# Patient Record
Sex: FEMALE | Race: WHITE | Employment: UNEMPLOYED | URBAN - METROPOLITAN AREA
[De-identification: names, ages, dates, MRNs, and addresses within clinical notes are randomized per-mention and may not be internally consistent; named-entity substitution may affect disease eponyms.]

---

## 2017-12-27 PROBLEM — R04.0 ACUTE ANTERIOR EPISTAXIS: Status: ACTIVE | Noted: 2017-12-27

## 2017-12-29 PROBLEM — D62 ANEMIA DUE TO ACUTE BLOOD LOSS: Status: ACTIVE | Noted: 2017-12-29

## 2017-12-29 PROBLEM — N18.30 CKD (CHRONIC KIDNEY DISEASE) STAGE 3, GFR 30-59 ML/MIN (HCC): Status: ACTIVE | Noted: 2017-12-29

## 2017-12-29 PROBLEM — I48.20 CHRONIC ATRIAL FIBRILLATION (HCC): Status: ACTIVE | Noted: 2017-12-29

## 2017-12-30 PROBLEM — E87.70 VOLUME OVERLOAD: Status: ACTIVE | Noted: 2017-12-30

## 2018-02-13 PROBLEM — E78.2 MIXED HYPERLIPIDEMIA: Status: ACTIVE | Noted: 2018-02-13

## 2018-02-13 PROBLEM — Z92.89 HISTORY OF ECHOCARDIOGRAM: Status: ACTIVE | Noted: 2018-02-13

## 2018-04-25 ENCOUNTER — HOSPITAL ENCOUNTER (INPATIENT)
Age: 83
LOS: 13 days | Discharge: NURSING, REHAB OR LONG-TERM CARE | DRG: 981 | End: 2018-05-08
Attending: EMERGENCY MEDICINE | Admitting: INTERNAL MEDICINE
Payer: MEDICARE

## 2018-04-25 DIAGNOSIS — D64.9 ANEMIA, UNSPECIFIED TYPE: ICD-10-CM

## 2018-04-25 DIAGNOSIS — R04.0 EPISTAXIS: Primary | ICD-10-CM

## 2018-04-25 LAB
ANISOCYTOSIS: ABNORMAL
APTT: 27.9 SEC (ref 24.5–35.1)
BASOPHILS ABSOLUTE: 0.01 E9/L (ref 0–0.2)
BASOPHILS RELATIVE PERCENT: 0.1 % (ref 0–2)
EOSINOPHILS ABSOLUTE: 0.1 E9/L (ref 0.05–0.5)
EOSINOPHILS RELATIVE PERCENT: 1.5 % (ref 0–6)
HCT VFR BLD CALC: 29.1 % (ref 34–48)
HEMOGLOBIN: 8.4 G/DL (ref 11.5–15.5)
HYPOCHROMIA: ABNORMAL
IMMATURE GRANULOCYTES #: 0.02 E9/L
IMMATURE GRANULOCYTES %: 0.3 % (ref 0–5)
INR BLD: 1.1
LYMPHOCYTES ABSOLUTE: 0.81 E9/L (ref 1.5–4)
LYMPHOCYTES RELATIVE PERCENT: 12 % (ref 20–42)
MCH RBC QN AUTO: 23.3 PG (ref 26–35)
MCHC RBC AUTO-ENTMCNC: 28.9 % (ref 32–34.5)
MCV RBC AUTO: 80.8 FL (ref 80–99.9)
MONOCYTES ABSOLUTE: 0.59 E9/L (ref 0.1–0.95)
MONOCYTES RELATIVE PERCENT: 8.7 % (ref 2–12)
NEUTROPHILS ABSOLUTE: 5.22 E9/L (ref 1.8–7.3)
NEUTROPHILS RELATIVE PERCENT: 77.4 % (ref 43–80)
OVALOCYTES: ABNORMAL
PDW BLD-RTO: 19.6 FL (ref 11.5–15)
PLATELET # BLD: 248 E9/L (ref 130–450)
PMV BLD AUTO: 9.7 FL (ref 7–12)
POIKILOCYTES: ABNORMAL
POLYCHROMASIA: ABNORMAL
PROTHROMBIN TIME: 13 SEC (ref 9.3–12.4)
RBC # BLD: 3.6 E12/L (ref 3.5–5.5)
WBC # BLD: 6.8 E9/L (ref 4.5–11.5)

## 2018-04-25 PROCEDURE — 96374 THER/PROPH/DIAG INJ IV PUSH: CPT

## 2018-04-25 PROCEDURE — 2700000000 HC OXYGEN THERAPY PER DAY

## 2018-04-25 PROCEDURE — 6370000000 HC RX 637 (ALT 250 FOR IP): Performed by: INTERNAL MEDICINE

## 2018-04-25 PROCEDURE — 6370000000 HC RX 637 (ALT 250 FOR IP): Performed by: EMERGENCY MEDICINE

## 2018-04-25 PROCEDURE — 6360000002 HC RX W HCPCS: Performed by: EMERGENCY MEDICINE

## 2018-04-25 PROCEDURE — 2500000003 HC RX 250 WO HCPCS: Performed by: EMERGENCY MEDICINE

## 2018-04-25 PROCEDURE — 30901 CONTROL OF NOSEBLEED: CPT

## 2018-04-25 PROCEDURE — 85730 THROMBOPLASTIN TIME PARTIAL: CPT

## 2018-04-25 PROCEDURE — 99285 EMERGENCY DEPT VISIT HI MDM: CPT

## 2018-04-25 PROCEDURE — 2580000003 HC RX 258: Performed by: INTERNAL MEDICINE

## 2018-04-25 PROCEDURE — 85610 PROTHROMBIN TIME: CPT

## 2018-04-25 PROCEDURE — 2060000000 HC ICU INTERMEDIATE R&B

## 2018-04-25 PROCEDURE — 85025 COMPLETE CBC W/AUTO DIFF WBC: CPT

## 2018-04-25 PROCEDURE — 2Y41X5Z PACKING OF NASAL REGION USING PACKING MATERIAL: ICD-10-PCS | Performed by: EMERGENCY MEDICINE

## 2018-04-25 RX ORDER — HYDRALAZINE HYDROCHLORIDE 10 MG/1
10 TABLET, FILM COATED ORAL 3 TIMES DAILY
Status: CANCELLED | OUTPATIENT
Start: 2018-04-25

## 2018-04-25 RX ORDER — FUROSEMIDE 20 MG/1
20 TABLET ORAL DAILY
Status: DISCONTINUED | OUTPATIENT
Start: 2018-04-26 | End: 2018-04-30

## 2018-04-25 RX ORDER — SODIUM CHLORIDE 0.9 % (FLUSH) 0.9 %
10 SYRINGE (ML) INJECTION PRN
Status: DISCONTINUED | OUTPATIENT
Start: 2018-04-25 | End: 2018-04-28 | Stop reason: SDUPTHER

## 2018-04-25 RX ORDER — CLONIDINE HYDROCHLORIDE 0.1 MG/1
0.1 TABLET ORAL ONCE
Status: DISCONTINUED | OUTPATIENT
Start: 2018-04-25 | End: 2018-05-02

## 2018-04-25 RX ORDER — TRANEXAMIC ACID 100 MG/ML
100 INJECTION, SOLUTION INTRAVENOUS ONCE
Status: COMPLETED | OUTPATIENT
Start: 2018-04-25 | End: 2018-04-25

## 2018-04-25 RX ORDER — ONDANSETRON 2 MG/ML
4 INJECTION INTRAMUSCULAR; INTRAVENOUS EVERY 6 HOURS PRN
Status: DISCONTINUED | OUTPATIENT
Start: 2018-04-25 | End: 2018-05-08 | Stop reason: HOSPADM

## 2018-04-25 RX ORDER — LORAZEPAM 2 MG/ML
1 INJECTION INTRAMUSCULAR ONCE
Status: COMPLETED | OUTPATIENT
Start: 2018-04-25 | End: 2018-04-25

## 2018-04-25 RX ORDER — GABAPENTIN 300 MG/1
600 CAPSULE ORAL NIGHTLY
Status: DISCONTINUED | OUTPATIENT
Start: 2018-04-25 | End: 2018-05-08 | Stop reason: HOSPADM

## 2018-04-25 RX ORDER — SIMVASTATIN 40 MG
40 TABLET ORAL NIGHTLY
Status: DISCONTINUED | OUTPATIENT
Start: 2018-04-25 | End: 2018-05-08 | Stop reason: HOSPADM

## 2018-04-25 RX ORDER — OXYMETAZOLINE HYDROCHLORIDE 0.05 G/100ML
2 SPRAY NASAL ONCE
Status: COMPLETED | OUTPATIENT
Start: 2018-04-25 | End: 2018-04-25

## 2018-04-25 RX ORDER — HYDROXYZINE HYDROCHLORIDE 10 MG/1
10 TABLET, FILM COATED ORAL 3 TIMES DAILY PRN
Status: CANCELLED | OUTPATIENT
Start: 2018-04-25

## 2018-04-25 RX ORDER — SODIUM CHLORIDE 0.9 % (FLUSH) 0.9 %
10 SYRINGE (ML) INJECTION EVERY 12 HOURS SCHEDULED
Status: DISCONTINUED | OUTPATIENT
Start: 2018-04-25 | End: 2018-05-08 | Stop reason: HOSPADM

## 2018-04-25 RX ORDER — FUROSEMIDE 40 MG/1
20 TABLET ORAL DAILY
Status: CANCELLED | OUTPATIENT
Start: 2018-04-25

## 2018-04-25 RX ORDER — HYDROXYZINE HYDROCHLORIDE 10 MG/1
10 TABLET, FILM COATED ORAL 3 TIMES DAILY PRN
Status: DISCONTINUED | OUTPATIENT
Start: 2018-04-25 | End: 2018-05-08 | Stop reason: HOSPADM

## 2018-04-25 RX ORDER — LISINOPRIL 10 MG/1
10 TABLET ORAL DAILY
Status: DISCONTINUED | OUTPATIENT
Start: 2018-04-26 | End: 2018-04-27

## 2018-04-25 RX ORDER — PANTOPRAZOLE SODIUM 40 MG/1
40 TABLET, DELAYED RELEASE ORAL
Status: DISCONTINUED | OUTPATIENT
Start: 2018-04-26 | End: 2018-04-29

## 2018-04-25 RX ORDER — INSULIN GLARGINE 100 [IU]/ML
60 INJECTION, SOLUTION SUBCUTANEOUS NIGHTLY
Status: DISCONTINUED | OUTPATIENT
Start: 2018-04-25 | End: 2018-05-08

## 2018-04-25 RX ORDER — NORTRIPTYLINE HYDROCHLORIDE 10 MG/1
10 CAPSULE ORAL NIGHTLY
Status: CANCELLED | OUTPATIENT
Start: 2018-04-25

## 2018-04-25 RX ORDER — METOPROLOL SUCCINATE 100 MG/1
100 TABLET, EXTENDED RELEASE ORAL DAILY
Status: DISCONTINUED | OUTPATIENT
Start: 2018-04-26 | End: 2018-04-30

## 2018-04-25 RX ORDER — SIMVASTATIN 40 MG
40 TABLET ORAL NIGHTLY
Status: CANCELLED | OUTPATIENT
Start: 2018-04-25

## 2018-04-25 RX ORDER — LEVOTHYROXINE SODIUM 0.03 MG/1
25 TABLET ORAL DAILY
Status: DISCONTINUED | OUTPATIENT
Start: 2018-04-26 | End: 2018-05-08 | Stop reason: HOSPADM

## 2018-04-25 RX ORDER — OXYMETAZOLINE HYDROCHLORIDE 0.05 G/100ML
2 SPRAY NASAL ONCE
Status: DISCONTINUED | OUTPATIENT
Start: 2018-04-25 | End: 2018-04-25

## 2018-04-25 RX ORDER — GABAPENTIN 300 MG/1
600 CAPSULE ORAL NIGHTLY
Status: CANCELLED | OUTPATIENT
Start: 2018-04-25

## 2018-04-25 RX ORDER — PANTOPRAZOLE SODIUM 40 MG/1
40 TABLET, DELAYED RELEASE ORAL
Status: CANCELLED | OUTPATIENT
Start: 2018-04-26

## 2018-04-25 RX ORDER — LISINOPRIL 10 MG/1
10 TABLET ORAL DAILY
Status: CANCELLED | OUTPATIENT
Start: 2018-04-25

## 2018-04-25 RX ORDER — METOPROLOL SUCCINATE 100 MG/1
100 TABLET, EXTENDED RELEASE ORAL DAILY
Status: CANCELLED | OUTPATIENT
Start: 2018-04-25

## 2018-04-25 RX ORDER — LEVOTHYROXINE SODIUM 0.03 MG/1
25 TABLET ORAL DAILY
Status: CANCELLED | OUTPATIENT
Start: 2018-04-25

## 2018-04-25 RX ORDER — HYDRALAZINE HYDROCHLORIDE 10 MG/1
10 TABLET, FILM COATED ORAL 3 TIMES DAILY
Status: DISCONTINUED | OUTPATIENT
Start: 2018-04-25 | End: 2018-04-28

## 2018-04-25 RX ORDER — CALCIUM CARBONATE 500(1250)
500 TABLET ORAL DAILY
Status: DISCONTINUED | OUTPATIENT
Start: 2018-04-26 | End: 2018-05-08 | Stop reason: HOSPADM

## 2018-04-25 RX ORDER — NORTRIPTYLINE HYDROCHLORIDE 10 MG/1
10 CAPSULE ORAL NIGHTLY
Status: DISCONTINUED | OUTPATIENT
Start: 2018-04-25 | End: 2018-05-08 | Stop reason: HOSPADM

## 2018-04-25 RX ORDER — CALCIUM CARBONATE 500(1250)
500 TABLET ORAL DAILY
Status: CANCELLED | OUTPATIENT
Start: 2018-04-25

## 2018-04-25 RX ADMIN — OXYMETAZOLINE HYDROCHLORIDE 2 SPRAY: 5 SPRAY NASAL at 16:43

## 2018-04-25 RX ADMIN — HYDROXYZINE HYDROCHLORIDE 10 MG: 10 TABLET, FILM COATED ORAL at 22:08

## 2018-04-25 RX ADMIN — Medication 10 ML: at 22:08

## 2018-04-25 RX ADMIN — GABAPENTIN 600 MG: 300 CAPSULE ORAL at 22:07

## 2018-04-25 RX ADMIN — NORTRIPTYLINE HYDROCHLORIDE 10 MG: 10 CAPSULE ORAL at 22:07

## 2018-04-25 RX ADMIN — SIMVASTATIN 40 MG: 40 TABLET, FILM COATED ORAL at 22:07

## 2018-04-25 RX ADMIN — COCAINE HYDROCHLORIDE: 40 SOLUTION TOPICAL at 16:43

## 2018-04-25 RX ADMIN — INSULIN GLARGINE 60 UNITS: 100 INJECTION, SOLUTION SUBCUTANEOUS at 22:10

## 2018-04-25 RX ADMIN — TRANEXAMIC ACID 100 MG: 100 INJECTION, SOLUTION INTRAVENOUS at 19:42

## 2018-04-25 RX ADMIN — HYDRALAZINE HYDROCHLORIDE 10 MG: 10 TABLET, FILM COATED ORAL at 22:07

## 2018-04-25 RX ADMIN — LORAZEPAM 1 MG: 2 INJECTION, SOLUTION INTRAMUSCULAR; INTRAVENOUS at 16:43

## 2018-04-25 ASSESSMENT — ENCOUNTER SYMPTOMS
SORE THROAT: 0
COUGH: 0
SHORTNESS OF BREATH: 0
VOMITING: 0
BACK PAIN: 0
CHEST TIGHTNESS: 0
ABDOMINAL PAIN: 0
DIARRHEA: 0
RHINORRHEA: 0
NAUSEA: 0

## 2018-04-26 ENCOUNTER — APPOINTMENT (OUTPATIENT)
Dept: GENERAL RADIOLOGY | Age: 83
DRG: 981 | End: 2018-04-26
Payer: MEDICARE

## 2018-04-26 PROBLEM — D62 ACUTE BLOOD LOSS ANEMIA: Status: ACTIVE | Noted: 2018-04-26

## 2018-04-26 PROBLEM — E78.2 MIXED HYPERLIPIDEMIA: Status: RESOLVED | Noted: 2018-02-13 | Resolved: 2018-04-26

## 2018-04-26 LAB
ALBUMIN SERPL-MCNC: 3.4 G/DL (ref 3.5–5.2)
ALP BLD-CCNC: 81 U/L (ref 35–104)
ALT SERPL-CCNC: 13 U/L (ref 0–32)
ANION GAP SERPL CALCULATED.3IONS-SCNC: 12 MMOL/L (ref 7–16)
ANISOCYTOSIS: ABNORMAL
AST SERPL-CCNC: 20 U/L (ref 0–31)
BASOPHILS ABSOLUTE: 0 E9/L (ref 0–0.2)
BASOPHILS RELATIVE PERCENT: 0 % (ref 0–2)
BILIRUB SERPL-MCNC: 0.5 MG/DL (ref 0–1.2)
BUN BLDV-MCNC: 29 MG/DL (ref 8–23)
CALCIUM SERPL-MCNC: 9.1 MG/DL (ref 8.6–10.2)
CHLORIDE BLD-SCNC: 101 MMOL/L (ref 98–107)
CO2: 28 MMOL/L (ref 22–29)
CREAT SERPL-MCNC: 1.5 MG/DL (ref 0.5–1)
EOSINOPHILS ABSOLUTE: 0.2 E9/L (ref 0.05–0.5)
EOSINOPHILS RELATIVE PERCENT: 4.4 % (ref 0–6)
GFR AFRICAN AMERICAN: 40
GFR NON-AFRICAN AMERICAN: 33 ML/MIN/1.73
GLUCOSE BLD-MCNC: 127 MG/DL (ref 74–109)
HCT VFR BLD CALC: 26.2 % (ref 34–48)
HCT VFR BLD CALC: 26.3 % (ref 34–48)
HCT VFR BLD CALC: 26.6 % (ref 34–48)
HEMOGLOBIN: 7.6 G/DL (ref 11.5–15.5)
HEMOGLOBIN: 7.6 G/DL (ref 11.5–15.5)
HEMOGLOBIN: 7.7 G/DL (ref 11.5–15.5)
HYPOCHROMIA: ABNORMAL
LYMPHOCYTES ABSOLUTE: 0.78 E9/L (ref 1.5–4)
LYMPHOCYTES RELATIVE PERCENT: 16.5 % (ref 20–42)
MCH RBC QN AUTO: 23.6 PG (ref 26–35)
MCHC RBC AUTO-ENTMCNC: 28.9 % (ref 32–34.5)
MCV RBC AUTO: 81.7 FL (ref 80–99.9)
METER GLUCOSE: 295 MG/DL (ref 70–110)
MONOCYTES ABSOLUTE: 0.23 E9/L (ref 0.1–0.95)
MONOCYTES RELATIVE PERCENT: 5.2 % (ref 2–12)
NEUTROPHILS ABSOLUTE: 3.4 E9/L (ref 1.8–7.3)
NEUTROPHILS RELATIVE PERCENT: 73.9 % (ref 43–80)
NUCLEATED RED BLOOD CELLS: 0 /100 WBC
OVALOCYTES: ABNORMAL
PDW BLD-RTO: 19.5 FL (ref 11.5–15)
PLATELET # BLD: 217 E9/L (ref 130–450)
PMV BLD AUTO: 9.4 FL (ref 7–12)
POIKILOCYTES: ABNORMAL
POLYCHROMASIA: ABNORMAL
POTASSIUM REFLEX MAGNESIUM: 4.8 MMOL/L (ref 3.5–5)
RBC # BLD: 3.22 E12/L (ref 3.5–5.5)
SODIUM BLD-SCNC: 141 MMOL/L (ref 132–146)
TARGET CELLS: ABNORMAL
TOTAL PROTEIN: 6.3 G/DL (ref 6.4–8.3)
WBC # BLD: 4.6 E9/L (ref 4.5–11.5)

## 2018-04-26 PROCEDURE — 80053 COMPREHEN METABOLIC PANEL: CPT

## 2018-04-26 PROCEDURE — 97535 SELF CARE MNGMENT TRAINING: CPT

## 2018-04-26 PROCEDURE — G8988 SELF CARE GOAL STATUS: HCPCS

## 2018-04-26 PROCEDURE — 6370000000 HC RX 637 (ALT 250 FOR IP): Performed by: OTOLARYNGOLOGY

## 2018-04-26 PROCEDURE — 36415 COLL VENOUS BLD VENIPUNCTURE: CPT

## 2018-04-26 PROCEDURE — 85025 COMPLETE CBC W/AUTO DIFF WBC: CPT

## 2018-04-26 PROCEDURE — G8987 SELF CARE CURRENT STATUS: HCPCS

## 2018-04-26 PROCEDURE — 2580000003 HC RX 258: Performed by: INTERNAL MEDICINE

## 2018-04-26 PROCEDURE — 82962 GLUCOSE BLOOD TEST: CPT

## 2018-04-26 PROCEDURE — 94664 DEMO&/EVAL PT USE INHALER: CPT

## 2018-04-26 PROCEDURE — 2700000000 HC OXYGEN THERAPY PER DAY

## 2018-04-26 PROCEDURE — 85018 HEMOGLOBIN: CPT

## 2018-04-26 PROCEDURE — 97165 OT EVAL LOW COMPLEX 30 MIN: CPT

## 2018-04-26 PROCEDURE — 6370000000 HC RX 637 (ALT 250 FOR IP): Performed by: INTERNAL MEDICINE

## 2018-04-26 PROCEDURE — 85014 HEMATOCRIT: CPT

## 2018-04-26 PROCEDURE — 71046 X-RAY EXAM CHEST 2 VIEWS: CPT

## 2018-04-26 PROCEDURE — 97161 PT EVAL LOW COMPLEX 20 MIN: CPT

## 2018-04-26 PROCEDURE — 2060000000 HC ICU INTERMEDIATE R&B

## 2018-04-26 PROCEDURE — 94640 AIRWAY INHALATION TREATMENT: CPT

## 2018-04-26 RX ORDER — NICOTINE POLACRILEX 4 MG
15 LOZENGE BUCCAL PRN
Status: DISCONTINUED | OUTPATIENT
Start: 2018-04-26 | End: 2018-04-28 | Stop reason: SDUPTHER

## 2018-04-26 RX ORDER — IPRATROPIUM BROMIDE AND ALBUTEROL SULFATE 2.5; .5 MG/3ML; MG/3ML
1 SOLUTION RESPIRATORY (INHALATION)
Status: DISCONTINUED | OUTPATIENT
Start: 2018-04-26 | End: 2018-05-08 | Stop reason: HOSPADM

## 2018-04-26 RX ORDER — DEXTROSE MONOHYDRATE 50 MG/ML
100 INJECTION, SOLUTION INTRAVENOUS PRN
Status: DISCONTINUED | OUTPATIENT
Start: 2018-04-26 | End: 2018-04-28 | Stop reason: SDUPTHER

## 2018-04-26 RX ORDER — DEXTROSE MONOHYDRATE 25 G/50ML
12.5 INJECTION, SOLUTION INTRAVENOUS PRN
Status: DISCONTINUED | OUTPATIENT
Start: 2018-04-26 | End: 2018-04-28 | Stop reason: SDUPTHER

## 2018-04-26 RX ORDER — SULFAMETHOXAZOLE AND TRIMETHOPRIM 800; 160 MG/1; MG/1
1 TABLET ORAL DAILY
Status: DISCONTINUED | OUTPATIENT
Start: 2018-04-26 | End: 2018-05-02

## 2018-04-26 RX ORDER — SULFAMETHOXAZOLE AND TRIMETHOPRIM 800; 160 MG/1; MG/1
1 TABLET ORAL EVERY 12 HOURS SCHEDULED
Status: DISCONTINUED | OUTPATIENT
Start: 2018-04-26 | End: 2018-04-26 | Stop reason: DRUGHIGH

## 2018-04-26 RX ADMIN — METOPROLOL SUCCINATE 100 MG: 100 TABLET, FILM COATED, EXTENDED RELEASE ORAL at 09:11

## 2018-04-26 RX ADMIN — Medication 10 ML: at 19:58

## 2018-04-26 RX ADMIN — INSULIN GLARGINE 60 UNITS: 100 INJECTION, SOLUTION SUBCUTANEOUS at 20:56

## 2018-04-26 RX ADMIN — NORTRIPTYLINE HYDROCHLORIDE 10 MG: 10 CAPSULE ORAL at 19:57

## 2018-04-26 RX ADMIN — HYDRALAZINE HYDROCHLORIDE 10 MG: 10 TABLET, FILM COATED ORAL at 15:30

## 2018-04-26 RX ADMIN — LEVOTHYROXINE SODIUM 25 MCG: 25 TABLET ORAL at 05:18

## 2018-04-26 RX ADMIN — SIMVASTATIN 40 MG: 40 TABLET, FILM COATED ORAL at 19:57

## 2018-04-26 RX ADMIN — IPRATROPIUM BROMIDE AND ALBUTEROL SULFATE 1 AMPULE: .5; 3 SOLUTION RESPIRATORY (INHALATION) at 11:56

## 2018-04-26 RX ADMIN — Medication 10 ML: at 17:39

## 2018-04-26 RX ADMIN — HYDRALAZINE HYDROCHLORIDE 10 MG: 10 TABLET, FILM COATED ORAL at 09:11

## 2018-04-26 RX ADMIN — Medication 500 MG: at 09:11

## 2018-04-26 RX ADMIN — GABAPENTIN 600 MG: 300 CAPSULE ORAL at 19:57

## 2018-04-26 RX ADMIN — IPRATROPIUM BROMIDE AND ALBUTEROL SULFATE 1 AMPULE: .5; 3 SOLUTION RESPIRATORY (INHALATION) at 15:30

## 2018-04-26 RX ADMIN — LISINOPRIL 10 MG: 10 TABLET ORAL at 09:11

## 2018-04-26 RX ADMIN — PANTOPRAZOLE SODIUM 40 MG: 40 TABLET, DELAYED RELEASE ORAL at 05:18

## 2018-04-26 RX ADMIN — PANTOPRAZOLE SODIUM 40 MG: 40 TABLET, DELAYED RELEASE ORAL at 17:04

## 2018-04-26 RX ADMIN — HYDRALAZINE HYDROCHLORIDE 10 MG: 10 TABLET, FILM COATED ORAL at 19:57

## 2018-04-26 RX ADMIN — FUROSEMIDE 20 MG: 20 TABLET ORAL at 09:11

## 2018-04-26 RX ADMIN — SULFAMETHOXAZOLE AND TRIMETHOPRIM 1 TABLET: 800; 160 TABLET ORAL at 11:31

## 2018-04-26 RX ADMIN — IPRATROPIUM BROMIDE AND ALBUTEROL SULFATE 1 AMPULE: .5; 3 SOLUTION RESPIRATORY (INHALATION) at 20:16

## 2018-04-26 RX ADMIN — Medication 10 ML: at 09:12

## 2018-04-26 ASSESSMENT — PAIN SCALES - GENERAL
PAINLEVEL_OUTOF10: 0
PAINLEVEL_OUTOF10: 0

## 2018-04-27 ENCOUNTER — APPOINTMENT (OUTPATIENT)
Dept: CT IMAGING | Age: 83
DRG: 981 | End: 2018-04-27
Payer: MEDICARE

## 2018-04-27 ENCOUNTER — ANESTHESIA EVENT (OUTPATIENT)
Dept: OPERATING ROOM | Age: 83
DRG: 981 | End: 2018-04-27
Payer: MEDICARE

## 2018-04-27 LAB
ABO/RH: NORMAL
ALBUMIN SERPL-MCNC: 3.6 G/DL (ref 3.5–5.2)
ALP BLD-CCNC: 85 U/L (ref 35–104)
ALT SERPL-CCNC: 13 U/L (ref 0–32)
ANION GAP SERPL CALCULATED.3IONS-SCNC: 10 MMOL/L (ref 7–16)
ANION GAP SERPL CALCULATED.3IONS-SCNC: 9 MMOL/L (ref 7–16)
ANION GAP SERPL CALCULATED.3IONS-SCNC: 9 MMOL/L (ref 7–16)
ANISOCYTOSIS: ABNORMAL
ANTIBODY SCREEN: NORMAL
AST SERPL-CCNC: 19 U/L (ref 0–31)
BASOPHILS ABSOLUTE: 0.02 E9/L (ref 0–0.2)
BASOPHILS RELATIVE PERCENT: 0.3 % (ref 0–2)
BILIRUB SERPL-MCNC: 0.2 MG/DL (ref 0–1.2)
BUN BLDV-MCNC: 40 MG/DL (ref 8–23)
BUN BLDV-MCNC: 41 MG/DL (ref 8–23)
BUN BLDV-MCNC: 46 MG/DL (ref 8–23)
CALCIUM SERPL-MCNC: 8.9 MG/DL (ref 8.6–10.2)
CHLORIDE BLD-SCNC: 98 MMOL/L (ref 98–107)
CHLORIDE BLD-SCNC: 98 MMOL/L (ref 98–107)
CHLORIDE BLD-SCNC: 99 MMOL/L (ref 98–107)
CO2: 29 MMOL/L (ref 22–29)
CO2: 30 MMOL/L (ref 22–29)
CO2: 30 MMOL/L (ref 22–29)
CREAT SERPL-MCNC: 1.9 MG/DL (ref 0.5–1)
CREAT SERPL-MCNC: 2 MG/DL (ref 0.5–1)
CREAT SERPL-MCNC: 2.2 MG/DL (ref 0.5–1)
EOSINOPHILS ABSOLUTE: 0.17 E9/L (ref 0.05–0.5)
EOSINOPHILS RELATIVE PERCENT: 2.3 % (ref 0–6)
GFR AFRICAN AMERICAN: 26
GFR AFRICAN AMERICAN: 29
GFR AFRICAN AMERICAN: 31
GFR NON-AFRICAN AMERICAN: 21 ML/MIN/1.73
GFR NON-AFRICAN AMERICAN: 24 ML/MIN/1.73
GFR NON-AFRICAN AMERICAN: 25 ML/MIN/1.73
GLUCOSE BLD-MCNC: 256 MG/DL (ref 74–109)
GLUCOSE BLD-MCNC: 266 MG/DL (ref 74–109)
GLUCOSE BLD-MCNC: 277 MG/DL (ref 74–109)
HCT VFR BLD CALC: 28 % (ref 34–48)
HCT VFR BLD CALC: 29.3 % (ref 34–48)
HEMOGLOBIN: 7.7 G/DL (ref 11.5–15.5)
HEMOGLOBIN: 8.6 G/DL (ref 11.5–15.5)
HYPOCHROMIA: ABNORMAL
IMMATURE GRANULOCYTES #: 0.05 E9/L
IMMATURE GRANULOCYTES %: 0.7 % (ref 0–5)
LYMPHOCYTES ABSOLUTE: 0.81 E9/L (ref 1.5–4)
LYMPHOCYTES RELATIVE PERCENT: 11.2 % (ref 20–42)
MCH RBC QN AUTO: 23.1 PG (ref 26–35)
MCH RBC QN AUTO: 24.8 PG (ref 26–35)
MCHC RBC AUTO-ENTMCNC: 27.5 % (ref 32–34.5)
MCHC RBC AUTO-ENTMCNC: 29.4 % (ref 32–34.5)
MCV RBC AUTO: 83.8 FL (ref 80–99.9)
MCV RBC AUTO: 84.4 FL (ref 80–99.9)
METER GLUCOSE: 259 MG/DL (ref 70–110)
MONOCYTES ABSOLUTE: 0.97 E9/L (ref 0.1–0.95)
MONOCYTES RELATIVE PERCENT: 13.4 % (ref 2–12)
NEUTROPHILS ABSOLUTE: 5.22 E9/L (ref 1.8–7.3)
NEUTROPHILS RELATIVE PERCENT: 72.1 % (ref 43–80)
OVALOCYTES: ABNORMAL
PDW BLD-RTO: 19.1 FL (ref 11.5–15)
PDW BLD-RTO: 19.5 FL (ref 11.5–15)
PLATELET # BLD: 232 E9/L (ref 130–450)
PLATELET # BLD: 237 E9/L (ref 130–450)
PMV BLD AUTO: 10.2 FL (ref 7–12)
PMV BLD AUTO: 10.4 FL (ref 7–12)
POIKILOCYTES: ABNORMAL
POLYCHROMASIA: ABNORMAL
POTASSIUM REFLEX MAGNESIUM: 5.3 MMOL/L (ref 3.5–5)
POTASSIUM SERPL-SCNC: 5.2 MMOL/L (ref 3.5–5)
POTASSIUM SERPL-SCNC: 5.2 MMOL/L (ref 3.5–5)
PRO-BNP: 3112 PG/ML (ref 0–450)
RBC # BLD: 3.34 E12/L (ref 3.5–5.5)
RBC # BLD: 3.47 E12/L (ref 3.5–5.5)
SODIUM BLD-SCNC: 136 MMOL/L (ref 132–146)
SODIUM BLD-SCNC: 138 MMOL/L (ref 132–146)
SODIUM BLD-SCNC: 138 MMOL/L (ref 132–146)
TOTAL PROTEIN: 6.9 G/DL (ref 6.4–8.3)
WBC # BLD: 7.2 E9/L (ref 4.5–11.5)
WBC # BLD: 9.2 E9/L (ref 4.5–11.5)

## 2018-04-27 PROCEDURE — 36415 COLL VENOUS BLD VENIPUNCTURE: CPT

## 2018-04-27 PROCEDURE — 80048 BASIC METABOLIC PNL TOTAL CA: CPT

## 2018-04-27 PROCEDURE — 2580000003 HC RX 258: Performed by: INTERNAL MEDICINE

## 2018-04-27 PROCEDURE — 6370000000 HC RX 637 (ALT 250 FOR IP): Performed by: INTERNAL MEDICINE

## 2018-04-27 PROCEDURE — 85025 COMPLETE CBC W/AUTO DIFF WBC: CPT

## 2018-04-27 PROCEDURE — 84156 ASSAY OF PROTEIN URINE: CPT

## 2018-04-27 PROCEDURE — 36430 TRANSFUSION BLD/BLD COMPNT: CPT

## 2018-04-27 PROCEDURE — 82962 GLUCOSE BLOOD TEST: CPT

## 2018-04-27 PROCEDURE — 6370000000 HC RX 637 (ALT 250 FOR IP): Performed by: OTOLARYNGOLOGY

## 2018-04-27 PROCEDURE — 86900 BLOOD TYPING SEROLOGIC ABO: CPT

## 2018-04-27 PROCEDURE — 80053 COMPREHEN METABOLIC PANEL: CPT

## 2018-04-27 PROCEDURE — 82570 ASSAY OF URINE CREATININE: CPT

## 2018-04-27 PROCEDURE — 94640 AIRWAY INHALATION TREATMENT: CPT

## 2018-04-27 PROCEDURE — 84300 ASSAY OF URINE SODIUM: CPT

## 2018-04-27 PROCEDURE — 86923 COMPATIBILITY TEST ELECTRIC: CPT

## 2018-04-27 PROCEDURE — 85027 COMPLETE CBC AUTOMATED: CPT

## 2018-04-27 PROCEDURE — 82436 ASSAY OF URINE CHLORIDE: CPT

## 2018-04-27 PROCEDURE — 71250 CT THORAX DX C-: CPT

## 2018-04-27 PROCEDURE — 83880 ASSAY OF NATRIURETIC PEPTIDE: CPT

## 2018-04-27 PROCEDURE — 2060000000 HC ICU INTERMEDIATE R&B

## 2018-04-27 PROCEDURE — P9040 RBC LEUKOREDUCED IRRADIATED: HCPCS

## 2018-04-27 PROCEDURE — 86850 RBC ANTIBODY SCREEN: CPT

## 2018-04-27 PROCEDURE — 86901 BLOOD TYPING SEROLOGIC RH(D): CPT

## 2018-04-27 RX ORDER — SODIUM CHLORIDE 9 MG/ML
INJECTION, SOLUTION INTRAVENOUS CONTINUOUS
Status: DISCONTINUED | OUTPATIENT
Start: 2018-04-27 | End: 2018-04-30

## 2018-04-27 RX ORDER — 0.9 % SODIUM CHLORIDE 0.9 %
250 INTRAVENOUS SOLUTION INTRAVENOUS ONCE
Status: COMPLETED | OUTPATIENT
Start: 2018-04-27 | End: 2018-04-28

## 2018-04-27 RX ADMIN — IPRATROPIUM BROMIDE AND ALBUTEROL SULFATE 1 AMPULE: .5; 3 SOLUTION RESPIRATORY (INHALATION) at 19:31

## 2018-04-27 RX ADMIN — METOPROLOL SUCCINATE 100 MG: 100 TABLET, FILM COATED, EXTENDED RELEASE ORAL at 08:41

## 2018-04-27 RX ADMIN — HYDRALAZINE HYDROCHLORIDE 10 MG: 10 TABLET, FILM COATED ORAL at 08:42

## 2018-04-27 RX ADMIN — Medication 10 ML: at 11:01

## 2018-04-27 RX ADMIN — HYDRALAZINE HYDROCHLORIDE 10 MG: 10 TABLET, FILM COATED ORAL at 14:01

## 2018-04-27 RX ADMIN — PANTOPRAZOLE SODIUM 40 MG: 40 TABLET, DELAYED RELEASE ORAL at 18:01

## 2018-04-27 RX ADMIN — IPRATROPIUM BROMIDE AND ALBUTEROL SULFATE 1 AMPULE: .5; 3 SOLUTION RESPIRATORY (INHALATION) at 16:07

## 2018-04-27 RX ADMIN — GABAPENTIN 600 MG: 300 CAPSULE ORAL at 20:21

## 2018-04-27 RX ADMIN — HYDRALAZINE HYDROCHLORIDE 10 MG: 10 TABLET, FILM COATED ORAL at 20:21

## 2018-04-27 RX ADMIN — LISINOPRIL 10 MG: 10 TABLET ORAL at 08:42

## 2018-04-27 RX ADMIN — LEVOTHYROXINE SODIUM 25 MCG: 25 TABLET ORAL at 05:41

## 2018-04-27 RX ADMIN — NORTRIPTYLINE HYDROCHLORIDE 10 MG: 10 CAPSULE ORAL at 20:21

## 2018-04-27 RX ADMIN — IPRATROPIUM BROMIDE AND ALBUTEROL SULFATE 1 AMPULE: .5; 3 SOLUTION RESPIRATORY (INHALATION) at 11:47

## 2018-04-27 RX ADMIN — IPRATROPIUM BROMIDE AND ALBUTEROL SULFATE 1 AMPULE: .5; 3 SOLUTION RESPIRATORY (INHALATION) at 07:46

## 2018-04-27 RX ADMIN — PANTOPRAZOLE SODIUM 40 MG: 40 TABLET, DELAYED RELEASE ORAL at 05:41

## 2018-04-27 RX ADMIN — SIMVASTATIN 40 MG: 40 TABLET, FILM COATED ORAL at 20:22

## 2018-04-27 RX ADMIN — SODIUM CHLORIDE: 9 INJECTION, SOLUTION INTRAVENOUS at 18:01

## 2018-04-27 RX ADMIN — Medication 10 ML: at 08:42

## 2018-04-27 RX ADMIN — FUROSEMIDE 20 MG: 20 TABLET ORAL at 08:42

## 2018-04-27 RX ADMIN — Medication 500 MG: at 08:42

## 2018-04-27 RX ADMIN — SULFAMETHOXAZOLE AND TRIMETHOPRIM 1 TABLET: 800; 160 TABLET ORAL at 08:42

## 2018-04-27 RX ADMIN — SODIUM CHLORIDE 250 ML: 9 INJECTION, SOLUTION INTRAVENOUS at 13:51

## 2018-04-27 ASSESSMENT — PAIN SCALES - GENERAL
PAINLEVEL_OUTOF10: 0

## 2018-04-28 ENCOUNTER — APPOINTMENT (OUTPATIENT)
Dept: ULTRASOUND IMAGING | Age: 83
DRG: 981 | End: 2018-04-28
Payer: MEDICARE

## 2018-04-28 ENCOUNTER — APPOINTMENT (OUTPATIENT)
Dept: GENERAL RADIOLOGY | Age: 83
DRG: 981 | End: 2018-04-28
Payer: MEDICARE

## 2018-04-28 ENCOUNTER — ANESTHESIA (OUTPATIENT)
Dept: OPERATING ROOM | Age: 83
DRG: 981 | End: 2018-04-28
Payer: MEDICARE

## 2018-04-28 VITALS — DIASTOLIC BLOOD PRESSURE: 50 MMHG | OXYGEN SATURATION: 92 % | SYSTOLIC BLOOD PRESSURE: 86 MMHG

## 2018-04-28 LAB
AADO2: 292.6 MMHG
AADO2: 451.6 MMHG
ALBUMIN SERPL-MCNC: 3.2 G/DL (ref 3.5–5.2)
ALP BLD-CCNC: 78 U/L (ref 35–104)
ALT SERPL-CCNC: 11 U/L (ref 0–32)
ANION GAP SERPL CALCULATED.3IONS-SCNC: 10 MMOL/L (ref 7–16)
ANION GAP SERPL CALCULATED.3IONS-SCNC: 15 MMOL/L (ref 7–16)
AST SERPL-CCNC: 17 U/L (ref 0–31)
B.E.: 0.6 MMOL/L (ref -3–3)
B.E.: 1.8 MMOL/L (ref -3–3)
BASOPHILS ABSOLUTE: 0.01 E9/L (ref 0–0.2)
BASOPHILS RELATIVE PERCENT: 0.1 % (ref 0–2)
BILIRUB SERPL-MCNC: 0.3 MG/DL (ref 0–1.2)
BLOOD BANK DISPENSE STATUS: NORMAL
BLOOD BANK PRODUCT CODE: NORMAL
BPU ID: NORMAL
BUN BLDV-MCNC: 49 MG/DL (ref 8–23)
BUN BLDV-MCNC: 51 MG/DL (ref 8–23)
CALCIUM SERPL-MCNC: 8.7 MG/DL (ref 8.6–10.2)
CALCIUM SERPL-MCNC: 8.8 MG/DL (ref 8.6–10.2)
CHLORIDE BLD-SCNC: 97 MMOL/L (ref 98–107)
CHLORIDE BLD-SCNC: 99 MMOL/L (ref 98–107)
CHLORIDE URINE RANDOM: <20 MMOL/L
CO2: 25 MMOL/L (ref 22–29)
CO2: 28 MMOL/L (ref 22–29)
COHB: 0.3 % (ref 0–1.5)
COHB: 0.3 % (ref 0–1.5)
COMMENT: ABNORMAL
CREAT SERPL-MCNC: 2.3 MG/DL (ref 0.5–1)
CREAT SERPL-MCNC: 2.5 MG/DL (ref 0.5–1)
CREATININE URINE: 123 MG/DL (ref 29–226)
CRITICAL: ABNORMAL
CRITICAL: ABNORMAL
DATE ANALYZED: ABNORMAL
DATE ANALYZED: ABNORMAL
DATE OF COLLECTION: ABNORMAL
DATE OF COLLECTION: ABNORMAL
DESCRIPTION BLOOD BANK: NORMAL
EOSINOPHILS ABSOLUTE: 0.14 E9/L (ref 0.05–0.5)
EOSINOPHILS RELATIVE PERCENT: 1.9 % (ref 0–6)
FIO2: 100 %
FIO2: 60 %
GFR AFRICAN AMERICAN: 22
GFR AFRICAN AMERICAN: 25
GFR NON-AFRICAN AMERICAN: 18 ML/MIN/1.73
GFR NON-AFRICAN AMERICAN: 20 ML/MIN/1.73
GLUCOSE BLD-MCNC: 278 MG/DL (ref 74–109)
GLUCOSE BLD-MCNC: 315 MG/DL (ref 74–109)
HBA1C MFR BLD: 7 % (ref 4.8–5.9)
HCO3: 26.7 MMOL/L (ref 22–26)
HCO3: 28.9 MMOL/L (ref 22–26)
HCT VFR BLD CALC: 28.7 % (ref 34–48)
HEMOGLOBIN: 8.2 G/DL (ref 11.5–15.5)
HHB: 2.4 % (ref 0–5)
HHB: 5.8 % (ref 0–5)
HYPOCHROMIA: ABNORMAL
IMMATURE GRANULOCYTES #: 0.06 E9/L
IMMATURE GRANULOCYTES %: 0.8 % (ref 0–5)
LAB: ABNORMAL
LAB: ABNORMAL
LYMPHOCYTES ABSOLUTE: 0.94 E9/L (ref 1.5–4)
LYMPHOCYTES RELATIVE PERCENT: 12.8 % (ref 20–42)
Lab: 1401
Lab: 955
MCH RBC QN AUTO: 24.6 PG (ref 26–35)
MCHC RBC AUTO-ENTMCNC: 28.6 % (ref 32–34.5)
MCV RBC AUTO: 85.9 FL (ref 80–99.9)
METER GLUCOSE: 251 MG/DL (ref 70–110)
METER GLUCOSE: 347 MG/DL (ref 70–110)
METER GLUCOSE: 378 MG/DL (ref 70–110)
METHB: 0.3 % (ref 0–1.5)
METHB: 0.3 % (ref 0–1.5)
MODE: AC
MODE: AC
MONOCYTES ABSOLUTE: 0.92 E9/L (ref 0.1–0.95)
MONOCYTES RELATIVE PERCENT: 12.6 % (ref 2–12)
NEUTROPHILS ABSOLUTE: 5.25 E9/L (ref 1.8–7.3)
NEUTROPHILS RELATIVE PERCENT: 71.8 % (ref 43–80)
O2 CONTENT: 13.1 ML/DL
O2 CONTENT: 14 ML/DL
O2 SATURATION: 94.2 % (ref 92–98.5)
O2 SATURATION: 97.6 % (ref 92–98.5)
O2HB: 93.6 % (ref 94–97)
O2HB: 97 % (ref 94–97)
OPERATOR ID: 166
OPERATOR ID: 316
OVALOCYTES: ABNORMAL
PATIENT TEMP: 37 C
PATIENT TEMP: 37 C
PCO2: 43.1 MMHG (ref 35–45)
PCO2: 68.2 MMHG (ref 35–45)
PDW BLD-RTO: 19.1 FL (ref 11.5–15)
PEEP/CPAP: 10 CMH?O
PEEP/CPAP: 10 CMH?O
PFO2: 1.21 MMHG/%
PFO2: 1.68 MMHG/%
PH BLOOD GAS: 7.25 (ref 7.35–7.45)
PH BLOOD GAS: 7.41 (ref 7.35–7.45)
PHOSPHORUS: 5 MG/DL (ref 2.5–4.5)
PLATELET # BLD: 214 E9/L (ref 130–450)
PMV BLD AUTO: 10 FL (ref 7–12)
PO2: 168.2 MMHG (ref 60–100)
PO2: 72.8 MMHG (ref 60–100)
POIKILOCYTES: ABNORMAL
POLYCHROMASIA: ABNORMAL
POTASSIUM REFLEX MAGNESIUM: 5.5 MMOL/L (ref 3.5–5)
POTASSIUM SERPL-SCNC: 5.3 MMOL/L (ref 3.5–5)
PROTEIN PROTEIN: 39 MG/DL (ref 0–12)
PROTEIN/CREAT RATIO: 0.3
PROTEIN/CREAT RATIO: 0.3 (ref 0–0.2)
RBC # BLD: 3.34 E12/L (ref 3.5–5.5)
RI(T): 268 %
RI(T): 402 %
RR MECHANICAL: 12 B/MIN
RR MECHANICAL: 16 B/MIN
SODIUM BLD-SCNC: 137 MMOL/L (ref 132–146)
SODIUM BLD-SCNC: 137 MMOL/L (ref 132–146)
SODIUM URINE: 32 MMOL/L
SOURCE, BLOOD GAS: ABNORMAL
SOURCE, BLOOD GAS: ABNORMAL
THB: 10 G/DL (ref 11.5–16.5)
THB: 9.9 G/DL (ref 11.5–16.5)
TIME ANALYZED: 1009
TIME ANALYZED: 1403
TOTAL PROTEIN: 6.4 G/DL (ref 6.4–8.3)
VT MECHANICAL: 500 ML
VT MECHANICAL: 500 ML
WBC # BLD: 7.3 E9/L (ref 4.5–11.5)

## 2018-04-28 PROCEDURE — 87205 SMEAR GRAM STAIN: CPT

## 2018-04-28 PROCEDURE — 6360000002 HC RX W HCPCS: Performed by: NURSE ANESTHETIST, CERTIFIED REGISTERED

## 2018-04-28 PROCEDURE — C1894 INTRO/SHEATH, NON-LASER: HCPCS | Performed by: OTOLARYNGOLOGY

## 2018-04-28 PROCEDURE — 2580000003 HC RX 258: Performed by: INTERNAL MEDICINE

## 2018-04-28 PROCEDURE — 94002 VENT MGMT INPAT INIT DAY: CPT

## 2018-04-28 PROCEDURE — 2709999900 HC NON-CHARGEABLE SUPPLY: Performed by: OTOLARYNGOLOGY

## 2018-04-28 PROCEDURE — 94664 DEMO&/EVAL PT USE INHALER: CPT

## 2018-04-28 PROCEDURE — 6370000000 HC RX 637 (ALT 250 FOR IP): Performed by: OTOLARYNGOLOGY

## 2018-04-28 PROCEDURE — 0BJ08ZZ INSPECTION OF TRACHEOBRONCHIAL TREE, VIA NATURAL OR ARTIFICIAL OPENING ENDOSCOPIC: ICD-10-PCS | Performed by: INTERNAL MEDICINE

## 2018-04-28 PROCEDURE — 0W3Q8ZZ CONTROL BLEEDING IN RESPIRATORY TRACT, VIA NATURAL OR ARTIFICIAL OPENING ENDOSCOPIC: ICD-10-PCS | Performed by: OTOLARYNGOLOGY

## 2018-04-28 PROCEDURE — 82962 GLUCOSE BLOOD TEST: CPT

## 2018-04-28 PROCEDURE — 84100 ASSAY OF PHOSPHORUS: CPT

## 2018-04-28 PROCEDURE — 87070 CULTURE OTHR SPECIMN AEROBIC: CPT

## 2018-04-28 PROCEDURE — 87206 SMEAR FLUORESCENT/ACID STAI: CPT

## 2018-04-28 PROCEDURE — 87116 MYCOBACTERIA CULTURE: CPT

## 2018-04-28 PROCEDURE — 2500000003 HC RX 250 WO HCPCS: Performed by: NURSE ANESTHETIST, CERTIFIED REGISTERED

## 2018-04-28 PROCEDURE — 2000000000 HC ICU R&B

## 2018-04-28 PROCEDURE — 6370000000 HC RX 637 (ALT 250 FOR IP): Performed by: NURSE ANESTHETIST, CERTIFIED REGISTERED

## 2018-04-28 PROCEDURE — 6370000000 HC RX 637 (ALT 250 FOR IP): Performed by: INTERNAL MEDICINE

## 2018-04-28 PROCEDURE — 85025 COMPLETE CBC W/AUTO DIFF WBC: CPT

## 2018-04-28 PROCEDURE — 89051 BODY FLUID CELL COUNT: CPT

## 2018-04-28 PROCEDURE — 71045 X-RAY EXAM CHEST 1 VIEW: CPT

## 2018-04-28 PROCEDURE — 94640 AIRWAY INHALATION TREATMENT: CPT

## 2018-04-28 PROCEDURE — 87102 FUNGUS ISOLATION CULTURE: CPT

## 2018-04-28 PROCEDURE — 94761 N-INVAS EAR/PLS OXIMETRY MLT: CPT

## 2018-04-28 PROCEDURE — 83036 HEMOGLOBIN GLYCOSYLATED A1C: CPT

## 2018-04-28 PROCEDURE — 3600000012 HC SURGERY LEVEL 2 ADDTL 15MIN: Performed by: OTOLARYNGOLOGY

## 2018-04-28 PROCEDURE — 2700000000 HC OXYGEN THERAPY PER DAY

## 2018-04-28 PROCEDURE — P9016 RBC LEUKOCYTES REDUCED: HCPCS

## 2018-04-28 PROCEDURE — 3600000002 HC SURGERY LEVEL 2 BASE: Performed by: OTOLARYNGOLOGY

## 2018-04-28 PROCEDURE — 6370000000 HC RX 637 (ALT 250 FOR IP)

## 2018-04-28 PROCEDURE — 80053 COMPREHEN METABOLIC PANEL: CPT

## 2018-04-28 PROCEDURE — 36415 COLL VENOUS BLD VENIPUNCTURE: CPT

## 2018-04-28 PROCEDURE — 87186 SC STD MICRODIL/AGAR DIL: CPT

## 2018-04-28 PROCEDURE — 0BH17EZ INSERTION OF ENDOTRACHEAL AIRWAY INTO TRACHEA, VIA NATURAL OR ARTIFICIAL OPENING: ICD-10-PCS | Performed by: ANESTHESIOLOGY

## 2018-04-28 PROCEDURE — 3700000000 HC ANESTHESIA ATTENDED CARE: Performed by: OTOLARYNGOLOGY

## 2018-04-28 PROCEDURE — 87077 CULTURE AEROBIC IDENTIFY: CPT

## 2018-04-28 PROCEDURE — 2580000003 HC RX 258: Performed by: OTOLARYNGOLOGY

## 2018-04-28 PROCEDURE — 6360000002 HC RX W HCPCS: Performed by: INTERNAL MEDICINE

## 2018-04-28 PROCEDURE — 09HK8YZ INSERTION OF OTHER DEVICE INTO NASAL MUCOSA AND SOFT TISSUE, VIA NATURAL OR ARTIFICIAL OPENING ENDOSCOPIC: ICD-10-PCS | Performed by: OTOLARYNGOLOGY

## 2018-04-28 PROCEDURE — 76770 US EXAM ABDO BACK WALL COMP: CPT

## 2018-04-28 PROCEDURE — 82805 BLOOD GASES W/O2 SATURATION: CPT

## 2018-04-28 PROCEDURE — 86923 COMPATIBILITY TEST ELECTRIC: CPT

## 2018-04-28 PROCEDURE — 5A1945Z RESPIRATORY VENTILATION, 24-96 CONSECUTIVE HOURS: ICD-10-PCS | Performed by: ANESTHESIOLOGY

## 2018-04-28 PROCEDURE — 80048 BASIC METABOLIC PNL TOTAL CA: CPT

## 2018-04-28 PROCEDURE — 3700000001 HC ADD 15 MINUTES (ANESTHESIA): Performed by: OTOLARYNGOLOGY

## 2018-04-28 PROCEDURE — 87015 SPECIMEN INFECT AGNT CONCNTJ: CPT

## 2018-04-28 RX ORDER — PROPOFOL 10 MG/ML
INJECTION, EMULSION INTRAVENOUS PRN
Status: DISCONTINUED | OUTPATIENT
Start: 2018-04-28 | End: 2018-04-28 | Stop reason: SDUPTHER

## 2018-04-28 RX ORDER — DEXTROSE MONOHYDRATE 25 G/50ML
12.5 INJECTION, SOLUTION INTRAVENOUS PRN
Status: DISCONTINUED | OUTPATIENT
Start: 2018-04-28 | End: 2018-05-08 | Stop reason: HOSPADM

## 2018-04-28 RX ORDER — LIDOCAINE HYDROCHLORIDE 10 MG/ML
5 INJECTION, SOLUTION EPIDURAL; INFILTRATION; INTRACAUDAL; PERINEURAL ONCE
Status: DISCONTINUED | OUTPATIENT
Start: 2018-04-28 | End: 2018-05-02

## 2018-04-28 RX ORDER — DIPHENHYDRAMINE HYDROCHLORIDE 50 MG/ML
12.5 INJECTION INTRAMUSCULAR; INTRAVENOUS
Status: DISCONTINUED | OUTPATIENT
Start: 2018-04-28 | End: 2018-04-28 | Stop reason: HOSPADM

## 2018-04-28 RX ORDER — DEXTROSE MONOHYDRATE 50 MG/ML
100 INJECTION, SOLUTION INTRAVENOUS PRN
Status: DISCONTINUED | OUTPATIENT
Start: 2018-04-28 | End: 2018-05-08 | Stop reason: HOSPADM

## 2018-04-28 RX ORDER — LIDOCAINE HYDROCHLORIDE AND EPINEPHRINE 10; 10 MG/ML; UG/ML
INJECTION, SOLUTION INFILTRATION; PERINEURAL PRN
Status: DISCONTINUED | OUTPATIENT
Start: 2018-04-28 | End: 2018-04-28 | Stop reason: HOSPADM

## 2018-04-28 RX ORDER — NICOTINE POLACRILEX 4 MG
15 LOZENGE BUCCAL PRN
Status: DISCONTINUED | OUTPATIENT
Start: 2018-04-28 | End: 2018-05-08 | Stop reason: HOSPADM

## 2018-04-28 RX ORDER — PROPOFOL 10 MG/ML
10 INJECTION, EMULSION INTRAVENOUS
Status: DISCONTINUED | OUTPATIENT
Start: 2018-04-28 | End: 2018-05-01

## 2018-04-28 RX ORDER — ROCURONIUM BROMIDE 10 MG/ML
INJECTION, SOLUTION INTRAVENOUS PRN
Status: DISCONTINUED | OUTPATIENT
Start: 2018-04-28 | End: 2018-04-28 | Stop reason: SDUPTHER

## 2018-04-28 RX ORDER — MEPERIDINE HYDROCHLORIDE 25 MG/ML
12.5 INJECTION INTRAMUSCULAR; INTRAVENOUS; SUBCUTANEOUS EVERY 5 MIN PRN
Status: DISCONTINUED | OUTPATIENT
Start: 2018-04-28 | End: 2018-04-28 | Stop reason: HOSPADM

## 2018-04-28 RX ORDER — FUROSEMIDE 10 MG/ML
INJECTION INTRAMUSCULAR; INTRAVENOUS PRN
Status: DISCONTINUED | OUTPATIENT
Start: 2018-04-28 | End: 2018-04-28 | Stop reason: SDUPTHER

## 2018-04-28 RX ORDER — 0.9 % SODIUM CHLORIDE 0.9 %
250 INTRAVENOUS SOLUTION INTRAVENOUS ONCE
Status: COMPLETED | OUTPATIENT
Start: 2018-04-28 | End: 2018-04-28

## 2018-04-28 RX ORDER — DEXAMETHASONE SODIUM PHOSPHATE 4 MG/ML
INJECTION, SOLUTION INTRA-ARTICULAR; INTRALESIONAL; INTRAMUSCULAR; INTRAVENOUS; SOFT TISSUE PRN
Status: DISCONTINUED | OUTPATIENT
Start: 2018-04-28 | End: 2018-04-28 | Stop reason: SDUPTHER

## 2018-04-28 RX ORDER — ALBUTEROL SULFATE 90 UG/1
AEROSOL, METERED RESPIRATORY (INHALATION) PRN
Status: DISCONTINUED | OUTPATIENT
Start: 2018-04-28 | End: 2018-04-28 | Stop reason: SDUPTHER

## 2018-04-28 RX ORDER — DEXTROSE MONOHYDRATE 25 G/50ML
25 INJECTION, SOLUTION INTRAVENOUS ONCE
Status: COMPLETED | OUTPATIENT
Start: 2018-04-28 | End: 2018-04-28

## 2018-04-28 RX ORDER — ONDANSETRON 2 MG/ML
INJECTION INTRAMUSCULAR; INTRAVENOUS PRN
Status: DISCONTINUED | OUTPATIENT
Start: 2018-04-28 | End: 2018-04-28 | Stop reason: SDUPTHER

## 2018-04-28 RX ORDER — HEPARIN SODIUM (PORCINE) LOCK FLUSH IV SOLN 100 UNIT/ML 100 UNIT/ML
3 SOLUTION INTRAVENOUS EVERY 12 HOURS SCHEDULED
Status: DISCONTINUED | OUTPATIENT
Start: 2018-04-28 | End: 2018-05-08 | Stop reason: HOSPADM

## 2018-04-28 RX ORDER — ALBUMIN (HUMAN) 12.5 G/50ML
25 SOLUTION INTRAVENOUS EVERY 6 HOURS
Status: DISCONTINUED | OUTPATIENT
Start: 2018-04-28 | End: 2018-04-28

## 2018-04-28 RX ORDER — DEXTROSE AND SODIUM CHLORIDE 5; .45 G/100ML; G/100ML
INJECTION, SOLUTION INTRAVENOUS CONTINUOUS
Status: DISCONTINUED | OUTPATIENT
Start: 2018-04-28 | End: 2018-04-28 | Stop reason: DRUGHIGH

## 2018-04-28 RX ORDER — PROPOFOL 10 MG/ML
10 INJECTION, EMULSION INTRAVENOUS
Status: DISCONTINUED | OUTPATIENT
Start: 2018-04-28 | End: 2018-04-28 | Stop reason: SDUPTHER

## 2018-04-28 RX ORDER — HEPARIN SODIUM (PORCINE) LOCK FLUSH IV SOLN 100 UNIT/ML 100 UNIT/ML
3 SOLUTION INTRAVENOUS PRN
Status: DISCONTINUED | OUTPATIENT
Start: 2018-04-28 | End: 2018-05-08 | Stop reason: HOSPADM

## 2018-04-28 RX ORDER — SODIUM CHLORIDE 0.9 % (FLUSH) 0.9 %
10 SYRINGE (ML) INJECTION PRN
Status: DISCONTINUED | OUTPATIENT
Start: 2018-04-28 | End: 2018-05-08 | Stop reason: HOSPADM

## 2018-04-28 RX ORDER — DEXTROSE AND SODIUM CHLORIDE 5; .45 G/100ML; G/100ML
INJECTION, SOLUTION INTRAVENOUS CONTINUOUS
Status: DISCONTINUED | OUTPATIENT
Start: 2018-04-28 | End: 2018-04-29

## 2018-04-28 RX ORDER — FUROSEMIDE 10 MG/ML
20 INJECTION INTRAMUSCULAR; INTRAVENOUS ONCE
Status: DISCONTINUED | OUTPATIENT
Start: 2018-04-28 | End: 2018-05-02

## 2018-04-28 RX ORDER — DIAPER,BRIEF,INFANT-TODD,DISP
EACH MISCELLANEOUS PRN
Status: DISCONTINUED | OUTPATIENT
Start: 2018-04-28 | End: 2018-04-28 | Stop reason: HOSPADM

## 2018-04-28 RX ORDER — VASOPRESSIN 20 U/ML
INJECTION PARENTERAL PRN
Status: DISCONTINUED | OUTPATIENT
Start: 2018-04-28 | End: 2018-04-28 | Stop reason: SDUPTHER

## 2018-04-28 RX ORDER — ALBUTEROL SULFATE 90 UG/1
2 AEROSOL, METERED RESPIRATORY (INHALATION) ONCE
Status: DISCONTINUED | OUTPATIENT
Start: 2018-04-28 | End: 2018-05-02

## 2018-04-28 RX ORDER — LIDOCAINE HYDROCHLORIDE 20 MG/ML
INJECTION, SOLUTION EPIDURAL; INFILTRATION; INTRACAUDAL; PERINEURAL PRN
Status: DISCONTINUED | OUTPATIENT
Start: 2018-04-28 | End: 2018-04-28 | Stop reason: SDUPTHER

## 2018-04-28 RX ADMIN — DEXTROSE MONOHYDRATE 25 G: 25 INJECTION, SOLUTION INTRAVENOUS at 05:59

## 2018-04-28 RX ADMIN — DEXAMETHASONE SODIUM PHOSPHATE 8 MG: 4 INJECTION, SOLUTION INTRA-ARTICULAR; INTRALESIONAL; INTRAMUSCULAR; INTRAVENOUS; SOFT TISSUE at 08:03

## 2018-04-28 RX ADMIN — INSULIN LISPRO 3 UNITS: 100 INJECTION, SOLUTION INTRAVENOUS; SUBCUTANEOUS at 12:21

## 2018-04-28 RX ADMIN — INSULIN HUMAN 5 UNITS: 100 INJECTION, SOLUTION PARENTERAL at 05:59

## 2018-04-28 RX ADMIN — Medication 10 ML: at 21:01

## 2018-04-28 RX ADMIN — INSULIN GLARGINE 60 UNITS: 100 INJECTION, SOLUTION SUBCUTANEOUS at 20:58

## 2018-04-28 RX ADMIN — PROPOFOL 10 MCG/KG/MIN: 10 INJECTION, EMULSION INTRAVENOUS at 12:11

## 2018-04-28 RX ADMIN — ONDANSETRON 4 MG: 2 INJECTION, SOLUTION INTRAMUSCULAR; INTRAVENOUS at 08:03

## 2018-04-28 RX ADMIN — NORTRIPTYLINE HYDROCHLORIDE 10 MG: 10 CAPSULE ORAL at 21:55

## 2018-04-28 RX ADMIN — PROPOFOL 130 MG: 10 INJECTION, EMULSION INTRAVENOUS at 07:21

## 2018-04-28 RX ADMIN — IPRATROPIUM BROMIDE AND ALBUTEROL SULFATE 1 AMPULE: .5; 3 SOLUTION RESPIRATORY (INHALATION) at 19:56

## 2018-04-28 RX ADMIN — ROCURONIUM BROMIDE 30 MG: 10 SOLUTION INTRAVENOUS at 08:35

## 2018-04-28 RX ADMIN — INSULIN LISPRO 4 UNITS: 100 INJECTION, SOLUTION INTRAVENOUS; SUBCUTANEOUS at 18:24

## 2018-04-28 RX ADMIN — FUROSEMIDE 40 MG: 10 INJECTION, SOLUTION INTRAMUSCULAR; INTRAVENOUS at 08:34

## 2018-04-28 RX ADMIN — GABAPENTIN 600 MG: 300 CAPSULE ORAL at 21:54

## 2018-04-28 RX ADMIN — ALBUTEROL SULFATE 4 PUFF: 90 AEROSOL, METERED RESPIRATORY (INHALATION) at 07:44

## 2018-04-28 RX ADMIN — DEXTROSE AND SODIUM CHLORIDE: 5; 450 INJECTION, SOLUTION INTRAVENOUS at 12:16

## 2018-04-28 RX ADMIN — SODIUM CHLORIDE 250 ML: 9 INJECTION, SOLUTION INTRAVENOUS at 04:53

## 2018-04-28 RX ADMIN — LIDOCAINE HYDROCHLORIDE 100 MG: 20 INJECTION, SOLUTION EPIDURAL; INFILTRATION; INTRACAUDAL; PERINEURAL at 07:21

## 2018-04-28 RX ADMIN — PROPOFOL 10 MCG/KG/MIN: 10 INJECTION, EMULSION INTRAVENOUS at 22:45

## 2018-04-28 RX ADMIN — IPRATROPIUM BROMIDE AND ALBUTEROL SULFATE 1 AMPULE: .5; 3 SOLUTION RESPIRATORY (INHALATION) at 16:22

## 2018-04-28 RX ADMIN — SIMVASTATIN 40 MG: 40 TABLET, FILM COATED ORAL at 21:55

## 2018-04-28 RX ADMIN — PHENYLEPHRINE HYDROCHLORIDE 100 MCG: 10 INJECTION INTRAVENOUS at 07:25

## 2018-04-28 RX ADMIN — IPRATROPIUM BROMIDE AND ALBUTEROL SULFATE 1 AMPULE: .5; 3 SOLUTION RESPIRATORY (INHALATION) at 12:24

## 2018-04-28 RX ADMIN — VASOPRESSIN 2 UNITS: 20 INJECTION INTRAVENOUS at 07:40

## 2018-04-28 RX ADMIN — SUGAMMADEX 263 MG: 100 INJECTION, SOLUTION INTRAVENOUS at 07:47

## 2018-04-28 ASSESSMENT — PULMONARY FUNCTION TESTS
PIF_VALUE: 34
PIF_VALUE: 46
PIF_VALUE: 38
PIF_VALUE: 46
PIF_VALUE: 38
PIF_VALUE: 38
PIF_VALUE: 34
PIF_VALUE: 35
PIF_VALUE: 33
PIF_VALUE: 34
PIF_VALUE: 38
PIF_VALUE: 38
PIF_VALUE: 21
PIF_VALUE: 32
PIF_VALUE: 38
PIF_VALUE: 24
PIF_VALUE: 29
PIF_VALUE: 33
PIF_VALUE: 38
PIF_VALUE: 38
PIF_VALUE: 22
PIF_VALUE: 34
PIF_VALUE: 1
PIF_VALUE: 34
PIF_VALUE: 37
PIF_VALUE: 45
PIF_VALUE: 5
PIF_VALUE: 46
PIF_VALUE: 38
PIF_VALUE: 33
PIF_VALUE: 0
PIF_VALUE: 34
PIF_VALUE: 43
PIF_VALUE: 38
PIF_VALUE: 0
PIF_VALUE: 38
PIF_VALUE: 11
PIF_VALUE: 29
PIF_VALUE: 46
PIF_VALUE: 38
PIF_VALUE: 34
PIF_VALUE: 1
PIF_VALUE: 30
PIF_VALUE: 1
PIF_VALUE: 34
PIF_VALUE: 46
PIF_VALUE: 37
PIF_VALUE: 73
PIF_VALUE: 46
PIF_VALUE: 21
PIF_VALUE: 46
PIF_VALUE: 34
PIF_VALUE: 38
PIF_VALUE: 20
PIF_VALUE: 38
PIF_VALUE: 38
PIF_VALUE: 45
PIF_VALUE: 38
PIF_VALUE: 46
PIF_VALUE: 21
PIF_VALUE: 33
PIF_VALUE: 37
PIF_VALUE: 37
PIF_VALUE: 46
PIF_VALUE: 38
PIF_VALUE: 34
PIF_VALUE: 34
PIF_VALUE: 37
PIF_VALUE: 46
PIF_VALUE: 46
PIF_VALUE: 0
PIF_VALUE: 38
PIF_VALUE: 34
PIF_VALUE: 33
PIF_VALUE: 45
PIF_VALUE: 21
PIF_VALUE: 33
PIF_VALUE: 45
PIF_VALUE: 34
PIF_VALUE: 38
PIF_VALUE: 45
PIF_VALUE: 38
PIF_VALUE: 38
PIF_VALUE: 34
PIF_VALUE: 34
PIF_VALUE: 59
PIF_VALUE: 21
PIF_VALUE: 37
PIF_VALUE: 21
PIF_VALUE: 33
PIF_VALUE: 33
PIF_VALUE: 37
PIF_VALUE: 34
PIF_VALUE: 33
PIF_VALUE: 34
PIF_VALUE: 23
PIF_VALUE: 0
PIF_VALUE: 0
PIF_VALUE: 40
PIF_VALUE: 1
PIF_VALUE: 38
PIF_VALUE: 46
PIF_VALUE: 33
PIF_VALUE: 46
PIF_VALUE: 38
PIF_VALUE: 35
PIF_VALUE: 1
PIF_VALUE: 23
PIF_VALUE: 33
PIF_VALUE: 21
PIF_VALUE: 38
PIF_VALUE: 38
PIF_VALUE: 34
PIF_VALUE: 19
PIF_VALUE: 46
PIF_VALUE: 46
PIF_VALUE: 38
PIF_VALUE: 27
PIF_VALUE: 38
PIF_VALUE: 24
PIF_VALUE: 1
PIF_VALUE: 29
PIF_VALUE: 38
PIF_VALUE: 35
PIF_VALUE: 21
PIF_VALUE: 38
PIF_VALUE: 38
PIF_VALUE: 62
PIF_VALUE: 38
PIF_VALUE: 46

## 2018-04-28 ASSESSMENT — PAIN SCALES - GENERAL: PAINLEVEL_OUTOF10: 0

## 2018-04-29 ENCOUNTER — APPOINTMENT (OUTPATIENT)
Dept: GENERAL RADIOLOGY | Age: 83
DRG: 981 | End: 2018-04-29
Payer: MEDICARE

## 2018-04-29 LAB
AADO2: 134.5 MMHG
ABO/RH: NORMAL
ALBUMIN SERPL-MCNC: 3.1 G/DL (ref 3.5–5.2)
ALP BLD-CCNC: 74 U/L (ref 35–104)
ALT SERPL-CCNC: 13 U/L (ref 0–32)
ANION GAP SERPL CALCULATED.3IONS-SCNC: 13 MMOL/L (ref 7–16)
ANISOCYTOSIS: ABNORMAL
ANTIBODY SCREEN: NORMAL
APPEARANCE FLUID: NORMAL
AST SERPL-CCNC: 19 U/L (ref 0–31)
B.E.: 4.3 MMOL/L (ref -3–3)
BASOPHILS ABSOLUTE: 0 E9/L (ref 0–0.2)
BASOPHILS RELATIVE PERCENT: 0 % (ref 0–2)
BILIRUB SERPL-MCNC: 0.4 MG/DL (ref 0–1.2)
BUN BLDV-MCNC: 49 MG/DL (ref 8–23)
CALCIUM SERPL-MCNC: 8.9 MG/DL (ref 8.6–10.2)
CELL COUNT FLUID TYPE: NORMAL
CHLORIDE BLD-SCNC: 98 MMOL/L (ref 98–107)
CO2: 25 MMOL/L (ref 22–29)
COHB: 0.3 % (ref 0–1.5)
COLOR FLUID: NORMAL
CREAT SERPL-MCNC: 2.2 MG/DL (ref 0.5–1)
CRITICAL: ABNORMAL
DATE ANALYZED: ABNORMAL
DATE OF COLLECTION: ABNORMAL
EOSINOPHILS ABSOLUTE: 0.01 E9/L (ref 0.05–0.5)
EOSINOPHILS RELATIVE PERCENT: 0.2 % (ref 0–6)
FIO2: 40 %
GFR AFRICAN AMERICAN: 26
GFR NON-AFRICAN AMERICAN: 21 ML/MIN/1.73
GLUCOSE BLD-MCNC: 355 MG/DL (ref 74–109)
GRAM STAIN ORDERABLE: NORMAL
HCO3: 28.7 MMOL/L (ref 22–26)
HCT VFR BLD CALC: 28.6 % (ref 34–48)
HEMOGLOBIN: 8.7 G/DL (ref 11.5–15.5)
HHB: 4.3 % (ref 0–5)
HYPOCHROMIA: ABNORMAL
IMMATURE GRANULOCYTES #: 0.03 E9/L
IMMATURE GRANULOCYTES %: 0.5 % (ref 0–5)
LAB: ABNORMAL
LYMPHOCYTES ABSOLUTE: 0.56 E9/L (ref 1.5–4)
LYMPHOCYTES RELATIVE PERCENT: 8.4 % (ref 20–42)
LYMPHOCYTES, BODY FLUID: 54 %
Lab: 606
MCH RBC QN AUTO: 24.6 PG (ref 26–35)
MCHC RBC AUTO-ENTMCNC: 30.4 % (ref 32–34.5)
MCV RBC AUTO: 81 FL (ref 80–99.9)
METER GLUCOSE: 220 MG/DL (ref 70–110)
METER GLUCOSE: 227 MG/DL (ref 70–110)
METER GLUCOSE: 348 MG/DL (ref 70–110)
METER GLUCOSE: 403 MG/DL (ref 70–110)
METHB: 0.3 % (ref 0–1.5)
MODE: AC
MONOCYTE, FLUID: 2 %
MONOCYTES ABSOLUTE: 0.67 E9/L (ref 0.1–0.95)
MONOCYTES RELATIVE PERCENT: 10.1 % (ref 2–12)
NEUTROPHIL, FLUID: 44 %
NEUTROPHILS ABSOLUTE: 5.37 E9/L (ref 1.8–7.3)
NEUTROPHILS RELATIVE PERCENT: 80.8 % (ref 43–80)
NUCLEATED CELLS FLUID: 320 /UL
O2 CONTENT: 13.2 ML/DL
O2 SATURATION: 95.7 % (ref 92–98.5)
O2HB: 95.1 % (ref 94–97)
OPERATOR ID: 2485
OVALOCYTES: ABNORMAL
PATIENT TEMP: 37 C
PCO2: 42.5 MMHG (ref 35–45)
PDW BLD-RTO: 18.9 FL (ref 11.5–15)
PEEP/CPAP: 10 CMH?O
PFO2: 2.3 MMHG/%
PH BLOOD GAS: 7.45 (ref 7.35–7.45)
PLATELET # BLD: 212 E9/L (ref 130–450)
PMV BLD AUTO: 10.7 FL (ref 7–12)
PO2: 91.8 MMHG (ref 60–100)
POIKILOCYTES: ABNORMAL
POLYCHROMASIA: ABNORMAL
POTASSIUM REFLEX MAGNESIUM: 4.5 MMOL/L (ref 3.5–5)
POTASSIUM SERPL-SCNC: 4.5 MMOL/L (ref 3.5–5)
RBC # BLD: 3.53 E12/L (ref 3.5–5.5)
RBC FLUID: <2000 /UL
RI(T): 147 %
RR MECHANICAL: 16 B/MIN
SODIUM BLD-SCNC: 136 MMOL/L (ref 132–146)
SOURCE, BLOOD GAS: ABNORMAL
THB: 9.8 G/DL (ref 11.5–16.5)
TIME ANALYZED: 609
TOTAL PROTEIN: 6.2 G/DL (ref 6.4–8.3)
VT MECHANICAL: 500 ML
WBC # BLD: 6.6 E9/L (ref 4.5–11.5)

## 2018-04-29 PROCEDURE — 6370000000 HC RX 637 (ALT 250 FOR IP): Performed by: INTERNAL MEDICINE

## 2018-04-29 PROCEDURE — 2000000000 HC ICU R&B

## 2018-04-29 PROCEDURE — 85025 COMPLETE CBC W/AUTO DIFF WBC: CPT

## 2018-04-29 PROCEDURE — 2580000003 HC RX 258: Performed by: INTERNAL MEDICINE

## 2018-04-29 PROCEDURE — 86900 BLOOD TYPING SEROLOGIC ABO: CPT

## 2018-04-29 PROCEDURE — C9113 INJ PANTOPRAZOLE SODIUM, VIA: HCPCS | Performed by: INTERNAL MEDICINE

## 2018-04-29 PROCEDURE — 94640 AIRWAY INHALATION TREATMENT: CPT

## 2018-04-29 PROCEDURE — 71045 X-RAY EXAM CHEST 1 VIEW: CPT

## 2018-04-29 PROCEDURE — 86901 BLOOD TYPING SEROLOGIC RH(D): CPT

## 2018-04-29 PROCEDURE — 82962 GLUCOSE BLOOD TEST: CPT

## 2018-04-29 PROCEDURE — 94003 VENT MGMT INPAT SUBQ DAY: CPT

## 2018-04-29 PROCEDURE — 80048 BASIC METABOLIC PNL TOTAL CA: CPT

## 2018-04-29 PROCEDURE — 82805 BLOOD GASES W/O2 SATURATION: CPT

## 2018-04-29 PROCEDURE — 6370000000 HC RX 637 (ALT 250 FOR IP): Performed by: OTOLARYNGOLOGY

## 2018-04-29 PROCEDURE — 36415 COLL VENOUS BLD VENIPUNCTURE: CPT

## 2018-04-29 PROCEDURE — 6360000002 HC RX W HCPCS: Performed by: INTERNAL MEDICINE

## 2018-04-29 PROCEDURE — 80053 COMPREHEN METABOLIC PANEL: CPT

## 2018-04-29 PROCEDURE — 86850 RBC ANTIBODY SCREEN: CPT

## 2018-04-29 RX ORDER — PANTOPRAZOLE SODIUM 40 MG/10ML
40 INJECTION, POWDER, LYOPHILIZED, FOR SOLUTION INTRAVENOUS DAILY
Status: DISCONTINUED | OUTPATIENT
Start: 2018-04-29 | End: 2018-05-02

## 2018-04-29 RX ORDER — 0.9 % SODIUM CHLORIDE 0.9 %
10 VIAL (ML) INJECTION DAILY
Status: DISCONTINUED | OUTPATIENT
Start: 2018-04-29 | End: 2018-05-02

## 2018-04-29 RX ORDER — SODIUM CHLORIDE 450 MG/100ML
INJECTION, SOLUTION INTRAVENOUS CONTINUOUS
Status: DISCONTINUED | OUTPATIENT
Start: 2018-04-29 | End: 2018-04-30

## 2018-04-29 RX ADMIN — Medication 300 UNITS: at 14:27

## 2018-04-29 RX ADMIN — METOPROLOL SUCCINATE 100 MG: 100 TABLET, FILM COATED, EXTENDED RELEASE ORAL at 14:23

## 2018-04-29 RX ADMIN — IPRATROPIUM BROMIDE AND ALBUTEROL SULFATE 1 AMPULE: .5; 3 SOLUTION RESPIRATORY (INHALATION) at 12:29

## 2018-04-29 RX ADMIN — GABAPENTIN 600 MG: 300 CAPSULE ORAL at 21:50

## 2018-04-29 RX ADMIN — PANTOPRAZOLE SODIUM 40 MG: 40 INJECTION, POWDER, FOR SOLUTION INTRAVENOUS at 14:36

## 2018-04-29 RX ADMIN — IPRATROPIUM BROMIDE AND ALBUTEROL SULFATE 1 AMPULE: .5; 3 SOLUTION RESPIRATORY (INHALATION) at 20:03

## 2018-04-29 RX ADMIN — IPRATROPIUM BROMIDE AND ALBUTEROL SULFATE 1 AMPULE: .5; 3 SOLUTION RESPIRATORY (INHALATION) at 17:25

## 2018-04-29 RX ADMIN — INSULIN GLARGINE 60 UNITS: 100 INJECTION, SOLUTION SUBCUTANEOUS at 21:49

## 2018-04-29 RX ADMIN — PANTOPRAZOLE SODIUM 40 MG: 40 TABLET, DELAYED RELEASE ORAL at 06:29

## 2018-04-29 RX ADMIN — INSULIN LISPRO 4 UNITS: 100 INJECTION, SOLUTION INTRAVENOUS; SUBCUTANEOUS at 05:55

## 2018-04-29 RX ADMIN — SODIUM CHLORIDE: 4.5 INJECTION, SOLUTION INTRAVENOUS at 11:30

## 2018-04-29 RX ADMIN — Medication 10 ML: at 21:51

## 2018-04-29 RX ADMIN — NORTRIPTYLINE HYDROCHLORIDE 10 MG: 10 CAPSULE ORAL at 21:50

## 2018-04-29 RX ADMIN — SULFAMETHOXAZOLE AND TRIMETHOPRIM 1 TABLET: 800; 160 TABLET ORAL at 14:23

## 2018-04-29 RX ADMIN — Medication 500 MG: at 14:17

## 2018-04-29 RX ADMIN — INSULIN LISPRO 2 UNITS: 100 INJECTION, SOLUTION INTRAVENOUS; SUBCUTANEOUS at 14:50

## 2018-04-29 RX ADMIN — INSULIN LISPRO 2 UNITS: 100 INJECTION, SOLUTION INTRAVENOUS; SUBCUTANEOUS at 18:57

## 2018-04-29 RX ADMIN — INSULIN LISPRO 6 UNITS: 100 INJECTION, SOLUTION INTRAVENOUS; SUBCUTANEOUS at 00:28

## 2018-04-29 RX ADMIN — IPRATROPIUM BROMIDE AND ALBUTEROL SULFATE 1 AMPULE: .5; 3 SOLUTION RESPIRATORY (INHALATION) at 08:01

## 2018-04-29 RX ADMIN — LEVOTHYROXINE SODIUM 25 MCG: 25 TABLET ORAL at 06:29

## 2018-04-29 RX ADMIN — FUROSEMIDE 20 MG: 20 TABLET ORAL at 14:23

## 2018-04-29 RX ADMIN — Medication 10 ML: at 14:24

## 2018-04-29 RX ADMIN — Medication 10 ML: at 14:27

## 2018-04-29 RX ADMIN — SIMVASTATIN 40 MG: 40 TABLET, FILM COATED ORAL at 21:50

## 2018-04-29 ASSESSMENT — PAIN SCALES - GENERAL
PAINLEVEL_OUTOF10: 0

## 2018-04-29 ASSESSMENT — PULMONARY FUNCTION TESTS
PIF_VALUE: 33
PIF_VALUE: 30
PIF_VALUE: 36
PIF_VALUE: 31
PIF_VALUE: 30
PIF_VALUE: 32

## 2018-04-30 ENCOUNTER — APPOINTMENT (OUTPATIENT)
Dept: GENERAL RADIOLOGY | Age: 83
DRG: 981 | End: 2018-04-30
Payer: MEDICARE

## 2018-04-30 LAB
AADO2: 123.8 MMHG
AADO2: 91.2 MMHG
ALBUMIN SERPL-MCNC: 3 G/DL (ref 3.5–5.2)
ALP BLD-CCNC: 76 U/L (ref 35–104)
ALT SERPL-CCNC: 12 U/L (ref 0–32)
ANION GAP SERPL CALCULATED.3IONS-SCNC: 13 MMOL/L (ref 7–16)
AST SERPL-CCNC: 18 U/L (ref 0–31)
B.E.: 4.6 MMOL/L (ref -3–3)
B.E.: 5.4 MMOL/L (ref -3–3)
BASOPHILS ABSOLUTE: 0.02 E9/L (ref 0–0.2)
BASOPHILS RELATIVE PERCENT: 0.2 % (ref 0–2)
BILIRUB SERPL-MCNC: 0.4 MG/DL (ref 0–1.2)
BUN BLDV-MCNC: 43 MG/DL (ref 8–23)
CALCIUM SERPL-MCNC: 8.8 MG/DL (ref 8.6–10.2)
CHLORIDE BLD-SCNC: 99 MMOL/L (ref 98–107)
CO2: 25 MMOL/L (ref 22–29)
COHB: 0.1 % (ref 0–1.5)
COHB: 0.2 % (ref 0–1.5)
CREAT SERPL-MCNC: 1.9 MG/DL (ref 0.5–1)
CRITICAL: ABNORMAL
CRITICAL: ABNORMAL
DATE ANALYZED: ABNORMAL
DATE ANALYZED: ABNORMAL
DATE OF COLLECTION: ABNORMAL
DATE OF COLLECTION: ABNORMAL
EOSINOPHILS ABSOLUTE: 0.19 E9/L (ref 0.05–0.5)
EOSINOPHILS RELATIVE PERCENT: 2.2 % (ref 0–6)
FIO2: 40 %
FIO2: 40 %
GFR AFRICAN AMERICAN: 31
GFR NON-AFRICAN AMERICAN: 25 ML/MIN/1.73
GLUCOSE BLD-MCNC: 119 MG/DL (ref 74–109)
HCO3: 28.8 MMOL/L (ref 22–26)
HCO3: 29 MMOL/L (ref 22–26)
HCT VFR BLD CALC: 30.5 % (ref 34–48)
HEMOGLOBIN: 9.4 G/DL (ref 11.5–15.5)
HHB: 2.4 % (ref 0–5)
HHB: 3.3 % (ref 0–5)
IMMATURE GRANULOCYTES #: 0.04 E9/L
IMMATURE GRANULOCYTES %: 0.5 % (ref 0–5)
LAB: ABNORMAL
LAB: ABNORMAL
LYMPHOCYTES ABSOLUTE: 0.71 E9/L (ref 1.5–4)
LYMPHOCYTES RELATIVE PERCENT: 8.2 % (ref 20–42)
Lab: 1720
Lab: 620
MCH RBC QN AUTO: 24.5 PG (ref 26–35)
MCHC RBC AUTO-ENTMCNC: 30.8 % (ref 32–34.5)
MCV RBC AUTO: 79.6 FL (ref 80–99.9)
METER GLUCOSE: 101 MG/DL (ref 70–110)
METER GLUCOSE: 117 MG/DL (ref 70–110)
METER GLUCOSE: 140 MG/DL (ref 70–110)
METER GLUCOSE: 99 MG/DL (ref 70–110)
METHB: 0.3 % (ref 0–1.5)
METHB: 0.3 % (ref 0–1.5)
MODE: ABNORMAL
MODE: AC
MONOCYTES ABSOLUTE: 0.96 E9/L (ref 0.1–0.95)
MONOCYTES RELATIVE PERCENT: 11.1 % (ref 2–12)
NEUTROPHILS ABSOLUTE: 6.7 E9/L (ref 1.8–7.3)
NEUTROPHILS RELATIVE PERCENT: 77.8 % (ref 43–80)
O2 CONTENT: 13.9 ML/DL
O2 CONTENT: 14.2 ML/DL
O2 SATURATION: 96.7 % (ref 92–98.5)
O2 SATURATION: 97.6 % (ref 92–98.5)
O2HB: 96.2 % (ref 94–97)
O2HB: 97.2 % (ref 94–97)
OPERATOR ID: 2485
OPERATOR ID: ABNORMAL
PATIENT TEMP: 37 C
PATIENT TEMP: 37 C
PCO2: 38.4 MMHG (ref 35–45)
PCO2: 41.4 MMHG (ref 35–45)
PDW BLD-RTO: 19.8 FL (ref 11.5–15)
PEEP/CPAP: 10 CMH?O
PEEP/CPAP: 5 CMH?O
PFO2: 2.6 MMHG/%
PFO2: 3.5 MMHG/%
PH BLOOD GAS: 7.46 (ref 7.35–7.45)
PH BLOOD GAS: 7.5 (ref 7.35–7.45)
PLATELET # BLD: 217 E9/L (ref 130–450)
PMV BLD AUTO: 9.3 FL (ref 7–12)
PO2: 103.8 MMHG (ref 60–100)
PO2: 139.8 MMHG (ref 60–100)
POTASSIUM REFLEX MAGNESIUM: 4.1 MMOL/L (ref 3.5–5)
PS: 5 CMH20
RBC # BLD: 3.83 E12/L (ref 3.5–5.5)
RI(T): 119 %
RI(T): 65 %
RR MECHANICAL: 16 B/MIN
SODIUM BLD-SCNC: 137 MMOL/L (ref 132–146)
SOURCE, BLOOD GAS: ABNORMAL
SOURCE, BLOOD GAS: ABNORMAL
THB: 10 G/DL (ref 11.5–16.5)
THB: 10.4 G/DL (ref 11.5–16.5)
TIME ANALYZED: 1726
TIME ANALYZED: 629
TOTAL PROTEIN: 6.2 G/DL (ref 6.4–8.3)
VT MECHANICAL: 500 ML
WBC # BLD: 8.6 E9/L (ref 4.5–11.5)

## 2018-04-30 PROCEDURE — 2580000003 HC RX 258: Performed by: INTERNAL MEDICINE

## 2018-04-30 PROCEDURE — 71045 X-RAY EXAM CHEST 1 VIEW: CPT

## 2018-04-30 PROCEDURE — 2000000000 HC ICU R&B

## 2018-04-30 PROCEDURE — C1751 CATH, INF, PER/CENT/MIDLINE: HCPCS

## 2018-04-30 PROCEDURE — 94640 AIRWAY INHALATION TREATMENT: CPT

## 2018-04-30 PROCEDURE — 82962 GLUCOSE BLOOD TEST: CPT

## 2018-04-30 PROCEDURE — 6370000000 HC RX 637 (ALT 250 FOR IP): Performed by: OTOLARYNGOLOGY

## 2018-04-30 PROCEDURE — 36415 COLL VENOUS BLD VENIPUNCTURE: CPT

## 2018-04-30 PROCEDURE — 82805 BLOOD GASES W/O2 SATURATION: CPT

## 2018-04-30 PROCEDURE — 6370000000 HC RX 637 (ALT 250 FOR IP): Performed by: INTERNAL MEDICINE

## 2018-04-30 PROCEDURE — 6360000002 HC RX W HCPCS: Performed by: INTERNAL MEDICINE

## 2018-04-30 PROCEDURE — 94003 VENT MGMT INPAT SUBQ DAY: CPT

## 2018-04-30 PROCEDURE — 2580000003 HC RX 258: Performed by: FAMILY MEDICINE

## 2018-04-30 PROCEDURE — 2500000003 HC RX 250 WO HCPCS: Performed by: INTERNAL MEDICINE

## 2018-04-30 PROCEDURE — 85025 COMPLETE CBC W/AUTO DIFF WBC: CPT

## 2018-04-30 PROCEDURE — 6370000000 HC RX 637 (ALT 250 FOR IP): Performed by: FAMILY MEDICINE

## 2018-04-30 PROCEDURE — 80053 COMPREHEN METABOLIC PANEL: CPT

## 2018-04-30 PROCEDURE — C9113 INJ PANTOPRAZOLE SODIUM, VIA: HCPCS | Performed by: INTERNAL MEDICINE

## 2018-04-30 RX ORDER — SODIUM CHLORIDE 0.9 % (FLUSH) 0.9 %
10 SYRINGE (ML) INJECTION PRN
Status: DISCONTINUED | OUTPATIENT
Start: 2018-04-30 | End: 2018-05-08 | Stop reason: HOSPADM

## 2018-04-30 RX ORDER — ACETAMINOPHEN 325 MG/1
650 TABLET ORAL ONCE
Status: DISCONTINUED | OUTPATIENT
Start: 2018-04-30 | End: 2018-04-30

## 2018-04-30 RX ORDER — LIDOCAINE HYDROCHLORIDE 10 MG/ML
5 INJECTION, SOLUTION EPIDURAL; INFILTRATION; INTRACAUDAL; PERINEURAL ONCE
Status: DISCONTINUED | OUTPATIENT
Start: 2018-04-30 | End: 2018-05-02

## 2018-04-30 RX ORDER — SODIUM CHLORIDE 9 MG/ML
INJECTION, SOLUTION INTRAVENOUS CONTINUOUS
Status: DISCONTINUED | OUTPATIENT
Start: 2018-04-30 | End: 2018-05-02

## 2018-04-30 RX ORDER — SODIUM CHLORIDE 0.9 % (FLUSH) 0.9 %
10 SYRINGE (ML) INJECTION EVERY 12 HOURS SCHEDULED
Status: DISCONTINUED | OUTPATIENT
Start: 2018-04-30 | End: 2018-05-08 | Stop reason: HOSPADM

## 2018-04-30 RX ORDER — METOPROLOL TARTRATE 5 MG/5ML
5 INJECTION INTRAVENOUS ONCE
Status: COMPLETED | OUTPATIENT
Start: 2018-04-30 | End: 2018-04-30

## 2018-04-30 RX ADMIN — INSULIN GLARGINE 60 UNITS: 100 INJECTION, SOLUTION SUBCUTANEOUS at 20:33

## 2018-04-30 RX ADMIN — PANTOPRAZOLE SODIUM 40 MG: 40 INJECTION, POWDER, FOR SOLUTION INTRAVENOUS at 09:45

## 2018-04-30 RX ADMIN — Medication 500 MG: at 11:59

## 2018-04-30 RX ADMIN — METOPROLOL TARTRATE 25 MG: 25 TABLET ORAL at 14:36

## 2018-04-30 RX ADMIN — SODIUM CHLORIDE: 9 INJECTION, SOLUTION INTRAVENOUS at 14:37

## 2018-04-30 RX ADMIN — PROPOFOL 10 MCG/KG/MIN: 10 INJECTION, EMULSION INTRAVENOUS at 12:34

## 2018-04-30 RX ADMIN — IPRATROPIUM BROMIDE AND ALBUTEROL SULFATE 1 AMPULE: .5; 3 SOLUTION RESPIRATORY (INHALATION) at 12:57

## 2018-04-30 RX ADMIN — PROPOFOL 10 MCG/KG/MIN: 10 INJECTION, EMULSION INTRAVENOUS at 02:06

## 2018-04-30 RX ADMIN — SODIUM CHLORIDE: 4.5 INJECTION, SOLUTION INTRAVENOUS at 02:06

## 2018-04-30 RX ADMIN — INSULIN LISPRO 1 UNITS: 100 INJECTION, SOLUTION INTRAVENOUS; SUBCUTANEOUS at 00:30

## 2018-04-30 RX ADMIN — SODIUM CHLORIDE: 4.5 INJECTION, SOLUTION INTRAVENOUS at 08:02

## 2018-04-30 RX ADMIN — IPRATROPIUM BROMIDE AND ALBUTEROL SULFATE 1 AMPULE: .5; 3 SOLUTION RESPIRATORY (INHALATION) at 22:08

## 2018-04-30 RX ADMIN — Medication 10 ML: at 16:40

## 2018-04-30 RX ADMIN — LEVOTHYROXINE SODIUM 25 MCG: 25 TABLET ORAL at 06:24

## 2018-04-30 RX ADMIN — IPRATROPIUM BROMIDE AND ALBUTEROL SULFATE 1 AMPULE: .5; 3 SOLUTION RESPIRATORY (INHALATION) at 07:38

## 2018-04-30 RX ADMIN — SULFAMETHOXAZOLE AND TRIMETHOPRIM 1 TABLET: 800; 160 TABLET ORAL at 11:59

## 2018-04-30 RX ADMIN — Medication 10 ML: at 09:45

## 2018-04-30 RX ADMIN — METOPROLOL TARTRATE 5 MG: 5 INJECTION, SOLUTION INTRAVENOUS at 22:17

## 2018-04-30 RX ADMIN — Medication 10 ML: at 20:27

## 2018-04-30 ASSESSMENT — PAIN SCALES - GENERAL
PAINLEVEL_OUTOF10: 0

## 2018-04-30 ASSESSMENT — PULMONARY FUNCTION TESTS
PIF_VALUE: 27
PIF_VALUE: 10
PIF_VALUE: 30
PIF_VALUE: 28

## 2018-05-01 ENCOUNTER — APPOINTMENT (OUTPATIENT)
Dept: GENERAL RADIOLOGY | Age: 83
DRG: 981 | End: 2018-05-01
Payer: MEDICARE

## 2018-05-01 LAB
AADO2: 78.5 MMHG
ALBUMIN SERPL-MCNC: 2.9 G/DL (ref 3.5–5.2)
ALP BLD-CCNC: 76 U/L (ref 35–104)
ALT SERPL-CCNC: 13 U/L (ref 0–32)
ANION GAP SERPL CALCULATED.3IONS-SCNC: 9 MMOL/L (ref 7–16)
ANISOCYTOSIS: ABNORMAL
AST SERPL-CCNC: 22 U/L (ref 0–31)
B.E.: 3.8 MMOL/L (ref -3–3)
BASOPHILS ABSOLUTE: 0.01 E9/L (ref 0–0.2)
BASOPHILS RELATIVE PERCENT: 0.1 % (ref 0–2)
BILIRUB SERPL-MCNC: 0.4 MG/DL (ref 0–1.2)
BUN BLDV-MCNC: 33 MG/DL (ref 8–23)
CALCIUM SERPL-MCNC: 8.8 MG/DL (ref 8.6–10.2)
CHLORIDE BLD-SCNC: 102 MMOL/L (ref 98–107)
CO2: 28 MMOL/L (ref 22–29)
COHB: 0.2 % (ref 0–1.5)
CREAT SERPL-MCNC: 1.7 MG/DL (ref 0.5–1)
CRITICAL: ABNORMAL
CULTURE, RESPIRATORY: ABNORMAL
CULTURE, RESPIRATORY: ABNORMAL
DATE ANALYZED: ABNORMAL
DATE OF COLLECTION: ABNORMAL
EOSINOPHILS ABSOLUTE: 0.25 E9/L (ref 0.05–0.5)
EOSINOPHILS RELATIVE PERCENT: 3.5 % (ref 0–6)
FIO2: 40 %
GFR AFRICAN AMERICAN: 35
GFR NON-AFRICAN AMERICAN: 29 ML/MIN/1.73
GLUCOSE BLD-MCNC: 100 MG/DL (ref 74–109)
HCO3: 30.2 MMOL/L (ref 22–26)
HCT VFR BLD CALC: 30.3 % (ref 34–48)
HEMOGLOBIN: 9.2 G/DL (ref 11.5–15.5)
HHB: 2.7 % (ref 0–5)
HYPOCHROMIA: ABNORMAL
IMMATURE GRANULOCYTES #: 0.03 E9/L
IMMATURE GRANULOCYTES %: 0.4 % (ref 0–5)
LAB: ABNORMAL
LYMPHOCYTES ABSOLUTE: 0.72 E9/L (ref 1.5–4)
LYMPHOCYTES RELATIVE PERCENT: 10.1 % (ref 20–42)
Lab: 611
MCH RBC QN AUTO: 25.1 PG (ref 26–35)
MCHC RBC AUTO-ENTMCNC: 30.4 % (ref 32–34.5)
MCV RBC AUTO: 82.8 FL (ref 80–99.9)
METER GLUCOSE: 101 MG/DL (ref 70–110)
METER GLUCOSE: 109 MG/DL (ref 70–110)
METER GLUCOSE: 117 MG/DL (ref 70–110)
METER GLUCOSE: 128 MG/DL (ref 70–110)
METHB: 0.3 % (ref 0–1.5)
MODE: ABNORMAL
MONOCYTES ABSOLUTE: 0.85 E9/L (ref 0.1–0.95)
MONOCYTES RELATIVE PERCENT: 11.9 % (ref 2–12)
NEUTROPHILS ABSOLUTE: 5.29 E9/L (ref 1.8–7.3)
NEUTROPHILS RELATIVE PERCENT: 74 % (ref 43–80)
O2 CONTENT: 14 ML/DL
O2 SATURATION: 97.3 % (ref 92–98.5)
O2HB: 96.8 % (ref 94–97)
OPERATOR ID: ABNORMAL
ORGANISM: ABNORMAL
OVALOCYTES: ABNORMAL
PATIENT TEMP: 37 C
PCO2: 55.3 MMHG (ref 35–45)
PDW BLD-RTO: 20.1 FL (ref 11.5–15)
PFO2: 3.33 MMHG/%
PH BLOOD GAS: 7.36 (ref 7.35–7.45)
PLATELET # BLD: 223 E9/L (ref 130–450)
PMV BLD AUTO: 10.1 FL (ref 7–12)
PO2: 133.1 MMHG (ref 60–100)
POIKILOCYTES: ABNORMAL
POLYCHROMASIA: ABNORMAL
POTASSIUM REFLEX MAGNESIUM: 4.7 MMOL/L (ref 3.5–5)
RBC # BLD: 3.66 E12/L (ref 3.5–5.5)
RI(T): 59 %
SMEAR, RESPIRATORY: ABNORMAL
SODIUM BLD-SCNC: 139 MMOL/L (ref 132–146)
SOURCE, BLOOD GAS: ABNORMAL
THB: 10.1 G/DL (ref 11.5–16.5)
TIME ANALYZED: 612
TOTAL PROTEIN: 6.1 G/DL (ref 6.4–8.3)
WBC # BLD: 7.2 E9/L (ref 4.5–11.5)

## 2018-05-01 PROCEDURE — 80053 COMPREHEN METABOLIC PANEL: CPT

## 2018-05-01 PROCEDURE — 2580000003 HC RX 258: Performed by: INTERNAL MEDICINE

## 2018-05-01 PROCEDURE — 94640 AIRWAY INHALATION TREATMENT: CPT

## 2018-05-01 PROCEDURE — 6370000000 HC RX 637 (ALT 250 FOR IP): Performed by: FAMILY MEDICINE

## 2018-05-01 PROCEDURE — 2580000003 HC RX 258: Performed by: FAMILY MEDICINE

## 2018-05-01 PROCEDURE — 85025 COMPLETE CBC W/AUTO DIFF WBC: CPT

## 2018-05-01 PROCEDURE — 1200000000 HC SEMI PRIVATE

## 2018-05-01 PROCEDURE — 36600 WITHDRAWAL OF ARTERIAL BLOOD: CPT

## 2018-05-01 PROCEDURE — G8997 SWALLOW GOAL STATUS: HCPCS

## 2018-05-01 PROCEDURE — 82962 GLUCOSE BLOOD TEST: CPT

## 2018-05-01 PROCEDURE — C9113 INJ PANTOPRAZOLE SODIUM, VIA: HCPCS | Performed by: INTERNAL MEDICINE

## 2018-05-01 PROCEDURE — 97530 THERAPEUTIC ACTIVITIES: CPT

## 2018-05-01 PROCEDURE — 92610 EVALUATE SWALLOWING FUNCTION: CPT

## 2018-05-01 PROCEDURE — 71045 X-RAY EXAM CHEST 1 VIEW: CPT

## 2018-05-01 PROCEDURE — 6370000000 HC RX 637 (ALT 250 FOR IP): Performed by: INTERNAL MEDICINE

## 2018-05-01 PROCEDURE — 6360000002 HC RX W HCPCS: Performed by: INTERNAL MEDICINE

## 2018-05-01 PROCEDURE — 94660 CPAP INITIATION&MGMT: CPT

## 2018-05-01 PROCEDURE — 2700000000 HC OXYGEN THERAPY PER DAY

## 2018-05-01 PROCEDURE — 2060000000 HC ICU INTERMEDIATE R&B

## 2018-05-01 PROCEDURE — 82805 BLOOD GASES W/O2 SATURATION: CPT

## 2018-05-01 PROCEDURE — 36415 COLL VENOUS BLD VENIPUNCTURE: CPT

## 2018-05-01 PROCEDURE — G8996 SWALLOW CURRENT STATUS: HCPCS

## 2018-05-01 PROCEDURE — 6370000000 HC RX 637 (ALT 250 FOR IP): Performed by: OTOLARYNGOLOGY

## 2018-05-01 RX ADMIN — GABAPENTIN 600 MG: 300 CAPSULE ORAL at 20:39

## 2018-05-01 RX ADMIN — IPRATROPIUM BROMIDE AND ALBUTEROL SULFATE 1 AMPULE: .5; 3 SOLUTION RESPIRATORY (INHALATION) at 16:35

## 2018-05-01 RX ADMIN — Medication 10 ML: at 08:26

## 2018-05-01 RX ADMIN — METOPROLOL TARTRATE 25 MG: 25 TABLET ORAL at 13:51

## 2018-05-01 RX ADMIN — Medication 500 MG: at 13:59

## 2018-05-01 RX ADMIN — SODIUM CHLORIDE: 9 INJECTION, SOLUTION INTRAVENOUS at 13:49

## 2018-05-01 RX ADMIN — METOPROLOL TARTRATE 25 MG: 25 TABLET ORAL at 20:40

## 2018-05-01 RX ADMIN — IPRATROPIUM BROMIDE AND ALBUTEROL SULFATE 1 AMPULE: .5; 3 SOLUTION RESPIRATORY (INHALATION) at 08:11

## 2018-05-01 RX ADMIN — SULFAMETHOXAZOLE AND TRIMETHOPRIM 1 TABLET: 800; 160 TABLET ORAL at 13:59

## 2018-05-01 RX ADMIN — IPRATROPIUM BROMIDE AND ALBUTEROL SULFATE 1 AMPULE: .5; 3 SOLUTION RESPIRATORY (INHALATION) at 11:58

## 2018-05-01 RX ADMIN — IPRATROPIUM BROMIDE AND ALBUTEROL SULFATE 1 AMPULE: .5; 3 SOLUTION RESPIRATORY (INHALATION) at 21:00

## 2018-05-01 RX ADMIN — SIMVASTATIN 40 MG: 40 TABLET, FILM COATED ORAL at 20:44

## 2018-05-01 RX ADMIN — Medication 10 ML: at 20:42

## 2018-05-01 RX ADMIN — PANTOPRAZOLE SODIUM 40 MG: 40 INJECTION, POWDER, FOR SOLUTION INTRAVENOUS at 09:30

## 2018-05-01 ASSESSMENT — PAIN SCALES - GENERAL
PAINLEVEL_OUTOF10: 0

## 2018-05-02 LAB
ALBUMIN SERPL-MCNC: 2.9 G/DL (ref 3.5–5.2)
ALP BLD-CCNC: 79 U/L (ref 35–104)
ALT SERPL-CCNC: 18 U/L (ref 0–32)
ANION GAP SERPL CALCULATED.3IONS-SCNC: 8 MMOL/L (ref 7–16)
AST SERPL-CCNC: 31 U/L (ref 0–31)
BASOPHILS ABSOLUTE: 0.02 E9/L (ref 0–0.2)
BASOPHILS RELATIVE PERCENT: 0.4 % (ref 0–2)
BILIRUB SERPL-MCNC: 0.3 MG/DL (ref 0–1.2)
BUN BLDV-MCNC: 26 MG/DL (ref 8–23)
CALCIUM SERPL-MCNC: 8.8 MG/DL (ref 8.6–10.2)
CHLORIDE BLD-SCNC: 103 MMOL/L (ref 98–107)
CO2: 27 MMOL/L (ref 22–29)
CREAT SERPL-MCNC: 1.4 MG/DL (ref 0.5–1)
EOSINOPHILS ABSOLUTE: 0.22 E9/L (ref 0.05–0.5)
EOSINOPHILS RELATIVE PERCENT: 4.2 % (ref 0–6)
GFR AFRICAN AMERICAN: 44
GFR NON-AFRICAN AMERICAN: 36 ML/MIN/1.73
GLUCOSE BLD-MCNC: 101 MG/DL (ref 74–109)
HCT VFR BLD CALC: 30.2 % (ref 34–48)
HEMOGLOBIN: 9 G/DL (ref 11.5–15.5)
IMMATURE GRANULOCYTES #: 0.02 E9/L
IMMATURE GRANULOCYTES %: 0.4 % (ref 0–5)
LYMPHOCYTES ABSOLUTE: 0.84 E9/L (ref 1.5–4)
LYMPHOCYTES RELATIVE PERCENT: 16 % (ref 20–42)
MCH RBC QN AUTO: 24.9 PG (ref 26–35)
MCHC RBC AUTO-ENTMCNC: 29.8 % (ref 32–34.5)
MCV RBC AUTO: 83.7 FL (ref 80–99.9)
METER GLUCOSE: 113 MG/DL (ref 70–110)
METER GLUCOSE: 121 MG/DL (ref 70–110)
METER GLUCOSE: 126 MG/DL (ref 70–110)
METER GLUCOSE: 93 MG/DL (ref 70–110)
METER GLUCOSE: 99 MG/DL (ref 70–110)
MONOCYTES ABSOLUTE: 0.64 E9/L (ref 0.1–0.95)
MONOCYTES RELATIVE PERCENT: 12.2 % (ref 2–12)
NEUTROPHILS ABSOLUTE: 3.51 E9/L (ref 1.8–7.3)
NEUTROPHILS RELATIVE PERCENT: 66.8 % (ref 43–80)
PDW BLD-RTO: 19.9 FL (ref 11.5–15)
PHOSPHORUS: 3.5 MG/DL (ref 2.5–4.5)
PLATELET # BLD: 218 E9/L (ref 130–450)
PMV BLD AUTO: 9.6 FL (ref 7–12)
POTASSIUM REFLEX MAGNESIUM: 4.6 MMOL/L (ref 3.5–5)
RBC # BLD: 3.61 E12/L (ref 3.5–5.5)
SODIUM BLD-SCNC: 138 MMOL/L (ref 132–146)
TOTAL PROTEIN: 6.2 G/DL (ref 6.4–8.3)
WBC # BLD: 5.3 E9/L (ref 4.5–11.5)

## 2018-05-02 PROCEDURE — 80053 COMPREHEN METABOLIC PANEL: CPT

## 2018-05-02 PROCEDURE — 36415 COLL VENOUS BLD VENIPUNCTURE: CPT

## 2018-05-02 PROCEDURE — 2580000003 HC RX 258: Performed by: FAMILY MEDICINE

## 2018-05-02 PROCEDURE — 1200000000 HC SEMI PRIVATE

## 2018-05-02 PROCEDURE — 94640 AIRWAY INHALATION TREATMENT: CPT

## 2018-05-02 PROCEDURE — 97530 THERAPEUTIC ACTIVITIES: CPT

## 2018-05-02 PROCEDURE — 6360000002 HC RX W HCPCS: Performed by: INTERNAL MEDICINE

## 2018-05-02 PROCEDURE — 82962 GLUCOSE BLOOD TEST: CPT

## 2018-05-02 PROCEDURE — 84100 ASSAY OF PHOSPHORUS: CPT

## 2018-05-02 PROCEDURE — 6370000000 HC RX 637 (ALT 250 FOR IP): Performed by: INTERNAL MEDICINE

## 2018-05-02 PROCEDURE — 2700000000 HC OXYGEN THERAPY PER DAY

## 2018-05-02 PROCEDURE — 6370000000 HC RX 637 (ALT 250 FOR IP): Performed by: FAMILY MEDICINE

## 2018-05-02 PROCEDURE — 2500000003 HC RX 250 WO HCPCS: Performed by: INTERNAL MEDICINE

## 2018-05-02 PROCEDURE — 2580000003 HC RX 258: Performed by: INTERNAL MEDICINE

## 2018-05-02 PROCEDURE — C9113 INJ PANTOPRAZOLE SODIUM, VIA: HCPCS | Performed by: INTERNAL MEDICINE

## 2018-05-02 PROCEDURE — 6370000000 HC RX 637 (ALT 250 FOR IP): Performed by: STUDENT IN AN ORGANIZED HEALTH CARE EDUCATION/TRAINING PROGRAM

## 2018-05-02 PROCEDURE — 2060000000 HC ICU INTERMEDIATE R&B

## 2018-05-02 PROCEDURE — 85025 COMPLETE CBC W/AUTO DIFF WBC: CPT

## 2018-05-02 PROCEDURE — 92526 ORAL FUNCTION THERAPY: CPT

## 2018-05-02 PROCEDURE — 6370000000 HC RX 637 (ALT 250 FOR IP): Performed by: OTOLARYNGOLOGY

## 2018-05-02 RX ORDER — SULFAMETHOXAZOLE AND TRIMETHOPRIM 800; 160 MG/1; MG/1
1 TABLET ORAL DAILY
Status: DISCONTINUED | OUTPATIENT
Start: 2018-05-02 | End: 2018-05-02

## 2018-05-02 RX ORDER — BUMETANIDE 0.25 MG/ML
1 INJECTION, SOLUTION INTRAMUSCULAR; INTRAVENOUS ONCE
Status: COMPLETED | OUTPATIENT
Start: 2018-05-02 | End: 2018-05-02

## 2018-05-02 RX ORDER — SULFAMETHOXAZOLE AND TRIMETHOPRIM 800; 160 MG/1; MG/1
1 TABLET ORAL DAILY
Status: DISCONTINUED | OUTPATIENT
Start: 2018-05-03 | End: 2018-05-03

## 2018-05-02 RX ORDER — PANTOPRAZOLE SODIUM 40 MG/1
40 TABLET, DELAYED RELEASE ORAL
Status: DISCONTINUED | OUTPATIENT
Start: 2018-05-02 | End: 2018-05-08 | Stop reason: HOSPADM

## 2018-05-02 RX ADMIN — IPRATROPIUM BROMIDE AND ALBUTEROL SULFATE 1 AMPULE: .5; 3 SOLUTION RESPIRATORY (INHALATION) at 12:06

## 2018-05-02 RX ADMIN — GABAPENTIN 600 MG: 300 CAPSULE ORAL at 20:42

## 2018-05-02 RX ADMIN — METOPROLOL TARTRATE 25 MG: 25 TABLET ORAL at 14:09

## 2018-05-02 RX ADMIN — METOPROLOL TARTRATE 25 MG: 25 TABLET ORAL at 20:42

## 2018-05-02 RX ADMIN — IPRATROPIUM BROMIDE AND ALBUTEROL SULFATE 1 AMPULE: .5; 3 SOLUTION RESPIRATORY (INHALATION) at 17:20

## 2018-05-02 RX ADMIN — Medication 10 ML: at 09:15

## 2018-05-02 RX ADMIN — LEVOTHYROXINE SODIUM 25 MCG: 25 TABLET ORAL at 05:41

## 2018-05-02 RX ADMIN — BUMETANIDE 1 MG: 0.25 INJECTION INTRAMUSCULAR; INTRAVENOUS at 12:51

## 2018-05-02 RX ADMIN — SALINE NASAL SPRAY 2 SPRAY: 1.5 SOLUTION NASAL at 14:09

## 2018-05-02 RX ADMIN — IPRATROPIUM BROMIDE AND ALBUTEROL SULFATE 1 AMPULE: .5; 3 SOLUTION RESPIRATORY (INHALATION) at 08:28

## 2018-05-02 RX ADMIN — SALINE NASAL SPRAY 2 SPRAY: 1.5 SOLUTION NASAL at 20:42

## 2018-05-02 RX ADMIN — Medication 10 ML: at 09:16

## 2018-05-02 RX ADMIN — PANTOPRAZOLE SODIUM 40 MG: 40 INJECTION, POWDER, FOR SOLUTION INTRAVENOUS at 09:11

## 2018-05-02 RX ADMIN — MAGNESIUM HYDROXIDE 30 ML: 400 SUSPENSION ORAL at 18:05

## 2018-05-02 RX ADMIN — SODIUM CHLORIDE: 9 INJECTION, SOLUTION INTRAVENOUS at 03:50

## 2018-05-02 RX ADMIN — Medication 300 UNITS: at 09:15

## 2018-05-02 RX ADMIN — SALINE NASAL SPRAY 2 SPRAY: 1.5 SOLUTION NASAL at 09:11

## 2018-05-02 RX ADMIN — SIMVASTATIN 40 MG: 40 TABLET, FILM COATED ORAL at 20:42

## 2018-05-02 RX ADMIN — Medication 10 ML: at 09:11

## 2018-05-02 RX ADMIN — METOPROLOL TARTRATE 25 MG: 25 TABLET ORAL at 09:11

## 2018-05-02 RX ADMIN — Medication 500 MG: at 09:14

## 2018-05-02 RX ADMIN — PANTOPRAZOLE SODIUM 40 MG: 40 TABLET, DELAYED RELEASE ORAL at 16:35

## 2018-05-02 RX ADMIN — Medication 10 ML: at 20:43

## 2018-05-02 RX ADMIN — SULFAMETHOXAZOLE AND TRIMETHOPRIM 1 TABLET: 800; 160 TABLET ORAL at 09:14

## 2018-05-02 ASSESSMENT — PAIN SCALES - GENERAL
PAINLEVEL_OUTOF10: 0
PAINLEVEL_OUTOF10: 0

## 2018-05-03 ENCOUNTER — APPOINTMENT (OUTPATIENT)
Dept: GENERAL RADIOLOGY | Age: 83
DRG: 981 | End: 2018-05-03
Payer: MEDICARE

## 2018-05-03 LAB
ALBUMIN SERPL-MCNC: 3 G/DL (ref 3.5–5.2)
ALP BLD-CCNC: 76 U/L (ref 35–104)
ALT SERPL-CCNC: 18 U/L (ref 0–32)
ANION GAP SERPL CALCULATED.3IONS-SCNC: 9 MMOL/L (ref 7–16)
AST SERPL-CCNC: 27 U/L (ref 0–31)
BASOPHILS ABSOLUTE: 0.01 E9/L (ref 0–0.2)
BASOPHILS RELATIVE PERCENT: 0.2 % (ref 0–2)
BILIRUB SERPL-MCNC: 0.3 MG/DL (ref 0–1.2)
BUN BLDV-MCNC: 26 MG/DL (ref 8–23)
CALCIUM SERPL-MCNC: 9.2 MG/DL (ref 8.6–10.2)
CHLORIDE BLD-SCNC: 101 MMOL/L (ref 98–107)
CO2: 29 MMOL/L (ref 22–29)
CREAT SERPL-MCNC: 1.4 MG/DL (ref 0.5–1)
EOSINOPHILS ABSOLUTE: 0.24 E9/L (ref 0.05–0.5)
EOSINOPHILS RELATIVE PERCENT: 4 % (ref 0–6)
GFR AFRICAN AMERICAN: 44
GFR NON-AFRICAN AMERICAN: 36 ML/MIN/1.73
GLUCOSE BLD-MCNC: 107 MG/DL (ref 74–109)
HCT VFR BLD CALC: 32.1 % (ref 34–48)
HEMOGLOBIN: 9.4 G/DL (ref 11.5–15.5)
IMMATURE GRANULOCYTES #: 0.02 E9/L
IMMATURE GRANULOCYTES %: 0.3 % (ref 0–5)
LYMPHOCYTES ABSOLUTE: 0.77 E9/L (ref 1.5–4)
LYMPHOCYTES RELATIVE PERCENT: 12.7 % (ref 20–42)
MCH RBC QN AUTO: 25 PG (ref 26–35)
MCHC RBC AUTO-ENTMCNC: 29.3 % (ref 32–34.5)
MCV RBC AUTO: 85.4 FL (ref 80–99.9)
METER GLUCOSE: 109 MG/DL (ref 70–110)
METER GLUCOSE: 140 MG/DL (ref 70–110)
METER GLUCOSE: 147 MG/DL (ref 70–110)
METER GLUCOSE: 90 MG/DL (ref 70–110)
MONOCYTES ABSOLUTE: 0.7 E9/L (ref 0.1–0.95)
MONOCYTES RELATIVE PERCENT: 11.6 % (ref 2–12)
NEUTROPHILS ABSOLUTE: 4.3 E9/L (ref 1.8–7.3)
NEUTROPHILS RELATIVE PERCENT: 71.2 % (ref 43–80)
PDW BLD-RTO: 20 FL (ref 11.5–15)
PLATELET # BLD: 247 E9/L (ref 130–450)
PMV BLD AUTO: 10 FL (ref 7–12)
POTASSIUM REFLEX MAGNESIUM: 4.6 MMOL/L (ref 3.5–5)
RBC # BLD: 3.76 E12/L (ref 3.5–5.5)
SODIUM BLD-SCNC: 139 MMOL/L (ref 132–146)
TOTAL PROTEIN: 6.2 G/DL (ref 6.4–8.3)
WBC # BLD: 6 E9/L (ref 4.5–11.5)

## 2018-05-03 PROCEDURE — 97530 THERAPEUTIC ACTIVITIES: CPT

## 2018-05-03 PROCEDURE — 6370000000 HC RX 637 (ALT 250 FOR IP): Performed by: INTERNAL MEDICINE

## 2018-05-03 PROCEDURE — 80053 COMPREHEN METABOLIC PANEL: CPT

## 2018-05-03 PROCEDURE — 94640 AIRWAY INHALATION TREATMENT: CPT

## 2018-05-03 PROCEDURE — 85025 COMPLETE CBC W/AUTO DIFF WBC: CPT

## 2018-05-03 PROCEDURE — 2060000000 HC ICU INTERMEDIATE R&B

## 2018-05-03 PROCEDURE — 82962 GLUCOSE BLOOD TEST: CPT

## 2018-05-03 PROCEDURE — 2580000003 HC RX 258: Performed by: FAMILY MEDICINE

## 2018-05-03 PROCEDURE — 2500000003 HC RX 250 WO HCPCS: Performed by: INTERNAL MEDICINE

## 2018-05-03 PROCEDURE — 6370000000 HC RX 637 (ALT 250 FOR IP): Performed by: FAMILY MEDICINE

## 2018-05-03 PROCEDURE — 1200000000 HC SEMI PRIVATE

## 2018-05-03 PROCEDURE — 36415 COLL VENOUS BLD VENIPUNCTURE: CPT

## 2018-05-03 PROCEDURE — 6370000000 HC RX 637 (ALT 250 FOR IP): Performed by: EMERGENCY MEDICINE

## 2018-05-03 PROCEDURE — 2700000000 HC OXYGEN THERAPY PER DAY

## 2018-05-03 PROCEDURE — 92526 ORAL FUNCTION THERAPY: CPT

## 2018-05-03 PROCEDURE — 71045 X-RAY EXAM CHEST 1 VIEW: CPT

## 2018-05-03 PROCEDURE — 2580000003 HC RX 258: Performed by: INTERNAL MEDICINE

## 2018-05-03 RX ORDER — BUMETANIDE 0.25 MG/ML
1 INJECTION, SOLUTION INTRAMUSCULAR; INTRAVENOUS EVERY 12 HOURS
Status: COMPLETED | OUTPATIENT
Start: 2018-05-03 | End: 2018-05-03

## 2018-05-03 RX ADMIN — IPRATROPIUM BROMIDE AND ALBUTEROL SULFATE 1 AMPULE: .5; 3 SOLUTION RESPIRATORY (INHALATION) at 21:20

## 2018-05-03 RX ADMIN — METOPROLOL TARTRATE 25 MG: 25 TABLET ORAL at 15:11

## 2018-05-03 RX ADMIN — Medication 10 ML: at 23:52

## 2018-05-03 RX ADMIN — SALINE NASAL SPRAY 2 SPRAY: 1.5 SOLUTION NASAL at 08:43

## 2018-05-03 RX ADMIN — SALINE NASAL SPRAY 2 SPRAY: 1.5 SOLUTION NASAL at 21:10

## 2018-05-03 RX ADMIN — Medication 10 ML: at 21:11

## 2018-05-03 RX ADMIN — Medication 500 MG: at 08:42

## 2018-05-03 RX ADMIN — PANTOPRAZOLE SODIUM 40 MG: 40 TABLET, DELAYED RELEASE ORAL at 05:50

## 2018-05-03 RX ADMIN — LEVOTHYROXINE SODIUM 25 MCG: 25 TABLET ORAL at 05:51

## 2018-05-03 RX ADMIN — INSULIN GLARGINE 30 UNITS: 100 INJECTION, SOLUTION SUBCUTANEOUS at 21:14

## 2018-05-03 RX ADMIN — Medication 10 ML: at 08:43

## 2018-05-03 RX ADMIN — Medication 10 ML: at 08:42

## 2018-05-03 RX ADMIN — GABAPENTIN 600 MG: 300 CAPSULE ORAL at 21:08

## 2018-05-03 RX ADMIN — BUMETANIDE 1 MG: 0.25 INJECTION INTRAMUSCULAR; INTRAVENOUS at 23:52

## 2018-05-03 RX ADMIN — METOPROLOL TARTRATE 25 MG: 25 TABLET ORAL at 21:08

## 2018-05-03 RX ADMIN — IPRATROPIUM BROMIDE AND ALBUTEROL SULFATE 1 AMPULE: .5; 3 SOLUTION RESPIRATORY (INHALATION) at 09:22

## 2018-05-03 RX ADMIN — BUMETANIDE 1 MG: 0.25 INJECTION INTRAMUSCULAR; INTRAVENOUS at 11:29

## 2018-05-03 RX ADMIN — SIMVASTATIN 40 MG: 40 TABLET, FILM COATED ORAL at 21:08

## 2018-05-03 RX ADMIN — IPRATROPIUM BROMIDE AND ALBUTEROL SULFATE 1 AMPULE: .5; 3 SOLUTION RESPIRATORY (INHALATION) at 12:46

## 2018-05-03 RX ADMIN — SULFAMETHOXAZOLE AND TRIMETHOPRIM 1 TABLET: 800; 160 TABLET ORAL at 08:42

## 2018-05-03 RX ADMIN — METOPROLOL TARTRATE 25 MG: 25 TABLET ORAL at 08:41

## 2018-05-03 RX ADMIN — IPRATROPIUM BROMIDE AND ALBUTEROL SULFATE 1 AMPULE: .5; 3 SOLUTION RESPIRATORY (INHALATION) at 17:02

## 2018-05-03 RX ADMIN — PANTOPRAZOLE SODIUM 40 MG: 40 TABLET, DELAYED RELEASE ORAL at 15:13

## 2018-05-03 ASSESSMENT — PAIN SCALES - GENERAL
PAINLEVEL_OUTOF10: 0

## 2018-05-04 ENCOUNTER — APPOINTMENT (OUTPATIENT)
Dept: ULTRASOUND IMAGING | Age: 83
DRG: 981 | End: 2018-05-04
Payer: MEDICARE

## 2018-05-04 ENCOUNTER — APPOINTMENT (OUTPATIENT)
Dept: GENERAL RADIOLOGY | Age: 83
DRG: 981 | End: 2018-05-04
Payer: MEDICARE

## 2018-05-04 LAB
ALBUMIN SERPL-MCNC: 3.2 G/DL (ref 3.5–5.2)
ALP BLD-CCNC: 79 U/L (ref 35–104)
ALT SERPL-CCNC: 19 U/L (ref 0–32)
ANION GAP SERPL CALCULATED.3IONS-SCNC: 10 MMOL/L (ref 7–16)
APPEARANCE FLUID: NORMAL
AST SERPL-CCNC: 27 U/L (ref 0–31)
BASOPHILS ABSOLUTE: 0.02 E9/L (ref 0–0.2)
BASOPHILS RELATIVE PERCENT: 0.4 % (ref 0–2)
BILIRUB SERPL-MCNC: 0.3 MG/DL (ref 0–1.2)
BUN BLDV-MCNC: 23 MG/DL (ref 8–23)
CALCIUM SERPL-MCNC: 9.2 MG/DL (ref 8.6–10.2)
CELL COUNT FLUID TYPE: NORMAL
CHLORIDE BLD-SCNC: 96 MMOL/L (ref 98–107)
CO2: 32 MMOL/L (ref 22–29)
COLOR FLUID: YELLOW
CREAT SERPL-MCNC: 1.4 MG/DL (ref 0.5–1)
EOSINOPHILS ABSOLUTE: 0.25 E9/L (ref 0.05–0.5)
EOSINOPHILS RELATIVE PERCENT: 4.5 % (ref 0–6)
FLUID TYPE: NORMAL
GFR AFRICAN AMERICAN: 44
GFR NON-AFRICAN AMERICAN: 36 ML/MIN/1.73
GLUCOSE BLD-MCNC: 115 MG/DL (ref 74–109)
GLUCOSE, FLUID: 124 MG/DL
HCT VFR BLD CALC: 32.9 % (ref 34–48)
HEMOGLOBIN: 9.6 G/DL (ref 11.5–15.5)
IMMATURE GRANULOCYTES #: 0.02 E9/L
IMMATURE GRANULOCYTES %: 0.4 % (ref 0–5)
LD, FLUID: 85 U/L
LYMPHOCYTES ABSOLUTE: 0.79 E9/L (ref 1.5–4)
LYMPHOCYTES RELATIVE PERCENT: 14.3 % (ref 20–42)
MAGNESIUM: 2 MG/DL (ref 1.6–2.6)
MCH RBC QN AUTO: 24.8 PG (ref 26–35)
MCHC RBC AUTO-ENTMCNC: 29.2 % (ref 32–34.5)
MCV RBC AUTO: 85 FL (ref 80–99.9)
METER GLUCOSE: 130 MG/DL (ref 70–110)
METER GLUCOSE: 194 MG/DL (ref 70–110)
METER GLUCOSE: 211 MG/DL (ref 70–110)
METER GLUCOSE: 98 MG/DL (ref 70–110)
MONOCYTE, FLUID: 71 %
MONOCYTES ABSOLUTE: 0.77 E9/L (ref 0.1–0.95)
MONOCYTES RELATIVE PERCENT: 13.9 % (ref 2–12)
NEUTROPHIL, FLUID: 29 %
NEUTROPHILS ABSOLUTE: 3.69 E9/L (ref 1.8–7.3)
NEUTROPHILS RELATIVE PERCENT: 66.5 % (ref 43–80)
NUCLEATED CELLS FLUID: 28 /UL
PDW BLD-RTO: 19.8 FL (ref 11.5–15)
PH, BODY FLUID: 8.02
PLATELET # BLD: 267 E9/L (ref 130–450)
PMV BLD AUTO: 10 FL (ref 7–12)
POTASSIUM REFLEX MAGNESIUM: 4.3 MMOL/L (ref 3.5–5)
PROTEIN FLUID: 1.5 G/DL
RBC # BLD: 3.87 E12/L (ref 3.5–5.5)
RBC FLUID: <2000 /UL
SODIUM BLD-SCNC: 138 MMOL/L (ref 132–146)
TOTAL PROTEIN: 6.5 G/DL (ref 6.4–8.3)
WBC # BLD: 5.5 E9/L (ref 4.5–11.5)

## 2018-05-04 PROCEDURE — 92610 EVALUATE SWALLOWING FUNCTION: CPT

## 2018-05-04 PROCEDURE — 2500000003 HC RX 250 WO HCPCS: Performed by: INTERNAL MEDICINE

## 2018-05-04 PROCEDURE — 0W9B3ZZ DRAINAGE OF LEFT PLEURAL CAVITY, PERCUTANEOUS APPROACH: ICD-10-PCS | Performed by: INTERNAL MEDICINE

## 2018-05-04 PROCEDURE — 6370000000 HC RX 637 (ALT 250 FOR IP): Performed by: INTERNAL MEDICINE

## 2018-05-04 PROCEDURE — 74230 X-RAY XM SWLNG FUNCJ C+: CPT

## 2018-05-04 PROCEDURE — 1200000000 HC SEMI PRIVATE

## 2018-05-04 PROCEDURE — 80053 COMPREHEN METABOLIC PANEL: CPT

## 2018-05-04 PROCEDURE — 89051 BODY FLUID CELL COUNT: CPT

## 2018-05-04 PROCEDURE — 84157 ASSAY OF PROTEIN OTHER: CPT

## 2018-05-04 PROCEDURE — 2500000003 HC RX 250 WO HCPCS: Performed by: RADIOLOGY

## 2018-05-04 PROCEDURE — 87205 SMEAR GRAM STAIN: CPT

## 2018-05-04 PROCEDURE — G8996 SWALLOW CURRENT STATUS: HCPCS

## 2018-05-04 PROCEDURE — 6360000004 HC RX CONTRAST MEDICATION: Performed by: RADIOLOGY

## 2018-05-04 PROCEDURE — 6370000000 HC RX 637 (ALT 250 FOR IP): Performed by: FAMILY MEDICINE

## 2018-05-04 PROCEDURE — 82962 GLUCOSE BLOOD TEST: CPT

## 2018-05-04 PROCEDURE — 88305 TISSUE EXAM BY PATHOLOGIST: CPT

## 2018-05-04 PROCEDURE — 32555 ASPIRATE PLEURA W/ IMAGING: CPT

## 2018-05-04 PROCEDURE — 83986 ASSAY PH BODY FLUID NOS: CPT

## 2018-05-04 PROCEDURE — 93970 EXTREMITY STUDY: CPT

## 2018-05-04 PROCEDURE — 2500000003 HC RX 250 WO HCPCS

## 2018-05-04 PROCEDURE — 2700000000 HC OXYGEN THERAPY PER DAY

## 2018-05-04 PROCEDURE — 2580000003 HC RX 258: Performed by: FAMILY MEDICINE

## 2018-05-04 PROCEDURE — 88342 IMHCHEM/IMCYTCHM 1ST ANTB: CPT

## 2018-05-04 PROCEDURE — 83735 ASSAY OF MAGNESIUM: CPT

## 2018-05-04 PROCEDURE — 36415 COLL VENOUS BLD VENIPUNCTURE: CPT

## 2018-05-04 PROCEDURE — 71045 X-RAY EXAM CHEST 1 VIEW: CPT

## 2018-05-04 PROCEDURE — 87070 CULTURE OTHR SPECIMN AEROBIC: CPT

## 2018-05-04 PROCEDURE — 97530 THERAPEUTIC ACTIVITIES: CPT

## 2018-05-04 PROCEDURE — 88341 IMHCHEM/IMCYTCHM EA ADD ANTB: CPT

## 2018-05-04 PROCEDURE — 2580000003 HC RX 258: Performed by: INTERNAL MEDICINE

## 2018-05-04 PROCEDURE — 88112 CYTOPATH CELL ENHANCE TECH: CPT

## 2018-05-04 PROCEDURE — 82947 ASSAY GLUCOSE BLOOD QUANT: CPT

## 2018-05-04 PROCEDURE — 85025 COMPLETE CBC W/AUTO DIFF WBC: CPT

## 2018-05-04 PROCEDURE — 83615 LACTATE (LD) (LDH) ENZYME: CPT

## 2018-05-04 PROCEDURE — G8997 SWALLOW GOAL STATUS: HCPCS

## 2018-05-04 PROCEDURE — 94640 AIRWAY INHALATION TREATMENT: CPT

## 2018-05-04 PROCEDURE — 6370000000 HC RX 637 (ALT 250 FOR IP): Performed by: STUDENT IN AN ORGANIZED HEALTH CARE EDUCATION/TRAINING PROGRAM

## 2018-05-04 PROCEDURE — 6360000002 HC RX W HCPCS: Performed by: EMERGENCY MEDICINE

## 2018-05-04 PROCEDURE — A4641 RADIOPHARM DX AGENT NOC: HCPCS | Performed by: RADIOLOGY

## 2018-05-04 RX ORDER — ZOLPIDEM TARTRATE 5 MG/1
5 TABLET ORAL NIGHTLY PRN
Status: DISCONTINUED | OUTPATIENT
Start: 2018-05-04 | End: 2018-05-08 | Stop reason: HOSPADM

## 2018-05-04 RX ORDER — BUMETANIDE 1 MG/1
1 TABLET ORAL 2 TIMES DAILY
Status: DISCONTINUED | OUTPATIENT
Start: 2018-05-04 | End: 2018-05-07

## 2018-05-04 RX ORDER — POLYETHYLENE GLYCOL 3350 17 G/17G
17 POWDER, FOR SOLUTION ORAL DAILY
Status: DISCONTINUED | OUTPATIENT
Start: 2018-05-04 | End: 2018-05-08 | Stop reason: HOSPADM

## 2018-05-04 RX ORDER — DOCUSATE SODIUM 100 MG/1
100 CAPSULE, LIQUID FILLED ORAL 2 TIMES DAILY
Status: DISCONTINUED | OUTPATIENT
Start: 2018-05-04 | End: 2018-05-08 | Stop reason: HOSPADM

## 2018-05-04 RX ORDER — LIDOCAINE HYDROCHLORIDE 10 MG/ML
INJECTION, SOLUTION INFILTRATION; PERINEURAL
Status: COMPLETED
Start: 2018-05-04 | End: 2018-05-04

## 2018-05-04 RX ORDER — BUMETANIDE 0.25 MG/ML
1 INJECTION, SOLUTION INTRAMUSCULAR; INTRAVENOUS 2 TIMES DAILY
Status: DISCONTINUED | OUTPATIENT
Start: 2018-05-04 | End: 2018-05-04

## 2018-05-04 RX ADMIN — METOPROLOL TARTRATE 25 MG: 25 TABLET ORAL at 22:05

## 2018-05-04 RX ADMIN — LEVOTHYROXINE SODIUM 25 MCG: 25 TABLET ORAL at 06:14

## 2018-05-04 RX ADMIN — INSULIN GLARGINE 60 UNITS: 100 INJECTION, SOLUTION SUBCUTANEOUS at 22:30

## 2018-05-04 RX ADMIN — LIDOCAINE HYDROCHLORIDE: 10 INJECTION, SOLUTION INFILTRATION; PERINEURAL at 13:00

## 2018-05-04 RX ADMIN — BARIUM SULFATE 45 G: 0.6 CREAM ORAL at 11:45

## 2018-05-04 RX ADMIN — SALINE NASAL SPRAY 2 SPRAY: 1.5 SOLUTION NASAL at 16:25

## 2018-05-04 RX ADMIN — PANTOPRAZOLE SODIUM 40 MG: 40 TABLET, DELAYED RELEASE ORAL at 16:15

## 2018-05-04 RX ADMIN — BARIUM SULFATE 88 G: 960 POWDER, FOR SUSPENSION ORAL at 11:45

## 2018-05-04 RX ADMIN — INSULIN LISPRO 2 UNITS: 100 INJECTION, SOLUTION INTRAVENOUS; SUBCUTANEOUS at 22:31

## 2018-05-04 RX ADMIN — ENOXAPARIN SODIUM 135 MG: 150 INJECTION SUBCUTANEOUS at 22:06

## 2018-05-04 RX ADMIN — Medication 10 ML: at 09:24

## 2018-05-04 RX ADMIN — DOCUSATE SODIUM 100 MG: 100 CAPSULE, LIQUID FILLED ORAL at 22:06

## 2018-05-04 RX ADMIN — MAGNESIUM HYDROXIDE 30 ML: 400 SUSPENSION ORAL at 22:05

## 2018-05-04 RX ADMIN — IPRATROPIUM BROMIDE AND ALBUTEROL SULFATE 1 AMPULE: .5; 3 SOLUTION RESPIRATORY (INHALATION) at 16:25

## 2018-05-04 RX ADMIN — METOPROLOL TARTRATE 25 MG: 25 TABLET ORAL at 16:15

## 2018-05-04 RX ADMIN — DOCUSATE SODIUM 100 MG: 100 CAPSULE, LIQUID FILLED ORAL at 16:15

## 2018-05-04 RX ADMIN — Medication 10 ML: at 09:30

## 2018-05-04 RX ADMIN — SIMVASTATIN 40 MG: 40 TABLET, FILM COATED ORAL at 22:06

## 2018-05-04 RX ADMIN — SALINE NASAL SPRAY 2 SPRAY: 1.5 SOLUTION NASAL at 09:23

## 2018-05-04 RX ADMIN — PANTOPRAZOLE SODIUM 40 MG: 40 TABLET, DELAYED RELEASE ORAL at 06:14

## 2018-05-04 RX ADMIN — SALINE NASAL SPRAY 2 SPRAY: 1.5 SOLUTION NASAL at 22:07

## 2018-05-04 RX ADMIN — IPRATROPIUM BROMIDE AND ALBUTEROL SULFATE 1 AMPULE: .5; 3 SOLUTION RESPIRATORY (INHALATION) at 09:30

## 2018-05-04 RX ADMIN — GABAPENTIN 600 MG: 300 CAPSULE ORAL at 22:06

## 2018-05-04 RX ADMIN — Medication 500 MG: at 09:22

## 2018-05-04 RX ADMIN — IPRATROPIUM BROMIDE AND ALBUTEROL SULFATE 1 AMPULE: .5; 3 SOLUTION RESPIRATORY (INHALATION) at 19:38

## 2018-05-04 RX ADMIN — BUMETANIDE 1 MG: 1 TABLET ORAL at 22:06

## 2018-05-04 RX ADMIN — BUMETANIDE 1 MG: 0.25 INJECTION INTRAMUSCULAR; INTRAVENOUS at 16:24

## 2018-05-04 RX ADMIN — POLYETHYLENE GLYCOL 3350 17 G: 17 POWDER, FOR SOLUTION ORAL at 16:15

## 2018-05-04 ASSESSMENT — PAIN SCALES - GENERAL
PAINLEVEL_OUTOF10: 0
PAINLEVEL_OUTOF10: 0
PAINLEVEL_OUTOF10: 4
PAINLEVEL_OUTOF10: 3

## 2018-05-04 ASSESSMENT — PAIN DESCRIPTION - FREQUENCY: FREQUENCY: CONTINUOUS

## 2018-05-04 ASSESSMENT — PAIN DESCRIPTION - PROGRESSION: CLINICAL_PROGRESSION: GRADUALLY WORSENING

## 2018-05-04 ASSESSMENT — PAIN DESCRIPTION - PAIN TYPE: TYPE: ACUTE PAIN

## 2018-05-04 ASSESSMENT — PAIN DESCRIPTION - DESCRIPTORS: DESCRIPTORS: CRAMPING;DISCOMFORT

## 2018-05-04 ASSESSMENT — PAIN DESCRIPTION - LOCATION: LOCATION: ABDOMEN

## 2018-05-05 LAB
ALBUMIN SERPL-MCNC: 3 G/DL (ref 3.5–5.2)
ALP BLD-CCNC: 76 U/L (ref 35–104)
ALT SERPL-CCNC: 20 U/L (ref 0–32)
ANION GAP SERPL CALCULATED.3IONS-SCNC: 9 MMOL/L (ref 7–16)
AST SERPL-CCNC: 24 U/L (ref 0–31)
BASOPHILS ABSOLUTE: 0.01 E9/L (ref 0–0.2)
BASOPHILS RELATIVE PERCENT: 0.2 % (ref 0–2)
BILIRUB SERPL-MCNC: 0.4 MG/DL (ref 0–1.2)
BUN BLDV-MCNC: 21 MG/DL (ref 8–23)
CALCIUM SERPL-MCNC: 9.5 MG/DL (ref 8.6–10.2)
CHLORIDE BLD-SCNC: 96 MMOL/L (ref 98–107)
CO2: 37 MMOL/L (ref 22–29)
CREAT SERPL-MCNC: 1.4 MG/DL (ref 0.5–1)
EOSINOPHILS ABSOLUTE: 0.27 E9/L (ref 0.05–0.5)
EOSINOPHILS RELATIVE PERCENT: 5 % (ref 0–6)
GFR AFRICAN AMERICAN: 44
GFR NON-AFRICAN AMERICAN: 36 ML/MIN/1.73
GLUCOSE BLD-MCNC: 89 MG/DL (ref 74–109)
GRAM STAIN ORDERABLE: NORMAL
HCT VFR BLD CALC: 32.4 % (ref 34–48)
HEMOGLOBIN: 9.8 G/DL (ref 11.5–15.5)
IMMATURE GRANULOCYTES #: 0.02 E9/L
IMMATURE GRANULOCYTES %: 0.4 % (ref 0–5)
LYMPHOCYTES ABSOLUTE: 0.77 E9/L (ref 1.5–4)
LYMPHOCYTES RELATIVE PERCENT: 14.1 % (ref 20–42)
MCH RBC QN AUTO: 25 PG (ref 26–35)
MCHC RBC AUTO-ENTMCNC: 30.2 % (ref 32–34.5)
MCV RBC AUTO: 82.7 FL (ref 80–99.9)
METER GLUCOSE: 108 MG/DL (ref 70–110)
METER GLUCOSE: 113 MG/DL (ref 70–110)
METER GLUCOSE: 72 MG/DL (ref 70–110)
METER GLUCOSE: 84 MG/DL (ref 70–110)
MONOCYTES ABSOLUTE: 0.88 E9/L (ref 0.1–0.95)
MONOCYTES RELATIVE PERCENT: 16.1 % (ref 2–12)
NEUTROPHILS ABSOLUTE: 3.5 E9/L (ref 1.8–7.3)
NEUTROPHILS RELATIVE PERCENT: 64.2 % (ref 43–80)
PDW BLD-RTO: 19.5 FL (ref 11.5–15)
PLATELET # BLD: 274 E9/L (ref 130–450)
PMV BLD AUTO: 9.3 FL (ref 7–12)
POTASSIUM REFLEX MAGNESIUM: 4.1 MMOL/L (ref 3.5–5)
POTASSIUM SERPL-SCNC: 4.1 MMOL/L (ref 3.5–5)
RBC # BLD: 3.92 E12/L (ref 3.5–5.5)
SODIUM BLD-SCNC: 142 MMOL/L (ref 132–146)
TOTAL PROTEIN: 6.3 G/DL (ref 6.4–8.3)
WBC # BLD: 5.5 E9/L (ref 4.5–11.5)

## 2018-05-05 PROCEDURE — 1200000000 HC SEMI PRIVATE

## 2018-05-05 PROCEDURE — 6370000000 HC RX 637 (ALT 250 FOR IP): Performed by: INTERNAL MEDICINE

## 2018-05-05 PROCEDURE — 36415 COLL VENOUS BLD VENIPUNCTURE: CPT

## 2018-05-05 PROCEDURE — 6370000000 HC RX 637 (ALT 250 FOR IP): Performed by: FAMILY MEDICINE

## 2018-05-05 PROCEDURE — 94640 AIRWAY INHALATION TREATMENT: CPT

## 2018-05-05 PROCEDURE — 80053 COMPREHEN METABOLIC PANEL: CPT

## 2018-05-05 PROCEDURE — 2580000003 HC RX 258: Performed by: INTERNAL MEDICINE

## 2018-05-05 PROCEDURE — 6360000002 HC RX W HCPCS: Performed by: EMERGENCY MEDICINE

## 2018-05-05 PROCEDURE — 80048 BASIC METABOLIC PNL TOTAL CA: CPT

## 2018-05-05 PROCEDURE — 82962 GLUCOSE BLOOD TEST: CPT

## 2018-05-05 PROCEDURE — 2700000000 HC OXYGEN THERAPY PER DAY

## 2018-05-05 PROCEDURE — 85025 COMPLETE CBC W/AUTO DIFF WBC: CPT

## 2018-05-05 RX ORDER — SODIUM PHOSPHATE, DIBASIC AND SODIUM PHOSPHATE, MONOBASIC 7; 19 G/133ML; G/133ML
1 ENEMA RECTAL DAILY PRN
Status: DISCONTINUED | OUTPATIENT
Start: 2018-05-05 | End: 2018-05-08 | Stop reason: HOSPADM

## 2018-05-05 RX ORDER — LACTULOSE 10 G/15ML
20 SOLUTION ORAL 3 TIMES DAILY
Status: DISCONTINUED | OUTPATIENT
Start: 2018-05-05 | End: 2018-05-08 | Stop reason: HOSPADM

## 2018-05-05 RX ORDER — ACETAZOLAMIDE 250 MG/1
250 TABLET ORAL 2 TIMES DAILY
Status: COMPLETED | OUTPATIENT
Start: 2018-05-05 | End: 2018-05-05

## 2018-05-05 RX ORDER — BISACODYL 10 MG
10 SUPPOSITORY, RECTAL RECTAL DAILY PRN
Status: DISCONTINUED | OUTPATIENT
Start: 2018-05-05 | End: 2018-05-08 | Stop reason: HOSPADM

## 2018-05-05 RX ADMIN — METOPROLOL TARTRATE 25 MG: 25 TABLET ORAL at 09:26

## 2018-05-05 RX ADMIN — IPRATROPIUM BROMIDE AND ALBUTEROL SULFATE 1 AMPULE: .5; 3 SOLUTION RESPIRATORY (INHALATION) at 12:12

## 2018-05-05 RX ADMIN — BUMETANIDE 1 MG: 1 TABLET ORAL at 20:32

## 2018-05-05 RX ADMIN — DOCUSATE SODIUM 100 MG: 100 CAPSULE, LIQUID FILLED ORAL at 20:32

## 2018-05-05 RX ADMIN — ACETAZOLAMIDE 250 MG: 250 TABLET ORAL at 20:32

## 2018-05-05 RX ADMIN — METOPROLOL TARTRATE 25 MG: 25 TABLET ORAL at 20:32

## 2018-05-05 RX ADMIN — LACTULOSE 20 G: 10 SOLUTION ORAL at 14:43

## 2018-05-05 RX ADMIN — LEVOTHYROXINE SODIUM 25 MCG: 25 TABLET ORAL at 09:26

## 2018-05-05 RX ADMIN — ZOLPIDEM TARTRATE 5 MG: 5 TABLET ORAL at 23:25

## 2018-05-05 RX ADMIN — METOPROLOL TARTRATE 25 MG: 25 TABLET ORAL at 14:43

## 2018-05-05 RX ADMIN — DOCUSATE SODIUM 100 MG: 100 CAPSULE, LIQUID FILLED ORAL at 09:26

## 2018-05-05 RX ADMIN — IPRATROPIUM BROMIDE AND ALBUTEROL SULFATE 1 AMPULE: .5; 3 SOLUTION RESPIRATORY (INHALATION) at 16:22

## 2018-05-05 RX ADMIN — SALINE NASAL SPRAY 2 SPRAY: 1.5 SOLUTION NASAL at 20:33

## 2018-05-05 RX ADMIN — POLYETHYLENE GLYCOL 3350 17 G: 17 POWDER, FOR SOLUTION ORAL at 11:20

## 2018-05-05 RX ADMIN — PANTOPRAZOLE SODIUM 40 MG: 40 TABLET, DELAYED RELEASE ORAL at 16:37

## 2018-05-05 RX ADMIN — SODIUM PHOSPHATE 1 ENEMA: 7; 19 ENEMA RECTAL at 05:00

## 2018-05-05 RX ADMIN — ENOXAPARIN SODIUM 135 MG: 150 INJECTION SUBCUTANEOUS at 20:32

## 2018-05-05 RX ADMIN — ACETAZOLAMIDE 250 MG: 250 TABLET ORAL at 11:55

## 2018-05-05 RX ADMIN — SIMVASTATIN 40 MG: 40 TABLET, FILM COATED ORAL at 20:32

## 2018-05-05 RX ADMIN — BISACODYL 10 MG: 10 SUPPOSITORY RECTAL at 03:07

## 2018-05-05 RX ADMIN — IPRATROPIUM BROMIDE AND ALBUTEROL SULFATE 1 AMPULE: .5; 3 SOLUTION RESPIRATORY (INHALATION) at 20:02

## 2018-05-05 RX ADMIN — SALINE NASAL SPRAY 2 SPRAY: 1.5 SOLUTION NASAL at 14:43

## 2018-05-05 RX ADMIN — PANTOPRAZOLE SODIUM 40 MG: 40 TABLET, DELAYED RELEASE ORAL at 09:26

## 2018-05-05 RX ADMIN — Medication 500 MG: at 11:20

## 2018-05-05 RX ADMIN — LACTULOSE 20 G: 10 SOLUTION ORAL at 20:31

## 2018-05-05 RX ADMIN — IPRATROPIUM BROMIDE AND ALBUTEROL SULFATE 1 AMPULE: .5; 3 SOLUTION RESPIRATORY (INHALATION) at 08:01

## 2018-05-05 RX ADMIN — GABAPENTIN 600 MG: 300 CAPSULE ORAL at 20:32

## 2018-05-05 RX ADMIN — SALINE NASAL SPRAY 2 SPRAY: 1.5 SOLUTION NASAL at 09:25

## 2018-05-05 RX ADMIN — BUMETANIDE 1 MG: 1 TABLET ORAL at 09:26

## 2018-05-05 RX ADMIN — ENOXAPARIN SODIUM 135 MG: 150 INJECTION SUBCUTANEOUS at 09:27

## 2018-05-05 ASSESSMENT — PAIN SCALES - GENERAL
PAINLEVEL_OUTOF10: 0
PAINLEVEL_OUTOF10: 0

## 2018-05-06 LAB
ALBUMIN SERPL-MCNC: 2.8 G/DL (ref 3.5–5.2)
ALP BLD-CCNC: 76 U/L (ref 35–104)
ALT SERPL-CCNC: 16 U/L (ref 0–32)
ANION GAP SERPL CALCULATED.3IONS-SCNC: 9 MMOL/L (ref 7–16)
AST SERPL-CCNC: 25 U/L (ref 0–31)
BASOPHILS ABSOLUTE: 0.02 E9/L (ref 0–0.2)
BASOPHILS RELATIVE PERCENT: 0.4 % (ref 0–2)
BILIRUB SERPL-MCNC: 0.4 MG/DL (ref 0–1.2)
BODY FLUID CULTURE, STERILE: NORMAL
BUN BLDV-MCNC: 17 MG/DL (ref 8–23)
CALCIUM SERPL-MCNC: 9.5 MG/DL (ref 8.6–10.2)
CHLORIDE BLD-SCNC: 93 MMOL/L (ref 98–107)
CO2: 37 MMOL/L (ref 22–29)
CREAT SERPL-MCNC: 1.5 MG/DL (ref 0.5–1)
EOSINOPHILS ABSOLUTE: 0.26 E9/L (ref 0.05–0.5)
EOSINOPHILS RELATIVE PERCENT: 4.6 % (ref 0–6)
GFR AFRICAN AMERICAN: 40
GFR NON-AFRICAN AMERICAN: 33 ML/MIN/1.73
GLUCOSE BLD-MCNC: 68 MG/DL (ref 74–109)
GRAM STAIN RESULT: NORMAL
HCT VFR BLD CALC: 33.7 % (ref 34–48)
HEMOGLOBIN: 10.1 G/DL (ref 11.5–15.5)
IMMATURE GRANULOCYTES #: 0.03 E9/L
IMMATURE GRANULOCYTES %: 0.5 % (ref 0–5)
LYMPHOCYTES ABSOLUTE: 0.89 E9/L (ref 1.5–4)
LYMPHOCYTES RELATIVE PERCENT: 15.6 % (ref 20–42)
MCH RBC QN AUTO: 25 PG (ref 26–35)
MCHC RBC AUTO-ENTMCNC: 30 % (ref 32–34.5)
MCV RBC AUTO: 83.4 FL (ref 80–99.9)
METER GLUCOSE: 104 MG/DL (ref 70–110)
METER GLUCOSE: 126 MG/DL (ref 70–110)
METER GLUCOSE: 213 MG/DL (ref 70–110)
METER GLUCOSE: 303 MG/DL (ref 70–110)
METER GLUCOSE: 67 MG/DL (ref 70–110)
MONOCYTES ABSOLUTE: 0.98 E9/L (ref 0.1–0.95)
MONOCYTES RELATIVE PERCENT: 17.2 % (ref 2–12)
NEUTROPHILS ABSOLUTE: 3.53 E9/L (ref 1.8–7.3)
NEUTROPHILS RELATIVE PERCENT: 61.7 % (ref 43–80)
PDW BLD-RTO: 19.6 FL (ref 11.5–15)
PHOSPHORUS: 3.3 MG/DL (ref 2.5–4.5)
PLATELET # BLD: 283 E9/L (ref 130–450)
PMV BLD AUTO: 9.8 FL (ref 7–12)
POTASSIUM REFLEX MAGNESIUM: 3.9 MMOL/L (ref 3.5–5)
RBC # BLD: 4.04 E12/L (ref 3.5–5.5)
SODIUM BLD-SCNC: 139 MMOL/L (ref 132–146)
TOTAL PROTEIN: 6.4 G/DL (ref 6.4–8.3)
WBC # BLD: 5.7 E9/L (ref 4.5–11.5)

## 2018-05-06 PROCEDURE — 6360000002 HC RX W HCPCS: Performed by: EMERGENCY MEDICINE

## 2018-05-06 PROCEDURE — 94640 AIRWAY INHALATION TREATMENT: CPT

## 2018-05-06 PROCEDURE — 94660 CPAP INITIATION&MGMT: CPT

## 2018-05-06 PROCEDURE — 2700000000 HC OXYGEN THERAPY PER DAY

## 2018-05-06 PROCEDURE — 85025 COMPLETE CBC W/AUTO DIFF WBC: CPT

## 2018-05-06 PROCEDURE — 80053 COMPREHEN METABOLIC PANEL: CPT

## 2018-05-06 PROCEDURE — 6370000000 HC RX 637 (ALT 250 FOR IP): Performed by: INTERNAL MEDICINE

## 2018-05-06 PROCEDURE — 84100 ASSAY OF PHOSPHORUS: CPT

## 2018-05-06 PROCEDURE — 6370000000 HC RX 637 (ALT 250 FOR IP)

## 2018-05-06 PROCEDURE — 36415 COLL VENOUS BLD VENIPUNCTURE: CPT

## 2018-05-06 PROCEDURE — 82962 GLUCOSE BLOOD TEST: CPT

## 2018-05-06 PROCEDURE — 1200000000 HC SEMI PRIVATE

## 2018-05-06 PROCEDURE — 6370000000 HC RX 637 (ALT 250 FOR IP): Performed by: FAMILY MEDICINE

## 2018-05-06 RX ORDER — ACETAZOLAMIDE 250 MG/1
250 TABLET ORAL 2 TIMES DAILY
Status: COMPLETED | OUTPATIENT
Start: 2018-05-06 | End: 2018-05-06

## 2018-05-06 RX ADMIN — SALINE NASAL SPRAY 2 SPRAY: 1.5 SOLUTION NASAL at 22:53

## 2018-05-06 RX ADMIN — INSULIN LISPRO 2 UNITS: 100 INJECTION, SOLUTION INTRAVENOUS; SUBCUTANEOUS at 16:18

## 2018-05-06 RX ADMIN — ACETAZOLAMIDE 250 MG: 250 TABLET ORAL at 22:51

## 2018-05-06 RX ADMIN — BUMETANIDE 1 MG: 1 TABLET ORAL at 22:52

## 2018-05-06 RX ADMIN — MAGNESIUM HYDROXIDE 30 ML: 400 SUSPENSION ORAL at 00:12

## 2018-05-06 RX ADMIN — ENOXAPARIN SODIUM 135 MG: 150 INJECTION SUBCUTANEOUS at 09:31

## 2018-05-06 RX ADMIN — INSULIN LISPRO 4 UNITS: 100 INJECTION, SOLUTION INTRAVENOUS; SUBCUTANEOUS at 22:53

## 2018-05-06 RX ADMIN — METOPROLOL TARTRATE 25 MG: 25 TABLET ORAL at 09:31

## 2018-05-06 RX ADMIN — PANTOPRAZOLE SODIUM 40 MG: 40 TABLET, DELAYED RELEASE ORAL at 06:34

## 2018-05-06 RX ADMIN — ENOXAPARIN SODIUM 135 MG: 150 INJECTION SUBCUTANEOUS at 22:51

## 2018-05-06 RX ADMIN — LACTULOSE 20 G: 10 SOLUTION ORAL at 09:31

## 2018-05-06 RX ADMIN — BUMETANIDE 1 MG: 1 TABLET ORAL at 09:31

## 2018-05-06 RX ADMIN — DEXTROSE 15 G: 15 GEL ORAL at 06:34

## 2018-05-06 RX ADMIN — Medication 500 MG: at 09:31

## 2018-05-06 RX ADMIN — METOPROLOL TARTRATE 25 MG: 25 TABLET ORAL at 22:52

## 2018-05-06 RX ADMIN — NORTRIPTYLINE HYDROCHLORIDE 10 MG: 10 CAPSULE ORAL at 22:52

## 2018-05-06 RX ADMIN — SALINE NASAL SPRAY 2 SPRAY: 1.5 SOLUTION NASAL at 13:58

## 2018-05-06 RX ADMIN — LEVOTHYROXINE SODIUM 25 MCG: 25 TABLET ORAL at 06:34

## 2018-05-06 RX ADMIN — DOCUSATE SODIUM 100 MG: 100 CAPSULE, LIQUID FILLED ORAL at 22:51

## 2018-05-06 RX ADMIN — SALINE NASAL SPRAY 2 SPRAY: 1.5 SOLUTION NASAL at 09:32

## 2018-05-06 RX ADMIN — METOPROLOL TARTRATE 25 MG: 25 TABLET ORAL at 13:58

## 2018-05-06 RX ADMIN — DOCUSATE SODIUM 100 MG: 100 CAPSULE, LIQUID FILLED ORAL at 09:31

## 2018-05-06 RX ADMIN — IPRATROPIUM BROMIDE AND ALBUTEROL SULFATE 1 AMPULE: .5; 3 SOLUTION RESPIRATORY (INHALATION) at 11:44

## 2018-05-06 RX ADMIN — ACETAZOLAMIDE 250 MG: 250 TABLET ORAL at 13:58

## 2018-05-06 RX ADMIN — LACTULOSE 20 G: 10 SOLUTION ORAL at 13:58

## 2018-05-06 RX ADMIN — PANTOPRAZOLE SODIUM 40 MG: 40 TABLET, DELAYED RELEASE ORAL at 16:14

## 2018-05-06 RX ADMIN — INSULIN GLARGINE 60 UNITS: 100 INJECTION, SOLUTION SUBCUTANEOUS at 22:52

## 2018-05-06 RX ADMIN — LACTULOSE 20 G: 10 SOLUTION ORAL at 22:51

## 2018-05-06 RX ADMIN — SIMVASTATIN 40 MG: 40 TABLET, FILM COATED ORAL at 22:51

## 2018-05-06 RX ADMIN — POLYETHYLENE GLYCOL 3350 17 G: 17 POWDER, FOR SOLUTION ORAL at 09:31

## 2018-05-06 RX ADMIN — IPRATROPIUM BROMIDE AND ALBUTEROL SULFATE 1 AMPULE: .5; 3 SOLUTION RESPIRATORY (INHALATION) at 20:15

## 2018-05-06 RX ADMIN — IPRATROPIUM BROMIDE AND ALBUTEROL SULFATE 1 AMPULE: .5; 3 SOLUTION RESPIRATORY (INHALATION) at 15:54

## 2018-05-06 RX ADMIN — ZOLPIDEM TARTRATE 5 MG: 5 TABLET ORAL at 23:05

## 2018-05-06 RX ADMIN — GABAPENTIN 600 MG: 300 CAPSULE ORAL at 22:51

## 2018-05-06 ASSESSMENT — PAIN DESCRIPTION - FREQUENCY: FREQUENCY: CONTINUOUS

## 2018-05-06 ASSESSMENT — PAIN SCALES - GENERAL
PAINLEVEL_OUTOF10: 0
PAINLEVEL_OUTOF10: 8

## 2018-05-06 ASSESSMENT — PAIN DESCRIPTION - PAIN TYPE: TYPE: ACUTE PAIN

## 2018-05-06 ASSESSMENT — PAIN DESCRIPTION - DESCRIPTORS: DESCRIPTORS: CRAMPING;DISCOMFORT

## 2018-05-06 ASSESSMENT — PAIN DESCRIPTION - LOCATION: LOCATION: ABDOMEN

## 2018-05-07 LAB
ALBUMIN SERPL-MCNC: 3 G/DL (ref 3.5–5.2)
ALP BLD-CCNC: 82 U/L (ref 35–104)
ALT SERPL-CCNC: 19 U/L (ref 0–32)
ANION GAP SERPL CALCULATED.3IONS-SCNC: 9 MMOL/L (ref 7–16)
ANISOCYTOSIS: ABNORMAL
AST SERPL-CCNC: 25 U/L (ref 0–31)
BASOPHILS ABSOLUTE: 0.02 E9/L (ref 0–0.2)
BASOPHILS RELATIVE PERCENT: 0.4 % (ref 0–2)
BILIRUB SERPL-MCNC: 0.4 MG/DL (ref 0–1.2)
BUN BLDV-MCNC: 17 MG/DL (ref 8–23)
CALCIUM SERPL-MCNC: 9.3 MG/DL (ref 8.6–10.2)
CHLORIDE BLD-SCNC: 94 MMOL/L (ref 98–107)
CO2: 35 MMOL/L (ref 22–29)
CREAT SERPL-MCNC: 1.7 MG/DL (ref 0.5–1)
EOSINOPHILS ABSOLUTE: 0.23 E9/L (ref 0.05–0.5)
EOSINOPHILS RELATIVE PERCENT: 5.1 % (ref 0–6)
GFR AFRICAN AMERICAN: 35
GFR NON-AFRICAN AMERICAN: 29 ML/MIN/1.73
GLUCOSE BLD-MCNC: 164 MG/DL (ref 74–109)
HCT VFR BLD CALC: 36.4 % (ref 34–48)
HEMOGLOBIN: 10.5 G/DL (ref 11.5–15.5)
HYPOCHROMIA: ABNORMAL
IMMATURE GRANULOCYTES #: 0.02 E9/L
IMMATURE GRANULOCYTES %: 0.4 % (ref 0–5)
LYMPHOCYTES ABSOLUTE: 0.76 E9/L (ref 1.5–4)
LYMPHOCYTES RELATIVE PERCENT: 16.9 % (ref 20–42)
MAGNESIUM: 2.3 MG/DL (ref 1.6–2.6)
MCH RBC QN AUTO: 24.2 PG (ref 26–35)
MCHC RBC AUTO-ENTMCNC: 28.8 % (ref 32–34.5)
MCV RBC AUTO: 84.1 FL (ref 80–99.9)
METER GLUCOSE: 142 MG/DL (ref 70–110)
METER GLUCOSE: 206 MG/DL (ref 70–110)
METER GLUCOSE: 249 MG/DL (ref 70–110)
METER GLUCOSE: 292 MG/DL (ref 70–110)
MONOCYTES ABSOLUTE: 0.81 E9/L (ref 0.1–0.95)
MONOCYTES RELATIVE PERCENT: 18 % (ref 2–12)
NEUTROPHILS ABSOLUTE: 2.66 E9/L (ref 1.8–7.3)
NEUTROPHILS RELATIVE PERCENT: 59.2 % (ref 43–80)
PDW BLD-RTO: 19.4 FL (ref 11.5–15)
PHOSPHORUS: 3.4 MG/DL (ref 2.5–4.5)
PLATELET # BLD: 271 E9/L (ref 130–450)
PMV BLD AUTO: 10 FL (ref 7–12)
POTASSIUM REFLEX MAGNESIUM: 3.9 MMOL/L (ref 3.5–5)
RBC # BLD: 4.33 E12/L (ref 3.5–5.5)
SODIUM BLD-SCNC: 138 MMOL/L (ref 132–146)
TOTAL PROTEIN: 6.5 G/DL (ref 6.4–8.3)
WBC # BLD: 4.5 E9/L (ref 4.5–11.5)

## 2018-05-07 PROCEDURE — 1200000000 HC SEMI PRIVATE

## 2018-05-07 PROCEDURE — 94640 AIRWAY INHALATION TREATMENT: CPT

## 2018-05-07 PROCEDURE — 84100 ASSAY OF PHOSPHORUS: CPT

## 2018-05-07 PROCEDURE — 6370000000 HC RX 637 (ALT 250 FOR IP): Performed by: INTERNAL MEDICINE

## 2018-05-07 PROCEDURE — 97530 THERAPEUTIC ACTIVITIES: CPT

## 2018-05-07 PROCEDURE — 2700000000 HC OXYGEN THERAPY PER DAY

## 2018-05-07 PROCEDURE — 97110 THERAPEUTIC EXERCISES: CPT

## 2018-05-07 PROCEDURE — 83735 ASSAY OF MAGNESIUM: CPT

## 2018-05-07 PROCEDURE — 92526 ORAL FUNCTION THERAPY: CPT

## 2018-05-07 PROCEDURE — 94660 CPAP INITIATION&MGMT: CPT

## 2018-05-07 PROCEDURE — 82962 GLUCOSE BLOOD TEST: CPT

## 2018-05-07 PROCEDURE — 80053 COMPREHEN METABOLIC PANEL: CPT

## 2018-05-07 PROCEDURE — 36415 COLL VENOUS BLD VENIPUNCTURE: CPT

## 2018-05-07 PROCEDURE — 6360000002 HC RX W HCPCS: Performed by: EMERGENCY MEDICINE

## 2018-05-07 PROCEDURE — 85025 COMPLETE CBC W/AUTO DIFF WBC: CPT

## 2018-05-07 PROCEDURE — 6370000000 HC RX 637 (ALT 250 FOR IP): Performed by: FAMILY MEDICINE

## 2018-05-07 RX ORDER — BUMETANIDE 1 MG/1
1 TABLET ORAL 2 TIMES DAILY
Qty: 30 TABLET | Refills: 0 | DISCHARGE
Start: 2018-05-07 | End: 2018-07-20 | Stop reason: ALTCHOICE

## 2018-05-07 RX ORDER — BISACODYL 10 MG
10 SUPPOSITORY, RECTAL RECTAL DAILY PRN
DISCHARGE
Start: 2018-05-07 | End: 2018-06-06

## 2018-05-07 RX ORDER — PSEUDOEPHEDRINE HCL 30 MG
100 TABLET ORAL 2 TIMES DAILY
DISCHARGE
Start: 2018-05-07 | End: 2018-07-20 | Stop reason: ALTCHOICE

## 2018-05-07 RX ORDER — BUMETANIDE 1 MG/1
1 TABLET ORAL DAILY
Status: DISCONTINUED | OUTPATIENT
Start: 2018-05-08 | End: 2018-05-08 | Stop reason: HOSPADM

## 2018-05-07 RX ORDER — POLYETHYLENE GLYCOL 3350 17 G/17G
17 POWDER, FOR SOLUTION ORAL DAILY
Qty: 527 G | Refills: 1 | DISCHARGE
Start: 2018-05-07 | End: 2018-06-06

## 2018-05-07 RX ADMIN — PANTOPRAZOLE SODIUM 40 MG: 40 TABLET, DELAYED RELEASE ORAL at 06:52

## 2018-05-07 RX ADMIN — GABAPENTIN 600 MG: 300 CAPSULE ORAL at 21:01

## 2018-05-07 RX ADMIN — IPRATROPIUM BROMIDE AND ALBUTEROL SULFATE 1 AMPULE: .5; 3 SOLUTION RESPIRATORY (INHALATION) at 12:02

## 2018-05-07 RX ADMIN — INSULIN LISPRO 2 UNITS: 100 INJECTION, SOLUTION INTRAVENOUS; SUBCUTANEOUS at 12:19

## 2018-05-07 RX ADMIN — SALINE NASAL SPRAY 2 SPRAY: 1.5 SOLUTION NASAL at 14:12

## 2018-05-07 RX ADMIN — PANTOPRAZOLE SODIUM 40 MG: 40 TABLET, DELAYED RELEASE ORAL at 16:45

## 2018-05-07 RX ADMIN — ENOXAPARIN SODIUM 135 MG: 150 INJECTION SUBCUTANEOUS at 08:42

## 2018-05-07 RX ADMIN — METOPROLOL TARTRATE 25 MG: 25 TABLET ORAL at 08:41

## 2018-05-07 RX ADMIN — METOPROLOL TARTRATE 25 MG: 25 TABLET ORAL at 21:01

## 2018-05-07 RX ADMIN — INSULIN LISPRO 1 UNITS: 100 INJECTION, SOLUTION INTRAVENOUS; SUBCUTANEOUS at 06:52

## 2018-05-07 RX ADMIN — IPRATROPIUM BROMIDE AND ALBUTEROL SULFATE 1 AMPULE: .5; 3 SOLUTION RESPIRATORY (INHALATION) at 15:22

## 2018-05-07 RX ADMIN — SALINE NASAL SPRAY 2 SPRAY: 1.5 SOLUTION NASAL at 21:01

## 2018-05-07 RX ADMIN — INSULIN GLARGINE 60 UNITS: 100 INJECTION, SOLUTION SUBCUTANEOUS at 20:55

## 2018-05-07 RX ADMIN — Medication 500 MG: at 08:42

## 2018-05-07 RX ADMIN — METOPROLOL TARTRATE 25 MG: 25 TABLET ORAL at 14:12

## 2018-05-07 RX ADMIN — ENOXAPARIN SODIUM 135 MG: 150 INJECTION SUBCUTANEOUS at 20:56

## 2018-05-07 RX ADMIN — IPRATROPIUM BROMIDE AND ALBUTEROL SULFATE 1 AMPULE: .5; 3 SOLUTION RESPIRATORY (INHALATION) at 07:33

## 2018-05-07 RX ADMIN — ZOLPIDEM TARTRATE 5 MG: 5 TABLET ORAL at 23:21

## 2018-05-07 RX ADMIN — NORTRIPTYLINE HYDROCHLORIDE 10 MG: 10 CAPSULE ORAL at 21:01

## 2018-05-07 RX ADMIN — LACTULOSE 20 G: 10 SOLUTION ORAL at 14:12

## 2018-05-07 RX ADMIN — SIMVASTATIN 40 MG: 40 TABLET, FILM COATED ORAL at 21:01

## 2018-05-07 RX ADMIN — DOCUSATE SODIUM 100 MG: 100 CAPSULE, LIQUID FILLED ORAL at 21:01

## 2018-05-07 RX ADMIN — LEVOTHYROXINE SODIUM 25 MCG: 25 TABLET ORAL at 06:52

## 2018-05-07 RX ADMIN — BUMETANIDE 1 MG: 1 TABLET ORAL at 08:42

## 2018-05-07 RX ADMIN — MAGNESIUM HYDROXIDE 30 ML: 400 SUSPENSION ORAL at 21:01

## 2018-05-07 RX ADMIN — INSULIN LISPRO 3 UNITS: 100 INJECTION, SOLUTION INTRAVENOUS; SUBCUTANEOUS at 20:55

## 2018-05-07 RX ADMIN — POLYETHYLENE GLYCOL 3350 17 G: 17 POWDER, FOR SOLUTION ORAL at 08:42

## 2018-05-07 RX ADMIN — DOCUSATE SODIUM 100 MG: 100 CAPSULE, LIQUID FILLED ORAL at 08:42

## 2018-05-07 RX ADMIN — IPRATROPIUM BROMIDE AND ALBUTEROL SULFATE 1 AMPULE: .5; 3 SOLUTION RESPIRATORY (INHALATION) at 19:06

## 2018-05-07 RX ADMIN — LACTULOSE 20 G: 10 SOLUTION ORAL at 21:01

## 2018-05-07 RX ADMIN — LACTULOSE 20 G: 10 SOLUTION ORAL at 08:42

## 2018-05-07 RX ADMIN — SALINE NASAL SPRAY 2 SPRAY: 1.5 SOLUTION NASAL at 08:42

## 2018-05-07 RX ADMIN — INSULIN LISPRO 2 UNITS: 100 INJECTION, SOLUTION INTRAVENOUS; SUBCUTANEOUS at 16:45

## 2018-05-07 ASSESSMENT — PAIN SCALES - GENERAL
PAINLEVEL_OUTOF10: 0
PAINLEVEL_OUTOF10: 0

## 2018-05-08 VITALS
RESPIRATION RATE: 16 BRPM | TEMPERATURE: 98.2 F | OXYGEN SATURATION: 97 % | BODY MASS INDEX: 50.08 KG/M2 | HEIGHT: 61 IN | WEIGHT: 265.25 LBS | HEART RATE: 77 BPM | DIASTOLIC BLOOD PRESSURE: 79 MMHG | SYSTOLIC BLOOD PRESSURE: 109 MMHG

## 2018-05-08 LAB
ALBUMIN SERPL-MCNC: 3.1 G/DL (ref 3.5–5.2)
ALP BLD-CCNC: 81 U/L (ref 35–104)
ALT SERPL-CCNC: 20 U/L (ref 0–32)
ANION GAP SERPL CALCULATED.3IONS-SCNC: 11 MMOL/L (ref 7–16)
AST SERPL-CCNC: 31 U/L (ref 0–31)
BASOPHILS ABSOLUTE: 0.01 E9/L (ref 0–0.2)
BASOPHILS RELATIVE PERCENT: 0.2 % (ref 0–2)
BILIRUB SERPL-MCNC: 0.3 MG/DL (ref 0–1.2)
BUN BLDV-MCNC: 16 MG/DL (ref 8–23)
CALCIUM SERPL-MCNC: 9.1 MG/DL (ref 8.6–10.2)
CHLORIDE BLD-SCNC: 91 MMOL/L (ref 98–107)
CO2: 34 MMOL/L (ref 22–29)
CREAT SERPL-MCNC: 1.6 MG/DL (ref 0.5–1)
EOSINOPHILS ABSOLUTE: 0.2 E9/L (ref 0.05–0.5)
EOSINOPHILS RELATIVE PERCENT: 4.8 % (ref 0–6)
GFR AFRICAN AMERICAN: 37
GFR NON-AFRICAN AMERICAN: 31 ML/MIN/1.73
GLUCOSE BLD-MCNC: 225 MG/DL (ref 74–109)
HCT VFR BLD CALC: 33.4 % (ref 34–48)
HEMOGLOBIN: 9.8 G/DL (ref 11.5–15.5)
IMMATURE GRANULOCYTES #: 0.01 E9/L
IMMATURE GRANULOCYTES %: 0.2 % (ref 0–5)
LYMPHOCYTES ABSOLUTE: 0.99 E9/L (ref 1.5–4)
LYMPHOCYTES RELATIVE PERCENT: 23.5 % (ref 20–42)
MCH RBC QN AUTO: 24.7 PG (ref 26–35)
MCHC RBC AUTO-ENTMCNC: 29.3 % (ref 32–34.5)
MCV RBC AUTO: 84.1 FL (ref 80–99.9)
METER GLUCOSE: 156 MG/DL (ref 70–110)
METER GLUCOSE: 184 MG/DL (ref 70–110)
MONOCYTES ABSOLUTE: 0.61 E9/L (ref 0.1–0.95)
MONOCYTES RELATIVE PERCENT: 14.5 % (ref 2–12)
NEUTROPHILS ABSOLUTE: 2.39 E9/L (ref 1.8–7.3)
NEUTROPHILS RELATIVE PERCENT: 56.8 % (ref 43–80)
PDW BLD-RTO: 19.3 FL (ref 11.5–15)
PLATELET # BLD: 275 E9/L (ref 130–450)
PMV BLD AUTO: 10.1 FL (ref 7–12)
POTASSIUM REFLEX MAGNESIUM: 4.1 MMOL/L (ref 3.5–5)
POTASSIUM SERPL-SCNC: 4.1 MMOL/L (ref 3.5–5)
RBC # BLD: 3.97 E12/L (ref 3.5–5.5)
SODIUM BLD-SCNC: 136 MMOL/L (ref 132–146)
TOTAL PROTEIN: 6.5 G/DL (ref 6.4–8.3)
WBC # BLD: 4.2 E9/L (ref 4.5–11.5)

## 2018-05-08 PROCEDURE — 92526 ORAL FUNCTION THERAPY: CPT

## 2018-05-08 PROCEDURE — 2700000000 HC OXYGEN THERAPY PER DAY

## 2018-05-08 PROCEDURE — 6370000000 HC RX 637 (ALT 250 FOR IP): Performed by: INTERNAL MEDICINE

## 2018-05-08 PROCEDURE — 85025 COMPLETE CBC W/AUTO DIFF WBC: CPT

## 2018-05-08 PROCEDURE — 80048 BASIC METABOLIC PNL TOTAL CA: CPT

## 2018-05-08 PROCEDURE — 94640 AIRWAY INHALATION TREATMENT: CPT

## 2018-05-08 PROCEDURE — 82962 GLUCOSE BLOOD TEST: CPT

## 2018-05-08 PROCEDURE — 80053 COMPREHEN METABOLIC PANEL: CPT

## 2018-05-08 PROCEDURE — 94660 CPAP INITIATION&MGMT: CPT

## 2018-05-08 PROCEDURE — 36415 COLL VENOUS BLD VENIPUNCTURE: CPT

## 2018-05-08 PROCEDURE — 6370000000 HC RX 637 (ALT 250 FOR IP): Performed by: FAMILY MEDICINE

## 2018-05-08 PROCEDURE — 6360000002 HC RX W HCPCS: Performed by: EMERGENCY MEDICINE

## 2018-05-08 RX ORDER — INSULIN GLARGINE 100 [IU]/ML
65 INJECTION, SOLUTION SUBCUTANEOUS NIGHTLY
Status: DISCONTINUED | OUTPATIENT
Start: 2018-05-08 | End: 2018-05-08 | Stop reason: HOSPADM

## 2018-05-08 RX ADMIN — METOPROLOL TARTRATE 25 MG: 25 TABLET ORAL at 08:03

## 2018-05-08 RX ADMIN — INSULIN LISPRO 1 UNITS: 100 INJECTION, SOLUTION INTRAVENOUS; SUBCUTANEOUS at 11:47

## 2018-05-08 RX ADMIN — ENOXAPARIN SODIUM 135 MG: 150 INJECTION SUBCUTANEOUS at 08:03

## 2018-05-08 RX ADMIN — SALINE NASAL SPRAY 2 SPRAY: 1.5 SOLUTION NASAL at 08:03

## 2018-05-08 RX ADMIN — LEVOTHYROXINE SODIUM 25 MCG: 25 TABLET ORAL at 06:22

## 2018-05-08 RX ADMIN — PANTOPRAZOLE SODIUM 40 MG: 40 TABLET, DELAYED RELEASE ORAL at 06:22

## 2018-05-08 RX ADMIN — IPRATROPIUM BROMIDE AND ALBUTEROL SULFATE 1 AMPULE: .5; 3 SOLUTION RESPIRATORY (INHALATION) at 08:35

## 2018-05-08 RX ADMIN — POLYETHYLENE GLYCOL 3350 17 G: 17 POWDER, FOR SOLUTION ORAL at 08:03

## 2018-05-08 RX ADMIN — DOCUSATE SODIUM 100 MG: 100 CAPSULE, LIQUID FILLED ORAL at 08:04

## 2018-05-08 RX ADMIN — BUMETANIDE 1 MG: 1 TABLET ORAL at 08:03

## 2018-05-08 RX ADMIN — INSULIN LISPRO 1 UNITS: 100 INJECTION, SOLUTION INTRAVENOUS; SUBCUTANEOUS at 06:21

## 2018-05-08 RX ADMIN — LACTULOSE 20 G: 10 SOLUTION ORAL at 08:03

## 2018-05-08 RX ADMIN — Medication 500 MG: at 08:03

## 2018-05-08 ASSESSMENT — PAIN SCALES - GENERAL: PAINLEVEL_OUTOF10: 0

## 2018-06-04 LAB
FUNGUS (MYCOLOGY) CULTURE: NORMAL
FUNGUS STAIN: NORMAL

## 2018-06-19 LAB
AFB CULTURE (MYCOBACTERIA): NORMAL
AFB SMEAR: NORMAL

## 2018-07-20 ENCOUNTER — APPOINTMENT (OUTPATIENT)
Dept: GENERAL RADIOLOGY | Age: 83
DRG: 291 | End: 2018-07-20
Payer: MEDICARE

## 2018-07-20 ENCOUNTER — HOSPITAL ENCOUNTER (INPATIENT)
Age: 83
LOS: 6 days | Discharge: NURSING, REHAB OR LONG-TERM CARE | DRG: 291 | End: 2018-07-26
Attending: EMERGENCY MEDICINE | Admitting: INTERNAL MEDICINE
Payer: MEDICARE

## 2018-07-20 DIAGNOSIS — R06.00 DYSPNEA AND RESPIRATORY ABNORMALITIES: ICD-10-CM

## 2018-07-20 DIAGNOSIS — R79.89 ELEVATED BRAIN NATRIURETIC PEPTIDE (BNP) LEVEL: ICD-10-CM

## 2018-07-20 DIAGNOSIS — E87.70 HYPERVOLEMIA, UNSPECIFIED HYPERVOLEMIA TYPE: ICD-10-CM

## 2018-07-20 DIAGNOSIS — J96.01 ACUTE RESPIRATORY FAILURE WITH HYPOXIA AND HYPERCAPNIA (HCC): Primary | ICD-10-CM

## 2018-07-20 DIAGNOSIS — J96.02 ACUTE RESPIRATORY FAILURE WITH HYPOXIA AND HYPERCAPNIA (HCC): Primary | ICD-10-CM

## 2018-07-20 DIAGNOSIS — R06.89 DYSPNEA AND RESPIRATORY ABNORMALITIES: ICD-10-CM

## 2018-07-20 PROBLEM — R04.0 EPISTAXIS: Status: RESOLVED | Noted: 2018-04-25 | Resolved: 2018-07-20

## 2018-07-20 PROBLEM — D64.9 CHRONIC ANEMIA: Status: ACTIVE | Noted: 2018-04-26

## 2018-07-20 PROBLEM — I50.33 ACUTE ON CHRONIC DIASTOLIC CHF (CONGESTIVE HEART FAILURE) (HCC): Status: ACTIVE | Noted: 2018-07-20

## 2018-07-20 PROBLEM — R09.02 HYPOXIA: Status: ACTIVE | Noted: 2018-07-20

## 2018-07-20 LAB
ALBUMIN SERPL-MCNC: 3.6 G/DL (ref 3.5–5.2)
ALP BLD-CCNC: 86 U/L (ref 35–104)
ALT SERPL-CCNC: 15 U/L (ref 0–32)
AMORPHOUS: ABNORMAL
ANION GAP SERPL CALCULATED.3IONS-SCNC: 11 MMOL/L (ref 7–16)
ANISOCYTOSIS: ABNORMAL
AST SERPL-CCNC: 18 U/L (ref 0–31)
B.E.: 1.2 MMOL/L (ref -3–3)
BACTERIA: ABNORMAL /HPF
BASOPHILS ABSOLUTE: 0.02 E9/L (ref 0–0.2)
BASOPHILS RELATIVE PERCENT: 0.4 % (ref 0–2)
BILIRUB SERPL-MCNC: 0.5 MG/DL (ref 0–1.2)
BILIRUBIN URINE: NEGATIVE
BLOOD, URINE: NEGATIVE
BUN BLDV-MCNC: 25 MG/DL (ref 8–23)
CALCIUM SERPL-MCNC: 9.3 MG/DL (ref 8.6–10.2)
CHLORIDE BLD-SCNC: 98 MMOL/L (ref 98–107)
CLARITY: CLEAR
CO2: 28 MMOL/L (ref 22–29)
COHB: 0.9 % (ref 0–1.5)
COLOR: ABNORMAL
CREAT SERPL-MCNC: 1.5 MG/DL (ref 0.5–1)
CRITICAL: ABNORMAL
DATE ANALYZED: ABNORMAL
DATE OF COLLECTION: ABNORMAL
EOSINOPHILS ABSOLUTE: 0.09 E9/L (ref 0.05–0.5)
EOSINOPHILS RELATIVE PERCENT: 1.6 % (ref 0–6)
EPITHELIAL CELLS, UA: ABNORMAL /HPF
GFR AFRICAN AMERICAN: 40
GFR NON-AFRICAN AMERICAN: 33 ML/MIN/1.73
GLUCOSE BLD-MCNC: 168 MG/DL (ref 74–109)
GLUCOSE URINE: NEGATIVE MG/DL
HCO3: 27.9 MMOL/L (ref 22–26)
HCT VFR BLD CALC: 28.2 % (ref 34–48)
HEMOGLOBIN: 7.9 G/DL (ref 11.5–15.5)
HHB: 4.3 % (ref 0–5)
HYPOCHROMIA: ABNORMAL
IMMATURE GRANULOCYTES #: 0.03 E9/L
IMMATURE GRANULOCYTES %: 0.5 % (ref 0–5)
KETONES, URINE: NEGATIVE MG/DL
LAB: ABNORMAL
LEUKOCYTE ESTERASE, URINE: NEGATIVE
LYMPHOCYTES ABSOLUTE: 0.71 E9/L (ref 1.5–4)
LYMPHOCYTES RELATIVE PERCENT: 12.9 % (ref 20–42)
Lab: 1327
MCH RBC QN AUTO: 21.5 PG (ref 26–35)
MCHC RBC AUTO-ENTMCNC: 28 % (ref 32–34.5)
MCV RBC AUTO: 76.8 FL (ref 80–99.9)
METER GLUCOSE: 134 MG/DL (ref 70–110)
METER GLUCOSE: 140 MG/DL (ref 70–110)
METER GLUCOSE: 175 MG/DL (ref 70–110)
METHB: 0.3 % (ref 0–1.5)
MODE: ABNORMAL
MONOCYTES ABSOLUTE: 0.46 E9/L (ref 0.1–0.95)
MONOCYTES RELATIVE PERCENT: 8.4 % (ref 2–12)
NEUTROPHILS ABSOLUTE: 4.19 E9/L (ref 1.8–7.3)
NEUTROPHILS RELATIVE PERCENT: 76.2 % (ref 43–80)
NITRITE, URINE: NEGATIVE
O2 CONTENT: 11.5 ML/DL
O2 SATURATION: 95.6 % (ref 92–98.5)
O2HB: 94.5 % (ref 94–97)
OPERATOR ID: ABNORMAL
PATIENT TEMP: 37 C
PCO2: 56.7 MMHG (ref 35–45)
PDW BLD-RTO: 18.6 FL (ref 11.5–15)
PH BLOOD GAS: 7.31 (ref 7.35–7.45)
PH UA: 5 (ref 5–9)
PLATELET # BLD: 319 E9/L (ref 130–450)
PMV BLD AUTO: 9.9 FL (ref 7–12)
PO2: 93.4 MMHG (ref 60–100)
POLYCHROMASIA: ABNORMAL
POTASSIUM SERPL-SCNC: 4.3 MMOL/L (ref 3.5–5)
PRO-BNP: 3029 PG/ML (ref 0–450)
PROTEIN UA: NEGATIVE MG/DL
RBC # BLD: 3.67 E12/L (ref 3.5–5.5)
RBC UA: ABNORMAL /HPF (ref 0–2)
SODIUM BLD-SCNC: 137 MMOL/L (ref 132–146)
SOURCE, BLOOD GAS: ABNORMAL
SPECIFIC GRAVITY UA: 1.01 (ref 1–1.03)
THB: 8.5 G/DL (ref 11.5–16.5)
TIME ANALYZED: 1328
TOTAL PROTEIN: 6.8 G/DL (ref 6.4–8.3)
TROPONIN: 0.01 NG/ML (ref 0–0.03)
UROBILINOGEN, URINE: 0.2 E.U./DL
WBC # BLD: 5.5 E9/L (ref 4.5–11.5)
WBC UA: ABNORMAL /HPF (ref 0–5)

## 2018-07-20 PROCEDURE — 36415 COLL VENOUS BLD VENIPUNCTURE: CPT

## 2018-07-20 PROCEDURE — 71045 X-RAY EXAM CHEST 1 VIEW: CPT

## 2018-07-20 PROCEDURE — 94760 N-INVAS EAR/PLS OXIMETRY 1: CPT

## 2018-07-20 PROCEDURE — 84484 ASSAY OF TROPONIN QUANT: CPT

## 2018-07-20 PROCEDURE — 2500000003 HC RX 250 WO HCPCS: Performed by: EMERGENCY MEDICINE

## 2018-07-20 PROCEDURE — 2060000000 HC ICU INTERMEDIATE R&B

## 2018-07-20 PROCEDURE — 82805 BLOOD GASES W/O2 SATURATION: CPT

## 2018-07-20 PROCEDURE — 80053 COMPREHEN METABOLIC PANEL: CPT

## 2018-07-20 PROCEDURE — 99285 EMERGENCY DEPT VISIT HI MDM: CPT

## 2018-07-20 PROCEDURE — 82962 GLUCOSE BLOOD TEST: CPT

## 2018-07-20 PROCEDURE — 6370000000 HC RX 637 (ALT 250 FOR IP): Performed by: INTERNAL MEDICINE

## 2018-07-20 PROCEDURE — 85025 COMPLETE CBC W/AUTO DIFF WBC: CPT

## 2018-07-20 PROCEDURE — 81001 URINALYSIS AUTO W/SCOPE: CPT

## 2018-07-20 PROCEDURE — 2500000003 HC RX 250 WO HCPCS: Performed by: INTERNAL MEDICINE

## 2018-07-20 PROCEDURE — 83880 ASSAY OF NATRIURETIC PEPTIDE: CPT

## 2018-07-20 PROCEDURE — 2580000003 HC RX 258: Performed by: INTERNAL MEDICINE

## 2018-07-20 RX ORDER — LEVOTHYROXINE SODIUM 0.03 MG/1
25 TABLET ORAL EVERY MORNING
Status: DISCONTINUED | OUTPATIENT
Start: 2018-07-21 | End: 2018-07-26 | Stop reason: HOSPADM

## 2018-07-20 RX ORDER — SODIUM CHLORIDE 0.9 % (FLUSH) 0.9 %
10 SYRINGE (ML) INJECTION EVERY 12 HOURS SCHEDULED
Status: DISCONTINUED | OUTPATIENT
Start: 2018-07-20 | End: 2018-07-26 | Stop reason: HOSPADM

## 2018-07-20 RX ORDER — ACETAMINOPHEN 325 MG/1
650 TABLET ORAL EVERY 4 HOURS PRN
Status: DISCONTINUED | OUTPATIENT
Start: 2018-07-20 | End: 2018-07-26 | Stop reason: HOSPADM

## 2018-07-20 RX ORDER — BUMETANIDE 0.25 MG/ML
1 INJECTION, SOLUTION INTRAMUSCULAR; INTRAVENOUS ONCE
Status: COMPLETED | OUTPATIENT
Start: 2018-07-20 | End: 2018-07-20

## 2018-07-20 RX ORDER — GABAPENTIN 300 MG/1
300 CAPSULE ORAL NIGHTLY
Status: DISCONTINUED | OUTPATIENT
Start: 2018-07-20 | End: 2018-07-26 | Stop reason: HOSPADM

## 2018-07-20 RX ORDER — ZOLPIDEM TARTRATE 5 MG/1
5 TABLET ORAL NIGHTLY PRN
Status: DISCONTINUED | OUTPATIENT
Start: 2018-07-20 | End: 2018-07-25

## 2018-07-20 RX ORDER — HYDROXYZINE HYDROCHLORIDE 10 MG/1
10 TABLET, FILM COATED ORAL 3 TIMES DAILY PRN
Status: DISCONTINUED | OUTPATIENT
Start: 2018-07-20 | End: 2018-07-26 | Stop reason: HOSPADM

## 2018-07-20 RX ORDER — INSULIN GLARGINE 100 [IU]/ML
65 INJECTION, SOLUTION SUBCUTANEOUS NIGHTLY
Status: DISCONTINUED | OUTPATIENT
Start: 2018-07-20 | End: 2018-07-22

## 2018-07-20 RX ORDER — BUMETANIDE 0.25 MG/ML
0.5 INJECTION, SOLUTION INTRAMUSCULAR; INTRAVENOUS 2 TIMES DAILY
Status: DISCONTINUED | OUTPATIENT
Start: 2018-07-20 | End: 2018-07-22

## 2018-07-20 RX ORDER — PANTOPRAZOLE SODIUM 40 MG/1
40 TABLET, DELAYED RELEASE ORAL
Status: DISCONTINUED | OUTPATIENT
Start: 2018-07-20 | End: 2018-07-26 | Stop reason: HOSPADM

## 2018-07-20 RX ORDER — DEXTROSE MONOHYDRATE 50 MG/ML
100 INJECTION, SOLUTION INTRAVENOUS PRN
Status: DISCONTINUED | OUTPATIENT
Start: 2018-07-20 | End: 2018-07-26 | Stop reason: HOSPADM

## 2018-07-20 RX ORDER — NICOTINE POLACRILEX 4 MG
15 LOZENGE BUCCAL PRN
Status: DISCONTINUED | OUTPATIENT
Start: 2018-07-20 | End: 2018-07-26 | Stop reason: HOSPADM

## 2018-07-20 RX ORDER — DEXTROSE MONOHYDRATE 25 G/50ML
12.5 INJECTION, SOLUTION INTRAVENOUS PRN
Status: DISCONTINUED | OUTPATIENT
Start: 2018-07-20 | End: 2018-07-26 | Stop reason: HOSPADM

## 2018-07-20 RX ORDER — SODIUM CHLORIDE 0.9 % (FLUSH) 0.9 %
10 SYRINGE (ML) INJECTION PRN
Status: DISCONTINUED | OUTPATIENT
Start: 2018-07-20 | End: 2018-07-26 | Stop reason: HOSPADM

## 2018-07-20 RX ORDER — ONDANSETRON 2 MG/ML
4 INJECTION INTRAMUSCULAR; INTRAVENOUS EVERY 6 HOURS PRN
Status: DISCONTINUED | OUTPATIENT
Start: 2018-07-20 | End: 2018-07-26 | Stop reason: HOSPADM

## 2018-07-20 RX ORDER — SIMVASTATIN 40 MG
40 TABLET ORAL NIGHTLY
Status: DISCONTINUED | OUTPATIENT
Start: 2018-07-20 | End: 2018-07-26 | Stop reason: HOSPADM

## 2018-07-20 RX ADMIN — ZOLPIDEM TARTRATE 5 MG: 5 TABLET ORAL at 22:12

## 2018-07-20 RX ADMIN — PANTOPRAZOLE SODIUM 40 MG: 40 TABLET, DELAYED RELEASE ORAL at 17:39

## 2018-07-20 RX ADMIN — BUMETANIDE 0.5 MG: 0.25 INJECTION INTRAMUSCULAR; INTRAVENOUS at 21:46

## 2018-07-20 RX ADMIN — INSULIN LISPRO 1 UNITS: 100 INJECTION, SOLUTION INTRAVENOUS; SUBCUTANEOUS at 21:54

## 2018-07-20 RX ADMIN — GABAPENTIN 300 MG: 300 CAPSULE ORAL at 21:56

## 2018-07-20 RX ADMIN — SIMVASTATIN 40 MG: 40 TABLET, FILM COATED ORAL at 21:46

## 2018-07-20 RX ADMIN — INSULIN GLARGINE 65 UNITS: 100 INJECTION, SOLUTION SUBCUTANEOUS at 21:57

## 2018-07-20 RX ADMIN — BUMETANIDE 1 MG: 0.25 INJECTION INTRAMUSCULAR; INTRAVENOUS at 15:42

## 2018-07-20 RX ADMIN — APIXABAN 2.5 MG: 5 TABLET, FILM COATED ORAL at 21:45

## 2018-07-20 RX ADMIN — Medication 10 ML: at 21:46

## 2018-07-20 ASSESSMENT — ENCOUNTER SYMPTOMS
ABDOMINAL DISTENTION: 0
CONSTIPATION: 0
VOMITING: 0
EYE REDNESS: 0
COUGH: 1
SORE THROAT: 0
DIARRHEA: 0
ABDOMINAL PAIN: 0
SINUS PRESSURE: 0
SHORTNESS OF BREATH: 1
EYE DISCHARGE: 0
EYE PAIN: 0
BACK PAIN: 0
NAUSEA: 0
RHINORRHEA: 0
WHEEZING: 0
BLOOD IN STOOL: 0

## 2018-07-20 ASSESSMENT — PAIN SCALES - GENERAL: PAINLEVEL_OUTOF10: 0

## 2018-07-20 NOTE — H&P
Hospitalist History & Physical     Chief Complaint: Shortness of Breath (For past 3 weeks, pt saw her PCP, about it they haven't done anything for her. FD obtained 88% RA pt talking in fragmented sentences. No home O2)  Primary Care Physician: Alex Amato MD    History of Present Illness  Ambar Kim is a 80y.o. year old female who  has a past medical history of Anemia due to acute blood loss; Arthritis; Blood transfusion reaction; Chronic atrial fibrillation (HCC); CKD (chronic kidney disease) stage 3, GFR 30-59 ml/min; Diabetes mellitus ([address] History of blood transfusion; History of breast cancer; History of stroke; Hyperlipidemia; Hypertension; Hypothyroidism; and Volume overload. .     The patient presented to the hospital with the chief complaint of severe shortness of breath. This is been chronic for some time but much worse yesterday. Exertion made her symptoms worse. She is chronic lower extremity edema that does not seem worse than normal. She does not wear nasal cannula oxygen at home. The patient states that she came to the hospital because her \"son was scared that she was getting sicker\". She is in the process of being set up to go to a nursing home for long-term care. As stated above exertion made her symptoms worse. Nothing objective seem to make her symptoms better patient has any other associated symptoms at this time. She is feeling much better after receiving IV diuretics in the emergency department yesterday. She normally is able to walk with a walker. Her symptoms are moderate in severity. There are no family or friends at bedside. History is provided by the patient. She is felt to be a great historian. Case was discussed with the patient's granddaughter who is a nurse on the floor that the patient is currently on. Last hospital admission:   ADMITTED: 4/25/2018                       DISCHARGED: 5/8/2018 12:53 PM  The patient was in his hospital to chief complaint of epistaxis.  ENT was put on consultation. The patient was taken to surgery. She required the ventilator after surgery. She was in the ICU. Pulmonology saw the patient. They were seeing the patient prior to surgery as well. Oxygen was weaned down. She was extubated. Antibiotics were stopped. She'll thoracentesis on 5/440 pleural effusion. Diuretic medications were given. She was found of a DVT. She was started on anticoagulation. Hemoglobin remained stable. She has some dysphagia and diet was altered. She has some constipation and enema was successful. Laxatives were started. She was felt stable for discharge to skilled nursing facility and arrangements were made. ED course:   Initial blood work and imaging studies performed. Admission recommended by ED physician. Case discussed with ED provider. Meds in ED consisted of the following:  Medications   bumetanide (BUMEX) injection 1 mg (not administered)     According to the ED, the patient specifically requested me. Past Medical History:   Diagnosis Date    Anemia due to acute blood loss 12/29/2017    Arthritis     Blood transfusion reaction     Chronic atrial fibrillation (HCC) 12/29/2017    CKD (chronic kidney disease) stage 3, GFR 30-59 ml/min 12/29/2017    Diabetes mellitus ([facility]     History of blood transfusion     History of breast cancer     breast cancer Right    History of stroke     Hyperlipidemia     Hypertension     Hypothyroidism     Volume overload 12/30/2017    As cause of dyspnea       Past Surgical History:   Procedure Laterality Date    BREAST SURGERY      right    BRONCHOSCOPY  4/28/2018    BRONCHOSCOPY DIAGNOSTIC performed by Varsha Mccrary MD at [address] [place] [address] NASAL SINUS SURGERY      august 27 2017. cauterized her nasal passage.     OTHER SURGICAL HISTORY  07/27/2017    endoscopic conrtol of epistaxis dr Gage Age N/A 4/28/2018    ENDOSCOPIC GUIDED CONTROL  Pcn [Penicillins] Hives       Review of Systems  Please see HPI above. All bolded are positive. All un-bolded are negative. Constitutional Symptoms: fever, chills, fatigue, generalized weakness, diaphoresis, increase in thirst, loss of appetite  Eyes: vision change   Ears, Nose, Mouth, Throat: hearing loss, nasal congestion, sores in the mouth  Cardiovascular: chest pain, chest heaviness, palpitations  Respiratory: shortness of breath, wheezing, coughing  Gastrointestinal: abdominal pain, nausea, vomiting, diarrhea, constipation, melena, hematochezia, hematemesis  Genitourinary: dysuria, hematuria, or increase in frequency  Musculoskeletal: lower extremity edema, myalgias, arthralgias, back pain  Integumentary: rashes, itching   Neurological: headache, lightheadedness, dizziness, confusion, syncope, numbness, tingling, focal weakness  Psychiatric: depression, suicidal ideation, or anxiety  Endocrine: unintentional weight change  Hematologic/Lymphatic: lymphadenopathy, easy bruising, easy bleeding   Allergic/Immunologic: recurrent infections      Objective  VITALS:  /68   Pulse 77   Temp 97.6 °F (36.4 °C) (Oral)   Resp 21   Ht 5' 1\" (1.549 m)   Wt 272 lb 3.2 oz (123.5 kg)   LMP  (LMP Unknown)   SpO2 97%   BMI 51.43 kg/m²     Physical Exam:  General: awake, alert, oriented to person, place, time, and purpose, appears stated age, cooperative, no acute distress, pleasant, appropriate mood, NCO2  Eyes: conjunctivae/corneas clear, sclera non icteric, EOMI  Ears: no obvious scars, no lesions, no masses, hearing intact  Mouth: mucous membranes moist, no obvious oral sores  Head: normocephalic, atraumatic  Neck: no JVD, no adenopathy, no thyromegaly, neck is supple, trachea is midline  Back: ROM normal, no CVA tenderness.   Chest: no pain on palpation  Lungs: diminished but fairly clear to auscultation bilaterally, without rhonchi, crackle, wheezing, or rale, no retractions or use of accessory muscles  Heart: regular rate and regular rhythm, no murmur, normal S1, S2  Abdomen: obese, soft, non-tender; bowel sounds normal; no masses, no organomegaly  Extremities: 1+ pitting lower extremity edema, extremities atraumatic, no cyanosis, no clubbing, 2+ pedal pulses palpated  Skin: normal color, normal texture, normal turgor, no rashes, no lesions  Neurologic:5/5 muscle strength throughout, normal muscle tone throughout, face symmetric, hearing intact, tongue midline, speech appropriate without slurring, sensation to fine touch intact in upper and lower extremities    Labs-   Lab Results   Component Value Date    WBC 5.4 07/21/2018    HGB 7.4 (L) 07/21/2018    HCT 26.6 (L) 07/21/2018     07/21/2018     07/21/2018    K 3.9 07/21/2018    CL 98 07/21/2018    CREATININE 1.4 (H) 07/21/2018    BUN 25 (H) 07/21/2018    CO2 32 (H) 07/21/2018    GLUCOSE 151 (H) 07/21/2018    ALT 12 07/21/2018    AST 16 07/21/2018    INR 1.1 04/25/2018     Lab Results   Component Value Date    TROPONINI 0.01 07/20/2018       Last echocardiogram:12/30/17   Left ventricular size is grossly normal.   Mild left ventricular concentric hypertrophy noted.   Ejection fraction is visually estimated at 60%. Recent Radiological Studies:  XR CHEST PORTABLE   Final Result   No airspace opacities or pleural effusion.  Mild cardiac megaly with   mild vascular congestion                Assessment  Patient Active Problem List    Diagnosis Date Noted    Acute on chronic diastolic CHF (congestive [facility] failure) ([facility] 07/20/2018     Priority: High    Hypoxia 07/20/2018     Priority: Medium    Chronic anemia 04/26/2018     Priority: Medium    Chronic atrial fibrillation ([facility] 12/29/2017     Priority: Medium     Class: Chronic    CKD (chronic kidney disease) stage 3, GFR 30-59 ml/min 12/29/2017     Priority: Medium     Class: Chronic    DM2 (diabetes mellitus, type 2) ([facility] 11/06/2016     Priority: Medium    Hypothyroidism    

## 2018-07-20 NOTE — ED NOTES
Bed: 14  Expected date:   Expected time:   Means of arrival:   Comments:     Charanjit Guthrie RN  07/20/18 [id number]

## 2018-07-20 NOTE — ED NOTES
Faxed SBAR to floor, spoke with Jael who received fax. Yasmin Hansen [id number] is RN.      Kayla Jackson RN  07/20/18 0709

## 2018-07-20 NOTE — PLAN OF CARE
Problem: Fluid Volume - Excess  Goal: Absence of fluid overload signs and symptoms  Outcome: Ongoing

## 2018-07-20 NOTE — ED PROVIDER NOTES
with volume overload. Patient requested to be admitted by her previous admitting physician/Dr. Stefano Smith. She is provided information and medication/Bumex for her condition. She is admitted in stable condition for continued workup, treatment, and monitoring of her condition. EKG Interpretation. EKG: This EKG is signed and interpreted by me. Rate: 85  Rhythm: Atrial fibrillation  Interpretation: non-specific EKG  Comparison: stable as compared to patient's most recent EKG       --------------------------------------------- PAST HISTORY ---------------------------------------------  Past Medical History:  has a past medical history of Anemia due to acute blood loss; Arthritis; Blood transfusion reaction; Chronic atrial fibrillation (HCC); CKD (chronic kidney disease) stage 3, GFR 30-59 ml/min; Diabetes mellitus ([facility] History of blood transfusion; History of breast cancer; History of stroke; Hyperlipidemia; Hypertension; Hypothyroidism; and Volume overload. Past Surgical History:  has a past surgical history that includes Breast surgery; Cholecystectomy; Gastric bypass surgery; other surgical history (07/27/2017); Nasal sinus surgery; pr ctrl nosebleed,anter,complex (N/A, 4/28/2018); and bronchoscopy (4/28/2018). Social History:  reports that she has never smoked. She has never used smokeless tobacco. She reports that she drinks alcohol. She reports that she does not use drugs. Family History: family history includes Diabetes in her father and mother; Heart Disease in her father and mother; Kidney Disease in her mother; Stroke in her father. The patients home medications have been reviewed.     Allergies: Pcn [penicillins]    -------------------------------------------------- RESULTS -------------------------------------------------    LABS:  Results for orders placed or performed during the hospital encounter of 07/20/18   CBC Auto Differential   Result Value Ref Range    WBC 5.5 4.5 - 11.5 E9/L    RBC 3.67 3.50 - 5.50 E12/L    Hemoglobin 7.9 (L) 11.5 - 15.5 g/dL    Hematocrit 28.2 (L) 34.0 - 48.0 %    MCV 76.8 (L) 80.0 - 99.9 fL    MCH 21.5 (L) 26.0 - 35.0 pg    MCHC 28.0 (L) 32.0 - 34.5 %    RDW 18.6 (H) 11.5 - 15.0 fL    Platelets 381 710 - 407 E9/L    MPV 9.9 7.0 - 12.0 fL    Neutrophils % 76.2 43.0 - 80.0 %    Immature Granulocytes % 0.5 0.0 - 5.0 %    Lymphocytes % 12.9 (L) 20.0 - 42.0 %    Monocytes % 8.4 2.0 - 12.0 %    Eosinophils % 1.6 0.0 - 6.0 %    Basophils % 0.4 0.0 - 2.0 %    Neutrophils # 4.19 1.80 - 7.30 E9/L    Immature Granulocytes # 0.03 E9/L    Lymphocytes # 0.71 (L) 1.50 - 4.00 E9/L    Monocytes # 0.46 0.10 - 0.95 E9/L    Eosinophils # 0.09 0.05 - 0.50 E9/L    Basophils # 0.02 0.00 - 0.20 E9/L    Anisocytosis 2+     Polychromasia 1+     Hypochromia 2+    Comprehensive Metabolic Panel   Result Value Ref Range    Sodium 137 132 - 146 mmol/L    Potassium 4.3 3.5 - 5.0 mmol/L    Chloride 98 98 - 107 mmol/L    CO2 28 22 - 29 mmol/L    Anion Gap 11 7 - 16 mmol/L    Glucose 168 (H) 74 - 109 mg/dL    BUN 25 (H) 8 - 23 mg/dL    CREATININE 1.5 (H) 0.5 - 1.0 mg/dL    GFR Non-African American 33 >=60 mL/min/1.73    GFR African American 40     Calcium 9.3 8.6 - 10.2 mg/dL    Total Protein 6.8 6.4 - 8.3 g/dL    Alb 3.6 3.5 - 5.2 g/dL    Total Bilirubin 0.5 0.0 - 1.2 mg/dL    Alkaline Phosphatase 86 35 - 104 U/L    ALT 15 0 - 32 U/L    AST 18 0 - 31 U/L   Brain Natriuretic Peptide   Result Value Ref Range    Pro-BNP 3,029 (H) 0 - 450 pg/mL   Blood Gas, Arterial   Result Value Ref Range    Date Analyzed [id number]     Time Analyzed 1328     Source: Blood Arterial     pH, Blood Gas 7.310 (L) 7.350 - 7.450    PCO2 56.7 (H) 35.0 - 45.0 mmHg    PO2 93.4 60.0 - 100.0 mmHg    HCO3 27.9 (H) 22.0 - 26.0 mmol/L    B.E. 1.2 -3.0 - 3.0 mmol/L    O2 Sat 95.6 92.0 - 98.5 %    O2Hb 94.5 94.0 - 97.0 %    COHb 0.9 0.0 - 1.5 %    MetHb 0.3 0.0 - 1.5 %    O2 Content 11.5 mL/dL    HHb 4.3 0.0 - 5.0 %    tHb (est) 8.5 Spoke with Dr. Samina Avendaño. Discussed case. They will admit the patient. This patient's ED course included: a personal history and physicial examination, multiple bedside re-evaluations, IV medications, cardiac monitoring and continuous pulse oximetry    This patient has remained hemodynamically stable during their ED course. Diagnosis:  1. Acute respiratory failure with hypoxia and hypercapnia (HCC)    2. Hypervolemia, unspecified hypervolemia type    3. Dyspnea and respiratory abnormalities    4. Elevated brain natriuretic peptide (BNP) level        Disposition:  Patient's disposition: Admit to telemetry  Patient's condition is stable.          Mando Hess DO  Resident  07/20/18 1220

## 2018-07-21 LAB
ALBUMIN SERPL-MCNC: 3.5 G/DL (ref 3.5–5.2)
ALP BLD-CCNC: 79 U/L (ref 35–104)
ALT SERPL-CCNC: 12 U/L (ref 0–32)
ANION GAP SERPL CALCULATED.3IONS-SCNC: 10 MMOL/L (ref 7–16)
ANISOCYTOSIS: ABNORMAL
AST SERPL-CCNC: 16 U/L (ref 0–31)
BASOPHILS ABSOLUTE: 0.02 E9/L (ref 0–0.2)
BASOPHILS RELATIVE PERCENT: 0.4 % (ref 0–2)
BILIRUB SERPL-MCNC: 0.4 MG/DL (ref 0–1.2)
BUN BLDV-MCNC: 25 MG/DL (ref 8–23)
CALCIUM SERPL-MCNC: 9 MG/DL (ref 8.6–10.2)
CHLORIDE BLD-SCNC: 98 MMOL/L (ref 98–107)
CO2: 32 MMOL/L (ref 22–29)
CREAT SERPL-MCNC: 1.4 MG/DL (ref 0.5–1)
EOSINOPHILS ABSOLUTE: 0.21 E9/L (ref 0.05–0.5)
EOSINOPHILS RELATIVE PERCENT: 3.9 % (ref 0–6)
FERRITIN: 30 NG/ML
FOLATE: 16 NG/ML (ref 4.8–24.2)
GFR AFRICAN AMERICAN: 43
GFR NON-AFRICAN AMERICAN: 36 ML/MIN/1.73
GLUCOSE BLD-MCNC: 151 MG/DL (ref 74–109)
HBA1C MFR BLD: 7.6 % (ref 4–5.6)
HCT VFR BLD CALC: 26.6 % (ref 34–48)
HCT VFR BLD CALC: 28.5 % (ref 34–48)
HEMOGLOBIN: 7.4 G/DL (ref 11.5–15.5)
HEMOGLOBIN: 7.8 G/DL (ref 11.5–15.5)
HYPOCHROMIA: ABNORMAL
IMMATURE GRANULOCYTES #: 0.02 E9/L
IMMATURE GRANULOCYTES %: 0.4 % (ref 0–5)
IRON SATURATION: 7 % (ref 15–50)
IRON: 26 MCG/DL (ref 37–145)
LYMPHOCYTES ABSOLUTE: 1.12 E9/L (ref 1.5–4)
LYMPHOCYTES RELATIVE PERCENT: 20.7 % (ref 20–42)
MCH RBC QN AUTO: 21.4 PG (ref 26–35)
MCHC RBC AUTO-ENTMCNC: 27.8 % (ref 32–34.5)
MCV RBC AUTO: 77.1 FL (ref 80–99.9)
METER GLUCOSE: 116 MG/DL (ref 70–110)
METER GLUCOSE: 121 MG/DL (ref 70–110)
METER GLUCOSE: 196 MG/DL (ref 70–110)
METER GLUCOSE: 91 MG/DL (ref 70–110)
MONOCYTES ABSOLUTE: 0.57 E9/L (ref 0.1–0.95)
MONOCYTES RELATIVE PERCENT: 10.6 % (ref 2–12)
NEUTROPHILS ABSOLUTE: 3.46 E9/L (ref 1.8–7.3)
NEUTROPHILS RELATIVE PERCENT: 64 % (ref 43–80)
OVALOCYTES: ABNORMAL
PDW BLD-RTO: 18.6 FL (ref 11.5–15)
PLATELET # BLD: 326 E9/L (ref 130–450)
PMV BLD AUTO: 9.9 FL (ref 7–12)
POIKILOCYTES: ABNORMAL
POLYCHROMASIA: ABNORMAL
POTASSIUM REFLEX MAGNESIUM: 3.9 MMOL/L (ref 3.5–5)
RBC # BLD: 3.45 E12/L (ref 3.5–5.5)
SODIUM BLD-SCNC: 140 MMOL/L (ref 132–146)
TOTAL IRON BINDING CAPACITY: 385 MCG/DL (ref 250–450)
TOTAL PROTEIN: 6.5 G/DL (ref 6.4–8.3)
VITAMIN B-12: 453 PG/ML (ref 211–946)
WBC # BLD: 5.4 E9/L (ref 4.5–11.5)

## 2018-07-21 PROCEDURE — 83036 HEMOGLOBIN GLYCOSYLATED A1C: CPT

## 2018-07-21 PROCEDURE — 2500000003 HC RX 250 WO HCPCS: Performed by: INTERNAL MEDICINE

## 2018-07-21 PROCEDURE — 82962 GLUCOSE BLOOD TEST: CPT

## 2018-07-21 PROCEDURE — 2700000000 HC OXYGEN THERAPY PER DAY

## 2018-07-21 PROCEDURE — 6370000000 HC RX 637 (ALT 250 FOR IP): Performed by: INTERNAL MEDICINE

## 2018-07-21 PROCEDURE — 83540 ASSAY OF IRON: CPT

## 2018-07-21 PROCEDURE — 36415 COLL VENOUS BLD VENIPUNCTURE: CPT

## 2018-07-21 PROCEDURE — 85014 HEMATOCRIT: CPT

## 2018-07-21 PROCEDURE — 2580000003 HC RX 258: Performed by: INTERNAL MEDICINE

## 2018-07-21 PROCEDURE — 82728 ASSAY OF FERRITIN: CPT

## 2018-07-21 PROCEDURE — 85025 COMPLETE CBC W/AUTO DIFF WBC: CPT

## 2018-07-21 PROCEDURE — 80053 COMPREHEN METABOLIC PANEL: CPT

## 2018-07-21 PROCEDURE — 85018 HEMOGLOBIN: CPT

## 2018-07-21 PROCEDURE — 82607 VITAMIN B-12: CPT

## 2018-07-21 PROCEDURE — 83550 IRON BINDING TEST: CPT

## 2018-07-21 PROCEDURE — 82746 ASSAY OF FOLIC ACID SERUM: CPT

## 2018-07-21 PROCEDURE — 2060000000 HC ICU INTERMEDIATE R&B

## 2018-07-21 RX ORDER — FERROUS SULFATE 325(65) MG
325 TABLET ORAL 2 TIMES DAILY WITH MEALS
Status: DISCONTINUED | OUTPATIENT
Start: 2018-07-21 | End: 2018-07-26 | Stop reason: HOSPADM

## 2018-07-21 RX ORDER — DOCUSATE SODIUM 100 MG/1
100 CAPSULE, LIQUID FILLED ORAL 2 TIMES DAILY
Status: DISCONTINUED | OUTPATIENT
Start: 2018-07-21 | End: 2018-07-22

## 2018-07-21 RX ADMIN — Medication 10 ML: at 08:47

## 2018-07-21 RX ADMIN — ZOLPIDEM TARTRATE 5 MG: 5 TABLET ORAL at 22:51

## 2018-07-21 RX ADMIN — MICONAZOLE NITRATE: 20 POWDER TOPICAL at 21:05

## 2018-07-21 RX ADMIN — FERROUS SULFATE TAB 325 MG (65 MG ELEMENTAL FE) 325 MG: 325 (65 FE) TAB at 17:12

## 2018-07-21 RX ADMIN — LEVOTHYROXINE SODIUM 25 MCG: 25 TABLET ORAL at 08:36

## 2018-07-21 RX ADMIN — SIMVASTATIN 40 MG: 40 TABLET, FILM COATED ORAL at 20:55

## 2018-07-21 RX ADMIN — DOCUSATE SODIUM 100 MG: 100 CAPSULE, LIQUID FILLED ORAL at 20:55

## 2018-07-21 RX ADMIN — Medication 10 ML: at 20:55

## 2018-07-21 RX ADMIN — FERROUS SULFATE TAB 325 MG (65 MG ELEMENTAL FE) 325 MG: 325 (65 FE) TAB at 14:36

## 2018-07-21 RX ADMIN — PANTOPRAZOLE SODIUM 40 MG: 40 TABLET, DELAYED RELEASE ORAL at 06:19

## 2018-07-21 RX ADMIN — PANTOPRAZOLE SODIUM 40 MG: 40 TABLET, DELAYED RELEASE ORAL at 17:12

## 2018-07-21 RX ADMIN — BUMETANIDE 0.5 MG: 0.25 INJECTION INTRAMUSCULAR; INTRAVENOUS at 08:36

## 2018-07-21 RX ADMIN — BUMETANIDE 0.5 MG: 0.25 INJECTION INTRAMUSCULAR; INTRAVENOUS at 20:55

## 2018-07-21 RX ADMIN — APIXABAN 2.5 MG: 5 TABLET, FILM COATED ORAL at 08:36

## 2018-07-21 RX ADMIN — GABAPENTIN 300 MG: 300 CAPSULE ORAL at 20:55

## 2018-07-21 RX ADMIN — INSULIN GLARGINE 65 UNITS: 100 INJECTION, SOLUTION SUBCUTANEOUS at 21:00

## 2018-07-21 RX ADMIN — METOPROLOL TARTRATE 25 MG: 25 TABLET ORAL at 08:36

## 2018-07-21 RX ADMIN — DOCUSATE SODIUM 100 MG: 100 CAPSULE, LIQUID FILLED ORAL at 14:36

## 2018-07-21 RX ADMIN — INSULIN LISPRO 1 UNITS: 100 INJECTION, SOLUTION INTRAVENOUS; SUBCUTANEOUS at 21:01

## 2018-07-21 RX ADMIN — APIXABAN 2.5 MG: 5 TABLET, FILM COATED ORAL at 20:55

## 2018-07-21 ASSESSMENT — PAIN SCALES - GENERAL
PAINLEVEL_OUTOF10: 0

## 2018-07-21 NOTE — PLAN OF CARE
Problem: Pain  Goal: Pain level will decrease  Pain level will decrease     Outcome: Met This Shift      Problem: FALL RISK  Goal: Patient will remain free of falls  Outcome: Met This Shift      Problem: Gas Exchange - Impaired:  Goal: Able to breathe comfortably  Able to breathe comfortably     Outcome: Met This Shift      Problem: Fluid Volume - Excess  Goal: Absence of fluid overload signs and symptoms  Outcome: Met This Shift      Problem: Falls - Risk of:  Goal: Will remain free from falls  Will remain free from falls   Outcome: Met This Shift    Goal: Absence of physical injury  Absence of physical injury   Outcome: Met This Shift

## 2018-07-22 LAB
ABO/RH: NORMAL
ALBUMIN SERPL-MCNC: 3.4 G/DL (ref 3.5–5.2)
ALP BLD-CCNC: 74 U/L (ref 35–104)
ALT SERPL-CCNC: 11 U/L (ref 0–32)
ANION GAP SERPL CALCULATED.3IONS-SCNC: 10 MMOL/L (ref 7–16)
ANISOCYTOSIS: ABNORMAL
ANTIBODY SCREEN: NORMAL
APTT: 33.3 SEC (ref 24.5–35.1)
AST SERPL-CCNC: 17 U/L (ref 0–31)
BASOPHILS ABSOLUTE: 0.02 E9/L (ref 0–0.2)
BASOPHILS RELATIVE PERCENT: 0.4 % (ref 0–2)
BILIRUB SERPL-MCNC: 0.4 MG/DL (ref 0–1.2)
BLOOD BANK DISPENSE STATUS: NORMAL
BLOOD BANK PRODUCT CODE: NORMAL
BPU ID: NORMAL
BUN BLDV-MCNC: 25 MG/DL (ref 8–23)
CALCIUM SERPL-MCNC: 8.8 MG/DL (ref 8.6–10.2)
CHLORIDE BLD-SCNC: 98 MMOL/L (ref 98–107)
CO2: 32 MMOL/L (ref 22–29)
CREAT SERPL-MCNC: 1.4 MG/DL (ref 0.5–1)
DESCRIPTION BLOOD BANK: NORMAL
EOSINOPHILS ABSOLUTE: 0.24 E9/L (ref 0.05–0.5)
EOSINOPHILS RELATIVE PERCENT: 4.9 % (ref 0–6)
GFR AFRICAN AMERICAN: 43
GFR NON-AFRICAN AMERICAN: 36 ML/MIN/1.73
GLUCOSE BLD-MCNC: 88 MG/DL (ref 74–109)
HCT VFR BLD CALC: 26.2 % (ref 34–48)
HCT VFR BLD CALC: 30.9 % (ref 34–48)
HEMOGLOBIN: 7.2 G/DL (ref 11.5–15.5)
HEMOGLOBIN: 8.8 G/DL (ref 11.5–15.5)
HYPOCHROMIA: ABNORMAL
IMMATURE GRANULOCYTES #: 0.03 E9/L
IMMATURE GRANULOCYTES %: 0.6 % (ref 0–5)
INR BLD: 1.4
LYMPHOCYTES ABSOLUTE: 1.12 E9/L (ref 1.5–4)
LYMPHOCYTES RELATIVE PERCENT: 23 % (ref 20–42)
MCH RBC QN AUTO: 21.2 PG (ref 26–35)
MCHC RBC AUTO-ENTMCNC: 27.5 % (ref 32–34.5)
MCV RBC AUTO: 77.1 FL (ref 80–99.9)
METER GLUCOSE: 102 MG/DL (ref 70–110)
METER GLUCOSE: 152 MG/DL (ref 70–110)
METER GLUCOSE: 158 MG/DL (ref 70–110)
METER GLUCOSE: 55 MG/DL (ref 70–110)
METER GLUCOSE: 80 MG/DL (ref 70–110)
MONOCYTES ABSOLUTE: 0.73 E9/L (ref 0.1–0.95)
MONOCYTES RELATIVE PERCENT: 15 % (ref 2–12)
NEUTROPHILS ABSOLUTE: 2.73 E9/L (ref 1.8–7.3)
NEUTROPHILS RELATIVE PERCENT: 56.1 % (ref 43–80)
OVALOCYTES: ABNORMAL
PDW BLD-RTO: 18.6 FL (ref 11.5–15)
PLATELET # BLD: 293 E9/L (ref 130–450)
PMV BLD AUTO: 10 FL (ref 7–12)
POIKILOCYTES: ABNORMAL
POLYCHROMASIA: ABNORMAL
POTASSIUM REFLEX MAGNESIUM: 3.8 MMOL/L (ref 3.5–5)
PROTHROMBIN TIME: 16.1 SEC (ref 9.3–12.4)
RBC # BLD: 3.4 E12/L (ref 3.5–5.5)
SODIUM BLD-SCNC: 140 MMOL/L (ref 132–146)
TOTAL PROTEIN: 6.4 G/DL (ref 6.4–8.3)
WBC # BLD: 4.9 E9/L (ref 4.5–11.5)

## 2018-07-22 PROCEDURE — 36415 COLL VENOUS BLD VENIPUNCTURE: CPT

## 2018-07-22 PROCEDURE — APPSS180 APP SPLIT SHARED TIME > 60 MINUTES: Performed by: NURSE PRACTITIONER

## 2018-07-22 PROCEDURE — 85610 PROTHROMBIN TIME: CPT

## 2018-07-22 PROCEDURE — 6370000000 HC RX 637 (ALT 250 FOR IP): Performed by: NURSE PRACTITIONER

## 2018-07-22 PROCEDURE — 2700000000 HC OXYGEN THERAPY PER DAY

## 2018-07-22 PROCEDURE — 36430 TRANSFUSION BLD/BLD COMPNT: CPT

## 2018-07-22 PROCEDURE — 86923 COMPATIBILITY TEST ELECTRIC: CPT

## 2018-07-22 PROCEDURE — 6370000000 HC RX 637 (ALT 250 FOR IP): Performed by: INTERNAL MEDICINE

## 2018-07-22 PROCEDURE — 86901 BLOOD TYPING SEROLOGIC RH(D): CPT

## 2018-07-22 PROCEDURE — 2060000000 HC ICU INTERMEDIATE R&B

## 2018-07-22 PROCEDURE — 99223 1ST HOSP IP/OBS HIGH 75: CPT | Performed by: INTERNAL MEDICINE

## 2018-07-22 PROCEDURE — 86850 RBC ANTIBODY SCREEN: CPT

## 2018-07-22 PROCEDURE — 6370000000 HC RX 637 (ALT 250 FOR IP): Performed by: CLINICAL NURSE SPECIALIST

## 2018-07-22 PROCEDURE — 2500000003 HC RX 250 WO HCPCS: Performed by: NURSE PRACTITIONER

## 2018-07-22 PROCEDURE — P9016 RBC LEUKOCYTES REDUCED: HCPCS

## 2018-07-22 PROCEDURE — 2580000003 HC RX 258: Performed by: INTERNAL MEDICINE

## 2018-07-22 PROCEDURE — 2500000003 HC RX 250 WO HCPCS: Performed by: INTERNAL MEDICINE

## 2018-07-22 PROCEDURE — 85014 HEMATOCRIT: CPT

## 2018-07-22 PROCEDURE — 85025 COMPLETE CBC W/AUTO DIFF WBC: CPT

## 2018-07-22 PROCEDURE — 80053 COMPREHEN METABOLIC PANEL: CPT

## 2018-07-22 PROCEDURE — 82962 GLUCOSE BLOOD TEST: CPT

## 2018-07-22 PROCEDURE — 85730 THROMBOPLASTIN TIME PARTIAL: CPT

## 2018-07-22 PROCEDURE — 85018 HEMOGLOBIN: CPT

## 2018-07-22 PROCEDURE — 86900 BLOOD TYPING SEROLOGIC ABO: CPT

## 2018-07-22 RX ORDER — POLYETHYLENE GLYCOL 3350 17 G/17G
17 POWDER, FOR SOLUTION ORAL DAILY
Status: DISCONTINUED | OUTPATIENT
Start: 2018-07-22 | End: 2018-07-26 | Stop reason: HOSPADM

## 2018-07-22 RX ORDER — BUMETANIDE 0.25 MG/ML
1 INJECTION, SOLUTION INTRAMUSCULAR; INTRAVENOUS 2 TIMES DAILY
Status: DISCONTINUED | OUTPATIENT
Start: 2018-07-22 | End: 2018-07-22

## 2018-07-22 RX ORDER — BUMETANIDE 0.25 MG/ML
0.5 INJECTION, SOLUTION INTRAMUSCULAR; INTRAVENOUS ONCE
Status: DISCONTINUED | OUTPATIENT
Start: 2018-07-22 | End: 2018-07-22

## 2018-07-22 RX ORDER — BISACODYL 10 MG
10 SUPPOSITORY, RECTAL RECTAL DAILY PRN
Status: DISCONTINUED | OUTPATIENT
Start: 2018-07-22 | End: 2018-07-26 | Stop reason: HOSPADM

## 2018-07-22 RX ORDER — INSULIN GLARGINE 100 [IU]/ML
60 INJECTION, SOLUTION SUBCUTANEOUS NIGHTLY
Status: DISCONTINUED | OUTPATIENT
Start: 2018-07-22 | End: 2018-07-24

## 2018-07-22 RX ORDER — BUMETANIDE 0.25 MG/ML
1 INJECTION, SOLUTION INTRAMUSCULAR; INTRAVENOUS 2 TIMES DAILY
Status: DISCONTINUED | OUTPATIENT
Start: 2018-07-22 | End: 2018-07-25

## 2018-07-22 RX ORDER — METOPROLOL SUCCINATE 25 MG/1
25 TABLET, EXTENDED RELEASE ORAL 2 TIMES DAILY
Status: DISCONTINUED | OUTPATIENT
Start: 2018-07-22 | End: 2018-07-26 | Stop reason: HOSPADM

## 2018-07-22 RX ORDER — 0.9 % SODIUM CHLORIDE 0.9 %
250 INTRAVENOUS SOLUTION INTRAVENOUS ONCE
Status: COMPLETED | OUTPATIENT
Start: 2018-07-22 | End: 2018-07-23

## 2018-07-22 RX ORDER — DOCUSATE SODIUM 100 MG/1
200 CAPSULE, LIQUID FILLED ORAL NIGHTLY
Status: DISCONTINUED | OUTPATIENT
Start: 2018-07-22 | End: 2018-07-26 | Stop reason: HOSPADM

## 2018-07-22 RX ADMIN — LEVOTHYROXINE SODIUM 25 MCG: 25 TABLET ORAL at 10:20

## 2018-07-22 RX ADMIN — GABAPENTIN 300 MG: 300 CAPSULE ORAL at 21:42

## 2018-07-22 RX ADMIN — SIMVASTATIN 40 MG: 40 TABLET, FILM COATED ORAL at 21:42

## 2018-07-22 RX ADMIN — POLYETHYLENE GLYCOL 3350 17 G: 17 POWDER, FOR SOLUTION ORAL at 18:02

## 2018-07-22 RX ADMIN — METOPROLOL SUCCINATE 25 MG: 25 TABLET, FILM COATED, EXTENDED RELEASE ORAL at 21:54

## 2018-07-22 RX ADMIN — BUMETANIDE 0.5 MG: 0.25 INJECTION INTRAMUSCULAR; INTRAVENOUS at 08:39

## 2018-07-22 RX ADMIN — BUMETANIDE 1 MG: 0.25 INJECTION INTRAMUSCULAR; INTRAVENOUS at 16:45

## 2018-07-22 RX ADMIN — Medication 10 ML: at 21:44

## 2018-07-22 RX ADMIN — PANTOPRAZOLE SODIUM 40 MG: 40 TABLET, DELAYED RELEASE ORAL at 06:25

## 2018-07-22 RX ADMIN — ZOLPIDEM TARTRATE 5 MG: 5 TABLET ORAL at 23:12

## 2018-07-22 RX ADMIN — MICONAZOLE NITRATE: 20 POWDER TOPICAL at 21:43

## 2018-07-22 RX ADMIN — FERROUS SULFATE TAB 325 MG (65 MG ELEMENTAL FE) 325 MG: 325 (65 FE) TAB at 08:39

## 2018-07-22 RX ADMIN — FERROUS SULFATE TAB 325 MG (65 MG ELEMENTAL FE) 325 MG: 325 (65 FE) TAB at 17:57

## 2018-07-22 RX ADMIN — INSULIN GLARGINE 60 UNITS: 100 INJECTION, SOLUTION SUBCUTANEOUS at 21:48

## 2018-07-22 RX ADMIN — Medication 10 ML: at 08:40

## 2018-07-22 RX ADMIN — PANTOPRAZOLE SODIUM 40 MG: 40 TABLET, DELAYED RELEASE ORAL at 17:57

## 2018-07-22 RX ADMIN — APIXABAN 2.5 MG: 5 TABLET, FILM COATED ORAL at 08:39

## 2018-07-22 RX ADMIN — MICONAZOLE NITRATE: 20 POWDER TOPICAL at 08:40

## 2018-07-22 RX ADMIN — SODIUM CHLORIDE 250 ML: 9 INJECTION, SOLUTION INTRAVENOUS at 14:30

## 2018-07-22 RX ADMIN — DOCUSATE SODIUM 200 MG: 100 CAPSULE, LIQUID FILLED ORAL at 21:43

## 2018-07-22 RX ADMIN — Medication 10 ML: at 11:03

## 2018-07-22 RX ADMIN — INSULIN LISPRO 2 UNITS: 100 INJECTION, SOLUTION INTRAVENOUS; SUBCUTANEOUS at 13:16

## 2018-07-22 RX ADMIN — DOCUSATE SODIUM 100 MG: 100 CAPSULE, LIQUID FILLED ORAL at 09:01

## 2018-07-22 RX ADMIN — METOPROLOL TARTRATE 25 MG: 25 TABLET ORAL at 08:39

## 2018-07-22 ASSESSMENT — PAIN SCALES - GENERAL
PAINLEVEL_OUTOF10: 0

## 2018-07-22 NOTE — CONSULTS
Consults     Gastroenterology Consult Note   [facility] with Buster Manuel M.D. Consult Note        Date of Service: 7/22/2018  Reason for Consult: anemia, ? GI bleeding  Requesting Physician: Dr Margarita Lozano:  Shortness of breath    History Obtained From:  patient, family member - granddaughter, electronic medical record    HISTORY OF PRESENT ILLNESS:       Nolan Hernández is a 80 y.o. female with significant past medical history of GERD, Large hiatal hernia, dysphagia, Gastritis, Gastric bypass, Epistaxis s/p endo guided control with septal button placement, intubation, bronchoscopy, extubation, Breast Cancer, HTN, DM2, Morbid Obesity, CKD stage III,  Thyroid Disease, Atrial Fibrillation on Eliquis, RBBB, PAD post Carotid endarterectomy, CVA, Arthritis, GOSIA, and Blood Transfusion Reaction admitted 7/20/18 via ED for shortness of breath. SpO2 in ED was 88% on RA. Pt was on prior O2 and CPAP at [facility] 6/2018 which were reportedly discontinued at discharge from the facility. Pt states she has chronic shortness of breath, she is not on home O2. Pt reports for about 2-3 days prior to admission she has had increasing shortness of breath \"really bad, not like usual. I had pain in the stomach real low about 2-3 weeks ago but that went away. \"  Pt reports she has BMs QD to QOD, brown to darker brown. Last BM 7/19/18. Pt and her granddaughter at the [facility] in addition to EMR provided HPI. Pt lives with her daughter and is cared for by daughter and son along with other family member. Patients son was reportedly caring for his mother this past Friday when he went to assist her in getting up, she had severe shortness of breath so he called 911. Pt reportedly had a fall about a month ago and was difficult to get up at that time per Granddaughter. Pt requires full care and plans are for pt to be discharged to [facility] upon discharge.  Pt denies dizziness, lightheadedness, n/v, chills, fever, weight loss, hematochezia, melena, or hematemesis. Admission labs: H&H 7.9 & 28.2; MCV 76.8; MCH 21.5; MCHC 28; RDW 18.6; Lymph 12.9%; Abs Mono 0.71; ; BUN 25; Cr 1.5. Pt had swallow study with speech pathology intervention 5/2018, recommendation for Dysphagia 2 diet,(mechanicallly altered solids, pureed meats, no sandwiches) with honey consistency liquids. Consultation for anemia, ? GI bleeding. Pt is known to Dr. Bria Hernandez, last seen inpatient in 2014. Pt had EGD 4/30/14 for melena and anemia which demonstrated Large hiatal hernia. GERD. Mild gastritis. Normal duodenum. No active bleeding seen. She was to have colonoscopy as outpatient and states she does not believe she followed up - will check office records once available. Currently, pt reports \"I feel better, still have not had a BM. I don't have pain or anything and my breathing is back to normal.\"  Labs today: H&H 7.2 & 26.2; WBC 4.9; MCV 77.1; MCH 21.2; MCHC 27.5; RDW 18.6; Mono 15%; Abs Lymph 1.12; Alb 3.4; CO2 32; BUN 25; Cr 1.4. Past Medical History:        Diagnosis Date    Anemia due to acute blood loss 12/29/2017    Arthritis     Blood transfusion reaction     Chronic atrial fibrillation (HCC) 12/29/2017    CKD (chronic kidney disease) stage 3, GFR 30-59 ml/min 12/29/2017    Diabetes mellitus ([facility]     History of blood transfusion     History of breast cancer     breast cancer Right    History of stroke     Hyperlipidemia     Hypertension     Hypothyroidism     Volume overload 12/30/2017    As cause of dyspnea     Past Surgical History:        Procedure Laterality Date    BREAST SURGERY      right    BRONCHOSCOPY  4/28/2018    BRONCHOSCOPY DIAGNOSTIC performed by Nicki Newton MD at [address] [place] [address]  NASAL SINUS SURGERY      august 27 2017. cauterized her nasal passage.     OTHER SURGICAL HISTORY  07/27/2017    endoscopic conrtol of epistaxis dr Angel Ayoub

## 2018-07-22 NOTE — CONSULTS
respiratory failure/tracheobronchomalacia  42. 5/2018 Probable GOSIA recommended C-pap at hs  43. Discharged to home end of June 2018 from Rehab on no O2 or C-pap. According to family has been not able to get out of house and has not followed up with PCP, pulmonary since discharge. Medications Prior to admit:  Prior to Admission medications    Medication Sig Start Date End Date Taking? Authorizing Provider   insulin detemir (LEVEMIR FLEXPEN) 100 UNIT/ML injection pen Inject 65 Units into the skin nightly 5/8/18  Yes Jd De La Rosa, DO   apixaban (ELIQUIS) 2.5 MG TABS tablet 5 mg by mouth twice a day ×7 days followed by 2.5 mg by mouth twice a day  Patient taking differently: Take 2.5 mg by mouth 2 times daily  5/7/18  Yes Jd De La Rosa, DO   metoprolol tartrate (LOPRESSOR) 25 MG tablet Take 1 tablet by mouth 3 times daily  Patient taking differently: Take 25 mg by mouth every morning  5/7/18  Yes Jd De La Rosa, DO   hydrOXYzine (ATARAX) 10 MG tablet Take 10 mg by mouth 3 times daily as needed for Itching   Yes Historical Provider, MD   levothyroxine (SYNTHROID) 25 MCG tablet Take 1 tablet by mouth daily  Patient taking differently: Take 25 mcg by mouth every morning  12/31/17  Yes Kayode Mistry, DO   Mirabegron ER 50 MG TB24 Take 50 mg by mouth Daily with supper  8/29/17  Yes Historical Provider, MD   pantoprazole (PROTONIX) 40 MG tablet Take 1 tablet by mouth 2 times daily (before meals). 5/2/14  Yes Yelitza Shows, DO   simvastatin (ZOCOR) 40 MG tablet Take 40 mg by mouth nightly. Yes Historical Provider, MD   gabapentin (NEURONTIN) 300 MG capsule Take 300 mg by mouth nightly.  .   Yes Historical Provider, MD       Current Medications:    Current Facility-Administered Medications: insulin glargine (LANTUS) injection vial 60 Units, 60 Units, Subcutaneous, Nightly  0.9 % sodium chloride bolus, 250 mL, Intravenous, Once  bumetanide (BUMEX) injection 0.5 mg, 0.5 mg, Intravenous, Once  bisacodyl (DULCOLAX) no clubbing or cyanosis   ABDOMEN: Soft, obese, non-tender to light palpation. Bowel sounds present. No palpable masses; no abdominal bruit  MS: Good muscle strength and tone. No atrophy or abnormal movements. : Deferred  SKIN: Pale, arm and dry no statis dermatitis or ulcers   NEURO / PSYCH: Oriented to person, place and time. Speech clear and appropriate. Follows all commands. Flat affect     DATA:    ECG 7/20/2018: AF CVR. RBBB  Tele strips: AF CVR    Diagnostic:  CXR 7/20/2018: No airspace opacities or pleural effusion. Mild cardiac megaly with  mild vascular congestion    Intake/Output Summary (Last 24 hours) at 07/22/18 1406  Last data filed at 07/22/18 1038   Gross per 24 hour   Intake              900 ml   Output             1550 ml   Net             -650 ml       Labs:   CBC:   Recent Labs      07/21/18   0420  07/21/18   1400  07/22/18   0415   WBC  5.4   --   4.9   HGB  7.4*  7.8*  7.2*   HCT  26.6*  28.5*  26.2*   PLT  326   --   293     BMP: Recent Labs      07/21/18   0420  07/22/18   0415   NA  140  140   K  3.9  3.8   CO2  32*  32*   BUN  25*  25*   CREATININE  1.4*  1.4*   LABGLOM  36  36   CALCIUM  9.0  8.8       HgA1c:   Lab Results   Component Value Date    LABA1C 7.6 (H) 07/21/2018     proBNP:   Recent Labs      07/20/18   1325   PROBNP  3,029*       CARDIAC ENZYMES:  Recent Labs      07/20/18   1325   TROPONINI  0.01     FASTING LIPID PANEL:  Lab Results   Component Value Date    CHOL 125 12/27/2017    HDL 44 12/27/2017    LDLCALC 52 12/27/2017    TRIG 146 12/27/2017     LIVER PROFILE:  Recent Labs      07/21/18   0420  07/22/18   0415   AST  16  17   ALT  12  11   LABALBU  3.5  3.4*   ASSESSMENT:  1. Anemia currently undergoing transfusion  2. Hx GIB. 3. Hx  recurrent epistaxis with surgical intervention  4. Documented history of respiratory failure etiology is unclear  5. Probable GOSIA required C-pap on last admission  6. Hx HFpEF etiology is not well clarified on TTE 12/2017.   7. Non ischemic Lexiscan MPS in 2/2018  8. Chronic Atrial fibrillation> Patient reports no history of DCCV. [address] had been stopped in 2014 due to GIB. Eliquis started in 5/2018 (DVT)  9. LUE DVT in 5/2018 placed on Eliquis at that time  10. Hx CVA  11. Hx gastric bypass with \"reversal\"  12. T2 IDDM  13. Super Morbid Obesity BMI 51.5  14. cRBBB        PLAN:  1. Decongest with IV Bumex>>goal 30% increase in SCr  2. TTE   3. At elevated risk for EGD/Colonoscopy more so from pulmonary standpoint than from cardiac standpoint  4. At elevated CVA risk with withholding OAC and would recommend resuming OAC (Eliquis 2.5 mg  BID) as soon possible  5. Further recommendations to follow    Discussed with Dr Peter Shore    Electronically signed by Ly Bellamy. NICOLE Lehman on 7/22/2018 at 2:06 PM     [facility] cardiology attending note: This 44-year-old woman is a poor historian. She is examined and interviewed by me todayGI requested risk stratification prior to EGD/Colonoscopy  7/20/2018 with SOB RA pulse oximetry 88% RA. Hgb 7.9, , Bun/Cr 25/1.5, K+ 4.3, p-BNP 3029, troponin <0.01. CXR read as mild vascular congestion. Today she is in no distress. She denies chest pain or dyspnea. The remainder of the HPI stand as summarized above without additions or corrections. Review of Systems:  Constitutional: negative for fever and chills  Respiratory: negative for cough and hemoptysis  Cardiovascular:   Gastrointestinal: negative for abdominal pain, diarrhea, nausea and vomiting  Genitourinary:negative for dysuria and hematuria  Derm: negative for rash and skin lesion(s)  Neurological: negative for seizures and tremors  Endocrine: negative for diabetic symptoms including polydipsia and polyuria  Musculoskeletal: negative for CTD  Psychiatric: negative for psychosis and major depression    She is examined by me. She is an obese elderly female in no objective distress although she was noted to have shallow slightly rapid respirations. /74 Pulse 70   Temp 98.6 °F (37 °C) (Oral)   Resp 21   Ht 5' 1\" (1.549 m)   Wt 272 lb (123.4 kg)   LMP  (LMP Unknown)   SpO2 100%   BMI 51.39 kg/m² Neck is thick and short. No bruit or JVD evident. There is no scleral icterus. EOMI intact. The patient is a central cyanosis. Breath sounds are diminished in both bases. There are a few crackles at the left base. The apex is not palpable. Heart sounds distant. No murmurs or gallops. The abdomen is obese. No tenderness. Bowel sounds intact. Trace ankle elian    EKG reviewed today by me. Atrial fibrillation with controlled ventricular response. RBBB      Assessment and plan formulated by me. and reviewed at length with Ms. Kandice Alanis.         I have personally interviewed the patient, independently performed a focused cardiac exam, reviewed the pertinent laboratory and diagnostic testing results with the patient, and directly participated in the medical decision-making as noted above with additions and corrections as appropriate>> SARKIS Callahan MD

## 2018-07-22 NOTE — PROGRESS NOTES
Patient educated on the importance of turning in bed to prevent skin breakdown. She still is refusing her turns. Will continue to educate.

## 2018-07-22 NOTE — PROGRESS NOTES
Hospitalist Progress Note    Admission date: 7/20/2018  Primary care physician: Quiana Bui MD  Reason for visit: follow-up for CHF/Anemia    Subjective  Cherrie Cosme was seen and examined at bedside today. No family present during my examination. Cherrie Cosme states that she is still short of breath. She is constipated and has not had a bowel movement. Nurse was in room and stated that no acute issues were noted overnight. Review of Systems  There are no new complaints of chest pain, abdominal pain, nausea, vomiting, diarrhea, constipation.     Hospital Medications  Current Facility-Administered Medications   Medication Dose Route Frequency Provider Last Rate Last Dose    ferrous sulfate tablet 325 mg  325 mg Oral BID WC Jd De La Rosa, DO   325 mg at 07/22/18 6748    docusate sodium (COLACE) capsule 100 mg  100 mg Oral BID Jd De La Rosa, DO   100 mg at 07/22/18 0901    miconazole (MICOTIN) 2 % powder   Topical BID Jd De La Rosa, DO        sodium chloride flush 0.9 % injection 10 mL  10 mL Intravenous 2 times per day Jd De La Rosa, DO   10 mL at 07/22/18 0840    sodium chloride flush 0.9 % injection 10 mL  10 mL Intravenous PRN Jd De La Rosa, DO        magnesium hydroxide (MILK OF MAGNESIA) 400 MG/5ML suspension 30 mL  30 mL Oral Daily PRN Jd De La Rosa, DO        ondansetron (ZOFRAN) injection 4 mg  4 mg Intravenous Q6H PRN Jd De La Rosa, DO        acetaminophen (TYLENOL) tablet 650 mg  650 mg Oral Q4H PRN Jd De La Rosa, DO        apixaban (ELIQUIS) tablet 2.5 mg  2.5 mg Oral BID Jd De La Rosa, DO   2.5 mg at 07/22/18 3902    gabapentin (NEURONTIN) capsule 300 mg  300 mg Oral Nightly Jd De La Rosa, DO   300 mg at 07/21/18 2055    hydrOXYzine (ATARAX) tablet 10 mg  10 mg Oral TID PRN Jd De La Rosa, DO        levothyroxine (SYNTHROID) tablet 25 mcg  25 mcg Oral QAM Jd De La Rosa, DO   25 mcg at 07/21/18 0836    metoprolol tartrate (LOPRESSOR) tablet 25 mg  25 mg Oral QAM Jd De La Rosa, DO   25 mg at 07/22/18 0839    Mirabegron ER TB24 50 mg  50 mg Oral Dinner Jd PALACIOS Volino, DO        pantoprazole (PROTONIX) tablet 40 mg  40 mg Oral BID AC Jd E Volino, DO   40 mg at 07/22/18 9300    simvastatin (ZOCOR) tablet 40 mg  40 mg Oral Nightly Jd E Volino, DO   40 mg at 07/21/18 2055    insulin lispro (HUMALOG) injection vial 0-12 Units  0-12 Units Subcutaneous TID WC Jd E Volino, DO        insulin lispro (HUMALOG) injection vial 0-6 Units  0-6 Units Subcutaneous Nightly Jd E Volino, DO   1 Units at 07/21/18 2101    glucose (GLUTOSE) 40 % oral gel 15 g  15 g Oral PRN Jd Johnsonino, DO        dextrose 50 % solution 12.5 g  12.5 g Intravenous PRN Jd Johnsonino, DO        glucagon (rDNA) injection 1 mg  1 mg Intramuscular PRN Jd De La Rosa, DO        dextrose 5 % solution  100 mL/hr Intravenous PRN Jd De La Rosa, DO        bumetanide (BUMEX) injection 0.5 mg  0.5 mg Intravenous BID Jd E Volino, DO   0.5 mg at 07/22/18 0839    insulin glargine (LANTUS) injection vial 65 Units  65 Units Subcutaneous Nightly Jd PHILIP Volino, DO   65 Units at 07/21/18 2100    zolpidem (AMBIEN) tablet 5 mg  5 mg Oral Nightly PRN Jd PALACIOS Volino, DO   5 mg at 07/21/18 2251       PRN Medications  sodium chloride flush, magnesium hydroxide, ondansetron, acetaminophen, hydrOXYzine, glucose, dextrose, glucagon (rDNA), dextrose, zolpidem    Objective  Most Recent Recorded Vitals  BP (!) 152/87   Pulse 94   Temp 98.3 °F (36.8 °C) (Oral)   Resp 20   Ht 5' 1\" (1.549 m)   Wt 272 lb (123.4 kg)   LMP  (LMP Unknown)   SpO2 93%   BMI 51.39 kg/m²   I/O last 3 completed shifts: In: 1240 [P.O.:1240]  Out: 2750 [Urine:2750]  No intake/output data recorded.     Physical Exam:  General: AAO to person/place/time/purpose, NAD, no labored breathing, NCO2  Eyes: conjunctivae/corneas clear, sclera non icteric  Skin: color/texture/turgor normal, no rashes or lesions  Lungs: CTAB, no retractions/use of accessory muscles,

## 2018-07-23 LAB
ALBUMIN SERPL-MCNC: 3.3 G/DL (ref 3.5–5.2)
ALP BLD-CCNC: 74 U/L (ref 35–104)
ALT SERPL-CCNC: 11 U/L (ref 0–32)
ANION GAP SERPL CALCULATED.3IONS-SCNC: 8 MMOL/L (ref 7–16)
ANISOCYTOSIS: ABNORMAL
AST SERPL-CCNC: 16 U/L (ref 0–31)
BASOPHILS ABSOLUTE: 0.02 E9/L (ref 0–0.2)
BASOPHILS RELATIVE PERCENT: 0.4 % (ref 0–2)
BILIRUB SERPL-MCNC: 0.4 MG/DL (ref 0–1.2)
BUN BLDV-MCNC: 20 MG/DL (ref 8–23)
CALCIUM SERPL-MCNC: 8.8 MG/DL (ref 8.6–10.2)
CHLORIDE BLD-SCNC: 94 MMOL/L (ref 98–107)
CHOLESTEROL, TOTAL: 94 MG/DL (ref 0–199)
CO2: 35 MMOL/L (ref 22–29)
CREAT SERPL-MCNC: 1.4 MG/DL (ref 0.5–1)
EOSINOPHILS ABSOLUTE: 0.28 E9/L (ref 0.05–0.5)
EOSINOPHILS RELATIVE PERCENT: 4.9 % (ref 0–6)
GFR AFRICAN AMERICAN: 43
GFR NON-AFRICAN AMERICAN: 36 ML/MIN/1.73
GLUCOSE BLD-MCNC: 166 MG/DL (ref 74–109)
HCT VFR BLD CALC: 28.9 % (ref 34–48)
HCT VFR BLD CALC: 31.7 % (ref 34–48)
HDLC SERPL-MCNC: 39 MG/DL
HEMOGLOBIN: 8.2 G/DL (ref 11.5–15.5)
HEMOGLOBIN: 8.9 G/DL (ref 11.5–15.5)
HYPOCHROMIA: ABNORMAL
IMMATURE GRANULOCYTES #: 0.04 E9/L
IMMATURE GRANULOCYTES %: 0.7 % (ref 0–5)
LDL CHOLESTEROL CALCULATED: 34 MG/DL (ref 0–99)
LV EF: 60 %
LVEF MODALITY: NORMAL
LYMPHOCYTES ABSOLUTE: 1.15 E9/L (ref 1.5–4)
LYMPHOCYTES RELATIVE PERCENT: 20.2 % (ref 20–42)
MAGNESIUM: 1.7 MG/DL (ref 1.6–2.6)
MCH RBC QN AUTO: 22 PG (ref 26–35)
MCHC RBC AUTO-ENTMCNC: 28.4 % (ref 32–34.5)
MCV RBC AUTO: 77.5 FL (ref 80–99.9)
METER GLUCOSE: 114 MG/DL (ref 70–110)
METER GLUCOSE: 140 MG/DL (ref 70–110)
METER GLUCOSE: 79 MG/DL (ref 70–110)
METER GLUCOSE: 90 MG/DL (ref 70–110)
METER GLUCOSE: 95 MG/DL (ref 70–110)
MONOCYTES ABSOLUTE: 0.73 E9/L (ref 0.1–0.95)
MONOCYTES RELATIVE PERCENT: 12.8 % (ref 2–12)
NEUTROPHILS ABSOLUTE: 3.48 E9/L (ref 1.8–7.3)
NEUTROPHILS RELATIVE PERCENT: 61 % (ref 43–80)
PDW BLD-RTO: 18.5 FL (ref 11.5–15)
PLATELET # BLD: 290 E9/L (ref 130–450)
PMV BLD AUTO: 9.5 FL (ref 7–12)
POLYCHROMASIA: ABNORMAL
POTASSIUM REFLEX MAGNESIUM: 3.5 MMOL/L (ref 3.5–5)
RBC # BLD: 3.73 E12/L (ref 3.5–5.5)
SODIUM BLD-SCNC: 137 MMOL/L (ref 132–146)
TOTAL PROTEIN: 6.1 G/DL (ref 6.4–8.3)
TRIGL SERPL-MCNC: 103 MG/DL (ref 0–149)
TSH SERPL DL<=0.05 MIU/L-ACNC: 1.43 UIU/ML (ref 0.27–4.2)
VLDLC SERPL CALC-MCNC: 21 MG/DL
WBC # BLD: 5.7 E9/L (ref 4.5–11.5)

## 2018-07-23 PROCEDURE — 6370000000 HC RX 637 (ALT 250 FOR IP): Performed by: INTERNAL MEDICINE

## 2018-07-23 PROCEDURE — 2500000003 HC RX 250 WO HCPCS: Performed by: NURSE PRACTITIONER

## 2018-07-23 PROCEDURE — 80053 COMPREHEN METABOLIC PANEL: CPT

## 2018-07-23 PROCEDURE — 2580000003 HC RX 258: Performed by: INTERNAL MEDICINE

## 2018-07-23 PROCEDURE — 84443 ASSAY THYROID STIM HORMONE: CPT

## 2018-07-23 PROCEDURE — 6370000000 HC RX 637 (ALT 250 FOR IP): Performed by: NURSE PRACTITIONER

## 2018-07-23 PROCEDURE — 85018 HEMOGLOBIN: CPT

## 2018-07-23 PROCEDURE — 80061 LIPID PANEL: CPT

## 2018-07-23 PROCEDURE — 99232 SBSQ HOSP IP/OBS MODERATE 35: CPT | Performed by: INTERNAL MEDICINE

## 2018-07-23 PROCEDURE — 36415 COLL VENOUS BLD VENIPUNCTURE: CPT

## 2018-07-23 PROCEDURE — 82962 GLUCOSE BLOOD TEST: CPT

## 2018-07-23 PROCEDURE — 85025 COMPLETE CBC W/AUTO DIFF WBC: CPT

## 2018-07-23 PROCEDURE — 2060000000 HC ICU INTERMEDIATE R&B

## 2018-07-23 PROCEDURE — 97161 PT EVAL LOW COMPLEX 20 MIN: CPT

## 2018-07-23 PROCEDURE — 83735 ASSAY OF MAGNESIUM: CPT

## 2018-07-23 PROCEDURE — 2700000000 HC OXYGEN THERAPY PER DAY

## 2018-07-23 PROCEDURE — 85014 HEMATOCRIT: CPT

## 2018-07-23 PROCEDURE — G8988 SELF CARE GOAL STATUS: HCPCS

## 2018-07-23 PROCEDURE — 6370000000 HC RX 637 (ALT 250 FOR IP): Performed by: CLINICAL NURSE SPECIALIST

## 2018-07-23 PROCEDURE — G8987 SELF CARE CURRENT STATUS: HCPCS

## 2018-07-23 PROCEDURE — 97165 OT EVAL LOW COMPLEX 30 MIN: CPT

## 2018-07-23 PROCEDURE — 93306 TTE W/DOPPLER COMPLETE: CPT

## 2018-07-23 PROCEDURE — APPSS15 APP SPLIT SHARED TIME 0-15 MINUTES: Performed by: NURSE PRACTITIONER

## 2018-07-23 RX ORDER — LISINOPRIL 5 MG/1
5 TABLET ORAL DAILY
Status: DISCONTINUED | OUTPATIENT
Start: 2018-07-23 | End: 2018-07-25

## 2018-07-23 RX ORDER — OXYBUTYNIN CHLORIDE 10 MG/1
10 TABLET, EXTENDED RELEASE ORAL
Status: DISCONTINUED | OUTPATIENT
Start: 2018-07-23 | End: 2018-07-26 | Stop reason: HOSPADM

## 2018-07-23 RX ADMIN — MICONAZOLE NITRATE: 20 POWDER TOPICAL at 09:24

## 2018-07-23 RX ADMIN — METOPROLOL SUCCINATE 25 MG: 25 TABLET, FILM COATED, EXTENDED RELEASE ORAL at 09:25

## 2018-07-23 RX ADMIN — MAGESIUM CITRATE 296 ML: 1.75 LIQUID ORAL at 13:59

## 2018-07-23 RX ADMIN — MICONAZOLE NITRATE: 20 POWDER TOPICAL at 20:30

## 2018-07-23 RX ADMIN — Medication 10 ML: at 09:23

## 2018-07-23 RX ADMIN — FERROUS SULFATE TAB 325 MG (65 MG ELEMENTAL FE) 325 MG: 325 (65 FE) TAB at 16:55

## 2018-07-23 RX ADMIN — SIMVASTATIN 40 MG: 40 TABLET, FILM COATED ORAL at 20:30

## 2018-07-23 RX ADMIN — FERROUS SULFATE TAB 325 MG (65 MG ELEMENTAL FE) 325 MG: 325 (65 FE) TAB at 09:22

## 2018-07-23 RX ADMIN — BISACODYL 30 MG: 5 TABLET, COATED ORAL at 16:57

## 2018-07-23 RX ADMIN — ZOLPIDEM TARTRATE 5 MG: 5 TABLET ORAL at 23:12

## 2018-07-23 RX ADMIN — METOPROLOL SUCCINATE 25 MG: 25 TABLET, FILM COATED, EXTENDED RELEASE ORAL at 20:30

## 2018-07-23 RX ADMIN — LISINOPRIL 5 MG: 5 TABLET ORAL at 11:56

## 2018-07-23 RX ADMIN — POLYETHYLENE GLYCOL 3350 17 G: 17 POWDER, FOR SOLUTION ORAL at 09:23

## 2018-07-23 RX ADMIN — GABAPENTIN 300 MG: 300 CAPSULE ORAL at 20:30

## 2018-07-23 RX ADMIN — PANTOPRAZOLE SODIUM 40 MG: 40 TABLET, DELAYED RELEASE ORAL at 06:10

## 2018-07-23 RX ADMIN — Medication 10 ML: at 20:30

## 2018-07-23 RX ADMIN — LEVOTHYROXINE SODIUM 25 MCG: 25 TABLET ORAL at 09:22

## 2018-07-23 RX ADMIN — OXYBUTYNIN CHLORIDE 10 MG: 10 TABLET, FILM COATED, EXTENDED RELEASE ORAL at 18:08

## 2018-07-23 RX ADMIN — PANTOPRAZOLE SODIUM 40 MG: 40 TABLET, DELAYED RELEASE ORAL at 16:55

## 2018-07-23 RX ADMIN — BUMETANIDE 1 MG: 0.25 INJECTION INTRAMUSCULAR; INTRAVENOUS at 20:30

## 2018-07-23 RX ADMIN — BUMETANIDE 1 MG: 0.25 INJECTION INTRAMUSCULAR; INTRAVENOUS at 09:23

## 2018-07-23 ASSESSMENT — PAIN SCALES - GENERAL
PAINLEVEL_OUTOF10: 0

## 2018-07-23 NOTE — PROGRESS NOTES
PROGRESS NOTE    Patient Presents with/Seen in Consultation For      *Reason for Consult: anemia, ? GI bleeding     CHIEF COMPLAINT:  Shortness of breath    Subjective:     Patient seen laying in bed, with tachypnea. Patient denies any SOB. Patient denies any BMs through the night or today so far. Patient being set up to eat breakfast this am, clear liquid diet. POC reviewed with patient, all questions answered, states understanding. BS nurse instructed to get pulmonary clearance on patient before colon prep is started. Review of Systems  Aside from what was mentioned in the PMH and HPI, essentially unremarkable, all others negative. Objective:     Patient Vitals for the past 8 hrs:   BP Temp Temp src Pulse Resp SpO2 Weight   07/23/18 0800 126/64 98.2 °F (36.8 °C) Oral 82 18 95 % -   07/23/18 0227 - - - - - - 272 lb 12.8 oz (123.7 kg)       General appearance: alert, awake, laying in bed, and cooperative  Eyes: conjunctivae/corneas clear. PERRL. Lungs: clear, diminshed to bilateral bases to auscultation bilaterally. O2 to NC  Heart: regular rate and rhythm, no murmur, 2+ pulses; with trace BLE edema  Abdomen: soft, non-tender; rounded, pendulous, bowel sounds hypoactive; no masses,  no organomegaly  Extremities: extremities with trace edema  Pulses: 2+ and symmetric  Skin: Skin color pale, dry texture, turgor fair.    Neurologic: Grossly normal      insulin glargine (LANTUS) injection vial 60 Units Nightly   bisacodyl (DULCOLAX) suppository 10 mg Daily PRN   polyethylene glycol (GLYCOLAX) packet 17 g Daily   docusate sodium (COLACE) capsule 200 mg Nightly   perflutren lipid microspheres (DEFINITY) injection 1.65 mg ONCE PRN   bisacodyl (DULCOLAX) EC tablet 30 mg Once   [START ON 7/24/2018] magnesium citrate solution 296 mL Once   magnesium citrate solution 296 mL BID   metoprolol succinate (TOPROL XL) extended release tablet 25 mg BID   bumetanide (BUMEX) injection 1 mg BID   ferrous sulfate tablet 325 mg BID WC   miconazole (MICOTIN) 2 % powder BID   sodium chloride flush 0.9 % injection 10 mL 2 times per day   sodium chloride flush 0.9 % injection 10 mL PRN   magnesium hydroxide (MILK OF MAGNESIA) 400 MG/5ML suspension 30 mL Daily PRN   ondansetron (ZOFRAN) injection 4 mg Q6H PRN   acetaminophen (TYLENOL) tablet 650 mg Q4H PRN   gabapentin (NEURONTIN) capsule 300 mg Nightly   hydrOXYzine (ATARAX) tablet 10 mg TID PRN   levothyroxine (SYNTHROID) tablet 25 mcg QAM   Mirabegron ER TB24 50 mg Dinner   pantoprazole (PROTONIX) tablet 40 mg BID AC   simvastatin (ZOCOR) tablet 40 mg Nightly   insulin lispro (HUMALOG) injection vial 0-12 Units TID WC   glucose (GLUTOSE) 40 % oral gel 15 g PRN   dextrose 50 % solution 12.5 g PRN   glucagon (rDNA) injection 1 mg PRN   dextrose 5 % solution PRN   zolpidem (AMBIEN) tablet 5 mg Nightly PRN        Data Review  CBC: Lab Results   Component Value Date    WBC 5.7 07/23/2018    RBC 3.73 07/23/2018    HGB 8.2 07/23/2018    HCT 28.9 07/23/2018    MCV 77.5 07/23/2018    MCH 22.0 07/23/2018    MCHC 28.4 07/23/2018    RDW 18.5 07/23/2018     07/23/2018    MPV 9.5 07/23/2018     CMP:  Lab Results   Component Value Date     07/23/2018    K 3.5 07/23/2018    CL 94 07/23/2018    CO2 35 07/23/2018    BUN 20 07/23/2018    CREATININE 1.4 07/23/2018    GFRAA 43 07/23/2018    LABGLOM 36 07/23/2018    GLUCOSE 166 07/23/2018    PROT 6.1 07/23/2018    LABALBU 3.3 07/23/2018    CALCIUM 8.8 07/23/2018    BILITOT 0.4 07/23/2018    ALKPHOS 74 07/23/2018    AST 16 07/23/2018    ALT 11 07/23/2018     Hepatic Function Panel:  Lab Results   Component Value Date    ALKPHOS 74 07/23/2018    ALT 11 07/23/2018    AST 16 07/23/2018    PROT 6.1 07/23/2018    BILITOT 0.4 07/23/2018    LABALBU 3.3 07/23/2018     No components found for: CHLPL  Lab Results   Component Value Date    TRIG 103 07/23/2018    TRIG 146 12/27/2017     Lab Results   Component Value Date    HDL 39 07/23/2018    HDL 44 12/27/2017

## 2018-07-23 NOTE — PROGRESS NOTES
Inpatient Cardiology Progress note     PATIENT IS BEING FOLLOWED FOR: Cardiac risk stratification for EGD/Colonoscopy     Nolan Hernández is a 80 y.o. female who is known to Dr. Christian Real: Denies CP and SOB. Feeling tired. OBJECTIVE: laying in bed and appears to be in no apparent distress. Patient just came back from ECHO.     ROS:  Consist: Denies fevers, chills or night sweats  Heart: Denies chest pain, palpitations, lightheadedness, dizziness or syncope  Lungs: Denies SOB, cough, wheezing, orthopnea or PND  GI: Denies abdominal pain, vomiting or diarrhea    PHYSICAL EXAM:   /64   Pulse 82   Temp 98.2 °F (36.8 °C) (Oral)   Resp 18   Ht 5' 1\" (1.549 m)   Wt 272 lb 12.8 oz (123.7 kg)   LMP  (LMP Unknown)   SpO2 95%   BMI 51.55 kg/m²    B/P Range last 24 hours: Systolic (41ZDY), HVV:631 , Min:126 , FYI:188    Diastolic (18PZR), VQT:81, Min:64, Max:97    CONST:  Well developed, ill appearing morbidly obese  female who appears of stated age. Awake, alert and cooperative. Appears tachypneic during conversation  HEENT:   Head- Normocephalic, atraumatic   Eyes- Conjunctivae pink, anicteric  Throat- Oral mucosa pink and moist  Neck-  Thick neck. No stridor, trachea midline, + JVD. No carotid bruit. CHEST: Chest symmetrical and non-tender to palpation. No accessory muscle use or intercostal retractions  RESPIRATORY: Lung sounds - diminished throughout no wheezing heard. Poor inspiratory effort. Tachypneic. On 2 liters NC.   CARDIOVASCULAR:     Heart Ausculation- IRRR. II/VI LOGAN. No rub heard. PV: 1-2+ pitting BLE. No varicosities. Pedal pulses palpable, no clubbing or cyanosis   ABDOMEN: Soft, obese, non-tender to light palpation. Bowel sounds present. No palpable masses; no abdominal bruit  MS: Good muscle strength and tone. No atrophy or abnormal movements.    : Deferred  SKIN: Pale, arm and dry no statis dermatitis or ulcers   NEURO / PSYCH: Oriented to person, place and

## 2018-07-23 NOTE — CONSULTS
Consults   See dictated consult for details:  Imp:  Super-morbid obesity  Restrictive ventilatory defect based on Weight  Obesity hypoventilation syndrome  Probable GOSIA  Tracheobronchomalacia   High risk from Pulmonary standpoint for proposed Endoscopy. I discussed this in detail with the patient. She understands if she develops respiratory failure she would be intubated and ventilated. She would then be weaned from ventilation afterward. She has required ventilation in the past. I cared for her on an ICU admission and weaned her from the vent then. I answered all questions. Patient is aware of risk and is willing to proceed. Plan:  1. Consider elective intubation for the procedure. Then extubation post procedure. 2. In the past I have done bronchoscopy on several of these high risk cases using nasal BIPAP to assist in ventilation and to splint the airway open. Would use BIPAP 18/8 with a rate of 15.  3. Putting the OR table in 20 degrees reverse Trendelenburg will assist in decompressing the diaphragms. 4. ABG immediately after procedure to evaluate PaCO2  5. BIPAP support post procedure  6. Outpatient sleep study  7. PFTs as outpatient once compensated  Will follow with you  Thanks!!! [place] Rivera.  STEFF.  [phone]

## 2018-07-23 NOTE — PROGRESS NOTES
Hospitalist Progress Note    Admission date: 7/20/2018  Primary care physician: Queen Piedad MD  Reason for visit: follow-up for CHF/Anemia    Subjective  Tash Frank was seen and examined at bedside today. No family present during my examination. Tash Frank states that she is feeling better today. Breathing is stable. She is okay to lay flat. She still requiring nasal cannula oxygen. She understands that she will be undergoing evaluation for GI bleeding. She did get up in a chair yesterday and tolerated this well. Review of Systems  There are no new complaints of chest pain, abdominal pain, nausea, vomiting, diarrhea, constipation. Hospital Medications  Current Facility-Administered Medications   Medication Dose Route Frequency Provider Last Rate Last Dose    insulin glargine (LANTUS) injection vial 60 Units  60 Units Subcutaneous Nightly Jd De La Rosa, DO   60 Units at 07/22/18 2148    bisacodyl (DULCOLAX) suppository 10 mg  10 mg Rectal Daily PRN Jd De La Rosa, DO        polyethylene glycol (GLYCOLAX) packet 17 g  17 g Oral Daily Jd De La Rosa DO   17 g at 07/22/18 1802    docusate sodium (COLACE) capsule 200 mg  200 mg Oral Nightly Claudia Atkinson APRN - CNS   200 mg at 07/22/18 2143    perflutren lipid microspheres (DEFINITY) injection 1.65 mg  1.5 mL Intravenous ONCE PRN Jeni Norman. NICOLE Burns        bisacodyl (DULCOLAX) EC tablet 30 mg  30 mg Oral Once Claudia Demetrio, APRN - CNS        [START ON 7/24/2018] magnesium citrate solution 296 mL  296 mL Oral Once Claudia Atkinson, APRN - CNS        magnesium citrate solution 296 mL  296 mL Oral BID Claudia Demetrio, APRN - CNS        metoprolol succinate (TOPROL XL) extended release tablet 25 mg  25 mg Oral BID Jeni Norman. Katy APN   25 mg at 07/22/18 2154    bumetanide (BUMEX) injection 1 mg  1 mg Intravenous BID Jeni Norman.  Katy APN   1 mg at 07/22/18 1645    ferrous sulfate tablet 325 mg  325 mg Oral BID WC Jd De La Rosa, DO   325 mg at 07/22/18

## 2018-07-23 NOTE — PLAN OF CARE
Problem: Pain  Goal: Pain level will decrease  Pain level will decrease     Outcome: Met This Shift      Problem: Gas Exchange - Impaired:  Goal: Able to breathe comfortably  Able to breathe comfortably     Outcome: Met This Shift      Problem: Fluid Volume - Excess  Goal: Absence of fluid overload signs and symptoms  Outcome: Met This Shift      Problem: Falls - Risk of:  Goal: Will remain free from falls  Will remain free from falls   Outcome: Met This Shift    Goal: Absence of physical injury  Absence of physical injury   Outcome: Met This Shift      Problem: Risk for Impaired Skin Integrity  Goal: Tissue integrity - skin and mucous membranes  Structural intactness and normal physiological function of skin and  mucous membranes.    Outcome: Met This Shift

## 2018-07-23 NOTE — PROGRESS NOTES
Physical Therapy    Facility/Department: [address] INTERMEDIATE 1  Initial Assessment    NAME: Connie Zapata  : 1935  MRN: [id number]    Date of Service: 2018    Patient Diagnosis(es): The primary encounter diagnosis was Acute respiratory failure with hypoxia and hypercapnia ([facility] Diagnoses of Hypervolemia, unspecified hypervolemia type, Dyspnea and respiratory abnormalities, and Elevated brain natriuretic peptide (BNP) level were also pertinent to this visit. has a past medical history of Anemia due to acute blood loss; Arthritis; Blood transfusion reaction; Chronic atrial fibrillation (HCC); CKD (chronic kidney disease) stage 3, GFR 30-59 ml/min; Diabetes mellitus ([facility] History of blood transfusion; History of breast cancer; History of stroke; Hyperlipidemia; Hypertension; Hypothyroidism; and Volume overload.   has a past surgical history that includes Breast surgery; Cholecystectomy; Gastric bypass surgery; other surgical history (2017); Nasal sinus surgery; pr ctrl nosebleed,anter,complex (N/A, 2018); and bronchoscopy (2018). Evaluating Therapist: Marie Solis PT    Room #:447  DIAGNOSIS: CHF  PRECAUTIONS: falls, alarm, O2    Social:  Pt lives with daughter in a split level home. 6 steps to enter 6 steps to bedroom. States she stay on the level where her bedroom is and does not do the stair. Prior to admission ambulates with ww     Initial Evaluation  Date:  Treatment      Short Term/ Long Term   Goals   Was pt agreeable to Eval/treatment? Yes with encouragement     Does pt have pain?  No c/o pain     Bed Mobility  Rolling: NT  Supine to sit: NT  Sit to supine: NT  Scooting: NT  independent   Transfers Sit to stand: CGA  Stand to sit: CGA  Stand pivot: CGA  independent   Ambulation    25 feet with ww with CGA  100 feet with ww independemt   Stair Negotiation  Ascended and descended  NT   6 steps with 1 rail with min assist   AM-PAC Raw score

## 2018-07-23 NOTE — CARE COORDINATION
MET  WITH  PT  AT BEDSIDE. INTRODUCED MYSELF  AS AN RN    AND  EXPLAINED  MY ROLE. PT  STATES  AT  DISCHARGE  SHE  IS  GOING  TO  [facility]. UNDERSTANDS  HER  CURRENT  COURSE  OF  CARE. AND ALREADY  \"FEELS  BETTER\". WILL  FOLLOW  ALONG  WITH  SOCIAL WORK  FOR  ANY  NEEDS  , Dillon Edmond RN,.

## 2018-07-24 LAB
ALBUMIN SERPL-MCNC: 3.1 G/DL (ref 3.5–5.2)
ALP BLD-CCNC: 74 U/L (ref 35–104)
ALT SERPL-CCNC: 12 U/L (ref 0–32)
ANION GAP SERPL CALCULATED.3IONS-SCNC: 7 MMOL/L (ref 7–16)
ANISOCYTOSIS: ABNORMAL
AST SERPL-CCNC: 17 U/L (ref 0–31)
BASOPHILS ABSOLUTE: 0.02 E9/L (ref 0–0.2)
BASOPHILS RELATIVE PERCENT: 0.4 % (ref 0–2)
BILIRUB SERPL-MCNC: 0.4 MG/DL (ref 0–1.2)
BUN BLDV-MCNC: 20 MG/DL (ref 8–23)
CALCIUM SERPL-MCNC: 8.9 MG/DL (ref 8.6–10.2)
CHLORIDE BLD-SCNC: 95 MMOL/L (ref 98–107)
CO2: 40 MMOL/L (ref 22–29)
CREAT SERPL-MCNC: 1.5 MG/DL (ref 0.5–1)
EOSINOPHILS ABSOLUTE: 0.21 E9/L (ref 0.05–0.5)
EOSINOPHILS RELATIVE PERCENT: 4.2 % (ref 0–6)
GFR AFRICAN AMERICAN: 40
GFR NON-AFRICAN AMERICAN: 33 ML/MIN/1.73
GLUCOSE BLD-MCNC: 116 MG/DL (ref 74–109)
HCT VFR BLD CALC: 30.9 % (ref 34–48)
HEMOGLOBIN: 8.4 G/DL (ref 11.5–15.5)
HYPOCHROMIA: ABNORMAL
IMMATURE GRANULOCYTES #: 0.01 E9/L
IMMATURE GRANULOCYTES %: 0.2 % (ref 0–5)
LYMPHOCYTES ABSOLUTE: 0.92 E9/L (ref 1.5–4)
LYMPHOCYTES RELATIVE PERCENT: 18.4 % (ref 20–42)
MAGNESIUM: 2.3 MG/DL (ref 1.6–2.6)
MCH RBC QN AUTO: 21.6 PG (ref 26–35)
MCHC RBC AUTO-ENTMCNC: 27.2 % (ref 32–34.5)
MCV RBC AUTO: 79.6 FL (ref 80–99.9)
METER GLUCOSE: 119 MG/DL (ref 70–110)
METER GLUCOSE: 144 MG/DL (ref 70–110)
METER GLUCOSE: 172 MG/DL (ref 70–110)
METER GLUCOSE: 53 MG/DL (ref 70–110)
METER GLUCOSE: 64 MG/DL (ref 70–110)
METER GLUCOSE: 91 MG/DL (ref 70–110)
MONOCYTES ABSOLUTE: 0.6 E9/L (ref 0.1–0.95)
MONOCYTES RELATIVE PERCENT: 12 % (ref 2–12)
NEUTROPHILS ABSOLUTE: 3.24 E9/L (ref 1.8–7.3)
NEUTROPHILS RELATIVE PERCENT: 64.8 % (ref 43–80)
OVALOCYTES: ABNORMAL
PDW BLD-RTO: 19.3 FL (ref 11.5–15)
PLATELET # BLD: 293 E9/L (ref 130–450)
PMV BLD AUTO: 9.9 FL (ref 7–12)
POIKILOCYTES: ABNORMAL
POLYCHROMASIA: ABNORMAL
POTASSIUM REFLEX MAGNESIUM: 3.3 MMOL/L (ref 3.5–5)
RBC # BLD: 3.88 E12/L (ref 3.5–5.5)
SODIUM BLD-SCNC: 142 MMOL/L (ref 132–146)
TOTAL PROTEIN: 5.9 G/DL (ref 6.4–8.3)
WBC # BLD: 5 E9/L (ref 4.5–11.5)

## 2018-07-24 PROCEDURE — 82962 GLUCOSE BLOOD TEST: CPT

## 2018-07-24 PROCEDURE — 6370000000 HC RX 637 (ALT 250 FOR IP): Performed by: INTERNAL MEDICINE

## 2018-07-24 PROCEDURE — 36415 COLL VENOUS BLD VENIPUNCTURE: CPT

## 2018-07-24 PROCEDURE — 85025 COMPLETE CBC W/AUTO DIFF WBC: CPT

## 2018-07-24 PROCEDURE — 6370000000 HC RX 637 (ALT 250 FOR IP): Performed by: CLINICAL NURSE SPECIALIST

## 2018-07-24 PROCEDURE — 80053 COMPREHEN METABOLIC PANEL: CPT

## 2018-07-24 PROCEDURE — 97530 THERAPEUTIC ACTIVITIES: CPT

## 2018-07-24 PROCEDURE — 2700000000 HC OXYGEN THERAPY PER DAY

## 2018-07-24 PROCEDURE — 2500000003 HC RX 250 WO HCPCS: Performed by: NURSE PRACTITIONER

## 2018-07-24 PROCEDURE — 83735 ASSAY OF MAGNESIUM: CPT

## 2018-07-24 PROCEDURE — 2580000003 HC RX 258: Performed by: INTERNAL MEDICINE

## 2018-07-24 PROCEDURE — 97110 THERAPEUTIC EXERCISES: CPT

## 2018-07-24 PROCEDURE — 99232 SBSQ HOSP IP/OBS MODERATE 35: CPT | Performed by: INTERNAL MEDICINE

## 2018-07-24 PROCEDURE — 2060000000 HC ICU INTERMEDIATE R&B

## 2018-07-24 PROCEDURE — 6370000000 HC RX 637 (ALT 250 FOR IP): Performed by: NURSE PRACTITIONER

## 2018-07-24 RX ORDER — POTASSIUM CHLORIDE 20 MEQ/1
40 TABLET, EXTENDED RELEASE ORAL
Status: DISCONTINUED | OUTPATIENT
Start: 2018-07-24 | End: 2018-07-26

## 2018-07-24 RX ORDER — INSULIN GLARGINE 100 [IU]/ML
50 INJECTION, SOLUTION SUBCUTANEOUS NIGHTLY
Status: DISCONTINUED | OUTPATIENT
Start: 2018-07-24 | End: 2018-07-26 | Stop reason: HOSPADM

## 2018-07-24 RX ADMIN — GABAPENTIN 300 MG: 300 CAPSULE ORAL at 21:34

## 2018-07-24 RX ADMIN — BUMETANIDE 1 MG: 0.25 INJECTION INTRAMUSCULAR; INTRAVENOUS at 08:45

## 2018-07-24 RX ADMIN — MICONAZOLE NITRATE: 20 POWDER TOPICAL at 21:35

## 2018-07-24 RX ADMIN — POTASSIUM CHLORIDE 40 MEQ: 1500 TABLET, EXTENDED RELEASE ORAL at 12:22

## 2018-07-24 RX ADMIN — APIXABAN 2.5 MG: 2.5 TABLET, FILM COATED ORAL at 21:34

## 2018-07-24 RX ADMIN — METOPROLOL SUCCINATE 25 MG: 25 TABLET, FILM COATED, EXTENDED RELEASE ORAL at 08:47

## 2018-07-24 RX ADMIN — PANTOPRAZOLE SODIUM 40 MG: 40 TABLET, DELAYED RELEASE ORAL at 16:18

## 2018-07-24 RX ADMIN — LEVOTHYROXINE SODIUM 25 MCG: 25 TABLET ORAL at 08:45

## 2018-07-24 RX ADMIN — DEXTROSE MONOHYDRATE 12.5 G: 25 INJECTION, SOLUTION INTRAVENOUS at 07:03

## 2018-07-24 RX ADMIN — SIMVASTATIN 40 MG: 40 TABLET, FILM COATED ORAL at 21:34

## 2018-07-24 RX ADMIN — INSULIN GLARGINE 50 UNITS: 100 INJECTION, SOLUTION SUBCUTANEOUS at 21:37

## 2018-07-24 RX ADMIN — Medication 10 ML: at 08:45

## 2018-07-24 RX ADMIN — DOCUSATE SODIUM 200 MG: 100 CAPSULE, LIQUID FILLED ORAL at 21:34

## 2018-07-24 RX ADMIN — FERROUS SULFATE TAB 325 MG (65 MG ELEMENTAL FE) 325 MG: 325 (65 FE) TAB at 08:45

## 2018-07-24 RX ADMIN — Medication 10 ML: at 07:03

## 2018-07-24 RX ADMIN — OXYBUTYNIN CHLORIDE 10 MG: 10 TABLET, FILM COATED, EXTENDED RELEASE ORAL at 16:18

## 2018-07-24 RX ADMIN — METOPROLOL SUCCINATE 25 MG: 25 TABLET, FILM COATED, EXTENDED RELEASE ORAL at 21:34

## 2018-07-24 RX ADMIN — ZOLPIDEM TARTRATE 5 MG: 5 TABLET ORAL at 23:15

## 2018-07-24 RX ADMIN — DEXTROSE 15 G: 15 GEL ORAL at 06:41

## 2018-07-24 RX ADMIN — PANTOPRAZOLE SODIUM 40 MG: 40 TABLET, DELAYED RELEASE ORAL at 06:40

## 2018-07-24 RX ADMIN — FERROUS SULFATE TAB 325 MG (65 MG ELEMENTAL FE) 325 MG: 325 (65 FE) TAB at 16:14

## 2018-07-24 RX ADMIN — Medication 10 ML: at 21:34

## 2018-07-24 RX ADMIN — LISINOPRIL 5 MG: 5 TABLET ORAL at 08:48

## 2018-07-24 RX ADMIN — BUMETANIDE 1 MG: 0.25 INJECTION INTRAMUSCULAR; INTRAVENOUS at 21:34

## 2018-07-24 RX ADMIN — MICONAZOLE NITRATE: 20 POWDER TOPICAL at 08:45

## 2018-07-24 RX ADMIN — DEXTROSE 15 G: 15 GEL ORAL at 06:40

## 2018-07-24 ASSESSMENT — PAIN SCALES - GENERAL
PAINLEVEL_OUTOF10: 0

## 2018-07-24 NOTE — PROGRESS NOTES
Occupational Therapy    OT BEDSIDE TREATMENT NOTE      Date:2018  Patient Name: Major Honeycutt  MRN: [id number]  : 1935  Room: [address] [place] [address]-PAC Daily Activity Raw Score:      Diagnosis: CHF     Past Medical History: Breast CA, stroke,   Precautions: Falls, O2     Home Living/PLOF:   Patient lives with daughter (works), bi level home with 6 steps/rail to enter. 6 steps/rail between floors. Walk in shower  PTA: Patient reports she dresses herself, has assist with bathing. Uses w/walker for mobility      Pain Level: pt c/o no pain  this session      UE AROM WFLS for ADL activity      Functional Assessment:    Initial Eval Status 18  Tx Session 18   Feeding  Set-up      Grooming  Set-up,seated   sitting in chair   Patient washed face and hands ,combing hair    Upper Body Dressing Min A      Lower Body Dressing Max A   Total assist don/doff socks   assist to manage socks    Bathing       Toileting  Assist with hygiene         Bed Mobility  Sitting in chair upon arrival  SBA  Supine to sit    Functional Transfers CGA  Sit-stand from chair    SBA  Sit-stand from bed    Functional Mobility CGA,w/walker  Ambulating in room  SBA ,w/walker, 2 L O2  Ambulating household distance in room   Noted decrease endurance, SOB   Educated on deep breathing tech        Comments: Upon arrival pt lying in bed . At end of session sitting in chair  all lines and tubes intact, call light within reach. Chair alarm, family present in room     · Pt has made fair  progress towards set goals.    · Continue with current plan of care    Time in: [address]  Time out:1215      Tono Valverde OTR/L [id number]

## 2018-07-24 NOTE — PROGRESS NOTES
acetaminophen, hydrOXYzine, glucose, dextrose, glucagon (rDNA), dextrose, zolpidem    Objective  Most Recent Recorded Vitals  BP (!) 104/48   Pulse 71   Temp 97.3 °F (36.3 °C) (Oral)   Resp 18   Ht 5' 1\" (1.549 m)   Wt 266 lb 3.2 oz (120.7 kg)   LMP  (LMP Unknown)   SpO2 100%   BMI 50.30 kg/m²   I/O last 3 completed shifts: In: 660 [P.O.:660]  Out: 900 [Urine:900]  No intake/output data recorded. Physical Exam:  General: AAO to person/place/time/purpose, NAD, no labored breathing, NCO2  Eyes: conjunctivae/corneas clear, sclera non icteric  Skin: color/texture/turgor normal, no rashes or lesions  Lungs: CTAB, no retractions/use of accessory muscles, no vocal fremitus, no rhonchi, no crackle, no rales  Heart: regular rate, regular rhythm, 3/6 systolic murmur  Abdomen: obese, soft, NT; bowel sounds normal; no masses,  no organomegaly  Extremities: atraumatic, no cyanosis, trace LE edema  Neurologic: cranial nerves 2-12 grossly intact, no slurred speech. Most Recent Labs  Lab Results   Component Value Date    WBC 5.7 07/23/2018    HGB 8.9 (L) 07/23/2018    HCT 31.7 (L) 07/23/2018     07/23/2018     07/23/2018    K 3.5 07/23/2018    CL 94 (L) 07/23/2018    CREATININE 1.4 (H) 07/23/2018    BUN 20 07/23/2018    CO2 35 (H) 07/23/2018    GLUCOSE 166 (H) 07/23/2018    ALT 11 07/23/2018    AST 16 07/23/2018    INR 1.4 07/22/2018    TSH 1.430 07/23/2018    LABA1C 7.6 (H) 07/21/2018       XR CHEST PORTABLE   Final Result   No airspace opacities or pleural effusion. Mild cardiac megaly with   mild vascular congestion                BLOOD CX #1  No results for input(s): BC in the last 72 hours. BLOOD CX #2  No results for input(s): Cain Courtney in the last 72 hours. TIP CULTURE  No results for input(s): CXCATHTIP in the last 72 hours. CULTURE, RESPIRATORY   No results for input(s): CULTRESP in the last 72 hours. RESPIRATORY SMEAR  No results for input(s): RESPSMEAR in the last 72 hours.      BODY

## 2018-07-24 NOTE — PROGRESS NOTES
right ventricle size with reduced function. TAPSE is 1.5 cm.   Mild aortic stenosis is present.   Moderate mitral valve stenosis. Mean transmitral gradient 9 mmHg.   Moderate mitral regurgitation is present.   Mild and tricuspid regurgitation.   Pulmonary hypertension is severe. RVSP is 72 mmHg. SpO2 Readings from Last 1 Encounters:   07/24/18 100%        7/20/18 CXR    COMPARISON: 5/4/2018       FINDINGS:   The heart is mildly enlarged in size. The mediastinum is normal in   width. Mild pulmonary vascular congestion. . No focal airspace opacity. There is no pleural effusion. There is no pneumothorax.             Impression   No airspace opacities or pleural effusion. Mild cardiac megaly with   mild vascular congestion         4/27/18 CT chest    FINDINGS:       There are moderate-sized bilateral pleural effusions with adjacent   irregular areas of attenuation. There is mild pulmonary vascular   congestion. No evidence of pneumothorax. Mild cardiomegaly. No   pericardial effusion. No mediastinal or hilar lymphadenopathy. View of   upper abdomen shows normal bilateral adrenal glands.           Impression       1. Moderate-sized bilateral pleural effusions with adjacent   atelectasis or pneumonia.       2. Cardiomegaly with mild pulmonary vascular congestion. Chest Radiographs have all been reviewed   Medications and labs have all been reviewed.   Current Facility-Administered Medications   Medication Dose Route Frequency Provider Last Rate Last Dose    insulin glargine (LANTUS) injection vial 50 Units  50 Units Subcutaneous Nightly Jd De La Rosa DO        oxybutynin (DITROPAN-XL) extended release tablet 10 mg  10 mg Oral Dinner Jd De La Rosa DO   10 mg at 07/23/18 1808    lisinopril (PRINIVIL;ZESTRIL) tablet 5 mg  5 mg Oral Daily SAVANNA Snow CNP   5 mg at 07/24/18 0848    bisacodyl (DULCOLAX) suppository 10 mg  10 mg Rectal Daily PRN Jd De La Rosa DO        polyethylene glycol WC Jd E Volino, DO   2 Units at 07/22/18 1316    glucose (GLUTOSE) 40 % oral gel 15 g  15 g Oral PRN Jd E Volino, DO   15 g at 07/24/18 0641    dextrose 50 % solution 12.5 g  12.5 g Intravenous PRN Jd E Volino, DO   12.5 g at 07/24/18 0703    glucagon (rDNA) injection 1 mg  1 mg Intramuscular PRN Jd E Volino, DO        dextrose 5 % solution  100 mL/hr Intravenous PRN Jd E Volino, DO        zolpidem (AMBIEN) tablet 5 mg  5 mg Oral Nightly PRN Jd E Volino, DO   5 mg at 07/23/18 2312     PHYSICAL EXAM:  General Appearance:    Lying in bed in no acute distress. Appears comfortable. Head:  Normocephalic atraumatic without obvious abnormality   Throat:  Lips, mucosa, and tongue normal.   Neck:  Supple, symmetrical, trachea midline, no adenopathy;     thyroid:  no enlargement/tenderness/nodules or JVD. Lungs:   Breath sounds diminished bilaterally . Respirations   unlabored. Symmetrical expansion. Heart:   Regular rate and rhythm, S1 and S2 normal, no murmur, rub   or gallop. Abdomen:    Soft, obese non-tender, bowel sounds active all four quadrants,     no masses, no organomegaly, no bruit. Extremities  Extremities normal, no cyanosis, clubbing, BLE edema. Capillary refill <3 seconds. Skin:   Warm and dry. Skin color, texture, turgor normal. No rashes,    bleeding,  or lesions. See wound care assessment.             ASSESSMENT:  Acute on chronic respiratory failure with hypoxia, hypercapnia  Tracheobronchomalacia  GOSIA  Morbid obesity   Restrictive ventilatory defect based on body habitus  Severe Pulmonary HTN-Echo 7/23/18 RSVP 72 mmHg  Hx Left thoracentesis 5/4/18  HFpEF-LVEF 60%  Hx CVA  Hx Right CEA  Hx Gastric Bipass  Hx LUE DVT   A fib-chronic  Chronic anticoagulation -Eliquis   CKD  GIB  Anemia  DM II  hx epistaxis requiring intubation, mechanical ventilation during previous hospital stay

## 2018-07-24 NOTE — PROGRESS NOTES
all commands. Flat affect       Intake/Output Summary (Last 24 hours) at 07/24/18 0854  Last data filed at 07/24/18 0400   Gross per 24 hour   Intake              660 ml   Output              900 ml   Net             -240 ml     Urinary output over the past 3 shifts:  No record, 400, 350 cc. Net output since admission: net negative -3480 cc. Weight:   Wt Readings from Last 3 Encounters:   07/24/18 266 lb 3.2 oz (120.7 kg)   05/08/18 265 lb 4 oz (120.3 kg)   02/23/18 267 lb (121.1 kg)     Current Inpatient Medications:   insulin glargine  50 Units Subcutaneous Nightly    oxybutynin  10 mg Oral Dinner    lisinopril  5 mg Oral Daily    polyethylene glycol  17 g Oral Daily    docusate sodium  200 mg Oral Nightly    magnesium citrate  296 mL Oral BID    metoprolol succinate  25 mg Oral BID    bumetanide  1 mg Intravenous BID    ferrous sulfate  325 mg Oral BID WC    miconazole   Topical BID    sodium chloride flush  10 mL Intravenous 2 times per day    gabapentin  300 mg Oral Nightly    levothyroxine  25 mcg Oral QAM    pantoprazole  40 mg Oral BID AC    simvastatin  40 mg Oral Nightly    insulin lispro  0-12 Units Subcutaneous TID WC       IV Infusions (if any):   dextrose         DIAGNOSTIC/ LABORATORY DATA:  Labs:   CBC:   Recent Labs      07/22/18   0415   07/23/18   0314  07/23/18   1137   WBC  4.9   --   5.7   --    HGB  7.2*   < >  8.2*  8.9*   HCT  26.2*   < >  28.9*  31.7*   PLT  293   --   290   --     < > = values in this interval not displayed.      BMP:   Recent Labs      07/22/18   0415  07/23/18   0314   NA  140  137   K  3.8  3.5   CO2  32*  35*   BUN  25*  20   CREATININE  1.4*  1.4*   LABGLOM  36  36   CALCIUM  8.8  8.8     Mag:   Recent Labs      07/23/18   0314   MG  1.7     TSH:   Recent Labs      07/23/18   0314   TSH  1.430     HgA1c:   Lab Results   Component Value Date    LABA1C 7.6 (H) 07/21/2018     PT/INR:   Recent Labs      07/22/18   1910   PROTIME  16.1*   INR  1.4 Assessment:  1. Acute on chronic HFpEF; with possible hypercapnic secondary to obesity hypoventilation syndrome. proBNP 3029. Net - 7741. On IV diuresis. 2. Anemia s/p transfusion. Hx of GIB  3. Nonischemic stress test 2/2018. 4. Chronic Afib: (previously on Eliquis 2.5 mg BID for LUE DVT)-held on admission. 5. Probable GOSIA (recommended CPAP in 5/2018): no follow-up   6. Chronic RBBB  7. CKD: at baseline (~1.4-1.6)    Plan:  1. Continue IV diuresis. 2. Echo as above. 3. Continue BB and low dose ACE-I.   5. At elevated risk for EGD/Colonoscopy more so from pulmonary standpoint than from cardiac standpoint  6. At elevated CVA risk with withholding OAC and would recommend resuming OAC (Eliquis 2.5 mg  BID) as soon possible. 7. Will follow. Gutierrez Gonzalez D.O.   Cardiologist  Cardiology, [address] [place]

## 2018-07-24 NOTE — PROGRESS NOTES
PROGRESS NOTE    Patient Presents with/Seen in Consultation For      *? GI bleeding  CHIEF COMPLAINT:  Shortness of breath    Subjective:     Patient seen sitting up in chair in no acute distress. Currently is denying abdominal pain. Reports she moved her bowels this am--nursing reports her stool was liquidy brown without melena or hematochezia. .  Tolerating diet without nausea or emesis. States she is breathing a little better. \"they got some fluid off of me\". Review of Systems  Aside from what was mentioned in the PMH and HPI, essentially unremarkable, all others negative. Objective:     Vitals: BP (!) 90/58   Pulse 86   Temp 97.7 °F (36.5 °C) (Oral)   Resp 18   Ht 5' 1\" (1.549 m)   Wt 266 lb 3.2 oz (120.7 kg)   LMP  (LMP Unknown)   SpO2 98%   BMI 50.30 kg/m²     General appearance: alert, awake, sitting up in chiar, and cooperative  Eyes: conjunctivae/corneas clear. PERRL. Lungs: clear to auscultation bilaterally but very diminished throughout; oxygen therapy in use at 2 L NC with SpO2 98%  Heart: regular rate and rhythm, no murmur, 2+ pulses;   With 1-2+ edema to BLE  Abdomen: large, soft, non-tender; bowel sounds normal; no masses,  no organomegaly  Extremities: extremities with 1-2+ edema  Pulses: 2+ and symmetric  Skin: Skin color, texture, turgor normal.   Neurologic: Grossly normal      insulin glargine (LANTUS) injection vial 50 Units Nightly   potassium chloride (KLOR-CON M) extended release tablet 40 mEq Daily with breakfast   oxybutynin (DITROPAN-XL) extended release tablet 10 mg Dinner   lisinopril (PRINIVIL;ZESTRIL) tablet 5 mg Daily   bisacodyl (DULCOLAX) suppository 10 mg Daily PRN   polyethylene glycol (GLYCOLAX) packet 17 g Daily   docusate sodium (COLACE) capsule 200 mg Nightly   perflutren lipid microspheres (DEFINITY) injection 1.65 mg ONCE PRN   metoprolol succinate (TOPROL XL) extended release tablet 25 mg BID   bumetanide (BUMEX) injection 1 mg BID   ferrous sulfate LDLCALC 34 2018    LDLCALC 52 2017     Lab Results   Component Value Date    LABVLDL 21 2018    LABVLDL 29 2017      PT/INR:    Lab Results   Component Value Date    PROTIME 16.1 2018    INR 1.4 2018     IRON:    Lab Results   Component Value Date    IRON 26 2018     FERRITIN:    Lab Results   Component Value Date    FERRITIN 30 2018     Radiology results:  Xr Chest Portable    Result Date: 2018  Patient MRN:  [id number] : 1935 Age: 80 years Gender: Female Order Date:  2018 12:45 PM EXAM: XR CHEST PORTABLE NUMBER OF VIEWS:  1 INDICATION:  Shortness of breath Shortness of breath COMPARISON: 2018 FINDINGS: The heart is mildly enlarged in size. The mediastinum is normal in width. Mild pulmonary vascular congestion. . No focal airspace opacity. There is no pleural effusion. There is no pneumothorax. No airspace opacities or pleural effusion. Mild cardiac megaly with mild vascular congestion     ECHO results from 18:  Conclusions      Summary   Ejection fraction is visually estimated at 60%.   No regional wall motion abnormalities seen.   Normal right ventricle size with reduced function. TAPSE is 1.5 cm.   Mild aortic stenosis is present.   Moderate mitral valve stenosis. Mean transmitral gradient 9 mmHg.   Moderate mitral regurgitation is present.   Mild and tricuspid regurgitation.   Pulmonary hypertension is severe. RVSP is 72 mmHg. Assessment:     ? Anemia, microcytic, hypochromic  ? Dysphagia  ? Constipation  ? Shortness of breath/VILLANUEVA  ? CHF - defer to PCP/Cardiology  ? Chronic Atrial Fibrillation - on Eliquis  ? Large hiatal hernia.  GERD.  Mild gastritis. Normal duodenum.  No active bleeding seen (14)  ? Hx PUD  ? Hx Breast CA  ? Acute on chronic hypoxic respiratory failure with hypercapnia  ?  Severe pulmonary HTN (RSVP 72 per Echo of 18)    Plan:   · H/H remains stable--Patient high risk for endoscopic procedures; since remains stable at this time, no planned scopes unless emergent situation arises  · Swallow study- patient refusing, signed waiver   ? Monitor INR, CBC  ? Hemoccult stool as ordered, document in progress notes--still pending  ? Defer co-morbidities to others  ? Continue to monitor      We will follow closely with you.     Discussed with Dr. Sudarshan Moura developed by Dr. Narinder CORRALES-CNP 7/24/18 3:50 PM for Dr. Douglas Abarca

## 2018-07-24 NOTE — PROGRESS NOTES
Physical Therapy  Facility/Department: [address] INTERMEDIATE 1  Daily Treatment Note  NAME: David Lindquist  : 1935  MRN: [id number]    Date of Service: 2018    Patient Diagnosis(es): The primary encounter diagnosis was Acute respiratory failure with hypoxia and hypercapnia ([facility] [address] Diagnoses of Hypervolemia, unspecified hypervolemia type, Dyspnea and respiratory abnormalities, and Elevated brain natriuretic peptide (BNP) level were also pertinent to this visit. has a past medical history of Anemia due to acute blood loss; Arthritis; Blood transfusion reaction; Chronic atrial fibrillation (HCC); CKD (chronic kidney disease) stage 3, GFR 30-59 ml/min; Diabetes mellitus ([facility] [address] History of blood transfusion; History of breast cancer; History of stroke; Hyperlipidemia; Hypertension; Hypothyroidism; and Volume overload.   has a past surgical history that includes Breast surgery; Cholecystectomy; Gastric bypass surgery; other surgical history (2017); Nasal sinus surgery; pr ctrl nosebleed,anter,complex (N/A, 2018); and bronchoscopy (2018). Evaluating Therapist: Melissa Arellano PT     Room #:447  DIAGNOSIS: CHF  PRECAUTIONS: falls, alarm, O2     Social:  Pt lives with daughter in a split level home. 6 steps to enter 6 steps to bedroom. States she stay on the level where her bedroom is and does not do the stair. Prior to admission ambulates with ww       Initial Evaluation  Date:  Treatment      Short Term/ Long Term   Goals   Was pt agreeable to Eval/treatment? Yes with encouragement yes      Does pt have pain?  No c/o pain  no c/o pain     Bed Mobility  Rolling: NT  Supine to sit: NT  Sit to supine: NT  Scooting: NT  Rolling: NT  Supine to sit: SBA  Sit to supine: NT  Scooting: SBA independent   Transfers Sit to stand: CGA  Stand to sit: CGA  Stand pivot: CGA  Sit to stand: SBA  Stand to sit: SBA  Stand pivot: SBA independent   Ambulation    25 feet with ww with CGA  30 feet with w/w

## 2018-07-25 LAB
ALBUMIN SERPL-MCNC: 3.3 G/DL (ref 3.5–5.2)
ALP BLD-CCNC: 74 U/L (ref 35–104)
ALT SERPL-CCNC: 10 U/L (ref 0–32)
ANION GAP SERPL CALCULATED.3IONS-SCNC: 8 MMOL/L (ref 7–16)
ANISOCYTOSIS: ABNORMAL
AST SERPL-CCNC: 16 U/L (ref 0–31)
BASOPHILIC STIPPLING: ABNORMAL
BASOPHILS ABSOLUTE: 0.02 E9/L (ref 0–0.2)
BASOPHILS RELATIVE PERCENT: 0.5 % (ref 0–2)
BILIRUB SERPL-MCNC: 0.5 MG/DL (ref 0–1.2)
BUN BLDV-MCNC: 22 MG/DL (ref 8–23)
CALCIUM SERPL-MCNC: 9 MG/DL (ref 8.6–10.2)
CHLORIDE BLD-SCNC: 96 MMOL/L (ref 98–107)
CO2: 39 MMOL/L (ref 22–29)
CREAT SERPL-MCNC: 2 MG/DL (ref 0.5–1)
EOSINOPHILS ABSOLUTE: 0.22 E9/L (ref 0.05–0.5)
EOSINOPHILS RELATIVE PERCENT: 5 % (ref 0–6)
GFR AFRICAN AMERICAN: 29
GFR NON-AFRICAN AMERICAN: 24 ML/MIN/1.73
GLUCOSE BLD-MCNC: 158 MG/DL (ref 74–109)
HCT VFR BLD CALC: 30.2 % (ref 34–48)
HEMOGLOBIN: 8.5 G/DL (ref 11.5–15.5)
HYPOCHROMIA: ABNORMAL
IMMATURE GRANULOCYTES #: 0.02 E9/L
IMMATURE GRANULOCYTES %: 0.5 % (ref 0–5)
LYMPHOCYTES ABSOLUTE: 0.78 E9/L (ref 1.5–4)
LYMPHOCYTES RELATIVE PERCENT: 17.7 % (ref 20–42)
MCH RBC QN AUTO: 22 PG (ref 26–35)
MCHC RBC AUTO-ENTMCNC: 28.1 % (ref 32–34.5)
MCV RBC AUTO: 78 FL (ref 80–99.9)
METER GLUCOSE: 149 MG/DL (ref 70–110)
METER GLUCOSE: 213 MG/DL (ref 70–110)
METER GLUCOSE: 260 MG/DL (ref 70–110)
METER GLUCOSE: 288 MG/DL (ref 70–110)
MONOCYTES ABSOLUTE: 0.54 E9/L (ref 0.1–0.95)
MONOCYTES RELATIVE PERCENT: 12.3 % (ref 2–12)
NEUTROPHILS ABSOLUTE: 2.82 E9/L (ref 1.8–7.3)
NEUTROPHILS RELATIVE PERCENT: 64 % (ref 43–80)
OVALOCYTES: ABNORMAL
PDW BLD-RTO: 19.9 FL (ref 11.5–15)
PLATELET # BLD: 282 E9/L (ref 130–450)
PMV BLD AUTO: 9.7 FL (ref 7–12)
POIKILOCYTES: ABNORMAL
POLYCHROMASIA: ABNORMAL
POTASSIUM REFLEX MAGNESIUM: 4 MMOL/L (ref 3.5–5)
RBC # BLD: 3.87 E12/L (ref 3.5–5.5)
SODIUM BLD-SCNC: 143 MMOL/L (ref 132–146)
TOTAL PROTEIN: 6 G/DL (ref 6.4–8.3)
WBC # BLD: 4.4 E9/L (ref 4.5–11.5)

## 2018-07-25 PROCEDURE — 94660 CPAP INITIATION&MGMT: CPT

## 2018-07-25 PROCEDURE — 2700000000 HC OXYGEN THERAPY PER DAY

## 2018-07-25 PROCEDURE — 97110 THERAPEUTIC EXERCISES: CPT

## 2018-07-25 PROCEDURE — 99232 SBSQ HOSP IP/OBS MODERATE 35: CPT | Performed by: INTERNAL MEDICINE

## 2018-07-25 PROCEDURE — 36415 COLL VENOUS BLD VENIPUNCTURE: CPT

## 2018-07-25 PROCEDURE — 80053 COMPREHEN METABOLIC PANEL: CPT

## 2018-07-25 PROCEDURE — 82962 GLUCOSE BLOOD TEST: CPT

## 2018-07-25 PROCEDURE — 6370000000 HC RX 637 (ALT 250 FOR IP): Performed by: NURSE PRACTITIONER

## 2018-07-25 PROCEDURE — 85025 COMPLETE CBC W/AUTO DIFF WBC: CPT

## 2018-07-25 PROCEDURE — 6370000000 HC RX 637 (ALT 250 FOR IP): Performed by: CLINICAL NURSE SPECIALIST

## 2018-07-25 PROCEDURE — 6370000000 HC RX 637 (ALT 250 FOR IP): Performed by: INTERNAL MEDICINE

## 2018-07-25 PROCEDURE — 2580000003 HC RX 258: Performed by: INTERNAL MEDICINE

## 2018-07-25 PROCEDURE — 2060000000 HC ICU INTERMEDIATE R&B

## 2018-07-25 RX ORDER — ZOLPIDEM TARTRATE 5 MG/1
10 TABLET ORAL NIGHTLY PRN
Status: DISCONTINUED | OUTPATIENT
Start: 2018-07-25 | End: 2018-07-26 | Stop reason: HOSPADM

## 2018-07-25 RX ADMIN — LEVOTHYROXINE SODIUM 25 MCG: 25 TABLET ORAL at 06:00

## 2018-07-25 RX ADMIN — INSULIN LISPRO 2 UNITS: 100 INJECTION, SOLUTION INTRAVENOUS; SUBCUTANEOUS at 08:09

## 2018-07-25 RX ADMIN — MICONAZOLE NITRATE: 20 POWDER TOPICAL at 21:27

## 2018-07-25 RX ADMIN — PANTOPRAZOLE SODIUM 40 MG: 40 TABLET, DELAYED RELEASE ORAL at 16:09

## 2018-07-25 RX ADMIN — SIMVASTATIN 40 MG: 40 TABLET, FILM COATED ORAL at 21:26

## 2018-07-25 RX ADMIN — INSULIN GLARGINE 50 UNITS: 100 INJECTION, SOLUTION SUBCUTANEOUS at 21:27

## 2018-07-25 RX ADMIN — POLYETHYLENE GLYCOL 3350 17 G: 17 POWDER, FOR SOLUTION ORAL at 08:06

## 2018-07-25 RX ADMIN — ZOLPIDEM TARTRATE 10 MG: 5 TABLET ORAL at 23:04

## 2018-07-25 RX ADMIN — INSULIN LISPRO 4 UNITS: 100 INJECTION, SOLUTION INTRAVENOUS; SUBCUTANEOUS at 12:17

## 2018-07-25 RX ADMIN — METOPROLOL SUCCINATE 25 MG: 25 TABLET, FILM COATED, EXTENDED RELEASE ORAL at 21:26

## 2018-07-25 RX ADMIN — PANTOPRAZOLE SODIUM 40 MG: 40 TABLET, DELAYED RELEASE ORAL at 05:59

## 2018-07-25 RX ADMIN — METOPROLOL SUCCINATE 25 MG: 25 TABLET, FILM COATED, EXTENDED RELEASE ORAL at 08:04

## 2018-07-25 RX ADMIN — POTASSIUM CHLORIDE 40 MEQ: 1500 TABLET, EXTENDED RELEASE ORAL at 08:04

## 2018-07-25 RX ADMIN — INSULIN LISPRO 6 UNITS: 100 INJECTION, SOLUTION INTRAVENOUS; SUBCUTANEOUS at 16:10

## 2018-07-25 RX ADMIN — Medication 10 ML: at 21:26

## 2018-07-25 RX ADMIN — GABAPENTIN 300 MG: 300 CAPSULE ORAL at 21:26

## 2018-07-25 RX ADMIN — Medication 10 ML: at 08:06

## 2018-07-25 RX ADMIN — APIXABAN 2.5 MG: 2.5 TABLET, FILM COATED ORAL at 08:05

## 2018-07-25 RX ADMIN — MICONAZOLE NITRATE: 20 POWDER TOPICAL at 08:06

## 2018-07-25 RX ADMIN — FERROUS SULFATE TAB 325 MG (65 MG ELEMENTAL FE) 325 MG: 325 (65 FE) TAB at 16:09

## 2018-07-25 RX ADMIN — OXYBUTYNIN CHLORIDE 10 MG: 10 TABLET, FILM COATED, EXTENDED RELEASE ORAL at 16:09

## 2018-07-25 RX ADMIN — DOCUSATE SODIUM 200 MG: 100 CAPSULE, LIQUID FILLED ORAL at 21:26

## 2018-07-25 RX ADMIN — FERROUS SULFATE TAB 325 MG (65 MG ELEMENTAL FE) 325 MG: 325 (65 FE) TAB at 08:04

## 2018-07-25 RX ADMIN — APIXABAN 2.5 MG: 2.5 TABLET, FILM COATED ORAL at 21:26

## 2018-07-25 ASSESSMENT — PAIN SCALES - GENERAL
PAINLEVEL_OUTOF10: 0

## 2018-07-25 NOTE — PROGRESS NOTES
PROGRESS NOTE    Patient Presents with/Seen in Consultation For      *? GI bleeding  CHIEF COMPLAINT:  Shortness of breath    Subjective:     Patient states she feels \"better, not hard to breathe or anything. \"  Pt denies abd pain, n/v, or diarrhea. States she had a BM yesterday, \"I thought it looked just brown. \" Pt states she is eating well. Review of Systems  Aside from what was mentioned in the PMH and HPI, essentially unremarkable, all others negative. Objective:     Patient Vitals for the past 8 hrs:   BP Temp Temp src Pulse Resp SpO2   07/25/18 0745 (!) 104/49 97.9 °F (36.6 °C) Oral 91 18 95 %       General appearance: alert, awake, sitting up in chair in no apparent distress, and cooperative  Eyes: conjunctiva pale, sclera anicteric. PERRL.   Lungs: diminished throughout, O2 2 L NC  Heart: regular rate and rhythm, no murmur, 2+ pulses; 1-2+ edema to BLE  Abdomen: large, soft, non-tender; bowel sounds normal; no masses,  no organomegaly  Extremities: extremities with 1-2+ BLE edema  Pulses: 2+ and symmetric  Skin: Skin color pale, texture, turgor normal.   Neurologic: Grossly normal      insulin glargine (LANTUS) injection vial 50 Units Nightly   potassium chloride (KLOR-CON M) extended release tablet 40 mEq Daily with breakfast   apixaban (ELIQUIS) tablet 2.5 mg BID   oxybutynin (DITROPAN-XL) extended release tablet 10 mg Dinner   bisacodyl (DULCOLAX) suppository 10 mg Daily PRN   polyethylene glycol (GLYCOLAX) packet 17 g Daily   docusate sodium (COLACE) capsule 200 mg Nightly   perflutren lipid microspheres (DEFINITY) injection 1.65 mg ONCE PRN   metoprolol succinate (TOPROL XL) extended release tablet 25 mg BID   ferrous sulfate tablet 325 mg BID WC   miconazole (MICOTIN) 2 % powder BID   sodium chloride flush 0.9 % injection 10 mL 2 times per day   sodium chloride flush 0.9 % injection 10 mL PRN   magnesium hydroxide (MILK OF MAGNESIA) 400 MG/5ML suspension 30 mL Daily PRN   ondansetron (ZOFRAN) injection 4 mg Q6H PRN   acetaminophen (TYLENOL) tablet 650 mg Q4H PRN   gabapentin (NEURONTIN) capsule 300 mg Nightly   hydrOXYzine (ATARAX) tablet 10 mg TID PRN   levothyroxine (SYNTHROID) tablet 25 mcg QAM   pantoprazole (PROTONIX) tablet 40 mg BID AC   simvastatin (ZOCOR) tablet 40 mg Nightly   insulin lispro (HUMALOG) injection vial 0-12 Units TID WC   glucose (GLUTOSE) 40 % oral gel 15 g PRN   dextrose 50 % solution 12.5 g PRN   glucagon (rDNA) injection 1 mg PRN   dextrose 5 % solution PRN   zolpidem (AMBIEN) tablet 5 mg Nightly PRN        Data Review  CBC: Lab Results   Component Value Date    WBC 4.4 07/25/2018    RBC 3.87 07/25/2018    HGB 8.5 07/25/2018    HCT 30.2 07/25/2018    MCV 78.0 07/25/2018    MCH 22.0 07/25/2018    MCHC 28.1 07/25/2018    RDW 19.9 07/25/2018     07/25/2018    MPV 9.7 07/25/2018     CMP:  Lab Results   Component Value Date     07/25/2018    K 4.0 07/25/2018    CL 96 07/25/2018    CO2 39 07/25/2018    BUN 22 07/25/2018    CREATININE 2.0 07/25/2018    GFRAA 29 07/25/2018    LABGLOM 24 07/25/2018    GLUCOSE 158 07/25/2018    PROT 6.0 07/25/2018    LABALBU 3.3 07/25/2018    CALCIUM 9.0 07/25/2018    BILITOT 0.5 07/25/2018    ALKPHOS 74 07/25/2018    AST 16 07/25/2018    ALT 10 07/25/2018     Hepatic Function Panel:  Lab Results   Component Value Date    ALKPHOS 74 07/25/2018    ALT 10 07/25/2018    AST 16 07/25/2018    PROT 6.0 07/25/2018    BILITOT 0.5 07/25/2018    LABALBU 3.3 07/25/2018     No components found for: CHLPL  Lab Results   Component Value Date    TRIG 103 07/23/2018    TRIG 146 12/27/2017     Lab Results   Component Value Date    HDL 39 07/23/2018    HDL 44 12/27/2017     Lab Results   Component Value Date    LDLCALC 34 07/23/2018    LDLCALC 52 12/27/2017     Lab Results   Component Value Date    LABVLDL 21 07/23/2018    LABVLDL 29 12/27/2017      PT/INR:    Lab Results   Component Value Date    PROTIME 16.1 07/22/2018    INR 1.4 07/22/2018     IRON:

## 2018-07-25 NOTE — PLAN OF CARE
Problem: Gas Exchange - Impaired:  Goal: Able to breathe comfortably  Able to breathe comfortably     Outcome: Met This Shift      Problem: Fluid Volume - Excess  Goal: Absence of fluid overload signs and symptoms  Outcome: Met This Shift      Problem: Falls - Risk of:  Goal: Will remain free from falls  Will remain free from falls   Outcome: Met This Shift    Goal: Absence of physical injury  Absence of physical injury   Outcome: Met This Shift      Problem: Risk for Impaired Skin Integrity  Goal: Tissue integrity - skin and mucous membranes  Structural intactness and normal physiological function of skin and  mucous membranes.    Outcome: Met This Shift

## 2018-07-25 NOTE — PROGRESS NOTES
Date: 7/24/2018    Time: 9:54 PM    Patient Placed On BIPAP/CPAP/ Non-Invasive Ventilation? No    If no must comment. Facial area red/color change? No           If YES are Blister/Lesion present? No   If yes must notify nursing staff  BIPAP/CPAP skin barrier? No    Skin barrier type:NA       Comments:  Patient has worn a bipap in the past and has difficulty tolerating. Patient is refusing tonight, because she is only here one more night, and does not wear one at home.        Jostin Jimenez

## 2018-07-25 NOTE — PROGRESS NOTES
Hospitalist Progress Note    Admission date: 7/20/2018  Primary care physician: Jacqueline Hutchinson MD  Reason for visit: follow-up for CHF/Anemia    Subjective  Jack Her was seen and examined at bedside today. No family present during my examination. Jack Her states that she is feeling relatively well. We discussed her kidney dysfunction this morning. She is upset that she will not be going to the nursing home today. Her goal is to go to the nursing home initially as a skilled patient and then as a long term resident. Review of Systems  There are no new complaints of chest pain, abdominal pain, nausea, vomiting, diarrhea, constipation. Hospital Medications  Current Facility-Administered Medications   Medication Dose Route Frequency Provider Last Rate Last Dose    insulin glargine (LANTUS) injection vial 50 Units  50 Units Subcutaneous Nightly Jd De La Rosa, DO   50 Units at 07/24/18 2137    potassium chloride (KLOR-CON M) extended release tablet 40 mEq  40 mEq Oral Daily with breakfast Jd De La Rosa DO   40 mEq at 07/24/18 1222    apixaban (ELIQUIS) tablet 2.5 mg  2.5 mg Oral BID Jd De La Rosa, DO   2.5 mg at 07/24/18 2134    oxybutynin (DITROPAN-XL) extended release tablet 10 mg  10 mg Oral Dinner Jd De La Rosa DO   10 mg at 07/24/18 1618    bisacodyl (DULCOLAX) suppository 10 mg  10 mg Rectal Daily PRN Jd De La Rosa, DO        polyethylene glycol (GLYCOLAX) packet 17 g  17 g Oral Daily Jd De La Rosa, DO   17 g at 07/23/18 8423    docusate sodium (COLACE) capsule 200 mg  200 mg Oral Nightly Angie Eglin, APRN - CNS   200 mg at 07/24/18 2134    perflutren lipid microspheres (DEFINITY) injection 1.65 mg  1.5 mL Intravenous ONCE PRN Harish Lush. Ginder, APN        metoprolol succinate (TOPROL XL) extended release tablet 25 mg  25 mg Oral BID Harish Lush.  Ginder, APN   25 mg at 07/24/18 2134    ferrous sulfate tablet 325 mg  325 mg Oral BID WC Jd De La Rosa, DO   325 mg at 07/24/18 1614    72 hours. Anaerobic cx  No results for input(s): LABANAE in the last 72 hours. CULTURE SURGICAL  No results for input(s): CXSURG in the last 72 hours. URINE CULTURE  No results for input(s): LABURIN in the last 72 hours. No results for input(s): ORG in the last 72 hours. MISC. SENDOUT  No results for input(s): MREF in the last 72 hours. STREP PNEUMONIA AG URINE  No results for input(s): STREPNEUMAGU in the last 72 hours. LEGIONELLA AG URINE  No results for input(s): LEGUR in the last 72 hours. No results for input(s): [address] in the last 72 hours. Last echocardiogram:12/30/17   Left ventricular size is grossly normal.   Mild left ventricular concentric hypertrophy noted.   Ejection fraction is visually estimated at 60%. Assessment   Patient Active Problem List    Diagnosis Date Noted    Acute on chronic diastolic CHF (congestive heart failure) ([facility] 07/20/2018     Priority: High    Hypoxia 07/20/2018     Priority: Medium    Chronic anemia 04/26/2018     Priority: Medium    Chronic atrial fibrillation ([facility] 12/29/2017     Priority: Medium     Class: Chronic     CHADS-VASC = 5  REFUSES ANTICOAGULATION      CKD (chronic kidney disease) stage 3, GFR 30-59 ml/min 12/29/2017     Priority: Medium     Class: Chronic    DM2 (diabetes mellitus, type 2) ([facility] 11/06/2016     Priority: Medium    Acute respiratory failure with hypoxia and hypercapnia (HCC)     Hypothyroidism     Hyperlipidemia     History of stroke     History of breast cancer      breast cancer Right      History of echocardiogram 02/13/2018     A. Echo 12/30/2017 (Naheed Abernathy):  Mild aortic stenosis, normal EF      Hypertension          Plan  · CHF/hypoxia: Telemetry. Previous echo noted. Follow I&O's (- 5.3 L this admission so far). Hold IV diuresis/ACEi-- due to DALILA unless cardiology feels ok to restart. Continue beta blocker. Gordon Genao. Cardio on case.   · Anemia, Fe def-- possible GI blood loss: noted Fe/Ferritin/TIBC/vitamin B12/folate. PO Fe-- consider IV Fe. Follow H&H. 1 unit pRBC 7/22-- more pRBC if Hb < 7.0. Pending stool for blood. Hold anticoagulation for now. GI consulted-- for scopes--- cardio/pulm input for periop risk assessment appreciatedextremely high risk. · Chronic atrial fibrillation: Restarted anticoagulation-- high risk of bleeding noted but also stroke risk noted without. Continue rate control medications. · IDDM w/ am hypoglycemia: Decreased basal insulin again. SSI to only with meals. HbA1c 7.6%. · Constipation: Continue Colace/MiraLAX. Dulcolax suppository as needed. · Dysphagia: Refusing swallow study. · Hypokalemia: Replace and monitor. · PT/OT 17 out of 24   · Follow labs  · DVT prophylaxis. · Please see orders for further management and care.   ·  for discharge planning  · Discharge plan: Dc to SNF when medically stable-- not today due to DALILA    Electronically signed by Shivani Ryan DO on 7/25/2018 at 8:01 [address] (always forwarded to covering physician): [phone]

## 2018-07-25 NOTE — PROGRESS NOTES
Lizbeth Curiel M.D., Una Gee D.O., F.A.C.O.I., Stella Smith M.D.,   Wing Callaway M.D., Brenda Smith M.D. Daily Pulmonary Progress Note    Patient being followed for Risk stratification prior to endoscopy, evaluate regarding hypoxemic-hypercapnic respiratory failure. Subjective:  Azeb Cat is a 80 y.o.  female  in no apparent distress. Tolerating current oxygen therapy 2 L NC. No complaints of pain at this time. Patient feeling better today. Less dyspnea, cough, no sputum. Negative for chest tightness,  Hemoptysis, stridor, or wheezing. Refused bipap last HS. Discussed importance of sleep study as outpatient. ROS:  Denies fever, night sweats  Denies hemoptysis  Denies chest pain, edema, palpitations  Denies nausea     Objective:  Vitals: BP (!) 104/49   Pulse 91   Temp 97.9 °F (36.6 °C) (Oral)   Resp 18   Ht 5' 1\" (1.549 m)   Wt 260 lb (117.9 kg)   LMP  (LMP Unknown)   SpO2 95%   BMI 49.13 kg/m²      I/O last 3 completed shifts: In: 36 [P.O.:720; I.V.:10]  Out: 2350 [Urine:2350]    Daily Lab Values:   CBC:   Lab Results   Component Value Date    WBC 4.4 07/25/2018    RBC 3.87 07/25/2018    HGB 8.5 07/25/2018    HCT 30.2 07/25/2018    MCV 78.0 07/25/2018    MCH 22.0 07/25/2018    MCHC 28.1 07/25/2018    RDW 19.9 07/25/2018     07/25/2018    MPV 9.7 07/25/2018     BMP:    Lab Results   Component Value Date     07/25/2018    K 4.0 07/25/2018    CL 96 07/25/2018    CO2 39 07/25/2018    BUN 22 07/25/2018    LABALBU 3.3 07/25/2018    CREATININE 2.0 07/25/2018    CALCIUM 9.0 07/25/2018    GFRAA 29 07/25/2018    LABGLOM 24 07/25/2018     PT/INR:    Lab Results   Component Value Date    PROTIME 16.1 07/22/2018    INR 1.4 07/22/2018   Results for Willis Hardwick (MRN [id number]) as of 7/24/2018 10:23   Ref.  Range 7/20/2018 13:25   Pro-BNP Latest Ref Range: 0 - 450 pg/mL 3,029 (H)        Summary   Ejection fraction is visually estimated at 60%.   No regional wall motion abnormalities seen.   Normal right ventricle size with reduced function. TAPSE is 1.5 cm.   Mild aortic stenosis is present.   Moderate mitral valve stenosis. Mean transmitral gradient 9 mmHg.   Moderate mitral regurgitation is present.   Mild and tricuspid regurgitation.   Pulmonary hypertension is severe. RVSP is 72 mmHg. SpO2 Readings from Last 1 Encounters:   07/25/18 95%        7/20/18 CXR    COMPARISON: 5/4/2018       FINDINGS:   The heart is mildly enlarged in size. The mediastinum is normal in   width. Mild pulmonary vascular congestion. . No focal airspace opacity. There is no pleural effusion. There is no pneumothorax.             Impression   No airspace opacities or pleural effusion. Mild cardiac megaly with   mild vascular congestion         4/27/18 CT chest    FINDINGS:       There are moderate-sized bilateral pleural effusions with adjacent   irregular areas of attenuation. There is mild pulmonary vascular   congestion. No evidence of pneumothorax. Mild cardiomegaly. No   pericardial effusion. No mediastinal or hilar lymphadenopathy. View of   upper abdomen shows normal bilateral adrenal glands.           Impression       1. Moderate-sized bilateral pleural effusions with adjacent   atelectasis or pneumonia.       2. Cardiomegaly with mild pulmonary vascular congestion. Chest Radiographs have all been reviewed   Medications and labs have all been reviewed.   Current Facility-Administered Medications   Medication Dose Route Frequency Provider Last Rate Last Dose    zolpidem (AMBIEN) tablet 10 mg  10 mg Oral Nightly PRN Jd DeL a Rosa DO        insulin glargine (LANTUS) injection vial 50 Units  50 Units Subcutaneous Nightly Jd De La Rosa DO   50 Units at 07/24/18 2137    potassium chloride (KLOR-CON M) extended release tablet 40 mEq  40 mEq Oral Daily with breakfast Jd De La Rosa DO   40 mEq at 07/25/18 0804    apixaban (ELIQUIS) tablet 2.5 mg  2.5 mg Oral BID Jd Johnsonino, DO   2.5 mg at 07/25/18 0805    oxybutynin (DITROPAN-XL) extended release tablet 10 mg  10 mg Oral Dinner Jd De La Rosa, DO   10 mg at 07/24/18 1618    bisacodyl (DULCOLAX) suppository 10 mg  10 mg Rectal Daily PRN Jd De La Rosa, DO        polyethylene glycol (GLYCOLAX) packet 17 g  17 g Oral Daily Jd De La Rosa, DO   17 g at 07/25/18 8350    docusate sodium (COLACE) capsule 200 mg  200 mg Oral Nightly Chick Birch, APRN - CNS   200 mg at 07/24/18 2134    perflutren lipid microspheres (DEFINITY) injection 1.65 mg  1.5 mL Intravenous ONCE PRN Mckayla Constable. Ginder APCARLOS        metoprolol succinate (TOPROL XL) extended release tablet 25 mg  25 mg Oral BID Mckayla Constable.  Ginder, APN   25 mg at 07/25/18 0804    ferrous sulfate tablet 325 mg  325 mg Oral BID WC Jd De La Rosa, DO   325 mg at 07/25/18 0804    miconazole (MICOTIN) 2 % powder   Topical BID Jd De La Rosa, DO        sodium chloride flush 0.9 % injection 10 mL  10 mL Intravenous 2 times per day Jd De La Rosa, DO   10 mL at 07/25/18 0806    sodium chloride flush 0.9 % injection 10 mL  10 mL Intravenous PRN Jd De La Rosa, DO   10 mL at 07/24/18 0703    magnesium hydroxide (MILK OF MAGNESIA) 400 MG/5ML suspension 30 mL  30 mL Oral Daily PRN Jd De La Rosa, DO        ondansetron (ZOFRAN) injection 4 mg  4 mg Intravenous Q6H PRN Jd De La Rosa, DO        acetaminophen (TYLENOL) tablet 650 mg  650 mg Oral Q4H PRN Jd De La Rosa, DO        gabapentin (NEURONTIN) capsule 300 mg  300 mg Oral Nightly Jd Johnsonino, DO   300 mg at 07/24/18 2134    hydrOXYzine (ATARAX) tablet 10 mg  10 mg Oral TID PRN Jd De La Rosa, DO        levothyroxine (SYNTHROID) tablet 25 mcg  25 mcg Oral QAM Jd De La Rosa, DO   25 mcg at 07/25/18 0600    pantoprazole (PROTONIX) tablet 40 mg  40 mg Oral BID AC Jd De La Rosa DO   40 mg at 07/25/18 0559    simvastatin (ZOCOR) tablet 40 mg  40 mg Oral Nightly Jd De La Rosa DO   40 mg at 07/24/18 7554   

## 2018-07-25 NOTE — PROGRESS NOTES
Inpatient Cardiology Progress note     PATIENT IS BEING FOLLOWED FOR: Cardiac risk stratification for EGD/Colonoscopy     Nolan Hernández is a 80 y.o. female who is known to Dr. Christian Ruizant: Denies CP and SOB. OBJECTIVE: No apparent distress. ROS:  Consist: Denies fevers, chills or night sweats  Heart: Denies chest pain, palpitations, lightheadedness, dizziness or syncope  Lungs: Denies SOB, cough, wheezing, orthopnea or PND  GI: Denies abdominal pain, vomiting or diarrhea    PHYSICAL EXAM:   BP (!) 104/49   Pulse 91   Temp 97.9 °F (36.6 °C) (Oral)   Resp 18   Ht 5' 1\" (1.549 m)   Wt 260 lb (117.9 kg)   LMP  (LMP Unknown)   SpO2 95%   BMI 49.13 kg/m²    B/P Range last 24 hours: Systolic (01QHT), CGH:894 , Min:90 , BKN:776    Diastolic (28DVI), GPK:61, Min:0, Max:78    CONST:  Well developed, ill appearing morbidly obese  female who appears of stated age. Awake, alert and cooperative. Appears tachypneic during conversation  HEENT:   Head- Normocephalic, atraumatic   Eyes- Conjunctivae pink, anicteric  Throat- Oral mucosa pink and moist  Neck-  Thick neck. No stridor, trachea midline, + JVD. No carotid bruit. CHEST: Chest symmetrical and non-tender to palpation. No accessory muscle use or intercostal retractions  RESPIRATORY: Lung sounds - diminished throughout no wheezing heard. Poor inspiratory effort. Tachypneic. On 2 liters NC.   CARDIOVASCULAR:     Heart Ausculation- IRRR. II/VI LOGAN. No rub heard. PV: 1-2+ pitting BLE. No varicosities. Pedal pulses palpable, no clubbing or cyanosis   ABDOMEN: Soft, obese, non-tender to light palpation. Bowel sounds present. No palpable masses; no abdominal bruit  MS: Good muscle strength and tone. No atrophy or abnormal movements. : Deferred  SKIN: Pale, arm and dry no statis dermatitis or ulcers   NEURO / PSYCH: Oriented to person, place and time. Speech clear and appropriate. Follows all commands.  Flat affect       Intake/Output Summary (Last 24 hours) at 07/25/18 1217  Last data filed at 07/25/18 0944   Gross per 24 hour   Intake              550 ml   Output             2650 ml   Net            -2100 ml     Urinary output over the past 3 shifts:  No record, 400, 350 cc. Net output since admission: net negative -3480 cc.        Weight:   Wt Readings from Last 3 Encounters:   07/25/18 260 lb (117.9 kg)   05/08/18 265 lb 4 oz (120.3 kg)   02/23/18 267 lb (121.1 kg)     Current Inpatient Medications:   insulin glargine  50 Units Subcutaneous Nightly    potassium chloride  40 mEq Oral Daily with breakfast    apixaban  2.5 mg Oral BID    oxybutynin  10 mg Oral Dinner    polyethylene glycol  17 g Oral Daily    docusate sodium  200 mg Oral Nightly    metoprolol succinate  25 mg Oral BID    ferrous sulfate  325 mg Oral BID WC    miconazole   Topical BID    sodium chloride flush  10 mL Intravenous 2 times per day    gabapentin  300 mg Oral Nightly    levothyroxine  25 mcg Oral QAM    pantoprazole  40 mg Oral BID AC    simvastatin  40 mg Oral Nightly    insulin lispro  0-12 Units Subcutaneous TID WC       IV Infusions (if any):   dextrose         DIAGNOSTIC/ LABORATORY DATA:  Labs:   CBC:   Recent Labs      07/24/18   0930  07/25/18   0320   WBC  5.0  4.4*   HGB  8.4*  8.5*   HCT  30.9*  30.2*   PLT  293  282     BMP:   Recent Labs      07/24/18   0930  07/25/18   0320   NA  142  143   K  3.3*  4.0   CO2  40*  39*   BUN  20  22   CREATININE  1.5*  2.0*   LABGLOM  33  24   CALCIUM  8.9  9.0     Mag:   Recent Labs      07/23/18   0314  07/24/18   0930   MG  1.7  2.3     TSH:   Recent Labs      07/23/18   0314   TSH  1.430     HgA1c:   Lab Results   Component Value Date    LABA1C 7.6 (H) 07/21/2018     PT/INR:   Recent Labs      07/22/18 1910   PROTIME  16.1*   INR  1.4     APTT:  Recent Labs      07/22/18 1910   APTT  33.3     CARDIAC ENZYMES:  No results for input(s): CKTOTAL, CKMB, CKMBINDEX, TROPONINI in the last 72

## 2018-07-26 VITALS
TEMPERATURE: 98.4 F | DIASTOLIC BLOOD PRESSURE: 65 MMHG | RESPIRATION RATE: 18 BRPM | OXYGEN SATURATION: 96 % | BODY MASS INDEX: 50.26 KG/M2 | WEIGHT: 266.2 LBS | HEIGHT: 61 IN | SYSTOLIC BLOOD PRESSURE: 122 MMHG | HEART RATE: 76 BPM

## 2018-07-26 LAB
ALBUMIN SERPL-MCNC: 3.7 G/DL (ref 3.5–5.2)
ALP BLD-CCNC: 78 U/L (ref 35–104)
ALT SERPL-CCNC: 13 U/L (ref 0–32)
ANION GAP SERPL CALCULATED.3IONS-SCNC: 8 MMOL/L (ref 7–16)
ANISOCYTOSIS: ABNORMAL
AST SERPL-CCNC: 16 U/L (ref 0–31)
BASOPHILS ABSOLUTE: 0.01 E9/L (ref 0–0.2)
BASOPHILS RELATIVE PERCENT: 0.2 % (ref 0–2)
BILIRUB SERPL-MCNC: 0.5 MG/DL (ref 0–1.2)
BUN BLDV-MCNC: 22 MG/DL (ref 8–23)
CALCIUM SERPL-MCNC: 9.6 MG/DL (ref 8.6–10.2)
CHLORIDE BLD-SCNC: 94 MMOL/L (ref 98–107)
CO2: 40 MMOL/L (ref 22–29)
CREAT SERPL-MCNC: 1.5 MG/DL (ref 0.5–1)
EOSINOPHILS ABSOLUTE: 0.25 E9/L (ref 0.05–0.5)
EOSINOPHILS RELATIVE PERCENT: 4.6 % (ref 0–6)
GFR AFRICAN AMERICAN: 40
GFR NON-AFRICAN AMERICAN: 33 ML/MIN/1.73
GLUCOSE BLD-MCNC: 114 MG/DL (ref 74–109)
HCT VFR BLD CALC: 33 % (ref 34–48)
HEMOGLOBIN: 9.1 G/DL (ref 11.5–15.5)
HYPOCHROMIA: ABNORMAL
IMMATURE GRANULOCYTES #: 0.02 E9/L
IMMATURE GRANULOCYTES %: 0.4 % (ref 0–5)
LYMPHOCYTES ABSOLUTE: 1.4 E9/L (ref 1.5–4)
LYMPHOCYTES RELATIVE PERCENT: 25.8 % (ref 20–42)
MCH RBC QN AUTO: 22.2 PG (ref 26–35)
MCHC RBC AUTO-ENTMCNC: 27.6 % (ref 32–34.5)
MCV RBC AUTO: 80.5 FL (ref 80–99.9)
METER GLUCOSE: 143 MG/DL (ref 70–110)
METER GLUCOSE: 172 MG/DL (ref 70–110)
MONOCYTES ABSOLUTE: 0.73 E9/L (ref 0.1–0.95)
MONOCYTES RELATIVE PERCENT: 13.5 % (ref 2–12)
NEUTROPHILS ABSOLUTE: 3.01 E9/L (ref 1.8–7.3)
NEUTROPHILS RELATIVE PERCENT: 55.5 % (ref 43–80)
OVALOCYTES: ABNORMAL
PDW BLD-RTO: 21.4 FL (ref 11.5–15)
PLATELET # BLD: 290 E9/L (ref 130–450)
PMV BLD AUTO: 9.5 FL (ref 7–12)
POIKILOCYTES: ABNORMAL
POLYCHROMASIA: ABNORMAL
POTASSIUM REFLEX MAGNESIUM: 4.1 MMOL/L (ref 3.5–5)
RBC # BLD: 4.1 E12/L (ref 3.5–5.5)
SODIUM BLD-SCNC: 142 MMOL/L (ref 132–146)
TOTAL PROTEIN: 6.6 G/DL (ref 6.4–8.3)
WBC # BLD: 5.4 E9/L (ref 4.5–11.5)

## 2018-07-26 PROCEDURE — 6370000000 HC RX 637 (ALT 250 FOR IP): Performed by: INTERNAL MEDICINE

## 2018-07-26 PROCEDURE — 2700000000 HC OXYGEN THERAPY PER DAY

## 2018-07-26 PROCEDURE — 36415 COLL VENOUS BLD VENIPUNCTURE: CPT

## 2018-07-26 PROCEDURE — 80053 COMPREHEN METABOLIC PANEL: CPT

## 2018-07-26 PROCEDURE — 85025 COMPLETE CBC W/AUTO DIFF WBC: CPT

## 2018-07-26 PROCEDURE — 2580000003 HC RX 258: Performed by: INTERNAL MEDICINE

## 2018-07-26 PROCEDURE — 99233 SBSQ HOSP IP/OBS HIGH 50: CPT | Performed by: INTERNAL MEDICINE

## 2018-07-26 PROCEDURE — 94660 CPAP INITIATION&MGMT: CPT

## 2018-07-26 PROCEDURE — 97530 THERAPEUTIC ACTIVITIES: CPT

## 2018-07-26 PROCEDURE — 6370000000 HC RX 637 (ALT 250 FOR IP): Performed by: NURSE PRACTITIONER

## 2018-07-26 PROCEDURE — 97535 SELF CARE MNGMENT TRAINING: CPT

## 2018-07-26 PROCEDURE — 82962 GLUCOSE BLOOD TEST: CPT

## 2018-07-26 RX ORDER — POLYETHYLENE GLYCOL 3350 17 G/17G
17 POWDER, FOR SOLUTION ORAL DAILY
Qty: 527 G | Refills: 1 | DISCHARGE
Start: 2018-07-26 | End: 2018-08-25

## 2018-07-26 RX ORDER — LISINOPRIL 5 MG/1
5 TABLET ORAL DAILY
Qty: 30 TABLET | Refills: 3 | Status: ON HOLD | DISCHARGE
Start: 2018-07-26 | End: 2020-01-01 | Stop reason: HOSPADM

## 2018-07-26 RX ORDER — METOPROLOL SUCCINATE 25 MG/1
25 TABLET, EXTENDED RELEASE ORAL 2 TIMES DAILY
Qty: 60 TABLET | Refills: 0 | Status: ON HOLD | DISCHARGE
Start: 2018-07-26 | End: 2020-01-01 | Stop reason: HOSPADM

## 2018-07-26 RX ORDER — POTASSIUM CHLORIDE 750 MG/1
10 TABLET, EXTENDED RELEASE ORAL
Qty: 60 TABLET | Refills: 3 | DISCHARGE
Start: 2018-07-27 | End: 2020-01-01

## 2018-07-26 RX ORDER — BISACODYL 10 MG
10 SUPPOSITORY, RECTAL RECTAL DAILY PRN
DISCHARGE
Start: 2018-07-26 | End: 2018-08-25

## 2018-07-26 RX ORDER — FERROUS SULFATE 325(65) MG
325 TABLET ORAL 2 TIMES DAILY WITH MEALS
Qty: 30 TABLET | Refills: 0 | DISCHARGE
Start: 2018-07-26

## 2018-07-26 RX ORDER — BUMETANIDE 1 MG/1
1 TABLET ORAL DAILY
Qty: 30 TABLET | Refills: 3 | Status: ON HOLD | DISCHARGE
Start: 2018-07-26 | End: 2020-01-01 | Stop reason: SDUPTHER

## 2018-07-26 RX ORDER — POTASSIUM CHLORIDE 750 MG/1
10 TABLET, EXTENDED RELEASE ORAL
Status: DISCONTINUED | OUTPATIENT
Start: 2018-07-27 | End: 2018-07-26 | Stop reason: HOSPADM

## 2018-07-26 RX ORDER — LISINOPRIL 5 MG/1
5 TABLET ORAL DAILY
Status: DISCONTINUED | OUTPATIENT
Start: 2018-07-26 | End: 2018-07-26 | Stop reason: HOSPADM

## 2018-07-26 RX ORDER — PSEUDOEPHEDRINE HCL 30 MG
200 TABLET ORAL NIGHTLY
DISCHARGE
Start: 2018-07-26

## 2018-07-26 RX ORDER — BUMETANIDE 1 MG/1
1 TABLET ORAL DAILY
Status: DISCONTINUED | OUTPATIENT
Start: 2018-07-26 | End: 2018-07-26 | Stop reason: HOSPADM

## 2018-07-26 RX ADMIN — INSULIN LISPRO 2 UNITS: 100 INJECTION, SOLUTION INTRAVENOUS; SUBCUTANEOUS at 11:51

## 2018-07-26 RX ADMIN — PANTOPRAZOLE SODIUM 40 MG: 40 TABLET, DELAYED RELEASE ORAL at 06:46

## 2018-07-26 RX ADMIN — POTASSIUM CHLORIDE 40 MEQ: 1500 TABLET, EXTENDED RELEASE ORAL at 08:58

## 2018-07-26 RX ADMIN — LEVOTHYROXINE SODIUM 25 MCG: 25 TABLET ORAL at 08:58

## 2018-07-26 RX ADMIN — LISINOPRIL 5 MG: 5 TABLET ORAL at 11:51

## 2018-07-26 RX ADMIN — MICONAZOLE NITRATE: 20 POWDER TOPICAL at 08:58

## 2018-07-26 RX ADMIN — FERROUS SULFATE TAB 325 MG (65 MG ELEMENTAL FE) 325 MG: 325 (65 FE) TAB at 08:58

## 2018-07-26 RX ADMIN — BUMETANIDE 1 MG: 1 TABLET ORAL at 11:51

## 2018-07-26 RX ADMIN — METOPROLOL SUCCINATE 25 MG: 25 TABLET, FILM COATED, EXTENDED RELEASE ORAL at 08:58

## 2018-07-26 RX ADMIN — Medication 10 ML: at 08:58

## 2018-07-26 RX ADMIN — APIXABAN 2.5 MG: 2.5 TABLET, FILM COATED ORAL at 09:05

## 2018-07-26 ASSESSMENT — PAIN SCALES - GENERAL: PAINLEVEL_OUTOF10: 0

## 2018-07-26 NOTE — PROGRESS NOTES
Spoke to Sleep lab and they will notify and schedule the outpatient sleep study with the nursing home once the patient is discharged to the facility. They will contact the facility, and they are aware of the order.

## 2018-07-26 NOTE — PLAN OF CARE
Problem: Gas Exchange - Impaired:  Goal: Able to breathe comfortably  Able to breathe comfortably     Outcome: Met This Shift      Problem: Fluid Volume - Excess  Goal: Absence of fluid overload signs and symptoms  Outcome: Met This Shift      Problem: Falls - Risk of:  Goal: Will remain free from falls  Will remain free from falls   Outcome: Met This Shift      Problem: Risk for Impaired Skin Integrity  Goal: Tissue integrity - skin and mucous membranes  Structural intactness and normal physiological function of skin and  mucous membranes.    Outcome: Met This Shift

## 2018-07-26 NOTE — PROGRESS NOTES
Nirmal García M.D., Freida Cook D.O., F.A.C.O.I., Fredirick Gottron, M.D.,   Geo Langley M.D., Linwood Mendoza M.D. Daily Pulmonary Progress Note    Patient being followed for Risk stratification prior to endoscopy, evaluate regarding hypoxemic-hypercapnic respiratory failure. Subjective:  Loyda Katz is a 80 y.o.  female  in no apparent distress. Tolerating current oxygen therapy 1 L NC. No complaints of pain at this time. Patient feeling better today. Denies cough, no sputum. Negative for chest tightness,  Hemoptysis, stridor, or wheezing. Refused bipap last HS. Discussed importance of sleep study as outpatient, facility will not send her out for PSG, but they will set it up for her on discharge. ROS:  Denies fever, night sweats  Denies hemoptysis  Denies chest pain, edema, palpitations  Denies nausea     Objective:  Vitals: /65   Pulse 76   Temp 98.4 °F (36.9 °C) (Oral)   Resp 18   Ht 5' 1\" (1.549 m)   Wt 266 lb 3.2 oz (120.7 kg)   LMP  (LMP Unknown)   SpO2 96%   BMI 50.30 kg/m²      I/O last 3 completed shifts: In: 480 [P.O.:480]  Out: 300 [Urine:300]    Daily Lab Values:   CBC:   Lab Results   Component Value Date    WBC 5.4 07/26/2018    RBC 4.10 07/26/2018    HGB 9.1 07/26/2018    HCT 33.0 07/26/2018    MCV 80.5 07/26/2018    MCH 22.2 07/26/2018    MCHC 27.6 07/26/2018    RDW 21.4 07/26/2018     07/26/2018    MPV 9.5 07/26/2018     BMP:    Lab Results   Component Value Date     07/26/2018    K 4.1 07/26/2018    CL 94 07/26/2018    CO2 40 07/26/2018    BUN 22 07/26/2018    LABALBU 3.7 07/26/2018    CREATININE 1.5 07/26/2018    CALCIUM 9.6 07/26/2018    GFRAA 40 07/26/2018    LABGLOM 33 07/26/2018     PT/INR:    Lab Results   Component Value Date    PROTIME 16.1 07/22/2018    INR 1.4 07/22/2018   Results for Jania Ruiz (MRN [id number]) as of 7/24/2018 10:23   Ref.  Range 7/20/2018 13:25   Pro-BNP Latest Ref Range: 0 - 450 (SYNTHROID) tablet 25 mcg  25 mcg Oral QAM Jd PALACIOS Volino, DO   25 mcg at 07/26/18 0858    pantoprazole (PROTONIX) tablet 40 mg  40 mg Oral BID AC Jd PALACIOS Volino, DO   40 mg at 07/26/18 0646    simvastatin (ZOCOR) tablet 40 mg  40 mg Oral Nightly Jd E Volino, DO   40 mg at 07/25/18 2126    insulin lispro (HUMALOG) injection vial 0-12 Units  0-12 Units Subcutaneous TID WC Jd E Volino, DO   2 Units at 07/26/18 1151    glucose (GLUTOSE) 40 % oral gel 15 g  15 g Oral PRN Jd E Volino, DO   15 g at 07/24/18 0641    dextrose 50 % solution 12.5 g  12.5 g Intravenous PRN Jd E Volino, DO   12.5 g at 07/24/18 0703    glucagon (rDNA) injection 1 mg  1 mg Intramuscular PRN Jd E Volino, DO        dextrose 5 % solution  100 mL/hr Intravenous PRN Jd Johnsonino, DO         PHYSICAL EXAM:  General Appearance:    Sitting in a chair in no acute distress. Appears comfortable. Head:  Normocephalic atraumatic without obvious abnormality   Throat:  Lips, mucosa, and tongue normal.   Neck:  Supple, symmetrical, trachea midline, no adenopathy;     thyroid:  no enlargement/tenderness/nodules or JVD. Lungs:   Breath sounds diminished bilaterally . Respirations   unlabored. Symmetrical expansion. Heart:   Regular rate and rhythm, S1 and S2 normal, no murmur, rub   or gallop. Abdomen:    Soft, obese non-tender, bowel sounds active all four quadrants,     no masses, no organomegaly, no bruit. Extremities  Extremities normal, no cyanosis, clubbing, BLE edema. Capillary refill <3 seconds. Skin:   Warm and dry. Skin color, texture, turgor normal. No rashes,    bleeding,  or lesions. See wound care assessment.             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

## 2018-07-26 NOTE — CARE COORDINATION
Social Work/Discharge Planning:  Discharge order noted. Patient to discharge to [facility] at [facility] LOC. Called [facility] and arranged [facility] transport for 3:00pm.  Notified patient, charge nurse and liaison Taiwo from [facility] of discharge time.   Electronically signed by THEODORE Marshall on 7/26/2018 at 11:37 AM

## 2018-07-26 NOTE — PROGRESS NOTES
Intake/Output Summary (Last 24 hours) at 07/26/18 0901  Last data filed at 07/25/18 1925   Gross per 24 hour   Intake              480 ml   Output              300 ml   Net              180 ml     Urinary output over the past 3 shifts:  No record, 400, 350 cc. Net output since admission: net negative -3480 cc. Weight:   Wt Readings from Last 3 Encounters:   07/26/18 266 lb 3.2 oz (120.7 kg)   05/08/18 265 lb 4 oz (120.3 kg)   02/23/18 267 lb (121.1 kg)     Current Inpatient Medications:   insulin glargine  50 Units Subcutaneous Nightly    potassium chloride  40 mEq Oral Daily with breakfast    apixaban  2.5 mg Oral BID    oxybutynin  10 mg Oral Dinner    polyethylene glycol  17 g Oral Daily    docusate sodium  200 mg Oral Nightly    metoprolol succinate  25 mg Oral BID    ferrous sulfate  325 mg Oral BID WC    miconazole   Topical BID    sodium chloride flush  10 mL Intravenous 2 times per day    gabapentin  300 mg Oral Nightly    levothyroxine  25 mcg Oral QAM    pantoprazole  40 mg Oral BID AC    simvastatin  40 mg Oral Nightly    insulin lispro  0-12 Units Subcutaneous TID WC       IV Infusions (if any):   dextrose         DIAGNOSTIC/ LABORATORY DATA:  Labs:   CBC:   Recent Labs      07/24/18   0930  07/25/18   0320   WBC  5.0  4.4*   HGB  8.4*  8.5*   HCT  30.9*  30.2*   PLT  293  282     BMP:   Recent Labs      07/24/18   0930  07/25/18   0320   NA  142  143   K  3.3*  4.0   CO2  40*  39*   BUN  20  22   CREATININE  1.5*  2.0*   LABGLOM  33  24   CALCIUM  8.9  9.0     Mag:   Recent Labs      07/24/18   0930   MG  2.3     TSH:   No results for input(s): TSH in the last 72 hours. HgA1c:   Lab Results   Component Value Date    LABA1C 7.6 (H) 07/21/2018     PT/INR:   No results for input(s): PROTIME, INR in the last 72 hours. APTT:  No results for input(s): APTT in the last 72 hours.   CARDIAC ENZYMES:  No results for input(s): CKTOTAL, CKMB, CKMBINDEX, TROPONINI in the last 72 Anemia s/p transfusion. Hx of GIB  3. Nonischemic stress test 2/2018. 4. Chronic Afib: (previously on Eliquis 2.5 mg BID for LUE DVT)-held on admission. 5. Probable GOSIA (recommended CPAP in 5/2018): no follow-up   6. Chronic RBBB  7. CKD: at baseline (~1.4-1.6)    Plan:  1. Continue BB/ACEI/bumex. Consider aldactone as outpatient. 2. Echo as above. Julissa Westbrook D.O.   Cardiologist  Cardiology, [facility]

## 2018-07-26 NOTE — PROGRESS NOTES
PROGRESS NOTE    Patient Presents with/Seen in Consultation For      *? GI bleeding  CHIEF COMPLAINT:  Shortness of breath    Subjective:     Patient seen sitting up in a chair at bedside, in NAD. States she is going to rehab today at [facility]. She is tolerating her diet with no difficulties, denies any N/V. Reports last BM was Monday, +flatus. Review of Systems  Aside from what was mentioned in the PMH and HPI, essentially unremarkable, all others negative. Objective:     Patient Vitals for the past 8 hrs:   BP Temp Temp src Pulse Resp SpO2   07/26/18 0745 122/65 98.4 °F (36.9 °C) Oral 76 18 96 %       General appearance: alert, awake, sitting up in chair in no apparent distress, and cooperative  Eyes: conjunctiva pale, sclera anicteric. PERRL.   Lungs: diminished throughout, O2 2 L NC  Heart: regular rate and rhythm, no murmur, 2+ pulses; 1-2+ edema to BLE  Abdomen: large, soft, non-tender; bowel sounds normal; no masses,  no organomegaly  Extremities: extremities with 1-2+ BLE edema  Pulses: 2+ and symmetric  Skin: Skin color pale, texture, turgor normal.   Neurologic: Grossly normal      lisinopril (PRINIVIL;ZESTRIL) tablet 5 mg Daily   bumetanide (BUMEX) tablet 1 mg Daily   [START ON 7/27/2018] potassium chloride (KLOR-CON M) extended release tablet 10 mEq Daily with breakfast   zolpidem (AMBIEN) tablet 10 mg Nightly PRN   insulin glargine (LANTUS) injection vial 50 Units Nightly   apixaban (ELIQUIS) tablet 2.5 mg BID   oxybutynin (DITROPAN-XL) extended release tablet 10 mg Dinner   bisacodyl (DULCOLAX) suppository 10 mg Daily PRN   polyethylene glycol (GLYCOLAX) packet 17 g Daily   docusate sodium (COLACE) capsule 200 mg Nightly   perflutren lipid microspheres (DEFINITY) injection 1.65 mg ONCE PRN   metoprolol succinate (TOPROL XL) extended release tablet 25 mg BID   ferrous sulfate tablet 325 mg BID WC   miconazole (MICOTIN) 2 % powder BID   sodium chloride flush 0.9 % injection 10 mL 2 times per day sodium chloride flush 0.9 % injection 10 mL PRN   magnesium hydroxide (MILK OF MAGNESIA) 400 MG/5ML suspension 30 mL Daily PRN   ondansetron (ZOFRAN) injection 4 mg Q6H PRN   acetaminophen (TYLENOL) tablet 650 mg Q4H PRN   gabapentin (NEURONTIN) capsule 300 mg Nightly   hydrOXYzine (ATARAX) tablet 10 mg TID PRN   levothyroxine (SYNTHROID) tablet 25 mcg QAM   pantoprazole (PROTONIX) tablet 40 mg BID AC   simvastatin (ZOCOR) tablet 40 mg Nightly   insulin lispro (HUMALOG) injection vial 0-12 Units TID WC   glucose (GLUTOSE) 40 % oral gel 15 g PRN   dextrose 50 % solution 12.5 g PRN   glucagon (rDNA) injection 1 mg PRN   dextrose 5 % solution PRN        Data Review  CBC:   Lab Results   Component Value Date    WBC 5.4 07/26/2018    RBC 4.10 07/26/2018    HGB 9.1 07/26/2018    HCT 33.0 07/26/2018    MCV 80.5 07/26/2018    MCH 22.2 07/26/2018    MCHC 27.6 07/26/2018    RDW 21.4 07/26/2018     07/26/2018    MPV 9.5 07/26/2018     CMP:    Lab Results   Component Value Date     07/26/2018    K 4.1 07/26/2018    CL 94 07/26/2018    CO2 40 07/26/2018    BUN 22 07/26/2018    CREATININE 1.5 07/26/2018    GFRAA 40 07/26/2018    LABGLOM 33 07/26/2018    GLUCOSE 114 07/26/2018    PROT 6.6 07/26/2018    LABALBU 3.7 07/26/2018    CALCIUM 9.6 07/26/2018    BILITOT 0.5 07/26/2018    ALKPHOS 78 07/26/2018    AST 16 07/26/2018    ALT 13 07/26/2018     Hepatic Function Panel:    Lab Results   Component Value Date    ALKPHOS 78 07/26/2018    ALT 13 07/26/2018    AST 16 07/26/2018    PROT 6.6 07/26/2018    BILITOT 0.5 07/26/2018    LABALBU 3.7 07/26/2018     No components found for: CHLPL    Lab Results   Component Value Date    TRIG 103 07/23/2018    TRIG 146 12/27/2017       Lab Results   Component Value Date    HDL 39 07/23/2018    HDL 44 12/27/2017       Lab Results   Component Value Date    LDLCALC 34 07/23/2018    LDLCALC 52 12/27/2017       Lab Results   Component Value Date    LABVLDL 21 07/23/2018    LABVLDL 29

## 2018-07-26 NOTE — PROGRESS NOTES
Hospitalist Progress Note    Admission date: 7/20/2018  Primary care physician: Alisa Landrum MD  Reason for visit: follow-up for CHF/Anemia    Subjective  Jose Elias Hurst was seen and examined at bedside today. No family present during my examination. Jose Elias Hurst states that she is feeling well. Her breathing is stable. She is not in any pain. She wants to go to the nursing home today. I discussed with her that we are waiting on blood work from this morning. Her goal is to go to the nursing home initially as a skilled patient and then as a long term resident. Review of Systems  There are no new complaints of chest pain, abdominal pain, nausea, vomiting, diarrhea, constipation. Hospital Medications  Current Facility-Administered Medications   Medication Dose Route Frequency Provider Last Rate Last Dose    zolpidem (AMBIEN) tablet 10 mg  10 mg Oral Nightly PRN Jd PALACIOS Volcatherine, DO   10 mg at 07/25/18 2304    insulin glargine (LANTUS) injection vial 50 Units  50 Units Subcutaneous Nightly Jd Johnsonino, DO   50 Units at 07/25/18 2127    potassium chloride (KLOR-CON M) extended release tablet 40 mEq  40 mEq Oral Daily with breakfast Jd Johnsonino, DO   40 mEq at 07/25/18 0804    apixaban (ELIQUIS) tablet 2.5 mg  2.5 mg Oral BID Jd Johnsonino, DO   2.5 mg at 07/25/18 2126    oxybutynin (DITROPAN-XL) extended release tablet 10 mg  10 mg Oral Dinner Jd Johnsonino, DO   10 mg at 07/25/18 1609    bisacodyl (DULCOLAX) suppository 10 mg  10 mg Rectal Daily PRN Jd PALACIOS Volino, DO        polyethylene glycol (GLYCOLAX) packet 17 g  17 g Oral Daily Jd PALACIOS Volino, DO   17 g at 07/25/18 7716    docusate sodium (COLACE) capsule 200 mg  200 mg Oral Nightly SAVANNA Sams - CNS   200 mg at 07/25/18 2126    perflutren lipid microspheres (DEFINITY) injection 1.65 mg  1.5 mL Intravenous ONCE PRN NICOLE Nicolas        metoprolol succinate (TOPROL XL) extended release tablet 25 mg  25 mg Oral BID Jannie Winchester dextrose    Objective  Most Recent Recorded Vitals  /62   Pulse 79   Temp 98.1 °F (36.7 °C) (Oral)   Resp 18   Ht 5' 1\" (1.549 m)   Wt 266 lb 3.2 oz (120.7 kg)   LMP  (LMP Unknown)   SpO2 97%   BMI 50.30 kg/m²   I/O last 3 completed shifts: In: 480 [P.O.:480]  Out: 300 [Urine:300]  No intake/output data recorded. Physical Exam:  General: AAO to person/place/time/purpose, NAD, no labored breathing, NCO2, lying flat in bed  Eyes: conjunctivae/corneas clear, sclera non icteric  Skin: color/texture/turgor normal, no rashes or lesions  Lungs: CTAB, no retractions/use of accessory muscles, no vocal fremitus, no rhonchi, no crackle, no rales  Heart: regular rate, regular rhythm, 3/6 systolic murmur  Abdomen: obese, soft, NT; bowel sounds normal; no masses,  no organomegaly  Extremities: atraumatic, no cyanosis, trace LE edema  Neurologic: cranial nerves 2-12 grossly intact, no slurred speech. Most Recent Labs  Lab Results   Component Value Date    WBC 4.4 (L) 07/25/2018    HGB 8.5 (L) 07/25/2018    HCT 30.2 (L) 07/25/2018     07/25/2018     07/25/2018    K 4.0 07/25/2018    CL 96 (L) 07/25/2018    CREATININE 2.0 (H) 07/25/2018    BUN 22 07/25/2018    CO2 39 (H) 07/25/2018    GLUCOSE 158 (H) 07/25/2018    ALT 10 07/25/2018    AST 16 07/25/2018    INR 1.4 07/22/2018    TSH 1.430 07/23/2018    LABA1C 7.6 (H) 07/21/2018       XR CHEST PORTABLE   Final Result   No airspace opacities or pleural effusion. Mild cardiac megaly with   mild vascular congestion                BLOOD CX #1  No results for input(s): BC in the last 72 hours. BLOOD CX #2  No results for input(s): Chevis Carte in the last 72 hours. TIP CULTURE  No results for input(s): CXCATHTIP in the last 72 hours. CULTURE, RESPIRATORY   No results for input(s): CULTRESP in the last 72 hours. RESPIRATORY SMEAR  No results for input(s): RESPSMEAR in the last 72 hours.      BODY FLUID CULTURE  No results for input(s): BFCS in the last 72 hours.    WOUND ABSCESS  No results for input(s): WNDABS in the last 72 hours. Anaerobic cx  No results for input(s): LABANAE in the last 72 hours. CULTURE SURGICAL  No results for input(s): CXSURG in the last 72 hours. URINE CULTURE  No results for input(s): LABURIN in the last 72 hours. No results for input(s): ORG in the last 72 hours. MISC. SENDOUT  No results for input(s): MREF in the last 72 hours. STREP PNEUMONIA AG URINE  No results for input(s): STREPNEUMAGU in the last 72 hours. LEGIONELLA AG URINE  No results for input(s): LEGUR in the last 72 hours. No results for input(s): [address] in the last 72 hours. Last echocardiogram:12/30/17   Left ventricular size is grossly normal.   Mild left ventricular concentric hypertrophy noted.   Ejection fraction is visually estimated at 60%. Assessment   Patient Active Problem List    Diagnosis Date Noted    Acute on chronic diastolic CHF (congestive heart failure) [facility] 07/20/2018     Priority: High    Hypoxia 07/20/2018     Priority: Medium    Chronic anemia 04/26/2018     Priority: Medium    Chronic atrial fibrillation ([facility] 12/29/2017     Priority: Medium     Class: Chronic     CHADS-VASC = 5  REFUSES ANTICOAGULATION      CKD (chronic kidney disease) stage 3, GFR 30-59 ml/min 12/29/2017     Priority: Medium     Class: Chronic    DM2 (diabetes mellitus, type 2) ([facility] 11/06/2016     Priority: Medium    Acute respiratory failure with hypoxia and hypercapnia (HCC)     Hypothyroidism     Hyperlipidemia     History of stroke     History of breast cancer      breast cancer Right      History of echocardiogram 02/13/2018     A. Echo 12/30/2017 (Carleene Cuff):  Mild aortic stenosis, normal EF      Hypertension          Plan  · CHF/hypoxia: Telemetry. Previous echo noted. Follow I&O's (- 5.1 L this admission so far). May restart diuresis/ACEi-- was held on 7/25 due to DALILA unless cardiology feels ok to restart. Continue beta blocker. Toy [place].  Cardio

## 2018-07-26 NOTE — PROGRESS NOTES
Occupational Therapy  OCCUPATIONAL THERAPY DAILY NOTE    Date:2018  Patient Name: Veatrice Merlin  MRN: [id number]  : 1935  Room: [address] [place] [address]     Patient Active Problem List   Diagnosis    DM2 (diabetes mellitus, type 2) ([facility]    Hypertension    Chronic atrial fibrillation ([facility]    CKD (chronic kidney disease) stage 3, GFR 30-59 ml/min    History of echocardiogram    Hypothyroidism    Hyperlipidemia    History of stroke    History of breast cancer    Chronic anemia    Acute on chronic [facility]congest[facility]    Hypoxia    Acute respiratory failure with hypoxia and hypercapnia (HCC)       Subjective:  Pt laying in the bed. Agreeable to therapy. Precautions: falls, O2  Chart Reviewed:  Yes          Independent Supervision Contact Guard Assist Minimal Assist Moderate Assist Maximum Assist Dependent   Feeding          Grooming          UE  Bathing          LE Bathing          UE Dressing          LE  Dressing                x    Toileting              x     Comments:  Max for LB dressing to don slipper socks. Max A for toilet hygiene. Pt states her daughter assists her at home. Functional Transfers:  Supine to sit onto the side of the bed CGA using bed rail for support. Transfers from bed and BSC surfaces CGA. Functional mobility using wheeled walker CGA. Short distances tolerated due to fatigue and SOB. Pt remained in chair at end of the session. Therapeutic Exercises:  Fair use of UE's during activity. Other:  Limited tolerance for ADL. Fatigue and SOB. Education:  Kasi Mahoney and transfer safety    Equipment Recommendations:  Continue to assess     AM-PAC Inpatient Daily Activity Raw Score:  15    Pain Level: /10   Additional Notes:    Patient has made  progress during treatment sessions toward set goals. [x] Continue with current OT Plan of care.   [] Prepare for Discharge    Tracey FALCON [id number]    Total Tx Time:

## 2018-07-26 NOTE — DISCHARGE SUMMARY
Hospitalist Discharge Summary    NAME: Derik Grace :  1935  MRN:  [id number] Mireya White MD    ADMITTED: 2018   DISCHARGED: 2018  3:40 PM    ADMITTING PHYSICIAN: Jd De La Rosa DO    CONSULTANT(S):   IP CONSULT TO INTERNAL MEDICINE  IP CONSULT TO SOCIAL WORK  IP CONSULT TO DIETITIAN  IP CONSULT TO GI  IP CONSULT TO IV TEAM  IP CONSULT TO CARDIOLOGY  IP CONSULT TO PULMONOLOGY  IP CONSULT TO IV TEAM     ADMITTING DIAGNOSIS:   Acute on chronic diastolic CHF (congestive heart failure) (HCC) [I50.33]  Acute on chronic diastolic CHF (congestive heart failure) ([address] [I50.33]     Please see H&P for further details    DISCHARGE DIAGNOSES:   Patient Active Problem List    Diagnosis Date Noted    Acute on chronic diastolic CHF (congestive heart failure) ([facility] 2018     Priority: High    Hypoxia 2018     Priority: Medium    Chronic anemia 2018     Priority: Medium    Chronic atrial fibrillation ([address] 2017     Priority: Medium     Class: Chronic    CKD (chronic kidney disease) stage 3, GFR 30-59 ml/min 2017     Priority: Medium     Class: Chronic    DM2 (diabetes mellitus, type 2) ([address] 2016     Priority: Medium    Acute respiratory failure with hypoxia and hypercapnia (HCC)     Hypothyroidism     Hyperlipidemia     History of stroke     History of breast cancer     History of echocardiogram 2018    Hypertension        BRIEF HISTORY OF PRESENT ILLNESS: Derik Grace is a 80 y.o. female patient of Cristino Emanuel MD who  has a past medical history of Anemia due to acute blood loss; Arthritis; Blood transfusion reaction; Chronic atrial fibrillation (HCC); CKD (chronic kidney disease) stage 3, GFR 30-59 ml/min; Diabetes mellitus ([address] History of blood transfusion; History of breast cancer; History of stroke; Hyperlipidemia; Hypertension; Hypothyroidism; and Volume overload.  who originally had concerns including Shortness of Breath (For past nerves 2-12 grossly intact, no slurred speech. LABS[de-identified]  Recent Labs      18   0930  18   0320   NA  142  143   K  3.3*  4.0   CL  95*  96*   CO2  40*  39*   BUN  20  22   CREATININE  1.5*  2.0*   GLUCOSE  116*  158*   CALCIUM  8.9  9.0     Recent Labs      18   0930  18   0320   ALKPHOS  74  74   ALT  12  10   AST  17  16   PROT  5.9*  6.0*   BILITOT  0.4  0.5   LABALBU  3.1*  3.3*     Recent Labs      18   1137  18   0930  18   0320   WBC   --   5.0  4.4*   RBC   --   3.88  3.87   HGB  8.9*  8.4*  8.5*   HCT  31.7*  30.9*  30.2*   MCV   --   79.6*  78.0*   MCH   --   21.6*  22.0*   MCHC   --   27.2*  28.1*   RDW   --   19.3*  19.9*   PLT   --   293  282   MPV   --   9.9  9.7     Lab Results   Component Value Date    LABA1C 7.6 (H) 2018    LABA1C 7.0 (H) 2018    LABA1C 7.4 (H) 2017     Lab Results   Component Value Date    INR 1.4 2018    INR 1.1 2018    INR 1.1 2017    PROTIME 16.1 (H) 2018    PROTIME 13.0 (H) 2018    PROTIME 11.9 2017      Lab Results   Component Value Date    TSH 1.430 2018    TSH 1.550 2017    TSH 1.060 2016     Lab Results   Component Value Date    TRIG 103 2018    TRIG 146 2017    HDL 39 2018    HDL 44 2017    LDLCALC 34 2018    LDLCALC 52 2017     Recent Labs      18   0930   MG  2.3       No results for input(s): CKTOTAL, CKMB, TROPONINI in the last 72 hours. No results for input(s): LACTA in the last 72 hours. IMAGING:  Xr Chest Portable    Result Date: 2018  Patient MRN:  [id number] : 1935 Age: 80 years Gender: Female Order Date:  2018 12:45 PM EXAM: XR CHEST PORTABLE NUMBER OF VIEWS:  1 INDICATION:  Shortness of breath Shortness of breath COMPARISON: 2018 FINDINGS: The heart is mildly enlarged in size. The mediastinum is normal in width. Mild pulmonary vascular congestion. . No focal airspace opacity.  There is no pleural effusion. There is no pneumothorax. No airspace opacities or pleural effusion. Mild cardiac megaly with mild vascular congestion       MICROBIOLOGY:  BLOOD CX #1  No results for input(s): BC in the last 72 hours. BLOOD CX #2  No results for input(s): Jeananne Crews in the last 72 hours. TIP CULTURE  No results for input(s): CXCATHTIP in the last 72 hours. CULTURE, RESPIRATORY   No results for input(s): CULTRESP in the last 72 hours. RESPIRATORY SMEAR  No results for input(s): RESPSMEAR in the last 72 hours. BODY FLUID CULTURE  No results for input(s): BFCS in the last 72 hours. WOUND ABSCESS  No results for input(s): WNDABS in the last 72 hours. Anaerobic cx  No results for input(s): LABANAE in the last 72 hours. CULTURE SURGICAL  No results for input(s): CXSURG in the last 72 hours. URINE CULTURE  No results for input(s): LABURIN in the last 72 hours. No results for input(s): ORG in the last 72 hours. MISC. SENDOUT  No results for input(s): MREF in the last 72 hours. STREP PNEUMONIA AG URINE  No results for input(s): STREPNEUMAGU in the last 72 hours. LEGIONELLA AG URINE  No results for input(s): LEGUR in the last 72 hours. No results for input(s): [address] in the last 72 hours. Last echocardiogram:12/30/17   Left ventricular size is grossly normal.   Mild left ventricular concentric hypertrophy noted.   Ejection fraction is visually estimated at 60%. DISPOSITION:  The patient's condition is fair.    The patient is being discharged to nursing home    DISCHARGE MEDICATIONS:    [address], [address] Medication Instructions BOM:[id number]    Printed on:07/27/18 2221   Medication Information                      apixaban (ELIQUIS) 2.5 MG TABS tablet  5 mg by mouth twice a day ×7 days followed by 2.5 mg by mouth twice a day             bisacodyl (DULCOLAX) 10 MG suppository  Place 1 suppository rectally daily as needed for Constipation             bumetanide (BUMEX) 1 MG tablet  Take 1 tablet by mouth daily             docusate sodium (COLACE, DULCOLAX) 100 MG CAPS  Take 200 mg by mouth nightly             ferrous sulfate 325 (65 Fe) MG tablet  Take 1 tablet by mouth 2 times daily (with meals)             gabapentin (NEURONTIN) 300 MG capsule  Take 300 mg by mouth nightly. .             hydrOXYzine (ATARAX) 10 MG tablet  Take 10 mg by mouth 3 times daily as needed for Itching             insulin detemir (LEVEMIR FLEXPEN) 100 UNIT/ML injection pen  Inject 55 Units into the skin nightly             insulin lispro (HUMALOG) 100 UNIT/ML injection vial  Inject 0-12 Units into the skin 3 times daily (with meals)             levothyroxine (SYNTHROID) 25 MCG tablet  Take 1 tablet by mouth daily             lisinopril (PRINIVIL;ZESTRIL) 5 MG tablet  Take 1 tablet by mouth daily             magnesium hydroxide (MILK OF MAGNESIA) 400 MG/5ML suspension  Take 30 mLs by mouth daily as needed for Constipation             metoprolol succinate (TOPROL XL) 25 MG extended release tablet  Take 1 tablet by mouth 2 times daily             Mirabegron ER 50 MG TB24  Take 50 mg by mouth Daily with supper              pantoprazole (PROTONIX) 40 MG tablet  Take 1 tablet by mouth 2 times daily (before meals). polyethylene glycol (GLYCOLAX) packet  Take 17 g by mouth daily             potassium chloride (KLOR-CON M) 10 MEQ extended release tablet  Take 1 tablet by mouth daily (with breakfast)             simvastatin (ZOCOR) 40 MG tablet  Take 40 mg by mouth nightly.                  Discharge Medication List as of 7/26/2018  1:01 PM      CONTINUE these medications which have CHANGED    Details   insulin detemir (LEVEMIR FLEXPEN) 100 UNIT/ML injection pen Inject 55 Units into the skin nightly, Disp-5 pen, R-3DC to SNF           Discharge Medication List as of 7/26/2018  1:01 PM      STOP taking these medications       metoprolol tartrate (LOPRESSOR) 25 MG tablet Comments:   Reason for Stopping:

## 2018-08-02 ENCOUNTER — TELEPHONE (OUTPATIENT)
Dept: CARDIOLOGY CLINIC | Age: 83
End: 2018-08-02

## 2018-08-07 LAB
EKG ATRIAL RATE: 98 BPM
EKG Q-T INTERVAL: 392 MS
EKG QRS DURATION: 128 MS
EKG QTC CALCULATION (BAZETT): 466 MS
EKG R AXIS: 9 DEGREES
EKG T AXIS: -16 DEGREES
EKG VENTRICULAR RATE: 85 BPM

## 2019-05-01 ENCOUNTER — OFFICE VISIT (OUTPATIENT)
Dept: ENDOCRINOLOGY | Age: 84
End: 2019-05-01
Payer: MEDICARE

## 2019-05-01 VITALS
WEIGHT: 283.4 LBS | DIASTOLIC BLOOD PRESSURE: 88 MMHG | HEIGHT: 61 IN | SYSTOLIC BLOOD PRESSURE: 136 MMHG | HEART RATE: 79 BPM | BODY MASS INDEX: 53.51 KG/M2 | OXYGEN SATURATION: 93 %

## 2019-05-01 DIAGNOSIS — E11.8 TYPE 2 DIABETES MELLITUS WITH COMPLICATION, WITH LONG-TERM CURRENT USE OF INSULIN (HCC): Primary | ICD-10-CM

## 2019-05-01 DIAGNOSIS — E03.9 HYPOTHYROIDISM, UNSPECIFIED TYPE: ICD-10-CM

## 2019-05-01 DIAGNOSIS — E66.9 OBESITY, UNSPECIFIED CLASSIFICATION, UNSPECIFIED OBESITY TYPE, UNSPECIFIED WHETHER SERIOUS COMORBIDITY PRESENT: ICD-10-CM

## 2019-05-01 DIAGNOSIS — Z79.4 TYPE 2 DIABETES MELLITUS WITH COMPLICATION, WITH LONG-TERM CURRENT USE OF INSULIN (HCC): Primary | ICD-10-CM

## 2019-05-01 PROCEDURE — 1090F PRES/ABSN URINE INCON ASSESS: CPT | Performed by: INTERNAL MEDICINE

## 2019-05-01 PROCEDURE — 1123F ACP DISCUSS/DSCN MKR DOCD: CPT | Performed by: INTERNAL MEDICINE

## 2019-05-01 PROCEDURE — 4040F PNEUMOC VAC/ADMIN/RCVD: CPT | Performed by: INTERNAL MEDICINE

## 2019-05-01 PROCEDURE — 1036F TOBACCO NON-USER: CPT | Performed by: INTERNAL MEDICINE

## 2019-05-01 PROCEDURE — G8417 CALC BMI ABV UP PARAM F/U: HCPCS | Performed by: INTERNAL MEDICINE

## 2019-05-01 PROCEDURE — G8427 DOCREV CUR MEDS BY ELIG CLIN: HCPCS | Performed by: INTERNAL MEDICINE

## 2019-05-01 PROCEDURE — G8400 PT W/DXA NO RESULTS DOC: HCPCS | Performed by: INTERNAL MEDICINE

## 2019-05-01 PROCEDURE — 99204 OFFICE O/P NEW MOD 45 MIN: CPT | Performed by: INTERNAL MEDICINE

## 2019-05-01 RX ORDER — NYSTATIN 100000 [USP'U]/G
POWDER TOPICAL 4 TIMES DAILY
COMMUNITY
End: 2020-01-01

## 2019-05-01 RX ORDER — CALCITRIOL 0.25 UG/1
0.5 CAPSULE, LIQUID FILLED ORAL DAILY
Status: ON HOLD | COMMUNITY
End: 2021-01-01 | Stop reason: HOSPADM

## 2019-05-01 RX ORDER — BISACODYL 10 MG
10 SUPPOSITORY, RECTAL RECTAL DAILY
COMMUNITY
End: 2020-01-01

## 2019-05-01 RX ORDER — LORATADINE 10 MG/1
10 TABLET ORAL DAILY
COMMUNITY

## 2019-05-01 RX ORDER — GUAIFENESIN 600 MG/1
600 TABLET, EXTENDED RELEASE ORAL EVERY 12 HOURS PRN
COMMUNITY

## 2019-05-01 RX ORDER — CHOLECALCIFEROL (VITAMIN D3) 125 MCG
5 CAPSULE ORAL NIGHTLY
COMMUNITY

## 2019-05-01 NOTE — PROGRESS NOTES
ENDOCRINOLOGY CLINIC NOTE    Date of Service: 5/1/2019    Medical Records Reviewed:   I personally reviewed and summarized previous records     Care Team:  Primary Care Physician: Audrey Mcclelland MD  Referring physician: Audra Vital MD  Provider: Jess King MD     Reason for the visit:  DM type 2, Hypothyroidism      History of Present Illness: The history is provided by the patient. No  was used. Accuracy of the patient data is excellent. Shara Mosre is a very pleasant 80 y.o. female seen in Endocrine clinic today for diabetes management     Shara Morse was diagnosed with diabetes 25 years ago   The patient is currently on Toujeo 70 units daily at bedtime, humalog 5 units before meals, Trulicity 1.5 mg weekly   The patient has been checking blood sugar 2 times a day. I reviewed point-of-care blood glucose levels   Most recent A1c results summarized below  Lab Results   Component Value Date    LABA1C 7.6 07/21/2018    LABA1C 7.0 04/28/2018    LABA1C 7.4 12/27/2017     Patient has had no hypoglycemic episodes   The patient hasn't been mindful of what has been eating and wasn't following diabetes diet as encouraged   I reviewed current medications and the patient has no issues with diabetes medications  Shara Morse is up to date with eye exam and denied any history of diabetic retinopathy. The patient seeing podiatrist every 2 months  Microvascular complications:  No Retinopathy, + Nephropathy + Neuropathy   Macrovascular complications: no CAD, PVD, or Stroke  The patient receives Flushot every year and up to date with the Pneumonia vaccine     Hypothyroidism   Diagnosed long time ago  Currently on Levothyroxine 25 mcg daily. Patient takes levothyroxine in the morning at empty stomach, wait one hour before eating , avoid multivitamins containing calcium  or iron with it.   Lab Results   Component Value Date/Time    TSH 1.430 07/23/2018 03:14 AM       PAST MEDICAL HISTORY   Past Medical History:   Diagnosis Date    Anemia due to acute blood loss 12/29/2017    Arthritis     Blood transfusion reaction     Chronic atrial fibrillation (HCC) 12/29/2017    CKD (chronic kidney disease) stage 3, GFR 30-59 ml/min (HCC) 12/29/2017    Diabetes mellitus ([facility] [address]     History of blood transfusion     History of breast cancer     breast cancer Right    History of stroke     Hyperlipidemia     Hypertension     Hypothyroidism     Volume overload 12/30/2017    As cause of dyspnea     PAST SURGICAL HISTORY   Past Surgical History:   Procedure Laterality Date    BREAST SURGERY      right    BRONCHOSCOPY  4/28/2018    BRONCHOSCOPY DIAGNOSTIC performed by Scott Arreguin MD at [address] [place] [address] NASAL SINUS SURGERY      august 27 2017. cauterized her nasal passage.  OTHER SURGICAL HISTORY  07/27/2017    endoscopic conrtol of epistaxis dr Amara Mora N/A 4/28/2018    ENDOSCOPIC GUIDED CONTROL EPISTAXIS  WITH SEPTAL BUTTON PLACEMENT. INTRA-OPERATIVE BRONCHOSCOPY. performed by Sctot Arreguin MD at [facility]   Social History     Socioeconomic History    Marital status:      Spouse name: Not on file    Number of children: Not on file    Years of education: Not on file    Highest education level: Not on file   Occupational History    Not on file   Social Needs    Financial resource strain: Not on file    Food insecurity:     Worry: Not on file     Inability: Not on file    Transportation needs:     Medical: Not on file     Non-medical: Not on file   Tobacco Use    Smoking status: Never Smoker    Smokeless tobacco: Never Used   Substance and Sexual Activity    Alcohol use:  Yes     Alcohol/week: 0.0 oz     Comment: occasional    Drug use: No    Sexual activity: Never   Lifestyle    Physical activity:     Days per week: Not on file     Minutes per session: Not on file    Stress: Not on file   Relationships    Social connections:     Talks on phone: Not on file     Gets together: Not on file     Attends [employer] service: Not on file     Active member of club or organization: Not on file     Attends meetings of clubs or organizations: Not on file     Relationship status: Not on file    Intimate partner violence:     Fear of current or ex partner: Not on file     Emotionally abused: Not on file     Physically abused: Not on file     Forced sexual activity: Not on file   Other Topics Concern    Not on file   Social History Narrative    Not on file     FAMILY HISTORY   Family History   Problem Relation Age of Onset    Heart Disease Mother     Kidney Disease Mother         dialysis    Diabetes Mother     Stroke Father     Heart Disease Father     Diabetes Father      ALLERGIES AND DRUG REACTIONS   Allergies   Allergen Reactions    Pcn [Penicillins] Hives       CURRENT MEDICATIONS   Current Outpatient Medications   Medication Sig Dispense Refill    calcitRIOL (ROCALTROL) 0.25 MCG capsule Take 0.25 mcg by mouth daily      loratadine (CLARITIN) 10 MG tablet Take 10 mg by mouth daily      bisacodyl (DULCOLAX) 10 MG suppository Place 10 mg rectally daily      melatonin 5 MG TABS tablet Take 5 mg by mouth daily      guaiFENesin (MUCINEX) 600 MG extended release tablet Take 1,200 mg by mouth 2 times daily      nystatin (NYSTATIN) 104765 UNIT/GM powder Apply topically 4 times daily Apply topically 4 times daily.       insulin glargine (TOUJEO SOLOSTAR) 300 UNIT/ML injection pen Inject 70 Units into the skin nightly      Dulaglutide (TRULICITY) 1.5 GM/6.8RL SOPN Inject 1.5 mg into the skin once a week Indications: weekly on Thursday      Cholecalciferol (VITAMIN D3) 5000 units TABS Take by mouth daily      insulin lispro (HUMALOG) 100 UNIT/ML injection vial Inject 0-12 Units into the skin 3 times daily (with meals) 1 vial 3    metoprolol succinate (TOPROL XL) 25 MG extended release tablet Take 1 tablet by mouth 2 times daily 60 tablet 0    ferrous sulfate 325 (65 Fe) MG tablet Take 1 tablet by mouth 2 times daily (with meals) 30 tablet 0    magnesium hydroxide (MILK OF MAGNESIA) 400 MG/5ML suspension Take 30 mLs by mouth daily as needed for Constipation      docusate sodium (COLACE, DULCOLAX) 100 MG CAPS Take 200 mg by mouth nightly      bumetanide (BUMEX) 1 MG tablet Take 1 tablet by mouth daily 30 tablet 3    potassium chloride (KLOR-CON M) 10 MEQ extended release tablet Take 1 tablet by mouth daily (with breakfast) 60 tablet 3    apixaban (ELIQUIS) 2.5 MG TABS tablet 5 mg by mouth twice a day ×7 days followed by 2.5 mg by mouth twice a day (Patient taking differently: Take 2.5 mg by mouth 2 times daily )      hydrOXYzine (ATARAX) 10 MG tablet Take 10 mg by mouth 3 times daily as needed for Itching      levothyroxine (SYNTHROID) 25 MCG tablet Take 1 tablet by mouth daily (Patient taking differently: Take 25 mcg by mouth every morning ) 30 tablet 3    Mirabegron ER 50 MG TB24 Take 50 mg by mouth Daily with supper       pantoprazole (PROTONIX) 40 MG tablet Take 1 tablet by mouth 2 times daily (before meals). 60 tablet 1    simvastatin (ZOCOR) 40 MG tablet Take 40 mg by mouth nightly.  gabapentin (NEURONTIN) 300 MG capsule Take 300 mg by mouth nightly. Fadi Nick insulin detemir (LEVEMIR FLEXPEN) 100 UNIT/ML injection pen Inject 55 Units into the skin nightly 5 pen 3    lisinopril (PRINIVIL;ZESTRIL) 5 MG tablet Take 1 tablet by mouth daily 30 tablet 3     No current facility-administered medications for this visit. Review of Systems  Constitutional: No fever, no chills, no diaphoresis, no generalized weakness. HEENT: No blurred vision, No sore throat, no ear pain, no hair loss  Neck: denied any neck swelling, difficulty swallowing,   Cardio-pulmonary: No CP, SOB or palpitation, No orthopnea or PND. No cough or wheezing.   GI: No N/V/D, no constipation, No abdominal pain, no melena or hematochezia   : Denied any dysuria, hematuria, flank pain, discharge, or incontinence. Skin: denied any rash, ulcer, Hirsute, or hyperpigmentation. MSK: denied any joint deformity, joint pain/swelling, muscle pain, or back pain. Neuro: no numbness, no tingling, no weakness, _    OBJECTIVE    /88   Pulse 79   Ht 5' 1\" (1.549 m)   Wt 283 lb 6.4 oz (128.5 kg)   LMP  (LMP Unknown)   SpO2 93%   BMI 53.55 kg/m²   BP Readings from Last 4 Encounters:   05/01/19 136/88   07/26/18 122/65   05/08/18 109/79   04/28/18 (!) 86/50     Wt Readings from Last 6 Encounters:   05/01/19 283 lb 6.4 oz (128.5 kg)   07/26/18 266 lb 3.2 oz (120.7 kg)   05/08/18 265 lb 4 oz (120.3 kg)   02/23/18 267 lb (121.1 kg)   02/13/18 267 lb (121.1 kg)   02/05/18 250 lb (113.4 kg)       Physical examination:  General: awake alert, oriented x3, no abnormal position or movements. Morbidly Obese   HEENT: normocephalic non-traumatic, no exophthalmos   Neck: supple, no LN enlargement, no thyromegaly, no thyroid tenderness, no JVD. Pulm: Clear equal air entry no added sounds, no wheezing or rhonchi    CVS: S1 + S2, no murmur, no heave. Dorsalis pedis pulse palpable   Abd: soft lax, no tenderness, no organomegaly, audible bowel sounds.    Skin: warm, no lesions, no rash. + callus, no Ulcers, No acanthosis nigricans  Musculoskeletal: No back tenderness, no kyphosis/scoliosis    Neuro: CN intact, Monofilament sensation decreased bilateral , muscle power normal  Psych: normal mood, and affect      Review of Laboratory Data:  I personally reviewed the following lab:  4/23/2019   BS 92, Na 143, K 4.3, Cr 1.5, GFR 33, Ca 9, ALT 9, AST 12, WBC 3.8, Hb 11.1, Plt 177, VitD 52  Lab Results   Component Value Date/Time    WBC 5.4 07/26/2018 09:40 AM    RBC 4.10 07/26/2018 09:40 AM    HGB 9.1 (L) 07/26/2018 09:40 AM    HCT 33.0 (L) 07/26/2018 09:40 AM    MCV 80.5 07/26/2018 09:40 AM    MCH 22.2 (L) 07/26/2018 09:40 AM    MCHC 27.6 (L) 07/26/2018 09:40 AM    RDW 21.4 (H) 07/26/2018 09:40 AM     07/26/2018 09:40 AM    MPV 9.5 07/26/2018 09:40 AM      Lab Results   Component Value Date/Time     07/26/2018 09:40 AM    K 4.1 07/26/2018 09:40 AM    CO2 40 (H) 07/26/2018 09:40 AM    BUN 22 07/26/2018 09:40 AM    CREATININE 1.5 (H) 07/26/2018 09:40 AM    CALCIUM 9.6 07/26/2018 09:40 AM    LABGLOM 33 07/26/2018 09:40 AM    GFRAA 40 07/26/2018 09:40 AM      Lab Results   Component Value Date/Time    TSH 1.430 07/23/2018 03:14 AM     Lab Results   Component Value Date    LABA1C 7.6 07/21/2018    GLUCOSE 114 07/26/2018    LABCREA 123 04/27/2018     Lab Results   Component Value Date    LABA1C 7.6 07/21/2018    LABA1C 7.0 04/28/2018    LABA1C 7.4 12/27/2017     Lab Results   Component Value Date    CHOL 94 07/23/2018    CHOL 125 12/27/2017    TRIG 103 07/23/2018    TRIG 146 12/27/2017    HDL 39 07/23/2018    HDL 44 12/27/2017     No results found for: [address] [place] [address] [address] Records/Labs/Images review:   I personally reviewed and summarized previous records   All labs and imaging studies were independently reviewed     [place]. 31, a 80 y.o.-old female seen in for the following issues     Diabetes Mellitus Type 2     · Patient's diabetes is uncontrol   · Continue Trulicity 1.5 mg weekly  · Decrease Toujeo to 50 units daily at bedtime  · Increase Humalog 12  Units before meals + ss 2:50>150   · The patient was advised to check blood sugars 4 times a day before meals and at bedtime and send BS readings to our office in a week.   · Discussed with patient A1c and blood sugar goals   · Optimal blood sugars: 100-140 pre-prandial, < 180 peak post-prandial  · The patient counseled about the complications of uncontrolled diabetes   · Patient was counselled about the importance of self-blood glucose monitoring and eating consistent carb diet to avoid blood sugar fluctuations   · Patient up to date with the routine diabetes maintenance and prevention  · Discussed lifestyle changes including diet and exercise with patient; recommended 150 minutes of moderate intensity exercise per week. · Diabetes labs before next visit     Dietary noncompliance   Discussed with patient the importance of eating consistent carbohydrate meals, avoiding high glycemic index food. Also, discussed with patient the risk and negative consequences of dietary noncompliance on blood glucose control, blood pressure and weight    Obesity   Discussed lifestyle changes including diet and exercise with patient in depth. Also discussed with patient cardiovascular risk associated with obesity    Hyperlipidemia   · continue statin     Hypothyroidism   · Continue levothyroxine 25 mcg daily   · TFT before next visit     Return in about 4 months (around 9/1/2019) for DM type 2 on insulin, Hypothyroidism . The above issues were reviewed with the patient who understood and agreed with the plan. Thank you for allowing us to participate in the care of this patient. Please do not hesitate to contact us with any additional questions. Diagnosis Orders   1. Type 2 diabetes mellitus with complication, with long-term current use of insulin (HCC)  Basic Metabolic Panel    Microalbumin / Creatinine Urine Ratio    Hemoglobin A1C    insulin lispro (HUMALOG) 100 UNIT/ML injection vial    insulin glargine (TOUJEO SOLOSTAR) 300 UNIT/ML injection pen   2.  Hypothyroidism, unspecified type  TSH without Reflex    T4, Free       Aaron Lu MD  Endocrinologist, [facility])   [address] [facility], [address] [place] [address],[facility] [id number]   Phone: [phone]  Fax: [phone]  --------------------------------------------  Electronically signed

## 2019-05-05 PROBLEM — E66.9 OBESITY: Status: ACTIVE | Noted: 2019-05-05

## 2019-05-20 ENCOUNTER — TELEPHONE (OUTPATIENT)
Dept: ENDOCRINOLOGY | Age: 84
End: 2019-05-20

## 2019-05-20 NOTE — TELEPHONE ENCOUNTER
Dr BORRERO reviewed patients BS log from the [address] at [place]. Only change is to decrease Toujeo from 45 to 40 units. Continue everything else the same. Lizet Aguero at the [address] was notified.  Med list updated

## 2019-07-27 ENCOUNTER — APPOINTMENT (OUTPATIENT)
Dept: CT IMAGING | Age: 84
End: 2019-07-27
Payer: MEDICARE

## 2019-07-27 ENCOUNTER — APPOINTMENT (OUTPATIENT)
Dept: GENERAL RADIOLOGY | Age: 84
End: 2019-07-27
Payer: MEDICARE

## 2019-07-27 ENCOUNTER — HOSPITAL ENCOUNTER (EMERGENCY)
Age: 84
Discharge: NURSING, REHAB OR LONG-TERM CARE | End: 2019-07-28
Attending: EMERGENCY MEDICINE
Payer: MEDICARE

## 2019-07-27 DIAGNOSIS — R10.84 GENERALIZED ABDOMINAL PAIN: Primary | ICD-10-CM

## 2019-07-27 LAB
BASOPHILS ABSOLUTE: 0.01 E9/L (ref 0–0.2)
BASOPHILS RELATIVE PERCENT: 0.1 % (ref 0–2)
EOSINOPHILS ABSOLUTE: 0.02 E9/L (ref 0.05–0.5)
EOSINOPHILS RELATIVE PERCENT: 0.2 % (ref 0–6)
HCT VFR BLD CALC: 39.5 % (ref 34–48)
HEMOGLOBIN: 12.7 G/DL (ref 11.5–15.5)
IMMATURE GRANULOCYTES #: 0.05 E9/L
IMMATURE GRANULOCYTES %: 0.5 % (ref 0–5)
LYMPHOCYTES ABSOLUTE: 0.64 E9/L (ref 1.5–4)
LYMPHOCYTES RELATIVE PERCENT: 6.2 % (ref 20–42)
MCH RBC QN AUTO: 30.8 PG (ref 26–35)
MCHC RBC AUTO-ENTMCNC: 32.2 % (ref 32–34.5)
MCV RBC AUTO: 95.9 FL (ref 80–99.9)
MONOCYTES ABSOLUTE: 0.9 E9/L (ref 0.1–0.95)
MONOCYTES RELATIVE PERCENT: 8.7 % (ref 2–12)
NEUTROPHILS ABSOLUTE: 8.73 E9/L (ref 1.8–7.3)
NEUTROPHILS RELATIVE PERCENT: 84.3 % (ref 43–80)
PDW BLD-RTO: 13 FL (ref 11.5–15)
PLATELET # BLD: 193 E9/L (ref 130–450)
PMV BLD AUTO: 11.6 FL (ref 7–12)
RBC # BLD: 4.12 E12/L (ref 3.5–5.5)
REASON FOR REJECTION: NORMAL
REJECTED TEST: NORMAL
WBC # BLD: 10.4 E9/L (ref 4.5–11.5)

## 2019-07-27 PROCEDURE — 99284 EMERGENCY DEPT VISIT MOD MDM: CPT

## 2019-07-27 PROCEDURE — 85025 COMPLETE CBC W/AUTO DIFF WBC: CPT

## 2019-07-27 PROCEDURE — 6360000002 HC RX W HCPCS: Performed by: EMERGENCY MEDICINE

## 2019-07-27 PROCEDURE — 80053 COMPREHEN METABOLIC PANEL: CPT

## 2019-07-27 PROCEDURE — 84484 ASSAY OF TROPONIN QUANT: CPT

## 2019-07-27 PROCEDURE — 81001 URINALYSIS AUTO W/SCOPE: CPT

## 2019-07-27 PROCEDURE — 83690 ASSAY OF LIPASE: CPT

## 2019-07-27 PROCEDURE — 93005 ELECTROCARDIOGRAM TRACING: CPT | Performed by: EMERGENCY MEDICINE

## 2019-07-27 PROCEDURE — 96374 THER/PROPH/DIAG INJ IV PUSH: CPT

## 2019-07-27 PROCEDURE — 96375 TX/PRO/DX INJ NEW DRUG ADDON: CPT

## 2019-07-27 PROCEDURE — 74176 CT ABD & PELVIS W/O CONTRAST: CPT

## 2019-07-27 PROCEDURE — 71045 X-RAY EXAM CHEST 1 VIEW: CPT

## 2019-07-27 RX ORDER — ONDANSETRON 2 MG/ML
4 INJECTION INTRAMUSCULAR; INTRAVENOUS ONCE
Status: COMPLETED | OUTPATIENT
Start: 2019-07-27 | End: 2019-07-27

## 2019-07-27 RX ORDER — MORPHINE SULFATE 4 MG/ML
4 INJECTION, SOLUTION INTRAMUSCULAR; INTRAVENOUS ONCE
Status: COMPLETED | OUTPATIENT
Start: 2019-07-27 | End: 2019-07-27

## 2019-07-27 RX ADMIN — ONDANSETRON 4 MG: 2 INJECTION INTRAMUSCULAR; INTRAVENOUS at 22:40

## 2019-07-27 RX ADMIN — MORPHINE SULFATE 4 MG: 4 INJECTION, SOLUTION INTRAMUSCULAR; INTRAVENOUS at 22:41

## 2019-07-27 ASSESSMENT — PAIN DESCRIPTION - LOCATION: LOCATION: ABDOMEN

## 2019-07-27 ASSESSMENT — PAIN DESCRIPTION - PAIN TYPE: TYPE: ACUTE PAIN

## 2019-07-27 ASSESSMENT — PAIN DESCRIPTION - DESCRIPTORS: DESCRIPTORS: CONSTANT

## 2019-07-27 ASSESSMENT — PAIN SCALES - GENERAL
PAINLEVEL_OUTOF10: 10
PAINLEVEL_OUTOF10: 10

## 2019-07-28 VITALS
BODY MASS INDEX: 53.43 KG/M2 | HEIGHT: 61 IN | TEMPERATURE: 98.9 F | DIASTOLIC BLOOD PRESSURE: 79 MMHG | HEART RATE: 96 BPM | SYSTOLIC BLOOD PRESSURE: 158 MMHG | RESPIRATION RATE: 18 BRPM | WEIGHT: 283 LBS | OXYGEN SATURATION: 95 %

## 2019-07-28 LAB
ALBUMIN SERPL-MCNC: 3.8 G/DL (ref 3.5–5.2)
ALP BLD-CCNC: 94 U/L (ref 35–104)
ALT SERPL-CCNC: 54 U/L (ref 0–32)
ANION GAP SERPL CALCULATED.3IONS-SCNC: 12 MMOL/L (ref 7–16)
AST SERPL-CCNC: 37 U/L (ref 0–31)
BACTERIA: ABNORMAL /HPF
BILIRUB SERPL-MCNC: 0.6 MG/DL (ref 0–1.2)
BILIRUBIN URINE: NEGATIVE
BLOOD, URINE: ABNORMAL
BUN BLDV-MCNC: 27 MG/DL (ref 8–23)
CALCIUM SERPL-MCNC: 9.9 MG/DL (ref 8.6–10.2)
CHLORIDE BLD-SCNC: 98 MMOL/L (ref 98–107)
CLARITY: CLEAR
CO2: 31 MMOL/L (ref 22–29)
COLOR: YELLOW
CREAT SERPL-MCNC: 1.5 MG/DL (ref 0.5–1)
EKG ATRIAL RATE: 125 BPM
EKG P AXIS: 40 DEGREES
EKG P-R INTERVAL: 130 MS
EKG Q-T INTERVAL: 328 MS
EKG QRS DURATION: 114 MS
EKG QTC CALCULATION (BAZETT): 473 MS
EKG R AXIS: -11 DEGREES
EKG T AXIS: -5 DEGREES
EKG VENTRICULAR RATE: 125 BPM
EPITHELIAL CELLS, UA: ABNORMAL /HPF
GFR AFRICAN AMERICAN: 40
GFR NON-AFRICAN AMERICAN: 33 ML/MIN/1.73
GLUCOSE BLD-MCNC: 139 MG/DL (ref 74–99)
GLUCOSE URINE: >=1000 MG/DL
KETONES, URINE: ABNORMAL MG/DL
LEUKOCYTE ESTERASE, URINE: NEGATIVE
LIPASE: 7 U/L (ref 13–60)
NITRITE, URINE: NEGATIVE
PH UA: 7 (ref 5–9)
POTASSIUM REFLEX MAGNESIUM: 4.3 MMOL/L (ref 3.5–5)
PROTEIN UA: 30 MG/DL
RBC UA: ABNORMAL /HPF (ref 0–2)
SODIUM BLD-SCNC: 141 MMOL/L (ref 132–146)
SPECIFIC GRAVITY UA: 1.01 (ref 1–1.03)
TOTAL PROTEIN: 7.2 G/DL (ref 6.4–8.3)
TROPONIN: 0.02 NG/ML (ref 0–0.03)
UROBILINOGEN, URINE: 0.2 E.U./DL
WBC UA: ABNORMAL /HPF (ref 0–5)

## 2019-07-28 PROCEDURE — 2580000003 HC RX 258: Performed by: EMERGENCY MEDICINE

## 2019-07-28 PROCEDURE — 93010 ELECTROCARDIOGRAM REPORT: CPT | Performed by: INTERNAL MEDICINE

## 2019-07-28 RX ORDER — 0.9 % SODIUM CHLORIDE 0.9 %
1000 INTRAVENOUS SOLUTION INTRAVENOUS ONCE
Status: COMPLETED | OUTPATIENT
Start: 2019-07-28 | End: 2019-07-28

## 2019-07-28 RX ORDER — SODIUM CHLORIDE 0.9 % (FLUSH) 0.9 %
10 SYRINGE (ML) INJECTION 2 TIMES DAILY
Status: DISCONTINUED | OUTPATIENT
Start: 2019-07-28 | End: 2019-07-28

## 2019-07-28 RX ORDER — PANTOPRAZOLE SODIUM 40 MG/1
40 TABLET, DELAYED RELEASE ORAL 2 TIMES DAILY
Qty: 28 TABLET | Refills: 0 | Status: SHIPPED | OUTPATIENT
Start: 2019-07-28 | End: 2020-01-01

## 2019-07-28 RX ADMIN — SODIUM CHLORIDE 1000 ML: 9 INJECTION, SOLUTION INTRAVENOUS at 01:50

## 2019-07-28 NOTE — ED PROVIDER NOTES
Basophils % 0.1 0.0 - 2.0 %    Neutrophils # 8.73 (H) 1.80 - 7.30 E9/L    Immature Granulocytes # 0.05 E9/L    Lymphocytes # 0.64 (L) 1.50 - 4.00 E9/L    Monocytes # 0.90 0.10 - 0.95 E9/L    Eosinophils # 0.02 (L) 0.05 - 0.50 E9/L    Basophils # 0.01 0.00 - 0.20 E9/L   Urinalysis   Result Value Ref Range    Color, UA Yellow Straw/Yellow    Clarity, UA Clear Clear    Glucose, Ur >=1000 (A) Negative mg/dL    Bilirubin Urine Negative Negative    Ketones, Urine TRACE (A) Negative mg/dL    Specific Gravity, UA 1.015 1.005 - 1.030    Blood, Urine TRACE-LYSED Negative    pH, UA 7.0 5.0 - 9.0    Protein, UA 30 (A) Negative mg/dL    Urobilinogen, Urine 0.2 <2.0 E.U./dL    Nitrite, Urine Negative Negative    Leukocyte Esterase, Urine Negative Negative   SPECIMEN REJECTION   Result Value Ref Range    Rejected Test CMPX LIPAS TROP     Reason for Rejection see below    Comprehensive Metabolic Panel w/ Reflex to MG   Result Value Ref Range    Sodium 141 132 - 146 mmol/L    Potassium reflex Magnesium 4.3 3.5 - 5.0 mmol/L    Chloride 98 98 - 107 mmol/L    CO2 31 (H) 22 - 29 mmol/L    Anion Gap 12 7 - 16 mmol/L    Glucose 139 (H) 74 - 99 mg/dL    BUN 27 (H) 8 - 23 mg/dL    CREATININE 1.5 (H) 0.5 - 1.0 mg/dL    GFR Non-African American 33 >=60 mL/min/1.73    GFR African American 40     Calcium 9.9 8.6 - 10.2 mg/dL    Total Protein 7.2 6.4 - 8.3 g/dL    Alb 3.8 3.5 - 5.2 g/dL    Total Bilirubin 0.6 0.0 - 1.2 mg/dL    Alkaline Phosphatase 94 35 - 104 U/L    ALT 54 (H) 0 - 32 U/L    AST 37 (H) 0 - 31 U/L   Lipase   Result Value Ref Range    Lipase 7 (L) 13 - 60 U/L   Troponin   Result Value Ref Range    Troponin 0.02 0.00 - 0.03 ng/mL   Microscopic Urinalysis   Result Value Ref Range    WBC, UA 0-1 0 - 5 /HPF    RBC, UA 0-1 0 - 2 /HPF    Epi Cells RARE /HPF    Bacteria, UA FEW (A) /HPF   EKG 12 Lead   Result Value Ref Range    Ventricular Rate 125 BPM    Atrial Rate 125 BPM    P-R Interval 130 ms    QRS Duration 114 ms    Q-T Interval

## 2019-09-11 ENCOUNTER — OFFICE VISIT (OUTPATIENT)
Dept: ENDOCRINOLOGY | Age: 84
End: 2019-09-11
Payer: MEDICARE

## 2019-09-11 VITALS
DIASTOLIC BLOOD PRESSURE: 98 MMHG | SYSTOLIC BLOOD PRESSURE: 148 MMHG | RESPIRATION RATE: 18 BRPM | HEART RATE: 79 BPM | BODY MASS INDEX: 49.5 KG/M2 | WEIGHT: 269 LBS | HEIGHT: 62 IN | OXYGEN SATURATION: 99 %

## 2019-09-11 DIAGNOSIS — E66.9 OBESITY, UNSPECIFIED CLASSIFICATION, UNSPECIFIED OBESITY TYPE, UNSPECIFIED WHETHER SERIOUS COMORBIDITY PRESENT: ICD-10-CM

## 2019-09-11 DIAGNOSIS — E11.8 TYPE 2 DIABETES MELLITUS WITH COMPLICATION, WITH LONG-TERM CURRENT USE OF INSULIN (HCC): Primary | ICD-10-CM

## 2019-09-11 DIAGNOSIS — E03.9 HYPOTHYROIDISM, UNSPECIFIED TYPE: ICD-10-CM

## 2019-09-11 DIAGNOSIS — Z79.4 TYPE 2 DIABETES MELLITUS WITH COMPLICATION, WITH LONG-TERM CURRENT USE OF INSULIN (HCC): Primary | ICD-10-CM

## 2019-09-11 LAB — HBA1C MFR BLD: 7.8 %

## 2019-09-11 PROCEDURE — G8428 CUR MEDS NOT DOCUMENT: HCPCS | Performed by: INTERNAL MEDICINE

## 2019-09-11 PROCEDURE — 4040F PNEUMOC VAC/ADMIN/RCVD: CPT | Performed by: INTERNAL MEDICINE

## 2019-09-11 PROCEDURE — 83036 HEMOGLOBIN GLYCOSYLATED A1C: CPT | Performed by: INTERNAL MEDICINE

## 2019-09-11 PROCEDURE — G8400 PT W/DXA NO RESULTS DOC: HCPCS | Performed by: INTERNAL MEDICINE

## 2019-09-11 PROCEDURE — 1123F ACP DISCUSS/DSCN MKR DOCD: CPT | Performed by: INTERNAL MEDICINE

## 2019-09-11 PROCEDURE — 1090F PRES/ABSN URINE INCON ASSESS: CPT | Performed by: INTERNAL MEDICINE

## 2019-09-11 PROCEDURE — 1036F TOBACCO NON-USER: CPT | Performed by: INTERNAL MEDICINE

## 2019-09-11 PROCEDURE — 99214 OFFICE O/P EST MOD 30 MIN: CPT | Performed by: INTERNAL MEDICINE

## 2019-09-11 PROCEDURE — G8417 CALC BMI ABV UP PARAM F/U: HCPCS | Performed by: INTERNAL MEDICINE

## 2019-09-11 NOTE — LETTER
 Not on file   Social Needs    Financial resource strain: Not on file    Food insecurity:     Worry: Not on file     Inability: Not on file    Transportation needs:     Medical: Not on file     Non-medical: Not on file   Tobacco Use    Smoking status: Never Smoker    Smokeless tobacco: Never Used   Substance and Sexual Activity    Alcohol use:  Yes     Alcohol/week: 0.0 standard drinks     Comment: occasional    Drug use: No    Sexual activity: Never   Lifestyle    Physical activity:     Days per week: Not on file     Minutes per session: Not on file    Stress: Not on file   Relationships    Social connections:     Talks on phone: Not on file     Gets together: Not on file     Attends [employer] service: Not on file     Active member of club or organization: Not on file     Attends meetings of clubs or organizations: Not on file     Relationship status: Not on file    Intimate partner violence:     Fear of current or ex partner: Not on file     Emotionally abused: Not on file     Physically abused: Not on file     Forced sexual activity: Not on file   Other Topics Concern    Not on file   Social History Narrative    Not on file     FAMILY HISTORY   Family History   Problem Relation Age of Onset    Heart Disease Mother     Kidney Disease Mother         dialysis    Diabetes Mother     Stroke Father     Diabetes Father      ALLERGIES AND DRUG REACTIONS   Allergies   Allergen Reactions    Pcn [Penicillins] Hives       CURRENT MEDICATIONS   Current Outpatient Medications   Medication Sig Dispense Refill    pantoprazole (PROTONIX) 40 MG tablet Take 1 tablet by mouth 2 times daily for 14 days 28 tablet 0    calcitRIOL (ROCALTROL) 0.25 MCG capsule Take 0.25 mcg by mouth daily      loratadine (CLARITIN) 10 MG tablet Take 10 mg by mouth daily      bisacodyl (DULCOLAX) 10 MG suppository Place 10 mg rectally daily      melatonin 5 MG TABS tablet Take 5 mg by mouth daily (Patient taking differently: Take 25 mcg by mouth every morning ) 30 tablet 3    Mirabegron ER 50 MG TB24 Take 50 mg by mouth Daily with supper       pantoprazole (PROTONIX) 40 MG tablet Take 1 tablet by mouth 2 times daily (before meals). 60 tablet 1    simvastatin (ZOCOR) 40 MG tablet Take 40 mg by mouth nightly.  gabapentin (NEURONTIN) 300 MG capsule Take 300 mg by mouth nightly. .       No current facility-administered medications for this visit. Review of Systems  Constitutional: No fever, no chills, no diaphoresis, no generalized weakness. HEENT: No blurred vision, No sore throat, no ear pain, no hair loss  Neck: denied any neck swelling, difficulty swallowing,   Cardio-pulmonary: No CP, SOB or palpitation, No orthopnea or PND. No cough or wheezing. GI: No N/V/D, no constipation, No abdominal pain, no melena or hematochezia   : Denied any dysuria, hematuria, flank pain, discharge, or incontinence. Skin: denied any rash, ulcer, Hirsute, or hyperpigmentation. MSK: denied any joint deformity, joint pain/swelling, muscle pain, or back pain. Neuro: no numbness, no tingling, no weakness, _    OBJECTIVE    BP (!) 148/98 (Site: Left Upper Arm, Position: Sitting, Cuff Size: Large Adult)   Pulse 79   Resp 18   Ht 5' 1.5\" (1.562 m)   Wt 269 lb (122 kg)   LMP  (LMP Unknown)   SpO2 99%   BMI 50.00 kg/m²    BP Readings from Last 4 Encounters:   09/11/19 (!) 148/98   07/28/19 (!) 158/79   05/01/19 136/88   07/26/18 122/65     Wt Readings from Last 6 Encounters:   09/11/19 269 lb (122 kg)   07/27/19 283 lb (128.4 kg)   05/01/19 283 lb 6.4 oz (128.5 kg)   07/26/18 266 lb 3.2 oz (120.7 kg)   05/08/18 265 lb 4 oz (120.3 kg)   02/23/18 267 lb (121.1 kg)       Physical examination:  General: awake alert, oriented x3, no abnormal position or movements. Morbidly Obese   HEENT: normocephalic non-traumatic, no exophthalmos   Neck: supple, no LN enlargement, no thyromegaly, no thyroid tenderness, no JVD.

## 2019-09-11 NOTE — PROGRESS NOTES
cancer Right    History of stroke     Hyperlipidemia     Hypertension     Hypothyroidism     Volume overload 12/30/2017    As cause of dyspnea     PAST SURGICAL HISTORY   Past Surgical History:   Procedure Laterality Date    BREAST SURGERY      right    BRONCHOSCOPY  4/28/2018    BRONCHOSCOPY DIAGNOSTIC performed by Jarred Saunders MD at [address] [place] [address] NASAL SINUS SURGERY      august 27 2017. cauterized her nasal passage.  OTHER SURGICAL HISTORY  07/27/2017    endoscopic conrtol of epistaxis dr Zhou Alatorre N/A 4/28/2018    ENDOSCOPIC GUIDED CONTROL EPISTAXIS  WITH SEPTAL BUTTON PLACEMENT. INTRA-OPERATIVE BRONCHOSCOPY. performed by Jarred Saunders MD at [place] [facility]   Social History     Socioeconomic History    Marital status:      Spouse name: Not on file    Number of children: Not on file    Years of education: Not on file    Highest education level: Not on file   Occupational History    Not on file   Social Needs    Financial resource strain: Not on file    Food insecurity:     Worry: Not on file     Inability: Not on file    Transportation needs:     Medical: Not on file     Non-medical: Not on file   Tobacco Use    Smoking status: Never Smoker    Smokeless tobacco: Never Used   Substance and Sexual Activity    Alcohol use:  Yes     Alcohol/week: 0.0 standard drinks     Comment: occasional    Drug use: No    Sexual activity: Never   Lifestyle    Physical activity:     Days per week: Not on file     Minutes per session: Not on file    Stress: Not on file   Relationships    Social connections:     Talks on phone: Not on file     Gets together: Not on file     Attends Confucianist service: Not on file     Active member of club or organization: Not on file     Attends meetings of clubs or organizations: Not on file     Relationship status: Not on file    Intimate partner violence: Fear of current or ex partner: Not on file     Emotionally abused: Not on file     Physically abused: Not on file     Forced sexual activity: Not on file   Other Topics Concern    Not on file   Social History Narrative    Not on file     FAMILY HISTORY   Family History   Problem Relation Age of Onset    Heart Disease Mother     Kidney Disease Mother         dialysis    Diabetes Mother     Stroke Father     Diabetes Father      ALLERGIES AND DRUG REACTIONS   Allergies   Allergen Reactions    Pcn [Penicillins] Hives       CURRENT MEDICATIONS   Current Outpatient Medications   Medication Sig Dispense Refill    pantoprazole (PROTONIX) 40 MG tablet Take 1 tablet by mouth 2 times daily for 14 days 28 tablet 0    calcitRIOL (ROCALTROL) 0.25 MCG capsule Take 0.25 mcg by mouth daily      loratadine (CLARITIN) 10 MG tablet Take 10 mg by mouth daily      bisacodyl (DULCOLAX) 10 MG suppository Place 10 mg rectally daily      melatonin 5 MG TABS tablet Take 5 mg by mouth daily      guaiFENesin (MUCINEX) 600 MG extended release tablet Take 1,200 mg by mouth 2 times daily      nystatin (NYSTATIN) 115798 UNIT/GM powder Apply topically 4 times daily Apply topically 4 times daily.  Dulaglutide (TRULICITY) 1.5 PI/7.4JD SOPN Inject 1.5 mg into the skin once a week Indications: weekly on Thursday      Cholecalciferol (VITAMIN D3) 5000 units TABS Take by mouth daily      insulin lispro (HUMALOG) 100 UNIT/ML injection vial Take 12 units before meals + sliding scale. MAX 60 units daily (Patient taking differently: Take 12 units before breakfast and lunch and 14 units before supper + sliding scale.  MAX 60 units daily) 3 vial 5    insulin glargine (TOUJEO SOLOSTAR) 300 UNIT/ML injection pen Inject 50 Units into the skin nightly (Patient taking differently: Inject 40 Units into the skin nightly ) 12 pen 5    metoprolol succinate (TOPROL XL) 25 MG extended release tablet Take 1 tablet by mouth 2 times daily 60 hyperpigmentation. MSK: denied any joint deformity, joint pain/swelling, muscle pain, or back pain. Neuro: no numbness, no tingling, no weakness, _    OBJECTIVE    BP (!) 148/98 (Site: Left Upper Arm, Position: Sitting, Cuff Size: Large Adult)   Pulse 79   Resp 18   Ht 5' 1.5\" (1.562 m)   Wt 269 lb (122 kg)   LMP  (LMP Unknown)   SpO2 99%   BMI 50.00 kg/m²   BP Readings from Last 4 Encounters:   09/11/19 (!) 148/98   07/28/19 (!) 158/79   05/01/19 136/88   07/26/18 122/65     Wt Readings from Last 6 Encounters:   09/11/19 269 lb (122 kg)   07/27/19 283 lb (128.4 kg)   05/01/19 283 lb 6.4 oz (128.5 kg)   07/26/18 266 lb 3.2 oz (120.7 kg)   05/08/18 265 lb 4 oz (120.3 kg)   02/23/18 267 lb (121.1 kg)       Physical examination:  General: awake alert, oriented x3, no abnormal position or movements. Morbidly Obese   HEENT: normocephalic non-traumatic, no exophthalmos   Neck: supple, no LN enlargement, no thyromegaly, no thyroid tenderness, no JVD. Pulm: Clear equal air entry no added sounds, no wheezing or rhonchi    CVS: S1 + S2, no murmur, no heave. Dorsalis pedis pulse palpable   Abd: soft lax, no tenderness, no organomegaly, audible bowel sounds.    Skin: warm, no lesions, no rash. + callus, no Ulcers, No acanthosis nigricans  Musculoskeletal: No back tenderness, no kyphosis/scoliosis    Neuro: CN intact, Monofilament sensation decreased bilateral , muscle power normal  Psych: normal mood, and affect      Review of Laboratory Data:  I personally reviewed the following lab:  Lab Results   Component Value Date/Time    WBC 10.4 07/27/2019 10:17 PM    RBC 4.12 07/27/2019 10:17 PM    HGB 12.7 07/27/2019 10:17 PM    HCT 39.5 07/27/2019 10:17 PM    MCV 95.9 07/27/2019 10:17 PM    MCH 30.8 07/27/2019 10:17 PM    MCHC 32.2 07/27/2019 10:17 PM    RDW 13.0 07/27/2019 10:17 PM     07/27/2019 10:17 PM    MPV 11.6 07/27/2019 10:17 PM      Lab Results   Component Value Date/Time     07/27/2019 11:59 PM    K 4.3 07/27/2019 11:59 PM    CO2 31 (H) 07/27/2019 11:59 PM    BUN 27 (H) 07/27/2019 11:59 PM    CREATININE 1.5 (H) 07/27/2019 11:59 PM    CALCIUM 9.9 07/27/2019 11:59 PM    LABGLOM 33 07/27/2019 11:59 PM    GFRAA 40 07/27/2019 11:59 PM      Lab Results   Component Value Date/Time    TSH 1.430 07/23/2018 03:14 AM     Lab Results   Component Value Date    LABA1C 7.8 09/11/2019    GLUCOSE 139 07/27/2019    LABCREA 123 04/27/2018     Lab Results   Component Value Date    LABA1C 7.8 09/11/2019    LABA1C 7.6 07/21/2018    LABA1C 7.0 04/28/2018     Lab Results   Component Value Date    CHOL 94 07/23/2018    CHOL 125 12/27/2017    TRIG 103 07/23/2018    TRIG 146 12/27/2017    HDL 39 07/23/2018    HDL 44 12/27/2017     No results found for: [address] [place] [address] [address] Records/Labs/Images review:   I personally reviewed and summarized previous records   All labs and imaging studies were independently reviewed     [place]. 31, a 80 y.o.-old female seen in for the following issues     Diabetes Mellitus Type 2     · Improving control. A1c now 7.8% down from 8.6%   · Continue Trulicity 1.5 mg weekly, Toujeo to 40 units daily at bedtime AND Humalog 12/12/14  Units before meals  · Change sliding scale to high dose 3:50>150   · The patient was advised to check blood sugars 4 times a day before meals and at bedtime and send BS readings to our office in few weeks   · Discussed with patient A1c and blood sugar goals   · Optimal blood sugars: 100-140 pre-prandial, < 180 peak post-prandial  · Patient up to date with the routine diabetes maintenance and prevention  · Discussed lifestyle changes including diet and exercise with patient; recommended 150 minutes of moderate intensity exercise per week. · Diabetes labs before next visit     Obesity   Discussed lifestyle changes including diet and exercise with patient in depth.  Also discussed with patient cardiovascular risk associated with obesity    Hyperlipidemia · continue statin     Hypothyroidism   · At goal, continue levothyroxine 25 mcg daily     Return in about 4 months (around 1/11/2020) for DM type 2, Hypothyroidism . The above issues were reviewed with the patient who understood and agreed with the plan. 30 minutes were spent today in management of this patient. More than 50% of time spent on counseling of patient on above diagnosis. Thank you for allowing us to participate in the care of this patient. Please do not hesitate to contact us with any additional questions. Diagnosis Orders   1. Type 2 diabetes mellitus with complication, with long-term current use of insulin (HCC)  POCT glycosylated hemoglobin (Hb A1C)   2. Hypothyroidism, unspecified type     3.  Obesity, unspecified classification, unspecified obesity type, unspecified whether serious comorbidity present         Bobbi Wan MD  Endocrinologist, [address], [address] [place] [address],[facility] [id number]   Phone: [phone]  Fax: [phone]  --------------------------------------------  Electronically signed

## 2019-11-11 ENCOUNTER — HOSPITAL ENCOUNTER (OUTPATIENT)
Dept: AUDIOLOGY | Age: 84
Discharge: HOME | End: 2019-11-11
Payer: MEDICARE

## 2019-11-11 ENCOUNTER — TELEPHONE (OUTPATIENT)
Dept: ENT CLINIC | Age: 84
End: 2019-11-11

## 2019-11-11 PROCEDURE — 92557 COMPREHENSIVE HEARING TEST: CPT | Performed by: AUDIOLOGIST

## 2019-11-11 PROCEDURE — 92567 TYMPANOMETRY: CPT | Performed by: AUDIOLOGIST

## 2020-01-01 ENCOUNTER — APPOINTMENT (OUTPATIENT)
Dept: GENERAL RADIOLOGY | Age: 85
DRG: 291 | End: 2020-01-01
Payer: COMMERCIAL

## 2020-01-01 ENCOUNTER — HOSPITAL ENCOUNTER (INPATIENT)
Age: 85
LOS: 3 days | Discharge: NURSING, REHAB OR LONG-TERM CARE | DRG: 291 | End: 2020-09-28
Attending: EMERGENCY MEDICINE | Admitting: INTERNAL MEDICINE
Payer: COMMERCIAL

## 2020-01-01 ENCOUNTER — OFFICE VISIT (OUTPATIENT)
Dept: ENT CLINIC | Age: 85
End: 2020-01-01
Payer: COMMERCIAL

## 2020-01-01 ENCOUNTER — HOSPITAL ENCOUNTER (INPATIENT)
Age: 85
LOS: 7 days | Discharge: NURSING, REHAB OR LONG-TERM CARE | DRG: 291 | End: 2020-08-31
Attending: EMERGENCY MEDICINE | Admitting: INTERNAL MEDICINE
Payer: COMMERCIAL

## 2020-01-01 ENCOUNTER — HOSPITAL ENCOUNTER (OUTPATIENT)
Age: 85
Discharge: HOME | End: 2020-10-28
Payer: COMMERCIAL

## 2020-01-01 ENCOUNTER — TELEPHONE (OUTPATIENT)
Dept: ENT CLINIC | Age: 85
End: 2020-01-01

## 2020-01-01 ENCOUNTER — OFFICE VISIT (OUTPATIENT)
Dept: CARDIOLOGY CLINIC | Age: 85
End: 2020-01-01
Payer: COMMERCIAL

## 2020-01-01 ENCOUNTER — APPOINTMENT (OUTPATIENT)
Dept: ULTRASOUND IMAGING | Age: 85
DRG: 291 | End: 2020-01-01
Payer: COMMERCIAL

## 2020-01-01 ENCOUNTER — HOSPITAL ENCOUNTER (OUTPATIENT)
Age: 85
Discharge: HOME | End: 2020-10-21
Payer: COMMERCIAL

## 2020-01-01 ENCOUNTER — HOSPITAL ENCOUNTER (OUTPATIENT)
Dept: AUDIOLOGY | Age: 85
Discharge: HOME | End: 2020-11-12

## 2020-01-01 ENCOUNTER — HOSPITAL ENCOUNTER (OUTPATIENT)
Age: 85
Discharge: HOME | End: 2020-10-07
Payer: COMMERCIAL

## 2020-01-01 ENCOUNTER — APPOINTMENT (OUTPATIENT)
Dept: CT IMAGING | Age: 85
DRG: 291 | End: 2020-01-01
Payer: COMMERCIAL

## 2020-01-01 ENCOUNTER — PROCEDURE VISIT (OUTPATIENT)
Dept: AUDIOLOGY | Age: 85
End: 2020-01-01
Payer: COMMERCIAL

## 2020-01-01 ENCOUNTER — HOSPITAL ENCOUNTER (OUTPATIENT)
Dept: AUDIOLOGY | Age: 85
Discharge: HOME | End: 2020-10-29
Payer: COMMERCIAL

## 2020-01-01 ENCOUNTER — TELEPHONE (OUTPATIENT)
Dept: OTHER | Facility: CLINIC | Age: 85
End: 2020-01-01

## 2020-01-01 ENCOUNTER — HOSPITAL ENCOUNTER (OUTPATIENT)
Age: 85
Discharge: HOME | End: 2020-10-14
Payer: COMMERCIAL

## 2020-01-01 ENCOUNTER — HOSPITAL ENCOUNTER (OUTPATIENT)
Age: 85
Discharge: HOME | End: 2020-09-17
Payer: COMMERCIAL

## 2020-01-01 ENCOUNTER — HOSPITAL ENCOUNTER (OUTPATIENT)
Age: 85
Discharge: HOME | End: 2020-10-01
Payer: COMMERCIAL

## 2020-01-01 ENCOUNTER — FOLLOWUP TELEPHONE ENCOUNTER (OUTPATIENT)
Dept: AUDIOLOGY | Age: 85
End: 2020-01-01

## 2020-01-01 ENCOUNTER — HOSPITAL ENCOUNTER (INPATIENT)
Age: 85
LOS: 6 days | Discharge: NURSING, REHAB OR LONG-TERM CARE | DRG: 291 | End: 2020-08-19
Attending: EMERGENCY MEDICINE | Admitting: INTERNAL MEDICINE
Payer: COMMERCIAL

## 2020-01-01 ENCOUNTER — TELEPHONE (OUTPATIENT)
Dept: CARDIOLOGY CLINIC | Age: 85
End: 2020-01-01

## 2020-01-01 VITALS
HEIGHT: 61 IN | OXYGEN SATURATION: 98 % | WEIGHT: 273.6 LBS | RESPIRATION RATE: 20 BRPM | DIASTOLIC BLOOD PRESSURE: 75 MMHG | HEART RATE: 90 BPM | SYSTOLIC BLOOD PRESSURE: 139 MMHG | BODY MASS INDEX: 51.65 KG/M2 | TEMPERATURE: 98 F

## 2020-01-01 VITALS
RESPIRATION RATE: 18 BRPM | HEIGHT: 61 IN | BODY MASS INDEX: 51.16 KG/M2 | HEART RATE: 76 BPM | DIASTOLIC BLOOD PRESSURE: 68 MMHG | SYSTOLIC BLOOD PRESSURE: 118 MMHG | WEIGHT: 271 LBS | OXYGEN SATURATION: 100 %

## 2020-01-01 VITALS — TEMPERATURE: 98.6 F | BODY MASS INDEX: 50.98 KG/M2 | HEIGHT: 61 IN | WEIGHT: 270 LBS

## 2020-01-01 VITALS
DIASTOLIC BLOOD PRESSURE: 59 MMHG | HEART RATE: 80 BPM | SYSTOLIC BLOOD PRESSURE: 135 MMHG | HEIGHT: 61 IN | RESPIRATION RATE: 18 BRPM | BODY MASS INDEX: 53.45 KG/M2 | WEIGHT: 283.1 LBS | OXYGEN SATURATION: 95 % | TEMPERATURE: 97.4 F

## 2020-01-01 VITALS
DIASTOLIC BLOOD PRESSURE: 74 MMHG | SYSTOLIC BLOOD PRESSURE: 125 MMHG | RESPIRATION RATE: 18 BRPM | TEMPERATURE: 97.2 F | OXYGEN SATURATION: 100 % | HEART RATE: 83 BPM | BODY MASS INDEX: 51.2 KG/M2 | WEIGHT: 271.2 LBS | HEIGHT: 61 IN

## 2020-01-01 LAB
ADENOVIRUS BY PCR: NOT DETECTED
ALBUMIN SERPL-MCNC: 3.1 G/DL (ref 3.5–5.2)
ALBUMIN SERPL-MCNC: 3.4 G/DL (ref 3.5–5.2)
ALBUMIN SERPL-MCNC: 3.4 G/DL (ref 3.5–5.2)
ALBUMIN SERPL-MCNC: 3.6 G/DL (ref 3.5–5.2)
ALP BLD-CCNC: 53 U/L (ref 35–104)
ALP BLD-CCNC: 60 U/L (ref 35–104)
ALP BLD-CCNC: 60 U/L (ref 35–104)
ALP BLD-CCNC: 63 U/L (ref 35–104)
ALP BLD-CCNC: 64 U/L (ref 35–104)
ALP BLD-CCNC: 67 U/L (ref 35–104)
ALT SERPL-CCNC: 12 U/L (ref 0–32)
ALT SERPL-CCNC: 12 U/L (ref 0–32)
ALT SERPL-CCNC: 13 U/L (ref 0–32)
ALT SERPL-CCNC: 13 U/L (ref 0–32)
ALT SERPL-CCNC: 19 U/L (ref 0–32)
ALT SERPL-CCNC: 19 U/L (ref 0–32)
ANION GAP SERPL CALCULATED.3IONS-SCNC: 10 MMOL/L (ref 7–16)
ANION GAP SERPL CALCULATED.3IONS-SCNC: 2 MMOL/L (ref 7–16)
ANION GAP SERPL CALCULATED.3IONS-SCNC: 2 MMOL/L (ref 7–16)
ANION GAP SERPL CALCULATED.3IONS-SCNC: 5 MMOL/L (ref 7–16)
ANION GAP SERPL CALCULATED.3IONS-SCNC: 5 MMOL/L (ref 7–16)
ANION GAP SERPL CALCULATED.3IONS-SCNC: 6 MMOL/L (ref 7–16)
ANION GAP SERPL CALCULATED.3IONS-SCNC: 6 MMOL/L (ref 7–16)
ANION GAP SERPL CALCULATED.3IONS-SCNC: 7 MMOL/L (ref 7–16)
ANION GAP SERPL CALCULATED.3IONS-SCNC: 8 MMOL/L (ref 7–16)
ANION GAP SERPL CALCULATED.3IONS-SCNC: 9 MMOL/L (ref 7–16)
ANISOCYTOSIS: ABNORMAL
ANISOCYTOSIS: ABNORMAL
AST SERPL-CCNC: 19 U/L (ref 0–31)
AST SERPL-CCNC: 23 U/L (ref 0–31)
AST SERPL-CCNC: 23 U/L (ref 0–31)
AST SERPL-CCNC: 24 U/L (ref 0–31)
AST SERPL-CCNC: 24 U/L (ref 0–31)
AST SERPL-CCNC: 28 U/L (ref 0–31)
B.E.: 11.2 MMOL/L (ref -3–0)
B.E.: 12.8 MMOL/L (ref -3–0)
B.E.: 14.8 MMOL/L (ref -3–0)
B.E.: 7.5 MMOL/L (ref -3–3)
BACTERIA: NORMAL /HPF
BASOPHILIC STIPPLING: ABNORMAL
BASOPHILS ABSOLUTE: 0 E9/L (ref 0–0.2)
BASOPHILS ABSOLUTE: 0.01 E9/L (ref 0–0.2)
BASOPHILS ABSOLUTE: 0.02 E9/L (ref 0–0.2)
BASOPHILS ABSOLUTE: 0.04 E9/L (ref 0–0.2)
BASOPHILS RELATIVE PERCENT: 0 % (ref 0–2)
BASOPHILS RELATIVE PERCENT: 0 % (ref 0–2)
BASOPHILS RELATIVE PERCENT: 0.2 % (ref 0–2)
BASOPHILS RELATIVE PERCENT: 0.3 % (ref 0–2)
BASOPHILS RELATIVE PERCENT: 0.5 % (ref 0–2)
BASOPHILS RELATIVE PERCENT: 0.6 % (ref 0–2)
BASOPHILS RELATIVE PERCENT: 0.9 % (ref 0–2)
BILIRUB SERPL-MCNC: 0.2 MG/DL (ref 0–1.2)
BILIRUB SERPL-MCNC: 0.3 MG/DL (ref 0–1.2)
BILIRUB SERPL-MCNC: 0.3 MG/DL (ref 0–1.2)
BILIRUB SERPL-MCNC: 0.4 MG/DL (ref 0–1.2)
BILIRUB SERPL-MCNC: <0.2 MG/DL (ref 0–1.2)
BILIRUB SERPL-MCNC: <0.2 MG/DL (ref 0–1.2)
BILIRUBIN URINE: NEGATIVE
BLOOD CULTURE, ROUTINE: NORMAL
BLOOD, URINE: ABNORMAL
BORDETELLA PARAPERTUSSIS BY PCR: NOT DETECTED
BORDETELLA PERTUSSIS BY PCR: NOT DETECTED
BUN BLDV-MCNC: 27 MG/DL (ref 8–23)
BUN BLDV-MCNC: 29 MG/DL (ref 8–23)
BUN BLDV-MCNC: 29 MG/DL (ref 8–23)
BUN BLDV-MCNC: 30 MG/DL (ref 8–23)
BUN BLDV-MCNC: 31 MG/DL (ref 8–23)
BUN BLDV-MCNC: 32 MG/DL (ref 8–23)
BUN BLDV-MCNC: 35 MG/DL (ref 8–23)
BUN BLDV-MCNC: 35 MG/DL (ref 8–23)
BUN BLDV-MCNC: 36 MG/DL (ref 8–23)
BUN BLDV-MCNC: 37 MG/DL (ref 8–23)
BUN BLDV-MCNC: 38 MG/DL (ref 8–23)
BUN BLDV-MCNC: 39 MG/DL (ref 8–23)
BUN BLDV-MCNC: 40 MG/DL (ref 8–23)
BUN BLDV-MCNC: 40 MG/DL (ref 8–23)
BUN BLDV-MCNC: 43 MG/DL (ref 8–23)
BUN BLDV-MCNC: 45 MG/DL (ref 8–23)
BUN BLDV-MCNC: 54 MG/DL (ref 8–23)
BUN BLDV-MCNC: 55 MG/DL (ref 8–23)
C-REACTIVE PROTEIN: 0.1 MG/DL (ref 0–0.4)
CALCIUM SERPL-MCNC: 10 MG/DL (ref 8.6–10.2)
CALCIUM SERPL-MCNC: 10.1 MG/DL (ref 8.6–10.2)
CALCIUM SERPL-MCNC: 10.2 MG/DL (ref 8.6–10.2)
CALCIUM SERPL-MCNC: 10.4 MG/DL (ref 8.6–10.2)
CALCIUM SERPL-MCNC: 10.4 MG/DL (ref 8.6–10.2)
CALCIUM SERPL-MCNC: 10.5 MG/DL (ref 8.6–10.2)
CALCIUM SERPL-MCNC: 10.6 MG/DL (ref 8.6–10.2)
CALCIUM SERPL-MCNC: 10.7 MG/DL (ref 8.6–10.2)
CALCIUM SERPL-MCNC: 10.8 MG/DL (ref 8.6–10.2)
CALCIUM SERPL-MCNC: 10.9 MG/DL (ref 8.6–10.2)
CALCIUM SERPL-MCNC: 9.8 MG/DL (ref 8.6–10.2)
CALCIUM SERPL-MCNC: 9.9 MG/DL (ref 8.6–10.2)
CHLAMYDOPHILIA PNEUMONIAE BY PCR: NOT DETECTED
CHLORIDE BLD-SCNC: 84 MMOL/L (ref 98–107)
CHLORIDE BLD-SCNC: 86 MMOL/L (ref 98–107)
CHLORIDE BLD-SCNC: 87 MMOL/L (ref 98–107)
CHLORIDE BLD-SCNC: 88 MMOL/L (ref 98–107)
CHLORIDE BLD-SCNC: 89 MMOL/L (ref 98–107)
CHLORIDE BLD-SCNC: 91 MMOL/L (ref 98–107)
CHLORIDE BLD-SCNC: 91 MMOL/L (ref 98–107)
CHLORIDE BLD-SCNC: 92 MMOL/L (ref 98–107)
CHLORIDE BLD-SCNC: 93 MMOL/L (ref 98–107)
CHLORIDE BLD-SCNC: 94 MMOL/L (ref 98–107)
CHLORIDE BLD-SCNC: 95 MMOL/L (ref 98–107)
CHLORIDE BLD-SCNC: 96 MMOL/L (ref 98–107)
CHLORIDE BLD-SCNC: 96 MMOL/L (ref 98–107)
CHOLESTEROL, TOTAL: 138 MG/DL (ref 0–199)
CHOLESTEROL, TOTAL: 176 MG/DL (ref 0–199)
CHOLESTEROL, TOTAL: 187 MG/DL (ref 0–199)
CLARITY: CLEAR
CO2: 36 MMOL/L (ref 22–29)
CO2: 36 MMOL/L (ref 22–29)
CO2: 38 MMOL/L (ref 22–29)
CO2: 38 MMOL/L (ref 22–29)
CO2: 39 MMOL/L (ref 22–29)
CO2: 40 MMOL/L (ref 22–29)
CO2: 40 MMOL/L (ref 22–29)
CO2: 41 MMOL/L (ref 22–29)
CO2: 42 MMOL/L (ref 22–29)
CO2: 45 MMOL/L (ref 22–29)
CO2: 46 MMOL/L (ref 22–29)
CO2: 48 MMOL/L (ref 22–29)
CO2: 49 MMOL/L (ref 22–29)
CO2: 50 MMOL/L (ref 22–29)
CO2: >50 MMOL/L (ref 22–29)
CO2: >50 MMOL/L (ref 22–29)
COHB: 0.7 % (ref 0–1.5)
COLOR: YELLOW
CORONAVIRUS 229E BY PCR: NOT DETECTED
CORONAVIRUS HKU1 BY PCR: NOT DETECTED
CORONAVIRUS NL63 BY PCR: NOT DETECTED
CORONAVIRUS OC43 BY PCR: NOT DETECTED
CREAT SERPL-MCNC: 1.5 MG/DL (ref 0.5–1)
CREAT SERPL-MCNC: 1.6 MG/DL (ref 0.5–1)
CREAT SERPL-MCNC: 1.6 MG/DL (ref 0.5–1)
CREAT SERPL-MCNC: 1.7 MG/DL (ref 0.5–1)
CREAT SERPL-MCNC: 1.8 MG/DL (ref 0.5–1)
CREAT SERPL-MCNC: 1.8 MG/DL (ref 0.5–1)
CREAT SERPL-MCNC: 1.9 MG/DL (ref 0.5–1)
CREAT SERPL-MCNC: 2 MG/DL (ref 0.5–1)
CREAT SERPL-MCNC: 2.1 MG/DL (ref 0.5–1)
CREAT SERPL-MCNC: 2.5 MG/DL (ref 0.5–1)
CREAT SERPL-MCNC: 2.6 MG/DL (ref 0.5–1)
CREATININE URINE: 41 MG/DL (ref 29–226)
CRITICAL NOTIFICATION: YES
CRITICAL NOTIFICATION: YES
CRITICAL: ABNORMAL
CULTURE, BLOOD 2: NORMAL
D DIMER: 229 NG/ML DDU
DATE ANALYZED: ABNORMAL
DATE OF COLLECTION: ABNORMAL
DEVICE: ABNORMAL
EKG ATRIAL RATE: 91 BPM
EKG ATRIAL RATE: 94 BPM
EKG ATRIAL RATE: 94 BPM
EKG Q-T INTERVAL: 350 MS
EKG Q-T INTERVAL: 364 MS
EKG Q-T INTERVAL: 382 MS
EKG QRS DURATION: 122 MS
EKG QRS DURATION: 122 MS
EKG QRS DURATION: 134 MS
EKG QTC CALCULATION (BAZETT): 406 MS
EKG QTC CALCULATION (BAZETT): 429 MS
EKG QTC CALCULATION (BAZETT): 455 MS
EKG R AXIS: -2 DEGREES
EKG R AXIS: 0 DEGREES
EKG R AXIS: 5 DEGREES
EKG T AXIS: -8 DEGREES
EKG T AXIS: 5 DEGREES
EKG T AXIS: 7 DEGREES
EKG VENTRICULAR RATE: 76 BPM
EKG VENTRICULAR RATE: 81 BPM
EKG VENTRICULAR RATE: 94 BPM
EOSINOPHILS ABSOLUTE: 0.07 E9/L (ref 0.05–0.5)
EOSINOPHILS ABSOLUTE: 0.11 E9/L (ref 0.05–0.5)
EOSINOPHILS ABSOLUTE: 0.12 E9/L (ref 0.05–0.5)
EOSINOPHILS ABSOLUTE: 0.13 E9/L (ref 0.05–0.5)
EOSINOPHILS ABSOLUTE: 0.14 E9/L (ref 0.05–0.5)
EOSINOPHILS ABSOLUTE: 0.17 E9/L (ref 0.05–0.5)
EOSINOPHILS ABSOLUTE: 0.18 E9/L (ref 0.05–0.5)
EOSINOPHILS ABSOLUTE: 0.21 E9/L (ref 0.05–0.5)
EOSINOPHILS RELATIVE PERCENT: 1.7 % (ref 0–6)
EOSINOPHILS RELATIVE PERCENT: 2.4 % (ref 0–6)
EOSINOPHILS RELATIVE PERCENT: 2.9 % (ref 0–6)
EOSINOPHILS RELATIVE PERCENT: 3 % (ref 0–6)
EOSINOPHILS RELATIVE PERCENT: 3 % (ref 0–6)
EOSINOPHILS RELATIVE PERCENT: 3.3 % (ref 0–6)
EOSINOPHILS RELATIVE PERCENT: 3.3 % (ref 0–6)
EOSINOPHILS RELATIVE PERCENT: 3.4 % (ref 0–6)
EOSINOPHILS RELATIVE PERCENT: 3.7 % (ref 0–6)
EOSINOPHILS RELATIVE PERCENT: 3.7 % (ref 0–6)
EOSINOPHILS RELATIVE PERCENT: 4 % (ref 0–6)
EOSINOPHILS RELATIVE PERCENT: 4.4 % (ref 0–6)
EOSINOPHILS RELATIVE PERCENT: 5 % (ref 0–6)
EOSINOPHILS RELATIVE PERCENT: 5.5 % (ref 0–6)
EOSINOPHILS RELATIVE PERCENT: 6.3 % (ref 0–6)
FERRITIN: 171 NG/ML
FIO2 ARTERIAL: 6
FOLATE: >20 NG/ML (ref 4.8–24.2)
FOLATE: >20 NG/ML (ref 4.8–24.2)
GFR AFRICAN AMERICAN: 21
GFR AFRICAN AMERICAN: 22
GFR AFRICAN AMERICAN: 27
GFR AFRICAN AMERICAN: 29
GFR AFRICAN AMERICAN: 30
GFR AFRICAN AMERICAN: 32
GFR AFRICAN AMERICAN: 32
GFR AFRICAN AMERICAN: 35
GFR AFRICAN AMERICAN: 37
GFR AFRICAN AMERICAN: 37
GFR AFRICAN AMERICAN: 40
GFR NON-AFRICAN AMERICAN: 18 ML/MIN/1.73
GFR NON-AFRICAN AMERICAN: 18 ML/MIN/1.73
GFR NON-AFRICAN AMERICAN: 22 ML/MIN/1.73
GFR NON-AFRICAN AMERICAN: 24 ML/MIN/1.73
GFR NON-AFRICAN AMERICAN: 25 ML/MIN/1.73
GFR NON-AFRICAN AMERICAN: 27 ML/MIN/1.73
GFR NON-AFRICAN AMERICAN: 27 ML/MIN/1.73
GFR NON-AFRICAN AMERICAN: 29 ML/MIN/1.73
GFR NON-AFRICAN AMERICAN: 31 ML/MIN/1.73
GFR NON-AFRICAN AMERICAN: 31 ML/MIN/1.73
GFR NON-AFRICAN AMERICAN: 33 ML/MIN/1.73
GLUCOSE BLD-MCNC: 100 MG/DL (ref 74–99)
GLUCOSE BLD-MCNC: 109 MG/DL (ref 74–99)
GLUCOSE BLD-MCNC: 111 MG/DL (ref 74–99)
GLUCOSE BLD-MCNC: 112 MG/DL (ref 74–99)
GLUCOSE BLD-MCNC: 113 MG/DL (ref 74–99)
GLUCOSE BLD-MCNC: 117 MG/DL (ref 74–99)
GLUCOSE BLD-MCNC: 131 MG/DL (ref 74–99)
GLUCOSE BLD-MCNC: 138 MG/DL (ref 74–99)
GLUCOSE BLD-MCNC: 140 MG/DL (ref 74–99)
GLUCOSE BLD-MCNC: 140 MG/DL (ref 74–99)
GLUCOSE BLD-MCNC: 141 MG/DL (ref 74–99)
GLUCOSE BLD-MCNC: 148 MG/DL (ref 74–99)
GLUCOSE BLD-MCNC: 155 MG/DL (ref 74–99)
GLUCOSE BLD-MCNC: 156 MG/DL (ref 74–99)
GLUCOSE BLD-MCNC: 165 MG/DL (ref 74–99)
GLUCOSE BLD-MCNC: 218 MG/DL (ref 74–99)
GLUCOSE BLD-MCNC: 62 MG/DL (ref 74–99)
GLUCOSE BLD-MCNC: 68 MG/DL (ref 74–99)
GLUCOSE BLD-MCNC: 85 MG/DL (ref 74–99)
GLUCOSE BLD-MCNC: 90 MG/DL (ref 74–99)
GLUCOSE BLD-MCNC: 94 MG/DL (ref 74–99)
GLUCOSE BLD-MCNC: 94 MG/DL (ref 74–99)
GLUCOSE BLD-MCNC: 99 MG/DL (ref 74–99)
GLUCOSE URINE: NEGATIVE MG/DL
HBA1C MFR BLD: 5.7 % (ref 4–5.6)
HBA1C MFR BLD: 5.7 % (ref 4–5.6)
HBA1C MFR BLD: 6.3 % (ref 4–5.6)
HCO3 ARTERIAL: 38.7 MMOL/L (ref 22–26)
HCO3 ARTERIAL: 40.1 MMOL/L (ref 22–26)
HCO3 ARTERIAL: 44.2 MMOL/L (ref 22–26)
HCO3: 33 MMOL/L (ref 22–26)
HCT VFR BLD CALC: 30 % (ref 34–48)
HCT VFR BLD CALC: 30.6 % (ref 34–48)
HCT VFR BLD CALC: 30.7 % (ref 34–48)
HCT VFR BLD CALC: 32 % (ref 34–48)
HCT VFR BLD CALC: 32.9 % (ref 34–48)
HCT VFR BLD CALC: 33 % (ref 34–48)
HCT VFR BLD CALC: 34.5 % (ref 34–48)
HCT VFR BLD CALC: 35.2 % (ref 34–48)
HCT VFR BLD CALC: 35.4 % (ref 34–48)
HCT VFR BLD CALC: 35.9 % (ref 34–48)
HCT VFR BLD CALC: 36 % (ref 34–48)
HCT VFR BLD CALC: 36.9 % (ref 34–48)
HCT VFR BLD CALC: 36.9 % (ref 34–48)
HCT VFR BLD CALC: 37.6 % (ref 34–48)
HCT VFR BLD CALC: 38.3 % (ref 34–48)
HCT VFR BLD CALC: 39.5 % (ref 34–48)
HCT VFR BLD CALC: 41 % (ref 34–48)
HDLC SERPL-MCNC: 54 MG/DL
HDLC SERPL-MCNC: 56 MG/DL
HDLC SERPL-MCNC: 61 MG/DL
HEMOGLOBIN: 10.5 G/DL (ref 11.5–15.5)
HEMOGLOBIN: 10.7 G/DL (ref 11.5–15.5)
HEMOGLOBIN: 10.8 G/DL (ref 11.5–15.5)
HEMOGLOBIN: 11 G/DL (ref 11.5–15.5)
HEMOGLOBIN: 11 G/DL (ref 11.5–15.5)
HEMOGLOBIN: 11.3 G/DL (ref 11.5–15.5)
HEMOGLOBIN: 11.4 G/DL (ref 11.5–15.5)
HEMOGLOBIN: 11.4 G/DL (ref 11.5–15.5)
HEMOGLOBIN: 11.7 G/DL (ref 11.5–15.5)
HEMOGLOBIN: 11.9 G/DL (ref 11.5–15.5)
HEMOGLOBIN: 12.8 G/DL (ref 11.5–15.5)
HEMOGLOBIN: 9.3 G/DL (ref 11.5–15.5)
HEMOGLOBIN: 9.4 G/DL (ref 11.5–15.5)
HEMOGLOBIN: 9.4 G/DL (ref 11.5–15.5)
HEMOGLOBIN: 9.7 G/DL (ref 11.5–15.5)
HEMOGLOBIN: 9.8 G/DL (ref 11.5–15.5)
HEMOGLOBIN: 9.9 G/DL (ref 11.5–15.5)
HHB: 1.1 % (ref 0–5)
HUMAN METAPNEUMOVIRUS BY PCR: NOT DETECTED
HUMAN RHINOVIRUS/ENTEROVIRUS BY PCR: NOT DETECTED
IMMATURE GRANULOCYTES #: 0.01 E9/L
IMMATURE GRANULOCYTES #: 0.02 E9/L
IMMATURE GRANULOCYTES #: 0.03 E9/L
IMMATURE GRANULOCYTES #: 0.05 E9/L
IMMATURE GRANULOCYTES %: 0.3 % (ref 0–5)
IMMATURE GRANULOCYTES %: 0.4 % (ref 0–5)
IMMATURE GRANULOCYTES %: 0.4 % (ref 0–5)
IMMATURE GRANULOCYTES %: 0.5 % (ref 0–5)
IMMATURE GRANULOCYTES %: 0.6 % (ref 0–5)
IMMATURE GRANULOCYTES %: 0.8 % (ref 0–5)
IMMATURE GRANULOCYTES %: 0.9 % (ref 0–5)
IMMATURE GRANULOCYTES %: 1.1 % (ref 0–5)
INFLUENZA A BY PCR: NOT DETECTED
INFLUENZA B BY PCR: NOT DETECTED
INR BLD: 1.1
INR BLD: 1.2
INR BLD: 1.4
INR BLD: 1.6
INR BLD: 1.8
INR BLD: 1.9
INR BLD: 2
INR BLD: 2.1
INR BLD: 2.1
INR BLD: 2.3
INR BLD: 2.4
INR BLD: 2.4
INR BLD: 2.5
INR BLD: 2.6
INR BLD: 2.6
INR BLD: 2.8
INR BLD: 3
INR BLD: 3.1
INR BLD: 3.3
INR BLD: 3.4
INR BLD: 3.5
IRON SATURATION: 19 % (ref 15–50)
IRON SATURATION: 24 % (ref 15–50)
IRON: 59 MCG/DL (ref 37–145)
IRON: 67 MCG/DL (ref 37–145)
KETONES, URINE: NEGATIVE MG/DL
L. PNEUMOPHILA SEROGP 1 UR AG: NORMAL
LAB: ABNORMAL
LACTIC ACID: 0.8 MMOL/L (ref 0.5–2.2)
LDL CHOLESTEROL CALCULATED: 46 MG/DL (ref 0–99)
LDL CHOLESTEROL CALCULATED: 83 MG/DL (ref 0–99)
LDL CHOLESTEROL CALCULATED: 90 MG/DL (ref 0–99)
LEUKOCYTE ESTERASE, URINE: ABNORMAL
LV EF: 68 %
LVEF MODALITY: NORMAL
LYMPHOCYTES ABSOLUTE: 0.56 E9/L (ref 1.5–4)
LYMPHOCYTES ABSOLUTE: 0.59 E9/L (ref 1.5–4)
LYMPHOCYTES ABSOLUTE: 0.59 E9/L (ref 1.5–4)
LYMPHOCYTES ABSOLUTE: 0.62 E9/L (ref 1.5–4)
LYMPHOCYTES ABSOLUTE: 0.62 E9/L (ref 1.5–4)
LYMPHOCYTES ABSOLUTE: 0.69 E9/L (ref 1.5–4)
LYMPHOCYTES ABSOLUTE: 0.7 E9/L (ref 1.5–4)
LYMPHOCYTES ABSOLUTE: 0.72 E9/L (ref 1.5–4)
LYMPHOCYTES ABSOLUTE: 0.73 E9/L (ref 1.5–4)
LYMPHOCYTES ABSOLUTE: 0.73 E9/L (ref 1.5–4)
LYMPHOCYTES ABSOLUTE: 0.75 E9/L (ref 1.5–4)
LYMPHOCYTES ABSOLUTE: 0.76 E9/L (ref 1.5–4)
LYMPHOCYTES ABSOLUTE: 0.85 E9/L (ref 1.5–4)
LYMPHOCYTES ABSOLUTE: 0.86 E9/L (ref 1.5–4)
LYMPHOCYTES ABSOLUTE: 0.87 E9/L (ref 1.5–4)
LYMPHOCYTES ABSOLUTE: 0.87 E9/L (ref 1.5–4)
LYMPHOCYTES ABSOLUTE: 0.89 E9/L (ref 1.5–4)
LYMPHOCYTES RELATIVE PERCENT: 12.4 % (ref 20–42)
LYMPHOCYTES RELATIVE PERCENT: 12.8 % (ref 20–42)
LYMPHOCYTES RELATIVE PERCENT: 13.3 % (ref 20–42)
LYMPHOCYTES RELATIVE PERCENT: 15.4 % (ref 20–42)
LYMPHOCYTES RELATIVE PERCENT: 15.8 % (ref 20–42)
LYMPHOCYTES RELATIVE PERCENT: 16.6 % (ref 20–42)
LYMPHOCYTES RELATIVE PERCENT: 18.2 % (ref 20–42)
LYMPHOCYTES RELATIVE PERCENT: 18.8 % (ref 20–42)
LYMPHOCYTES RELATIVE PERCENT: 19.5 % (ref 20–42)
LYMPHOCYTES RELATIVE PERCENT: 19.6 % (ref 20–42)
LYMPHOCYTES RELATIVE PERCENT: 20.4 % (ref 20–42)
LYMPHOCYTES RELATIVE PERCENT: 20.7 % (ref 20–42)
LYMPHOCYTES RELATIVE PERCENT: 22.8 % (ref 20–42)
LYMPHOCYTES RELATIVE PERCENT: 22.9 % (ref 20–42)
LYMPHOCYTES RELATIVE PERCENT: 23.9 % (ref 20–42)
LYMPHOCYTES RELATIVE PERCENT: 24.2 % (ref 20–42)
LYMPHOCYTES RELATIVE PERCENT: 27 % (ref 20–42)
Lab: ABNORMAL
MAGNESIUM: 1.8 MG/DL (ref 1.6–2.6)
MAGNESIUM: 1.9 MG/DL (ref 1.6–2.6)
MAGNESIUM: 1.9 MG/DL (ref 1.6–2.6)
MAGNESIUM: 2 MG/DL (ref 1.6–2.6)
MAGNESIUM: 2.1 MG/DL (ref 1.6–2.6)
MAGNESIUM: 2.2 MG/DL (ref 1.6–2.6)
MAGNESIUM: 2.3 MG/DL (ref 1.6–2.6)
MAGNESIUM: 2.5 MG/DL (ref 1.6–2.6)
MCH RBC QN AUTO: 30.4 PG (ref 26–35)
MCH RBC QN AUTO: 30.7 PG (ref 26–35)
MCH RBC QN AUTO: 30.7 PG (ref 26–35)
MCH RBC QN AUTO: 30.8 PG (ref 26–35)
MCH RBC QN AUTO: 31 PG (ref 26–35)
MCH RBC QN AUTO: 31.4 PG (ref 26–35)
MCH RBC QN AUTO: 31.5 PG (ref 26–35)
MCH RBC QN AUTO: 31.5 PG (ref 26–35)
MCH RBC QN AUTO: 31.7 PG (ref 26–35)
MCH RBC QN AUTO: 31.7 PG (ref 26–35)
MCH RBC QN AUTO: 31.8 PG (ref 26–35)
MCH RBC QN AUTO: 31.9 PG (ref 26–35)
MCH RBC QN AUTO: 32.2 PG (ref 26–35)
MCH RBC QN AUTO: 32.4 PG (ref 26–35)
MCH RBC QN AUTO: 32.5 PG (ref 26–35)
MCHC RBC AUTO-ENTMCNC: 29.2 % (ref 32–34.5)
MCHC RBC AUTO-ENTMCNC: 29.5 % (ref 32–34.5)
MCHC RBC AUTO-ENTMCNC: 30 % (ref 32–34.5)
MCHC RBC AUTO-ENTMCNC: 30.1 % (ref 32–34.5)
MCHC RBC AUTO-ENTMCNC: 30.2 % (ref 32–34.5)
MCHC RBC AUTO-ENTMCNC: 30.3 % (ref 32–34.5)
MCHC RBC AUTO-ENTMCNC: 30.5 % (ref 32–34.5)
MCHC RBC AUTO-ENTMCNC: 30.6 % (ref 32–34.5)
MCHC RBC AUTO-ENTMCNC: 30.7 % (ref 32–34.5)
MCHC RBC AUTO-ENTMCNC: 30.9 % (ref 32–34.5)
MCHC RBC AUTO-ENTMCNC: 31 % (ref 32–34.5)
MCHC RBC AUTO-ENTMCNC: 31.2 % (ref 32–34.5)
MCHC RBC AUTO-ENTMCNC: 31.3 % (ref 32–34.5)
MCHC RBC AUTO-ENTMCNC: 31.3 % (ref 32–34.5)
MCV RBC AUTO: 101.3 FL (ref 80–99.9)
MCV RBC AUTO: 101.4 FL (ref 80–99.9)
MCV RBC AUTO: 102.1 FL (ref 80–99.9)
MCV RBC AUTO: 102.5 FL (ref 80–99.9)
MCV RBC AUTO: 103 FL (ref 80–99.9)
MCV RBC AUTO: 103.2 FL (ref 80–99.9)
MCV RBC AUTO: 103.4 FL (ref 80–99.9)
MCV RBC AUTO: 103.6 FL (ref 80–99.9)
MCV RBC AUTO: 103.8 FL (ref 80–99.9)
MCV RBC AUTO: 105.1 FL (ref 80–99.9)
MCV RBC AUTO: 107.8 FL (ref 80–99.9)
MCV RBC AUTO: 108.2 FL (ref 80–99.9)
MCV RBC AUTO: 108.2 FL (ref 80–99.9)
MCV RBC AUTO: 98.8 FL (ref 80–99.9)
MCV RBC AUTO: 99.5 FL (ref 80–99.9)
METER GLUCOSE: 100 MG/DL (ref 74–99)
METER GLUCOSE: 105 MG/DL (ref 74–99)
METER GLUCOSE: 108 MG/DL (ref 74–99)
METER GLUCOSE: 116 MG/DL (ref 74–99)
METER GLUCOSE: 117 MG/DL (ref 74–99)
METER GLUCOSE: 118 MG/DL (ref 74–99)
METER GLUCOSE: 121 MG/DL (ref 74–99)
METER GLUCOSE: 121 MG/DL (ref 74–99)
METER GLUCOSE: 122 MG/DL (ref 74–99)
METER GLUCOSE: 122 MG/DL (ref 74–99)
METER GLUCOSE: 123 MG/DL (ref 74–99)
METER GLUCOSE: 123 MG/DL (ref 74–99)
METER GLUCOSE: 124 MG/DL (ref 74–99)
METER GLUCOSE: 130 MG/DL (ref 74–99)
METER GLUCOSE: 133 MG/DL (ref 74–99)
METER GLUCOSE: 141 MG/DL (ref 74–99)
METER GLUCOSE: 144 MG/DL (ref 74–99)
METER GLUCOSE: 144 MG/DL (ref 74–99)
METER GLUCOSE: 145 MG/DL (ref 74–99)
METER GLUCOSE: 149 MG/DL (ref 74–99)
METER GLUCOSE: 149 MG/DL (ref 74–99)
METER GLUCOSE: 150 MG/DL (ref 74–99)
METER GLUCOSE: 150 MG/DL (ref 74–99)
METER GLUCOSE: 151 MG/DL (ref 74–99)
METER GLUCOSE: 154 MG/DL (ref 74–99)
METER GLUCOSE: 156 MG/DL (ref 74–99)
METER GLUCOSE: 157 MG/DL (ref 74–99)
METER GLUCOSE: 164 MG/DL (ref 74–99)
METER GLUCOSE: 165 MG/DL (ref 74–99)
METER GLUCOSE: 165 MG/DL (ref 74–99)
METER GLUCOSE: 169 MG/DL (ref 74–99)
METER GLUCOSE: 170 MG/DL (ref 74–99)
METER GLUCOSE: 170 MG/DL (ref 74–99)
METER GLUCOSE: 186 MG/DL (ref 74–99)
METER GLUCOSE: 187 MG/DL (ref 74–99)
METER GLUCOSE: 189 MG/DL (ref 74–99)
METER GLUCOSE: 190 MG/DL (ref 74–99)
METER GLUCOSE: 190 MG/DL (ref 74–99)
METER GLUCOSE: 193 MG/DL (ref 74–99)
METER GLUCOSE: 196 MG/DL (ref 74–99)
METER GLUCOSE: 199 MG/DL (ref 74–99)
METER GLUCOSE: 203 MG/DL (ref 74–99)
METER GLUCOSE: 206 MG/DL (ref 74–99)
METER GLUCOSE: 207 MG/DL (ref 74–99)
METER GLUCOSE: 210 MG/DL (ref 74–99)
METER GLUCOSE: 216 MG/DL (ref 74–99)
METER GLUCOSE: 219 MG/DL (ref 74–99)
METER GLUCOSE: 221 MG/DL (ref 74–99)
METER GLUCOSE: 221 MG/DL (ref 74–99)
METER GLUCOSE: 227 MG/DL (ref 74–99)
METER GLUCOSE: 233 MG/DL (ref 74–99)
METER GLUCOSE: 235 MG/DL (ref 74–99)
METER GLUCOSE: 244 MG/DL (ref 74–99)
METER GLUCOSE: 246 MG/DL (ref 74–99)
METER GLUCOSE: 249 MG/DL (ref 74–99)
METER GLUCOSE: 261 MG/DL (ref 74–99)
METER GLUCOSE: 263 MG/DL (ref 74–99)
METER GLUCOSE: 265 MG/DL (ref 74–99)
METER GLUCOSE: 268 MG/DL (ref 74–99)
METER GLUCOSE: 301 MG/DL (ref 74–99)
METER GLUCOSE: 315 MG/DL (ref 74–99)
METER GLUCOSE: 71 MG/DL (ref 74–99)
METER GLUCOSE: 79 MG/DL (ref 74–99)
METER GLUCOSE: 84 MG/DL (ref 74–99)
METER GLUCOSE: 92 MG/DL (ref 74–99)
METHB: 0.3 % (ref 0–1.5)
MODE: ABNORMAL
MONOCYTES ABSOLUTE: 0.16 E9/L (ref 0.1–0.95)
MONOCYTES ABSOLUTE: 0.17 E9/L (ref 0.1–0.95)
MONOCYTES ABSOLUTE: 0.36 E9/L (ref 0.1–0.95)
MONOCYTES ABSOLUTE: 0.38 E9/L (ref 0.1–0.95)
MONOCYTES ABSOLUTE: 0.39 E9/L (ref 0.1–0.95)
MONOCYTES ABSOLUTE: 0.4 E9/L (ref 0.1–0.95)
MONOCYTES ABSOLUTE: 0.4 E9/L (ref 0.1–0.95)
MONOCYTES ABSOLUTE: 0.41 E9/L (ref 0.1–0.95)
MONOCYTES ABSOLUTE: 0.42 E9/L (ref 0.1–0.95)
MONOCYTES ABSOLUTE: 0.44 E9/L (ref 0.1–0.95)
MONOCYTES ABSOLUTE: 0.46 E9/L (ref 0.1–0.95)
MONOCYTES ABSOLUTE: 0.46 E9/L (ref 0.1–0.95)
MONOCYTES ABSOLUTE: 0.49 E9/L (ref 0.1–0.95)
MONOCYTES ABSOLUTE: 0.5 E9/L (ref 0.1–0.95)
MONOCYTES ABSOLUTE: 0.57 E9/L (ref 0.1–0.95)
MONOCYTES RELATIVE PERCENT: 10 % (ref 2–12)
MONOCYTES RELATIVE PERCENT: 10.5 % (ref 2–12)
MONOCYTES RELATIVE PERCENT: 10.8 % (ref 2–12)
MONOCYTES RELATIVE PERCENT: 11.1 % (ref 2–12)
MONOCYTES RELATIVE PERCENT: 11.4 % (ref 2–12)
MONOCYTES RELATIVE PERCENT: 11.5 % (ref 2–12)
MONOCYTES RELATIVE PERCENT: 12.3 % (ref 2–12)
MONOCYTES RELATIVE PERCENT: 12.4 % (ref 2–12)
MONOCYTES RELATIVE PERCENT: 12.5 % (ref 2–12)
MONOCYTES RELATIVE PERCENT: 12.8 % (ref 2–12)
MONOCYTES RELATIVE PERCENT: 12.9 % (ref 2–12)
MONOCYTES RELATIVE PERCENT: 13.1 % (ref 2–12)
MONOCYTES RELATIVE PERCENT: 4.4 % (ref 2–12)
MONOCYTES RELATIVE PERCENT: 5.4 % (ref 2–12)
MONOCYTES RELATIVE PERCENT: 7.6 % (ref 2–12)
MONOCYTES RELATIVE PERCENT: 9 % (ref 2–12)
MONOCYTES RELATIVE PERCENT: 9.8 % (ref 2–12)
MYCOPLASMA PNEUMONIAE BY PCR: NOT DETECTED
NEUTROPHILS ABSOLUTE: 1.91 E9/L (ref 1.8–7.3)
NEUTROPHILS ABSOLUTE: 2.01 E9/L (ref 1.8–7.3)
NEUTROPHILS ABSOLUTE: 2.13 E9/L (ref 1.8–7.3)
NEUTROPHILS ABSOLUTE: 2.16 E9/L (ref 1.8–7.3)
NEUTROPHILS ABSOLUTE: 2.18 E9/L (ref 1.8–7.3)
NEUTROPHILS ABSOLUTE: 2.19 E9/L (ref 1.8–7.3)
NEUTROPHILS ABSOLUTE: 2.29 E9/L (ref 1.8–7.3)
NEUTROPHILS ABSOLUTE: 2.35 E9/L (ref 1.8–7.3)
NEUTROPHILS ABSOLUTE: 2.4 E9/L (ref 1.8–7.3)
NEUTROPHILS ABSOLUTE: 2.47 E9/L (ref 1.8–7.3)
NEUTROPHILS ABSOLUTE: 2.59 E9/L (ref 1.8–7.3)
NEUTROPHILS ABSOLUTE: 2.92 E9/L (ref 1.8–7.3)
NEUTROPHILS ABSOLUTE: 3 E9/L (ref 1.8–7.3)
NEUTROPHILS ABSOLUTE: 3.16 E9/L (ref 1.8–7.3)
NEUTROPHILS ABSOLUTE: 3.3 E9/L (ref 1.8–7.3)
NEUTROPHILS ABSOLUTE: 3.31 E9/L (ref 1.8–7.3)
NEUTROPHILS ABSOLUTE: 3.85 E9/L (ref 1.8–7.3)
NEUTROPHILS RELATIVE PERCENT: 58.2 % (ref 43–80)
NEUTROPHILS RELATIVE PERCENT: 59.7 % (ref 43–80)
NEUTROPHILS RELATIVE PERCENT: 60.8 % (ref 43–80)
NEUTROPHILS RELATIVE PERCENT: 61.3 % (ref 43–80)
NEUTROPHILS RELATIVE PERCENT: 61.3 % (ref 43–80)
NEUTROPHILS RELATIVE PERCENT: 62 % (ref 43–80)
NEUTROPHILS RELATIVE PERCENT: 62.1 % (ref 43–80)
NEUTROPHILS RELATIVE PERCENT: 64.1 % (ref 43–80)
NEUTROPHILS RELATIVE PERCENT: 64.2 % (ref 43–80)
NEUTROPHILS RELATIVE PERCENT: 64.8 % (ref 43–80)
NEUTROPHILS RELATIVE PERCENT: 66.9 % (ref 43–80)
NEUTROPHILS RELATIVE PERCENT: 67.6 % (ref 43–80)
NEUTROPHILS RELATIVE PERCENT: 69.1 % (ref 43–80)
NEUTROPHILS RELATIVE PERCENT: 72.4 % (ref 43–80)
NEUTROPHILS RELATIVE PERCENT: 74.2 % (ref 43–80)
NEUTROPHILS RELATIVE PERCENT: 76.8 % (ref 43–80)
NEUTROPHILS RELATIVE PERCENT: 77.2 % (ref 43–80)
NITRITE, URINE: NEGATIVE
NUCLEATED RED BLOOD CELLS: 0.9 /100 WBC
O2 CONTENT: 16.4 ML/DL
O2 SATURATION: 61.8 % (ref 92–98.5)
O2 SATURATION: 73.4 % (ref 92–98.5)
O2 SATURATION: 98 % (ref 92–98.5)
O2 SATURATION: 98.9 % (ref 92–98.5)
O2HB: 97.9 % (ref 94–97)
OPERATOR ID: 1023
OPERATOR ID: 1023
OPERATOR ID: 1210
OPERATOR ID: 3114
PARAINFLUENZA VIRUS 1 BY PCR: NOT DETECTED
PARAINFLUENZA VIRUS 2 BY PCR: NOT DETECTED
PARAINFLUENZA VIRUS 3 BY PCR: NOT DETECTED
PARAINFLUENZA VIRUS 4 BY PCR: NOT DETECTED
PATIENT TEMP: 37
PATIENT TEMP: 37
PATIENT TEMP: 37 C
PCO2 (TEMP CORRECTED): 77.7 MMHG (ref 35–45)
PCO2 (TEMP CORRECTED): 84.4 MMHG (ref 35–45)
PCO2 ARTERIAL: 56.9 MMHG (ref 35–45)
PCO2: 50.7 MMHG (ref 35–45)
PDW BLD-RTO: 13 FL (ref 11.5–15)
PDW BLD-RTO: 13 FL (ref 11.5–15)
PDW BLD-RTO: 13.1 FL (ref 11.5–15)
PDW BLD-RTO: 13.1 FL (ref 11.5–15)
PDW BLD-RTO: 13.2 FL (ref 11.5–15)
PDW BLD-RTO: 13.5 FL (ref 11.5–15)
PDW BLD-RTO: 13.5 FL (ref 11.5–15)
PDW BLD-RTO: 13.6 FL (ref 11.5–15)
PDW BLD-RTO: 13.8 FL (ref 11.5–15)
PDW BLD-RTO: 13.9 FL (ref 11.5–15)
PDW BLD-RTO: 14 FL (ref 11.5–15)
PDW BLD-RTO: 14.1 FL (ref 11.5–15)
PH (TEMPERATURE CORRECTED): 7.32 (ref 7.35–7.45)
PH (TEMPERATURE CORRECTED): 7.33 (ref 7.35–7.45)
PH BLOOD GAS: 7.43 (ref 7.35–7.45)
PH BLOOD GAS: 7.44 (ref 7.35–7.45)
PH UA: 5.5 (ref 5–9)
PHOSPHORUS: 2.7 MG/DL (ref 2.5–4.5)
PHOSPHORUS: 3.3 MG/DL (ref 2.5–4.5)
PHOSPHORUS: 3.4 MG/DL (ref 2.5–4.5)
PHOSPHORUS: 3.8 MG/DL (ref 2.5–4.5)
PLATELET # BLD: 151 E9/L (ref 130–450)
PLATELET # BLD: 158 E9/L (ref 130–450)
PLATELET # BLD: 160 E9/L (ref 130–450)
PLATELET # BLD: 167 E9/L (ref 130–450)
PLATELET # BLD: 167 E9/L (ref 130–450)
PLATELET # BLD: 169 E9/L (ref 130–450)
PLATELET # BLD: 177 E9/L (ref 130–450)
PLATELET # BLD: 181 E9/L (ref 130–450)
PLATELET # BLD: 187 E9/L (ref 130–450)
PLATELET # BLD: 206 E9/L (ref 130–450)
PLATELET # BLD: 206 E9/L (ref 130–450)
PLATELET # BLD: 217 E9/L (ref 130–450)
PLATELET # BLD: 224 E9/L (ref 130–450)
PLATELET # BLD: 229 E9/L (ref 130–450)
PLATELET # BLD: 98 E9/L (ref 130–450)
PLATELET CONFIRMATION: NORMAL
PMV BLD AUTO: 10 FL (ref 7–12)
PMV BLD AUTO: 10 FL (ref 7–12)
PMV BLD AUTO: 10.1 FL (ref 7–12)
PMV BLD AUTO: 10.2 FL (ref 7–12)
PMV BLD AUTO: 10.2 FL (ref 7–12)
PMV BLD AUTO: 10.3 FL (ref 7–12)
PMV BLD AUTO: 10.4 FL (ref 7–12)
PMV BLD AUTO: 10.4 FL (ref 7–12)
PMV BLD AUTO: 10.9 FL (ref 7–12)
PMV BLD AUTO: 11.3 FL (ref 7–12)
PMV BLD AUTO: 9.5 FL (ref 7–12)
PMV BLD AUTO: 9.8 FL (ref 7–12)
PMV BLD AUTO: 9.9 FL (ref 7–12)
PO2 (TEMP CORRECTED): 36.8 MMHG (ref 60–80)
PO2 (TEMP CORRECTED): 44.4 MMHG (ref 60–80)
PO2 ARTERIAL: 104.3 MMHG (ref 60–80)
PO2: 157.7 MMHG (ref 75–100)
POIKILOCYTES: ABNORMAL
POLYCHROMASIA: ABNORMAL
POTASSIUM REFLEX MAGNESIUM: 3.9 MMOL/L (ref 3.5–5)
POTASSIUM REFLEX MAGNESIUM: 4.4 MMOL/L (ref 3.5–5)
POTASSIUM REFLEX MAGNESIUM: 4.5 MMOL/L (ref 3.5–5)
POTASSIUM REFLEX MAGNESIUM: 5.1 MMOL/L (ref 3.5–5)
POTASSIUM SERPL-SCNC: 3.6 MMOL/L (ref 3.5–5)
POTASSIUM SERPL-SCNC: 3.8 MMOL/L (ref 3.5–5)
POTASSIUM SERPL-SCNC: 3.8 MMOL/L (ref 3.5–5)
POTASSIUM SERPL-SCNC: 3.9 MMOL/L (ref 3.5–5)
POTASSIUM SERPL-SCNC: 4 MMOL/L (ref 3.5–5)
POTASSIUM SERPL-SCNC: 4 MMOL/L (ref 3.5–5)
POTASSIUM SERPL-SCNC: 4.1 MMOL/L (ref 3.5–5)
POTASSIUM SERPL-SCNC: 4.2 MMOL/L (ref 3.5–5)
POTASSIUM SERPL-SCNC: 4.4 MMOL/L (ref 3.5–5)
POTASSIUM SERPL-SCNC: 4.6 MMOL/L (ref 3.5–5)
POTASSIUM SERPL-SCNC: 4.7 MMOL/L (ref 3.5–5)
POTASSIUM SERPL-SCNC: 4.7 MMOL/L (ref 3.5–5)
POTASSIUM SERPL-SCNC: 4.8 MMOL/L (ref 3.5–5)
POTASSIUM SERPL-SCNC: 5.1 MMOL/L (ref 3.5–5)
POTASSIUM SERPL-SCNC: 5.2 MMOL/L (ref 3.5–5)
PRO-BNP: 1301 PG/ML (ref 0–450)
PRO-BNP: 1417 PG/ML (ref 0–450)
PRO-BNP: 1462 PG/ML (ref 0–450)
PRO-BNP: 763 PG/ML (ref 0–450)
PROCALCITONIN: 0.07 NG/ML (ref 0–0.08)
PROCALCITONIN: 0.07 NG/ML (ref 0–0.08)
PROCALCITONIN: 0.08 NG/ML (ref 0–0.08)
PROCALCITONIN: 0.1 NG/ML (ref 0–0.08)
PROTEIN UA: NEGATIVE MG/DL
PROTHROMBIN TIME: 13.4 SEC (ref 9.3–12.4)
PROTHROMBIN TIME: 13.6 SEC (ref 9.3–12.4)
PROTHROMBIN TIME: 16.4 SEC (ref 9.3–12.4)
PROTHROMBIN TIME: 18.7 SEC (ref 9.3–12.4)
PROTHROMBIN TIME: 20.8 SEC (ref 9.3–12.4)
PROTHROMBIN TIME: 21.5 SEC (ref 9.3–12.4)
PROTHROMBIN TIME: 22.1 SEC (ref 9.3–12.4)
PROTHROMBIN TIME: 23 SEC (ref 9.3–12.4)
PROTHROMBIN TIME: 23.5 SEC (ref 9.3–12.4)
PROTHROMBIN TIME: 24 SEC (ref 9.3–12.4)
PROTHROMBIN TIME: 24.9 SEC (ref 9.3–12.4)
PROTHROMBIN TIME: 25.6 SEC (ref 9.3–12.4)
PROTHROMBIN TIME: 27.3 SEC (ref 9.3–12.4)
PROTHROMBIN TIME: 27.5 SEC (ref 9.3–12.4)
PROTHROMBIN TIME: 28.4 SEC (ref 9.3–12.4)
PROTHROMBIN TIME: 29.2 SEC (ref 9.3–12.4)
PROTHROMBIN TIME: 29.5 SEC (ref 9.3–12.4)
PROTHROMBIN TIME: 29.9 SEC (ref 9.3–12.4)
PROTHROMBIN TIME: 31.3 SEC (ref 9.3–12.4)
PROTHROMBIN TIME: 33.8 SEC (ref 9.3–12.4)
PROTHROMBIN TIME: 34.5 SEC (ref 9.3–12.4)
PROTHROMBIN TIME: 36.5 SEC (ref 9.3–12.4)
PROTHROMBIN TIME: 37.4 SEC (ref 9.3–12.4)
PROTHROMBIN TIME: 39.3 SEC (ref 9.3–12.4)
PROTHROMBIN TIME: 41.8 SEC (ref 9.3–12.4)
RBC # BLD: 2.89 E12/L (ref 3.5–5.5)
RBC # BLD: 2.92 E12/L (ref 3.5–5.5)
RBC # BLD: 2.96 E12/L (ref 3.5–5.5)
RBC # BLD: 3.04 E12/L (ref 3.5–5.5)
RBC # BLD: 3.05 E12/L (ref 3.5–5.5)
RBC # BLD: 3.09 E12/L (ref 3.5–5.5)
RBC # BLD: 3.33 E12/L (ref 3.5–5.5)
RBC # BLD: 3.35 E12/L (ref 3.5–5.5)
RBC # BLD: 3.39 E12/L (ref 3.5–5.5)
RBC # BLD: 3.41 E12/L (ref 3.5–5.5)
RBC # BLD: 3.49 E12/L (ref 3.5–5.5)
RBC # BLD: 3.6 E12/L (ref 3.5–5.5)
RBC # BLD: 3.64 E12/L (ref 3.5–5.5)
RBC # BLD: 3.71 E12/L (ref 3.5–5.5)
RBC # BLD: 3.85 E12/L (ref 3.5–5.5)
RBC # BLD: 3.87 E12/L (ref 3.5–5.5)
RBC # BLD: 4.15 E12/L (ref 3.5–5.5)
RBC UA: >20 /HPF (ref 0–2)
REASON FOR REJECTION: NORMAL
REJECTED TEST: NORMAL
RESPIRATORY SYNCYTIAL VIRUS BY PCR: NOT DETECTED
SARS-COV-2, NAAT: NOT DETECTED
SARS-COV-2, PCR: NOT DETECTED
SARS-COV-2: NOT DETECTED
SEDIMENTATION RATE, ERYTHROCYTE: 80 MM/HR (ref 0–20)
SODIUM BLD-SCNC: 137 MMOL/L (ref 132–146)
SODIUM BLD-SCNC: 138 MMOL/L (ref 132–146)
SODIUM BLD-SCNC: 139 MMOL/L (ref 132–146)
SODIUM BLD-SCNC: 140 MMOL/L (ref 132–146)
SODIUM BLD-SCNC: 141 MMOL/L (ref 132–146)
SODIUM BLD-SCNC: 142 MMOL/L (ref 132–146)
SODIUM BLD-SCNC: 143 MMOL/L (ref 132–146)
SODIUM BLD-SCNC: 143 MMOL/L (ref 132–146)
SODIUM BLD-SCNC: 144 MMOL/L (ref 132–146)
SODIUM BLD-SCNC: 144 MMOL/L (ref 132–146)
SODIUM URINE: 83 MMOL/L
SOURCE, BLOOD GAS: ABNORMAL
SOURCE: NORMAL
SPECIFIC GRAVITY UA: 1.01 (ref 1–1.03)
STOMATOCYTES: ABNORMAL
STOMATOCYTES: ABNORMAL
STREP PNEUMONIAE ANTIGEN, URINE: NORMAL
STREP PNEUMONIAE ANTIGEN, URINE: NORMAL
T4 FREE: 1.37 NG/DL (ref 0.93–1.7)
TARGET CELLS: ABNORMAL
THB: 11.7 G/DL (ref 11.5–16.5)
TIME ANALYZED: 1521
TOTAL IRON BINDING CAPACITY: 279 MCG/DL (ref 250–450)
TOTAL IRON BINDING CAPACITY: 314 MCG/DL (ref 250–450)
TOTAL PROTEIN: 6.4 G/DL (ref 6.4–8.3)
TOTAL PROTEIN: 6.5 G/DL (ref 6.4–8.3)
TOTAL PROTEIN: 6.8 G/DL (ref 6.4–8.3)
TOTAL PROTEIN: 6.9 G/DL (ref 6.4–8.3)
TOTAL PROTEIN: 6.9 G/DL (ref 6.4–8.3)
TOTAL PROTEIN: 7 G/DL (ref 6.4–8.3)
TOXIC GRANULATION: ABNORMAL
TRIGL SERPL-MCNC: 155 MG/DL (ref 0–149)
TRIGL SERPL-MCNC: 185 MG/DL (ref 0–149)
TRIGL SERPL-MCNC: 213 MG/DL (ref 0–149)
TROPONIN: 0.03 NG/ML (ref 0–0.03)
TSH SERPL DL<=0.05 MIU/L-ACNC: 0.04 UIU/ML (ref 0.27–4.2)
TSH SERPL DL<=0.05 MIU/L-ACNC: 2.57 UIU/ML (ref 0.27–4.2)
UROBILINOGEN, URINE: 0.2 E.U./DL
VANCOMYCIN RANDOM: 14.3 MCG/ML (ref 5–40)
VITAMIN B-12: 379 PG/ML (ref 211–946)
VITAMIN B-12: 432 PG/ML (ref 211–946)
VITAMIN D 25-HYDROXY: 25 NG/ML (ref 30–100)
VITAMIN D 25-HYDROXY: 52 NG/ML (ref 30–100)
VLDLC SERPL CALC-MCNC: 31 MG/DL
VLDLC SERPL CALC-MCNC: 37 MG/DL
VLDLC SERPL CALC-MCNC: 43 MG/DL
WBC # BLD: 3.3 E9/L (ref 4.5–11.5)
WBC # BLD: 3.3 E9/L (ref 4.5–11.5)
WBC # BLD: 3.4 E9/L (ref 4.5–11.5)
WBC # BLD: 3.5 E9/L (ref 4.5–11.5)
WBC # BLD: 3.6 E9/L (ref 4.5–11.5)
WBC # BLD: 3.7 E9/L (ref 4.5–11.5)
WBC # BLD: 3.8 E9/L (ref 4.5–11.5)
WBC # BLD: 3.8 E9/L (ref 4.5–11.5)
WBC # BLD: 3.9 E9/L (ref 4.5–11.5)
WBC # BLD: 4.2 E9/L (ref 4.5–11.5)
WBC # BLD: 4.3 E9/L (ref 4.5–11.5)
WBC # BLD: 4.4 E9/L (ref 4.5–11.5)
WBC # BLD: 4.5 E9/L (ref 4.5–11.5)
WBC # BLD: 4.6 E9/L (ref 4.5–11.5)
WBC # BLD: 5 E9/L (ref 4.5–11.5)
WBC UA: NORMAL /HPF (ref 0–5)

## 2020-01-01 PROCEDURE — 83605 ASSAY OF LACTIC ACID: CPT

## 2020-01-01 PROCEDURE — 84300 ASSAY OF URINE SODIUM: CPT

## 2020-01-01 PROCEDURE — 92567 TYMPANOMETRY: CPT | Performed by: AUDIOLOGIST

## 2020-01-01 PROCEDURE — 80202 ASSAY OF VANCOMYCIN: CPT

## 2020-01-01 PROCEDURE — 99204 OFFICE O/P NEW MOD 45 MIN: CPT | Performed by: OTOLARYNGOLOGY

## 2020-01-01 PROCEDURE — 82805 BLOOD GASES W/O2 SATURATION: CPT

## 2020-01-01 PROCEDURE — 71250 CT THORAX DX C-: CPT

## 2020-01-01 PROCEDURE — 6370000000 HC RX 637 (ALT 250 FOR IP): Performed by: PHYSICIAN ASSISTANT

## 2020-01-01 PROCEDURE — 36415 COLL VENOUS BLD VENIPUNCTURE: CPT

## 2020-01-01 PROCEDURE — 85610 PROTHROMBIN TIME: CPT

## 2020-01-01 PROCEDURE — 94660 CPAP INITIATION&MGMT: CPT

## 2020-01-01 PROCEDURE — 2580000003 HC RX 258: Performed by: INTERNAL MEDICINE

## 2020-01-01 PROCEDURE — 85025 COMPLETE CBC W/AUTO DIFF WBC: CPT

## 2020-01-01 PROCEDURE — 6370000000 HC RX 637 (ALT 250 FOR IP): Performed by: INTERNAL MEDICINE

## 2020-01-01 PROCEDURE — 80048 BASIC METABOLIC PNL TOTAL CA: CPT

## 2020-01-01 PROCEDURE — 97161 PT EVAL LOW COMPLEX 20 MIN: CPT

## 2020-01-01 PROCEDURE — 82962 GLUCOSE BLOOD TEST: CPT

## 2020-01-01 PROCEDURE — 80053 COMPREHEN METABOLIC PANEL: CPT

## 2020-01-01 PROCEDURE — 6360000002 HC RX W HCPCS: Performed by: STUDENT IN AN ORGANIZED HEALTH CARE EDUCATION/TRAINING PROGRAM

## 2020-01-01 PROCEDURE — U0003 INFECTIOUS AGENT DETECTION BY NUCLEIC ACID (DNA OR RNA); SEVERE ACUTE RESPIRATORY SYNDROME CORONAVIRUS 2 (SARS-COV-2) (CORONAVIRUS DISEASE [COVID-19]), AMPLIFIED PROBE TECHNIQUE, MAKING USE OF HIGH THROUGHPUT TECHNOLOGIES AS DESCRIBED BY CMS-2020-01-R: HCPCS

## 2020-01-01 PROCEDURE — 84100 ASSAY OF PHOSPHORUS: CPT

## 2020-01-01 PROCEDURE — 2060000000 HC ICU INTERMEDIATE R&B

## 2020-01-01 PROCEDURE — 82728 ASSAY OF FERRITIN: CPT

## 2020-01-01 PROCEDURE — 6370000000 HC RX 637 (ALT 250 FOR IP): Performed by: STUDENT IN AN ORGANIZED HEALTH CARE EDUCATION/TRAINING PROGRAM

## 2020-01-01 PROCEDURE — 2580000003 HC RX 258: Performed by: EMERGENCY MEDICINE

## 2020-01-01 PROCEDURE — 2500000003 HC RX 250 WO HCPCS: Performed by: INTERNAL MEDICINE

## 2020-01-01 PROCEDURE — 97530 THERAPEUTIC ACTIVITIES: CPT

## 2020-01-01 PROCEDURE — 84484 ASSAY OF TROPONIN QUANT: CPT

## 2020-01-01 PROCEDURE — 71046 X-RAY EXAM CHEST 2 VIEWS: CPT

## 2020-01-01 PROCEDURE — 1111F DSCHRG MED/CURRENT MED MERGE: CPT | Performed by: NURSE PRACTITIONER

## 2020-01-01 PROCEDURE — 84145 PROCALCITONIN (PCT): CPT

## 2020-01-01 PROCEDURE — 36600 WITHDRAWAL OF ARTERIAL BLOOD: CPT

## 2020-01-01 PROCEDURE — 2580000003 HC RX 258: Performed by: PHYSICIAN ASSISTANT

## 2020-01-01 PROCEDURE — 93005 ELECTROCARDIOGRAM TRACING: CPT | Performed by: EMERGENCY MEDICINE

## 2020-01-01 PROCEDURE — 97165 OT EVAL LOW COMPLEX 30 MIN: CPT

## 2020-01-01 PROCEDURE — 2700000000 HC OXYGEN THERAPY PER DAY

## 2020-01-01 PROCEDURE — 99285 EMERGENCY DEPT VISIT HI MDM: CPT

## 2020-01-01 PROCEDURE — 6370000000 HC RX 637 (ALT 250 FOR IP): Performed by: NURSE PRACTITIONER

## 2020-01-01 PROCEDURE — U0002 COVID-19 LAB TEST NON-CDC: HCPCS

## 2020-01-01 PROCEDURE — 6360000004 HC RX CONTRAST MEDICATION: Performed by: INTERNAL MEDICINE

## 2020-01-01 PROCEDURE — 94640 AIRWAY INHALATION TREATMENT: CPT

## 2020-01-01 PROCEDURE — 99284 EMERGENCY DEPT VISIT MOD MDM: CPT

## 2020-01-01 PROCEDURE — 1200000000 HC SEMI PRIVATE

## 2020-01-01 PROCEDURE — 94664 DEMO&/EVAL PT USE INHALER: CPT

## 2020-01-01 PROCEDURE — 93000 ELECTROCARDIOGRAM COMPLETE: CPT | Performed by: INTERNAL MEDICINE

## 2020-01-01 PROCEDURE — 83735 ASSAY OF MAGNESIUM: CPT

## 2020-01-01 PROCEDURE — 6360000002 HC RX W HCPCS: Performed by: INTERNAL MEDICINE

## 2020-01-01 PROCEDURE — G8400 PT W/DXA NO RESULTS DOC: HCPCS | Performed by: NURSE PRACTITIONER

## 2020-01-01 PROCEDURE — 1123F ACP DISCUSS/DSCN MKR DOCD: CPT | Performed by: OTOLARYNGOLOGY

## 2020-01-01 PROCEDURE — 1036F TOBACCO NON-USER: CPT | Performed by: NURSE PRACTITIONER

## 2020-01-01 PROCEDURE — 96366 THER/PROPH/DIAG IV INF ADDON: CPT

## 2020-01-01 PROCEDURE — 80061 LIPID PANEL: CPT

## 2020-01-01 PROCEDURE — 83550 IRON BINDING TEST: CPT

## 2020-01-01 PROCEDURE — 97535 SELF CARE MNGMENT TRAINING: CPT

## 2020-01-01 PROCEDURE — 86140 C-REACTIVE PROTEIN: CPT

## 2020-01-01 PROCEDURE — 83880 ASSAY OF NATRIURETIC PEPTIDE: CPT

## 2020-01-01 PROCEDURE — 82803 BLOOD GASES ANY COMBINATION: CPT

## 2020-01-01 PROCEDURE — 99223 1ST HOSP IP/OBS HIGH 75: CPT | Performed by: INTERNAL MEDICINE

## 2020-01-01 PROCEDURE — 71045 X-RAY EXAM CHEST 1 VIEW: CPT

## 2020-01-01 PROCEDURE — 93005 ELECTROCARDIOGRAM TRACING: CPT | Performed by: STUDENT IN AN ORGANIZED HEALTH CARE EDUCATION/TRAINING PROGRAM

## 2020-01-01 PROCEDURE — 99222 1ST HOSP IP/OBS MODERATE 55: CPT | Performed by: STUDENT IN AN ORGANIZED HEALTH CARE EDUCATION/TRAINING PROGRAM

## 2020-01-01 PROCEDURE — 2500000003 HC RX 250 WO HCPCS: Performed by: STUDENT IN AN ORGANIZED HEALTH CARE EDUCATION/TRAINING PROGRAM

## 2020-01-01 PROCEDURE — 85651 RBC SED RATE NONAUTOMATED: CPT

## 2020-01-01 PROCEDURE — 87040 BLOOD CULTURE FOR BACTERIA: CPT

## 2020-01-01 PROCEDURE — 81001 URINALYSIS AUTO W/SCOPE: CPT

## 2020-01-01 PROCEDURE — 93306 TTE W/DOPPLER COMPLETE: CPT

## 2020-01-01 PROCEDURE — G8427 DOCREV CUR MEDS BY ELIG CLIN: HCPCS | Performed by: NURSE PRACTITIONER

## 2020-01-01 PROCEDURE — 1090F PRES/ABSN URINE INCON ASSESS: CPT | Performed by: NURSE PRACTITIONER

## 2020-01-01 PROCEDURE — 1036F TOBACCO NON-USER: CPT | Performed by: OTOLARYNGOLOGY

## 2020-01-01 PROCEDURE — G8417 CALC BMI ABV UP PARAM F/U: HCPCS | Performed by: OTOLARYNGOLOGY

## 2020-01-01 PROCEDURE — 6360000002 HC RX W HCPCS: Performed by: EMERGENCY MEDICINE

## 2020-01-01 PROCEDURE — 4040F PNEUMOC VAC/ADMIN/RCVD: CPT | Performed by: NURSE PRACTITIONER

## 2020-01-01 PROCEDURE — 99213 OFFICE O/P EST LOW 20 MIN: CPT | Performed by: NURSE PRACTITIONER

## 2020-01-01 PROCEDURE — 0100U HC RESPIRPTHGN MULT REV TRANS & AMP PRB TECH 21 TRGT: CPT

## 2020-01-01 PROCEDURE — 6360000002 HC RX W HCPCS: Performed by: PHYSICIAN ASSISTANT

## 2020-01-01 PROCEDURE — 92557 COMPREHENSIVE HEARING TEST: CPT | Performed by: AUDIOLOGIST

## 2020-01-01 PROCEDURE — G0378 HOSPITAL OBSERVATION PER HR: HCPCS

## 2020-01-01 PROCEDURE — 85378 FIBRIN DEGRADE SEMIQUANT: CPT

## 2020-01-01 PROCEDURE — APPSS45 APP SPLIT SHARED TIME 31-45 MINUTES: Performed by: NURSE PRACTITIONER

## 2020-01-01 PROCEDURE — 87450 HC DIRECT STREP B ANTIGEN: CPT

## 2020-01-01 PROCEDURE — 99232 SBSQ HOSP IP/OBS MODERATE 35: CPT | Performed by: INTERNAL MEDICINE

## 2020-01-01 PROCEDURE — 97110 THERAPEUTIC EXERCISES: CPT

## 2020-01-01 PROCEDURE — 4040F PNEUMOC VAC/ADMIN/RCVD: CPT | Performed by: OTOLARYNGOLOGY

## 2020-01-01 PROCEDURE — G8417 CALC BMI ABV UP PARAM F/U: HCPCS | Performed by: NURSE PRACTITIONER

## 2020-01-01 PROCEDURE — 83540 ASSAY OF IRON: CPT

## 2020-01-01 PROCEDURE — G8400 PT W/DXA NO RESULTS DOC: HCPCS | Performed by: OTOLARYNGOLOGY

## 2020-01-01 PROCEDURE — 9990000010 HC NO CHARGE VISIT: Performed by: AUDIOLOGIST

## 2020-01-01 PROCEDURE — 76604 US EXAM CHEST: CPT

## 2020-01-01 PROCEDURE — 93010 ELECTROCARDIOGRAM REPORT: CPT | Performed by: INTERNAL MEDICINE

## 2020-01-01 PROCEDURE — 1090F PRES/ABSN URINE INCON ASSESS: CPT | Performed by: OTOLARYNGOLOGY

## 2020-01-01 PROCEDURE — 93970 EXTREMITY STUDY: CPT

## 2020-01-01 PROCEDURE — G8427 DOCREV CUR MEDS BY ELIG CLIN: HCPCS | Performed by: OTOLARYNGOLOGY

## 2020-01-01 PROCEDURE — 84443 ASSAY THYROID STIM HORMONE: CPT

## 2020-01-01 PROCEDURE — 83036 HEMOGLOBIN GLYCOSYLATED A1C: CPT

## 2020-01-01 PROCEDURE — 99231 SBSQ HOSP IP/OBS SF/LOW 25: CPT | Performed by: INTERNAL MEDICINE

## 2020-01-01 PROCEDURE — 1123F ACP DISCUSS/DSCN MKR DOCD: CPT | Performed by: NURSE PRACTITIONER

## 2020-01-01 PROCEDURE — 96365 THER/PROPH/DIAG IV INF INIT: CPT

## 2020-01-01 PROCEDURE — 82570 ASSAY OF URINE CREATININE: CPT

## 2020-01-01 RX ORDER — PROMETHAZINE HYDROCHLORIDE 25 MG/1
12.5 TABLET ORAL EVERY 6 HOURS PRN
Status: DISCONTINUED | OUTPATIENT
Start: 2020-01-01 | End: 2020-01-01 | Stop reason: HOSPADM

## 2020-01-01 RX ORDER — POTASSIUM CHLORIDE 750 MG/1
10 TABLET, EXTENDED RELEASE ORAL 2 TIMES DAILY WITH MEALS
Status: DISCONTINUED | OUTPATIENT
Start: 2020-01-01 | End: 2020-01-01 | Stop reason: HOSPADM

## 2020-01-01 RX ORDER — POTASSIUM CHLORIDE 750 MG/1
10 TABLET, EXTENDED RELEASE ORAL 2 TIMES DAILY
COMMUNITY

## 2020-01-01 RX ORDER — BUMETANIDE 1 MG/1
1 TABLET ORAL DAILY PRN
Status: ON HOLD | COMMUNITY
End: 2020-01-01 | Stop reason: HOSPADM

## 2020-01-01 RX ORDER — CHOLECALCIFEROL (VITAMIN D3) 125 MCG
10 CAPSULE ORAL NIGHTLY
Status: DISCONTINUED | OUTPATIENT
Start: 2020-01-01 | End: 2020-01-01 | Stop reason: SDUPTHER

## 2020-01-01 RX ORDER — FLUTICASONE PROPIONATE 50 MCG
1 SPRAY, SUSPENSION (ML) NASAL 2 TIMES DAILY
Status: DISCONTINUED | OUTPATIENT
Start: 2020-01-01 | End: 2020-01-01 | Stop reason: HOSPADM

## 2020-01-01 RX ORDER — DOXYCYCLINE HYCLATE 100 MG/1
100 CAPSULE ORAL EVERY 12 HOURS SCHEDULED
Status: DISCONTINUED | OUTPATIENT
Start: 2020-01-01 | End: 2020-01-01

## 2020-01-01 RX ORDER — LIDOCAINE 4 G/G
1 PATCH TOPICAL 2 TIMES DAILY
COMMUNITY

## 2020-01-01 RX ORDER — ONDANSETRON 2 MG/ML
4 INJECTION INTRAMUSCULAR; INTRAVENOUS EVERY 6 HOURS PRN
Status: DISCONTINUED | OUTPATIENT
Start: 2020-01-01 | End: 2020-01-01 | Stop reason: HOSPADM

## 2020-01-01 RX ORDER — ACETAMINOPHEN 325 MG/1
650 TABLET ORAL EVERY 6 HOURS PRN
Status: DISCONTINUED | OUTPATIENT
Start: 2020-01-01 | End: 2020-01-01 | Stop reason: HOSPADM

## 2020-01-01 RX ORDER — WARFARIN SODIUM 2 MG/1
2 TABLET ORAL
Status: DISCONTINUED | OUTPATIENT
Start: 2020-01-01 | End: 2020-01-01 | Stop reason: HOSPADM

## 2020-01-01 RX ORDER — FUROSEMIDE 10 MG/ML
20 INJECTION INTRAMUSCULAR; INTRAVENOUS ONCE
Status: COMPLETED | OUTPATIENT
Start: 2020-01-01 | End: 2020-01-01

## 2020-01-01 RX ORDER — FUROSEMIDE 10 MG/ML
40 INJECTION INTRAMUSCULAR; INTRAVENOUS ONCE
Status: COMPLETED | OUTPATIENT
Start: 2020-01-01 | End: 2020-01-01

## 2020-01-01 RX ORDER — DEXTROSE MONOHYDRATE 50 MG/ML
100 INJECTION, SOLUTION INTRAVENOUS PRN
Status: DISCONTINUED | OUTPATIENT
Start: 2020-01-01 | End: 2020-01-01 | Stop reason: HOSPADM

## 2020-01-01 RX ORDER — BUMETANIDE 1 MG/1
1 TABLET ORAL 2 TIMES DAILY
Qty: 60 TABLET | Refills: 0 | Status: SHIPPED | OUTPATIENT
Start: 2020-01-01 | End: 2021-01-01

## 2020-01-01 RX ORDER — SIMVASTATIN 40 MG
40 TABLET ORAL NIGHTLY
Status: DISCONTINUED | OUTPATIENT
Start: 2020-01-01 | End: 2020-01-01 | Stop reason: CLARIF

## 2020-01-01 RX ORDER — POLYETHYLENE GLYCOL 3350 17 G/17G
17 POWDER, FOR SOLUTION ORAL DAILY PRN
Status: DISCONTINUED | OUTPATIENT
Start: 2020-01-01 | End: 2020-01-01 | Stop reason: HOSPADM

## 2020-01-01 RX ORDER — BUMETANIDE 0.25 MG/ML
1 INJECTION, SOLUTION INTRAMUSCULAR; INTRAVENOUS 2 TIMES DAILY
Status: DISCONTINUED | OUTPATIENT
Start: 2020-01-01 | End: 2020-01-01

## 2020-01-01 RX ORDER — IPRATROPIUM BROMIDE AND ALBUTEROL SULFATE 2.5; .5 MG/3ML; MG/3ML
1 SOLUTION RESPIRATORY (INHALATION)
Status: DISCONTINUED | OUTPATIENT
Start: 2020-01-01 | End: 2020-01-01 | Stop reason: HOSPADM

## 2020-01-01 RX ORDER — LEVOTHYROXINE SODIUM 0.03 MG/1
25 TABLET ORAL DAILY
Status: DISCONTINUED | OUTPATIENT
Start: 2020-01-01 | End: 2020-01-01 | Stop reason: HOSPADM

## 2020-01-01 RX ORDER — FLUTICASONE PROPIONATE 50 MCG
1 SPRAY, SUSPENSION (ML) NASAL DAILY
Qty: 2 BOTTLE | Refills: 1 | Status: SHIPPED
Start: 2020-01-01 | End: 2021-01-01

## 2020-01-01 RX ORDER — FERROUS SULFATE 325(65) MG
325 TABLET ORAL 2 TIMES DAILY WITH MEALS
Status: DISCONTINUED | OUTPATIENT
Start: 2020-01-01 | End: 2020-01-01 | Stop reason: HOSPADM

## 2020-01-01 RX ORDER — SODIUM CHLORIDE 0.9 % (FLUSH) 0.9 %
10 SYRINGE (ML) INJECTION PRN
Status: DISCONTINUED | OUTPATIENT
Start: 2020-01-01 | End: 2020-01-01 | Stop reason: HOSPADM

## 2020-01-01 RX ORDER — DOCUSATE SODIUM 100 MG/1
200 CAPSULE, LIQUID FILLED ORAL NIGHTLY
Status: DISCONTINUED | OUTPATIENT
Start: 2020-01-01 | End: 2020-01-01 | Stop reason: HOSPADM

## 2020-01-01 RX ORDER — GUAIFENESIN 400 MG/1
400 TABLET ORAL 3 TIMES DAILY
Status: DISCONTINUED | OUTPATIENT
Start: 2020-01-01 | End: 2020-01-01 | Stop reason: HOSPADM

## 2020-01-01 RX ORDER — BUMETANIDE 1 MG/1
1 TABLET ORAL 2 TIMES DAILY
Status: DISCONTINUED | OUTPATIENT
Start: 2020-01-01 | End: 2020-01-01 | Stop reason: HOSPADM

## 2020-01-01 RX ORDER — GUAIFENESIN 600 MG/1
600 TABLET, EXTENDED RELEASE ORAL 2 TIMES DAILY
Status: DISCONTINUED | OUTPATIENT
Start: 2020-01-01 | End: 2020-01-01 | Stop reason: CLARIF

## 2020-01-01 RX ORDER — SULFAMETHOXAZOLE AND TRIMETHOPRIM 800; 160 MG/1; MG/1
1 TABLET ORAL 2 TIMES DAILY
Status: ON HOLD | COMMUNITY
Start: 2020-01-01 | End: 2020-01-01 | Stop reason: HOSPADM

## 2020-01-01 RX ORDER — ATORVASTATIN CALCIUM 20 MG/1
20 TABLET, FILM COATED ORAL DAILY
Status: DISCONTINUED | OUTPATIENT
Start: 2020-01-01 | End: 2020-01-01 | Stop reason: HOSPADM

## 2020-01-01 RX ORDER — GABAPENTIN 300 MG/1
300 CAPSULE ORAL NIGHTLY
Status: DISCONTINUED | OUTPATIENT
Start: 2020-01-01 | End: 2020-01-01 | Stop reason: HOSPADM

## 2020-01-01 RX ORDER — POLYETHYLENE GLYCOL 3350 17 G/17G
17 POWDER, FOR SOLUTION ORAL 2 TIMES DAILY
Status: DISCONTINUED | OUTPATIENT
Start: 2020-01-01 | End: 2020-01-01 | Stop reason: HOSPADM

## 2020-01-01 RX ORDER — CETIRIZINE HYDROCHLORIDE 10 MG/1
5 TABLET ORAL DAILY
Status: DISCONTINUED | OUTPATIENT
Start: 2020-01-01 | End: 2020-01-01 | Stop reason: HOSPADM

## 2020-01-01 RX ORDER — WARFARIN SODIUM 2 MG/1
2 TABLET ORAL
Status: COMPLETED | OUTPATIENT
Start: 2020-01-01 | End: 2020-01-01

## 2020-01-01 RX ORDER — DEXTROSE MONOHYDRATE 50 MG/ML
INJECTION, SOLUTION INTRAVENOUS
Status: DISPENSED
Start: 2020-01-01 | End: 2020-01-01

## 2020-01-01 RX ORDER — BISACODYL 10 MG
10 SUPPOSITORY, RECTAL RECTAL DAILY PRN
Status: DISCONTINUED | OUTPATIENT
Start: 2020-01-01 | End: 2020-01-01 | Stop reason: HOSPADM

## 2020-01-01 RX ORDER — ALBUTEROL SULFATE 2.5 MG/3ML
2.5 SOLUTION RESPIRATORY (INHALATION) EVERY 4 HOURS PRN
Status: DISCONTINUED | OUTPATIENT
Start: 2020-01-01 | End: 2020-01-01 | Stop reason: HOSPADM

## 2020-01-01 RX ORDER — SODIUM CHLORIDE 9 MG/ML
INJECTION, SOLUTION INTRAVENOUS EVERY 12 HOURS
Status: DISCONTINUED | OUTPATIENT
Start: 2020-01-01 | End: 2020-01-01 | Stop reason: ALTCHOICE

## 2020-01-01 RX ORDER — WARFARIN SODIUM 2.5 MG/1
2.5 TABLET ORAL
Status: COMPLETED | OUTPATIENT
Start: 2020-01-01 | End: 2020-01-01

## 2020-01-01 RX ORDER — POTASSIUM CHLORIDE 750 MG/1
10 TABLET, EXTENDED RELEASE ORAL 2 TIMES DAILY
Status: DISCONTINUED | OUTPATIENT
Start: 2020-01-01 | End: 2020-01-01 | Stop reason: HOSPADM

## 2020-01-01 RX ORDER — CALCITRIOL 0.25 UG/1
0.5 CAPSULE, LIQUID FILLED ORAL DAILY
Status: DISCONTINUED | OUTPATIENT
Start: 2020-01-01 | End: 2020-01-01 | Stop reason: HOSPADM

## 2020-01-01 RX ORDER — LIDOCAINE 4 G/G
1 PATCH TOPICAL DAILY
Status: DISCONTINUED | OUTPATIENT
Start: 2020-01-01 | End: 2020-01-01 | Stop reason: HOSPADM

## 2020-01-01 RX ORDER — WARFARIN SODIUM 2 MG/1
2 TABLET ORAL DAILY
Qty: 30 TABLET | Refills: 0 | Status: ON HOLD | OUTPATIENT
Start: 2020-01-01 | End: 2021-01-01 | Stop reason: HOSPADM

## 2020-01-01 RX ORDER — FUROSEMIDE 10 MG/ML
40 INJECTION INTRAMUSCULAR; INTRAVENOUS 2 TIMES DAILY
Status: DISCONTINUED | OUTPATIENT
Start: 2020-01-01 | End: 2020-01-01

## 2020-01-01 RX ORDER — DOCUSATE SODIUM 100 MG/1
100 CAPSULE, LIQUID FILLED ORAL DAILY
Status: DISCONTINUED | OUTPATIENT
Start: 2020-01-01 | End: 2020-01-01

## 2020-01-01 RX ORDER — BUMETANIDE 0.25 MG/ML
2 INJECTION, SOLUTION INTRAMUSCULAR; INTRAVENOUS
Status: DISCONTINUED | OUTPATIENT
Start: 2020-01-01 | End: 2020-01-01

## 2020-01-01 RX ORDER — LEVOTHYROXINE SODIUM 0.03 MG/1
25 TABLET ORAL EVERY MORNING
Status: DISCONTINUED | OUTPATIENT
Start: 2020-01-01 | End: 2020-01-01 | Stop reason: HOSPADM

## 2020-01-01 RX ORDER — WARFARIN SODIUM 1 MG/1
1 TABLET ORAL
Status: COMPLETED | OUTPATIENT
Start: 2020-01-01 | End: 2020-01-01

## 2020-01-01 RX ORDER — ACETAMINOPHEN 650 MG/1
650 SUPPOSITORY RECTAL EVERY 6 HOURS PRN
Status: DISCONTINUED | OUTPATIENT
Start: 2020-01-01 | End: 2020-01-01 | Stop reason: HOSPADM

## 2020-01-01 RX ORDER — CEFTRIAXONE 1 G/1
INJECTION, POWDER, FOR SOLUTION INTRAMUSCULAR; INTRAVENOUS
Status: DISCONTINUED
Start: 2020-01-01 | End: 2020-01-01 | Stop reason: WASHOUT

## 2020-01-01 RX ORDER — FUROSEMIDE 10 MG/ML
40 INJECTION INTRAMUSCULAR; INTRAVENOUS 2 TIMES DAILY
Status: COMPLETED | OUTPATIENT
Start: 2020-01-01 | End: 2020-01-01

## 2020-01-01 RX ORDER — DEXTROSE MONOHYDRATE 25 G/50ML
12.5 INJECTION, SOLUTION INTRAVENOUS PRN
Status: DISCONTINUED | OUTPATIENT
Start: 2020-01-01 | End: 2020-01-01 | Stop reason: HOSPADM

## 2020-01-01 RX ORDER — LORATADINE 10 MG/1
10 TABLET ORAL DAILY
Status: DISCONTINUED | OUTPATIENT
Start: 2020-01-01 | End: 2020-01-01 | Stop reason: CLARIF

## 2020-01-01 RX ORDER — HYDRALAZINE HYDROCHLORIDE 20 MG/ML
10 INJECTION INTRAMUSCULAR; INTRAVENOUS EVERY 6 HOURS PRN
Status: DISCONTINUED | OUTPATIENT
Start: 2020-01-01 | End: 2020-01-01 | Stop reason: HOSPADM

## 2020-01-01 RX ORDER — BUDESONIDE 0.5 MG/2ML
500 INHALANT ORAL 2 TIMES DAILY
Status: DISCONTINUED | OUTPATIENT
Start: 2020-01-01 | End: 2020-01-01 | Stop reason: HOSPADM

## 2020-01-01 RX ORDER — BUMETANIDE 0.25 MG/ML
2 INJECTION, SOLUTION INTRAMUSCULAR; INTRAVENOUS 3 TIMES DAILY
Status: DISCONTINUED | OUTPATIENT
Start: 2020-01-01 | End: 2020-01-01

## 2020-01-01 RX ORDER — METOPROLOL SUCCINATE 50 MG/1
50 TABLET, EXTENDED RELEASE ORAL DAILY
Status: DISCONTINUED | OUTPATIENT
Start: 2020-01-01 | End: 2020-01-01 | Stop reason: HOSPADM

## 2020-01-01 RX ORDER — POLYETHYLENE GLYCOL 3350 17 G/17G
17 POWDER, FOR SOLUTION ORAL DAILY
COMMUNITY
End: 2021-01-01

## 2020-01-01 RX ORDER — HYDROXYZINE HYDROCHLORIDE 10 MG/1
10 TABLET, FILM COATED ORAL 3 TIMES DAILY PRN
Status: DISCONTINUED | OUTPATIENT
Start: 2020-01-01 | End: 2020-01-01 | Stop reason: HOSPADM

## 2020-01-01 RX ORDER — METOPROLOL SUCCINATE 50 MG/1
50 TABLET, EXTENDED RELEASE ORAL DAILY
Qty: 30 TABLET | Refills: 0 | Status: SHIPPED | OUTPATIENT
Start: 2020-01-01 | End: 2021-01-01

## 2020-01-01 RX ORDER — DOCUSATE SODIUM 100 MG/1
200 CAPSULE, LIQUID FILLED ORAL NIGHTLY
Status: DISCONTINUED | OUTPATIENT
Start: 2020-01-01 | End: 2020-01-01

## 2020-01-01 RX ORDER — INSULIN GLARGINE 100 [IU]/ML
40 INJECTION, SOLUTION SUBCUTANEOUS NIGHTLY
Status: DISCONTINUED | OUTPATIENT
Start: 2020-01-01 | End: 2020-01-01 | Stop reason: HOSPADM

## 2020-01-01 RX ORDER — ACETAMINOPHEN 325 MG/1
650 TABLET ORAL EVERY 4 HOURS PRN
COMMUNITY

## 2020-01-01 RX ORDER — FUROSEMIDE 40 MG/1
40 TABLET ORAL 2 TIMES DAILY
Status: DISCONTINUED | OUTPATIENT
Start: 2020-01-01 | End: 2020-01-01 | Stop reason: HOSPADM

## 2020-01-01 RX ORDER — SODIUM CHLORIDE 0.9 % (FLUSH) 0.9 %
10 SYRINGE (ML) INJECTION EVERY 12 HOURS SCHEDULED
Status: DISCONTINUED | OUTPATIENT
Start: 2020-01-01 | End: 2020-01-01 | Stop reason: HOSPADM

## 2020-01-01 RX ORDER — NICOTINE POLACRILEX 4 MG
15 LOZENGE BUCCAL PRN
Status: DISCONTINUED | OUTPATIENT
Start: 2020-01-01 | End: 2020-01-01 | Stop reason: HOSPADM

## 2020-01-01 RX ORDER — SODIUM CHLORIDE 9 MG/ML
INJECTION, SOLUTION INTRAVENOUS CONTINUOUS
Status: DISCONTINUED | OUTPATIENT
Start: 2020-01-01 | End: 2020-01-01

## 2020-01-01 RX ORDER — DOXYCYCLINE HYCLATE 100 MG
100 TABLET ORAL 2 TIMES DAILY
Status: ON HOLD | COMMUNITY
Start: 2020-01-01 | End: 2020-01-01 | Stop reason: HOSPADM

## 2020-01-01 RX ORDER — SENNA AND DOCUSATE SODIUM 50; 8.6 MG/1; MG/1
2 TABLET, FILM COATED ORAL 2 TIMES DAILY
Status: DISCONTINUED | OUTPATIENT
Start: 2020-01-01 | End: 2020-01-01 | Stop reason: HOSPADM

## 2020-01-01 RX ORDER — CHOLECALCIFEROL (VITAMIN D3) 50 MCG
5000 TABLET ORAL DAILY
Status: DISCONTINUED | OUTPATIENT
Start: 2020-01-01 | End: 2020-01-01 | Stop reason: HOSPADM

## 2020-01-01 RX ORDER — SENNA PLUS 8.6 MG/1
1 TABLET ORAL NIGHTLY
Status: DISCONTINUED | OUTPATIENT
Start: 2020-01-01 | End: 2020-01-01

## 2020-01-01 RX ORDER — ARFORMOTEROL TARTRATE 15 UG/2ML
15 SOLUTION RESPIRATORY (INHALATION) 2 TIMES DAILY
Status: DISCONTINUED | OUTPATIENT
Start: 2020-01-01 | End: 2020-01-01 | Stop reason: HOSPADM

## 2020-01-01 RX ORDER — HYDROXYZINE HYDROCHLORIDE 25 MG/1
25 TABLET, FILM COATED ORAL NIGHTLY
Status: ON HOLD | COMMUNITY
End: 2020-01-01 | Stop reason: HOSPADM

## 2020-01-01 RX ORDER — ATORVASTATIN CALCIUM 20 MG/1
20 TABLET, FILM COATED ORAL NIGHTLY
Status: DISCONTINUED | OUTPATIENT
Start: 2020-01-01 | End: 2020-01-01 | Stop reason: HOSPADM

## 2020-01-01 RX ORDER — PANTOPRAZOLE SODIUM 40 MG/1
40 TABLET, DELAYED RELEASE ORAL NIGHTLY
Status: DISCONTINUED | OUTPATIENT
Start: 2020-01-01 | End: 2020-01-01 | Stop reason: HOSPADM

## 2020-01-01 RX ORDER — BISACODYL 10 MG
10 SUPPOSITORY, RECTAL RECTAL DAILY PRN
COMMUNITY

## 2020-01-01 RX ORDER — M-VIT,TX,IRON,MINS/CALC/FOLIC 27MG-0.4MG
1 TABLET ORAL DAILY
COMMUNITY

## 2020-01-01 RX ORDER — INSULIN GLARGINE 100 [IU]/ML
24 INJECTION, SOLUTION SUBCUTANEOUS NIGHTLY
Status: DISCONTINUED | OUTPATIENT
Start: 2020-01-01 | End: 2020-01-01 | Stop reason: HOSPADM

## 2020-01-01 RX ORDER — BUMETANIDE 0.25 MG/ML
2 INJECTION, SOLUTION INTRAMUSCULAR; INTRAVENOUS 2 TIMES DAILY
Status: DISCONTINUED | OUTPATIENT
Start: 2020-01-01 | End: 2020-01-01

## 2020-01-01 RX ORDER — METOLAZONE 5 MG/1
5 TABLET ORAL DAILY
Qty: 14 TABLET | Refills: 0 | Status: SHIPPED | OUTPATIENT
Start: 2020-01-01 | End: 2021-01-01

## 2020-01-01 RX ORDER — WARFARIN SODIUM 2 MG/1
2 TABLET ORAL DAILY
Status: DISCONTINUED | OUTPATIENT
Start: 2020-01-01 | End: 2020-01-01 | Stop reason: HOSPADM

## 2020-01-01 RX ORDER — INSULIN GLARGINE 100 [IU]/ML
30 INJECTION, SOLUTION SUBCUTANEOUS NIGHTLY
Status: DISCONTINUED | OUTPATIENT
Start: 2020-01-01 | End: 2020-01-01 | Stop reason: HOSPADM

## 2020-01-01 RX ORDER — WARFARIN SODIUM 5 MG/1
5 TABLET ORAL
Status: COMPLETED | OUTPATIENT
Start: 2020-01-01 | End: 2020-01-01

## 2020-01-01 RX ORDER — PANTOPRAZOLE SODIUM 40 MG/1
40 TABLET, DELAYED RELEASE ORAL
Status: DISCONTINUED | OUTPATIENT
Start: 2020-01-01 | End: 2020-01-01 | Stop reason: HOSPADM

## 2020-01-01 RX ORDER — METOLAZONE 5 MG/1
5 TABLET ORAL DAILY
Status: DISCONTINUED | OUTPATIENT
Start: 2020-01-01 | End: 2020-01-01 | Stop reason: HOSPADM

## 2020-01-01 RX ADMIN — BISACODYL 10 MG: 10 SUPPOSITORY RECTAL at 16:34

## 2020-01-01 RX ADMIN — FLUTICASONE PROPIONATE 1 SPRAY: 50 SPRAY, METERED NASAL at 08:46

## 2020-01-01 RX ADMIN — LEVOTHYROXINE SODIUM 25 MCG: 25 TABLET ORAL at 06:40

## 2020-01-01 RX ADMIN — FLUTICASONE PROPIONATE 1 SPRAY: 50 SPRAY, METERED NASAL at 07:45

## 2020-01-01 RX ADMIN — POTASSIUM CHLORIDE 10 MEQ: 750 TABLET, EXTENDED RELEASE ORAL at 21:05

## 2020-01-01 RX ADMIN — CETIRIZINE HYDROCHLORIDE 5 MG: 10 TABLET, FILM COATED ORAL at 16:38

## 2020-01-01 RX ADMIN — CHOLECALCIFEROL TAB 50 MCG (2000 UNIT) 5000 UNITS: 50 TAB at 09:23

## 2020-01-01 RX ADMIN — PETROLATUM: 420 OINTMENT TOPICAL at 20:26

## 2020-01-01 RX ADMIN — ATORVASTATIN CALCIUM 20 MG: 20 TABLET, FILM COATED ORAL at 20:50

## 2020-01-01 RX ADMIN — METOPROLOL SUCCINATE 50 MG: 50 TABLET, EXTENDED RELEASE ORAL at 08:48

## 2020-01-01 RX ADMIN — INSULIN LISPRO 1 UNITS: 100 INJECTION, SOLUTION INTRAVENOUS; SUBCUTANEOUS at 22:09

## 2020-01-01 RX ADMIN — FERROUS SULFATE TAB 325 MG (65 MG ELEMENTAL FE) 325 MG: 325 (65 FE) TAB at 16:45

## 2020-01-01 RX ADMIN — GUAIFENESIN 400 MG: 400 TABLET, FILM COATED ORAL at 09:20

## 2020-01-01 RX ADMIN — POTASSIUM CHLORIDE 10 MEQ: 750 TABLET, EXTENDED RELEASE ORAL at 20:18

## 2020-01-01 RX ADMIN — BUMETANIDE 2 MG: 0.25 INJECTION INTRAMUSCULAR; INTRAVENOUS at 16:31

## 2020-01-01 RX ADMIN — LEVOTHYROXINE SODIUM 25 MCG: 25 TABLET ORAL at 06:25

## 2020-01-01 RX ADMIN — BUMETANIDE 2 MG: 0.25 INJECTION INTRAMUSCULAR; INTRAVENOUS at 06:40

## 2020-01-01 RX ADMIN — INSULIN GLARGINE 40 UNITS: 100 INJECTION, SOLUTION SUBCUTANEOUS at 21:41

## 2020-01-01 RX ADMIN — ARFORMOTEROL TARTRATE 15 MCG: 15 SOLUTION RESPIRATORY (INHALATION) at 08:43

## 2020-01-01 RX ADMIN — DOCUSATE SODIUM 200 MG: 100 CAPSULE, LIQUID FILLED ORAL at 21:19

## 2020-01-01 RX ADMIN — GABAPENTIN 300 MG: 300 CAPSULE ORAL at 20:27

## 2020-01-01 RX ADMIN — PANTOPRAZOLE SODIUM 40 MG: 40 TABLET, DELAYED RELEASE ORAL at 20:18

## 2020-01-01 RX ADMIN — DOCUSATE SODIUM 200 MG: 100 CAPSULE, LIQUID FILLED ORAL at 20:16

## 2020-01-01 RX ADMIN — FERROUS SULFATE TAB 325 MG (65 MG ELEMENTAL FE) 325 MG: 325 (65 FE) TAB at 16:31

## 2020-01-01 RX ADMIN — PANTOPRAZOLE SODIUM 40 MG: 40 TABLET, DELAYED RELEASE ORAL at 21:18

## 2020-01-01 RX ADMIN — Medication 10 MG: at 20:48

## 2020-01-01 RX ADMIN — Medication 10 ML: at 09:28

## 2020-01-01 RX ADMIN — PANTOPRAZOLE SODIUM 40 MG: 40 TABLET, DELAYED RELEASE ORAL at 20:49

## 2020-01-01 RX ADMIN — IPRATROPIUM BROMIDE AND ALBUTEROL SULFATE 1 AMPULE: .5; 3 SOLUTION RESPIRATORY (INHALATION) at 15:35

## 2020-01-01 RX ADMIN — FLUTICASONE PROPIONATE 1 SPRAY: 50 SPRAY, METERED NASAL at 22:00

## 2020-01-01 RX ADMIN — ARFORMOTEROL TARTRATE 15 MCG: 15 SOLUTION RESPIRATORY (INHALATION) at 19:26

## 2020-01-01 RX ADMIN — INSULIN LISPRO 2 UNITS: 100 INJECTION, SOLUTION INTRAVENOUS; SUBCUTANEOUS at 16:28

## 2020-01-01 RX ADMIN — ARFORMOTEROL TARTRATE 15 MCG: 15 SOLUTION RESPIRATORY (INHALATION) at 07:50

## 2020-01-01 RX ADMIN — METOLAZONE 5 MG: 5 TABLET ORAL at 08:44

## 2020-01-01 RX ADMIN — METOLAZONE 5 MG: 5 TABLET ORAL at 10:44

## 2020-01-01 RX ADMIN — POTASSIUM CHLORIDE 10 MEQ: 750 TABLET, EXTENDED RELEASE ORAL at 16:45

## 2020-01-01 RX ADMIN — PANTOPRAZOLE SODIUM 40 MG: 40 TABLET, DELAYED RELEASE ORAL at 20:50

## 2020-01-01 RX ADMIN — PANTOPRAZOLE SODIUM 40 MG: 40 TABLET, DELAYED RELEASE ORAL at 06:34

## 2020-01-01 RX ADMIN — POLYETHYLENE GLYCOL 3350 17 G: 17 POWDER, FOR SOLUTION ORAL at 10:04

## 2020-01-01 RX ADMIN — IPRATROPIUM BROMIDE AND ALBUTEROL SULFATE 1 AMPULE: .5; 3 SOLUTION RESPIRATORY (INHALATION) at 21:04

## 2020-01-01 RX ADMIN — PANTOPRAZOLE SODIUM 40 MG: 40 TABLET, DELAYED RELEASE ORAL at 06:16

## 2020-01-01 RX ADMIN — DOCUSATE SODIUM 200 MG: 100 CAPSULE, LIQUID FILLED ORAL at 20:20

## 2020-01-01 RX ADMIN — GABAPENTIN 300 MG: 300 CAPSULE ORAL at 21:55

## 2020-01-01 RX ADMIN — ARFORMOTEROL TARTRATE 15 MCG: 15 SOLUTION RESPIRATORY (INHALATION) at 19:54

## 2020-01-01 RX ADMIN — GABAPENTIN 300 MG: 300 CAPSULE ORAL at 20:49

## 2020-01-01 RX ADMIN — GABAPENTIN 300 MG: 300 CAPSULE ORAL at 22:09

## 2020-01-01 RX ADMIN — INSULIN LISPRO 1 UNITS: 100 INJECTION, SOLUTION INTRAVENOUS; SUBCUTANEOUS at 16:32

## 2020-01-01 RX ADMIN — IPRATROPIUM BROMIDE AND ALBUTEROL SULFATE 1 AMPULE: .5; 3 SOLUTION RESPIRATORY (INHALATION) at 19:26

## 2020-01-01 RX ADMIN — VANCOMYCIN HYDROCHLORIDE 2500 MG: 5 INJECTION, POWDER, LYOPHILIZED, FOR SOLUTION INTRAVENOUS at 12:45

## 2020-01-01 RX ADMIN — INSULIN LISPRO 6 UNITS: 100 INJECTION, SOLUTION INTRAVENOUS; SUBCUTANEOUS at 16:06

## 2020-01-01 RX ADMIN — WARFARIN SODIUM 2 MG: 2 TABLET ORAL at 18:05

## 2020-01-01 RX ADMIN — METOPROLOL SUCCINATE 50 MG: 50 TABLET, EXTENDED RELEASE ORAL at 09:20

## 2020-01-01 RX ADMIN — POLYETHYLENE GLYCOL 3350 17 G: 17 POWDER, FOR SOLUTION ORAL at 17:18

## 2020-01-01 RX ADMIN — IPRATROPIUM BROMIDE AND ALBUTEROL SULFATE 1 AMPULE: .5; 3 SOLUTION RESPIRATORY (INHALATION) at 16:19

## 2020-01-01 RX ADMIN — BUDESONIDE 500 MCG: 0.5 SUSPENSION RESPIRATORY (INHALATION) at 20:47

## 2020-01-01 RX ADMIN — BUDESONIDE 500 MCG: 0.5 SUSPENSION RESPIRATORY (INHALATION) at 07:52

## 2020-01-01 RX ADMIN — Medication 10 ML: at 22:14

## 2020-01-01 RX ADMIN — METOPROLOL SUCCINATE 50 MG: 50 TABLET, EXTENDED RELEASE ORAL at 09:27

## 2020-01-01 RX ADMIN — MAGNESIUM HYDROXIDE 30 ML: 400 SUSPENSION ORAL at 01:50

## 2020-01-01 RX ADMIN — GABAPENTIN 300 MG: 300 CAPSULE ORAL at 20:50

## 2020-01-01 RX ADMIN — INSULIN LISPRO 2 UNITS: 100 INJECTION, SOLUTION INTRAVENOUS; SUBCUTANEOUS at 20:48

## 2020-01-01 RX ADMIN — BUMETANIDE 1 MG: 0.25 INJECTION, SOLUTION INTRAMUSCULAR; INTRAVENOUS at 20:13

## 2020-01-01 RX ADMIN — INSULIN LISPRO 8 UNITS: 100 INJECTION, SOLUTION INTRAVENOUS; SUBCUTANEOUS at 16:06

## 2020-01-01 RX ADMIN — POTASSIUM CHLORIDE 10 MEQ: 750 TABLET, EXTENDED RELEASE ORAL at 22:09

## 2020-01-01 RX ADMIN — INSULIN LISPRO 2 UNITS: 100 INJECTION, SOLUTION INTRAVENOUS; SUBCUTANEOUS at 06:38

## 2020-01-01 RX ADMIN — DOCUSATE SODIUM 100 MG: 100 CAPSULE, LIQUID FILLED ORAL at 11:38

## 2020-01-01 RX ADMIN — BUMETANIDE 1 MG: 0.25 INJECTION, SOLUTION INTRAMUSCULAR; INTRAVENOUS at 07:46

## 2020-01-01 RX ADMIN — GABAPENTIN 300 MG: 300 CAPSULE ORAL at 20:11

## 2020-01-01 RX ADMIN — POLYETHYLENE GLYCOL 3350 17 G: 17 POWDER, FOR SOLUTION ORAL at 15:55

## 2020-01-01 RX ADMIN — IPRATROPIUM BROMIDE AND ALBUTEROL SULFATE 1 AMPULE: .5; 3 SOLUTION RESPIRATORY (INHALATION) at 07:56

## 2020-01-01 RX ADMIN — INSULIN LISPRO 2 UNITS: 100 INJECTION, SOLUTION INTRAVENOUS; SUBCUTANEOUS at 16:12

## 2020-01-01 RX ADMIN — PANTOPRAZOLE SODIUM 40 MG: 40 TABLET, DELAYED RELEASE ORAL at 20:27

## 2020-01-01 RX ADMIN — IPRATROPIUM BROMIDE AND ALBUTEROL SULFATE 1 AMPULE: .5; 3 SOLUTION RESPIRATORY (INHALATION) at 12:05

## 2020-01-01 RX ADMIN — WARFARIN SODIUM 1 MG: 1 TABLET ORAL at 17:58

## 2020-01-01 RX ADMIN — SODIUM CHLORIDE, PRESERVATIVE FREE 10 ML: 5 INJECTION INTRAVENOUS at 21:25

## 2020-01-01 RX ADMIN — FUROSEMIDE 40 MG: 10 INJECTION, SOLUTION INTRAMUSCULAR; INTRAVENOUS at 20:50

## 2020-01-01 RX ADMIN — PANTOPRAZOLE SODIUM 40 MG: 40 TABLET, DELAYED RELEASE ORAL at 20:45

## 2020-01-01 RX ADMIN — INSULIN LISPRO 2 UNITS: 100 INJECTION, SOLUTION INTRAVENOUS; SUBCUTANEOUS at 12:26

## 2020-01-01 RX ADMIN — IPRATROPIUM BROMIDE AND ALBUTEROL SULFATE 1 AMPULE: .5; 3 SOLUTION RESPIRATORY (INHALATION) at 08:43

## 2020-01-01 RX ADMIN — IPRATROPIUM BROMIDE AND ALBUTEROL SULFATE 1 AMPULE: .5; 3 SOLUTION RESPIRATORY (INHALATION) at 16:33

## 2020-01-01 RX ADMIN — Medication 10 ML: at 20:21

## 2020-01-01 RX ADMIN — IPRATROPIUM BROMIDE AND ALBUTEROL SULFATE 1 AMPULE: .5; 3 SOLUTION RESPIRATORY (INHALATION) at 08:07

## 2020-01-01 RX ADMIN — FLUTICASONE PROPIONATE 1 SPRAY: 50 SPRAY, METERED NASAL at 20:19

## 2020-01-01 RX ADMIN — PANTOPRAZOLE SODIUM 40 MG: 40 TABLET, DELAYED RELEASE ORAL at 06:57

## 2020-01-01 RX ADMIN — WARFARIN SODIUM 2 MG: 2 TABLET ORAL at 20:11

## 2020-01-01 RX ADMIN — INSULIN LISPRO 8 UNITS: 100 INJECTION, SOLUTION INTRAVENOUS; SUBCUTANEOUS at 16:39

## 2020-01-01 RX ADMIN — Medication 10 ML: at 21:00

## 2020-01-01 RX ADMIN — WARFARIN SODIUM 2 MG: 2 TABLET ORAL at 16:27

## 2020-01-01 RX ADMIN — CALCITRIOL 0.5 MCG: 0.25 CAPSULE ORAL at 16:38

## 2020-01-01 RX ADMIN — LEVOTHYROXINE SODIUM 25 MCG: 25 TABLET ORAL at 06:18

## 2020-01-01 RX ADMIN — Medication 10 MG: at 21:18

## 2020-01-01 RX ADMIN — BUDESONIDE 500 MCG: 0.5 SUSPENSION RESPIRATORY (INHALATION) at 19:26

## 2020-01-01 RX ADMIN — DOCUSATE SODIUM 50 MG AND SENNOSIDES 8.6 MG 2 TABLET: 8.6; 5 TABLET, FILM COATED ORAL at 08:38

## 2020-01-01 RX ADMIN — CALCITRIOL 0.5 MCG: 0.25 CAPSULE ORAL at 08:42

## 2020-01-01 RX ADMIN — BUMETANIDE 2 MG: 0.25 INJECTION INTRAMUSCULAR; INTRAVENOUS at 06:26

## 2020-01-01 RX ADMIN — INSULIN GLARGINE 30 UNITS: 100 INJECTION, SOLUTION SUBCUTANEOUS at 20:23

## 2020-01-01 RX ADMIN — ARFORMOTEROL TARTRATE 15 MCG: 15 SOLUTION RESPIRATORY (INHALATION) at 19:43

## 2020-01-01 RX ADMIN — POLYETHYLENE GLYCOL 3350 17 G: 17 POWDER, FOR SOLUTION ORAL at 13:55

## 2020-01-01 RX ADMIN — FERROUS SULFATE TAB 325 MG (65 MG ELEMENTAL FE) 325 MG: 325 (65 FE) TAB at 17:18

## 2020-01-01 RX ADMIN — METOPROLOL SUCCINATE 50 MG: 50 TABLET, EXTENDED RELEASE ORAL at 08:43

## 2020-01-01 RX ADMIN — CALCITRIOL 0.5 MCG: 0.25 CAPSULE ORAL at 07:45

## 2020-01-01 RX ADMIN — IPRATROPIUM BROMIDE AND ALBUTEROL SULFATE 1 AMPULE: .5; 3 SOLUTION RESPIRATORY (INHALATION) at 12:02

## 2020-01-01 RX ADMIN — CEFEPIME HYDROCHLORIDE 1 G: 1 INJECTION, POWDER, FOR SOLUTION INTRAMUSCULAR; INTRAVENOUS at 23:06

## 2020-01-01 RX ADMIN — WARFARIN SODIUM 5 MG: 5 TABLET ORAL at 15:45

## 2020-01-01 RX ADMIN — INSULIN LISPRO 1 UNITS: 100 INJECTION, SOLUTION INTRAVENOUS; SUBCUTANEOUS at 11:52

## 2020-01-01 RX ADMIN — FERROUS SULFATE TAB 325 MG (65 MG ELEMENTAL FE) 325 MG: 325 (65 FE) TAB at 08:42

## 2020-01-01 RX ADMIN — INSULIN GLARGINE 24 UNITS: 100 INJECTION, SOLUTION SUBCUTANEOUS at 20:27

## 2020-01-01 RX ADMIN — POTASSIUM CHLORIDE 10 MEQ: 750 TABLET, EXTENDED RELEASE ORAL at 20:19

## 2020-01-01 RX ADMIN — PETROLATUM: 420 OINTMENT TOPICAL at 20:19

## 2020-01-01 RX ADMIN — IPRATROPIUM BROMIDE AND ALBUTEROL SULFATE 1 AMPULE: .5; 3 SOLUTION RESPIRATORY (INHALATION) at 15:30

## 2020-01-01 RX ADMIN — INSULIN LISPRO 3 UNITS: 100 INJECTION, SOLUTION INTRAVENOUS; SUBCUTANEOUS at 20:23

## 2020-01-01 RX ADMIN — POTASSIUM CHLORIDE 10 MEQ: 750 TABLET, EXTENDED RELEASE ORAL at 20:16

## 2020-01-01 RX ADMIN — BUDESONIDE 500 MCG: 0.5 SUSPENSION RESPIRATORY (INHALATION) at 19:55

## 2020-01-01 RX ADMIN — BUMETANIDE 2 MG: 0.25 INJECTION INTRAMUSCULAR; INTRAVENOUS at 16:05

## 2020-01-01 RX ADMIN — INSULIN LISPRO 1 UNITS: 100 INJECTION, SOLUTION INTRAVENOUS; SUBCUTANEOUS at 11:12

## 2020-01-01 RX ADMIN — BUMETANIDE 2 MG: 0.25 INJECTION INTRAMUSCULAR; INTRAVENOUS at 13:11

## 2020-01-01 RX ADMIN — SODIUM CHLORIDE: 9 INJECTION, SOLUTION INTRAVENOUS at 13:48

## 2020-01-01 RX ADMIN — CALCITRIOL 0.5 MCG: 0.25 CAPSULE ORAL at 08:49

## 2020-01-01 RX ADMIN — INSULIN LISPRO 2 UNITS: 100 INJECTION, SOLUTION INTRAVENOUS; SUBCUTANEOUS at 16:17

## 2020-01-01 RX ADMIN — Medication 10 ML: at 08:54

## 2020-01-01 RX ADMIN — IPRATROPIUM BROMIDE AND ALBUTEROL SULFATE 1 AMPULE: .5; 3 SOLUTION RESPIRATORY (INHALATION) at 18:01

## 2020-01-01 RX ADMIN — INSULIN LISPRO 2 UNITS: 100 INJECTION, SOLUTION INTRAVENOUS; SUBCUTANEOUS at 20:17

## 2020-01-01 RX ADMIN — METOPROLOL SUCCINATE 50 MG: 50 TABLET, EXTENDED RELEASE ORAL at 16:38

## 2020-01-01 RX ADMIN — GABAPENTIN 300 MG: 300 CAPSULE ORAL at 20:45

## 2020-01-01 RX ADMIN — FLUTICASONE PROPIONATE 1 SPRAY: 50 SPRAY, METERED NASAL at 08:43

## 2020-01-01 RX ADMIN — INSULIN LISPRO 1 UNITS: 100 INJECTION, SOLUTION INTRAVENOUS; SUBCUTANEOUS at 20:28

## 2020-01-01 RX ADMIN — INSULIN LISPRO 1 UNITS: 100 INJECTION, SOLUTION INTRAVENOUS; SUBCUTANEOUS at 20:44

## 2020-01-01 RX ADMIN — FERROUS SULFATE TAB 325 MG (65 MG ELEMENTAL FE) 325 MG: 325 (65 FE) TAB at 09:27

## 2020-01-01 RX ADMIN — GUAIFENESIN 400 MG: 400 TABLET, FILM COATED ORAL at 09:31

## 2020-01-01 RX ADMIN — FERROUS SULFATE TAB 325 MG (65 MG ELEMENTAL FE) 325 MG: 325 (65 FE) TAB at 07:45

## 2020-01-01 RX ADMIN — POTASSIUM CHLORIDE 10 MEQ: 750 TABLET, EXTENDED RELEASE ORAL at 08:42

## 2020-01-01 RX ADMIN — SODIUM CHLORIDE, PRESERVATIVE FREE 10 ML: 5 INJECTION INTRAVENOUS at 07:44

## 2020-01-01 RX ADMIN — DOCUSATE SODIUM 100 MG: 100 CAPSULE, LIQUID FILLED ORAL at 10:25

## 2020-01-01 RX ADMIN — POTASSIUM CHLORIDE 10 MEQ: 750 TABLET, EXTENDED RELEASE ORAL at 21:57

## 2020-01-01 RX ADMIN — FUROSEMIDE 40 MG: 10 INJECTION, SOLUTION INTRAMUSCULAR; INTRAVENOUS at 09:21

## 2020-01-01 RX ADMIN — INSULIN LISPRO 8 UNITS: 100 INJECTION, SOLUTION INTRAVENOUS; SUBCUTANEOUS at 06:26

## 2020-01-01 RX ADMIN — SENNOSIDES 8.6 MG: 8.6 TABLET, FILM COATED ORAL at 22:14

## 2020-01-01 RX ADMIN — INSULIN LISPRO 1 UNITS: 100 INJECTION, SOLUTION INTRAVENOUS; SUBCUTANEOUS at 07:34

## 2020-01-01 RX ADMIN — CETIRIZINE HYDROCHLORIDE 5 MG: 10 TABLET, FILM COATED ORAL at 08:48

## 2020-01-01 RX ADMIN — BUMETANIDE 1 MG: 1 TABLET ORAL at 09:24

## 2020-01-01 RX ADMIN — INSULIN LISPRO 2 UNITS: 100 INJECTION, SOLUTION INTRAVENOUS; SUBCUTANEOUS at 11:06

## 2020-01-01 RX ADMIN — DOCUSATE SODIUM 50 MG AND SENNOSIDES 8.6 MG 2 TABLET: 8.6; 5 TABLET, FILM COATED ORAL at 09:24

## 2020-01-01 RX ADMIN — CEFEPIME HYDROCHLORIDE 1 G: 1 INJECTION, POWDER, FOR SOLUTION INTRAMUSCULAR; INTRAVENOUS at 21:17

## 2020-01-01 RX ADMIN — ARFORMOTEROL TARTRATE 15 MCG: 15 SOLUTION RESPIRATORY (INHALATION) at 07:51

## 2020-01-01 RX ADMIN — PANTOPRAZOLE SODIUM 40 MG: 40 TABLET, DELAYED RELEASE ORAL at 06:00

## 2020-01-01 RX ADMIN — BISACODYL 10 MG: 10 SUPPOSITORY RECTAL at 09:04

## 2020-01-01 RX ADMIN — ARFORMOTEROL TARTRATE 15 MCG: 15 SOLUTION RESPIRATORY (INHALATION) at 09:26

## 2020-01-01 RX ADMIN — INSULIN GLARGINE 30 UNITS: 100 INJECTION, SOLUTION SUBCUTANEOUS at 22:07

## 2020-01-01 RX ADMIN — GUAIFENESIN 400 MG: 400 TABLET, FILM COATED ORAL at 20:48

## 2020-01-01 RX ADMIN — GUAIFENESIN 400 MG: 400 TABLET, FILM COATED ORAL at 21:18

## 2020-01-01 RX ADMIN — SODIUM CHLORIDE: 9 INJECTION, SOLUTION INTRAVENOUS at 03:10

## 2020-01-01 RX ADMIN — SODIUM CHLORIDE, PRESERVATIVE FREE 10 ML: 5 INJECTION INTRAVENOUS at 09:32

## 2020-01-01 RX ADMIN — GABAPENTIN 300 MG: 300 CAPSULE ORAL at 21:37

## 2020-01-01 RX ADMIN — POLYETHYLENE GLYCOL 3350 17 G: 17 POWDER, FOR SOLUTION ORAL at 08:39

## 2020-01-01 RX ADMIN — PANTOPRAZOLE SODIUM 40 MG: 40 TABLET, DELAYED RELEASE ORAL at 16:06

## 2020-01-01 RX ADMIN — IPRATROPIUM BROMIDE AND ALBUTEROL SULFATE 1 AMPULE: .5; 3 SOLUTION RESPIRATORY (INHALATION) at 15:49

## 2020-01-01 RX ADMIN — INSULIN LISPRO 1 UNITS: 100 INJECTION, SOLUTION INTRAVENOUS; SUBCUTANEOUS at 06:13

## 2020-01-01 RX ADMIN — FUROSEMIDE 20 MG: 20 INJECTION, SOLUTION INTRAMUSCULAR; INTRAVENOUS at 17:24

## 2020-01-01 RX ADMIN — SODIUM CHLORIDE, PRESERVATIVE FREE 10 ML: 5 INJECTION INTRAVENOUS at 20:50

## 2020-01-01 RX ADMIN — WARFARIN SODIUM 1 MG: 1 TABLET ORAL at 17:17

## 2020-01-01 RX ADMIN — GUAIFENESIN 400 MG: 400 TABLET, FILM COATED ORAL at 07:57

## 2020-01-01 RX ADMIN — IPRATROPIUM BROMIDE AND ALBUTEROL SULFATE 1 AMPULE: .5; 3 SOLUTION RESPIRATORY (INHALATION) at 07:50

## 2020-01-01 RX ADMIN — INSULIN LISPRO 2 UNITS: 100 INJECTION, SOLUTION INTRAVENOUS; SUBCUTANEOUS at 11:00

## 2020-01-01 RX ADMIN — PANTOPRAZOLE SODIUM 40 MG: 40 TABLET, DELAYED RELEASE ORAL at 17:21

## 2020-01-01 RX ADMIN — INSULIN GLARGINE 24 UNITS: 100 INJECTION, SOLUTION SUBCUTANEOUS at 20:49

## 2020-01-01 RX ADMIN — SODIUM CHLORIDE, PRESERVATIVE FREE 10 ML: 5 INJECTION INTRAVENOUS at 21:37

## 2020-01-01 RX ADMIN — FLUTICASONE PROPIONATE 1 SPRAY: 50 SPRAY, METERED NASAL at 09:32

## 2020-01-01 RX ADMIN — DOXYCYCLINE HYCLATE 100 MG: 100 CAPSULE ORAL at 09:27

## 2020-01-01 RX ADMIN — GABAPENTIN 300 MG: 300 CAPSULE ORAL at 21:18

## 2020-01-01 RX ADMIN — GABAPENTIN 300 MG: 300 CAPSULE ORAL at 20:19

## 2020-01-01 RX ADMIN — FERROUS SULFATE TAB 325 MG (65 MG ELEMENTAL FE) 325 MG: 325 (65 FE) TAB at 22:10

## 2020-01-01 RX ADMIN — METOPROLOL SUCCINATE 50 MG: 50 TABLET, EXTENDED RELEASE ORAL at 07:47

## 2020-01-01 RX ADMIN — DOXYCYCLINE HYCLATE 100 MG: 100 CAPSULE ORAL at 21:55

## 2020-01-01 RX ADMIN — GUAIFENESIN 400 MG: 400 TABLET, FILM COATED ORAL at 13:41

## 2020-01-01 RX ADMIN — GUAIFENESIN 400 MG: 400 TABLET, FILM COATED ORAL at 13:11

## 2020-01-01 RX ADMIN — FERROUS SULFATE TAB 325 MG (65 MG ELEMENTAL FE) 325 MG: 325 (65 FE) TAB at 08:53

## 2020-01-01 RX ADMIN — METOPROLOL SUCCINATE 50 MG: 50 TABLET, EXTENDED RELEASE ORAL at 10:03

## 2020-01-01 RX ADMIN — WARFARIN SODIUM 2.5 MG: 2.5 TABLET ORAL at 17:15

## 2020-01-01 RX ADMIN — Medication 10 ML: at 09:26

## 2020-01-01 RX ADMIN — INSULIN GLARGINE 24 UNITS: 100 INJECTION, SOLUTION SUBCUTANEOUS at 20:44

## 2020-01-01 RX ADMIN — FLUTICASONE PROPIONATE 1 SPRAY: 50 SPRAY, METERED NASAL at 10:01

## 2020-01-01 RX ADMIN — SODIUM CHLORIDE, PRESERVATIVE FREE 10 ML: 5 INJECTION INTRAVENOUS at 07:46

## 2020-01-01 RX ADMIN — FLUTICASONE PROPIONATE 1 SPRAY: 50 SPRAY, METERED NASAL at 20:48

## 2020-01-01 RX ADMIN — FLUTICASONE PROPIONATE 1 SPRAY: 50 SPRAY, METERED NASAL at 20:20

## 2020-01-01 RX ADMIN — DOCUSATE SODIUM 50 MG AND SENNOSIDES 8.6 MG 2 TABLET: 8.6; 5 TABLET, FILM COATED ORAL at 21:00

## 2020-01-01 RX ADMIN — ACETAMINOPHEN 650 MG: 325 TABLET ORAL at 20:18

## 2020-01-01 RX ADMIN — DOCUSATE SODIUM 200 MG: 100 CAPSULE, LIQUID FILLED ORAL at 23:15

## 2020-01-01 RX ADMIN — FUROSEMIDE 40 MG: 10 INJECTION, SOLUTION INTRAMUSCULAR; INTRAVENOUS at 19:30

## 2020-01-01 RX ADMIN — INSULIN LISPRO 2 UNITS: 100 INJECTION, SOLUTION INTRAVENOUS; SUBCUTANEOUS at 21:00

## 2020-01-01 RX ADMIN — LEVOTHYROXINE SODIUM 25 MCG: 25 TABLET ORAL at 06:16

## 2020-01-01 RX ADMIN — POTASSIUM CHLORIDE 10 MEQ: 750 TABLET, EXTENDED RELEASE ORAL at 08:38

## 2020-01-01 RX ADMIN — IPRATROPIUM BROMIDE AND ALBUTEROL SULFATE 1 AMPULE: .5; 3 SOLUTION RESPIRATORY (INHALATION) at 12:09

## 2020-01-01 RX ADMIN — BUDESONIDE 500 MCG: 0.5 SUSPENSION RESPIRATORY (INHALATION) at 07:50

## 2020-01-01 RX ADMIN — FLUTICASONE PROPIONATE 1 SPRAY: 50 SPRAY, METERED NASAL at 21:39

## 2020-01-01 RX ADMIN — INSULIN LISPRO 8 UNITS: 100 INJECTION, SOLUTION INTRAVENOUS; SUBCUTANEOUS at 07:00

## 2020-01-01 RX ADMIN — FLUTICASONE PROPIONATE 1 SPRAY: 50 SPRAY, METERED NASAL at 20:50

## 2020-01-01 RX ADMIN — PANTOPRAZOLE SODIUM 40 MG: 40 TABLET, DELAYED RELEASE ORAL at 16:31

## 2020-01-01 RX ADMIN — CALCITRIOL 0.5 MCG: 0.25 CAPSULE ORAL at 09:20

## 2020-01-01 RX ADMIN — ENOXAPARIN SODIUM 30 MG: 30 INJECTION SUBCUTANEOUS at 21:18

## 2020-01-01 RX ADMIN — LEVOTHYROXINE SODIUM 25 MCG: 0.03 TABLET ORAL at 07:46

## 2020-01-01 RX ADMIN — SODIUM CHLORIDE, PRESERVATIVE FREE 10 ML: 5 INJECTION INTRAVENOUS at 20:12

## 2020-01-01 RX ADMIN — FLUTICASONE PROPIONATE 1 SPRAY: 50 SPRAY, METERED NASAL at 09:20

## 2020-01-01 RX ADMIN — FLUTICASONE PROPIONATE 1 SPRAY: 50 SPRAY, METERED NASAL at 20:44

## 2020-01-01 RX ADMIN — FERROUS SULFATE TAB 325 MG (65 MG ELEMENTAL FE) 325 MG: 325 (65 FE) TAB at 16:06

## 2020-01-01 RX ADMIN — FLUTICASONE PROPIONATE 1 SPRAY: 50 SPRAY, METERED NASAL at 20:10

## 2020-01-01 RX ADMIN — METOPROLOL SUCCINATE 50 MG: 50 TABLET, EXTENDED RELEASE ORAL at 07:45

## 2020-01-01 RX ADMIN — DOCUSATE SODIUM 50 MG AND SENNOSIDES 8.6 MG 2 TABLET: 8.6; 5 TABLET, FILM COATED ORAL at 20:18

## 2020-01-01 RX ADMIN — PANTOPRAZOLE SODIUM 40 MG: 40 TABLET, DELAYED RELEASE ORAL at 06:40

## 2020-01-01 RX ADMIN — INSULIN GLARGINE 30 UNITS: 100 INJECTION, SOLUTION SUBCUTANEOUS at 20:17

## 2020-01-01 RX ADMIN — INSULIN GLARGINE 24 UNITS: 100 INJECTION, SOLUTION SUBCUTANEOUS at 20:54

## 2020-01-01 RX ADMIN — DOCUSATE SODIUM 50 MG AND SENNOSIDES 8.6 MG 2 TABLET: 8.6; 5 TABLET, FILM COATED ORAL at 10:02

## 2020-01-01 RX ADMIN — IPRATROPIUM BROMIDE AND ALBUTEROL SULFATE 1 AMPULE: .5; 3 SOLUTION RESPIRATORY (INHALATION) at 08:23

## 2020-01-01 RX ADMIN — PANTOPRAZOLE SODIUM 40 MG: 40 TABLET, DELAYED RELEASE ORAL at 16:17

## 2020-01-01 RX ADMIN — FERROUS SULFATE TAB 325 MG (65 MG ELEMENTAL FE) 325 MG: 325 (65 FE) TAB at 17:21

## 2020-01-01 RX ADMIN — GUAIFENESIN 400 MG: 400 TABLET, FILM COATED ORAL at 20:18

## 2020-01-01 RX ADMIN — GABAPENTIN 300 MG: 300 CAPSULE ORAL at 20:18

## 2020-01-01 RX ADMIN — Medication 10 ML: at 10:04

## 2020-01-01 RX ADMIN — CHOLECALCIFEROL TAB 50 MCG (2000 UNIT) 5000 UNITS: 50 TAB at 10:02

## 2020-01-01 RX ADMIN — BUMETANIDE 1 MG: 1 TABLET ORAL at 08:50

## 2020-01-01 RX ADMIN — Medication 10 ML: at 08:40

## 2020-01-01 RX ADMIN — BISACODYL 10 MG: 10 SUPPOSITORY RECTAL at 17:39

## 2020-01-01 RX ADMIN — INSULIN LISPRO 1 UNITS: 100 INJECTION, SOLUTION INTRAVENOUS; SUBCUTANEOUS at 11:44

## 2020-01-01 RX ADMIN — ATORVASTATIN CALCIUM 20 MG: 20 TABLET, FILM COATED ORAL at 21:19

## 2020-01-01 RX ADMIN — ATORVASTATIN CALCIUM 20 MG: 20 TABLET, FILM COATED ORAL at 21:37

## 2020-01-01 RX ADMIN — FERROUS SULFATE TAB 325 MG (65 MG ELEMENTAL FE) 325 MG: 325 (65 FE) TAB at 07:46

## 2020-01-01 RX ADMIN — FLUTICASONE PROPIONATE 1 SPRAY: 50 SPRAY, METERED NASAL at 20:18

## 2020-01-01 RX ADMIN — FLUTICASONE PROPIONATE 1 SPRAY: 50 SPRAY, METERED NASAL at 10:26

## 2020-01-01 RX ADMIN — ATORVASTATIN CALCIUM 20 MG: 20 TABLET, FILM COATED ORAL at 20:18

## 2020-01-01 RX ADMIN — INSULIN LISPRO 1 UNITS: 100 INJECTION, SOLUTION INTRAVENOUS; SUBCUTANEOUS at 21:42

## 2020-01-01 RX ADMIN — GUAIFENESIN 400 MG: 400 TABLET, FILM COATED ORAL at 14:06

## 2020-01-01 RX ADMIN — POLYETHYLENE GLYCOL 3350 17 G: 17 POWDER, FOR SOLUTION ORAL at 21:00

## 2020-01-01 RX ADMIN — BUMETANIDE 2 MG: 0.25 INJECTION INTRAMUSCULAR; INTRAVENOUS at 14:55

## 2020-01-01 RX ADMIN — INSULIN GLARGINE 30 UNITS: 100 INJECTION, SOLUTION SUBCUTANEOUS at 20:16

## 2020-01-01 RX ADMIN — FERROUS SULFATE TAB 325 MG (65 MG ELEMENTAL FE) 325 MG: 325 (65 FE) TAB at 16:27

## 2020-01-01 RX ADMIN — FERROUS SULFATE TAB 325 MG (65 MG ELEMENTAL FE) 325 MG: 325 (65 FE) TAB at 16:33

## 2020-01-01 RX ADMIN — POTASSIUM CHLORIDE 10 MEQ: 750 TABLET, EXTENDED RELEASE ORAL at 08:53

## 2020-01-01 RX ADMIN — CEFEPIME 2 G: 2 INJECTION, POWDER, FOR SOLUTION INTRAVENOUS at 10:47

## 2020-01-01 RX ADMIN — WARFARIN SODIUM 2 MG: 2 TABLET ORAL at 17:00

## 2020-01-01 RX ADMIN — GABAPENTIN 300 MG: 300 CAPSULE ORAL at 21:19

## 2020-01-01 RX ADMIN — METOPROLOL SUCCINATE 50 MG: 50 TABLET, EXTENDED RELEASE ORAL at 08:54

## 2020-01-01 RX ADMIN — FUROSEMIDE 40 MG: 10 INJECTION, SOLUTION INTRAMUSCULAR; INTRAVENOUS at 17:37

## 2020-01-01 RX ADMIN — IPRATROPIUM BROMIDE AND ALBUTEROL SULFATE 1 AMPULE: .5; 3 SOLUTION RESPIRATORY (INHALATION) at 15:27

## 2020-01-01 RX ADMIN — INSULIN LISPRO 2 UNITS: 100 INJECTION, SOLUTION INTRAVENOUS; SUBCUTANEOUS at 07:00

## 2020-01-01 RX ADMIN — IPRATROPIUM BROMIDE AND ALBUTEROL SULFATE 1 AMPULE: .5; 3 SOLUTION RESPIRATORY (INHALATION) at 12:23

## 2020-01-01 RX ADMIN — IPRATROPIUM BROMIDE AND ALBUTEROL SULFATE 1 AMPULE: .5; 3 SOLUTION RESPIRATORY (INHALATION) at 21:34

## 2020-01-01 RX ADMIN — FLUTICASONE PROPIONATE 1 SPRAY: 50 SPRAY, METERED NASAL at 21:58

## 2020-01-01 RX ADMIN — POTASSIUM CHLORIDE 10 MEQ: 750 TABLET, EXTENDED RELEASE ORAL at 09:27

## 2020-01-01 RX ADMIN — GUAIFENESIN 400 MG: 400 TABLET, FILM COATED ORAL at 08:47

## 2020-01-01 RX ADMIN — IPRATROPIUM BROMIDE AND ALBUTEROL SULFATE 1 AMPULE: .5; 3 SOLUTION RESPIRATORY (INHALATION) at 19:23

## 2020-01-01 RX ADMIN — BUMETANIDE 2 MG: 0.25 INJECTION INTRAMUSCULAR; INTRAVENOUS at 07:44

## 2020-01-01 RX ADMIN — BUDESONIDE 500 MCG: 0.5 SUSPENSION RESPIRATORY (INHALATION) at 19:43

## 2020-01-01 RX ADMIN — CHOLECALCIFEROL TAB 50 MCG (2000 UNIT) 5000 UNITS: 50 TAB at 08:38

## 2020-01-01 RX ADMIN — IPRATROPIUM BROMIDE AND ALBUTEROL SULFATE 1 AMPULE: .5; 3 SOLUTION RESPIRATORY (INHALATION) at 20:51

## 2020-01-01 RX ADMIN — BUMETANIDE 2 MG: 0.25 INJECTION INTRAMUSCULAR; INTRAVENOUS at 06:34

## 2020-01-01 RX ADMIN — GABAPENTIN 300 MG: 300 CAPSULE ORAL at 21:00

## 2020-01-01 RX ADMIN — SODIUM CHLORIDE, PRESERVATIVE FREE 10 ML: 5 INJECTION INTRAVENOUS at 06:26

## 2020-01-01 RX ADMIN — BUDESONIDE 500 MCG: 0.5 SUSPENSION RESPIRATORY (INHALATION) at 08:07

## 2020-01-01 RX ADMIN — HYDROXYZINE HYDROCHLORIDE 10 MG: 10 TABLET, FILM COATED ORAL at 20:19

## 2020-01-01 RX ADMIN — IPRATROPIUM BROMIDE AND ALBUTEROL SULFATE 1 AMPULE: .5; 3 SOLUTION RESPIRATORY (INHALATION) at 20:47

## 2020-01-01 RX ADMIN — POTASSIUM CHLORIDE 10 MEQ: 750 TABLET, EXTENDED RELEASE ORAL at 10:01

## 2020-01-01 RX ADMIN — WARFARIN SODIUM 5 MG: 5 TABLET ORAL at 16:13

## 2020-01-01 RX ADMIN — GUAIFENESIN 400 MG: 400 TABLET, FILM COATED ORAL at 14:05

## 2020-01-01 RX ADMIN — SODIUM CHLORIDE, PRESERVATIVE FREE 10 ML: 5 INJECTION INTRAVENOUS at 08:43

## 2020-01-01 RX ADMIN — INSULIN GLARGINE 30 UNITS: 100 INJECTION, SOLUTION SUBCUTANEOUS at 22:09

## 2020-01-01 RX ADMIN — DOCUSATE SODIUM 200 MG: 100 CAPSULE, LIQUID FILLED ORAL at 22:09

## 2020-01-01 RX ADMIN — BUDESONIDE 500 MCG: 0.5 SUSPENSION RESPIRATORY (INHALATION) at 08:43

## 2020-01-01 RX ADMIN — LEVOTHYROXINE SODIUM 25 MCG: 25 TABLET ORAL at 06:57

## 2020-01-01 RX ADMIN — METOPROLOL SUCCINATE 50 MG: 50 TABLET, EXTENDED RELEASE ORAL at 08:38

## 2020-01-01 RX ADMIN — WARFARIN SODIUM 2 MG: 2 TABLET ORAL at 16:33

## 2020-01-01 RX ADMIN — SENNOSIDES 8.6 MG: 8.6 TABLET, FILM COATED ORAL at 20:16

## 2020-01-01 RX ADMIN — IPRATROPIUM BROMIDE AND ALBUTEROL SULFATE 1 AMPULE: .5; 3 SOLUTION RESPIRATORY (INHALATION) at 09:23

## 2020-01-01 RX ADMIN — INSULIN LISPRO 4 UNITS: 100 INJECTION, SOLUTION INTRAVENOUS; SUBCUTANEOUS at 16:37

## 2020-01-01 RX ADMIN — CETIRIZINE HYDROCHLORIDE 5 MG: 10 TABLET, FILM COATED ORAL at 09:20

## 2020-01-01 RX ADMIN — FLUTICASONE PROPIONATE 1 SPRAY: 50 SPRAY, METERED NASAL at 21:18

## 2020-01-01 RX ADMIN — SODIUM CHLORIDE, PRESERVATIVE FREE 10 ML: 5 INJECTION INTRAVENOUS at 14:55

## 2020-01-01 RX ADMIN — METOPROLOL SUCCINATE 50 MG: 50 TABLET, EXTENDED RELEASE ORAL at 08:42

## 2020-01-01 RX ADMIN — PANTOPRAZOLE SODIUM 40 MG: 40 TABLET, DELAYED RELEASE ORAL at 17:18

## 2020-01-01 RX ADMIN — CHOLECALCIFEROL TAB 50 MCG (2000 UNIT) 5000 UNITS: 50 TAB at 09:26

## 2020-01-01 RX ADMIN — FLUTICASONE PROPIONATE 1 SPRAY: 50 SPRAY, METERED NASAL at 20:26

## 2020-01-01 RX ADMIN — FUROSEMIDE 40 MG: 10 INJECTION, SOLUTION INTRAMUSCULAR; INTRAVENOUS at 13:09

## 2020-01-01 RX ADMIN — Medication 10 ML: at 20:19

## 2020-01-01 RX ADMIN — BUMETANIDE 2 MG: 0.25 INJECTION INTRAMUSCULAR; INTRAVENOUS at 21:24

## 2020-01-01 RX ADMIN — ATORVASTATIN CALCIUM 20 MG: 20 TABLET, FILM COATED ORAL at 20:48

## 2020-01-01 RX ADMIN — DOCUSATE SODIUM 200 MG: 100 CAPSULE, LIQUID FILLED ORAL at 20:11

## 2020-01-01 RX ADMIN — INSULIN LISPRO 2 UNITS: 100 INJECTION, SOLUTION INTRAVENOUS; SUBCUTANEOUS at 17:22

## 2020-01-01 RX ADMIN — ACETAMINOPHEN 650 MG: 325 TABLET ORAL at 23:15

## 2020-01-01 RX ADMIN — ARFORMOTEROL TARTRATE 15 MCG: 15 SOLUTION RESPIRATORY (INHALATION) at 08:07

## 2020-01-01 RX ADMIN — IPRATROPIUM BROMIDE AND ALBUTEROL SULFATE 1 AMPULE: .5; 3 SOLUTION RESPIRATORY (INHALATION) at 07:51

## 2020-01-01 RX ADMIN — FERROUS SULFATE TAB 325 MG (65 MG ELEMENTAL FE) 325 MG: 325 (65 FE) TAB at 20:11

## 2020-01-01 RX ADMIN — CETIRIZINE HYDROCHLORIDE 5 MG: 10 TABLET, FILM COATED ORAL at 08:44

## 2020-01-01 RX ADMIN — IPRATROPIUM BROMIDE AND ALBUTEROL SULFATE 1 AMPULE: .5; 3 SOLUTION RESPIRATORY (INHALATION) at 12:18

## 2020-01-01 RX ADMIN — FERROUS SULFATE TAB 325 MG (65 MG ELEMENTAL FE) 325 MG: 325 (65 FE) TAB at 16:17

## 2020-01-01 RX ADMIN — ENOXAPARIN SODIUM 30 MG: 30 INJECTION SUBCUTANEOUS at 20:10

## 2020-01-01 RX ADMIN — CEFEPIME HYDROCHLORIDE 1 G: 1 INJECTION, POWDER, FOR SOLUTION INTRAMUSCULAR; INTRAVENOUS at 09:46

## 2020-01-01 RX ADMIN — DOCUSATE SODIUM 100 MG: 100 CAPSULE, LIQUID FILLED ORAL at 08:54

## 2020-01-01 RX ADMIN — SODIUM CHLORIDE, PRESERVATIVE FREE 10 ML: 5 INJECTION INTRAVENOUS at 03:22

## 2020-01-01 RX ADMIN — Medication 10 ML: at 09:04

## 2020-01-01 RX ADMIN — FLUTICASONE PROPIONATE 1 SPRAY: 50 SPRAY, METERED NASAL at 08:49

## 2020-01-01 RX ADMIN — ATORVASTATIN CALCIUM 20 MG: 20 TABLET, FILM COATED ORAL at 20:45

## 2020-01-01 RX ADMIN — APIXABAN 2.5 MG: 2.5 TABLET, FILM COATED ORAL at 08:49

## 2020-01-01 RX ADMIN — CETIRIZINE HYDROCHLORIDE 5 MG: 10 TABLET, FILM COATED ORAL at 09:31

## 2020-01-01 RX ADMIN — INSULIN LISPRO 2 UNITS: 100 INJECTION, SOLUTION INTRAVENOUS; SUBCUTANEOUS at 12:10

## 2020-01-01 RX ADMIN — FERROUS SULFATE TAB 325 MG (65 MG ELEMENTAL FE) 325 MG: 325 (65 FE) TAB at 09:24

## 2020-01-01 RX ADMIN — INSULIN LISPRO 2 UNITS: 100 INJECTION, SOLUTION INTRAVENOUS; SUBCUTANEOUS at 17:38

## 2020-01-01 RX ADMIN — FUROSEMIDE 40 MG: 10 INJECTION, SOLUTION INTRAMUSCULAR; INTRAVENOUS at 08:46

## 2020-01-01 RX ADMIN — SODIUM CHLORIDE, PRESERVATIVE FREE 10 ML: 5 INJECTION INTRAVENOUS at 09:20

## 2020-01-01 RX ADMIN — INSULIN LISPRO 4 UNITS: 100 INJECTION, SOLUTION INTRAVENOUS; SUBCUTANEOUS at 16:45

## 2020-01-01 RX ADMIN — FUROSEMIDE 40 MG: 10 INJECTION, SOLUTION INTRAMUSCULAR; INTRAVENOUS at 23:09

## 2020-01-01 RX ADMIN — CETIRIZINE HYDROCHLORIDE 5 MG: 10 TABLET, FILM COATED ORAL at 07:57

## 2020-01-01 RX ADMIN — METOPROLOL SUCCINATE 50 MG: 50 TABLET, EXTENDED RELEASE ORAL at 10:24

## 2020-01-01 RX ADMIN — FLUTICASONE PROPIONATE 1 SPRAY: 50 SPRAY, METERED NASAL at 07:57

## 2020-01-01 RX ADMIN — POTASSIUM CHLORIDE 10 MEQ: 750 TABLET, EXTENDED RELEASE ORAL at 10:25

## 2020-01-01 RX ADMIN — METOPROLOL SUCCINATE 50 MG: 50 TABLET, EXTENDED RELEASE ORAL at 10:01

## 2020-01-01 RX ADMIN — INSULIN GLARGINE 30 UNITS: 100 INJECTION, SOLUTION SUBCUTANEOUS at 20:22

## 2020-01-01 RX ADMIN — WARFARIN SODIUM 2 MG: 2 TABLET ORAL at 17:18

## 2020-01-01 RX ADMIN — INSULIN LISPRO 2 UNITS: 100 INJECTION, SOLUTION INTRAVENOUS; SUBCUTANEOUS at 17:05

## 2020-01-01 RX ADMIN — CHOLECALCIFEROL TAB 50 MCG (2000 UNIT) 5000 UNITS: 50 TAB at 08:53

## 2020-01-01 RX ADMIN — FERROUS SULFATE TAB 325 MG (65 MG ELEMENTAL FE) 325 MG: 325 (65 FE) TAB at 10:25

## 2020-01-01 RX ADMIN — FLUTICASONE PROPIONATE 1 SPRAY: 50 SPRAY, METERED NASAL at 21:00

## 2020-01-01 RX ADMIN — LEVOTHYROXINE SODIUM 25 MCG: 25 TABLET ORAL at 05:52

## 2020-01-01 RX ADMIN — FLUTICASONE PROPIONATE 1 SPRAY: 50 SPRAY, METERED NASAL at 10:00

## 2020-01-01 RX ADMIN — Medication 10 MG: at 20:45

## 2020-01-01 RX ADMIN — INSULIN LISPRO 8 UNITS: 100 INJECTION, SOLUTION INTRAVENOUS; SUBCUTANEOUS at 11:06

## 2020-01-01 RX ADMIN — FLUTICASONE PROPIONATE 1 SPRAY: 50 SPRAY, METERED NASAL at 09:25

## 2020-01-01 RX ADMIN — APIXABAN 2.5 MG: 2.5 TABLET, FILM COATED ORAL at 21:18

## 2020-01-01 RX ADMIN — FLUTICASONE PROPIONATE 1 SPRAY: 50 SPRAY, METERED NASAL at 08:39

## 2020-01-01 RX ADMIN — IPRATROPIUM BROMIDE AND ALBUTEROL SULFATE 1 AMPULE: .5; 3 SOLUTION RESPIRATORY (INHALATION) at 11:41

## 2020-01-01 RX ADMIN — FLUTICASONE PROPIONATE 1 SPRAY: 50 SPRAY, METERED NASAL at 07:44

## 2020-01-01 RX ADMIN — INSULIN LISPRO 2 UNITS: 100 INJECTION, SOLUTION INTRAVENOUS; SUBCUTANEOUS at 21:33

## 2020-01-01 RX ADMIN — INSULIN LISPRO 1 UNITS: 100 INJECTION, SOLUTION INTRAVENOUS; SUBCUTANEOUS at 06:18

## 2020-01-01 RX ADMIN — Medication 10 MG: at 20:18

## 2020-01-01 RX ADMIN — SODIUM CHLORIDE: 9 INJECTION, SOLUTION INTRAVENOUS at 13:40

## 2020-01-01 RX ADMIN — IPRATROPIUM BROMIDE AND ALBUTEROL SULFATE 1 AMPULE: .5; 3 SOLUTION RESPIRATORY (INHALATION) at 16:29

## 2020-01-01 RX ADMIN — BUMETANIDE 2 MG: 0.25 INJECTION INTRAMUSCULAR; INTRAVENOUS at 06:57

## 2020-01-01 RX ADMIN — IPRATROPIUM BROMIDE AND ALBUTEROL SULFATE 1 AMPULE: .5; 3 SOLUTION RESPIRATORY (INHALATION) at 19:54

## 2020-01-01 RX ADMIN — GUAIFENESIN 400 MG: 400 TABLET, FILM COATED ORAL at 15:14

## 2020-01-01 RX ADMIN — INSULIN LISPRO 2 UNITS: 100 INJECTION, SOLUTION INTRAVENOUS; SUBCUTANEOUS at 11:38

## 2020-01-01 RX ADMIN — FUROSEMIDE 40 MG: 10 INJECTION, SOLUTION INTRAMUSCULAR; INTRAVENOUS at 17:14

## 2020-01-01 RX ADMIN — VANCOMYCIN HYDROCHLORIDE 1750 MG: 5 INJECTION, POWDER, LYOPHILIZED, FOR SOLUTION INTRAVENOUS at 18:18

## 2020-01-01 RX ADMIN — Medication 10 MG: at 20:50

## 2020-01-01 RX ADMIN — INSULIN GLARGINE 40 UNITS: 100 INJECTION, SOLUTION SUBCUTANEOUS at 21:33

## 2020-01-01 RX ADMIN — FUROSEMIDE 40 MG: 10 INJECTION, SOLUTION INTRAMUSCULAR; INTRAVENOUS at 07:57

## 2020-01-01 RX ADMIN — FLUTICASONE PROPIONATE 1 SPRAY: 50 SPRAY, METERED NASAL at 21:19

## 2020-01-01 RX ADMIN — FERROUS SULFATE TAB 325 MG (65 MG ELEMENTAL FE) 325 MG: 325 (65 FE) TAB at 10:01

## 2020-01-01 RX ADMIN — CHOLECALCIFEROL TAB 50 MCG (2000 UNIT) 5000 UNITS: 50 TAB at 10:01

## 2020-01-01 RX ADMIN — SENNOSIDES 8.6 MG: 8.6 TABLET, FILM COATED ORAL at 20:19

## 2020-01-01 RX ADMIN — BISACODYL 10 MG: 10 SUPPOSITORY RECTAL at 20:18

## 2020-01-01 RX ADMIN — GUAIFENESIN 400 MG: 400 TABLET, FILM COATED ORAL at 08:44

## 2020-01-01 RX ADMIN — APIXABAN 2.5 MG: 2.5 TABLET, FILM COATED ORAL at 08:43

## 2020-01-01 RX ADMIN — DOCUSATE SODIUM 200 MG: 100 CAPSULE, LIQUID FILLED ORAL at 21:37

## 2020-01-01 RX ADMIN — ATORVASTATIN CALCIUM 20 MG: 20 TABLET, FILM COATED ORAL at 21:18

## 2020-01-01 RX ADMIN — PETROLATUM: 420 OINTMENT TOPICAL at 07:57

## 2020-01-01 RX ADMIN — LEVOTHYROXINE SODIUM 25 MCG: 25 TABLET ORAL at 06:09

## 2020-01-01 RX ADMIN — INSULIN LISPRO 1 UNITS: 100 INJECTION, SOLUTION INTRAVENOUS; SUBCUTANEOUS at 17:14

## 2020-01-01 RX ADMIN — DOXYCYCLINE HYCLATE 100 MG: 100 CAPSULE ORAL at 22:09

## 2020-01-01 RX ADMIN — CEFTRIAXONE 1 G: 1 INJECTION, POWDER, FOR SOLUTION INTRAMUSCULAR; INTRAVENOUS at 22:08

## 2020-01-01 RX ADMIN — SODIUM CHLORIDE: 9 INJECTION, SOLUTION INTRAVENOUS at 16:03

## 2020-01-01 RX ADMIN — IPRATROPIUM BROMIDE AND ALBUTEROL SULFATE 1 AMPULE: .5; 3 SOLUTION RESPIRATORY (INHALATION) at 19:43

## 2020-01-01 RX ADMIN — GUAIFENESIN 400 MG: 400 TABLET, FILM COATED ORAL at 08:48

## 2020-01-01 RX ADMIN — FLUTICASONE PROPIONATE 1 SPRAY: 50 SPRAY, METERED NASAL at 20:29

## 2020-01-01 RX ADMIN — IPRATROPIUM BROMIDE AND ALBUTEROL SULFATE 1 AMPULE: .5; 3 SOLUTION RESPIRATORY (INHALATION) at 12:58

## 2020-01-01 RX ADMIN — BUMETANIDE 1 MG: 1 TABLET ORAL at 11:03

## 2020-01-01 RX ADMIN — GUAIFENESIN 400 MG: 400 TABLET, FILM COATED ORAL at 20:50

## 2020-01-01 RX ADMIN — METOPROLOL SUCCINATE 50 MG: 50 TABLET, EXTENDED RELEASE ORAL at 09:31

## 2020-01-01 RX ADMIN — APIXABAN 2.5 MG: 2.5 TABLET, FILM COATED ORAL at 20:45

## 2020-01-01 RX ADMIN — INSULIN LISPRO 1 UNITS: 100 INJECTION, SOLUTION INTRAVENOUS; SUBCUTANEOUS at 15:21

## 2020-01-01 RX ADMIN — POTASSIUM CHLORIDE 10 MEQ: 750 TABLET, EXTENDED RELEASE ORAL at 10:02

## 2020-01-01 RX ADMIN — SODIUM CHLORIDE, PRESERVATIVE FREE 10 ML: 5 INJECTION INTRAVENOUS at 09:11

## 2020-01-01 RX ADMIN — METOPROLOL SUCCINATE 50 MG: 50 TABLET, EXTENDED RELEASE ORAL at 07:57

## 2020-01-01 RX ADMIN — CALCITRIOL 0.5 MCG: 0.25 CAPSULE ORAL at 08:48

## 2020-01-01 RX ADMIN — CHOLECALCIFEROL TAB 50 MCG (2000 UNIT) 5000 UNITS: 50 TAB at 10:24

## 2020-01-01 RX ADMIN — FERROUS SULFATE TAB 325 MG (65 MG ELEMENTAL FE) 325 MG: 325 (65 FE) TAB at 08:37

## 2020-01-01 RX ADMIN — PANTOPRAZOLE SODIUM 40 MG: 40 TABLET, DELAYED RELEASE ORAL at 06:25

## 2020-01-01 RX ADMIN — FUROSEMIDE 40 MG: 10 INJECTION, SOLUTION INTRAMUSCULAR; INTRAVENOUS at 03:21

## 2020-01-01 RX ADMIN — Medication 10 ML: at 22:08

## 2020-01-01 RX ADMIN — ATORVASTATIN CALCIUM 20 MG: 20 TABLET, FILM COATED ORAL at 20:27

## 2020-01-01 RX ADMIN — POTASSIUM CHLORIDE 10 MEQ: 750 TABLET, EXTENDED RELEASE ORAL at 16:33

## 2020-01-01 RX ADMIN — GUAIFENESIN 400 MG: 400 TABLET, FILM COATED ORAL at 16:38

## 2020-01-01 RX ADMIN — BUMETANIDE 1 MG: 1 TABLET ORAL at 20:18

## 2020-01-01 RX ADMIN — LEVOTHYROXINE SODIUM 25 MCG: 0.03 TABLET ORAL at 07:45

## 2020-01-01 RX ADMIN — INSULIN GLARGINE 24 UNITS: 100 INJECTION, SOLUTION SUBCUTANEOUS at 20:47

## 2020-01-01 RX ADMIN — PERFLUTREN 1.65 MG: 6.52 INJECTION, SUSPENSION INTRAVENOUS at 14:43

## 2020-01-01 RX ADMIN — POTASSIUM CHLORIDE 10 MEQ: 750 TABLET, EXTENDED RELEASE ORAL at 09:24

## 2020-01-01 RX ADMIN — Medication 10 MG: at 20:27

## 2020-01-01 RX ADMIN — PANTOPRAZOLE SODIUM 40 MG: 40 TABLET, DELAYED RELEASE ORAL at 16:01

## 2020-01-01 RX ADMIN — FUROSEMIDE 40 MG: 10 INJECTION, SOLUTION INTRAMUSCULAR; INTRAVENOUS at 09:31

## 2020-01-01 RX ADMIN — WARFARIN SODIUM 1 MG: 1 TABLET ORAL at 17:21

## 2020-01-01 RX ADMIN — FERROUS SULFATE TAB 325 MG (65 MG ELEMENTAL FE) 325 MG: 325 (65 FE) TAB at 08:02

## 2020-01-01 RX ADMIN — INSULIN LISPRO 1 UNITS: 100 INJECTION, SOLUTION INTRAVENOUS; SUBCUTANEOUS at 21:17

## 2020-01-01 RX ADMIN — LEVOTHYROXINE SODIUM 25 MCG: 25 TABLET ORAL at 06:34

## 2020-01-01 RX ADMIN — INSULIN GLARGINE 24 UNITS: 100 INJECTION, SOLUTION SUBCUTANEOUS at 21:17

## 2020-01-01 RX ADMIN — FLUTICASONE PROPIONATE 1 SPRAY: 50 SPRAY, METERED NASAL at 22:08

## 2020-01-01 RX ADMIN — LEVOTHYROXINE SODIUM 25 MCG: 25 TABLET ORAL at 06:26

## 2020-01-01 RX ADMIN — IPRATROPIUM BROMIDE AND ALBUTEROL SULFATE 1 AMPULE: .5; 3 SOLUTION RESPIRATORY (INHALATION) at 11:37

## 2020-01-01 RX ADMIN — INSULIN LISPRO 1 UNITS: 100 INJECTION, SOLUTION INTRAVENOUS; SUBCUTANEOUS at 16:52

## 2020-01-01 RX ADMIN — INSULIN LISPRO 2 UNITS: 100 INJECTION, SOLUTION INTRAVENOUS; SUBCUTANEOUS at 06:36

## 2020-01-01 RX ADMIN — BUDESONIDE 500 MCG: 0.5 SUSPENSION RESPIRATORY (INHALATION) at 21:04

## 2020-01-01 RX ADMIN — DOXYCYCLINE HYCLATE 100 MG: 100 CAPSULE ORAL at 10:25

## 2020-01-01 RX ADMIN — ACETAMINOPHEN 650 MG: 325 TABLET ORAL at 21:20

## 2020-01-01 RX ADMIN — SODIUM CHLORIDE: 9 INJECTION, SOLUTION INTRAVENOUS at 04:06

## 2020-01-01 RX ADMIN — BUDESONIDE 500 MCG: 0.5 SUSPENSION RESPIRATORY (INHALATION) at 09:26

## 2020-01-01 RX ADMIN — LEVOTHYROXINE SODIUM 25 MCG: 25 TABLET ORAL at 06:30

## 2020-01-01 RX ADMIN — INSULIN LISPRO 8 UNITS: 100 INJECTION, SOLUTION INTRAVENOUS; SUBCUTANEOUS at 11:41

## 2020-01-01 RX ADMIN — Medication 10 ML: at 20:29

## 2020-01-01 RX ADMIN — POLYETHYLENE GLYCOL 3350 17 G: 17 POWDER, FOR SOLUTION ORAL at 18:05

## 2020-01-01 RX ADMIN — GUAIFENESIN 400 MG: 400 TABLET, FILM COATED ORAL at 20:45

## 2020-01-01 RX ADMIN — DOCUSATE SODIUM 200 MG: 100 CAPSULE, LIQUID FILLED ORAL at 21:55

## 2020-01-01 RX ADMIN — ARFORMOTEROL TARTRATE 15 MCG: 15 SOLUTION RESPIRATORY (INHALATION) at 21:04

## 2020-01-01 RX ADMIN — INSULIN LISPRO 1 UNITS: 100 INJECTION, SOLUTION INTRAVENOUS; SUBCUTANEOUS at 11:25

## 2020-01-01 RX ADMIN — METOPROLOL SUCCINATE 50 MG: 50 TABLET, EXTENDED RELEASE ORAL at 09:24

## 2020-01-01 RX ADMIN — INSULIN LISPRO 2 UNITS: 100 INJECTION, SOLUTION INTRAVENOUS; SUBCUTANEOUS at 16:44

## 2020-01-01 RX ADMIN — POLYETHYLENE GLYCOL 3350 17 G: 17 POWDER, FOR SOLUTION ORAL at 20:17

## 2020-01-01 RX ADMIN — CALCITRIOL 0.5 MCG: 0.25 CAPSULE ORAL at 07:47

## 2020-01-01 RX ADMIN — DOCUSATE SODIUM 100 MG: 100 CAPSULE, LIQUID FILLED ORAL at 10:01

## 2020-01-01 RX ADMIN — IPRATROPIUM BROMIDE AND ALBUTEROL SULFATE 1 AMPULE: .5; 3 SOLUTION RESPIRATORY (INHALATION) at 20:38

## 2020-01-01 RX ADMIN — CALCITRIOL 0.5 MCG: 0.25 CAPSULE ORAL at 08:43

## 2020-01-01 RX ADMIN — GABAPENTIN 300 MG: 300 CAPSULE ORAL at 20:16

## 2020-01-01 RX ADMIN — GUAIFENESIN 400 MG: 400 TABLET, FILM COATED ORAL at 13:55

## 2020-01-01 RX ADMIN — IPRATROPIUM BROMIDE AND ALBUTEROL SULFATE 1 AMPULE: .5; 3 SOLUTION RESPIRATORY (INHALATION) at 08:06

## 2020-01-01 RX ADMIN — POLYETHYLENE GLYCOL 3350 17 G: 17 POWDER, FOR SOLUTION ORAL at 09:26

## 2020-01-01 RX ADMIN — INSULIN LISPRO 8 UNITS: 100 INJECTION, SOLUTION INTRAVENOUS; SUBCUTANEOUS at 16:19

## 2020-01-01 RX ADMIN — CEFTRIAXONE 1 G: 1 INJECTION, POWDER, FOR SOLUTION INTRAMUSCULAR; INTRAVENOUS at 21:58

## 2020-01-01 RX ADMIN — INSULIN LISPRO 1 UNITS: 100 INJECTION, SOLUTION INTRAVENOUS; SUBCUTANEOUS at 20:54

## 2020-01-01 RX ADMIN — IPRATROPIUM BROMIDE AND ALBUTEROL SULFATE 1 AMPULE: .5; 3 SOLUTION RESPIRATORY (INHALATION) at 15:58

## 2020-01-01 RX ADMIN — Medication 10 ML: at 20:18

## 2020-01-01 RX ADMIN — IPRATROPIUM BROMIDE AND ALBUTEROL SULFATE 1 AMPULE: .5; 3 SOLUTION RESPIRATORY (INHALATION) at 12:22

## 2020-01-01 RX ADMIN — FLUTICASONE PROPIONATE 1 SPRAY: 50 SPRAY, METERED NASAL at 09:27

## 2020-01-01 RX ADMIN — Medication 10 ML: at 10:27

## 2020-01-01 RX ADMIN — SODIUM CHLORIDE, PRESERVATIVE FREE 10 ML: 5 INJECTION INTRAVENOUS at 16:31

## 2020-01-01 RX ADMIN — POTASSIUM CHLORIDE 10 MEQ: 750 TABLET, EXTENDED RELEASE ORAL at 07:47

## 2020-01-01 RX ADMIN — INSULIN GLARGINE 30 UNITS: 100 INJECTION, SOLUTION SUBCUTANEOUS at 21:00

## 2020-01-01 RX ADMIN — GUAIFENESIN 400 MG: 400 TABLET, FILM COATED ORAL at 20:27

## 2020-01-01 RX ADMIN — PANTOPRAZOLE SODIUM 40 MG: 40 TABLET, DELAYED RELEASE ORAL at 06:26

## 2020-01-01 RX ADMIN — LEVOTHYROXINE SODIUM 25 MCG: 0.03 TABLET ORAL at 08:42

## 2020-01-01 RX ADMIN — BUMETANIDE 2 MG: 0.25 INJECTION INTRAMUSCULAR; INTRAVENOUS at 14:52

## 2020-01-01 RX ADMIN — ARFORMOTEROL TARTRATE 15 MCG: 15 SOLUTION RESPIRATORY (INHALATION) at 20:47

## 2020-01-01 RX ADMIN — FLUTICASONE PROPIONATE 1 SPRAY: 50 SPRAY, METERED NASAL at 08:54

## 2020-01-01 RX ADMIN — LEVOTHYROXINE SODIUM 25 MCG: 25 TABLET ORAL at 06:11

## 2020-01-01 RX ADMIN — IPRATROPIUM BROMIDE AND ALBUTEROL SULFATE 1 AMPULE: .5; 3 SOLUTION RESPIRATORY (INHALATION) at 16:22

## 2020-01-01 RX ADMIN — PETROLATUM: 420 OINTMENT TOPICAL at 15:35

## 2020-01-01 RX ADMIN — CALCITRIOL 0.5 MCG: 0.25 CAPSULE ORAL at 09:31

## 2020-01-01 RX ADMIN — INSULIN LISPRO 1 UNITS: 100 INJECTION, SOLUTION INTRAVENOUS; SUBCUTANEOUS at 22:06

## 2020-01-01 RX ADMIN — FERROUS SULFATE TAB 325 MG (65 MG ELEMENTAL FE) 325 MG: 325 (65 FE) TAB at 16:01

## 2020-01-01 RX ADMIN — ATORVASTATIN CALCIUM 20 MG: 20 TABLET, FILM COATED ORAL at 20:11

## 2020-01-01 RX ADMIN — BUMETANIDE 2 MG: 0.25 INJECTION INTRAMUSCULAR; INTRAVENOUS at 21:37

## 2020-01-01 RX ADMIN — INSULIN LISPRO 2 UNITS: 100 INJECTION, SOLUTION INTRAVENOUS; SUBCUTANEOUS at 16:58

## 2020-01-01 RX ADMIN — PETROLATUM: 420 OINTMENT TOPICAL at 09:20

## 2020-01-01 RX ADMIN — METOPROLOL SUCCINATE 50 MG: 50 TABLET, EXTENDED RELEASE ORAL at 08:47

## 2020-01-01 RX ADMIN — POTASSIUM CHLORIDE 10 MEQ: 750 TABLET, EXTENDED RELEASE ORAL at 20:11

## 2020-01-01 RX ADMIN — POTASSIUM CHLORIDE 10 MEQ: 750 TABLET, EXTENDED RELEASE ORAL at 16:28

## 2020-01-01 RX ADMIN — POTASSIUM CHLORIDE 10 MEQ: 750 TABLET, EXTENDED RELEASE ORAL at 07:45

## 2020-01-01 RX ADMIN — ACETAMINOPHEN 650 MG: 325 TABLET ORAL at 16:04

## 2020-01-01 RX ADMIN — INSULIN LISPRO 2 UNITS: 100 INJECTION, SOLUTION INTRAVENOUS; SUBCUTANEOUS at 11:39

## 2020-01-01 RX ADMIN — FLUTICASONE PROPIONATE 1 SPRAY: 50 SPRAY, METERED NASAL at 10:06

## 2020-01-01 RX ADMIN — CALCITRIOL 0.5 MCG: 0.25 CAPSULE ORAL at 07:57

## 2020-01-01 ASSESSMENT — PAIN DESCRIPTION - PAIN TYPE
TYPE: ACUTE PAIN
TYPE: ACUTE PAIN
TYPE: CHRONIC PAIN

## 2020-01-01 ASSESSMENT — PAIN DESCRIPTION - ORIENTATION
ORIENTATION: LEFT
ORIENTATION: LOWER
ORIENTATION: LEFT

## 2020-01-01 ASSESSMENT — PAIN SCALES - GENERAL
PAINLEVEL_OUTOF10: 0
PAINLEVEL_OUTOF10: 7
PAINLEVEL_OUTOF10: 7
PAINLEVEL_OUTOF10: 0
PAINLEVEL_OUTOF10: 3
PAINLEVEL_OUTOF10: 0
PAINLEVEL_OUTOF10: 3
PAINLEVEL_OUTOF10: 0
PAINLEVEL_OUTOF10: 0

## 2020-01-01 ASSESSMENT — PAIN DESCRIPTION - PROGRESSION
CLINICAL_PROGRESSION: GRADUALLY WORSENING
CLINICAL_PROGRESSION: NOT CHANGED

## 2020-01-01 ASSESSMENT — ENCOUNTER SYMPTOMS
ABDOMINAL DISTENTION: 0
NAUSEA: 0
EYE DISCHARGE: 0
NAUSEA: 0
CHEST TIGHTNESS: 0
SINUS PRESSURE: 0
ABDOMINAL PAIN: 0
DIARRHEA: 0
VOMITING: 0
COUGH: 0
EYE DISCHARGE: 0
SORE THROAT: 0
BACK PAIN: 0
COUGH: 0
CONSTIPATION: 0
EYE REDNESS: 0
SHORTNESS OF BREATH: 1
EYE REDNESS: 0
APNEA: 0
NAUSEA: 0
VOMITING: 0
SINUS PRESSURE: 0
COUGH: 0
SHORTNESS OF BREATH: 1
COLOR CHANGE: 0
WHEEZING: 0
VOMITING: 0
BLOOD IN STOOL: 0
CHEST TIGHTNESS: 0
SORE THROAT: 0
ABDOMINAL DISTENTION: 0
DIARRHEA: 0
ABDOMINAL DISTENTION: 0
SHORTNESS OF BREATH: 1
ABDOMINAL PAIN: 0
WHEEZING: 0
SHORTNESS OF BREATH: 0
ALLERGIC/IMMUNOLOGIC NEGATIVE: 1
SINUS PAIN: 0
SINUS PRESSURE: 0
VOMITING: 0
STRIDOR: 0
COUGH: 0
DIARRHEA: 0
SPUTUM PRODUCTION: 0
ABDOMINAL PAIN: 0
CONSTIPATION: 0
BACK PAIN: 0
COLOR CHANGE: 0
STRIDOR: 0
NAUSEA: 0
EYE PAIN: 0

## 2020-01-01 ASSESSMENT — PAIN DESCRIPTION - FREQUENCY
FREQUENCY: INTERMITTENT
FREQUENCY: INTERMITTENT

## 2020-01-01 ASSESSMENT — PAIN DESCRIPTION - DESCRIPTORS
DESCRIPTORS: ACHING
DESCRIPTORS: ACHING
DESCRIPTORS: CONSTANT;DISCOMFORT

## 2020-01-01 ASSESSMENT — PAIN - FUNCTIONAL ASSESSMENT
PAIN_FUNCTIONAL_ASSESSMENT: PREVENTS OR INTERFERES SOME ACTIVE ACTIVITIES AND ADLS
PAIN_FUNCTIONAL_ASSESSMENT: PREVENTS OR INTERFERES SOME ACTIVE ACTIVITIES AND ADLS
PAIN_FUNCTIONAL_ASSESSMENT: ACTIVITIES ARE NOT PREVENTED

## 2020-01-01 ASSESSMENT — PAIN DESCRIPTION - LOCATION
LOCATION: ARM
LOCATION: ARM
LOCATION: BACK

## 2020-01-01 ASSESSMENT — PAIN DESCRIPTION - ONSET: ONSET: ON-GOING

## 2020-07-10 NOTE — PROGRESS NOTES
This patient was referred for audiometric/tympanometric testing by Dr. Kvng Cope due to medical clearance for hearing aids. She was seen in November 2019 for hearing test, which showed right ear conductive component. .     Audiometry revealed a mild-to-profound sloping sensorineural loss in the left ear and a moderate to profound  mixed hearing loss, in the right ear. Reliability was good. Speech reception thresholds were in good agreement with the pure tone averages, bilaterally. Speech discrimination scores were good, bilaterally. Tympanometry revealed normal middle ear peak pressure and compliance, in the left ear and flat tympanogram in the right ear. The results were reviewed with the patient. Ear impressions were taken bilaterally without incident. Prior authorization for hearing aids will be obtained pending medical clearance. Recommendations for follow up will be made pending physician consult.     Sin Ballard

## 2020-07-10 NOTE — PROGRESS NOTES
Subjective:     Patient ID:  Malcolm Leventhal is a 80 y.o. female. HPI:    Hearing Loss  Patient presents today with complaints of hearingloss. Concern regarding hearing has been present for several years. She has failed a prior hearing test.The patient reports difficulty hearing. Patient does not have hearing aids at this time. History of Head trauma: no   Description: none  History of surgeryto the head/neck: no   Description:none  History of cerumen impaction: no  History of noise exposure: yes  Spinning: no  Hearing loss: yes    Fluctuating: no  Aural pressure: no  Tinnitus:no  Otalgia:no    Patient'smedications, allergies, past medical, surgical, social and family histories werereviewed and updated as appropriate. Review of Systems   Constitutional: Negative for chills, fatigue and fever. HENT: Positive for hearing loss. Eyes: Negative for discharge and redness. Respiratory: Negative for cough, shortness of breath and stridor. Gastrointestinal: Negative for nausea and vomiting. Endocrine: Negative. Genitourinary: Negative. Musculoskeletal: Negative. Skin: Negative for color change and rash. Allergic/Immunologic: Negative. Neurological: Negative for dizziness, speech difficulty and headaches. Hematological: Negative. Psychiatric/Behavioral: Negative for agitation and confusion. All other systems reviewed and are negative. Objective:   Physical Exam  Constitutional:       Appearance: Normal appearance. She is obese. HENT:      Right Ear: External ear normal.      Left Ear: Tympanic membrane, ear canal and external ear normal.      Ears:        Nose: Nose normal.      Mouth/Throat:      Mouth: Mucous membranes are moist.   Eyes:      Pupils: Pupils are equal, round, and reactive to light. Neck:      Musculoskeletal: Normal range of motion. Cardiovascular:      Rate and Rhythm: Normal rate.    Pulmonary:      Effort: Pulmonary effort is normal. Musculoskeletal: Normal range of motion. Skin:     General: Skin is warm and dry. Neurological:      Mental Status: She is alert. Audiogram -  An audiogram was ordered, obtained and reviewed by me, in order to evaluate thehearing loss associated with eustachian tube dysfuntion. moderate-to-severe right sided, bilateral mixed hearing loss, left sensorineural hearing loss     Discrimination    Right- 76%    Left - 84%     Tympanogram -    Right - Type B    Pressure - negative    Left   - Type A    Pressure - normal                       Assessment:       Diagnosis Orders   1. Sensorineural hearing loss (SNHL) of both ears     2. Dysfunction of right eustachian tube                Plan:      Bilateral sensorineural hearing loss (R mixed with conductive component due to eustachian tube dysfunction)  Recommend Flonase to resolve ETD  Recommend hearing aids, patient medically cleared  Follow up in 6 week(s)            Marvel Ruby  1935    I have discussed the case, including pertinent history and exam findings with the resident. I have seen and examined the patient and the key elements of the encounter have been performed by me. I agree with the assessment, plan and orders as documented by the  resident              Remainder of medical problems as per  resident note. Patient seen and examined. Agree with above exam, assessment and plan.       Electronically signed by Mayda Shaw DO on 7/14/20 at 7:23 AM EDT

## 2020-08-13 PROBLEM — N18.9 ACUTE KIDNEY INJURY SUPERIMPOSED ON CKD (HCC): Status: ACTIVE | Noted: 2020-01-01

## 2020-08-13 PROBLEM — J15.5 BILATERAL LOWER LOBE PNEUMONIA DUE TO ESCHERICHIA COLI (HCC): Status: ACTIVE | Noted: 2020-01-01

## 2020-08-13 PROBLEM — J18.9 PNA (PNEUMONIA): Status: ACTIVE | Noted: 2020-01-01

## 2020-08-13 PROBLEM — N17.9 ACUTE KIDNEY INJURY SUPERIMPOSED ON CKD (HCC): Status: ACTIVE | Noted: 2020-01-01

## 2020-08-13 NOTE — ED PROVIDER NOTES
55-year-old female history of chronic renal failure history of XIAO martell on Eliquis who presents to the emergency department with shortness of breath and cough. Patient is from a nursing facility. There is no known COVID contacts at the facility. Patient denies chest pain. She states no nausea vomiting diarrhea urinary symptoms or leg swelling. Patient does have a history of congestive heart failure. They deny cough as well. Patient chronically wears 3-1/2 L of oxygen at home EMS did report the head of her to nonrebreather nurse reports that patient is satting 95% on 5 L but drops to the 80s on 3.5 L    The history is provided by the patient. Shortness of Breath   Severity:  Mild  Onset quality:  Gradual  Duration:  2 days  Timing:  Intermittent  Progression:  Waxing and waning  Chronicity:  New  Relieved by:  Nothing  Worsened by:  Nothing  Ineffective treatments:  None tried  Associated symptoms: no abdominal pain, no chest pain, no cough, no diaphoresis, no ear pain, no fever, no headaches, no rash, no sore throat, no sputum production, no vomiting and no wheezing         Review of Systems   Constitutional: Negative for chills, diaphoresis and fever. HENT: Negative for ear pain, sinus pressure and sore throat. Eyes: Negative for pain, discharge and redness. Respiratory: Positive for shortness of breath. Negative for cough, sputum production and wheezing. Cardiovascular: Negative for chest pain. Gastrointestinal: Negative for abdominal distention, abdominal pain, diarrhea, nausea and vomiting. Genitourinary: Negative for dysuria and frequency. Musculoskeletal: Negative for arthralgias and back pain. Skin: Negative for rash and wound. Neurological: Negative for weakness and headaches. Hematological: Negative for adenopathy. All other systems reviewed and are negative. Physical Exam  Constitutional:       Appearance: Normal appearance. She is obese.    HENT:      Head: Normocephalic and atraumatic. Nose: Nose normal.      Mouth/Throat:      Mouth: Mucous membranes are moist.   Eyes:      Extraocular Movements: Extraocular movements intact. Pupils: Pupils are equal, round, and reactive to light. Cardiovascular:      Rate and Rhythm: Normal rate and regular rhythm. Pulses: Normal pulses. Heart sounds: Normal heart sounds. Pulmonary:      Breath sounds: Examination of the right-lower field reveals decreased breath sounds. Examination of the left-lower field reveals decreased breath sounds. Decreased breath sounds and rhonchi present. Musculoskeletal:      Right lower leg: No edema. Left lower leg: No edema. Neurological:      Mental Status: She is alert and oriented to person, place, and time. Procedures     MDM  Number of Diagnoses or Management Options  Diagnosis management comments: Patient seen and examined. Labs and imaging were ordered. Patient concern for CHF versus pneumonia. She is requiring more oxygen and she is normally on 3.5 L and requiring 5 here in the emergency department. Work-up does reveal signs concerning for pneumonia but cannot rule out congestive heart failure as well despite a lower BMP. During patient's care she did require increasing level of oxygen. Patient is ultimately placed on BiPAP after COVID was found negative and had significant improvement with this. Patient was also given a dose of Lasix to help with fluid overload.   Case was discussed with Dr. Iftikhar Thorne who will admit the patient to a telemetry bed.             --------------------------------------------- PAST HISTORY ---------------------------------------------  Past Medical History:  has a past medical history of Anemia due to acute blood loss, Arthritis, Blood transfusion reaction, Chronic atrial fibrillation, CKD (chronic kidney disease) stage 3, GFR 30-59 ml/min ([address] Diabetes mellitus ([facility] [address] History of blood transfusion, History of breast cancer, History of stroke, Hyperlipidemia, Hypertension, Hypothyroidism, and Volume overload. Past Surgical History:  has a past surgical history that includes Breast surgery; Cholecystectomy; Gastric bypass surgery; other surgical history (07/27/2017); Nasal sinus surgery; pr ctrl nosebleed,anter,complex (N/A, 4/28/2018); and bronchoscopy (4/28/2018). Social History:  reports that she has never smoked. She has never used smokeless tobacco. She reports previous alcohol use. She reports that she does not use drugs. Family History: family history includes Diabetes in her father and mother; Heart Disease in her mother; Kidney Disease in her mother; Stroke in her father. The patients home medications have been reviewed.     Allergies: Pcn [penicillins]    -------------------------------------------------- RESULTS -------------------------------------------------    LABS:  Results for orders placed or performed during the hospital encounter of 08/13/20   CBC Auto Differential   Result Value Ref Range    WBC 5.0 4.5 - 11.5 E9/L    RBC 3.04 (L) 3.50 - 5.50 E12/L    Hemoglobin 9.7 (L) 11.5 - 15.5 g/dL    Hematocrit 32.9 (L) 34.0 - 48.0 %    .2 (H) 80.0 - 99.9 fL    MCH 31.9 26.0 - 35.0 pg    MCHC 29.5 (L) 32.0 - 34.5 %    RDW 13.6 11.5 - 15.0 fL    Platelets 684 096 - 333 E9/L    MPV 10.2 7.0 - 12.0 fL    Neutrophils % 76.8 43.0 - 80.0 %    Immature Granulocytes % 0.6 0.0 - 5.0 %    Lymphocytes % 12.4 (L) 20.0 - 42.0 %    Monocytes % 7.6 2.0 - 12.0 %    Eosinophils % 2.4 0.0 - 6.0 %    Basophils % 0.2 0.0 - 2.0 %    Neutrophils Absolute 3.85 1.80 - 7.30 E9/L    Immature Granulocytes # 0.03 E9/L    Lymphocytes Absolute 0.62 (L) 1.50 - 4.00 E9/L    Monocytes Absolute 0.38 0.10 - 0.95 E9/L    Eosinophils Absolute 0.12 0.05 - 0.50 E9/L    Basophils Absolute 0.01 0.00 - 0.20 E9/L   Comprehensive Metabolic Panel   Result Value Ref Range    Sodium 140 132 - 146 mmol/L    Potassium 5.2 (H) 3.5 - 5.0 mmol/L Chloride 95 (L) 98 - 107 mmol/L    CO2 38 (H) 22 - 29 mmol/L    Anion Gap 7 7 - 16 mmol/L    Glucose 90 74 - 99 mg/dL    BUN 54 (H) 8 - 23 mg/dL    CREATININE 2.6 (H) 0.5 - 1.0 mg/dL    GFR Non-African American 18 >=60 mL/min/1.73    GFR African American 21     Calcium 10.7 (H) 8.6 - 10.2 mg/dL    Total Protein 6.9 6.4 - 8.3 g/dL    Alb 3.6 3.5 - 5.2 g/dL    Total Bilirubin <0.2 0.0 - 1.2 mg/dL    Alkaline Phosphatase 60 35 - 104 U/L    ALT 12 0 - 32 U/L    AST 23 0 - 31 U/L   Troponin   Result Value Ref Range    Troponin 0.03 0.00 - 0.03 ng/mL   Lactic Acid, Plasma   Result Value Ref Range    Lactic Acid 0.8 0.5 - 2.2 mmol/L   C-Reactive Protein   Result Value Ref Range    CRP 0.1 0.0 - 0.4 mg/dL   Brain Natriuretic Peptide   Result Value Ref Range    Pro-BNP 1,417 (H) 0 - 450 pg/mL   COVID-19   Result Value Ref Range    SARS-CoV-2, NAAT Not Detected Not Detected   SPECIMEN REJECTION   Result Value Ref Range    Rejected Test ESR     Reason for Rejection see below    Sedimentation Rate   Result Value Ref Range    Sed Rate 80 (H) 0 - 20 mm/Hr   Arterial Blood Gas, Respiratory Only   Result Value Ref Range    Source: Arterial     Pt Temp 37.0     PH (TEMPERATURE CORRECTED) 7.327 (L) 7.350 - 7.450    PCO2 (TEMP CORRECTED) 84.4 (HH) 35.0 - 45.0 mmHg    PO2 (TEMP CORRECTED) 36.8 (LL) 60.0 - 80.0 mmHg    HCO3, Arterial 44.2 (H) 22.0 - 26.0 mmol/L    B.E. 14.8 (H) -3.0 - 0.0 mmol/L    O2 Sat 61.8 (L) 92.0 - 98.5 %     ID 1,023     DEVICE [id number]     Critical Notification Yes     Mode 6lnc    Arterial Blood Gas, Respiratory Only   Result Value Ref Range    Source: Arterial     Pt Temp 37.0     PH (TEMPERATURE CORRECTED) 7.321 (L) 7.350 - 7.450    PCO2 (TEMP CORRECTED) 77.7 (HH) 35.0 - 45.0 mmHg    PO2 (TEMP CORRECTED) 44.4 (L) 60.0 - 80.0 mmHg    HCO3, Arterial 40.1 (H) 22.0 - 26.0 mmol/L    B.E. 11.2 (H) -3.0 - 0.0 mmol/L    O2 Sat 73.4 (L) 92.0 - 98.5 %     ID 1,023     DEVICE [id number] Critical Notification Yes     Mode 6lnc    EKG 12 Lead   Result Value Ref Range    Ventricular Rate 81 BPM    Atrial Rate 91 BPM    QRS Duration 122 ms    Q-T Interval 350 ms    QTc Calculation (Bazett) 406 ms    R Axis -2 degrees    T Axis -8 degrees       RADIOLOGY:  Xr Chest Portable    Result Date: 2020  Patient MRN: [id number] : 1935 Age:  80 years Gender: Female Order Date: 2020 7:48 AM Exam: XR CHEST PORTABLE Number of Images: 1 view Indication:   cough, shortness of breath cough, shortness of breath Comparison: 2019 Findings: The heart is enlarged. Lung fields demonstrate patchy bilateral infiltrates which could be related to pulmonary vascular congestion. The aorta is tortuous and ectatic     Cardiomegaly Tortuous aorta There are patchy infiltrates seen throughout both the lung fields. Pulmonary vascular congestion could have this appearance The chest appears to be worse in the interval       ------------------------- NURSING NOTES AND VITALS REVIEWED ---------------------------  Date / Time Roomed:  2020  7:27 AM  ED Bed Assignment:  [id number]    The nursing notes within the ED encounter and vital signs as below have been reviewed.      Patient Vitals for the past 24 hrs:   BP Temp Temp src Pulse Resp SpO2 Height Weight   20 1457 134/65 97.5 °F (36.4 °C) Axillary 96 19 100 % -- --   20 1314 138/83 -- -- 100 22 100 % -- --   20 1245 (!) 140/84 97.6 °F (36.4 °C) Oral 100 20 90 % -- --   20 1024 (!) 142/60 -- -- 93 20 93 % -- --   20 0740 (!) 126/53 97.7 °F (36.5 °C) Oral 85 20 99 % 5' 1\" (1.549 m) 276 lb (125.2 kg)       Oxygen Saturation Interpretation: Normal    ------------------------------------------ PROGRESS NOTES ------------------------------------------    Counseling:  I have spoken with the patient and discussed todays results, in addition to providing specific details for the plan of care and counseling regarding the diagnosis and

## 2020-08-13 NOTE — PROGRESS NOTES
Date: 8/13/2020    Time: 6:45 PM    Patient Placed On BIPAP/CPAP/ Non-Invasive Ventilation? Yes    If no must comment. Facial area red/color change? No           If YES are Blister/Lesion present? No   If yes must notify nursing staff  BIPAP/CPAP skin barrier?   Yes    Skin barrier type:duoderm       Comments:        Lito Hong

## 2020-08-13 NOTE — ED NOTES
Bed: 28  Expected date:   Expected time:   Means of arrival:   Comments:  Bhupendra Ayoub RN  08/13/20 [id number]

## 2020-08-13 NOTE — PROGRESS NOTES
Keira Lopez was ordered mirabegron [facility], [facility].) which is a nonformulary medication. The patient has indicated that the home supply of this medication will be brought in to the hospital for inpatient use. If the medication has not been administered by 1400 on the following day from the time the order was placed, a pharmacist will follow-up with the nurse of the patient to assess the capability of the patient to bring in the medication. If it is determined that the patient cannot supply the medication and it is not available to be dispensed from the pharmacy, a call will be placed to the ordering provider to discuss alternative options.       Juan Carlos Delgado, PharmD, [facility] 8/13/2020 3:43 PM

## 2020-08-13 NOTE — ED NOTES
bipap applied settings 16/8, oxygen 50%, RR 12.  Tolerating well without difficutly     Carlota Sharpe RN  08/13/20 [id number]

## 2020-08-14 PROBLEM — E78.5 HYPERLIPIDEMIA LDL GOAL <100: Chronic | Status: ACTIVE | Noted: 2020-01-01

## 2020-08-14 PROBLEM — J96.21 ACUTE ON CHRONIC RESPIRATORY FAILURE WITH HYPOXIA (HCC): Status: ACTIVE | Noted: 2020-01-01

## 2020-08-14 PROBLEM — E66.01 MORBID OBESITY WITH BMI OF 50.0-59.9, ADULT (HCC): Chronic | Status: ACTIVE | Noted: 2020-01-01

## 2020-08-14 PROBLEM — Z86.73 HISTORY OF CVA (CEREBROVASCULAR ACCIDENT): Chronic | Status: ACTIVE | Noted: 2020-01-01

## 2020-08-14 PROBLEM — E66.9 OBESITY: Status: RESOLVED | Noted: 2019-05-05 | Resolved: 2020-01-01

## 2020-08-14 PROBLEM — N17.9 ACUTE KIDNEY INJURY (HCC): Status: ACTIVE | Noted: 2020-01-01

## 2020-08-14 PROBLEM — R09.02 HYPOXIA: Status: RESOLVED | Noted: 2018-07-20 | Resolved: 2020-01-01

## 2020-08-14 PROBLEM — Z92.89 HISTORY OF ECHOCARDIOGRAM: Status: RESOLVED | Noted: 2018-02-13 | Resolved: 2020-01-01

## 2020-08-14 PROBLEM — N18.9 ACUTE KIDNEY INJURY SUPERIMPOSED ON CKD (HCC): Status: RESOLVED | Noted: 2020-01-01 | Resolved: 2020-01-01

## 2020-08-14 PROBLEM — E03.9 ACQUIRED HYPOTHYROIDISM: Chronic | Status: ACTIVE | Noted: 2020-01-01

## 2020-08-14 PROBLEM — N17.9 ACUTE KIDNEY INJURY SUPERIMPOSED ON CKD (HCC): Status: RESOLVED | Noted: 2020-01-01 | Resolved: 2020-01-01

## 2020-08-14 PROBLEM — I10 ESSENTIAL HYPERTENSION: Chronic | Status: ACTIVE | Noted: 2020-01-01

## 2020-08-14 NOTE — PROGRESS NOTES
Pharmacy Consultation Note  (Antibiotic Dosing and Monitoring)    Initial consult date:   Consulting physician: Galindo Cunningham  Drug(s): Vancomycin  Indication: PNA    Ht Readings from Last 1 Encounters:   20 5' 1\" (1.549 m)     Wt Readings from Last 1 Encounters:   20 293 lb 8 oz (133.1 kg)         Age/Gender Height IBW Weight  Allergy Information   80 y.o. female 5' 1\" (154.9 cm) Ideal body weight: 47.8 kg (105 lb 6.1 oz)  Adjusted ideal body weight: 81.9 kg (180 lb 10.1 oz) 276 lb (125.2 kg)  Pcn [penicillins]                Date  WBC BUN/CR Drug/Dose Time   Given Level(s)   (Time) Comments     (#1) 5 54/2.6 Vanco 2500mg IV x1 1245       (#2) 4.5 55/2.5 Vanco 1750mg IV q24h <1800> Rm 14.3 @ 1300      (#3)           (#4)           Estimated Creatinine Clearance: 22 mL/min (A) (based on SCr of 2.5 mg/dL (H)). UOP over the past 24 hours:     Intake/Output Summary (Last 24 hours) at 2020 1558  Last data filed at 2020 1339  Gross per 24 hour   Intake 1960 ml   Output 1700 ml   Net 260 ml       Temp max: Temp (24hrs), Av.9 °F (36.1 °C), Min:96.8 °F (36 °C), Max:96.9 °F (36.1 °C)        Cultures:  available culture and sensitivity results were reviewed in EPIC  Cultures sent and are pending. Recent Labs     20  0750   BLOODCULT2 24 Hours no growth        Historical Cultures:  Organism   Date Value Ref Range Status   2018 Enterobacter cloacae (A)  Final     Recent Labs     20  0750   BC 24 Hours no growth       Assessment:  · Pt is a 80 y.o. female who presented with acute respiratory failure  · Consulted by Galindo Cunningham to dose/monitor vancomycin  · Goal trough level:  15-20 mcg/mL;  Goal AUC/QUINN 400-600  · Serum creatinine today: 2.5 ;CrCl ~ 22; baseline Scr ~ 1.5  · Pt received Vanco 2500mg IV x1   · Rm @ 1300 on : 14.3   · Procalcitonin 0.08    Plan:  · Vancomycin 1750mg IV q24h at this time  · Patient most likely does not require antibiotics; would recommend discontinuation  · Will check a vancomycin trough when steady state attained if vancomycin continues  · Pharmacist will follow and monitor/adjust dosing as necessary      Thank you for the consult,    La Saab PharmD, BCPS 8/14/2020 3:58 PM

## 2020-08-14 NOTE — CONSULTS
Pearl Hudson M.D.,[facility]  Paula Laird D.O., F.A.C.O.I., April Iglesias M.D. Estrada Black M.D., Claire Bah M.D. Amairani Babcock D.O. Patient:  Ana Austin 80 y.o. female MRN: [id number]     Date of Service: 8/14/2020      PULMONARY CONSULTATION    Reason for Consultation: Acute respiratory failure  Referring Physician: Darlina Curling    Communication with the referring physician will be sent via the electronic medical record. Chief Complaint: Shortness of breath    CODE STATUS: DNR CCA    SUBJECTIVE:  HPI:  Ana Austin is a 80 y.o. female with a past medical history significant for chronic hypoxic and hypercapnic respiratory failure, pulmonary hypertension, chronic A. fib on anticoagulation with Eliquis, chronic kidney disease, morbid obesity, GERD hypertension hypothyroidism CVA right CEA gastric bypass surgery in the past who was presented from her nursing home with shortness of breath and worsening hypoxia. In the ER yesterday ABGs revealed pH of 7.32/PCO2 of 84.4/PO2 of 36.8/a repeat 1 pretty much confirmed the first findings. This was done on 6 L of oxygen. She did not have leukocytosis, her proBNP was 1417. She was in acute on chronic renal failure with a creatinine of 2.6. Patient was given Lasix 40 mg x 1 in the ER. Also received cefepime and vancomycin. Was admitted for further evaluation and treatment. She was placed also on a noninvasive ventilator her current settings is tidal volume of 400 EPAP of 8 FiO2 of 50%. She is saturating 100% on the settings.     Past Medical History:   Diagnosis Date    Anemia due to acute blood loss 12/29/2017    Arthritis     Blood transfusion reaction     Chronic atrial fibrillation 12/29/2017    CKD (chronic kidney disease) stage 3, GFR 30-59 ml/min ([facility]) 12/29/2017    Diabetes mellitus ([facility] [address]     History of blood transfusion     History of breast cancer     breast cancer Right    History of stroke     Hyperlipidemia     Hypertension     Hypothyroidism     Volume overload 12/30/2017    As cause of dyspnea       Past Surgical History:   Procedure Laterality Date    BREAST SURGERY      right    BRONCHOSCOPY  4/28/2018    BRONCHOSCOPY DIAGNOST[facility]  NASAL SINUS SURGERY      august 27 2017. cauterized her nasal passage.  OTHER SURGICAL HISTORY  07/27/2017    endoscopic conrtol of epistaxis dr Pratibha Hernandez N/A 4/28/2018    ENDOSCOPIC GUIDED CONTROL EPISTAXIS  WITH SEPTAL BUTTON PLACEMENT. INTRA-OPERATIVE BRONCHOSCOPY. performed by Isaak Torres MD at [facility] OR       Family History   Problem Relation Age of Onset    Heart Disease Mother     Kidney Disease Mother         dialysis    Diabetes Mother     Stroke Father     Diabetes Father        Social History:   Social History     Socioeconomic History    Marital status:       Spouse name: Not on file    Number of children: Not on file    Years of education: Not on file    Highest education level: Not on file   Occupational History    Not on file   Social Needs    Financial resource strain: Not on file    Food insecurity     Worry: Not on file     Inability: Not on file    Transportation needs     Medical: Not on file     Non-medical: Not on file   Tobacco Use    Smoking status: Never Smoker    Smokeless tobacco: Never Used   Substance and Sexual Activity    Alcohol use: Not Currently     Alcohol/week: 0.0 standard drinks     Comment: occasional    Drug use: No    Sexual activity: Never   Lifestyle    Physical activity     Days per week: Not on file     Minutes per session: Not on file    Stress: Not on file   Relationships    Social connections     Talks on phone: Not on file     Gets together: Not on file     Attends Hindu service: Not on file     Active member of club or organization: Not on file     Attends meetings of clubs or organizations: Not on file     Relationship status: Not on file    Intimate partner violence     Fear of current or ex partner: Not on file     Emotionally abused: Not on file     Physically abused: Not on file     Forced sexual activity: Not on file   Other Topics Concern    Not on file   Social History Narrative    Not on file     Smoking history: The patient is a Never smoker       ETOH:   reports previous alcohol use. Exposures: There  is not history of TB or TB exposure. There is not asbestos or silica dust exposure. The patient reports does not have coal, foundry, quarry or [employer] exposure. Recent travel history none. There is not  history of recreational or IV drug use. There is not hot tub exposure. The patient does not have any exotic pets, turtles or exotic birds. Vaccines:    Influenza: Indicated for current flu vaccination season Oct. to Feb.  Pneumococcal Polysaccharide:  Up-to-date; approximate date: 2019  Immunization History   Administered Date(s) Administered    Influenza Vaccine, unspecified formulation 10/20/2017    Influenza Virus Vaccine 10/16/2019    Pneumococcal Conjugate Vaccine 03/20/2015    Pneumococcal Vaccine 09/25/2019        Home Meds: Medications Prior to Admission: Multiple Vitamins-Minerals (THERAPEUTIC MULTIVITAMIN-MINERALS) tablet, Take 1 tablet by mouth daily  sulfamethoxazole-trimethoprim (BACTRIM DS;SEPTRA DS) 800-160 MG per tablet, Take 1 tablet by mouth 2 times daily  doxycycline hyclate (VIBRA-TABS) 100 MG tablet, Take 100 mg by mouth 2 times daily  insulin lispro (HUMALOG) 100 UNIT/ML injection vial, Inject 12 Units into the skin 2 times daily With breakfast and lunch.  Plus sliding scale  lidocaine 4 % external patch, Place 1 patch onto the skin daily  potassium chloride (KLOR-CON M) 10 MEQ extended release tablet, Take 10 mEq by mouth 2 times daily  acetaminophen (TYLENOL) 325 MG tablet, Take 650 mg by mouth every 6 hours as needed for Pain  fluticasone (FLONASE) 50 MCG/ACT nasal spray, 1 spray by Each Nostril route daily (Patient taking differently: 1 spray by Each Nostril route 2 times daily )  calcitRIOL (ROCALTROL) 0.25 MCG capsule, Take 0.5 mcg by mouth daily   loratadine (CLARITIN) 10 MG tablet, Take 10 mg by mouth daily  melatonin 5 MG TABS tablet, Take 10 mg by mouth nightly   guaiFENesin (MUCINEX) 600 MG extended release tablet, Take 600 mg by mouth 2 times daily   Cholecalciferol (VITAMIN D3) 5000 units TABS, Take 5,000 Units by mouth daily   insulin lispro (HUMALOG) 100 UNIT/ML injection vial, Take 12 units before meals + sliding scale.  MAX 60 units daily (Patient taking differently: Inject 15 Units into the skin Daily with supper Plus sliding scale)  insulin glargine (TOUJEO SOLOSTAR) 300 UNIT/ML injection pen, Inject 50 Units into the skin nightly (Patient taking differently: Inject 40 Units into the skin nightly )  metoprolol succinate (TOPROL XL) 25 MG extended release tablet, Take 1 tablet by mouth 2 times daily (Patient taking differently: Take 50 mg by mouth daily )  ferrous sulfate 325 (65 Fe) MG tablet, Take 1 tablet by mouth 2 times daily (with meals)  docusate sodium (COLACE, DULCOLAX) 100 MG CAPS, Take 200 mg by mouth nightly  lisinopril (PRINIVIL;ZESTRIL) 5 MG tablet, Take 1 tablet by mouth daily (Patient taking differently: Take 2.5 mg by mouth nightly )  bumetanide (BUMEX) 1 MG tablet, Take 1 tablet by mouth daily (Patient taking differently: Take 1 mg by mouth 2 times daily )  apixaban (ELIQUIS) 2.5 MG TABS tablet, 5 mg by mouth twice a day ×7 days followed by 2.5 mg by mouth twice a day (Patient taking differently: Take 2.5 mg by mouth 2 times daily )  levothyroxine (SYNTHROID) 25 MCG tablet, Take 1 tablet by mouth daily (Patient taking differently: Take 25 mcg by mouth every morning )  Mirabegron ER 50 MG TB24, Take 50 mg by mouth Daily with supper   pantoprazole (PROTONIX) 40 MG tablet, Take 1 tablet by mouth 2 times daily (before meals). (Patient taking differently: Take 40 mg by mouth nightly )  simvastatin (ZOCOR) 40 MG tablet, Take 40 mg by mouth nightly.  gabapentin (NEURONTIN) 300 MG capsule, Take 300 mg by mouth nightly.  .  Dulaglutide (TRULICITY) 1.5 QS/7.7HR SOPN, Inject 1.5 mg into the skin once a week Indications: weekly on Thursday  magnesium hydroxide (MILK OF MAGNESIA) 400 MG/5ML suspension, Take 30 mLs by mouth daily as needed for Constipation  hydrOXYzine (ATARAX) 10 MG tablet, Take 10 mg by mouth 3 times daily as needed for Itching    CURRENT MEDS :  Scheduled Meds:   budesonide  500 mcg Nebulization BID    Arformoterol Tartrate  15 mcg Nebulization BID    apixaban  2.5 mg Oral BID    calcitRIOL  0.5 mcg Oral Daily    fluticasone  1 spray Each Nostril BID    gabapentin  300 mg Oral Nightly    levothyroxine  25 mcg Oral QAM    lidocaine  1 patch Transdermal Daily    metoprolol succinate  50 mg Oral Daily    mirabegron  50 mg Oral Dinner    pantoprazole  40 mg Oral Nightly    insulin lispro  0-6 Units Subcutaneous TID WC    insulin lispro  0-3 Units Subcutaneous Nightly    ipratropium-albuterol  1 ampule Inhalation Q4H WA    guaiFENesin  400 mg Oral TID    insulin glargine  24 Units Subcutaneous Nightly    cetirizine  5 mg Oral Daily    melatonin  10 mg Oral Nightly    atorvastatin  20 mg Oral Nightly    cefepime  1 g Intravenous Q12H    vancomycin (VANCOCIN) intermittent dosing (placeholder)   Other RX Placeholder       Continuous Infusions:   dextrose      sodium chloride 75 mL/hr at 08/13/20 1603    sodium chloride 12.5 mL/hr at 08/14/20 0310    dextrose         Allergies   Allergen Reactions    Pcn [Penicillins] Hives       REVIEW OF SYSTEMS:  Constitutional: Denies fever, weight loss, night sweats, and fatigue  Skin: Denies pigmentation, dark lesions, and rashes   HEENT: Denies hearing loss, tinnitus, ear drainage, epistaxis, sore throat, and hoarseness. Cardiovascular: Denies palpitations, chest pain, and chest pressure. Respiratory: Positive for shortness of breath, no cough  Gastrointestinal: Denies nausea, vomiting, poor appetite, diarrhea, heartburn or reflux  Genitourinary: Denies dysuria, frequency, urgency or hematuria  Musculoskeletal: Denies myalgias, muscle weakness, and bone pain  Neurological: Denies dizziness, vertigo, headache, and focal weakness        OBJECTIVE:   /64   Pulse 86   Temp 96.8 °F (36 °C) (Axillary)   Resp 15   Ht 5' 1\" (1.549 m)   Wt 293 lb 8 oz (133.1 kg) Comment: bed pump removed  LMP  (LMP Unknown)   SpO2 100%   BMI 55.46 kg/m²   SpO2 Readings from Last 1 Encounters:   08/14/20 100%        I/O:    Intake/Output Summary (Last 24 hours) at 8/14/2020 0849  Last data filed at 8/14/2020 0640  Gross per 24 hour   Intake 1165 ml   Output 1700 ml   Net -535 ml     Vent Information  Vt Ordered: 400 mL  FiO2 : 50 %  SpO2: 100 %  SpO2/FiO2 ratio: 200  Mask Type: Full face mask  Mask Size: Small       IPAP: 16 cmH20  CPAP/EPAP: 8 cmH2O     Physical Exam:  General: The patient is lying in bed comfortably without any distress. On noninvasive ventilator. Breathing is not labored  HEENT: Pupils are equal round and reactive to light, there are no oral lesions and no post-nasal drip   Neck: supple without adenopathy  Cardiovascular: regular rate and rhythm without murmur or gallop  Respiratory: Diminished breathing sounds bilaterally  Abdomen: Obese, soft, non-tender, non-distended, normal bowel sounds  Extremities: warm, no edema, no clubbing  Skin: no rash or lesion  Neurologic: CN II-XII grossly intact, no focal deficits          Imaging personally reviewed:    Impression    Cardiomegaly    Tortuous aorta    There are patchy infiltrates seen throughout both the lung fields.     Pulmonary vascular congestion could have this appearance    The chest appears to be worse in the interval             Echo:  From 2018    Summary   Ejection fraction is visually estimated at 60%. No regional wall motion abnormalities seen. Normal right ventricle size with reduced function. TAPSE is 1.5 cm. Mild aortic stenosis is present. Moderate mitral valve stenosis. Mean transmitral gradient 9 mmHg. Moderate mitral regurgitation is present. Mild and tricuspid regurgitation. Pulmonary hypertension is severe. RVSP is 72 mmHg. Signature      ----------------------------------------------------------------   Electronically signed by Ede Agudelo DO(Interpreting   physician) on 07/23/2018 01:24 PM   ----------------------------------------------------------------      Labs:  Lab Results   Component Value Date    WBC 4.5 08/14/2020    HGB 9.9 08/14/2020    HCT 33.0 08/14/2020    .2 08/14/2020    MCH 32.5 08/14/2020    MCHC 30.0 08/14/2020    RDW 13.5 08/14/2020     08/14/2020    MPV 10.1 08/14/2020     Lab Results   Component Value Date     08/13/2020    K 5.2 08/13/2020    K 4.3 07/27/2019    CL 95 08/13/2020    CO2 38 08/13/2020    BUN 54 08/13/2020    CREATININE 2.6 08/13/2020    LABALBU 3.6 08/13/2020    CALCIUM 10.7 08/13/2020    GFRAA 21 08/13/2020    LABGLOM 18 08/13/2020     Lab Results   Component Value Date    PROTIME 16.1 07/22/2018    INR 1.4 07/22/2018     Recent Labs     08/13/20  0750   PROBNP 1,417*     Recent Labs     08/13/20  0750   TROPONINI 0.03     No results for input(s): PROCAL in the last 72 hours. This SmartLink has not been configured with any valid records. Micro:  No results for input(s): CULTRESP in the last 72 hours. No results for input(s): LABGRAM in the last 72 hours. No results for input(s): LEGUR in the last 72 hours. Recent Labs     08/13/20  1554   STREPNEUMAGU Presumptive negative- suggests no current or recent  pneumococcal infection.   Infection due to Strep pneumoniae cannot be  ruled out since the antigen present in the sample  may be below the detection limit of the test.  Normal Range:Presumptive Negative       Recent Labs     08/13/20  1554   LP1UAG Presumptive Negative -suggesting no recent or current infections  with Legionella pneumophila serogroup 1. Infection to Legionella cannot be ruled out since other serogroups  and species may cause infection, antigen may not be present in  early infection, or level of antigen may be below the  detection limit. Normal Range: Presumptive Negative       COVID-19 test negative    Assessment:  1. Acute on chronic hypoxic and hypercapnic respiratory failure  2. History of pulmonary hypertension based on last echocardiogram from 2018  3. Acute on chronic CHF  4. Acute on chronic kidney disease  5. Chronic A. fib on anticoagulation with Eliquis  6. Questionable PE    Plan:  1. We will try to wean NIV after oxygen on room air. Discussed with respiratory therapist.  Continue NIV PRN and nightly. 2. Patient has refused polysomnography in the past.  At this point considering her ABGs which she might qualify for trilogy upon discharge to her facility. 3. Based on the Wells criteria she has an intermediate risk for PE. Despite being on anticoagulation literature does not support direct oral anticoagulants for patients with morbid obesity and a BMI more than 40 or weight more than 120 kg. She cannot have a CTA at this point. We will go ahead and check and lower extremity Dopplers. And also check a d-dimer. Depending on the results we might need to empirically switch her anticoagulation to Coumadin. 4. Infectious disease appears to be less likely. I will go ahead and check a procalcitonin level. 5. Considering she has pulmonary hypertension based on echo in 2018 we will go ahead and repeat her echocardiogram.  6. We will check a respiratory film array. 7.       Thank you for allowing me to participate in the care of [facility]. Please feel free to call with questions.          Electronically signed by Corrinne Backbone Marcin Conway MD on 8/14/2020 at 8:49 AM

## 2020-08-14 NOTE — H&P
[address] [facility]  Attending History and Physical      CHIEF COMPLAINT:  Shortness of breath      HISTORY OF PRESENT ILLNESS:      The patient is a 80 y.o. female patient of dr Yanira Ohara who presents with complains of shortness of breath. Patient lives at a rehab facility. She is currently on BiPAP. She states she has been short of breath for several days. She is on supplemental oxygen, 3.5 L. She states she could not catch her breath. She denied cough or sputum production. She denied chest pain, abdominal pain, nausea, vomiting, fevers, chills and diaphoresis. She is feeling better on the BiPAP. Past Medical History:    Past Medical History:   Diagnosis Date    Anemia due to acute blood loss 12/29/2017    Arthritis     Blood transfusion reaction     Chronic atrial fibrillation 12/29/2017    CKD (chronic kidney disease) stage 3, GFR 30-59 ml/min ([facility]) 12/29/2017    Diabetes mellitus ([facility]     History of blood transfusion     History of breast cancer     breast cancer Right    History of stroke     Hyperlipidemia     Hypertension     Hypothyroidism     Volume overload 12/30/2017    As cause of dyspnea       Past Surgical History:    Past Surgical History:   Procedure Laterality Date    BREAST SURGERY      right    BRONCHOSCOPY  4/28/2018    BRONCHOSCOPY DIAGNOSTIC performed by Bib Edwards MD at [address] [place] [address] NASAL SINUS SURGERY      august 27 2017. cauterized her nasal passage.  OTHER SURGICAL HISTORY  07/27/2017    endoscopic conrtol of epistaxis dr Teresa Fields N/A 4/28/2018    ENDOSCOPIC GUIDED CONTROL EPISTAXIS  WITH SEPTAL BUTTON PLACEMENT. INTRA-OPERATIVE BRONCHOSCOPY.  performed by Bib Edwards MD at [facility] OR       Medications Prior to Admission:    Medications Prior to Admission: Multiple Vitamins-Minerals (THERAPEUTIC MULTIVITAMIN-MINERALS) tablet, Take 1 tablet by mouth daily  sulfamethoxazole-trimethoprim (BACTRIM DS;SEPTRA DS) 800-160 MG per tablet, Take 1 tablet by mouth 2 times daily  doxycycline hyclate (VIBRA-TABS) 100 MG tablet, Take 100 mg by mouth 2 times daily  insulin lispro (HUMALOG) 100 UNIT/ML injection vial, Inject 12 Units into the skin 2 times daily With breakfast and lunch. Plus sliding scale  lidocaine 4 % external patch, Place 1 patch onto the skin daily  potassium chloride (KLOR-CON M) 10 MEQ extended release tablet, Take 10 mEq by mouth 2 times daily  acetaminophen (TYLENOL) 325 MG tablet, Take 650 mg by mouth every 6 hours as needed for Pain  fluticasone (FLONASE) 50 MCG/ACT nasal spray, 1 spray by Each Nostril route daily (Patient taking differently: 1 spray by Each Nostril route 2 times daily )  calcitRIOL (ROCALTROL) 0.25 MCG capsule, Take 0.5 mcg by mouth daily   loratadine (CLARITIN) 10 MG tablet, Take 10 mg by mouth daily  melatonin 5 MG TABS tablet, Take 10 mg by mouth nightly   guaiFENesin (MUCINEX) 600 MG extended release tablet, Take 600 mg by mouth 2 times daily   Cholecalciferol (VITAMIN D3) 5000 units TABS, Take 5,000 Units by mouth daily   insulin lispro (HUMALOG) 100 UNIT/ML injection vial, Take 12 units before meals + sliding scale.  MAX 60 units daily (Patient taking differently: Inject 15 Units into the skin Daily with supper Plus sliding scale)  insulin glargine (TOUJEO SOLOSTAR) 300 UNIT/ML injection pen, Inject 50 Units into the skin nightly (Patient taking differently: Inject 40 Units into the skin nightly )  metoprolol succinate (TOPROL XL) 25 MG extended release tablet, Take 1 tablet by mouth 2 times daily (Patient taking differently: Take 50 mg by mouth daily )  ferrous sulfate 325 (65 Fe) MG tablet, Take 1 tablet by mouth 2 times daily (with meals)  docusate sodium (COLACE, DULCOLAX) 100 MG CAPS, Take 200 mg by mouth nightly  lisinopril (PRINIVIL;ZESTRIL) 5 MG tablet, Take 1 tablet by mouth daily (Patient taking differently: Take 2.5 mg by mouth nightly )  bumetanide (BUMEX) 1 MG tablet, Take 1 tablet by mouth daily (Patient taking differently: Take 1 mg by mouth 2 times daily )  apixaban (ELIQUIS) 2.5 MG TABS tablet, 5 mg by mouth twice a day ×7 days followed by 2.5 mg by mouth twice a day (Patient taking differently: Take 2.5 mg by mouth 2 times daily )  levothyroxine (SYNTHROID) 25 MCG tablet, Take 1 tablet by mouth daily (Patient taking differently: Take 25 mcg by mouth every morning )  Mirabegron ER 50 MG TB24, Take 50 mg by mouth Daily with supper   pantoprazole (PROTONIX) 40 MG tablet, Take 1 tablet by mouth 2 times daily (before meals). (Patient taking differently: Take 40 mg by mouth nightly )  simvastatin (ZOCOR) 40 MG tablet, Take 40 mg by mouth nightly.  gabapentin (NEURONTIN) 300 MG capsule, Take 300 mg by mouth nightly. .  Dulaglutide (TRULICITY) 1.5 HL/3.2LB SOPN, Inject 1.5 mg into the skin once a week Indications: weekly on Thursday  magnesium hydroxide (MILK OF MAGNESIA) 400 MG/5ML suspension, Take 30 mLs by mouth daily as needed for Constipation  hydrOXYzine (ATARAX) 10 MG tablet, Take 10 mg by mouth 3 times daily as needed for Itching    Allergies:    Pcn [penicillins]    Social History:    reports that she has never smoked. She has never used smokeless tobacco. She reports previous alcohol use. She reports that she does not use drugs. Family History:   family history includes Diabetes in her father and mother; Heart Disease in her mother; Kidney Disease in her mother; Stroke in her father. REVIEW OF SYSTEMS:  As above in the HPI, otherwise negative    PHYSICAL EXAM:    Vitals:  /64   Pulse 86   Temp 96.8 °F (36 °C) (Axillary)   Resp 15   Ht 5' 1\" (1.549 m)   Wt 293 lb 8 oz (133.1 kg) Comment: bed pump removed  LMP  (LMP Unknown)   SpO2 100%   BMI 55.46 kg/m²     General:  Awake, alert, oriented X 3. Frail appearing  HEENT:  Normocephalic, atraumatic.   Pupils equal, round, reactive to light. No scleral icterus. No conjunctival injection. Normal lips, teeth, and gums. No nasal discharge. Neck:  Supple  Heart:  RRR, no murmurs, gallops, rubs  Lungs:  bilateral rhonchi  Abdomen: Bowel sounds present, soft, nontender, no masses, no organomegaly, no peritoneal signs  Extremities:  No clubbing, cyanosis, or edema  Skin:  Warm and dry, no open lesions or rash  Neuro:  Cranial nerves 2-12 intact, no focal deficits.   Severe general physical deconditioning  Breast: deferred  Rectal: deferred  Genitalia:  deferred    LABS:    CBC with Differential:    Lab Results   Component Value Date    WBC 4.5 08/14/2020    RBC 3.05 08/14/2020    HGB 9.9 08/14/2020    HCT 33.0 08/14/2020     08/14/2020    .2 08/14/2020    MCH 32.5 08/14/2020    MCHC 30.0 08/14/2020    RDW 13.5 08/14/2020    NRBC 0.0 04/26/2018    LYMPHOPCT 13.3 08/14/2020    MONOPCT 9.0 08/14/2020    BASOPCT 0.2 08/14/2020    MONOSABS 0.40 08/14/2020    LYMPHSABS 0.59 08/14/2020    EOSABS 0.13 08/14/2020    BASOSABS 0.01 08/14/2020     CMP:    Lab Results   Component Value Date     08/14/2020    K 5.1 08/14/2020    CL 94 08/14/2020    CO2 42 08/14/2020    BUN 55 08/14/2020    CREATININE 2.5 08/14/2020    GFRAA 22 08/14/2020    LABGLOM 18 08/14/2020    GLUCOSE 62 08/14/2020    PROT 6.9 08/13/2020    LABALBU 3.6 08/13/2020    CALCIUM 10.6 08/14/2020    BILITOT <0.2 08/13/2020    ALKPHOS 60 08/13/2020    AST 23 08/13/2020    ALT 12 08/13/2020     BMP:    Lab Results   Component Value Date     08/14/2020    K 5.1 08/14/2020    CL 94 08/14/2020    CO2 42 08/14/2020    BUN 55 08/14/2020    LABALBU 3.6 08/13/2020    CREATININE 2.5 08/14/2020    CALCIUM 10.6 08/14/2020    GFRAA 22 08/14/2020    LABGLOM 18 08/14/2020    GLUCOSE 62 08/14/2020     Magnesium:    Lab Results   Component Value Date    MG 2.3 07/24/2018     Phosphorus:    Lab Results   Component Value Date    PHOS 3.4 05/07/2018     LDH:  No results found for: LDH  PT/INR:    Lab Results   Component Value Date    PROTIME 16.1 07/22/2018    INR 1.4 07/22/2018     PTT:    Lab Results   Component Value Date    APTT 33.3 07/22/2018   [APTT}  Troponin:    Lab Results   Component Value Date    TROPONINI 0.03 08/13/2020     Last 3 Troponin:    Lab Results   Component Value Date    TROPONINI 0.03 08/13/2020    TROPONINI 0.02 07/27/2019    TROPONINI 0.01 07/20/2018     U/A:    Lab Results   Component Value Date    COLORU Yellow 08/13/2020    PROTEINU Negative 08/13/2020    PHUR 5.5 08/13/2020    WBCUA 1-3 08/13/2020    RBCUA >20 08/13/2020    BACTERIA NONE SEEN 08/13/2020    CLARITYU Clear 08/13/2020    SPECGRAV 1.015 08/13/2020    LEUKOCYTESUR SMALL 08/13/2020    UROBILINOGEN 0.2 08/13/2020    BILIRUBINUR Negative 08/13/2020    BLOODU LARGE 08/13/2020    GLUCOSEU Negative 08/13/2020    AMORPHOUS NONE 07/20/2018     ABG:    Lab Results   Component Value Date    PH 7.310 07/20/2018    PCO2 56.7 07/20/2018    PO2 93.4 07/20/2018    HCO3 27.9 07/20/2018    BE 11.2 08/13/2020    O2SAT 73.4 08/13/2020     HgBA1c:    Lab Results   Component Value Date    LABA1C 7.8 09/11/2019     FLP:    Lab Results   Component Value Date    TRIG 103 07/23/2018    HDL 39 07/23/2018    LDLCALC 34 07/23/2018    LABVLDL 21 07/23/2018     TSH:    Lab Results   Component Value Date    TSH 1.430 07/23/2018       ASSESSMENT:      Patient Active Problem List   Diagnosis    Chronic atrial fibrillation    CKD (chronic kidney disease) stage 3, GFR 30-59 ml/min ([facility])    History of breast cancer    Chronic anemia    Acute on chronic diastolic CHF (congestive heart failure) ([facility])    PNA (pneumonia)    Diabetes mellitus type 2, uncontrolled ([facility]    Essential hypertension    History of CVA (cerebrovascular accident)    Hyperlipidemia LDL goal <100    Acquired hypothyroidism    Morbid obesity with BMI of 50.0-59.9, adult ([facility]    Acute on chronic respiratory failure with hypoxia ([facility])    Acute kidney

## 2020-08-14 NOTE — PROGRESS NOTES
Physical Therapy    Facility/Department: [address] INTERMEDIATE 1  Initial Assessment    NAME: John Chowdhury  : 1935  MRN: [id number]    Date of Service: 2020      Patient Diagnosis(es): The primary encounter diagnosis was Acute respiratory failure with hypoxia and hypercarbia ([facility] [address] [address] Diagnoses of Congestive heart failure, unspecified HF chronicity, unspecified heart failure type ([facility] and Pneumonia of both lungs due to infectious organism, unspecified part of lung were also pertinent to this visit. has a past medical history of Anemia due to acute blood loss, Arthritis, Blood transfusion reaction, Chronic atrial fibrillation, CKD (chronic kidney disease) stage 3, GFR 30-59 ml/min ([facility]), Diabetes mellitus ([facility] History of blood transfusion, History of breast cancer, History of stroke, Hyperlipidemia, Hypertension, Hypothyroidism, and Volume overload.   has a past surgical history that includes Breast surgery; Cholecystectomy; Gastric bypass surgery; other surgical history (2017); Nasal sinus surgery; pr ctrl nosebleed,anter,complex (N/A, 2018); and bronchoscopy (2018). Referring Provider:  EMERALD Kendrick       Evaluating Therapist: Maddie Mazariegos PT    Room #:434  DIAGNOSIS: Pneumonia  Additional Pertinent History:A-fib, CKD, HTN, Gastric bypass, CHF  PRECAUTIONS: falls, alarm, Bipap    Social:  Per chart pt admitted from nursing home. Pt questionable historian with difficuly answering questions about prior level of function. States she walks with a ww. Unable to report if she came from nursing home or her own home     Initial Evaluation  Date: 20 Treatment      Short Term/ Long Term   Goals   Was pt agreeable to Eval/treatment? yes     Does pt have pain?  No c/o pain     Bed Mobility  Rolling: max assist  Supine to sit: max assist  Sit to supine: max assist  Scooting: max assist  Min assist   Transfers NT secondary to shortness of breath, lethargy and poor sitting balance Max assist   Ambulation    NT  5 feet with ww with max assist   Stair Negotiation  Ascended and descended  NT   N/A   LE strength     3-/5    3=/5   balance      Sitting balance edge of bed max assist     AM-PAC Raw score               8/24         Pt is lethargic, difficulty answering questions  LE ROM: WFL  Sensation: intact  Endurance: poor     ASSESSMENT  Pt displays functional ability as noted in the objective portion of this evaluation. Patient education  Pt educated on Importance of mobility    Patient response to education:   Pt verbalized understanding Pt demonstrated skill Pt requires further education in this area   no   yes     ASSESSMENT:    Comments:  Pt short of breath with minimal activity. Poor activity tolerance, only able to tolerate sitting edge of bed breifly. SpO2 with bi pap on 99%    Pt's/ family goals   1. Pt unable to state    Patient and or family understand(s) diagnosis, prognosis, and plan of care. no    PLAN:    PT care will be provided in accordance with the objectives noted above. Exercises and functional mobility practice will be used as well as appropriate assistive devices or modalities to obtain goals. Patient and family education will also be administered as needed. Frequency of treatments: 2-5x/week x 7 .days    Time out  0951      Evaluation Time includes thorough review of current medical information, gathering information on past medical history/social history and prior level of function, completion of standardized testing/informal observation of tasks, assessment of data and education on plan of care and goals.     CPT codes:  [x] Low Complexity PT evaluation 39831  [] Moderate Complexity PT evaluation 13529  [] High Complexity PT evaluation 65044  [] PT Re-evaluation 91785  [] Gait training 97246 minutes  [] Manual therapy 78658 minutes  [] Therapeutic activities 88388 minutes  [] Therapeutic exercises 37525 minutes  [] Neuromuscular reeducation 56890 minutes Shannon PT [id number]

## 2020-08-14 NOTE — CARE COORDINATION
Met with patient at bedside- confirmed that discharge plan is for patient to return to [facility] upon discharge. JASPER will need to know 24 hours prior to discharge if NIV is needed upon discharge.

## 2020-08-14 NOTE — PROGRESS NOTES
Date: 8/13/2020    Time: 10:33 PM    Patient Placed On BIPAP/CPAP/ Non-Invasive Ventilation? No    If no must comment. Facial area red/color change? No           If YES are Blister/Lesion present? No   If yes must notify nursing staff  BIPAP/CPAP skin barrier? Yes    Skin barrier type:duoderm       Comments: Pt remains on AVAPS at this time, duoderm in place. Machine plugged into red outlet.         Kristy Fitting     08/13/20 6920   NIV Type   Mode AVAPS   Mask Type Full face mask   Mask Size Small   Settings/Measurements   CPAP/EPAP 8 cmH2O   Vt Ordered 400 mL   Rate Ordered 12   Resp 16   FiO2  50 %   Vt Exhaled 414 mL   Minute Volume 70.1 Liters   Mask Leak (lpm) 61 lpm   Comfort Level Good   Using Accessory Muscles No

## 2020-08-14 NOTE — PROGRESS NOTES
Occupational Therapy  OCCUPATIONAL THERAPY INITIAL EVALUATION      Date:2020  Patient Name: Cristin Sanchez  MRN: [id number]  : 1935  Room: [address] [place] [address]    Referring Provider: EMERALD Nelson    Evaluating OT: Emily De La Garza OTR/L [id number]    AM-PAC Daily Activity Raw Score:     Recommended Adaptive Equipment: TBD    Diagnosis: Pneumonia. Pt presents to ED from ECF with SOB and cough      Pertinent Medical History: DM, afib, CKD, h/o CVA, arthritis   Precautions:  Falls, bipap     Home Living: Pt is a resident of [facility] skilled nursing facility. Pt reports being Mod I in transfers and wheelchair mobility. Staff assist in toileting, dressing and bathing. O2 at home. Pain Level: pt reports no pain just significant fatigue    Cognition: A&O: 3/4. Pt is oriented to self, hospital and temporal concepts. Confusion easily regarding situation. Pleasant and cooperative but lethargic. Problem solving:  fair   Judgement/safety:  fair     Functional Assessment:   Initial Eval Status  Date: 20 Treatment session:  Short Term Goals  Treatment frequency: 2-5x/wk PRN x1-3 wks     Feeding Min A     Grooming Mod A  Set up   UB Dressing Max A  Management of hospital gown  Set up   LB Dressing Dependent    Mod A    Bathing Max A  Mod A   Toileting Dependent  Min A   Bed Mobility  Supine to sit: NT  Pt declines reporting she sat up earlier and is now too fatigued  Long sitting in bed: Max A     Functional Transfers STS: unable to complete  Min A   Functional Mobility NT     Balance Long Sitting: poor plus    Standing: NT     Activity Tolerance poor  standing suleman x2-3 min with fair balance during self care tasks             Treatment: Patient educated on techniques for completion of ADL, safe functional transfers and functional mobility.   Patient required cues for follow through with proper hand/foot placement, pacing, safety, attention, sequencing and technique in bed mobility, UB dressing and LB dressing in preparation for maximum independence in all self care tasks. Hand Dominance: Right []  Left []   Strength ROM Additional Info:    RUE  4-/5 WFL good  and FMC/dexterity noted during ADL tasks     LUE Proximally: 2-/5  Distally: 3+/5 WFL elbow to digits. L shoulder flexion approx 10 degrees, PROM to approx 30 degrees then resistive due to pain Fair  and FMC/dexterity noted during ADL tasks         Hearing: WFL   Vision: WFL   Sensation:  No c/o numbness or tingling   Tone: WFL                             Long Term Goal (1-3 wks): Pt will maximize functional performance in all self care tasks/functional transfers with good follow through of all trained techniques for safe transition to next level of care    Eval Complexity: Low    Assessment of current deficits   Functional mobility [x]  ADLs [x] Strength [x]  Cognition []  Functional transfers  [x] IADLs [x] Safety Awareness [x]  Endurance [x]  Fine Motor Coordination [] Balance [x] Vision/perception [] Sensation []   Gross Motor Coordination [] ROM [] Delirium []                  Motor Control []    Plan of Care:   ADL retraining [x]   Equipment needs [x]   Neuromuscular re-education [x] Energy Conservation Techniques [x]  Functional Transfer training [x] Patient and/or Family Education [x]  Functional Mobility training [x]  Environmental Modifications [x]  Cognitive re-training []   Compensatory techniques for ADLs [x]  Splinting Needs []   Positioning to improve overall function [x]   Therapeutic Activity [x]  Therapeutic Exercise  [x]  Visual/Perceptual: []    Delirium prevention/treatment  []   Other:  []    Rehab Potential: Good for established goals     Patient / Family Goal: To get home. Patient and/or family were instructed on functional diagnosis, prognosis/goals and OT plan of care. Pt verbalized understanding. Upon arrival, patient supine in bed.  At end of session, patient supine in bed with call light and phone within reach, all lines and tubes intact. Pt would benefit from continued skilled OT to increase safety and independence with completion of ADL/IADL tasks for functional independence and quality of life.      Low Evaluation + 0 timed treatment minutes    Evaluation time includes thorough review of current medical information, gathering information on past medical history/social history and prior level of function, completion of standardized testing/informal observation of tasks, assessment of data, and development of POC/Goals    Derl Rolling OTR/L  [id number]

## 2020-08-15 NOTE — PROGRESS NOTES
Liam Best M.D.,[facility]  Yue Manzano D.O., FTAYLOR.OPolaI., Aliyah Varela M.D. Agustín Yi M.D., Abe Diane M.D. Treva May D.O. Daily Pulmonary Progress Note    Patient:  Keerthi Molina 80 y.o. female MRN: [id number]     Date of Service: 8/15/2020        Subjective      Patient was seen and examined. Still SOB and needing BIPAP.     Objective   Vitals: /62   Pulse 86   Temp 98.2 °F (36.8 °C) (Oral)   Resp 20   Ht 5' 1\" (1.549 m)   Wt (!) 300 lb 6.4 oz (136.3 kg)   LMP  (LMP Unknown)   SpO2 96%   BMI 56.76 kg/m²     I/O:    Intake/Output Summary (Last 24 hours) at 8/15/2020 0948  Last data filed at 8/15/2020 6713  Gross per 24 hour   Intake 1170 ml   Output 600 ml   Net 570 ml       CURRENT MEDS :  Scheduled Meds:   budesonide  500 mcg Nebulization BID    Arformoterol Tartrate  15 mcg Nebulization BID    vancomycin  1,750 mg Intravenous Q24H    apixaban  2.5 mg Oral BID    calcitRIOL  0.5 mcg Oral Daily    fluticasone  1 spray Each Nostril BID    gabapentin  300 mg Oral Nightly    levothyroxine  25 mcg Oral QAM    lidocaine  1 patch Transdermal Daily    metoprolol succinate  50 mg Oral Daily    mirabegron  50 mg Oral Dinner    pantoprazole  40 mg Oral Nightly    insulin lispro  0-6 Units Subcutaneous TID     insulin lispro  0-3 Units Subcutaneous Nightly    ipratropium-albuterol  1 ampule Inhalation Q4H WA    guaiFENesin  400 mg Oral TID    insulin glargine  24 Units Subcutaneous Nightly    cetirizine  5 mg Oral Daily    melatonin  10 mg Oral Nightly    atorvastatin  20 mg Oral Nightly    cefepime  1 g Intravenous Q12H       Continuous Infusions:   dextrose      sodium chloride Stopped (08/15/20 0632)       PRN Meds:  sodium chloride flush, hydrOXYzine, glucose, dextrose, glucagon (rDNA), dextrose, hydrALAZINE, acetaminophen **OR** acetaminophen, polyethylene glycol, promethazine **OR** ondansetron      Physical Exam:  Physical Exam  Constitutional:       General: She is not in acute distress. Appearance: She is obese. She is ill-appearing. HENT:      Head: Normocephalic and atraumatic. Mouth/Throat:      Pharynx: No oropharyngeal exudate. Eyes:      General: No scleral icterus. Conjunctiva/sclera: Conjunctivae normal.   Neck:      Musculoskeletal: Neck supple. Trachea: No tracheal deviation. Cardiovascular:      Rate and Rhythm: Normal rate. Heart sounds: Normal heart sounds. Pulmonary:      Effort: Pulmonary effort is normal.      Breath sounds: Decreased breath sounds present. Abdominal:      Palpations: Abdomen is soft. Tenderness: There is no abdominal tenderness. Musculoskeletal:         General: Swelling present. No deformity. Lymphadenopathy:      Cervical: No cervical adenopathy. Skin:     General: Skin is warm. Findings: No rash. Neurological:      General: No focal deficit present. Mental Status: She is alert and oriented to person, place, and time. Psychiatric:         Behavior: Behavior normal.         Pertinent/ New Labs and Imaging Studies     Pulmonary Function Testing personally reviewed and interpreted. PERTINENT LAB RESULTS: Labs reviewed. DIAGNOSTICS: Pertinent imaging reviewed. Assessment:      1. Acute on chronic hypoxic and hypercapnic respiratory failure  2. Pulmonary HTN, worsening RV function on ECHO compared to 2018. Multifactorial due to untreated GOSIA, OHS. Volume overload. Possible underlying PE, however less likely. 3. Acute on chronic CHF  4. Acute on chronic kidney disease  5. Chronic A. fib on anticoagulation with Eliquis  6. Questionable PE  7. DALILA on CKD      Plan:      NIV prn and qhs   Wean FiO2 as suleman   procal is low, deescalate off abx   Diuresis as tolerated, consult Nephrology   Switch to coumadin to cover for a.fib and possible underlying PE. Coumadin preferred given her weight.   · Patient has refused polysomnography in the past.  At this point considering her ABGs which she might qualify for trilogy upon discharge to her facility. Thank you for allowing me to participate in the care of [facility]. Please feel free to call with questions.      Electronically signed by Jacquelin Black DO on 8/15/2020 at 9:48 AM

## 2020-08-15 NOTE — PROGRESS NOTES
Pharmacy Consultation Note  (Warfarin Dosing and Monitoring)    Initial consult date: 8/15  Consulting physician: Alexandru    Allergies:  Pcn [penicillins]    80 y.o. female    Ht Readings from Last 1 Encounters:   08/13/20 5' 1\" (1.549 m)     Wt Readings from Last 1 Encounters:   08/15/20 (!) 300 lb 6.4 oz (136.3 kg)         Warfarin Indication Target   INR Range Home Dose  (if applicable) Diet/Feeding Tube   (Enteral feeds, nutritional drinks, increased Vitamin K in diet can decrease INR)   Afib/possible PE 2-3 N/A  Previously on Eliquis Carb Control       x Home Med? Meds Increasing INR x Home Med?  Meds Decreasing INR     Allopurinol    Azathioprine     Amiodarone/Propafenone/Dronedarone   Carbamazepine     Androgens   Cholestyramine     Chemotherapy (BBW: Capecitabine)   Estrogen     Ciprofloxacin/Levofloxacin   Nafcillin/Dicloxacillin     Clarithromycin/Erythromycin/Azithromycin   Barbiturates      Fluconazole/Itraconazole/Voriconazole/Ketoconazole   Phenytoin (Variable)     Metronidazole   Rifampin     Phenytoin (Variable)   Steroids (Variable/Dose Dependent)   X Y  Statins/Fenofibrate/Gemfibrozil   Sucralfate     Steroids (Variable/Dose Dependent)   Other:     Sulfamethoxazole/Trimethoprim        Tramadol         Other:        Comments regarding medication interactions:      x Diseases Affecting INR x Increased Bleeding Risk    CHF Exacerbation (Increases)  History GI Bleed/PUD    Liver Disease (Increases)  Chronic NSAID Use    Thyroid: Hyper (Increases)  Hypo (Decreases)  Chronic ASA/Antiplatelet Use (Clopidogrel/ Dipyridamole/Prasugrel/Ticagrelor)      Malignancy (Increases)  Abnormal Renal Function (dialysis, renal transplant, SCr ? 2.3 mg/dL)     History of EtOH Abuse: Acute (Increases)   Chronic (Decreases)  Liver Function (cirrhosis, bilirubin >2x ULN with AST/ALT/AP >3x ULN)    Fever (Increases)  Age > 65 years    Acute infection (Increases)  Hypertension/Uncontrolled BP    Diarrhea/Dehydration (Increases)  History of stroke    Other: __________________  Other:___________________       Vitamin K or Blood product  Administration Date                    TSH:    Lab Results   Component Value Date    TSH 1.430 07/23/2018        Hepatic Function Panel:                            Lab Results   Component Value Date    ALKPHOS 60 08/15/2020    ALT 12 08/15/2020    AST 19 08/15/2020    PROT 6.4 08/15/2020    BILITOT 0.3 08/15/2020    LABALBU 3.4 08/15/2020       Date Warfarin Dose INR Heparin or LMWH HGB/HCT PLT Comment   8/15 5mg -- -- 9.4/30.7 167                                          Assessment and Plan:  · Pt is a 81 yo female with history of Afib on Eliquis PTA. Pt presented with acute respiratory failure with possible PE.  Due to body habitus, studies are poor with DOACs and patient is being transitioned to warfarin for anticoagulation   · Patient may require bridging if thought to be true PE in this patient  · Goal INR 2-3  · INR unavailable today  · Warfarin 5mg tonight  · Daily PT/INR until the INR is stable within the therapeutic range  · Pharmacist will follow and monitor/adjust dosing as necessary    Thank you for this consult,    Robert Oreilly, [id number] [facility] PharmD 8/15/2020 12:46 PM

## 2020-08-15 NOTE — PROGRESS NOTES
Date: 8/15/2020    Time: 7:56 AM    Patient Placed On BIPAP/CPAP/ Non-Invasive Ventilation? Yes    If no must comment. Facial area red/color change? No           If YES are Blister/Lesion present? No   If yes must notify nursing staff  BIPAP/CPAP skin barrier?   yes   Skin barrier type:duoderm       Comments:        Rakan Dominguez

## 2020-08-15 NOTE — PROGRESS NOTES
Subjective: The patient is awake and alert. On BiPAP. No problems overnight. Denies chest pain, angina, and dyspnea. Denies abdominal pain. Tolerating diet. No nausea or vomiting. Son present during medical interview. Objective:    /62   Pulse 86   Temp 98.2 °F (36.8 °C) (Oral)   Resp 20   Ht 5' 1\" (1.549 m)   Wt (!) 300 lb 6.4 oz (136.3 kg)   LMP  (LMP Unknown)   SpO2 96%   BMI 56.76 kg/m²     Current medications that patient is taking have been reviewed. Heart:  RRR, no murmurs, gallops, or rubs.   Lungs:  CTA bilaterally, no wheeze, rales or rhonchi  Abd: bowel sounds present, soft, nontender, nondistended, no masses  Extrem:  No cyanosis or edema    CBC with Differential:    Lab Results   Component Value Date    WBC 4.3 08/15/2020    RBC 2.92 08/15/2020    HGB 9.4 08/15/2020    HCT 30.7 08/15/2020     08/15/2020    .1 08/15/2020    MCH 32.2 08/15/2020    MCHC 30.6 08/15/2020    RDW 13.5 08/15/2020    NRBC 0.9 08/15/2020    LYMPHOPCT 15.8 08/15/2020    MONOPCT 4.4 08/15/2020    BASOPCT 0.9 08/15/2020    MONOSABS 0.17 08/15/2020    LYMPHSABS 0.69 08/15/2020    EOSABS 0.07 08/15/2020    BASOSABS 0.04 08/15/2020     CMP:    Lab Results   Component Value Date     08/15/2020    K 5.1 08/15/2020    K 5.1 08/14/2020    CL 92 08/15/2020    CO2 38 08/15/2020    BUN 45 08/15/2020    CREATININE 1.9 08/15/2020    GFRAA 30 08/15/2020    LABGLOM 25 08/15/2020    GLUCOSE 113 08/15/2020    PROT 6.4 08/15/2020    LABALBU 3.4 08/15/2020    CALCIUM 10.4 08/15/2020    BILITOT 0.3 08/15/2020    ALKPHOS 60 08/15/2020    AST 19 08/15/2020    ALT 12 08/15/2020     BMP:    Lab Results   Component Value Date     08/15/2020    K 5.1 08/15/2020    K 5.1 08/14/2020    CL 92 08/15/2020    CO2 38 08/15/2020    BUN 45 08/15/2020    LABALBU 3.4 08/15/2020    CREATININE 1.9 08/15/2020    CALCIUM 10.4 08/15/2020    GFRAA 30 08/15/2020    LABGLOM 25 08/15/2020    GLUCOSE 113 08/15/2020

## 2020-08-15 NOTE — PROGRESS NOTES
Pharmacy Consultation Note  (Antibiotic Dosing and Monitoring)    Initial consult date: 8/13  Consulting physician: MARITO Soliz  Drug(s): Vanco    Note vancomycin discontinued. Clinical pharmacy will sign-off. Please re-consult if we can be of further assistance.     Thanks for this consult,  Kenna Marrero, [address] [place] [address], PharmD, BCPS  8/15/2020  12:46 PM

## 2020-08-15 NOTE — PROGRESS NOTES
Date: 8/14/2020    Time: 9:07 PM    Patient Placed On BIPAP/CPAP/ Non-Invasive Ventilation? No    If no must comment. Facial area red/color change? No           If YES are Blister/Lesion present? No   If yes must notify nursing staff  BIPAP/CPAP skin barrier?   Yes    Skin barrier type:Liquicel       Comments:        Philip Powell

## 2020-08-16 NOTE — PROGRESS NOTES
Date: 8/16/2020    Time: 7:57 AM    Patient Placed On BIPAP/CPAP/ Non-Invasive Ventilation? Yes    If no must comment. Facial area red/color change? Yes           If YES are Blister/Lesion present? Yes   If yes must notify nursing staff  BIPAP/CPAP skin barrier? Yes    Skin barrier type:mepilex       Comments:   Will try nasal mask      Almon Burns [place]

## 2020-08-16 NOTE — PROGRESS NOTES
Date: 8/16/2020    Time: 3:11 AM    Patient Placed On BIPAP/CPAP/ Non-Invasive Ventilation? No    If no must comment. Facial area red/color change? Yes           If YES are Blister/Lesion present? Yes   If yes must notify nursing staff  BIPAP/CPAP skin barrier? Yes    Skin barrier type:mepilex       Comments: Pt remains on AVAPS at this time. Wound noted by previous RT, RN made aware and placed all for wound care. Mepliex in place, may consider switching to under the nose if pt is agreeable next time she comes off to eat. Will continue to monitor.         Inkomerce      08/16/20 0310   NIV Type   Mode AVAPS   Mask Type Full face mask   Mask Size Small   Settings/Measurements   CPAP/EPAP 8 cmH2O   IPAP Min 22 cmH2O   IPAP Max 25 cmH2O   Vt Ordered 400 mL   Rate Ordered 12   Resp 17   FiO2  40 %   Vt Exhaled 477 mL   Minute Volume 9.3 Liters   Mask Leak (lpm) 50 lpm   Comfort Level Good   Using Accessory Muscles No

## 2020-08-16 NOTE — CONSULTS
cauterized her nasal passage.  OTHER SURGICAL HISTORY  07/27/2017    endoscopic conrtol of epistaxis dr Kala Pickens N/A 4/28/2018    ENDOSCOPIC GUIDED CONTROL EPISTAXIS  WITH SEPTAL BUTTON PLACEMENT. INTRA-OPERATIVE BRONCHOSCOPY. performed by Peace Colindres MD at [facility] OR       Family History   Problem Relation Age of Onset    Heart Disease Mother     Kidney Disease Mother         dialysis    Diabetes Mother     Stroke Father     Diabetes Father         reports that she has never smoked. She has never used smokeless tobacco. She reports previous alcohol use. She reports that she does not use drugs.     Allergies:  Pcn [penicillins]    Current Medications:    warfarin (COUMADIN) daily dosing (placeholder), RX Placeholder  furosemide (LASIX) injection 40 mg, BID  budesonide (PULMICORT) nebulizer suspension 500 mcg, BID  Arformoterol Tartrate (BROVANA) nebulizer solution 15 mcg, BID  sodium chloride flush 0.9 % injection 10 mL, PRN  calcitRIOL (ROCALTROL) capsule 0.5 mcg, Daily  fluticasone (FLONASE) 50 MCG/ACT nasal spray 1 spray, BID  gabapentin (NEURONTIN) capsule 300 mg, Nightly  hydrOXYzine (ATARAX) tablet 10 mg, TID PRN  levothyroxine (SYNTHROID) tablet 25 mcg, QAM  lidocaine 4 % external patch 1 patch, Daily  metoprolol succinate (TOPROL XL) extended release tablet 50 mg, Daily  mirabegron (MYRBETRIQ) extended release tablet 50 mg, Dinner  pantoprazole (PROTONIX) tablet 40 mg, Nightly  insulin lispro (HUMALOG) injection vial 0-6 Units, TID WC  insulin lispro (HUMALOG) injection vial 0-3 Units, Nightly  glucose (GLUTOSE) 40 % oral gel 15 g, PRN  dextrose 50 % IV solution, PRN  glucagon (rDNA) injection 1 mg, PRN  dextrose 5 % solution, PRN  hydrALAZINE (APRESOLINE) injection 10 mg, Q6H PRN  acetaminophen (TYLENOL) tablet 650 mg, Q6H PRN    Or  acetaminophen (TYLENOL) suppository 650 mg, Q6H PRN  polyethylene glycol (GLYCOLAX) packet 17 g, Daily PRN  promethazine (PHENERGAN) tablet 12.5 mg, Q6H PRN    Or  ondansetron (ZOFRAN) injection 4 mg, Q6H PRN  ipratropium-albuterol (DUONEB) nebulizer solution 1 ampule, Q4H WA  guaiFENesin tablet 400 mg, TID  insulin glargine (LANTUS) injection vial 24 Units, Nightly  cetirizine (ZYRTEC) tablet 5 mg, Daily  melatonin tablet 10 mg, Nightly  atorvastatin (LIPITOR) tablet 20 mg, Nightly        Review of Systems:   Constitutional: no fevers , no chills , feels ok   Eyes: no eye pain , no itching , no drainage  Ears, nose, mouth, throat, and face: no ear ,nose pain , hearing is ok ,no nasal drainage   Respiratory: no sob ,no cough ,no wheezing . Cardiovascular: no chest pain , no palpitation ,no sob . Gastrointestinal: no nausea, vomiting , constipation , no abdominal pain . Genitourinary:no urinary retention , no burning , dysuria . No polyuria   Hematologic/lymphatic: no bleeding , no cougulation issues . Musculoskeletal:no joint pain , no swelling . Neurological: no headaches ,no weakness , no numbness . Endocrine: no thirst , no weight issues . Physical exam:   Vital signs /78   Pulse 100   Temp 97.6 °F (36.4 °C) (Oral)   Resp 20   Ht 5' 1\" (1.549 m)   Wt (!) 300 lb 6.4 oz (136.3 kg)   LMP  (LMP Unknown)   SpO2 99%   BMI 56.76 kg/m²   Gen : NAD , appropriate for stated age . Head : at , nc   Neck : supple , no thyromegaly noted . Eyes : EOMI , PERRLA   CV : RRR , No M/R/G . Lungs: CTAB , no wheezing , good flow heard b/l   Abd : soft , NT , BS + , No Organomegaly appreciated . Skin : soft, dry . Neuro : CN  II-XII grossly intact , no focal neurologic deficit . Psych : cooperative .      Data:   Labs:  CBC with Differential:    Lab Results   Component Value Date    WBC 4.3 08/15/2020    RBC 2.92 08/15/2020    HGB 9.4 08/15/2020    HCT 30.7 08/15/2020     08/15/2020    .1 08/15/2020    MCH 32.2 08/15/2020    MCHC 30.6 08/15/2020    RDW 13.5 08/15/2020    NRBC 0.9 08/15/2020 LYMPHOPCT 15.8 08/15/2020    MONOPCT 4.4 08/15/2020    BASOPCT 0.9 08/15/2020    MONOSABS 0.17 08/15/2020    LYMPHSABS 0.69 08/15/2020    EOSABS 0.07 08/15/2020    BASOSABS 0.04 08/15/2020     CMP:    Lab Results   Component Value Date     08/15/2020    K 5.1 08/15/2020    K 5.1 08/14/2020    CL 92 08/15/2020    CO2 38 08/15/2020    BUN 45 08/15/2020    CREATININE 1.9 08/15/2020    GFRAA 30 08/15/2020    LABGLOM 25 08/15/2020    GLUCOSE 113 08/15/2020    PROT 6.4 08/15/2020    LABALBU 3.4 08/15/2020    CALCIUM 10.4 08/15/2020    BILITOT 0.3 08/15/2020    ALKPHOS 60 08/15/2020    AST 19 08/15/2020    ALT 12 08/15/2020     Ionized Calcium:  No results found for: IONCA  Magnesium:    Lab Results   Component Value Date    MG 2.1 08/15/2020     Phosphorus:    Lab Results   Component Value Date    PHOS 3.4 05/07/2018     U/A:    Lab Results   Component Value Date    COLORU Yellow 08/13/2020    PHUR 5.5 08/13/2020    WBCUA 1-3 08/13/2020    RBCUA >20 08/13/2020    BACTERIA NONE SEEN 08/13/2020    CLARITYU Clear 08/13/2020    SPECGRAV 1.015 08/13/2020    LEUKOCYTESUR SMALL 08/13/2020    UROBILINOGEN 0.2 08/13/2020    BILIRUBINUR Negative 08/13/2020    BLOODU LARGE 08/13/2020    GLUCOSEU Negative 08/13/2020    AMORPHOUS NONE 07/20/2018     Microalbumen/Creatinine ratio:  No components found for: RUCREAT  Iron Saturation:  No components found for: PERCENTFE  TIBC:    Lab Results   Component Value Date    TIBC 385 07/21/2018     FERRITIN:    Lab Results   Component Value Date    FERRITIN 30 07/21/2018        Imaging:  US DUP LOWER EXTREMITIES BILATERAL VENOUS   Final Result   Right lower Rhodia Raid he could not be completely evaluated, and the left   SFA could not be evaluated due to patient's inability to tolerate   compression.  Within the limits of this submitted images no gross   abnormality               XR CHEST PORTABLE   Final Result   Cardiomegaly   Tortuous aorta   There are patchy infiltrates seen throughout both the lung fields.    Pulmonary vascular congestion could have this appearance   The chest appears to be worse in the interval                  XR CHEST PORTABLE    (Results Pending)       Assessment    -chronic kidney disease stage IIIB baseline cr 1.9-2.4 with no proteinuria   -Acute /chronic respiratory failure multifactorial with concern for fluid overload   -LVH   -anaemia of chronic disease   -Mineral bone disease     Plan :  Her kidney function appear close to baseline I will start lasix iv 40 bid and assess her response       Thank you Dr. Shekhar Espitia MD for allowing us to participate in care of Brett Wolfe           Electronically signed by Araceli Small MD on 8/15/2020 at 9:11 PM

## 2020-08-16 NOTE — PROGRESS NOTES
Date: 8/15/2020    Time: 10:25 PM    Patient Placed On BIPAP/CPAP/ Non-Invasive Ventilation? Yes    If no must comment. Facial area red/color change? Yes           If YES are Blister/Lesion present? Yes   If yes must notify nursing staff  BIPAP/CPAP skin barrier? Yes    Skin barrier type:mepilex       Comments: Patient has a blister on the bridge of her nose, nurse notified, duoderm taken off and replaced with mepilex.         Kirk Sever

## 2020-08-16 NOTE — PROGRESS NOTES
Pharmacy Consultation Note  (Warfarin Dosing and Monitoring)    Initial consult date: 8/15  Consulting physician: Alexandru    Allergies:  Pcn [penicillins]    80 y.o. female    Ht Readings from Last 1 Encounters:   08/13/20 5' 1\" (1.549 m)     Wt Readings from Last 1 Encounters:   08/16/20 (!) 301 lb 9.6 oz (136.8 kg)         Warfarin Indication Target   INR Range Home Dose  (if applicable) Diet/Feeding Tube   (Enteral feeds, nutritional drinks, increased Vitamin K in diet can decrease INR)   Afib/possible PE 2-3 N/A  Previously on Eliquis Carb Control       x Home Med? Meds Increasing INR x Home Med?  Meds Decreasing INR     Allopurinol    Azathioprine     Amiodarone/Propafenone/Dronedarone   Carbamazepine     Androgens   Cholestyramine     Chemotherapy (BBW: Capecitabine)   Estrogen     Ciprofloxacin/Levofloxacin   Nafcillin/Dicloxacillin     Clarithromycin/Erythromycin/Azithromycin   Barbiturates      Fluconazole/Itraconazole/Voriconazole/Ketoconazole   Phenytoin (Variable)     Metronidazole   Rifampin     Phenytoin (Variable)   Steroids (Variable/Dose Dependent)   X Y  Statins/Fenofibrate/Gemfibrozil   Sucralfate     Steroids (Variable/Dose Dependent)   Other:     Sulfamethoxazole/Trimethoprim        Tramadol         Other:        Comments regarding medication interactions:      x Diseases Affecting INR x Increased Bleeding Risk    CHF Exacerbation (Increases)  History GI Bleed/PUD    Liver Disease (Increases)  Chronic NSAID Use    Thyroid: Hyper (Increases)  Hypo (Decreases)  Chronic ASA/Antiplatelet Use (Clopidogrel/ Dipyridamole/Prasugrel/Ticagrelor)      Malignancy (Increases)  Abnormal Renal Function (dialysis, renal transplant, SCr ? 2.3 mg/dL)     History of EtOH Abuse: Acute (Increases)   Chronic (Decreases)  Liver Function (cirrhosis, bilirubin >2x ULN with AST/ALT/AP >3x ULN)    Fever (Increases)  Age > 65 years    Acute infection (Increases)  Hypertension/Uncontrolled BP    Diarrhea/Dehydration (Increases)  History of stroke    Other: __________________  Other:___________________       Vitamin K or Blood product  Administration Date                    TSH:    Lab Results   Component Value Date    TSH 1.430 07/23/2018        Hepatic Function Panel:                            Lab Results   Component Value Date    ALKPHOS 60 08/15/2020    ALT 12 08/15/2020    AST 19 08/15/2020    PROT 6.4 08/15/2020    BILITOT 0.3 08/15/2020    LABALBU 3.4 08/15/2020       Date Warfarin Dose INR Heparin or LMWH HGB/HCT PLT Comment   8/15 5mg -- -- 9.4/30.7 167    8/16 5mg 1.1 -- 9.4/30.6 158                                 Assessment and Plan:  · Pt is a 81 yo female with history of Afib on Eliquis PTA. Pt presented with acute respiratory failure with possible PE.  Due to body habitus, studies are poor with DOACs and patient is being transitioned to warfarin for anticoagulation   · Patient may require bridging if thought to be true PE in this patient  · Goal INR 2-3  · INR 1.1 today  · Warfarin 5mg tonight  · Daily PT/INR until the INR is stable within the therapeutic range  · Pharmacist will follow and monitor/adjust dosing as necessary    Thank you for this consult,    Esvin Covarrubias, [id number] [facility] PharmD 8/16/2020 12:07 PM

## 2020-08-16 NOTE — PROGRESS NOTES
Component Value Date    MG 2.0 08/16/2020     Phosphorus:    Lab Results   Component Value Date    PHOS 3.4 05/07/2018     PT/INR:    Lab Results   Component Value Date    PROTIME 13.6 08/16/2020    INR 1.1 08/16/2020     PTT:    Lab Results   Component Value Date    APTT 33.3 07/22/2018   [APTT}     Assessment:    Patient Active Problem List   Diagnosis    Chronic atrial fibrillation    CKD (chronic kidney disease) stage 3, GFR 30-59 ml/min ([facility])    History of breast cancer    Chronic anemia    Acute on chronic diastolic CHF (congestive heart failure) ([facility])    PNA (pneumonia)    Diabetes mellitus type 2, uncontrolled [facility]    Essential hypertension    History of CVA (cerebrovascular accident)    Hyperlipidemia LDL goal <100    Acquired hypothyroidism    Morbid obesity with BMI of 50.0-59.9, adult ([facility]    Acute on chronic respiratory failure with hypoxia ([facility]    Acute kidney injury ([facility]       Plan:  Rate controlled. Anticoagulated with eliquis. Renal function improved with fluids. Stable. Hgb stable. Monitor volume status closely. Discussed with pulmonology. Doubt infection. Pursue CTA chest.    Blood glucose ok, continue to adjust basal/bolus insulin therapy  Blood pressure ok, continue current medications  Continue aggressive lipid therapy  Continue synthroid. Continue to encourage weight loss. Appreciate pulmonology input. Follow recommendations. Nephrology consulted for assist. Cr down to 1.8. Agree with diuresis.     Pt/Ot evaluations for discharge planning    Ronak Encarnacion    9:55 AM  8/16/2020

## 2020-08-16 NOTE — PROGRESS NOTES
Rachel Rodriguez M.D.,[facility]  Naomi Arechiga D.O., F.INEZOPolaI., Jude Holt M.D. Dian Miller M.D., Bruce Ordonez M.D. Eleno Crump D.O. Daily Pulmonary Progress Note    Patient:  Inis Hunterdon 80 y.o. female MRN: [id number]     Date of Service: 8/16/2020        Subjective      Patient was seen and examined. Less short of breath. Tolerating longer periods off BiPAP. Nephrology consulted and having good urine output with diuresis.     Objective   Vitals: /69   Pulse 79   Temp 97.9 °F (36.6 °C) (Temporal)   Resp 17   Ht 5' 1\" (1.549 m)   Wt (!) 301 lb 9.6 oz (136.8 kg)   LMP  (LMP Unknown)   SpO2 96%   BMI 56.99 kg/m²     I/O:    Intake/Output Summary (Last 24 hours) at 8/16/2020 1036  Last data filed at 8/16/2020 0946  Gross per 24 hour   Intake 460 ml   Output 2400 ml   Net -1940 ml       CURRENT MEDS :  Scheduled Meds:   warfarin (COUMADIN) daily dosing (placeholder)   Other RX Placeholder    budesonide  500 mcg Nebulization BID    Arformoterol Tartrate  15 mcg Nebulization BID    calcitRIOL  0.5 mcg Oral Daily    fluticasone  1 spray Each Nostril BID    gabapentin  300 mg Oral Nightly    levothyroxine  25 mcg Oral QAM    lidocaine  1 patch Transdermal Daily    metoprolol succinate  50 mg Oral Daily    mirabegron  50 mg Oral Dinner    pantoprazole  40 mg Oral Nightly    insulin lispro  0-6 Units Subcutaneous TID WC    insulin lispro  0-3 Units Subcutaneous Nightly    ipratropium-albuterol  1 ampule Inhalation Q4H WA    guaiFENesin  400 mg Oral TID    insulin glargine  24 Units Subcutaneous Nightly    cetirizine  5 mg Oral Daily    melatonin  10 mg Oral Nightly    atorvastatin  20 mg Oral Nightly       Continuous Infusions:   dextrose         PRN Meds:  sodium chloride flush, hydrOXYzine, glucose, dextrose, glucagon (rDNA), dextrose, hydrALAZINE, acetaminophen **OR** acetaminophen, polyethylene glycol, promethazine **OR** ondansetron      Physical Exam:  Physical Exam  Constitutional:       General: She is not in acute distress. Appearance: She is obese. She is ill-appearing. HENT:      Head: Normocephalic and atraumatic. Mouth/Throat:      Pharynx: No oropharyngeal exudate. Eyes:      General: No scleral icterus. Conjunctiva/sclera: Conjunctivae normal.   Neck:      Musculoskeletal: Neck supple. Trachea: No tracheal deviation. Cardiovascular:      Rate and Rhythm: Normal rate. Heart sounds: Normal heart sounds. Pulmonary:      Effort: Pulmonary effort is normal.      Breath sounds: Decreased breath sounds present. Abdominal:      Palpations: Abdomen is soft. Tenderness: There is no abdominal tenderness. Musculoskeletal:         General: Swelling present. No deformity. Lymphadenopathy:      Cervical: No cervical adenopathy. Skin:     General: Skin is warm. Findings: No rash. Neurological:      General: No focal deficit present. Mental Status: She is alert and oriented to person, place, and time. Psychiatric:         Behavior: Behavior normal.         Pertinent/ New Labs and Imaging Studies     Pulmonary Function Testing personally reviewed and interpreted. PERTINENT LAB RESULTS: Labs reviewed. DIAGNOSTICS: Pertinent imaging reviewed. Assessment:      1. Acute on chronic hypoxic and hypercapnic respiratory failure  2. Pulmonary HTN, worsening RV function on ECHO compared to 2018. Multifactorial due to untreated GOSIA, OHS. Volume overload. Possible underlying PE, however less likely. 3. Acute on chronic CHF  4. Acute on chronic kidney disease  5. Chronic A. fib on anticoagulation with Eliquis  6. Questionable PE  7. DALILA on CKD      Plan:      NIV prn and qhs   Wean FiO2 as suleman   procal is low, deescalate off abx.   Continue to monitor off antibiotics   Diuresis as tolerated, nephrology consulted   Switch to coumadin to cover for a.fib and possible underlying PE. Coumadin preferred given her weight. · Patient has refused polysomnography in the past.  At this point considering her ABGs which she might qualify for trilogy upon discharge to her facility. Thank you for allowing me to participate in the care of [facility]. Please feel free to call with questions.      Electronically signed by Felicia Og DO on 8/16/2020 at 10:36 AM

## 2020-08-17 NOTE — PROGRESS NOTES
RT communication order read and acknowledged. Patient refuses to switch to under the nose mask for BIPAP machine.

## 2020-08-17 NOTE — PLAN OF CARE
Problem: Falls - Risk of:  Goal: Will remain free from falls  Description: Will remain free from falls  Outcome: Met This Shift  Goal: Absence of physical injury  Description: Absence of physical injury  Outcome: Met This Shift     Problem: Pain:  Goal: Pain level will decrease  Description: Pain level will decrease  Outcome: Ongoing  Goal: Control of acute pain  Description: Control of acute pain  Outcome: Ongoing  Goal: Control of chronic pain  Description: Control of chronic pain  Outcome: Ongoing     Problem: Skin Integrity:  Goal: Will show no infection signs and symptoms  Description: Will show no infection signs and symptoms  Outcome: Ongoing  Goal: Absence of new skin breakdown  Description: Absence of new skin breakdown  Outcome: Ongoing     Problem: Gas Exchange - Impaired  Goal: Able to breathe comfortably  Description: Able to breathe comfortably  Outcome: Ongoing     Problem: HH FLUID RETENTION-CHF  Goal: Absence of fluid overload signs and symptoms  Outcome: Ongoing     Problem: FLUID AND ELECTROLYTE IMBALANCE  Goal: Fluid and electrolyte balance are achieved/maintained  Outcome: Ongoing

## 2020-08-17 NOTE — PROGRESS NOTES
Pharmacy Consultation Note  (Warfarin Dosing and Monitoring)    Initial consult date: 8/15  Consulting physician: Alexandru    Allergies:  Pcn [penicillins]    80 y.o. female    Ht Readings from Last 1 Encounters:   08/13/20 5' 1\" (1.549 m)     Wt Readings from Last 1 Encounters:   08/17/20 292 lb 6.4 oz (132.6 kg)         Warfarin Indication Target   INR Range Home Dose  (if applicable) Diet/Feeding Tube   (Enteral feeds, nutritional drinks, increased Vitamin K in diet can decrease INR)   Afib/possible PE 2-3 N/A  Previously on Eliquis Carb Control       x Home Med? Meds Increasing INR x Home Med?  Meds Decreasing INR     Allopurinol    Azathioprine     Amiodarone/Propafenone/Dronedarone   Carbamazepine     Androgens   Cholestyramine     Chemotherapy (BBW: Capecitabine)   Estrogen     Ciprofloxacin/Levofloxacin   Nafcillin/Dicloxacillin     Clarithromycin/Erythromycin/Azithromycin   Barbiturates      Fluconazole/Itraconazole/Voriconazole/Ketoconazole   Phenytoin (Variable)     Metronidazole   Rifampin     Phenytoin (Variable)   Steroids (Variable/Dose Dependent)   X Y  Statins/Fenofibrate/Gemfibrozil   Sucralfate     Steroids (Variable/Dose Dependent)   Other:     Sulfamethoxazole/Trimethoprim        Tramadol         Other:        Comments regarding medication interactions:      x Diseases Affecting INR x Increased Bleeding Risk    CHF Exacerbation (Increases)  History GI Bleed/PUD    Liver Disease (Increases)  Chronic NSAID Use    Thyroid: Hyper (Increases)  Hypo (Decreases)  Chronic ASA/Antiplatelet Use (Clopidogrel/ Dipyridamole/Prasugrel/Ticagrelor)      Malignancy (Increases)  Abnormal Renal Function (dialysis, renal transplant, SCr ? 2.3 mg/dL)     History of EtOH Abuse: Acute (Increases)   Chronic (Decreases)  Liver Function (cirrhosis, bilirubin >2x ULN with AST/ALT/AP >3x ULN)    Fever (Increases)  Age > 65 years    Acute infection (Increases)  Hypertension/Uncontrolled BP    Diarrhea/Dehydration (Increases) History of stroke    Other: __________________  Other:___________________       Vitamin K or Blood product  Administration Date                    TSH:    Lab Results   Component Value Date    TSH 1.430 07/23/2018        Hepatic Function Panel:                            Lab Results   Component Value Date    ALKPHOS 60 08/15/2020    ALT 12 08/15/2020    AST 19 08/15/2020    PROT 6.4 08/15/2020    BILITOT 0.3 08/15/2020    LABALBU 3.4 08/15/2020       Date Warfarin Dose INR Heparin or LMWH HGB/HCT PLT Comment   8/15 5mg 1.2 -- 9.4/30.7 167    8/16 5mg 1.1 -- 9.4/30.6 158    8/17 2.5mg 1.6 -- 9.3/30 160                        Assessment and Plan:  · Pt is a 81 yo female with history of Afib on Eliquis PTA. Pt presented with acute respiratory failure with possible PE.  Due to body habitus, studies are poor with DOACs and patient is being transitioned to warfarin for anticoagulation   · Patient may require bridging if thought to be true PE in this patient  · Goal INR 2-3  · INR 1.6 today  · Warfarin 2.5mg tonight  · Daily PT/INR until the INR is stable within the therapeutic range  · Pharmacist will follow and monitor/adjust dosing as necessary    Thank you for this consult,    Macie Bermudez [facility] PharmD 8/17/2020 9:54 AM

## 2020-08-17 NOTE — PROGRESS NOTES
Rachel Rodriguez M.D.,[facility]  Naomi Arechiga D.O., F.INEZOPolaI., Jude Holt M.D. Dian Miller M.D., Bruce Ordonez M.D. Eleno Crump D.O. Daily Pulmonary Progress Note    Patient:  Inis [place] 80 y.o. female MRN: [id number]     Date of Service: 8/17/2020        Subjective      Patient was seen and examined. Currently on AVAPS volume 400 . Elevated CO2 on chemistry panel. Will repeat ABGs. She is having good urine output with diuresis. 2.6 L urine output in past 24 hrs.      Objective   Vitals: /69   Pulse 82   Temp 98.6 °F (37 °C) (Oral)   Resp 13   Ht 5' 1\" (1.549 m)   Wt 292 lb 6.4 oz (132.6 kg)   LMP  (LMP Unknown)   SpO2 99%   BMI 55.25 kg/m²     I/O:    Intake/Output Summary (Last 24 hours) at 8/17/2020 1042  Last data filed at 8/17/2020 0549  Gross per 24 hour   Intake 660 ml   Output 2650 ml   Net -1990 ml       CURRENT MEDS :  Scheduled Meds:   warfarin  2.5 mg Oral Once    furosemide  40 mg Intravenous BID    warfarin (COUMADIN) daily dosing (placeholder)   Other RX Placeholder    budesonide  500 mcg Nebulization BID    Arformoterol Tartrate  15 mcg Nebulization BID    calcitRIOL  0.5 mcg Oral Daily    fluticasone  1 spray Each Nostril BID    gabapentin  300 mg Oral Nightly    levothyroxine  25 mcg Oral QAM    lidocaine  1 patch Transdermal Daily    metoprolol succinate  50 mg Oral Daily    mirabegron  50 mg Oral Dinner    pantoprazole  40 mg Oral Nightly    insulin lispro  0-6 Units Subcutaneous TID     insulin lispro  0-3 Units Subcutaneous Nightly    ipratropium-albuterol  1 ampule Inhalation Q4H WA    guaiFENesin  400 mg Oral TID    insulin glargine  24 Units Subcutaneous Nightly    cetirizine  5 mg Oral Daily    melatonin  10 mg Oral Nightly    atorvastatin  20 mg Oral Nightly       Continuous Infusions:   dextrose         PRN Meds:  sodium chloride flush, hydrOXYzine, glucose, dextrose, glucagon (rDNA), dextrose, hydrALAZINE, acetaminophen **OR** acetaminophen, polyethylene glycol, promethazine **OR** ondansetron      Physical Exam:  Physical Exam  Constitutional:       General: She is not in acute distress. Appearance: She is obese. She is not ill-appearing. HENT:      Head: Normocephalic and atraumatic. Mouth/Throat:      Pharynx: No oropharyngeal exudate. Eyes:      General: No scleral icterus. Conjunctiva/sclera: Conjunctivae normal.   Neck:      Musculoskeletal: Neck supple. Trachea: No tracheal deviation. Cardiovascular:      Rate and Rhythm: Normal rate. Heart sounds: Normal heart sounds. Pulmonary:      Effort: Pulmonary effort is normal.      Breath sounds: Decreased breath sounds present. Abdominal:      Palpations: Abdomen is soft. Tenderness: There is no abdominal tenderness. Musculoskeletal:         General: Swelling present. No deformity. Lymphadenopathy:      Cervical: No cervical adenopathy. Skin:     General: Skin is warm. Findings: No rash. Neurological:      General: No focal deficit present. Mental Status: She is alert and oriented to person, place, and time. Psychiatric:         Behavior: Behavior normal.         Pertinent/ New Labs and Imaging Studies     Pulmonary Function Testing personally reviewed and interpreted. PERTINENT LAB RESULTS: Labs reviewed. DIAGNOSTICS: Pertinent imaging reviewed. 8/16 CXR       The heart is enlarged    Small bilateral pleural effusions with associated atelectasis. The aorta is calcified, tortuous and ectatic              Impression         Small bilateral pleural effusions with associated atelectasis, in the    setting of cardiomegaly, likely representing pulmonary edema.                Assessment:      1. Acute on chronic hypoxic and hypercapnic respiratory failure  2. Pulmonary HTN, worsening RV function on ECHO compared to 2018. Multifactorial due to untreated GOSIA, OHS. Volume overload.  Possible

## 2020-08-17 NOTE — PROGRESS NOTES
Occupational Therapy  OT BEDSIDE TREATMENT NOTE      Date:2020  Patient Name: Keerthi Molina  MRN: [id number]  : 1935  Room: [address] [place] [address]     Referring Provider: EMERALD Bahena     Evaluating OT: Ingris Montes OTR/L [id number]     AM-PAC Daily Activity Raw Score: 11     Recommended Adaptive Equipment: continue to assess      Diagnosis: Pneumonia. Pt presents to ED from ECF with SOB and cough      Pertinent Medical History: DM, afib, CKD, h/o CVA, arthritis   Precautions:  Falls, bipap     Home Living: Pt is a resident of [facility] skilled nursing facility. Pt reports being Mod I in transfers and wheelchair mobility. Staff assist in toileting, dressing and bathing. O2 at home.     Pain Level: Pt reports pain with movement      Cognition: Awake and grossly oriented. Limited safety awareness and judgement                 Functional Assessment:    Initial Eval Status  Date: 20 Treatment session:  Short Term Goals  Treatment frequency: 2-5x/wk PRN x1-3 wks      Feeding Min A       Grooming Mod A maxA   Set up   UB Dressing Max A  Management of hospital gown Max A   Set up   LB Dressing Dependent    Max A   Mod A    Bathing Max A   Mod A   Toileting Dependent Dependent   Min A   Bed Mobility  Supine to sit: NT  Pt declines reporting she sat up earlier and is now too fatigued  Long sitting in bed: Max A  max A x2 supine <> sit   Min  A rolling side to side for ADL and positioning      Functional Transfers STS: unable to complete Unable to [employer] A   Functional Mobility NT       Balance Long Sitting: poor plus     Standing: NT  sit balance inconsistent max A -min A on the EOB.      Activity Tolerance poor  poor tolerance for sitting on the EOB. 3-4 minutes on the EOB. Increased anxiousness and pt demanding to return to supine. standing suleman x2-3 min with fair balance during self care tasks                 Comments:  Limited tolerance for activity.   Upon return to bed, much time taken for positioning due to linens bunched up and hercules bed needing reset. Pt remained in bed at end of the session. Nursing aware of problem with linen on bed. Education/treatment:  ADL retraining with facilitation of movement to increase self care. Therapeutic activity to address balance, strength, and endurance for ADL and transfers. Pt education of need to increase level of participation and activity. · Pt has made limited  progress towards set goals.    · Continue with current plan of care        Treatment Charges: Mins Units    ADL/Home Mgt 75383 12 1    Thera Activities 34510 12 1    Ther Ex 15354      Manual Therapy 79894      Neuro Re-ed 03837      Orthotic manage/training  [id number]      Non Billable Time      Total Timed Treatment 24 2        [place]/L [id number]

## 2020-08-17 NOTE — PROGRESS NOTES
[facility] MD Ted Hightower MD Maya Dunker, MD Anshu Kang, JESSA Stewart, JEWEL  Progress Note    8/17/2020    SUBJECTIVE:   8/17: Feeling little bit ongoing fatigue, malaise, generalized weakness.   (-) c/o's  (-) sob/sen/cp/palp Appetite improving    PROBLEM LIST:    Principal Problem:    Acute on chronic respiratory failure with hypoxia (HCC)  Active Problems:    Chronic atrial fibrillation    Acute on chronic diastolic CHF (congestive heart failure) (HCC)    CKD (chronic kidney disease) stage 3, GFR 30-59 ml/min (HCC)    Chronic anemia    History of breast cancer    PNA (pneumonia)    Diabetes mellitus type 2, uncontrolled ([facility])    Essential hypertension    History of CVA (cerebrovascular accident)    Hyperlipidemia LDL goal <100    Acquired hypothyroidism    Morbid obesity with BMI of 50.0-59.9, adult ([facility]    Acute kidney injury ([facility]  Resolved Problems:    DM2 (diabetes mellitus, type 2) ([facility]    Hypertension    Acute kidney injury superimposed on CKD ([facility]         DIET:    DIET CARB CONTROL;     MEDS (scheduled):    warfarin  2.5 mg Oral Once    white petrolatum   Topical BID    furosemide  40 mg Intravenous BID    warfarin (COUMADIN) daily dosing (placeholder)   Other RX Placeholder    budesonide  500 mcg Nebulization BID    Arformoterol Tartrate  15 mcg Nebulization BID    calcitRIOL  0.5 mcg Oral Daily    fluticasone  1 spray Each Nostril BID    gabapentin  300 mg Oral Nightly    levothyroxine  25 mcg Oral QAM    lidocaine  1 patch Transdermal Daily    metoprolol succinate  50 mg Oral Daily    mirabegron  50 mg Oral Dinner    pantoprazole  40 mg Oral Nightly    insulin lispro  0-6 Units Subcutaneous TID WC    insulin lispro  0-3 Units Subcutaneous Nightly    ipratropium-albuterol  1 ampule Inhalation Q4H WA    guaiFENesin  400 mg Oral TID    insulin glargine  24 Units Subcutaneous Nightly    cetirizine  5 39* 41*   MG 2.1 2.0 2.0   BUN 45* 39* 40*   CREATININE 1.9* 1.8* 1.9*   ALT 12  --   --    AST 19  --   --    BILITOT 0.3  --   --    ALKPHOS 60  --   --        Lab Results   Component Value Date    LABPROT 0.3 (H) 04/27/2018    LABPROT 0.3 04/27/2018       ASSESSMENT / RECOMMENDATIONS:    1-chronic kidney disease stage IIIB baseline cr 1.9-2.4 with no proteinuria     2-Acute /chronic respiratory failure multifactorial with concern for fluid overload     3-LVH     4-anaemia of chronic disease     5-Mineral bone disease     Azotemia stable. Clinically improving.    --> Continue lasix iv 40 bid .  If inadequate response add a thiazide diuretic  --> Follow labs, UO      Electronically signed by Sary Rubio MD on 8/17/2020 at 3:12 PM

## 2020-08-17 NOTE — PROGRESS NOTES
4/28/2018    ENDOSCOPIC GUIDED CONTROL EPISTAXIS  WITH SEPTAL BUTTON PLACEMENT. INTRA-OPERATIVE BRONCHOSCOPY. performed by April Rashid MD at [facility] OR       Family History   Problem Relation Age of Onset    Heart Disease Mother     Kidney Disease Mother         dialysis    Diabetes Mother     Stroke Father     Diabetes Father         reports that she has never smoked. She has never used smokeless tobacco. She reports previous alcohol use. She reports that she does not use drugs.     Allergies:  Pcn [penicillins]    Current Medications:    furosemide (LASIX) injection 40 mg, BID  warfarin (COUMADIN) daily dosing (placeholder), RX Placeholder  budesonide (PULMICORT) nebulizer suspension 500 mcg, BID  Arformoterol Tartrate (BROVANA) nebulizer solution 15 mcg, BID  sodium chloride flush 0.9 % injection 10 mL, PRN  calcitRIOL (ROCALTROL) capsule 0.5 mcg, Daily  fluticasone (FLONASE) 50 MCG/ACT nasal spray 1 spray, BID  gabapentin (NEURONTIN) capsule 300 mg, Nightly  hydrOXYzine (ATARAX) tablet 10 mg, TID PRN  levothyroxine (SYNTHROID) tablet 25 mcg, QAM  lidocaine 4 % external patch 1 patch, Daily  metoprolol succinate (TOPROL XL) extended release tablet 50 mg, Daily  mirabegron (MYRBETRIQ) extended release tablet 50 mg, Dinner  pantoprazole (PROTONIX) tablet 40 mg, Nightly  insulin lispro (HUMALOG) injection vial 0-6 Units, TID WC  insulin lispro (HUMALOG) injection vial 0-3 Units, Nightly  glucose (GLUTOSE) 40 % oral gel 15 g, PRN  dextrose 50 % IV solution, PRN  glucagon (rDNA) injection 1 mg, PRN  dextrose 5 % solution, PRN  hydrALAZINE (APRESOLINE) injection 10 mg, Q6H PRN  acetaminophen (TYLENOL) tablet 650 mg, Q6H PRN    Or  acetaminophen (TYLENOL) suppository 650 mg, Q6H PRN  polyethylene glycol (GLYCOLAX) packet 17 g, Daily PRN  promethazine (PHENERGAN) tablet 12.5 mg, Q6H PRN    Or  ondansetron (ZOFRAN) injection 4 mg, Q6H PRN  ipratropium-albuterol (DUONEB) nebulizer solution 1 ampule, Q4H 08/16/2020    BASOSABS 0.01 08/16/2020     CMP:    Lab Results   Component Value Date     08/16/2020    K 4.7 08/16/2020    K 5.1 08/14/2020    CL 93 08/16/2020    CO2 39 08/16/2020    BUN 39 08/16/2020    CREATININE 1.8 08/16/2020    GFRAA 32 08/16/2020    LABGLOM 27 08/16/2020    GLUCOSE 85 08/16/2020    PROT 6.4 08/15/2020    LABALBU 3.4 08/15/2020    CALCIUM 10.5 08/16/2020    BILITOT 0.3 08/15/2020    ALKPHOS 60 08/15/2020    AST 19 08/15/2020    ALT 12 08/15/2020     Ionized Calcium:  No results found for: IONCA  Magnesium:    Lab Results   Component Value Date    MG 2.0 08/16/2020     Phosphorus:    Lab Results   Component Value Date    PHOS 3.4 05/07/2018     U/A:    Lab Results   Component Value Date    COLORU Yellow 08/13/2020    PHUR 5.5 08/13/2020    WBCUA 1-3 08/13/2020    RBCUA >20 08/13/2020    BACTERIA NONE SEEN 08/13/2020    CLARITYU Clear 08/13/2020    SPECGRAV 1.015 08/13/2020    LEUKOCYTESUR SMALL 08/13/2020    UROBILINOGEN 0.2 08/13/2020    BILIRUBINUR Negative 08/13/2020    BLOODU LARGE 08/13/2020    GLUCOSEU Negative 08/13/2020    AMORPHOUS NONE 07/20/2018     Microalbumen/Creatinine ratio:  No components found for: RUCREAT  Iron Saturation:  No components found for: PERCENTFE  TIBC:    Lab Results   Component Value Date    TIBC 385 07/21/2018     FERRITIN:    Lab Results   Component Value Date    FERRITIN 30 07/21/2018        Imaging:  XR CHEST PORTABLE   Final Result      Small bilateral pleural effusions with associated atelectasis, in the   setting of cardiomegaly, likely representing pulmonary edema. This study was dictated by Luanne Yoo PA-C and Katelynn Mckeon MD   reviewed and concurred with the findings. US DUP LOWER EXTREMITIES BILATERAL VENOUS   Final Result   Right lower Mardella Standard he could not be completely evaluated, and the left   SFA could not be evaluated due to patient's inability to tolerate   compression.  Within the limits of this submitted images no gross

## 2020-08-17 NOTE — CARE COORDINATION
Social Work/Discharge Planning:  Received notification patient will need a NIV at discharge and Physician is confirming settings. Notified liaison Angel Pratt from [facility]. Will continue to follow. Electronically signed by THEODORE Lopez on 8/17/2020 at 10:43 AM    Addendum:  Angel Pratt with [facility] requested to be notified when patient will discharge, so facility can place order for NIV. Will continue to follow.   Electronically signed by THEODORE Lopez on 8/17/2020 at 3:28 PM

## 2020-08-17 NOTE — FLOWSHEET NOTE
Inpatient Wound Care    Admit Date: 8/13/2020  7:27 AM    Reason for consult:  Nose    Significant history:  Pt admitted with respiratory failure. History includes: A-fib, CHF, CKD, CVA, HTN and HLD. Wound history:  HAPI    Findings:       08/17/20 1319   Wound 08/15/20 Nose Mid   Date First Assessed/Time First Assessed: 08/15/20 2100   Present on Hospital Admission: No  Location: Nose  Wound Location Orientation: Mid   Wound Pressure Stage  2   Dressing/Treatment Foam   Wound Length (cm) 1 cm   Wound Width (cm) 1 cm   Wound Depth (cm) 0.2 cm   Wound Surface Area (cm^2) 1 cm^2   Change in Wound Size % (l*w) -108.33   Wound Volume (cm^3) 0.2 cm^3   Wound Healing % -300   Wound Assessment Pink   Drainage Amount None   Odor None   Cande-wound Assessment Pink       Impression:  Stage 2 pressure injury on nose. Interventions in place:  Pt requiring prolonged period on Bipap. Superficial wound noted on bridge of nose. Sacrum and heels intact. Plan: Foam dressing to nose, SOS precautions, Aquaphor to buttocks.        Dee Morris 8/17/2020 1:21 PM

## 2020-08-17 NOTE — PROGRESS NOTES
Physical Therapy  Facility/Department: [address] INTERMEDIATE 1  Daily Treatment Note  NAME: Madhavi Carrizales  : 1935  MRN: [id number]    Date of Service: 2020  Patient Diagnosis(es): The primary encounter diagnosis was Acute respiratory failure with hypoxia and hypercarbia ([facility] Diagnoses of Congestive heart failure, unspecified HF chronicity, unspecified heart failure type ([facility] [address] and Pneumonia of both lungs due to infectious organism, unspecified part of lung were also pertinent to this visit. has a past medical history of Anemia due to acute blood loss, Arthritis, Blood transfusion reaction, Chronic atrial fibrillation, CKD (chronic kidney disease) stage 3, GFR 30-59 ml/min ([facility]), Diabetes [facility] History of blood transfusion, History of breast cancer, History of stroke, Hyperlipidemia, Hypertension, Hypothyroidism, and Volume overload.   has a past surgical history that includes Breast surgery; Cholecystectomy; Gastric bypass surgery; other surgical history (2017); Nasal sinus surgery; pr ctrl nosebleed,anter,complex (N/A, 2018); and bronchoscopy (2018). Referring Provider:  EMERALD Dennison         Evaluating Therapist: Randall Hernandez PT     Room #:434  DIAGNOSIS: Pneumonia  Additional Pertinent History:A-fib, CKD, HTN, Gastric bypass, CHF  PRECAUTIONS: falls, alarm, Bipap     Social:  Per chart pt admitted from nursing home. Pt questionable historian with difficuly answering questions about prior level of function. States she walks with a ww. Unable to report if she came from nursing home or her own home       Initial Evaluation  Date: 20 Treatment  20    Short Term/ Long Term   Goals   Was pt agreeable to Eval/treatment? yes With encouragement      Does pt have pain?  No c/o pain       Bed Mobility  Rolling: max assist  Supine to sit: max assist  Sit to supine: max assist  Scooting: max assist  rolling min assist  Supine to sit max of 2 assist  Sit to supine max assist of 2  Scooting max assist of 2 Min assist   Transfers NT secondary to shortness of breath, lethargy and poor sitting balance   NT Max assist   Ambulation    NT   NT 5 feet with ww with max assist   Stair Negotiation  Ascended and descended  NT    N/A   LE strength     3-/5     3=/5   balance      Sitting balance edge of bed max assist       AM-PAC Raw score               8/24 7/24      Patient education  Pt was educated on importance of mobiolity    Patient response to education:   Pt verbalized understanding Pt demonstrated skill Pt requires further education in this area   no   yes     Additional Comments: Poor activity tolerance. Only able to tolerate sitting up edge of bed briefly. SpO2 with bi pap on 99%. Pt rolled in bed several times for positioning. Pt was left in bed with call light left by patient. Chair/bed alarm: yes    Time in: 0945  Time out: 1010    Total treatment time 25 minuted    Pt is making limited progress toward established Physical Therapy goals. Continue with physical therapy current plan of care.     Shannon PT [id number]      CPT codes:  [] Low Complexity PT evaluation 49451  [] Moderate Complexity PT evaluation 51678  [] High Complexity PT evaluation 54063  [] PT Re-evaluation 50742  [] Gait training 88678  minutes  [] Manual therapy 72600    minutes  [x] Therapeutic activities 27404 25   minutes  [] Therapeutic exercises 80620     minutes  [] Neuromuscular reeducation 65184     minutes

## 2020-08-18 NOTE — PROGRESS NOTES
Physician Progress Note      Perla Marcelo  [facility] #:                  [id number]  :                       1935  ADMIT DATE:       2020 7:27 AM  DISCH DATE:  RESPONDING  PROVIDER #:        Marthena Galeazzi MD          QUERY TEXT:    Patient admitted with CHF. Per wound care, noted to also have Stage 2 pressure   injury on nose, not POA. If possible, please document in progress notes and   discharge summary the stage of the decubitus ulcer: The medical record reflects the following:  Risk Factors: BIPAP use  Clinical Indicators: per wound care \"Pt requiring prolonged period on Bipap. Stage 2 pressure injury on nose. \"  Treatment: Foam dressing to nose    Thank you,  Meghan Reynoso RN, CCDS  Clinical Documentation Improvement Specialist  [email or web address]  Options provided:  -- Stage 1 Decubitus Pressure Injury of nose POA  -- Stage 2 Decubitus Pressure Injury of nose not POA  -- Stage 3 Decubitus Pressure Injury of nose not POA  -- Stage 4 Decubitus Pressure Injury of nose not POA  -- Deep tissue injury pressure ulcer of nose POA  -- Deep tissue injury pressure ulcer of nose not POA  -- Unstageable Decubitus Pressure Ulcer of nose not POA  -- Other - I will add my own diagnosis  -- Disagree - Not applicable / Not valid  -- Disagree - Clinically unable to determine / Unknown  -- Refer to Clinical Documentation Reviewer    PROVIDER RESPONSE TEXT:    This patient has a Stage 2 decubitus pressure injury of the nose that was not   present on admission.     Query created by: Deirdre Ziegler on 2020 8:58 AM      Electronically signed by:  Marthena Galeazzi MD 2020 9:17 AM Alert/Awake

## 2020-08-18 NOTE — PROGRESS NOTES
Wound / ostomy dept follow-up for nose. The Pt has hydrocolloid dressing intact. She is currently off the Bipap. She states that she has no pain at the site.

## 2020-08-18 NOTE — PROGRESS NOTES
Subjective: The patient is awake and alert. Off BiPAP. No problems overnight. Denies chest pain, angina, and dyspnea. Denies abdominal pain. Tolerating diet. No nausea or vomiting. Objective:    BP (!) 140/66   Pulse 83   Temp 97.5 °F (36.4 °C) (Oral)   Resp 18   Ht 5' 1\" (1.549 m)   Wt 280 lb 4 oz (127.1 kg)   LMP  (LMP Unknown)   SpO2 100%   BMI 52.95 kg/m²     Current medications that patient is taking have been reviewed. Heart:  RRR, no murmurs, gallops, or rubs.   Lungs:  CTA bilaterally, no wheeze, rales or rhonchi  Abd: bowel sounds present, soft, nontender, nondistended, no masses  Extrem:  No cyanosis or edema    CBC with Differential:    Lab Results   Component Value Date    WBC 4.2 08/17/2020    RBC 2.89 08/17/2020    HGB 9.3 08/17/2020    HCT 30.0 08/17/2020     08/17/2020    .8 08/17/2020    MCH 32.2 08/17/2020    MCHC 31.0 08/17/2020    RDW 13.9 08/17/2020    NRBC 0.9 08/15/2020    LYMPHOPCT 16.6 08/17/2020    MONOPCT 10.0 08/17/2020    BASOPCT 0.5 08/17/2020    MONOSABS 0.42 08/17/2020    LYMPHSABS 0.70 08/17/2020    EOSABS 0.14 08/17/2020    BASOSABS 0.02 08/17/2020     CMP:    Lab Results   Component Value Date     08/17/2020    K 4.6 08/17/2020    K 5.1 08/14/2020    CL 92 08/17/2020    CO2 41 08/17/2020    BUN 40 08/17/2020    CREATININE 1.9 08/17/2020    GFRAA 30 08/17/2020    LABGLOM 25 08/17/2020    GLUCOSE 140 08/17/2020    PROT 6.4 08/15/2020    LABALBU 3.4 08/15/2020    CALCIUM 10.6 08/17/2020    BILITOT 0.3 08/15/2020    ALKPHOS 60 08/15/2020    AST 19 08/15/2020    ALT 12 08/15/2020     BMP:    Lab Results   Component Value Date     08/17/2020    K 4.6 08/17/2020    K 5.1 08/14/2020    CL 92 08/17/2020    CO2 41 08/17/2020    BUN 40 08/17/2020    LABALBU 3.4 08/15/2020    CREATININE 1.9 08/17/2020    CALCIUM 10.6 08/17/2020    GFRAA 30 08/17/2020    LABGLOM 25 08/17/2020    GLUCOSE 140 08/17/2020     Magnesium:    Lab Results   Component Value Date    MG 2.0 08/17/2020     Phosphorus:    Lab Results   Component Value Date    PHOS 3.4 05/07/2018     PT/INR:    Lab Results   Component Value Date    PROTIME 31.3 08/18/2020    INR 2.6 08/18/2020     PTT:    Lab Results   Component Value Date    APTT 33.3 07/22/2018   [APTT}     Assessment:    Patient Active Problem List   Diagnosis    Chronic atrial fibrillation    CKD (chronic kidney disease) stage 3, GFR 30-59 ml/min ([facility])    History of breast cancer    Chronic anemia    Acute on chronic diastolic CHF (congestive heart failure) ([facility])    PNA (pneumonia)    Diabetes mellitus type 2, uncontrolled [facility]    Essential hypertension    History of CVA (cerebrovascular accident)    Hyperlipidemia LDL goal <100    Acquired hypothyroidism    Morbid obesity with BMI of 50.0-59.9, adult ([facility]    Acute on chronic respiratory failure with hypoxia ([facility]    Acute kidney injury ([facility]       Plan:  Rate controlled. Anticoagulated with eliquis. Renal function improved with fluids. Stable. Hgb stable. Monitor volume status closely. Discussed with pulmonology. Doubt infection. Pursue CTA chest.    Blood glucose ok, continue to adjust basal/bolus insulin therapy  Blood pressure ok, continue current medications  Continue aggressive lipid therapy  Continue synthroid. Continue to encourage weight loss. Appreciate pulmonology input. Follow recommendations. Nephrology consulted for assist. Cr at 1.9. Agree with diuresis. Pt/Ot evaluations for discharge planning. Discharge to [address].     Farzaneh Factor    10:39 AM  8/18/2020

## 2020-08-18 NOTE — PROGRESS NOTES
Shea Riley M.D.,[facility]  Yue Manzano D.O., F.A.C.O.I., Aliyah Varela M.D. Agustín Yi M.D., Abe Diane M.D. Treva May D.O. Daily Pulmonary Progress Note    Patient:  Keerthi Molina 80 y.o. female MRN: [id number]     Date of Service: 8/18/2020        Subjective      Patient was seen and examined. Currently on AVAPS volume 400 BUR 12 EPAP + 5. Doing well off bipap. abg 7.44/56/104/38/12.8/98% yesterday on 6 liters.     Objective   Vitals: BP (!) 140/66   Pulse 83   Temp 97.5 °F (36.4 °C) (Oral)   Resp 18   Ht 5' 1\" (1.549 m)   Wt 280 lb 4 oz (127.1 kg)   LMP  (LMP Unknown)   SpO2 100%   BMI 52.95 kg/m²     I/O:    Intake/Output Summary (Last 24 hours) at 8/18/2020 1206  Last data filed at 8/18/2020 1003  Gross per 24 hour   Intake 420 ml   Output 600 ml   Net -180 ml       CURRENT MEDS :  Scheduled Meds:   white petrolatum   Topical BID    furosemide  40 mg Intravenous BID    warfarin (COUMADIN) daily dosing (placeholder)   Other RX Placeholder    budesonide  500 mcg Nebulization BID    Arformoterol Tartrate  15 mcg Nebulization BID    calcitRIOL  0.5 mcg Oral Daily    fluticasone  1 spray Each Nostril BID    gabapentin  300 mg Oral Nightly    levothyroxine  25 mcg Oral QAM    lidocaine  1 patch Transdermal Daily    metoprolol succinate  50 mg Oral Daily    mirabegron  50 mg Oral Dinner    pantoprazole  40 mg Oral Nightly    insulin lispro  0-6 Units Subcutaneous TID WC    insulin lispro  0-3 Units Subcutaneous Nightly    ipratropium-albuterol  1 ampule Inhalation Q4H WA    guaiFENesin  400 mg Oral TID    insulin glargine  24 Units Subcutaneous Nightly    cetirizine  5 mg Oral Daily    melatonin  10 mg Oral Nightly    atorvastatin  20 mg Oral Nightly       Continuous Infusions:   dextrose         PRN Meds:  sodium chloride flush, hydrOXYzine, glucose, dextrose, glucagon (rDNA), dextrose, hydrALAZINE, acetaminophen **OR** acetaminophen, polyethylene glycol, promethazine **OR** ondansetron      Physical Exam:  Physical Exam  Constitutional:       General: She is not in acute distress. Appearance: She is obese. She is not ill-appearing. HENT:      Head: Normocephalic and atraumatic. Mouth/Throat:      Pharynx: No oropharyngeal exudate. Eyes:      General: No scleral icterus. Conjunctiva/sclera: Conjunctivae normal.   Neck:      Musculoskeletal: Neck supple. Trachea: No tracheal deviation. Cardiovascular:      Rate and Rhythm: Normal rate. Heart sounds: Normal heart sounds. Pulmonary:      Effort: Pulmonary effort is normal.      Breath sounds: Decreased breath sounds present. Abdominal:      Palpations: Abdomen is soft. Tenderness: There is no abdominal tenderness. Musculoskeletal:         General: Swelling present. No deformity. Lymphadenopathy:      Cervical: No cervical adenopathy. Skin:     General: Skin is warm. Findings: No rash. Neurological:      General: No focal deficit present. Mental Status: She is alert and oriented to person, place, and time. Psychiatric:         Behavior: Behavior normal.         Pertinent/ New Labs and Imaging Studies     Pulmonary Function Testing personally reviewed and interpreted. PERTINENT LAB RESULTS: Labs reviewed. DIAGNOSTICS: Pertinent imaging reviewed. 8/18/2020       The heart is enlarged. The lung fields demonstrate patchy bilateral infiltrates. There are    small bilateral pleural effusions, with mild interval increase on the    left. The aorta is tortuous and calcified.              Impression    Findings compatible with congestive heart failure. Bilateral pleural effusions, greater on the left.     Atherosclerotic disease of the aorta.         The study was dictated by Kaylen Whaley PA-C and Heather Dawn MD    reviewed and concurred with the findings.                 8/16 CXR       The heart is enlarged    Small bilateral pleural effusions with associated atelectasis. The aorta is calcified, tortuous and ectatic              Impression         Small bilateral pleural effusions with associated atelectasis, in the    setting of cardiomegaly, likely representing pulmonary edema.                 Assessment:      1. Acute on chronic hypoxic and hypercapnic respiratory failure  With metabolic alkalosis, likely secondary to aggressive diuresis   2. Pulmonary HTN, worsening RV function on ECHO compared to 2018. Multifactorial due to untreated GOSIA, OHS. Volume overload. Possible underlying PE, however less likely. 3. Acute on chronic CHF  4. Acute on chronic kidney disease  5. Chronic A. fib on anticoagulation  Previously was on  Eliquis  6. Questionable PE  7. DALILA on CKD      Plan:      NIV prn and qhs AVAPS 400 BUR 12 FIO2 40% - to continue at [facility]   procal is low, Continue to monitor off antibiotics   Diuresis lasix 40 mg iv bid, as renal function tolerates, nephrology following. -3.7 L neg fluid balance   Bronchodilator therapy -brovana and budesonide bid, duonebs every 4 hrs w/a   Guaifenesin TID    Switch to coumadin to cover for a.fib and possible underlying PE. Coumadin preferred given her weight. INR 2.6  · Patient has refused polysomnography in the past.  At this point considering her ABGs which she might qualify for trilogy upon discharge to her facility. · GI prophylaxis     This plan of care was reviewed in collaboration with Dr. En Hilton    Electronically signed by SAVANNA Hyde CNP on 8/18/2020 at 12:06 PM      I personally saw, examined, and cared for the patient. Labs, medications, radiographs reviewed. I agree with history exam and plans detailed in NP note.       Electronically signed by Octavio Branham DO on 8/19/2020 at 8:25 AM

## 2020-08-18 NOTE — CARE COORDINATION
Social Work/Discharge Planning:  Called liaison Joanie from [facility] and requested for facility to order [place]. Will continue to follow. Electronically signed by THEODORE Sofia on 8/18/2020 at 11:58 AM    Addendum:  Received notification patient to discharge tomorrow. Notified Mary Ibrahim with [facility].   Electronically signed by THEODORE Sofia on 8/18/2020 at 3:18 PM

## 2020-08-18 NOTE — PROGRESS NOTES
Occupational Therapy  OT BEDSIDE TREATMENT NOTE      Date:2020  Patient Name: Elda Pitts  MRN: [id number]  : 1935  Room: [address] [place] [address]     Referring Provider: EMERALD Jaquez     Evaluating OT: Yasmin Aceves OTR/L [id number]     AM-PAC Daily Activity Raw Score: 12     Recommended Adaptive Equipment: continue to assess      Diagnosis: Pneumonia. Pt presents to ED from ECF with SOB and cough      Pertinent Medical History: DM, afib, CKD, h/o CVA, arthritis   Precautions:  Falls, O2      Home Living: Pt is a resident of [facility] skilled nursing facility. Pt reports being Mod I in transfers and wheelchair mobility. Staff assist in toileting, dressing and bathing. O2 at home.     Pain Level: Pt reports pain with movement      Cognition: Awake and grossly oriented. Limited safety awareness and judgement                 Functional Assessment:    Initial Eval Status  Date: 20 Treatment session:  Short Term Goals  Treatment frequency: 2-5x/wk PRN x1-3 wks      Feeding Min A       Grooming Mod A Washed face after setup while seated on the EOB.    Set up   UB Dressing Max A  Management of hospital gown Mod A   Set up   LB Dressing Dependent    Max A   Mod A    Bathing Max A   Mod A   Toileting Dependent Dependent. Pt requesting bedpan after sitting on the side of the bed.   Min A   Bed Mobility  Supine to sit: NT  Pt declines reporting she sat up earlier and is now too fatigued  Long sitting in bed: Max A  max A x2 supine <> sit   Min  A rolling side to side for toileting and positioning      Functional Transfers STS: unable to complete Unable to [employer] A   Functional Mobility NT       Balance Long Sitting: poor plus     Standing: NT  sit balance inconsistent max A -min A on the EOB.      Activity Tolerance poor  poor+ tolerance for sitting on the EOB. 5 minutes on the EOB. standing suleman x2-3 min with fair balance during self care tasks                 Comments:  Limited tolerance for activity. Upon return to bed, pt requesting bedpan. Pt also requesting to be placed back onto bipap after participating in therapy. Education/treatment:  ADL retraining with facilitation of movement to increase self care. Therapeutic activity to address balance, strength, and endurance for ADL and transfers. Pt education of need to increase level of participation and activity. · Pt has made limited  progress towards set goals.    · Continue with current plan of care        Treatment Charges: Mins Units    ADL/Home Mgt [id number] 1    Thera Activities 35218 8     Ther Ex 45838      Manual Therapy 13418      Neuro Re-ed 09945      Orthotic manage/training  09275      Non Billable Time      Total Timed Treatment 18  [address] [facility] [place]/L [id number]

## 2020-08-18 NOTE — PROGRESS NOTES
[facility] MD Kenia Nvaarro, MD Jeramy Starr, MD Aníbal Daly, MD Ronak Barrera, JESSA Stewart, JEWEL  Progress Note    8/18/2020    SUBJECTIVE:   8/17: Feeling little bit ongoing fatigue, malaise, generalized weakness. (-) c/o's  (-) sob/sen/cp/palp Appetite improving  8/18: Resting comfortably on CPAP. Fatigue, generalized weakness persist.  Denies dyspnea on CPAP. Appetite remains spotty.   Hemodynamically stable    PROBLEM LIST:    Principal Problem:    Acute on chronic respiratory failure with hypoxia (HCC)  Active Problems:    Chronic atrial fibrillation    Acute on chronic diastolic CHF (congestive heart failure) (HCC)    CKD (chronic kidney disease) stage 3, GFR 30-59 ml/min (HCC)    Chronic anemia    History of breast cancer    PNA (pneumonia)    Diabetes mellitus type 2, uncontrolled (HCC)    Essential hypertension    History of CVA (cerebrovascular accident)    Hyperlipidemia LDL goal <100    Acquired hypothyroidism    Morbid obesity with BMI of 50.0-59.9, adult ([facility]    Acute kidney injury ([facility]  Resolved Problems:    DM2 (diabetes mellitus, type 2) ([facility])    Hypertension    Acute kidney injury superimposed on CKD ([facility]         DIET:    DIET CARB CONTROL;     MEDS (scheduled):    white petrolatum   Topical BID    furosemide  40 mg Intravenous BID    warfarin (COUMADIN) daily dosing (placeholder)   Other RX Placeholder    budesonide  500 mcg Nebulization BID    Arformoterol Tartrate  15 mcg Nebulization BID    calcitRIOL  0.5 mcg Oral Daily    fluticasone  1 spray Each Nostril BID    gabapentin  300 mg Oral Nightly    levothyroxine  25 mcg Oral QAM    lidocaine  1 patch Transdermal Daily    metoprolol succinate  50 mg Oral Daily    mirabegron  50 mg Oral Dinner    pantoprazole  40 mg Oral Nightly    insulin lispro  0-6 Units Subcutaneous TID WC    insulin lispro  0-3 Units Subcutaneous Nightly    ipratropium-albuterol  1 ampule Inhalation Q4H WA    guaiFENesin  400 mg Oral TID    insulin glargine  24 Units Subcutaneous Nightly    cetirizine  5 mg Oral Daily    melatonin  10 mg Oral Nightly    atorvastatin  20 mg Oral Nightly       MEDS (infusions):   dextrose         MEDS (prn):  sodium chloride flush, hydrOXYzine, glucose, dextrose, glucagon (rDNA), dextrose, hydrALAZINE, acetaminophen **OR** acetaminophen, polyethylene glycol, promethazine **OR** ondansetron    PHYSICAL EXAM:     Patient Vitals for the past 24 hrs:   BP Temp Temp src Pulse Resp SpO2 Weight   08/18/20 0830 (!) 140/66 97.5 °F (36.4 °C) Oral 83 18 100 % --   08/18/20 0615 -- -- -- -- -- -- 280 lb 4 oz (127.1 kg)   08/17/20 2355 -- -- -- -- 18 -- --   08/17/20 2000 (!) 143/77 98.3 °F (36.8 °C) Axillary 89 18 98 % --   08/17/20 1942 -- -- -- -- 20 -- --   08/17/20 1634 -- -- -- -- 18 -- --   08/17/20 1633 -- -- -- -- 16 -- --   08/17/20 1600 (!) 142/62 -- -- 76 16 96 % --   @      Intake/Output Summary (Last 24 hours) at 8/18/2020 1309  Last data filed at 8/18/2020 1003  Gross per 24 hour   Intake 420 ml   Output 600 ml   Net -180 ml         Wt Readings from Last 3 Encounters:   08/18/20 280 lb 4 oz (127.1 kg)   07/10/20 270 lb (122.5 kg)   09/11/19 269 lb (122 kg)       Constitutional:  in no acute distress  HEENT: NC/AT, EOMI, sclera and conjunctiva are clear and anicteric, mucus membranes moist  Neck: Trachea midline, no JVD  Cardiovascular: S1, S2 regular rhythm, no murmur,or rub  Respiratory:  No crackles, no wheeze  Gastrointestinal:  Soft, nontender, nondistended, NABS  Ext: no edema, feet warm  Skin: dry, no rash  Neuro: awake, alert, interactive      DATA:    Recent Labs     08/16/20  0340 08/17/20  0340   WBC 3.8* 4.2*   HGB 9.4* 9.3*   HCT 30.6* 30.0*   .4* 103.8*    160     Recent Labs     08/16/20  0340 08/17/20  0340    140   K 4.7 4.6   CL 93* 92*   CO2 39* 41*   MG 2.0 2.0   BUN 39* 40*   CREATININE 1.8* 1.9*       Lab Results Component Value Date    LABPROT 0.3 (H) 04/27/2018    LABPROT 0.3 04/27/2018       ASSESSMENT / RECOMMENDATIONS:    1-chronic kidney disease stage IIIB baseline cr 1.9-2.4 with no proteinuria     2-Acute /chronic respiratory failure multifactorial with concern for fluid overload     3-LVH     4-anaemia of chronic disease     5-Mineral bone disease     Azotemia stable, cr at baseline. Clinically improving.    --> Continue lasix iv 40 bid .  If inadequate response add a thiazide diuretic  --> Follow labs, UO  --> Continue supportive care      Electronically signed by Jose Guadalupe Ireland MD on 8/18/2020 at 1:09 PM

## 2020-08-18 NOTE — DISCHARGE INSTR - COC
Continuity of Care Form    Patient Name: Ana Austin   :  1935  MRN:  [id number]    Admit date:  2020  Discharge date:      Code Status Order: DNR-CCA   Advance Directives:   [address] [facility] Documentation     Date/Time Healthcare Directive Type of Healthcare Directive Copy in [address] Agent's Name Healthcare Agent's Phone Number    20 1254  Yes, patient has an advance directive for healthcare treatment  Health care treatment directive  No, copy requested from family  Adult [address]  [phone]          Admitting Physician:  Marcos Dolan MD  PCP: Anai Plaza MD    Discharging Nurse: [facility] Unit/Room#: 0165/4427-O  Discharging Unit Phone Number: [phone]    Emergency Contact:   Extended Emergency Contact Information  Primary Emergency Contact: Navarro Whittington  Address: [address] [place] [address], [address] [place] [address] Phone: [phone]  Mobile Phone: [phone]  Relation: Child  Secondary Emergency Contact: Capri Bellamy  Mobile Phone: [phone]  Relation: Child    Past Surgical History:  Past Surgical History:   Procedure Laterality Date    BREAST SURGERY      right    BRONCHOSCOPY  2018    BRONCHOSCOPY DIAGNOSTIC performed by Trini Carvalho MD at [address] [place] [address] NASAL SINUS SURGERY      2017. cauterized her nasal passage.  OTHER SURGICAL HISTORY  2017    endoscopic conrtol of epistaxis dr Arron Dolan N/A 2018    ENDOSCOPIC GUIDED CONTROL EPISTAXIS  WITH SEPTAL BUTTON PLACEMENT. INTRA-OPERATIVE BRONCHOSCOPY.  performed by Trini Carvalho MD at [facility] OR       Immunization History:   Immunization History   Administered Date(s) Administered    Influenza Vaccine, unspecified formulation 10/20/2017    Influenza Virus Vaccine 10/16/2019    Pneumococcal Conjugate Vaccine 03/20/2015    Pneumococcal Vaccine 09/25/2019       Active Problems:  Patient Active Problem List   Diagnosis Code    Chronic atrial fibrillation I48.20    CKD (chronic kidney disease) stage 3, GFR 30-59 ml/min ([facility]) N18.3    History of breast cancer Z85.3    Chronic anemia D64.9    Acute on chronic diastolic CHF (congestive heart failure) ([facility]) I50.33    PNA (pneumonia) J18.9    Diabetes mellitus type 2, uncontrolled ([facility]) E11.65    Essential hypertension I10    History of CVA (cerebrovascular accident) Z80.78    Hyperlipidemia LDL goal <100 E78.5    Acquired hypothyroidism E03.9    Morbid obesity with BMI of 50.0-59.9, adult ([facility]) E66.01, Z68.43    Acute on chronic respiratory failure with hypoxia ([facility]) J96.21    Acute kidney injury ([facility] N17.9       Isolation/Infection:   Isolation          No Isolation        Patient Infection Status     Infection Onset Added Last Indicated Last Indicated By Review Planned Expiration Resolved Resolved By    None active    Resolved    COVID-19 Rule Out 08/13/20 08/13/20 08/13/20 COVID-19 (Ordered)   08/13/20 Rule-Out Test Resulted          Nurse Assessment:  Last Vital Signs: BP (!) 140/66   Pulse 83   Temp 97.5 °F (36.4 °C) (Oral)   Resp 18   Ht 5' 1\" (1.549 m)   Wt 280 lb 4 oz (127.1 kg)   LMP  (LMP Unknown)   SpO2 100%   BMI 52.95 kg/m²     Last documented pain score (0-10 scale): Pain Level: 0  Last Weight:   Wt Readings from Last 1 Encounters:   08/18/20 280 lb 4 oz (127.1 kg)     Mental Status:  oriented and alert    IV Access:  - None    Nursing Mobility/ADLs:  Walking   Assisted  Transfer  Dependent  Bathing  Dependent  Dressing  Dependent  Toileting  Dependent  Feeding  Independent  Med Admin  Assisted  Med Delivery   whole    Wound Care Documentation and Therapy:  Wound 08/15/20 Nose Mid (Active)   Wound Pressure Stage  2 08/17/20 1319   Dressing/Treatment Foam 08/17/20 2000   Wound Length (cm) 1 cm 08/17/20 1319   Wound Width (cm) 1 cm

## 2020-08-18 NOTE — PROGRESS NOTES
Asked patient before both treatments if she would change from a full face mask on the bipap to an under the nose mask. Patient refused. Wound care has been consulted about breakdown on there bridge of nose.

## 2020-08-18 NOTE — PROGRESS NOTES
Pharmacy Consultation Note  (Warfarin Dosing and Monitoring)    Initial consult date: 8/15  Consulting physician: Alexandru    Allergies:  Pcn [penicillins]    80 y.o. female    Ht Readings from Last 1 Encounters:   08/13/20 5' 1\" (1.549 m)     Wt Readings from Last 1 Encounters:   08/18/20 280 lb 4 oz (127.1 kg)         Warfarin Indication Target   INR Range Home Dose  (if applicable) Diet/Feeding Tube   (Enteral feeds, nutritional drinks, increased Vitamin K in diet can decrease INR)   Afib/possible PE 2-3 N/A  Previously on Eliquis Carb Control       x Home Med? Meds Increasing INR x Home Med?  Meds Decreasing INR     Allopurinol    Azathioprine     Amiodarone/Propafenone/Dronedarone   Carbamazepine     Androgens   Cholestyramine     Chemotherapy (BBW: Capecitabine)   Estrogen     Ciprofloxacin/Levofloxacin   Nafcillin/Dicloxacillin     Clarithromycin/Erythromycin/Azithromycin   Barbiturates      Fluconazole/Itraconazole/Voriconazole/Ketoconazole   Phenytoin (Variable)     Metronidazole   Rifampin     Phenytoin (Variable)   Steroids (Variable/Dose Dependent)   X Y  Statins/Fenofibrate/Gemfibrozil   Sucralfate     Steroids (Variable/Dose Dependent)   Other:     Sulfamethoxazole/Trimethoprim        Tramadol         Other:        Comments regarding medication interactions:      x Diseases Affecting INR x Increased Bleeding Risk    CHF Exacerbation (Increases)  History GI Bleed/PUD    Liver Disease (Increases)  Chronic NSAID Use    Thyroid: Hyper (Increases)  Hypo (Decreases)  Chronic ASA/Antiplatelet Use (Clopidogrel/ Dipyridamole/Prasugrel/Ticagrelor)      Malignancy (Increases)  Abnormal Renal Function (dialysis, renal transplant, SCr ? 2.3 mg/dL)     History of EtOH Abuse: Acute (Increases)   Chronic (Decreases)  Liver Function (cirrhosis, bilirubin >2x ULN with AST/ALT/AP >3x ULN)    Fever (Increases)  Age > 65 years    Acute infection (Increases)  Hypertension/Uncontrolled BP    Diarrhea/Dehydration (Increases) History of stroke    Other: __________________  Other:___________________       Vitamin K or Blood product  Administration Date                    TSH:    Lab Results   Component Value Date    TSH 1.430 07/23/2018        Hepatic Function Panel:                            Lab Results   Component Value Date    ALKPHOS 60 08/15/2020    ALT 12 08/15/2020    AST 19 08/15/2020    PROT 6.4 08/15/2020    BILITOT 0.3 08/15/2020    LABALBU 3.4 08/15/2020       Date Warfarin Dose INR Heparin or LMWH HGB/HCT PLT Comment   8/15 5mg 1.2 -- 9.4/30.7 167    8/16 5mg 1.1 -- 9.4/30.6 158    8/17 2.5mg 1.6 -- 9.3/30 160    8/18 HOLD 2.6 -- -- --               Assessment and Plan:  · Pt is a 79 yo female with history of Afib on Eliquis PTA. Pt presented with acute respiratory failure with possible PE.  Due to body habitus, studies are poor with DOACs and patient is being transitioned to warfarin for anticoagulation   · Patient may require bridging if thought to be true PE in this patient  · Goal INR 2-3  · INR 2.6 today  · Warfarin on hold tonight d/t too quick of an increase in INR  · Daily PT/INR until the INR is stable within the therapeutic range  · Pharmacist will follow and monitor/adjust dosing as necessary    Thank you for this consult,    James Fang, [address] [facility] PharmD 8/18/2020 10:18 AM

## 2020-08-18 NOTE — PROGRESS NOTES
Physical Therapy  Facility/Department: [address] INTERMEDIATE 1  Daily Treatment Note  NAME: Joy Woo  : 1935  MRN: [id number]    Date of Service: 2020    Patient Diagnosis(es): The primary encounter diagnosis was Acute respiratory failure with hypoxia and hypercarbia ([facility] Diagnoses of Congestive heart failure, unspecified HF chronicity, unspecified heart failure type ([facility] and Pneumonia of both lungs due to infectious organism, unspecified part of lung were also pertinent to this visit. has a past medical history of Anemia due to acute blood loss, Arthritis, Blood transfusion reaction, Chronic atrial fibrillation, CKD (chronic kidney disease) stage 3, GFR 30-59 ml/min ([facility]), Diabetes mellitus ([facility] History of blood transfusion, History of breast cancer, History of stroke, Hyperlipidemia, Hypertension, Hypothyroidism, and Volume overload.   has a past surgical history that includes Breast surgery; Cholecystectomy; Gastric bypass surgery; other surgical history (2017); Nasal sinus surgery; pr ctrl nosebleed,anter,complex (N/A, 2018); and bronchoscopy (2018).    Referring EMERALD Bowles         Evaluating Therapist: Shannon PT     Room #:434  DIAGNOSIS: Pneumonia  Additional Pertinent History:A-fib, CKD, HTN, Gastric bypass, CHF  PRECAUTIONS: falls, alarm, Bipap     Social:  Per chart pt admitted from nursing home. Pt questionable historian with difficuly answering questions about prior level of function. States she walks with a ww.  Unable to report if she came from nursing home or her own home       Initial Evaluation  Date: 20 Treatment  20    Short Term/ Long Term   Goals   Was pt agreeable to Eval/treatment? yes With encouragement      Does pt have pain?  No c/o pain       Bed Mobility  Rolling: max assist  Supine to sit: max assist  Sit to supine: max assist  Scooting: max assist  rolling min assist  Supine to sit max of 2 assist  Sit to supine max assist of 2  Scooting max assist of 2 Min assist   Transfers NT secondary to shortness of breath, lethargy and poor sitting balance   NT Max assist   Ambulation    NT   NT 5 feet with ww with max assist   Stair Negotiation  Ascended and descended  NT    N/A   LE strength     3-/5     3=/5   balance      Sitting balance edge of bed max assist       AM-PAC Raw score               8/24 7/24        Treatment/therapeutic exercises  Functional mobility as above. Pt rolled several time for positioning of sheets in bed and to use bed pan. Sitting balance edge of bed min assist. Pt performed LAQ, ankle pumps and marching. Fatigues quickly with mobility. SpO2 on 4L 97% seated edge of bed. Patient education  Pt was educated on importance of mobility    Patient response to education:   Pt verbalized understanding Pt demonstrated skill Pt requires further education in this area   no   yes         Pt was left in bed with call light left by patient. Chair/bed alarm: yes    Time in: 0900  Time out: 0930    Total treatment time 30 mintues    Pt is making limited progress toward established Physical Therapy goals. Continue with physical therapy current plan of care.     Shannon PT [id number]      CPT codes:  [] Low Complexity PT evaluation 50250  [] Moderate Complexity PT evaluation 22325  [] High Complexity PT evaluation 98366  [] PT Re-evaluation 36047  [] Gait training 38336  minutes  [] Manual therapy 82955    minutes  [x] Therapeutic activities 27065 23   minutes  [x] Therapeutic exercises 36505  10   minutes  [] Neuromuscular reeducation 03379     minutes

## 2020-08-19 NOTE — PLAN OF CARE
Problem: Falls - Risk of:  Goal: Will remain free from falls  Description: Will remain free from falls  Outcome: Met This Shift  Goal: Absence of physical injury  Description: Absence of physical injury  Outcome: Met This Shift     Problem: Pain:  Goal: Pain level will decrease  Description: Pain level will decrease  Outcome: Met This Shift  Goal: Control of acute pain  Description: Control of acute pain  Outcome: Met This Shift  Goal: Control of chronic pain  Description: Control of chronic pain  Outcome: Met This Shift     Problem: Gas Exchange - Impaired  Goal: Able to breathe comfortably  Description: Able to breathe comfortably  Outcome: Met This Shift     Problem: HH FLUID RETENTION-CHF  Goal: Absence of fluid overload signs and symptoms  Outcome: Met This Shift     Problem: FLUID AND ELECTROLYTE IMBALANCE  Goal: Fluid and electrolyte balance are achieved/maintained  Outcome: Met This Shift     Problem: Skin Integrity:  Goal: Will show no infection signs and symptoms  Description: Will show no infection signs and symptoms  Outcome: Met This Shift  Goal: Absence of new skin breakdown  Description: Absence of new skin breakdown  Outcome: Met This Shift

## 2020-08-19 NOTE — PROGRESS NOTES
Christel Claude, M.D.,[facility]  Shannon Flower D.O., F.A.C.O.I., Luisa Barry M.D. Zainab Gomez M.D., Aubree Camacho M.D. Alfa Leong D.O. Daily Pulmonary Progress Note    Patient:  Hari Morrison 80 y.o. female MRN: [id number]     Date of Service: 8/19/2020        Subjective      Patient was seen and examined. Tolerating AVAPS mode volume 400 EPAP +5 FiO2 40% at bedtime and during daytime intermittently. Oxygen therapy 4 to 5 L nasal cannula when off NIV. No fevers documented overnight. No cough or mucus production. Tolerating diuresis per nephrology, 750 cc urine output last 24 hrs.      Objective   Vitals: BP (!) 139/59   Pulse 93   Temp 97.5 °F (36.4 °C) (Oral)   Resp 20   Ht 5' 1\" (1.549 m)   Wt 283 lb 1.6 oz (128.4 kg)   LMP  (LMP Unknown)   SpO2 100%   BMI 53.49 kg/m²     I/O:    Intake/Output Summary (Last 24 hours) at 8/19/2020 1045  Last data filed at 8/19/2020 0802  Gross per 24 hour   Intake 565 ml   Output 750 ml   Net -185 ml       CURRENT MEDS :  Scheduled Meds:   white petrolatum   Topical BID    furosemide  40 mg Intravenous BID    warfarin (COUMADIN) daily dosing (placeholder)   Other RX Placeholder    budesonide  500 mcg Nebulization BID    Arformoterol Tartrate  15 mcg Nebulization BID    calcitRIOL  0.5 mcg Oral Daily    fluticasone  1 spray Each Nostril BID    gabapentin  300 mg Oral Nightly    levothyroxine  25 mcg Oral QAM    lidocaine  1 patch Transdermal Daily    metoprolol succinate  50 mg Oral Daily    mirabegron  50 mg Oral Dinner    pantoprazole  40 mg Oral Nightly    insulin lispro  0-6 Units Subcutaneous TID WC    insulin lispro  0-3 Units Subcutaneous Nightly    ipratropium-albuterol  1 ampule Inhalation Q4H WA    guaiFENesin  400 mg Oral TID    insulin glargine  24 Units Subcutaneous Nightly    cetirizine  5 mg Oral Daily    melatonin  10 mg Oral Nightly    atorvastatin  20 mg Oral Nightly       Continuous Infusions:   dextrose         PRN Meds:  sodium chloride flush, hydrOXYzine, glucose, dextrose, glucagon (rDNA), dextrose, hydrALAZINE, acetaminophen **OR** acetaminophen, polyethylene glycol, promethazine **OR** ondansetron      Physical Exam:  Physical Exam  Constitutional:       General: She is not in acute distress. Appearance: She is obese. She is not ill-appearing. HENT:      Head: Normocephalic and atraumatic. Mouth/Throat:      Pharynx: No oropharyngeal exudate. Eyes:      General: No scleral icterus. Conjunctiva/sclera: Conjunctivae normal.   Neck:      Musculoskeletal: Neck supple. Trachea: No tracheal deviation. Cardiovascular:      Rate and Rhythm: Normal rate. Heart sounds: Normal heart sounds. Pulmonary:      Effort: Pulmonary effort is normal.      Breath sounds: No decreased breath sounds. Comments: Slightly diminished bibasilar  Abdominal:      Palpations: Abdomen is soft. Tenderness: There is no abdominal tenderness. Musculoskeletal:         General: No deformity. Lymphadenopathy:      Cervical: No cervical adenopathy. Skin:     General: Skin is warm. Findings: No rash. Comments: Generalized peripheral edema   Neurological:      General: No focal deficit present. Mental Status: She is alert and oriented to person, place, and time. Psychiatric:         Behavior: Behavior normal.         Pertinent/ New Labs and Imaging Studies     Pulmonary Function Testing personally reviewed and interpreted. PERTINENT LAB RESULTS: Labs reviewed. DIAGNOSTICS: Pertinent imaging reviewed. 8/18/2020       The heart is enlarged. The lung fields demonstrate patchy bilateral infiltrates. There are    small bilateral pleural effusions, with mild interval increase on the    left. The aorta is tortuous and calcified.              Impression    Findings compatible with congestive heart failure. Bilateral pleural effusions, greater on the left. Atherosclerotic disease of the aorta.         The study was dictated by Mehran Metzger PA-C and Abiel Polo MD    reviewed and concurred with the findings.                     Assessment:      1. Acute on chronic hypoxic and hypercapnic respiratory failure  With metabolic alkalosis, likely secondary to aggressive diuresis   2. Pulmonary HTN, worsening RV function on ECHO compared to 2018. Multifactorial due to untreated GOSIA, OHS. Volume overload. Possible underlying PE, however less likely. 3. Acute on chronic CHF  4. Acute on chronic kidney disease  5. Chronic A. fib on anticoagulation  Previously was on  Eliquis  6. Questionable PE  7. DALILA on CKD      Plan:      NIV prn and qhs AVAPS 400 BUR 12 EPAP +5 FIO2 40% - to [facility] at [facility]   procal is low, Continue to monitor off antibiotics   Diuresis lasix 40 mg iv bid, as renal function tolerates, nephrology following. -3.9 L neg fluid balance   Bronchodilator therapy -brovana and budesonide bid, duonebs every 4 hrs w/a   Guaifenesin TID    Flonase, Zyrtec daily   Switch to coumadin to cover for a.fib and possible underlying PE. Coumadin preferred given her weight. INR 2.8  · Patient qualifies for Non invasive ventilator (Trilogy) upon discharge to her facility, this has been arranged and can be delivered upon her discharge  · GI prophylaxis     This plan of care was reviewed in collaboration with Dr. Nakul Lynn    Electronically signed by SAVANNA Taylor CNP on 8/19/2020 at 10:45 AM         I personally saw, examined, and cared for the patient. Labs, medications, radiographs reviewed. I agree with history exam and plans detailed in NP note.       Electronically signed by Mere Pichardo DO on 8/19/2020 at 3:26 PM

## 2020-08-19 NOTE — PROGRESS NOTES
Value Date    MG 2.0 08/19/2020     Phosphorus:    Lab Results   Component Value Date    PHOS 3.8 08/19/2020     PT/INR:    Lab Results   Component Value Date    PROTIME 33.8 08/19/2020    INR 2.8 08/19/2020     PTT:    Lab Results   Component Value Date    APTT 33.3 07/22/2018   [APTT}     Assessment:    Patient Active Problem List   Diagnosis    Chronic atrial fibrillation    CKD (chronic kidney disease) stage 3, GFR 30-59 ml/min ([facility])    History of breast cancer    Chronic anemia    Acute on chronic diastolic CHF (congestive heart failure) ([facility])    PNA (pneumonia)    Diabetes mellitus type 2, uncontrolled ([facility]    Essential hypertension    History of CVA (cerebrovascular accident)    Hyperlipidemia LDL goal <100    Acquired hypothyroidism    Morbid obesity with BMI of 50.0-59.9, adult ([facility]    Acute on chronic respiratory failure with hypoxia ([facility]    Acute kidney injury ([facility]       Plan:  Rate controlled. Anticoagulated with eliquis. Renal function improved with fluids. Stable. Hgb stable. Monitor volume status closely. Discussed with pulmonology. Doubt infection. Pursue CTA chest.    Blood glucose ok, continue to adjust basal/bolus insulin therapy  Blood pressure ok, continue current medications  Continue aggressive lipid therapy  Continue synthroid. Continue to encourage weight loss. Appreciate pulmonology input. Follow recommendations. Nephrology consulted for assist. Cr at 2.0. Agree with diuresis. Pt/Ot evaluations for discharge planning. Discharge to [place] [id number] soon.     Cyn Iqbal    1:44 PM  8/19/2020

## 2020-08-19 NOTE — PROGRESS NOTES
Pharmacy Consultation Note  (Warfarin Dosing and Monitoring)    Initial consult date: 8/15  Consulting physician: Alexandru    Allergies:  Pcn [penicillins]    80 y.o. female    Ht Readings from Last 1 Encounters:   08/13/20 5' 1\" (1.549 m)     Wt Readings from Last 1 Encounters:   08/19/20 283 lb 1.6 oz (128.4 kg)         Warfarin Indication Target   INR Range Home Dose  (if applicable) Diet/Feeding Tube   (Enteral feeds, nutritional drinks, increased Vitamin K in diet can decrease INR)   Afib/possible PE 2-3 N/A  Previously on Eliquis Carb Control       x Home Med? Meds Increasing INR x Home Med?  Meds Decreasing INR     Allopurinol    Azathioprine     Amiodarone/Propafenone/Dronedarone   Carbamazepine     Androgens   Cholestyramine     Chemotherapy (BBW: Capecitabine)   Estrogen     Ciprofloxacin/Levofloxacin   Nafcillin/Dicloxacillin     Clarithromycin/Erythromycin/Azithromycin   Barbiturates      Fluconazole/Itraconazole/Voriconazole/Ketoconazole   Phenytoin (Variable)     Metronidazole   Rifampin     Phenytoin (Variable)   Steroids (Variable/Dose Dependent)   X Y  Statins/Fenofibrate/Gemfibrozil   Sucralfate     Steroids (Variable/Dose Dependent)   Other:     Sulfamethoxazole/Trimethoprim        Tramadol         Other:        Comments regarding medication interactions:      x Diseases Affecting INR x Increased Bleeding Risk    CHF Exacerbation (Increases)  History GI Bleed/PUD    Liver Disease (Increases)  Chronic NSAID Use    Thyroid: Hyper (Increases)  Hypo (Decreases)  Chronic ASA/Antiplatelet Use (Clopidogrel/ Dipyridamole/Prasugrel/Ticagrelor)      Malignancy (Increases)  Abnormal Renal Function (dialysis, renal transplant, SCr ? 2.3 mg/dL)     History of EtOH Abuse: Acute (Increases)   Chronic (Decreases)  Liver Function (cirrhosis, bilirubin >2x ULN with AST/ALT/AP >3x ULN)    Fever (Increases)  Age > 65 years    Acute infection (Increases)  Hypertension/Uncontrolled BP    Diarrhea/Dehydration (Increases) History of stroke    Other: __________________  Other:___________________       Vitamin K or Blood product  Administration Date                    TSH:    Lab Results   Component Value Date    TSH 1.430 07/23/2018        Hepatic Function Panel:                            Lab Results   Component Value Date    ALKPHOS 60 08/15/2020    ALT 12 08/15/2020    AST 19 08/15/2020    PROT 6.4 08/15/2020    BILITOT 0.3 08/15/2020    LABALBU 3.4 08/15/2020       Date Warfarin Dose INR Heparin or LMWH HGB/HCT PLT Comment   8/15 5mg 1.2 -- 9.4/30.7 167    8/16 5mg 1.1 -- 9.4/30.6 158    8/17 2.5mg 1.6 -- 9.3/30 160    8/18 HOLD 2.6 -- -- --    8/19 2mg 2.8 -- 9.8/32 187      Assessment and Plan:  · Pt is a 79 yo female with history of Afib on Eliquis PTA. Pt presented with acute respiratory failure with possible PE.  Due to body habitus, studies are poor with DOACs and patient is being transitioned to warfarin for anticoagulation   · Patient may require bridging if thought to be true PE in this patient  · Goal INR 2-3  · INR 2.8 today  · Warfarin 2m tonight  · Daily PT/INR until the INR is stable within the therapeutic range  · Pharmacist will follow and monitor/adjust dosing as necessary    Thank you for this consult,    Ольга Zhou [facility] PharmD 8/19/2020 11:35 AM

## 2020-08-19 NOTE — PROGRESS NOTES
Date: 8/19/2020    Time: 0005    Patient Placed On BIPAP/CPAP/ Non-Invasive Ventilation? No    If no must comment. Facial area red/color change? If YES are Blister/Lesion present? If yes must notify nursing staff  BIPAP/CPAP skin barrier?   Yes    Skin barrier type:duoderm       Comments:    Already on  Red outlet note already written about nasal bridge from prior therapist    Chino Ponce

## 2020-08-19 NOTE — CARE COORDINATION
Social Work/Discharge Planning:  Received notification to arrange transport to snf with anticipation of discharge. Patient to return to [facility] [place] at [facility] LOC. Called Raysa at (ph: [phone]) and arranged Ambulance transport for 5:00pm.  Notified patient, her son Vito Meyers (ph: [phone]), RN and liaison iNa Moser at [facility] of discharge time.   Electronically signed by THEODORE De Los Santos on 8/19/2020 at 1:15 PM

## 2020-08-19 NOTE — PROGRESS NOTES
Notified Dr. Joeleln Wan on all consults signing off for discharge and patients last bowel movement 8/15.

## 2020-08-19 NOTE — PROGRESS NOTES
Physical Therapy  Facility/Department: [address] INTERMEDIATE 1  Daily Treatment Note  NAME: Joy Woo  : 1935  MRN: [id number]    Date of Service: 2020    Patient Diagnosis(es): The primary encounter diagnosis was Acute respiratory failure with hypoxia and hypercarbia ([facility] Diagnoses of Congestive heart failure, unspecified HF chronicity, unspecified heart failure type ([facility] and Pneumonia of both lungs due to infectious organism, unspecified part of lung were also pertinent to this visit. has a past medical history of Anemia due to acute blood loss, Arthritis, Blood transfusion reaction, Chronic atrial fibrillation, CKD (chronic kidney disease) stage 3, GFR 30-59 ml/min ([facility]), Diabetes mellitus ([facility] History of blood transfusion, History of breast cancer, History of stroke, Hyperlipidemia, Hypertension, Hypothyroidism, and Volume overload.   has a past surgical history that includes Breast surgery; Cholecystectomy; Gastric bypass surgery; other surgical history (2017); Nasal sinus surgery; pr ctrl nosebleed,anter,complex (N/A, 2018); and bronchoscopy (2018). Referring EMERALD Bowles         Evaluating Therapist: Ty Camara PT     Room #:434  DIAGNOSIS: Pneumonia  Additional Pertinent History:A-fib, CKD, HTN, Gastric bypass, CHF  PRECAUTIONS: falls, alarm, Bipap     Social:  Per chart pt admitted from nursing home. Pt questionable historian with difficuly answering questions about prior level of function. States she walks with a ww.  Unable to report if she came from nursing home or her own home       Initial Evaluation  Date: 20 Treatment  20    Short Term/ Long Term   Goals   Was pt agreeable to Eval/treatment? yes yest      Does pt have pain?  No c/o pain       Bed Mobility  Rolling: max assist  Supine to sit: max assist  Sit to supine: max assist  Scooting: max assist  rolling min assist  Supine to sit mod assist  Sit to supine mod

## 2020-08-19 NOTE — PROGRESS NOTES
[facility] MD Kiesha Allen MD Marit Oliphant, MD Norberto Candy, MD Hilton Evert, CNP   Libra Stewart, JEWEL  Progress Note    8/19/2020    SUBJECTIVE:   8/17: Feeling little bit ongoing fatigue, malaise, generalized weakness. (-) c/o's  (-) sob/sen/cp/palp Appetite improving  8/18: Resting comfortably on CPAP. Fatigue, generalized weakness persist.  Denies dyspnea on CPAP. Appetite remains spotty. Hemodynamically stable  8/19:  No new c/o. No swelling peripherally. Breathing much better. Tolerating cpap.   Concerned re: toilieting once pure-wick is d/c'd    PROBLEM LIST:    Principal Problem:    Acute on chronic respiratory failure with hypoxia (HCC)  Active Problems:    Chronic atrial fibrillation    Acute on chronic diastolic CHF (congestive heart failure) (HCC)    CKD (chronic kidney disease) stage 3, GFR 30-59 ml/min (HCC)    Chronic anemia    History of breast cancer    PNA (pneumonia)    Diabetes mellitus type 2, uncontrolled (HCC)    Essential hypertension    History of CVA (cerebrovascular accident)    Hyperlipidemia LDL goal <100    Acquired hypothyroidism    Morbid obesity with BMI of 50.0-59.9, adult ([facility]    Acute kidney injury ([facility]  Resolved Problems:    DM2 (diabetes mellitus, type 2) (HCC)    Hypertension    Acute kidney injury superimposed on CKD ([facility] [facility]         DIET:    DIET CARB CONTROL;     MEDS (scheduled):    warfarin  2 mg Oral Once    white petrolatum   Topical BID    furosemide  40 mg Intravenous BID    warfarin (COUMADIN) daily dosing (placeholder)   Other RX Placeholder    budesonide  500 mcg Nebulization BID    Arformoterol Tartrate  15 mcg Nebulization BID    calcitRIOL  0.5 mcg Oral Daily    fluticasone  1 spray Each Nostril BID    gabapentin  300 mg Oral Nightly    levothyroxine  25 mcg Oral QAM    lidocaine  1 patch Transdermal Daily    metoprolol succinate  50 mg Oral Daily    mirabegron  50 mg Oral Dinner   Lidia Gale 08/17/20 0340 08/19/20  0419   WBC 4.2* 3.7*   HGB 9.3* 9.8*   HCT 30.0* 32.0*   .8* 103.6*    187     Recent Labs     08/17/20 0340 08/19/20  0419    139   K 4.6 4.1   CL 92* 89*   CO2 41* 41*   MG 2.0 2.0   PHOS  --  3.8   BUN 40* 43*   CREATININE 1.9* 2.0*       Lab Results   Component Value Date    LABPROT 0.3 (H) 04/27/2018    LABPROT 0.3 04/27/2018       ASSESSMENT / RECOMMENDATIONS:    1-chronic kidney disease stage IIIB baseline cr 1.9-2.4 with no proteinuria     2-Acute /chronic respiratory failure multifactorial with concern for fluid overload     3-LVH     4-anaemia of chronic disease     5-Mineral bone disease     Azotemia stable, cr at baseline.   Clinically improving.    --> Continue lasix , change to po  --> Follow labs -- bmp, mg, po4 monday  --> Continue supportive care  --> ok for d/c      Electronically signed by Varun Morris MD on 8/19/2020 at 1:53 PM

## 2020-08-19 NOTE — PROGRESS NOTES
Date: 8/19/2020    Time: 8:27 AM    Patient Placed On BIPAP/CPAP/ Non-Invasive Ventilation? Yes    If no must comment. Facial area red/color change? Yes           If YES are Blister/Lesion present? Yes   If yes must notify nursing staff  BIPAP/CPAP skin barrier? Yes    Skin barrier type:duoderm       Comments: pt refuses to try under the nose mask to prevent further breakdown to the nose, wound care already consulted and aware. Daniela Ferrara, RRT

## 2020-08-19 NOTE — PATIENT CARE CONFERENCE
[facility] Quality Flow/Interdisciplinary Rounds Progress Note        Quality Flow Rounds held on August 19, 2020    Disciplines Attending:  Bedside Nurse,  and Nursing Unit Leadership    Carlos Irby was admitted on 8/13/2020  7:27 AM    Anticipated Discharge Date:  Expected Discharge Date: 08/16/20    Disposition:    Cole Score:  Cole Scale Score: 14    Readmission Score:         Discussed patient goal for the day, patient clinical progression, and barriers to discharge.   The following Goal(s) of the Day/Commitment(s) have been identified:  Plan for discharge today      Hiram Alexandre  August 19, 2020

## 2020-08-24 PROBLEM — I50.33 ACUTE ON CHRONIC DIASTOLIC CONGESTIVE HEART FAILURE (HCC): Status: ACTIVE | Noted: 2020-01-01

## 2020-08-24 PROBLEM — I50.84 CHF (NYHA CLASS IV, ACC/AHA STAGE D) (HCC): Status: ACTIVE | Noted: 2020-01-01

## 2020-08-24 NOTE — CARE COORDINATION
Social Work/Transition of Care:    Pt is from [facility], per [employer] was receiving skilled care will follow and assist with disposition.     Electronically signed by Prince Castro on 7/37/2645 at 6:15 PM

## 2020-08-24 NOTE — PROGRESS NOTES
Placed call to [facility] requesting [facility]. Awaiting Fax.

## 2020-08-24 NOTE — PROGRESS NOTES
Database complete. Medications reconciled. Care plans and education initiated. [facility]. Wears 4L 02 continuous and BIPAP/CPAP nightly. HX of R partial mastectomy(no BP's Labs in RUE). Uses walker per pt.

## 2020-08-24 NOTE — ED PROVIDER NOTES
HPI   Patient is a 66-year-old female presents with chief complaint of shortness of breath. Patient has a past medical history of COPD. Patient states that this is been going on for many months. Patient states that the doctor at her nursing home noticed her increasing shortness of breath and sent her in. Patient states that she has felt increasing shortness of breath over the past week. Patient was previously discharged from the hospital for CHF and new onset A. Fib. Patient states that she denies any fevers, chills, nausea, vomiting, diarrhea. Patient was brought in on EMS who put her on BiPAP. Patient stated that she felt better after receiving the BiPAP. Patient states that the shortness of breath is alleviated by sitting up. Patient is on 4-1/2 L of nasal cannula at home. Review of Systems   Constitutional: Negative for activity change, appetite change, chills, diaphoresis and fever. HENT: Negative for congestion, sinus pressure and sneezing. Respiratory: Positive for shortness of breath. Negative for apnea, cough, chest tightness, wheezing and stridor. Cardiovascular: Negative for chest pain and palpitations. Gastrointestinal: Negative for abdominal distention, abdominal pain, blood in stool, constipation, diarrhea, nausea and vomiting. Genitourinary: Negative for decreased urine volume, difficulty urinating, frequency and urgency. Skin: Negative for rash and wound. Neurological: Negative for dizziness, weakness and headaches. Psychiatric/Behavioral: Negative for agitation and confusion. Physical Exam  Constitutional:       General: She is not in acute distress. Appearance: Normal appearance. She is not ill-appearing. Comments: Patient sitting up in bed tachypneic. HENT:      Mouth/Throat:      Mouth: Mucous membranes are moist.   Eyes:      Extraocular Movements: Extraocular movements intact. Pupils: Pupils are equal, round, and reactive to light. Cardiovascular:      Rate and Rhythm: Normal rate and regular rhythm. Pulses: Normal pulses. Heart sounds: Normal heart sounds. No murmur. Pulmonary:      Effort: Respiratory distress present. Breath sounds: Normal breath sounds. No stridor. No wheezing or rhonchi. Chest:      Chest wall: No tenderness. Abdominal:      General: Abdomen is flat. There is no distension. Palpations: Abdomen is soft. Tenderness: There is no abdominal tenderness. There is no guarding. Musculoskeletal:         General: No swelling or tenderness. Skin:     General: Skin is warm and dry. Capillary Refill: Capillary refill takes less than 2 seconds. Neurological:      General: No focal deficit present. Mental Status: She is alert and oriented to person, place, and time. Psychiatric:         Mood and Affect: Mood normal.         Behavior: Behavior normal.          Procedures     [facility]     ED Course as of Aug 24 2025   Mon Aug 24, 2020   1519 EKG. Rate 76 bpm.  Normal axis. Irregularly irregular. Right bundle branch block. Atrial fibrillation. Similar to previous EKG. [JM]   1528 Patient stated that she felt much better after being on BiPAP. [JM]      ED Course User Index  [JM] Gabe Stevenson MD      Patient is an 29-year-old female presents with chief complaint of shortness of breath. Patient was recently discharged for new onset A. fib and congestive heart failure. Patient was seen in the nursing home but was sent in by EMS on BiPAP. Patient had 4-1/2 L of nasal cannula at home. Patient was placed on 4-1/2 L of nasal cannula and was satting 98% but was tachypneic at 30 breaths/min. Patient was transitioned back to BiPAP. Patient stated that she felt significantly better after being on BiPAP. Patient had a chest x-ray that was limited secondary to body habitus. Appeared to be CHF in nature. Patient was given 40 of Lasix.  Contacted internal medicine who agreed to admit to [facility]   Result Value Ref Range    Sodium 141 132 - 146 mmol/L    Potassium reflex Magnesium 4.5 3.5 - 5.0 mmol/L    Chloride 96 (L) 98 - 107 mmol/L    CO2 36 (H) 22 - 29 mmol/L    Anion Gap 9 7 - 16 mmol/L    Glucose 218 (H) 74 - 99 mg/dL    BUN 30 (H) 8 - 23 mg/dL    CREATININE 2.1 (H) 0.5 - 1.0 mg/dL    GFR Non-African American 22 >=60 mL/min/1.73    GFR African American 27     Calcium 10.2 8.6 - 10.2 mg/dL    Total Protein 6.9 6.4 - 8.3 g/dL    Alb 3.6 3.5 - 5.2 g/dL    Total Bilirubin 0.3 0.0 - 1.2 mg/dL    Alkaline Phosphatase 67 35 - 104 U/L    ALT 19 0 - 32 U/L    AST 24 0 - 31 U/L   CBC Auto Differential   Result Value Ref Range    WBC 4.6 4.5 - 11.5 E9/L    RBC 3.35 (L) 3.50 - 5.50 E12/L    Hemoglobin 10.8 (L) 11.5 - 15.5 g/dL    Hematocrit 34.5 34.0 - 48.0 %    .0 (H) 80.0 - 99.9 fL    MCH 32.2 26.0 - 35.0 pg    MCHC 31.3 (L) 32.0 - 34.5 %    RDW 14.1 11.5 - 15.0 fL    Platelets 644 474 - 141 E9/L    MPV 10.3 7.0 - 12.0 fL    Neutrophils % 64.8 43.0 - 80.0 %    Immature Granulocytes % 0.4 0.0 - 5.0 %    Lymphocytes % 18.8 (L) 20.0 - 42.0 %    Monocytes % 12.3 (H) 2.0 - 12.0 %    Eosinophils % 3.7 0.0 - 6.0 %    Basophils % 0.0 0.0 - 2.0 %    Neutrophils Absolute 3.00 1.80 - 7.30 E9/L    Immature Granulocytes # 0.02 E9/L    Lymphocytes Absolute 0.87 (L) 1.50 - 4.00 E9/L    Monocytes Absolute 0.57 0.10 - 0.95 E9/L    Eosinophils Absolute 0.17 0.05 - 0.50 E9/L    Basophils Absolute 0.00 0.00 - 0.20 E9/L   Blood Gas, Arterial   Result Value Ref Range    Date Analyzed [id number]     Time Analyzed 0967     Source: Blood Arterial     pH, Blood Gas 7.431 7.350 - 7.450    PCO2 50.7 (H) 35.0 - 45.0 mmHg    PO2 157.7 (H) 75.0 - 100.0 mmHg    HCO3 33.0 (H) 22.0 - 26.0 mmol/L    B.E. 7.5 (H) -3.0 - 3.0 mmol/L    O2 Sat 98.9 (H) 92.0 - 98.5 %    O2Hb 97.9 (H) 94.0 - 97.0 %    COHb 0.7 0.0 - 1.5 %    MetHb 0.3 0.0 - 1.5 %    O2 Content 16.4 mL/dL    HHb 1.1 0.0 - 5.0 %    tHb (est) 11.7 11.5 - 16.5 g/dL    Mode NC- 5L     Date Of Collection      Time Collected      Pt Temp 37.0 C     ID [id number]     Lab [id number]     Critical(s) Notified . No Critical Values    COVID-19   Result Value Ref Range    SARS-CoV-2, NAAT Not Detected Not Detected   Brain Natriuretic Peptide   Result Value Ref Range    Pro- (H) 0 - 450 pg/mL   EKG 12 Lead   Result Value Ref Range    Ventricular Rate 76 BPM    Atrial Rate 94 BPM    QRS Duration 122 ms    Q-T Interval 382 ms    QTc Calculation (Bazett) 429 ms    R Axis 5 degrees    T Axis 5 degrees       RADIOLOGY:  XR CHEST PORTABLE   Final Result      Redemonstration of bibasilar opacities notable in the left which could   indicate persistent bilateral pleural effusions and/or atelectasis. Continued follow-up could be helpful for further evaluation. XR CHEST (2 VW)    (Results Pending)       EKG:  See MDM      ------------------------- NURSING NOTES AND VITALS REVIEWED ---------------------------  Date / Time Roomed:  8/24/2020  1:55 PM  ED Bed Assignment:  [id number]    The nursing notes within the ED encounter and vital signs as below have been reviewed.      Patient Vitals for the past 24 hrs:   BP Temp Temp src Pulse Resp SpO2 Height Weight   08/24/20 2001 133/89 98.6 °F (37 °C) Axillary 67 20 99 % -- --   08/24/20 1750 (!) 163/72 -- -- 64 18 100 % -- --   08/24/20 1520 -- -- -- -- -- 100 % -- --   08/24/20 1518 -- -- -- -- (!) 31 -- -- --   08/24/20 1417 (!) 166/73 98.3 °F (36.8 °C) Oral 89 26 100 % 5' 1\" (1.549 m) 275 lb (124.7 kg)   08/24/20 1406 (!) 178/81 -- -- 87 -- -- -- --       Oxygen Saturation Interpretation: Abnormal - but at baseline    ------------------------------------------ PROGRESS NOTES ------------------------------------------  Re-evaluation(s):  Time: See MDM patients symptoms are improving  Repeat physical examination is not changed    Counseling:  I have spoken with the patient and discussed todays results, in addition to providing specific details for the plan of care and counseling regarding the diagnosis and prognosis. Their questions are answered at this time and they are agreeable with the plan of admission.    --------------------------------- ADDITIONAL PROVIDER NOTES ---------------------------------  Consultations:  Time: 1415. Spoke with Dr. Anna Dominguez. Discussed case. They will admit the patient. This patient's ED course included: a personal history and physicial examination, re-evaluation prior to disposition, multiple bedside re-evaluations, IV medications and cardiac monitoring    This patient has remained hemodynamically stable during their ED course. Diagnosis:  1. Acute on chronic congestive heart failure, unspecified heart failure type ([facility]    2. Dyspnea and respiratory abnormalities        Disposition:  Patient's disposition: Admit to telemetry  Patient's condition is stable.              Philippe Carroll MD  Resident  08/24/20 3585

## 2020-08-24 NOTE — DISCHARGE SUMMARY
Physician Discharge Summary     Patient ID:  Luz Marina Ocampo  [id number]  80 y.o.  1935    Admit date: 8/13/2020    Discharge date and time:  8/19/2020    Admission Diagnoses:   Patient Active Problem List   Diagnosis    Chronic atrial fibrillation    CKD (chronic kidney disease) stage 3, GFR 30-59 ml/min ([facility])    History of breast cancer    Chronic anemia    Acute on chronic diastolic CHF (congestive heart failure) ([facility])    PNA (pneumonia)    Diabetes mellitus type 2, uncontrolled ([facility]    Essential hypertension    History of CVA (cerebrovascular accident)    Hyperlipidemia LDL goal <100    Acquired hypothyroidism    Morbid obesity with BMI of 50.0-59.9, adult ([facility]    Acute on chronic respiratory failure with hypoxia ([facility]    Acute kidney injury ([facility] [address] [facility]       Discharge Diagnoses: as above    Consults: pulmonary/intensive care and nephrology    Procedures: see chart    Hospital Course: patient was admitted with shortness of breath. She was found to suffer acute respiratory failure and acute kidney injury secondary to CHF. Pneumonia was essentially ruled out. She was seen by nephrology and pulmonology. Her volume status was corrected. Her renal function improved. She clinically improved. She was seen by Pt/Ot and discharged to a rehab facility. She was set up with [facility]. She was placed on coumadin secondary to atrial fibrillation as her weight contraindicated the novel agents.       Discharge Exam:  See progress note from today    Condition:  stable    Disposition: SNF    Patient Instructions:   Discharge Medication List as of 8/19/2020  2:22 PM      START taking these medications    Details   warfarin (COUMADIN) 2 MG tablet Take 1 tablet by mouth daily, Disp-30 tablet,R-0Normal         CONTINUE these medications which have CHANGED    Details   metoprolol succinate (TOPROL XL) 50 MG extended release tablet Take 1 tablet by mouth daily, Disp-30 tablet,R-0Normal      bumetanide (BUMEX) 1 MG

## 2020-08-25 PROBLEM — N17.9 ACUTE KIDNEY INJURY (HCC): Status: RESOLVED | Noted: 2020-01-01 | Resolved: 2020-01-01

## 2020-08-25 PROBLEM — I50.84 CHF (NYHA CLASS IV, ACC/AHA STAGE D) (HCC): Status: RESOLVED | Noted: 2020-01-01 | Resolved: 2020-01-01

## 2020-08-25 PROBLEM — I50.33 ACUTE ON CHRONIC DIASTOLIC CHF (CONGESTIVE HEART FAILURE) (HCC): Status: RESOLVED | Noted: 2018-07-20 | Resolved: 2020-01-01

## 2020-08-25 PROBLEM — J18.9 PNA (PNEUMONIA): Status: RESOLVED | Noted: 2020-01-01 | Resolved: 2020-01-01

## 2020-08-25 NOTE — PROGRESS NOTES
Occupational Therapy  OCCUPATIONAL THERAPY INITIAL EVALUATION      Date:2020  Patient Name: Jazmin Munoz  MRN: [id number]  : 1935  Room: [address] [place] [address]    Referring Provider: Pam Olivares MD    Evaluating OT: Anahi Melgar OTR/L [id number]    AM-PAC Daily Activity Raw Score:     Recommended Adaptive Equipment: TBD    Diagnosis: CHF. Pt presents to ED from [facility] with SOB. Pertinent Medical History: DM, afib, CKD, h/o CVA, arthritis    Precautions:  Falls, O2     Home Living: Pt is a resident of [facility] skilled nursing facility. Pt reports she recently has been primarily bed level reporting she does not remember the last time she stood or got in to a wheelchair. Pt reports staff assist in all ADL/IADLs. Pain Level: no reported pain    Cognition: A&O: Pt is alert, reports being overwhelmed by questioning, easily confused regarding situation. Problem solving:  poor   Judgement/safety:  poor     Functional Assessment:   Initial Eval Status  Date: 20 Treatment session:  Short Term Goals  Treatment frequency: 2-5x/wk PRN x1-3 wks     Feeding Set up     Grooming Min A  Set up   UB Dressing Max A  Management of hospital gown  Min A   LB Dressing Dependent      Bathing Max A  Mod A   Toileting Dependent  Max A   Bed Mobility  Supine <> sit: Max A x2     Functional Transfers STS: NT   Pt declines   Max A   Functional Mobility      Balance Sitting: varying levels from poor to fair minus seated EOB    Standing: NT     Activity Tolerance Poor  O2 throughout session maintained 98% O2 sat  Limited activity tolerance with request to return to supine  sitting suleman x10-12 min with fair balance during self care tasks             Treatment: Patient educated on techniques for completion of ADL, safe functional transfers.   Patient required cues for follow through with proper hand/foot placement, pacing, safety, attention, sequencing and technique in bed mobility, UB dressing and LB dressing in preparation for maximum independence in all self care tasks. Hand Dominance: Right []  Left []   Strength ROM Additional Info:    RUE  4-/5 WFL good  and FMC/dexterity noted during ADL tasks     LUE Proximally: 2-/5  Distally: 3+/5 WFL elbow to digits. L shoulder flexion approx 10 degrees, PROM to approx 30 degrees then resistive due to pain good  and FMC/dexterity noted during ADL tasks         Hearing: WFL   Vision: WFL   Sensation:  No c/o numbness or tingling   Tone: WFL   Edema: B LE                             Long Term Goal (1-3 wks): Pt will maximize functional performance in all self care tasks/functional transfers with good follow through of all trained techniques for safe transition to next level of care    Eval Complexity: Low    Assessment of current deficits   Functional mobility [x]  ADLs [x] Strength [x]  Cognition []  Functional transfers  [x] IADLs [x] Safety Awareness [x]  Endurance [x]  Fine Motor Coordination [] Balance [x] Vision/perception [] Sensation []   Gross Motor Coordination [] ROM [] Delirium []                  Motor Control []    Plan of Care:   ADL retraining [x]   Equipment needs [x]   Neuromuscular re-education [x] Energy Conservation Techniques [x]  Functional Transfer training [x] Patient and/or Family Education [x]  Functional Mobility training [x]  Environmental Modifications [x]  Cognitive re-training []   Compensatory techniques for ADLs [x]  Splinting Needs []   Positioning to improve overall function [x]   Therapeutic Activity [x]  Therapeutic Exercise  [x]  Visual/Perceptual: []    Delirium prevention/treatment  []   Other:  []    Rehab Potential: Good for established goals     Patient / Family Goal: To get home. Patient and/or family were instructed on functional diagnosis, prognosis/goals and OT plan of care. Pt verbalized fair understanding. Upon arrival, patient supine in bed.  At end of session, patient supine in bed with call light and phone within reach, all lines and tubes intact. Pt would benefit from continued skilled OT to increase safety and independence with completion of ADL/IADL tasks for functional independence and quality of life.  Bed/chair alarm: ON    Low Evaluation + 0 timed treatment minutes    Evaluation time includes thorough review of current medical information, gathering information on past medical history/social history and prior level of function, completion of standardized testing/informal observation of tasks, assessment of data, and development of POC/Goals    Jose Zayas OTR/L  [id number]

## 2020-08-25 NOTE — CONSULTS
Inpatient Cardiology Consultation      Reason for Consult:  CHF, atrial fibrillation    Consulting Physician: Dr. Valentino Caprio    Requesting Physician: Dr. Heaven Orr    Date of Consultation: 8/25/2020    HISTORY OF PRESENT ILLNESS:     This 80-year-old female is known to my partner [facility] cardiology and was followed by Dr. Miguel Dwyer but he has not seen the patient in several years. She was last seen by Dr. Velasquez Ta for cardiac risk stratification prior to EGD and colonoscopy. She has an extensive medical history including morbid obesity, chronic hypoxic and hypercapnic respiratory failure, pulmonary hypertension, chronic atrial fibrillation, chronic kidney disease, CVA, right carotid endarterectomy, obstructive sleep apnea, and breast cancer. She had a recent admission for volume overload, respiratory failure. She was diuresed and then sent to a facility on a trilogy. She was switched from Eliquis to Coumadin because there was a concern about a PE and due to her weight. She states her breathing was better when she left here but she somehow did not feel as good on the trilogy at the facility. Reportedly the settings were the same. She denies any chest pain. It appears her weights have dropped from 300 to 277 pounds. She had no cough or fever. She was sent to the hospital for tachypnea and worsening dyspnea. Chest x-ray on admission showed bilateral pleural effusions that were moderate. Renal function is stable. proBNP was 763 and during her last admission it was 3029. ABGs show mild hypercapnia. She received a total of 80 mg of Lasix IV and started on IV Bumex. Her breathing is not much better on BiPAP. EKG showed atrial fibrillation with right bundle branch block and no acute ST-T wave changes and her heart rates are controlled. PAST MEDICAL HISTORY    1. Lifelong non smoker  2. HTN  3. HLD  4. PUD  5. Super Morbid Obesity BMI 51.1  6. CKD stage III (baseline SCr 1.4-1.5)  7.  T2 IDDM  8. GERD and Large hiatal hernia  9. Anemia with history of transfusions  10. Hypothyroidism on replacement therapy  11. cRBBB  12. Chronic Atrial Fibrillation Hx of coumadin (stopped 4/2014  due to GIB). [id number]= 9 (CHF, HTN, age, DM, CVA, vascular disease, and female)  15. R breast carcinoma 1997 s/p partial mastectomy s/p XRT   14. PAD s/p R CEA  15. Hx CVA per patient reports no deficits. 16. R rotator cuff repair  17. Gastric Bypass in her late 29's was down to 125 pounds problems with electrolyte derangement and was gastric bypass was reversed  18. Cholecystomy, Appendectomy, bilateral cataracts. 19. GIB s/p EGD with biopsy 4/30/2014: GERD. Large hiatal hernia  20. GIB s/p Colonoscopy 4/30/2014: Large hiatal hernia, mild gastritis, no active bleeding. 21. Epistaxis 5/16/2015 s/p nasal packing  22. Vertigo 11/2016 evaluated by Dr Vincent Ordaz placed on Meclizine  23. 11/6/2016 p-  24. Epistaxis 7/15/2017 s/p cautery and packing  25. Recurrent Epistaxis 7/27/2017  S/p control of epistaxis with endoscopic cautery and  Right sphenopalatine artery ligation 7/27/2017  26. Developed AF RVR with HTN post-op 7/27/2017 endoscopic cautery and  right sphenopalatine artery ligation. 27. Recurrent Epistaxis 10/15/2017 s/p packing with discharge with outpatient follow up with Dr Kostas Montana. 28. Recurrent Epitsaxis 12/27/2017  S/p packing. Transfused. Hgb 7.9, Bun/Cr 29/1.3. EKG AF CVR, RBBB  29. TTE 12/20/2017 Dr Amy Villanueva: Left ventricular size is grossly normal. Mild concentric LVH. EF visually estimated at 60%. Mild AS/TR/MR. Mildly enlarged RA. RVSP 37 mmHg. 30. 12/27/2017 p-BNP 1005   31. 12/29/2017 p-BNP 1505  32. [facility] 2/5/2018 Worsening VILLANUEVA. Hgb 9.2, , K+ 5.4, Bun/Cr 38/1.9, p-BNP 1209, troponin 0.03. Discharged to home. 33. Lexiscan MPS 2/23/2018: EF 75%. Non ischemic. NWM. 34. [facility] 4/25/2018-5/8/2018 Recurrent  Epitaxis s/p bilateral nasal packing. Hgb 8.4>>7.6. Transfused.   35. 4/27/2018 p-BNP 3112  36. 4/28/2018 endoscopic guided control of epistaxis(Septal perforation anteriorly about 1 cm to1 1/2cm) with septal button placement. Intra-operative bronchoscopy (no clots or mucous plugging), but dynamic airway collapse with ventilation. Left on ventilator to wean in ICU. + Enterobacter cloaca (no ATB's per ID). Discharged on Bumex 1 mg BID, Lopressor 25 TID. Wean O2 as outpatient  37. 4/30/2018 Extubated  38. 5/1/2018 Dysphagia note on swallowing evaluation>>dysphagia II mechanically altered  39. 5/4/2018 LUE DVT>>placed on Eliquis 5 mg BID x 7 days then 2.5 mg BID  40. 5/4/2018 L Thoracentesis>>450 cc transudative   41. 5/2018 Chronic Hypercapnic respiratory failure/tracheobronchomalacia  42. 5/2018 Probable GOSIA recommended C-pap at hs  43. Discharged to home end of June 2018 from Rehab on no O2 or C-pap. According to family has been not able to get out of house and has not followed up with PCP, pulmonary since discharge. 44. EGD/ Colonoscopy 7/18  45. Admission 8/13/20 for dyspnea, respiratory failure, CHF, DALILA, started on Coumadin discharged on trilogy,   55. 2d echo August 14, 2020     Technically suboptimal and limited limited study. Left ventricular internal dimensions were normal in diastole and systole. Moderate left ventricular concentric hypertrophy noted. No regional wall motion abnormalities seen. Normal left ventricular ejection fraction. The left atrium is borderline dilated. Borderline dilated right ventricle. Right ventricle global systolic function is reduced . Mildly enlarged right atrium size. Mild thickening of the mitral valve leaflets. Moderate mitral annular calcification. Mild mitral regurgitation is present. Mild to moderate functional mitral stenosis . Mild aortic stenosis. Mild tricuspid regurgitation. Pulmonary hypertension is mild . EF 65-70    47.  Bronchoscopy April 2018        Medications Prior to admit:  Prior to Admission medications    Medication Sig Start units daily  Patient taking differently: Inject 12 Units into the skin 3 times daily (before meals) 12 units tid with meals plus sliding scale 5/1/19  Yes Karena Hatchet, MD   insulin glargine (TOUJEO SOLOSTAR) 300 UNIT/ML injection pen Inject 50 Units into the skin nightly 5/1/19  Yes Karena Hatchet, MD   ferrous sulfate 325 (65 Fe) MG tablet Take 1 tablet by mouth 2 times daily (with meals) 7/26/18  Yes Jd De La Rosa,    magnesium hydroxide (MILK OF MAGNESIA) 400 MG/5ML suspension Take 30 mLs by mouth daily as needed for Constipation 7/26/18  Yes Jd De La Rosa,    docusate sodium (COLACE, DULCOLAX) 100 MG CAPS Take 200 mg by mouth nightly 7/26/18  Yes Jd De La Rosa,    hydrOXYzine (ATARAX) 10 MG tablet Take 10 mg by mouth 3 times daily as needed for Itching   Yes Historical Provider, MD   levothyroxine (SYNTHROID) 25 MCG tablet Take 1 tablet by mouth daily  Patient taking differently: Take 25 mcg by mouth every morning  12/31/17  Yes Kayode Mistry,    Mirabegron ER 50 MG TB24 Take 50 mg by mouth Daily with supper  8/29/17  Yes Historical Provider, MD   pantoprazole (PROTONIX) 40 MG tablet Take 1 tablet by mouth 2 times daily (before meals). Patient taking differently: Take 20 mg by mouth 2 times daily  5/2/14  Yes Antonio Brooke,    simvastatin (ZOCOR) 40 MG tablet Take 40 mg by mouth nightly. Yes Historical Provider, MD   gabapentin (NEURONTIN) 300 MG capsule Take 300 mg by mouth nightly.  .   Yes Historical Provider, MD       Current Medications:    Current Facility-Administered Medications: warfarin (COUMADIN) tablet 1 mg, 1 mg, Oral, Once  bumetanide (BUMEX) injection 2 mg, 2 mg, Intravenous, 2 times per day  vitamin D (CHOLECALCIFEROL) tablet 5,000 Units, 5,000 Units, Oral, Daily  docusate sodium (COLACE) capsule 200 mg, 200 mg, Oral, Nightly  ferrous sulfate (IRON 325) tablet 325 mg, 325 mg, Oral, BID WC  fluticasone (FLONASE) 50 MCG/ACT nasal spray 1 spray, 1 spray, Each Nostril, bruit.    CHEST: Chest symmetrical and non-tender to palpation  RESPIRATORY: Lung sounds - clear throughout fields but diminished in the bases  CARDIOVASCULAR:     Heart Inspection- shows no noted pulsations  Heart Palpation- no heaves or thrills; PMI is non-displaced   Heart Ausculation- irregularly irregular rate and rhythm, no murmur. No s3, s4 or rub   PV: Trace lower extremity edema. No varicosities. Pedal pulses palpable, no clubbing or cyanosis   ABDOMEN: Soft, non-tender to light palpation. Bowel sounds present. No palpable masses no organomegaly; no abdominal bruit huge pannus and retaining fluid in the abdominal wall,   MS: Week muscle strength and tone. No atrophy or abnormal movements. : Sommer draining clear main  SKIN: Warm and dry no statis dermatitis or ulcers   NEURO / PSYCH: Oriented to person, place and time. Speech clear and appropriate. Follows all commands. Pleasant affect     DATA:    ECG / Tele strips:  atrial fibrillation with controlled ventricular response  Diagnostic:      Intake/Output Summary (Last 24 hours) at 8/25/2020 1025  Last data filed at 8/25/2020 0929  Gross per 24 hour   Intake 180 ml   Output 1100 ml   Net -920 ml       Labs:   CBC:   Recent Labs     08/24/20  1425 08/25/20  0625   WBC 4.6 3.5*   HGB 10.8* 11.0*   HCT 34.5 36.0    229     BMP:   Recent Labs     08/24/20  1425 08/25/20  0625    141   K 4.5 3.9  3.9   CO2 36* 36*   BUN 30* 27*   CREATININE 2.1* 1.9*   LABGLOM 22 25   CALCIUM 10.2 10.1     Mag:   Recent Labs     08/25/20  0625   MG 2.0     Phos:   Recent Labs     08/25/20  0625   PHOS 3.3     TSH: No results for input(s): TSH in the last 72 hours. HgA1c:   Lab Results   Component Value Date    LABA1C 5.7 (H) 08/24/2020     No results found for: EAG  proBNP:   Recent Labs     08/24/20  1425   PROBNP 763*     PT/INR:   Recent Labs     08/24/20  2137 08/25/20  0625   PROTIME 41.8* 36.5*   INR 3.5 3.1     APTT:No results for input(s):  APTT in the last 72 hours. CARDIAC ENZYMES:  Recent Labs     08/24/20  1425   TROPONINI 0.03     FASTING LIPID PANEL:  Lab Results   Component Value Date    CHOL 94 07/23/2018    HDL 39 07/23/2018    LDLCALC 34 07/23/2018    TRIG 103 07/23/2018     LIVER PROFILE:  Recent Labs     08/24/20  1425   AST 24   ALT 19   LABALBU 3.6       Electronically signed by SAVANNA Jones CNP on 8/25/2020 at 10:25 AM      Assessment and plan by      The above documentation has been prepared under my direction and personally reviewed by me in its entirety. I confirm that the note above accurately reflects all work, treatment, procedures, and medical decision making performed by me. The patient's history was independently obtained. The patient was independently examined. Electrocardiogram, prior and present cardiovascular assessment, and laboratory studies were reviewed. The patient is an 28-year-old white female known to [facility] with primary cardiovascular care provided by Deisi Tucker. She has a known history of permanent atrial fibrillation with a rate control and anticoagulation strategy in the face of a previous cerebral ischemic event as well as that of extracranial carotid disease hypertension, diabetes with associated stage III chronic kidney disease, hyperlipidemia as well as that of morbid obesity and obstructive sleep apnea. In the face of her morbid obesity, a history of chronic combined hypoxic and hypercapnic respiratory failure is present.  she is undergone most recent objective assessment within the past 2 weeks with an echocardiogram while technically suboptimal demonstrating evidence of a normal-sized left ventricular chamber with moderate concentric left ventricular hypertrophy and normal left ventricular systolic function with borderline biatrial enlargement and evidence of right ventricular dilatation and right ventricular dysfunction and with evidence of mitral leaflet thickening with annular calcification and mild to moderate functional mitral stenosis as well as that of mild mitral regurgitation with mild pulmonary hypertension a right ventricular systolic pressure of approximately 40 mmHg. She was recently hospitalized without cardiovascular assessment for reported acute superimposed upon chronic diastolic heart failure and volume overload with a proBNP level of 14,020 pg/mL noted time of her hospitalization. In addition, evidence of acute kidney injury was present upon admission stabilized during her hospitalization. She was eventually transferred to an extended care facility for rehabilitation but there noted persistent and reportedly progressive symptomatology of dyspnea leading to emergency room assessment at which time electrocardiogram reviewed at time of her assessment demonstrated evidence of atrial fibrillation with a mean ventricular response of approximately 75 bpm with a chronically noted right bundle branch block conduction pattern and a chest x-ray again reviewed time of assessment and demonstrating borderline cardiomegaly with poor inspiration and no evidence of interstitial infiltrates with bilateral pleural effusions present and a proBNP level of 765 pg/mL in the absence of acute kidney injury in the face of borderline supratherapeutic anticoagulation in the absence of a significant leukocytosis or additional symptoms. Arterial blood gases demonstrate evidence of chronic hypercapnia without a significant evidence of acidosis or hypoxia. Subsequent to hospitalization, she is received diuretics with a negative fluid balance reported and no improvement of her symptomatology and during hospitalization atrial fibrillation with acceptable rate control has been maintained. At the time of assessment, her medications and allergies were reviewed along with that of her past medical history and review of systems as documented.     On examination, she is presently maintained on a BiPAP mask and appears in no distress. She is hemodynamically stable with vital signs as documented. Poor dentition is present with normal jugular venous pressure. Lung fields are clear to auscultation with the exception of diminished breath sounds in both lung bases. Cardiac auscultation is notable for a slightly irregular rhythm in the presence of a soft systolic murmur at the left sternal border. Abdominal obesity is present with no evidence of significant peripheral edema. Diagnostic Assessment and Plan: On a clinical basis, the patient presents with evidence of chronic hypercapnic respiratory failure in the face of morbid obesity and presently no clinical or objective evidence of significant volume overload. Her chest x-ray in fact suggests evidence of pleural effusions in the absence of interstitial infiltrates likely contributing to her dyspnea with a 50% reduction of her proBNP level from that of her recent hospitalization with reported decompensation. Acceptable rate control of her atrial fibrillation has been documented throughout hospitalization with therapeutic anticoagulation maintained. On this basis, from a cardiovascular standpoint no alteration of her medical regimen is recommended with complete assessment of the pulmonary service pending and potential benefits of thoracentesis deferred to the pulmonary service. Continued careful monitoring of anticoagulation will be advisable with goals of maintaining prothrombin times in a range of 2.0-2.5 times INR control to reduce risk of embolic events. Continued aggressive risk factor modification blood pressure, diabetes and serum lipids will remain essential to reducing risk of future atherosclerotic development. Thank you for allowing me to participate in your patient's care. Please feel free to contact me if you have any questions or concerns. Kate Ballard.  [facility], [address] [place] [address] Cardiology

## 2020-08-25 NOTE — PROGRESS NOTES
Pharmacy Consultation Note  (Anticoagulant Dosing and Monitoring)    Initial consult date: 8/24/20  Consulting physician: Dr. Deon Gutierrez    Allergies:  Pcn [penicillins]    80 y.o. female      Ht Readings from Last 1 Encounters:   08/24/20 5' 1\" (1.549 m)     Wt Readings from Last 1 Encounters:   08/24/20 275 lb (124.7 kg)         Warfarin Indication Target   INR Range Home   Dose  (if applicable) Diet/Feeding Tube   afib 2-3 2 mg nightly --       Vitamin K or Blood product  Administration Date                 Warfarin drug-drug interactions  Start  Stop Home Med?  Comments                                 TSH:    Lab Results   Component Value Date    TSH 1.430 07/23/2018        Hepatic Function Panel:                            Lab Results   Component Value Date    ALKPHOS 67 08/24/2020    ALT 19 08/24/2020    AST 24 08/24/2020    PROT 6.9 08/24/2020    BILITOT 0.3 08/24/2020    LABALBU 3.6 08/24/2020       Date Warfarin Dose INR Heparin or LMWH HBG/  HCT PLT Comment   8/24 HOLD 3.5 -- 10.8/34.5 224                                          Assessment and Plan:  · 80 yof admitted with CHF exacerbation on chronic [address] with warfarin for afib  · INR goal 2-3; home warfarin dose 2 mg nightly   · 8/24: INR 3.5  · HOLD warfarin  · Daily PT/INR until the INR is stable within the therapeutic range  · Pharmacist will follow and monitor/adjust dosing as necessary    Ganga Marin, PharmD, BCPS 8/24/2020 10:25 PM

## 2020-08-25 NOTE — PROGRESS NOTES
Physical Therapy    Facility/Department: [facility] MED SURG  Initial Assessment    NAME: Carlos Irby  : 1935  MRN: [id number]    Date of Service: 2020       REQUIRES PT FOLLOW UP: Yes       Patient Diagnosis(es): The primary encounter diagnosis was Acute on chronic congestive heart failure, unspecified heart failure type ([facility] [address] A diagnosis of Dyspnea and respiratory abnormalities was also pertinent to this visit. has a past medical history of Anemia due to acute blood loss, Arthritis, Blood transfusion reaction, Chronic atrial fibrillation, CKD (chronic kidney disease) stage 3, GFR 30-59 ml/min ([facility]), Diabetes mellitus ([facility] [address] History of blood transfusion, History of breast cancer, History of stroke, Hyperlipidemia, Hypertension, Hypothyroidism, and Volume overload.   has a past surgical history that includes Breast surgery; Cholecystectomy; Gastric bypass surgery; other surgical history (2017); Nasal sinus surgery; pr ctrl nosebleed,anter,complex (N/A, 2018); bronchoscopy (2018); and Appendectomy. Evaluating Therapist: Yovanny Greer, PT     Referring Provider:  Dr. Charlotte Hemphill #: [id number]   DIAGNOSIS : CHF  Additional Pertinent History:A-fib, CKD, HTN, Gastric bypass, CHF  PRECAUTIONS: falls, O2     Social:  Per chart pt admitted from nursing home. Pt questionable historian with difficuly answering questions about prior level of function. States she walks is bed bound; does not get OOB        Initial Evaluation  Date: 2020 Treatment      Short Term/ Long Term   Goals   Was pt agreeable to Eval/treatment? With encouragement       Does pt have pain? No c/o pain       Bed Mobility  Rolling: max assist  Supine to sit: max assist x 2   Sit to supine: max assist x 2   Scooting: max assist x 2    Mod assist   Transfers NT .  Pt reports unable    Max assist x 2   Ambulation    NT   TBD    LE strength     3-/5     3+/5   balance      Sitting balance edge of bed SBA        AM-PAC Raw score               6/24             Pt is alert, questionable historian   MARINA QUINTEROS: WFL    Endurance: poor     ASSESSMENT  Pt displays functional ability as noted in the objective portion of this evaluation.       Patient education  Pt educated on Importance of mobility, fall risk, PT POC, proper breathing technique     Patient response to education:   Pt verbalized understanding Pt demonstrated skill Pt requires further education in this area   yes  with cues  yes      ASSESSMENT:     Comments:  Mobility as above. Pt c/o SOB with minimal activity. Pulse ox after mobility 99%. RN informed that pt wanted back in Bipap      Pt's/ family goals   1.none stated      Patient and or family understand(s) diagnosis, prognosis, and plan of care. Questionable      PLAN:     PT care will be provided in accordance with the objectives noted above. Exercises and functional mobility practice will be used as well as appropriate assistive devices or modalities to obtain goals. Patient and family education will also be administered as needed.     Frequency of treatments: 2-5x/week x 7-10 days     Time in : 1333  Macie out : 1345         Evaluation Time includes thorough review of current medical information, gathering information on past medical history/social history and prior level of function, completion of standardized testing/informal observation of tasks, assessment of data and education on plan of care and goals.     CPT codes:  [x]? Low Complexity PT evaluation [id number]  []? Moderate Complexity PT evaluation 58337  []? High Complexity PT evaluation [id number]  []? PT Re-evaluation [id number]  []? Gait training 43829 minutes  []? Manual therapy 19528 minutes  []? Therapeutic activities 36265 minutes  []? Therapeutic exercises 74340 minutes  []?  Neuromuscular reeducation 89113 minutes     Angela Jane   PT [id number]

## 2020-08-25 NOTE — H&P
APPENDECTOMY      BREAST SURGERY      right    BRONCHOSCOPY  4/28/2018    BRONCHOSCOPY DIAGNOSTIC performed by Marisa White MD at [address] [place] [address]  NASAL SINUS SURGERY      august 27 2017. cauterized her nasal passage.  OTHER SURGICAL HISTORY  07/27/2017    endoscopic conrtol of epistaxis dr Seretha Councilman N/A 4/28/2018    ENDOSCOPIC GUIDED CONTROL EPISTAXIS  WITH SEPTAL BUTTON PLACEMENT. INTRA-OPERATIVE BRONCHOSCOPY.  performed by Marisa White MD at [facility] OR       Medications Prior to Admission:    Medications Prior to Admission: warfarin (COUMADIN) 2 MG tablet, Take 1 tablet by mouth daily (Patient taking differently: Take 2 mg by mouth nightly )  metoprolol succinate (TOPROL XL) 50 MG extended release tablet, Take 1 tablet by mouth daily  bumetanide (BUMEX) 1 MG tablet, Take 1 tablet by mouth 2 times daily (Patient taking differently: Take 1 mg by mouth 2 times daily Rise and noon)  Multiple Vitamins-Minerals (THERAPEUTIC MULTIVITAMIN-MINERALS) tablet, Take 1 tablet by mouth daily  lidocaine 4 % external patch, Place 1 patch onto the skin daily Remove at HS  potassium chloride (KLOR-CON M) 10 MEQ extended release tablet, Take 10 mEq by mouth 2 times daily  acetaminophen (TYLENOL) 325 MG tablet, Take 650 mg by mouth every 6 hours as needed for Pain  fluticasone (FLONASE) 50 MCG/ACT nasal spray, 1 spray by Each Nostril route daily (Patient taking differently: 1 spray by Each Nostril route 2 times daily )  calcitRIOL (ROCALTROL) 0.25 MCG capsule, Take 0.5 mcg by mouth daily   loratadine (CLARITIN) 10 MG tablet, Take 10 mg by mouth daily  melatonin 5 MG TABS tablet, Take 10 mg by mouth nightly   guaiFENesin (MUCINEX) 600 MG extended release tablet, Take 600 mg by mouth 2 times daily   Dulaglutide (TRULICITY) 1.5 LS/5.8PB SOPN, Inject 1.5 mg into the skin once a week Indications: weekly on Thursday  Cholecalciferol (VITAMIN D3) 5000 units TABS, Take 5,000 Units by mouth daily   insulin lispro (HUMALOG) 100 UNIT/ML injection vial, Take 12 units before meals + sliding scale. MAX 60 units daily (Patient taking differently: Inject 12 Units into the skin 3 times daily (before meals) 12 units tid with meals plus sliding scale)  insulin glargine (TOUJEO SOLOSTAR) 300 UNIT/ML injection pen, Inject 50 Units into the skin nightly  ferrous sulfate 325 (65 Fe) MG tablet, Take 1 tablet by mouth 2 times daily (with meals)  magnesium hydroxide (MILK OF MAGNESIA) 400 MG/5ML suspension, Take 30 mLs by mouth daily as needed for Constipation  docusate sodium (COLACE, DULCOLAX) 100 MG CAPS, Take 200 mg by mouth nightly  hydrOXYzine (ATARAX) 10 MG tablet, Take 10 mg by mouth 3 times daily as needed for Itching  levothyroxine (SYNTHROID) 25 MCG tablet, Take 1 tablet by mouth daily (Patient taking differently: Take 25 mcg by mouth every morning )  Mirabegron ER 50 MG TB24, Take 50 mg by mouth Daily with supper   pantoprazole (PROTONIX) 40 MG tablet, Take 1 tablet by mouth 2 times daily (before meals). (Patient taking differently: Take 20 mg by mouth 2 times daily )  simvastatin (ZOCOR) 40 MG tablet, Take 40 mg by mouth nightly.  gabapentin (NEURONTIN) 300 MG capsule, Take 300 mg by mouth nightly. .    Allergies:    Pcn [penicillins]    Social History:    reports that she has never smoked. She has never used smokeless tobacco. She reports previous alcohol use. She reports that she does not use drugs. Family History:   family history includes Diabetes in her father and mother; Heart Disease in her mother; Kidney Disease in her mother; Stroke in her father.     REVIEW OF SYSTEMS:  As above in the HPI, otherwise negative    PHYSICAL EXAM:    Vitals:  /68   Pulse 82   Temp 98.2 °F (36.8 °C) (Oral)   Resp 18   Ht 5' 1\" (1.549 m)   Wt 277 lb (125.6 kg)   LMP  (LMP Unknown)   SpO2 99%   BMI 52.34 kg/m²     General:  Awake, alert, oriented X 3. Frail appearing. HEENT:  Normocephalic, atraumatic. Pupils equal, round, reactive to light. No scleral icterus. No conjunctival injection. Normal lips, teeth, and gums. No nasal discharge. Neck:  Supple  Heart:  RRR, no murmurs, gallops, rubs  Lungs:  bilateral rhonchi  Abdomen: Bowel sounds present, soft, nontender, no masses, no organomegaly, no peritoneal signs  Extremities:  Trace lower extremity edema  Skin:  Warm and dry, no open lesions or rash  Neuro:  Cranial nerves 2-12 intact, no focal deficits.   General physical deconditioning  Breast: deferred  Rectal: deferred  Genitalia:  deferred    LABS:    CBC with Differential:    Lab Results   Component Value Date    WBC 3.5 08/25/2020    RBC 3.49 08/25/2020    HGB 11.0 08/25/2020    HCT 36.0 08/25/2020     08/25/2020    .2 08/25/2020    MCH 31.5 08/25/2020    MCHC 30.6 08/25/2020    RDW 14.0 08/25/2020    NRBC 0.9 08/15/2020    LYMPHOPCT 20.7 08/25/2020    MONOPCT 13.1 08/25/2020    BASOPCT 0.3 08/25/2020    MONOSABS 0.46 08/25/2020    LYMPHSABS 0.73 08/25/2020    EOSABS 0.14 08/25/2020    BASOSABS 0.01 08/25/2020     CMP:    Lab Results   Component Value Date     08/25/2020    K 3.9 08/25/2020    K 3.9 08/25/2020    CL 96 08/25/2020    CO2 36 08/25/2020    BUN 27 08/25/2020    CREATININE 1.9 08/25/2020    GFRAA 30 08/25/2020    LABGLOM 25 08/25/2020    GLUCOSE 112 08/25/2020    PROT 6.9 08/24/2020    LABALBU 3.6 08/24/2020    CALCIUM 10.1 08/25/2020    BILITOT 0.3 08/24/2020    ALKPHOS 67 08/24/2020    AST 24 08/24/2020    ALT 19 08/24/2020     BMP:    Lab Results   Component Value Date     08/25/2020    K 3.9 08/25/2020    K 3.9 08/25/2020    CL 96 08/25/2020    CO2 36 08/25/2020    BUN 27 08/25/2020    LABALBU 3.6 08/24/2020    CREATININE 1.9 08/25/2020    CALCIUM 10.1 08/25/2020    GFRAA 30 08/25/2020    LABGLOM 25 08/25/2020    GLUCOSE 112 08/25/2020     Magnesium:    Lab Results   Component Value Date    MG 2.0 08/25/2020     Phosphorus:    Lab Results   Component Value Date    PHOS 3.3 08/25/2020     PT/INR:    Lab Results   Component Value Date    PROTIME 36.5 08/25/2020    INR 3.1 08/25/2020     PTT:    Lab Results   Component Value Date    APTT 33.3 07/22/2018   [APTT}  Troponin:    Lab Results   Component Value Date    TROPONINI 0.03 08/24/2020     Last 3 Troponin:    Lab Results   Component Value Date    TROPONINI 0.03 08/24/2020    TROPONINI 0.03 08/13/2020    TROPONINI 0.02 07/27/2019     U/A:    Lab Results   Component Value Date    COLORU Yellow 08/13/2020    PROTEINU Negative 08/13/2020    PHUR 5.5 08/13/2020    WBCUA 1-3 08/13/2020    RBCUA >20 08/13/2020    BACTERIA NONE SEEN 08/13/2020    CLARITYU Clear 08/13/2020    SPECGRAV 1.015 08/13/2020    LEUKOCYTESUR SMALL 08/13/2020    UROBILINOGEN 0.2 08/13/2020    BILIRUBINUR Negative 08/13/2020    BLOODU LARGE 08/13/2020    GLUCOSEU Negative 08/13/2020    AMORPHOUS NONE 07/20/2018     HgBA1c:    Lab Results   Component Value Date    LABA1C 5.7 08/24/2020     FLP:    Lab Results   Component Value Date    TRIG 103 07/23/2018    HDL 39 07/23/2018    LDLCALC 34 07/23/2018    LABVLDL 21 07/23/2018     TSH:    Lab Results   Component Value Date    TSH 1.430 07/23/2018       ASSESSMENT:      Patient Active Problem List   Diagnosis    Chronic atrial fibrillation    CKD (chronic kidney disease) stage 3, GFR 30-59 ml/min ([facility])    History of breast cancer    Chronic anemia    [facility] mellitus type [facility], uncontrolled [facility]    Essential hypertension    History of CVA (cerebrovascular accident)    Hyperlipidemia LDL goal <100    Acquired hypothyroidism    Morbid obesity with BMI of 50.0-59.9, adult ([facility]    Acute on chronic respiratory failure with hypoxia (HCC)    Acute on chronic diastolic congestive heart failure (HCC)         PLAN:    Rate controlled. Renal function at baseline. Stable. Hgb stable.   Blood glucose ok, continue to adjust basal/bolus insulin therapy  Blood pressure ok, continue current medications  Continue Pt/Ot and risk factor modifications. Continue aggressive lipid therapy  Continue synthroid. Continue to encourage weight loss. Pulmonology consulted for evaluation. Echo from 7/23/2018 reviewed. EF of 60% with indeterminate diastolic dysfunction. diurese as BP and renal function allow.   Pt/Ot evaluations for discharge planning    Sunil Miller MD  9:46 AM  8/25/2020

## 2020-08-25 NOTE — CONSULTS
Prasanna Bennett M.D.,[facility]  Yelena Arias D.O., F.A.C.O.I., Jenelle Fleming M.D. Alesia Alvarez M.D., Danette Riggs M.D. Desi Gutierrez D.O. Patient:  Madhavi Carrizales 80 y.o. female MRN: [id number]     Date of Service: 8/25/2020      PULMONARY CONSULTATION    Reason for Consultation: Dyspnea and acute on chronic respiratory failure with hypoxia and hypercapnia  Referring Physician: Dr. Jeevan Castañeda MD.    Communication with the referring physician will be sent via the electronic medical record. Chief Complaint: Shortness of breath and cough    CODE STATUS: DNR CCA    SUBJECTIVE:  HPI:  Madhavi Carrizales is a 80 y.o.  female who we are asked to evaluate in consultation for dyspnea related to acute on chronic respiratory failure with hypoxia and hypercapnia. She is a well-known patient to our service followed during most recent hospital admission 8/13 and discharge on 8/19/2020. She has a past medical history significant for chronic hypoxic and hypercapnic respiratory failure, pulmonary hypertension, chronic A. fib on anticoagulation previously with and switched to Coumadin during last hospital admission-INR is currently 3.1, chronic kidney disease, morbid obesity, GERD hypertension hypothyroidism CVA right CEA gastric bypass surgery in the past who was presented from her nursing home with shortness of breath and worsening hypoxia. She states since she was discharged last week her breathing has not been great. During her last hospital stay she was treated for CHF, volume overload, and A. fib rate control receiving aggressive diuresis per nephrology proBNP was previously 3029 down to 763 on 8/24/2020. Lab testing WBC 3.5, hemoglobin 11.0, she has been around no known COVID contacts at her facility. COVID-19 test negative on admission.     Chest x-ray on 8/24/2020 limited due to body habitus with evidence of moderate bilateral pleural effusions seen in the lateral view Influenza Vaccine, unspecified formulation 10/20/2017    Influenza Virus Vaccine 10/16/2019    Pneumococcal Conjugate Vaccine 03/20/2015    Pneumococcal Vaccine 09/25/2019        Home Meds: Medications Prior to Admission: warfarin (COUMADIN) 2 MG tablet, Take 1 tablet by mouth daily (Patient taking differently: Take 2 mg by mouth nightly )  metoprolol succinate (TOPROL XL) 50 MG extended release tablet, Take 1 tablet by mouth daily  bumetanide (BUMEX) 1 MG tablet, Take 1 tablet by mouth 2 times daily (Patient taking differently: Take 1 mg by mouth 2 times daily Rise and noon)  Multiple Vitamins-Minerals (THERAPEUTIC MULTIVITAMIN-MINERALS) tablet, Take 1 tablet by mouth daily  lidocaine 4 % external patch, Place 1 patch onto the skin daily Remove at HS  potassium chloride (KLOR-CON M) 10 MEQ extended release tablet, Take 10 mEq by mouth 2 times daily  acetaminophen (TYLENOL) 325 MG tablet, Take 650 mg by mouth every 6 hours as needed for Pain  fluticasone (FLONASE) 50 MCG/ACT nasal spray, 1 spray by Each Nostril route daily (Patient taking differently: 1 spray by Each Nostril route 2 times daily )  calcitRIOL (ROCALTROL) 0.25 MCG capsule, Take 0.5 mcg by mouth daily   loratadine (CLARITIN) 10 MG tablet, Take 10 mg by mouth daily  melatonin 5 MG TABS tablet, Take 10 mg by mouth nightly   guaiFENesin (MUCINEX) 600 MG extended release tablet, Take 600 mg by mouth 2 times daily   Dulaglutide (TRULICITY) 1.5 SF/5.1TN SOPN, Inject 1.5 mg into the skin once a week Indications: weekly on Thursday  Cholecalciferol (VITAMIN D3) 5000 units TABS, Take 5,000 Units by mouth daily   insulin lispro (HUMALOG) 100 UNIT/ML injection vial, Take 12 units before meals + sliding scale.  MAX 60 units daily (Patient taking differently: Inject 12 Units into the skin 3 times daily (before meals) 12 units tid with meals plus sliding scale)  insulin glargine (TOUJEO SOLOSTAR) 300 UNIT/ML injection pen, Inject 50 Units into the skin nightly  ferrous sulfate 325 (65 Fe) MG tablet, Take 1 tablet by mouth 2 times daily (with meals)  magnesium hydroxide (MILK OF MAGNESIA) 400 MG/5ML suspension, Take 30 mLs by mouth daily as needed for Constipation  docusate sodium (COLACE, DULCOLAX) 100 MG CAPS, Take 200 mg by mouth nightly  hydrOXYzine (ATARAX) 10 MG tablet, Take 10 mg by mouth 3 times daily as needed for Itching  levothyroxine (SYNTHROID) 25 MCG tablet, Take 1 tablet by mouth daily (Patient taking differently: Take 25 mcg by mouth every morning )  Mirabegron ER 50 MG TB24, Take 50 mg by mouth Daily with supper   pantoprazole (PROTONIX) 40 MG tablet, Take 1 tablet by mouth 2 times daily (before meals). (Patient taking differently: Take 20 mg by mouth 2 times daily )  simvastatin (ZOCOR) 40 MG tablet, Take 40 mg by mouth nightly.  gabapentin (NEURONTIN) 300 MG capsule, Take 300 mg by mouth nightly. Collette Ruts     CURRENT MEDS :  Scheduled Meds:   bumetanide  2 mg Intravenous 2 times per day    vitamin D  5,000 Units Oral Daily    docusate sodium  200 mg Oral Nightly    ferrous sulfate  325 mg Oral BID WC    fluticasone  1 spray Each Nostril BID    gabapentin  300 mg Oral Nightly    insulin glargine  30 Units Subcutaneous Nightly    insulin lispro  8 Units Subcutaneous TID WC    insulin lispro  0-12 Units Subcutaneous TID WC    insulin lispro  0-6 Units Subcutaneous Nightly    levothyroxine  25 mcg Oral Daily    lidocaine  1 patch Transdermal Daily    metoprolol succinate  50 mg Oral Daily    pantoprazole  40 mg Oral BID AC    mirabegron  50 mg Oral Dinner    potassium chloride  10 mEq Oral BID    sodium chloride flush  10 mL Intravenous 2 times per day    warfarin (COUMADIN) daily dosing (placeholder)   Other RX Placeholder    cefTRIAXone (ROCEPHIN) IV  1 g Intravenous Q24H    doxycycline hyclate  100 mg Oral 2 times per day       Continuous Infusions:   dextrose         Allergies   Allergen Reactions    Pcn [Penicillins] Hives soft, non-tender, non-distended, normal bowel sounds  Extremities: warm, no edema, no clubbing  Skin: no rash or lesion  Neurologic: CN II-XII grossly intact, no focal deficits           Imaging personally reviewed:       The heart is unremarkable    The lung fields demonstrate no significant pulmonary vascular    congestion and edema. The aorta is tortuous ectatic    There are findings throughout the lung fields which are likely chronic              Impression    Findings compatible with atherosclerotic disease    Moderate bilateral pleural effusions best seen on the lateral view    with likely associated atelectasis                    Echo:       Echo:  From 2018     Summary   Ejection fraction is visually estimated at 60%.   No regional wall motion abnormalities seen.   Normal right ventricle size with reduced function. TAPSE is 1.5 cm.   Mild aortic stenosis is present.   Moderate mitral valve stenosis. Mean transmitral gradient 9 mmHg.   Moderate mitral regurgitation is present.   Mild and tricuspid regurgitation.   Pulmonary hypertension is severe. RVSP is 72 mmHg.      Signature  Labs:  Lab Results   Component Value Date    WBC 3.5 08/25/2020    HGB 11.0 08/25/2020    HCT 36.0 08/25/2020    .2 08/25/2020    MCH 31.5 08/25/2020    MCHC 30.6 08/25/2020    RDW 14.0 08/25/2020     08/25/2020    MPV 9.9 08/25/2020     Lab Results   Component Value Date     08/24/2020    K 4.5 08/24/2020    CL 96 08/24/2020    CO2 36 08/24/2020    BUN 30 08/24/2020    CREATININE 2.1 08/24/2020    LABALBU 3.6 08/24/2020    CALCIUM 10.2 08/24/2020    GFRAA 27 08/24/2020    LABGLOM 22 08/24/2020     Lab Results   Component Value Date    PROTIME 36.5 08/25/2020    INR 3.1 08/25/2020     Recent Labs     08/24/20  1425   PROBNP 763*     Recent Labs     08/24/20  1425   TROPONINI 0.03     No results for input(s): PROCAL in the last 72 hours. This SmartLink has not been configured with any valid records. Micro: No results for input(s): CULTRESP in the last 72 hours. No results for input(s): LABGRAM in the last 72 hours. No results for input(s): LEGUR in the last 72 hours. No results for input(s): STREPNEUMAGU in the last 72 hours. No results for input(s): LP1UAG in the last 72 hours. Assessment:  1. Acute on chronic hypoxic and hypercapnic respiratory failure  2. Bilateral pleural effusions with atelectasis  3. Pulmonary HTN, worsening RV function on ECHO compared to 2018. Multifactorial due to untreated GOSIA, OHS. Volume overload. 4. Acute on chronic CHF  5. Acute on chronic kidney disease  6. Chronic A. fib on anticoagulation-previously on Eliquis now on warfarin due to BMI greater than 40  7. Chronic kidney disease  8. Diabetes mellitus type 2  9. Morbid obesity-BMI 52.3    Plan:  1. Oxygen therapy 5 L nasal cannula keep >94%  2. Noninvasive ventilation in the form of BiPAP 14/6-we previously arranged trilogy noninvasive ventilator for her facility last week prior to discharge. switch to AVAPS volume 400 backup rate 12 EPAP +5 FiO2 40%  3. bronchodilator therapy- albuterol PRN  4. Add incentive spirometer and EZ Pap  5. Flonase and Zyrtec  6. Empiric antibiotics Rocephin and doxycycline for CAP coverage. Check cultures, imaging not impressive for pneumonia. . consider US chest to evaluate pleural fluid, if procedure needed, coumadin would need held  7. Coumadin for A. fib INR 3.1  8. Follow chest x-ray. 9. DVT, GI prophylaxis. 10. Fluid management per nephrology-monitor renal function creatinine 2.1 based on Bumex 2 mg IV twice daily. 11. covid 19 test negative on admission   12. Bowel regimen- colace, senna      Thank you for allowing me to participate in the care of [facility]. Please feel free to call with questions.      This plan of care was reviewed in collaboration with Dr. Sarah Mancia    Electronically signed by SAVANNA Shah - CNP on 8/25/2020 at 8:33 AM        I personally saw, examined, and cared for the patient. Labs, medications, radiographs reviewed. I agree with history exam and plans detailed in NP note.       Electronically signed by Mere Pichardo DO on 8/25/2020 at 11:27 PM

## 2020-08-25 NOTE — DISCHARGE INSTR - COC
Continuity of Care Form    Patient Name: Samina Shelley   :  1935  MRN:  [id number]    Admit date:  2020  Discharge date:  20  Code Status Order: DNR-CCA   Advance Directives:   [facility] Documentation     Date/Time Healthcare Directive Type of Healthcare Directive Copy in [address] Agent's Name Healthcare Agent's Phone Number    20 1837  Yes, patient has an advance directive for healthcare treatment  Health care treatment directive  No, copy requested from family  Adult [facility]  [phone]          Admitting Physician:  Dioni Mojica MD  PCP: Emelia Le MD    Discharging Nurse: Bert Chars [facility] Unit/Room#: [address] [facility] Unit Phone Number:[phone]    Emergency Contact:   Extended Emergency Contact Information  Primary Emergency Contact: Navarro [place] [facility])  Address: [address] [place] [address], [address] [place] [address] Phone: [phone]  Mobile Phone: [phone]  Relation: Child  Secondary Emergency Contact: Richmond Garcia  Mobile Phone: [phone]  Relation: Child    Past Surgical History:  Past Surgical History:   Procedure Laterality Date    APPENDECTOMY      BREAST SURGERY      right    BRONCHOSCOPY  2018    BRONCHOSCOPY DIAGNOSTIC performed by Katlyn Murillo MD at [address] [place] [address] NASAL SINUS SURGERY      2017. cauterized her nasal passage.  OTHER SURGICAL HISTORY  2017    endoscopic conrtol of epistaxis dr Louanne Kehr N/A 2018    ENDOSCOPIC GUIDED CONTROL EPISTAXIS  WITH SEPTAL BUTTON PLACEMENT. INTRA-OPERATIVE BRONCHOSCOPY.  performed by Katlyn Murillo MD at [facility]       Immunization History:   Immunization History   Administered Date(s) Administered    Influenza Vaccine, unspecified formulation 10/20/2017    Influenza Virus Vaccine 10/16/2019    Pneumococcal Conjugate Vaccine 03/20/2015    Pneumococcal Vaccine 09/25/2019       Active Problems:  Patient Active Problem List   Diagnosis Code    Chronic atrial fibrillation I48.20    CKD (chronic kidney disease) stage 3, GFR 30-59 ml/min ([facility]) N18.3    History of breast cancer Z85.3    Chronic anemia D64.9    Diabetes mellitus type 2, uncontrolled ([facility] [address] E11.65    Essential hypertension I10    History of CVA (cerebrovascular accident) Z80.78    Hyperlipidemia LDL goal <100 E78.5    Acquired hypothyroidism E03.9    Morbid obesity with BMI of 50.0-59.9, adult ([facility]) E66.01, Z68.43    Acute on chronic respiratory failure with hypoxia ([facility]) J96.21    Acute on chronic diastolic congestive heart failure ([facility]) I50.33       Isolation/Infection:   Isolation          No Isolation        Patient Infection Status     Infection Onset Added Last Indicated Last Indicated By Review Planned Expiration Resolved Resolved By    None active    Resolved    COVID-19 Rule Out 08/24/20 08/24/20 08/24/20 COVID-19 (Ordered)   08/24/20 Rule-Out Test Resulted    COVID-19 Rule Out 08/13/20 08/13/20 08/13/20 COVID-19 (Ordered)   08/13/20 Rule-Out Test Resulted          Nurse Assessment:  Last Vital Signs: /68   Pulse 82   Temp 98.2 °F (36.8 °C) (Oral)   Resp 18   Ht 5' 1\" (1.549 m)   Wt 277 lb (125.6 kg)   LMP  (LMP Unknown)   SpO2 98%   BMI 52.34 kg/m²     Last documented pain score (0-10 scale): Pain Level: 0  Last Weight:   Wt Readings from Last 1 Encounters:   08/25/20 277 lb (125.6 kg)     Mental Status:  oriented    IV Access:  - None    Nursing Mobility/ADLs:  Walking   Dependent  Transfer  Dependent  Bathing  Assisted  Dressing  Dependent  Toileting  Dependent  Feeding  Assisted  Med Admin  Assisted  Med Delivery   none    Wound Care Documentation and Therapy:  Wound 08/15/20 Nose Mid (Active)   Number of days: 9        Elimination:  Continence:   · Bowel:  Yes  · Bladder: No  Urinary certify the above information and transfer of Elda [place]  is necessary for the continuing treatment of the diagnosis listed and that she requires [place] for less 30 days. Update Admission H&P: {CHP DME Changes in FAUDF:[id number]}    PHYSICIAN SIGNATURE: Electronically signed by Chaya Gomez MD on 8/31/2020 at 8:45 AM    Follow up with dr Jazmin Norton in 1 week. Follow up with dr Fay Lord in 2-3 weeks. Follow up with dr Shannan Jaquez in 2-3 weeks. Follow up with dr Jameel Bearden in 2-3 weeks. Follow up with the CHF clinic as scheduled.

## 2020-08-25 NOTE — CARE COORDINATION
Social Work:    Navarro, patient's son returned social work call and advised that Mrs. Daphnie Hall resides at [address] [place] [address] and will return.     Electronically signed by THEODORE Stanley on 8/25/2020 at 1:40 PM

## 2020-08-25 NOTE — CARE COORDINATION
CM NOTE: Per QFR--- admitted from [facility] with CHF. Given IV lasix---diuretics on hold this am. Pt currently using O2 6L & uses 4L at SNF. Attempt wean to baseline today. ID & nephrology on case. Therapy ordered & pending. Await evals & input.

## 2020-08-25 NOTE — PROGRESS NOTES
Date: 8/24/2020    Time: 8:28 PM    Patient Placed On BIPAP/CPAP/ Non-Invasive Ventilation? Patient already on BIPAP     Facial area red/color change? yes           If YES are Blister/Lesion present? Reported to nurse by previous RT   If yes must notify nursing staff  BIPAP/CPAP skin barrier? yes    Skin barrier type: Duoderm to bridge of nose      Comments: Patient transported on BIPAP to patient room 605. No complications.         Montrell Carolina

## 2020-08-25 NOTE — CONSULTS
[facility] MD Jacklyn Huddleston MD Liza Diener, MD Milford Moro, MD Noemi Requena, JESSA Correia, JEWEL  Consultation  Patient's Name: Kevin Faulkner  7:30 PM  8/25/2020    Nephrologist: Jacklyn Espinoza MD    Reason for Consult: Chronic kidney disease, swelling  Requesting Physician:  Óscar Baker MD    Chief Complaint: Dyspnea    History Obtained From: Patient, chart    History of Present Ilness:    Ms. Debora Rogers is a pleasant 35-year-old woman known to service, followed by me longitudinally as an outpatient for chronic kidney disease, and seen just recently during her admission for acute kidney injury and CHF. Baseline creatinine 1.9 to 2.1 mg/dL. She was discharged on 8/19/2020 to an ECF. Evening prior to her admission she developed rather rapid onset of dyspnea associated with some gasping. Denies cough or wheeze. Denies chest pain or palpitations. No fever or chill. Lower extremity swelling was quite mild. She is brought to the emergency department given IV Lasix, with improvement in her symptoms. Bumex 2 mg IV every 12 hours was started this morning. He had a creatinine presentation yesterday afternoon with 30 and 2.1, respectively, improving to 25 and 1.9, respectively as of today. Bicarbonate 36 on presentation, rate 36 as of today. At the time of my initial evaluation this afternoon she was resting comfortably, with no specific complaint presents fatigue, malaise.     Past Medical History:   Diagnosis Date    Anemia due to acute blood loss 12/29/2017    Arthritis     Blood transfusion reaction     Chronic atrial fibrillation 12/29/2017    CKD (chronic kidney disease) stage 3, GFR 30-59 ml/min ([facility]) 12/29/2017    Diabetes mellitus ([facility] [address]     History of blood transfusion     History of breast cancer     breast cancer Right    History of stroke     Hyperlipidemia     Hypertension     Hypothyroidism     Volume overload 12/30/2017    As cause of dyspnea       Past Surgical History:   Procedure Laterality Date    APPENDECTOMY      BREAST SURGERY      right    BRONCHOSCOPY  4/28/2018    BRONCHOSCOPY DIAGNOSTIC performed by Ck Reyes MD at [address] [place] [address]  NASAL SINUS SURGERY      august 27 2017. cauterized her nasal passage.  OTHER SURGICAL HISTORY  07/27/2017    endoscopic conrtol of epistaxis dr Jahaira De Dios N/A 4/28/2018    ENDOSCOPIC GUIDED CONTROL EPISTAXIS  WITH SEPTAL BUTTON PLACEMENT. INTRA-OPERATIVE BRONCHOSCOPY. performed by Ck Reyes MD at [facility] OR       Family History   Problem Relation Age of Onset    Heart Disease Mother     Kidney Disease Mother         dialysis    Diabetes Mother     Stroke Father     Diabetes Father         reports that she has never smoked. She has never used smokeless tobacco. She reports previous alcohol use. She reports that she does not use drugs.     Allergies:  Pcn [penicillins]    Current Medications:    docusate sodium (COLACE) capsule 100 mg, Daily  senna (SENOKOT) tablet 8.6 mg, Nightly  albuterol (PROVENTIL) nebulizer solution 2.5 mg, Q4H PRN  bumetanide (BUMEX) injection 2 mg, 2 times per day  vitamin D (CHOLECALCIFEROL) tablet 5,000 Units, Daily  docusate sodium (COLACE) capsule 200 mg, Nightly  ferrous sulfate (IRON 325) tablet 325 mg, BID WC  fluticasone (FLONASE) 50 MCG/ACT nasal spray 1 spray, BID  gabapentin (NEURONTIN) capsule 300 mg, Nightly  hydrOXYzine (ATARAX) tablet 10 mg, TID PRN  glucose (GLUTOSE) 40 % oral gel 15 g, PRN  dextrose 50 % IV solution, PRN  glucagon (rDNA) injection 1 mg, PRN  dextrose 5 % solution, PRN  insulin glargine (LANTUS) injection vial 30 Units, Nightly  insulin lispro (HUMALOG) injection vial 8 Units, TID WC  insulin lispro (HUMALOG) injection vial 0-12 Units, TID WC  insulin lispro (HUMALOG) injection vial 0-6 Units, Nightly  levothyroxine Results   Component Value Date    WBC 3.5 08/25/2020    RBC 3.49 08/25/2020    HGB 11.0 08/25/2020    HCT 36.0 08/25/2020     08/25/2020    .2 08/25/2020    MCH 31.5 08/25/2020    MCHC 30.6 08/25/2020    RDW 14.0 08/25/2020    NRBC 0.9 08/15/2020    LYMPHOPCT 20.7 08/25/2020    MONOPCT 13.1 08/25/2020    BASOPCT 0.3 08/25/2020    MONOSABS 0.46 08/25/2020    LYMPHSABS 0.73 08/25/2020    EOSABS 0.14 08/25/2020    BASOSABS 0.01 08/25/2020     CMP:    Lab Results   Component Value Date     08/25/2020    K 3.9 08/25/2020    K 3.9 08/25/2020    CL 96 08/25/2020    CO2 36 08/25/2020    BUN 27 08/25/2020    CREATININE 1.9 08/25/2020    GFRAA 30 08/25/2020    LABGLOM 25 08/25/2020    GLUCOSE 112 08/25/2020    PROT 6.9 08/24/2020    LABALBU 3.6 08/24/2020    CALCIUM 10.1 08/25/2020    BILITOT 0.3 08/24/2020    ALKPHOS 67 08/24/2020    AST 24 08/24/2020    ALT 19 08/24/2020     Ionized Calcium:  No results found for: IONCA  Magnesium:    Lab Results   Component Value Date    MG 2.0 08/25/2020     Phosphorus:    Lab Results   Component Value Date    PHOS 3.3 08/25/2020     U/A:    Lab Results   Component Value Date    COLORU Yellow 08/13/2020    PHUR 5.5 08/13/2020    WBCUA 1-3 08/13/2020    RBCUA >20 08/13/2020    BACTERIA NONE SEEN 08/13/2020    CLARITYU Clear 08/13/2020    SPECGRAV 1.015 08/13/2020    LEUKOCYTESUR SMALL 08/13/2020    UROBILINOGEN 0.2 08/13/2020    BILIRUBINUR Negative 08/13/2020    BLOODU LARGE 08/13/2020    GLUCOSEU Negative 08/13/2020    AMORPHOUS NONE 07/20/2018     Microalbumen/Creatinine ratio:  No components found for: RUCREAT  Iron Saturation:  No components found for: PERCENTFE  TIBC:    Lab Results   Component Value Date    TIBC 385 07/21/2018     FERRITIN:    Lab Results   Component Value Date    FERRITIN 30 07/21/2018        Imaging:  XR CHEST (2 VW)   Final Result   Findings compatible with atherosclerotic disease    Moderate bilateral pleural effusions best seen on the lateral view   with likely associated atelectasis                     XR CHEST PORTABLE   Final Result      Redemonstration of bibasilar opacities notable in the left which could   indicate persistent bilateral pleural effusions and/or atelectasis. Continued follow-up could be helpful for further evaluation. US CHEST INCLUDING MEDIASTINUM    (Results Pending)       ASSESSMENT / RECOMMENDATIONS:    1-chronic kidney disease stage IIIB baseline cr 1.9-2.2 with no proteinuria      2-Acute /chronic respiratory failure multifactorial, secondary combination of obstructive lung disease, systolic and diastolic CHF     3-LVH      4-anemia of chronic disease. Mild. JENIFER not warranted. Mildly macrocytic. Folate and B12 normal     5-Mineral bone disease      Azotemia stable, cr at baseline.      --> Continue Bumex 2 mg IV every 12 hours for now  --> Transition oral diuretic therapy in the next 1 to 3 days  --> Check iron profile and ferritin  --> Follow labs, UO  --> Continue supportive care      Thank you thank you for the opportunity to participate in the care of your pleasant patient. We look forward to following along with you.       Electronically signed by Rosie Grady MD on 8/25/2020

## 2020-08-25 NOTE — CARE COORDINATION
Social Work:    Misty Rodriguez at [facility] advised that Mrs. Tolu Mary is a [facility] dual bed hold and will need PT/OT ( if possible) to re-skill upon return. Misty Rodriguez advised there will be no hold for transfer back to their facility. No covid test is needed unless Mrs. Tolu Mary is showing sign & symptoms that warrant testing. Social service attempted to confirm return plans with patient unsuccessfully today. Attempt to reach patient's son/POA was also made unsuccessfully. Social work to follow-up .  (N-17, ambulance done)    Electronically signed by THEODORE Tavera on 8/25/2020 at 1:17 PM

## 2020-08-26 NOTE — PROGRESS NOTES
Erica Pearl M.D.,[facility]  Joanne Cifuentes D.O., F.A.C.O.I., Kathia Dumont M.D. Ольга Osorio M.D., Ninfa Sneed M.D. Zahida Gonzalez D.O. Daily Pulmonary Progress Note    Patient:  Malcolm Leventhal 80 y.o. female MRN: [id number]     Date of Service: 8/26/2020      Synopsis     We are following patient for Dyspnea and acute on chronic respiratory failure with hypoxia and hypercapnia    \"CC\" shortness of breath    Code status: FULL       Subjective      Patient was seen and examined. Currently on 5 liters NC off NIV . Wore bipap 18/6 last HS . Family present at bedside questions answered. Await ultrasound chest to assess degree of pleural effusions. Intermittently feels short of breath. Occasional non productive cough. No fevers. Review of Systems:  Constitutional: Denies fever, weight loss, night sweats, and fatigue  Skin: Denies pigmentation, dark lesions, and rashes   HEENT: Denies hearing loss, tinnitus, ear drainage, epistaxis, sore throat, and hoarseness. Cardiovascular: Denies palpitations, chest pain, and chest pressure.   Respiratory: Denies cough, dyspnea at rest, hemoptysis, apnea, and choking. +intermittent dyspnea  Gastrointestinal: Denies nausea, vomiting, poor appetite, diarrhea, heartburn or reflux  Genitourinary: Denies dysuria, frequency, urgency or hematuria  Musculoskeletal: Denies myalgias, muscle weakness, and bone pain  Neurological: Denies dizziness, vertigo, headache, and focal weakness  Psychological: Denies anxiety and depression  Endocrine: Denies heat intolerance and cold intolerance  Hematopoietic/Lymphatic: Denies bleeding problems and blood transfusions    24-hour events:  None     Objective   Vitals: /70   Pulse 81   Temp 97.8 °F (36.6 °C) (Oral)   Resp 20   Ht 5' 1\" (1.549 m)   Wt 271 lb 6.4 oz (123.1 kg)   LMP  (LMP Unknown)   SpO2 100%   BMI 51.28 kg/m²     I/O:  No intake or output data in the 24 hours ending 08/26/20 1132    Vent Information  Skin Assessment: (S) Skin breakdown present (see comment/note)(nurse is aware of skin break down)  FiO2 : 60 %  SpO2: 100 %  SpO2/FiO2 ratio: 165  I Time/ I Time %: 1 s  Mask Type: Full face mask  Mask Size: Medium       IPAP: 18 cmH20  CPAP/EPAP: 6 cmH2O     CURRENT MEDS :  Scheduled Meds:   docusate sodium  100 mg Oral Daily    senna  1 tablet Oral Nightly    bumetanide  2 mg Intravenous 2 times per day    vitamin D  5,000 Units Oral Daily    docusate sodium  200 mg Oral Nightly    ferrous sulfate  325 mg Oral BID WC    fluticasone  1 spray Each Nostril BID    gabapentin  300 mg Oral Nightly    insulin glargine  30 Units Subcutaneous Nightly    insulin lispro  8 Units Subcutaneous TID WC    insulin lispro  0-12 Units Subcutaneous TID WC    insulin lispro  0-6 Units Subcutaneous Nightly    levothyroxine  25 mcg Oral Daily    lidocaine  1 patch Transdermal Daily    metoprolol succinate  50 mg Oral Daily    pantoprazole  40 mg Oral BID AC    mirabegron  50 mg Oral Dinner    potassium chloride  10 mEq Oral BID    sodium chloride flush  10 mL Intravenous 2 times per day    warfarin (COUMADIN) daily dosing (placeholder)   Other RX Placeholder    cefTRIAXone (ROCEPHIN) IV  1 g Intravenous Q24H    doxycycline hyclate  100 mg Oral 2 times per day       Physical Exam:  General Appearance: appears comfortable in no acute distress. HEENT: Normocephalic atraumatic without obvious abnormality   Neck: Lips, mucosa, and tongue normal.  Supple, symmetrical, trachea midline, no adenopathy;thyroid:  no enlargement/tenderness/nodules or JVD. Lung: Breath sounds diminished fewer bibasilar crackles Respirations   unlabored. Symmetrical expansion. Heart: RRR, normal S1, S2. No MRG  Abdomen: Soft, NT, ND. BS present x 4 quadrants. No bruit or organomegaly. Extremities: Pedal pulses 2+ symmetric b/l. Extremities normal, no cyanosis, clubbing, or edema.    Musculokeletal: No joint swelling, no muscle tenderness. ROM normal in all joints of extremities. Neurologic: Mental status: Alert and Oriented X3 . Pertinent/ New Labs and Imaging Studies     Imaging Personally Reviewed:       The heart is unremarkable    The lung fields demonstrate no significant pulmonary vascular    congestion and edema. The aorta is tortuous ectatic    There are findings throughout the lung fields which are likely chronic              Impression    Findings compatible with atherosclerotic disease    Moderate bilateral pleural effusions best seen on the lateral view    with likely associated atelectasis                      Echo:        Echo:  From 2018     Summary   Ejection fraction is visually estimated at 60%.   No regional wall motion abnormalities seen.   Normal right ventricle size with reduced function. TAPSE is 1.5 cm.   Mild aortic stenosis is present.   Moderate mitral valve stenosis. Mean transmitral gradient 9 mmHg.   Moderate mitral regurgitation is present.   Mild and tricuspid regurgitation.   Pulmonary hypertension is severe. RVSP is 72 mmHg.              Labs:  Lab Results   Component Value Date    WBC 3.9 08/26/2020    HGB 11.0 08/26/2020    HCT 35.2 08/26/2020    .8 08/26/2020    MCH 32.4 08/26/2020    MCHC 31.3 08/26/2020    RDW 13.8 08/26/2020     08/26/2020    MPV 9.8 08/26/2020     Lab Results   Component Value Date     08/26/2020    K 4.2 08/26/2020    K 3.9 08/25/2020    CL 93 08/26/2020    CO2 40 08/26/2020    BUN 27 08/26/2020    CREATININE 2.0 08/26/2020    LABALBU 3.6 08/26/2020    CALCIUM 9.9 08/26/2020    GFRAA 29 08/26/2020    LABGLOM 24 08/26/2020     Lab Results   Component Value Date    PROTIME 29.5 08/26/2020    INR 2.4 08/26/2020     Recent Labs     08/24/20  1425   PROBNP 763*     Recent Labs     08/26/20  0416   PROCAL 0.07     This SmartLink has not been configured with any valid records.        Micro:  No results for input(s): CULTRESP in the last 72 hours. No results for input(s): LABGRAM in the last 72 hours. No results for input(s): LEGUR in the last 72 hours. Recent Labs     08/25/20  1530   STREPNEUMAGU Presumptive negative- suggests no current or recent  pneumococcal infection. Infection due to Strep pneumoniae cannot be  ruled out since the antigen present in the sample  may be below the detection limit of the test.  Normal Range:Presumptive Negative       No results for input(s): LP1UAG in the last 72 hours. Assessment:    1. Acute on chronic hypoxic and hypercapnic respiratory failure  2. Bilateral pleural effusions with atelectasis  3. Pulmonary HTN, worsening RV function on ECHO compared to 2018. Multifactorial due to untreated GOSIA, OHS. Volume overload. 4. Acute on chronic CHF  5. Acute on chronic kidney disease  6. Chronic A. fib on anticoagulation-previously on Eliquis now on warfarin due to BMI greater than 40  7. Chronic kidney disease  8. Diabetes mellitus type 2  9. Morbid obesity-BMI 52.3      Plan:   1. Oxygen therapy 5 L nasal cannula keep >94%  2. Noninvasive ventilation in the form of BiPAP at HS 18/6 BUR 18 FIO2 60%-we previously arranged trilogy noninvasive ventilator for her facility last week prior to discharge. previous settings were AVAPS volume 400 backup rate 12 EPAP +5 FiO2 40%  3. bronchodilator therapy- albuterol PRN  4. Continue incentive spirometer and EZ Pap  5. Flonase and Zyrtec  6. Empiric antibiotics Rocephin and doxycycline for CAP coverage. Check cultures, imaging not impressive for pneumonia. . consider US chest to evaluate pleural fluid, if procedure needed, coumadin would need held  7. Coumadin for A. fib INR 2.4  8. Follow chest x-ray. 9. DVT, GI prophylaxis. 10. Fluid management per nephrology-monitor renal function creatinine 2.1 based on Bumex 2 mg IV twice daily. 11. covid 19 test negative on admission   12.  Bowel regimen- colace,   This plan of care was reviewed in collaboration with  Alexandru  Electronically signed by SAVANNA Baer CNP on 8/26/2020 at 11:32 AM        I personally saw, examined, and cared for the patient. Labs, medications, radiographs reviewed. I agree with history exam and plans detailed in NP note. US with minimal b/l fluid. No significant amount for thoracentesis.   D/C abx    Continue diuresis per nephro    Electronically signed by Nina Pepper DO on 8/26/2020 at 2:33 PM

## 2020-08-26 NOTE — PROGRESS NOTES
Patient tidal volumes measuring as low as 56 w a very low mask leak on bipap while laying flat sleeping. Increased IPAP to 18 to achieve adequate tidal volume, some breaths still below 200. Increased back up rate to 18 to maintain minute volume. Consider avaps settings on the bipap.

## 2020-08-26 NOTE — PROGRESS NOTES
[facility] Quality Flow/Interdisciplinary Rounds Progress Note        Quality Flow Rounds held on August 26, 2020    Disciplines Attending:  Bedside Nurse, ,  and Nursing Unit Leadership    John Chowdhury was admitted on 8/24/2020  1:55 PM    Anticipated Discharge Date:  Expected Discharge Date: 08/27/20    Disposition:    Cole Score:  Cole Scale Score: 18    Readmission Risk              Risk of Unplanned Readmission:        27           Discussed patient goal for the day, patient clinical progression, and barriers to discharge. The following Goal(s) of the Day/Commitment(s) have been identified:  Continues on IV Lasix, check Nephrology plan.       Eli Alfred  August 26, 2020

## 2020-08-26 NOTE — PROGRESS NOTES
Physical Therapy    Facility/Department: [facility] MED SURG  Treatment note    NAME: Keerthi Molina  : 1935  MRN: [id number]    Date of Service: 2020               Patient Diagnosis(es): The primary encounter diagnosis was Acute on chronic congestive heart failure, unspecified heart failure type ([facility] A diagnosis of Dyspnea and respiratory abnormalities was also pertinent to this visit. has a past medical history of Anemia due to acute blood loss, Arthritis, Blood transfusion reaction, Chronic atrial fibrillation, CKD (chronic kidney disease) stage 3, GFR 30-59 ml/min ([facility]), Diabetes mellitus ([facility] History of blood transfusion, History of breast cancer, History of stroke, Hyperlipidemia, Hypertension, Hypothyroidism, and Volume overload.   has a past surgical history that includes Breast surgery; Cholecystectomy; Gastric bypass surgery; other surgical history (2017); Nasal sinus surgery; pr ctrl nosebleed,anter,complex (N/A, 2018); bronchoscopy (2018); and Appendectomy. Evaluating Therapist: Bowen Del Real PT     Referring Provider:  Dr. Latasha Mercado #: [id number]   DIAGNOSIS : CHF  Additional Pertinent History:A-fib, CKD, HTN, Gastric bypass, CHF  PRECAUTIONS: falls, O2     Social:  Per chart pt admitted from nursing home. Pt questionable historian with difficuly answering questions about prior level of function. States she walks is bed bound; does not get OOB        Initial Evaluation  Date: 2020 Treatment    2020  Short Term/ Long Term   Goals   Was pt agreeable to Eval/treatment? With encouragement  yes     Does pt have pain? No c/o pain No complaints      Bed Mobility  Rolling: max assist  Supine to sit: max assist x 2   Sit to supine: max assist x 2   Scooting: max assist x 2  Rolling: Max A  Supine ,. sit: Max A of 2  Scooting: Max A of 2 to EOB and up in bed once supine Mod assist   Transfers NT . Pt reports unable  Sit to stand:  Mod A of 2  Stand to sit: Mod A of 2   Max assist x 2   Ambulation    NT NT TBD    LE strength     3-/5     3+/5   balance      Sitting balance edge of bed SBA        AM-PAC Raw score               6/24 8/24             Pt is alert able to follow instruction  Balance: poor standing without A/D    Pt performed therapeutic exercise of the following: NT    Patient education  Pt was educated on bed mobility, benefits of standing tolerance    Patient response to education:   Pt verbalized understanding Pt demonstrated skill Pt requires further education in this area   yes With instruction yes     ASSESSMENT:   Comments: Nurse ok with rx. Pt found in bed, agreed to rx, was assisted to EOB, sat EOB mod A for balance due to trunk retroversion. Pt stood off EOB approx 30 seconds x 2 and 10 seconds x 1 with Mod A for balance. Pt fatigued, unable to transfer to a chair or perform gait a short distance. Treatment: Pt practiced and was instructed in the following treatment: LE participation with standing balance    Pt was left in bed with call light in reach    Time in 0959   Time out 1014   Total Treatment Time 15 minutes   CPT codes:     Therapeutic activities 41001 15 minutes   Therapeutic exercises 57055 0 minutes       Pt is making good progress toward established Physical Therapy goals as per brief standing performed today. Continue with physical therapy current plan of care promoting OOB activity as alexandre Medel PTA   License Number: [id number]

## 2020-08-26 NOTE — PROGRESS NOTES
Value Date    MG 1.9 08/26/2020     Phosphorus:    Lab Results   Component Value Date    PHOS 3.4 08/26/2020     PT/INR:    Lab Results   Component Value Date    PROTIME 29.5 08/26/2020    INR 2.4 08/26/2020     PTT:    Lab Results   Component Value Date    APTT 33.3 07/22/2018   [APTT}     Assessment:    Patient Active Problem List   Diagnosis    Chronic atrial fibrillation    CKD (chronic kidney disease) stage 3, GFR 30-59 ml/min ([facility])    History of breast cancer    Chronic anemia    Diabetes mellitus type 2, uncontrolled [facility]    Essential hypertension    History of CVA (cerebrovascular accident)    Hyperlipidemia LDL goal <100    Acquired hypothyroidism    Morbid obesity with BMI of 50.0-59.9, adult ([facility]    Acute on chronic respiratory failure with hypoxia ([facility])    Acute on chronic diastolic congestive heart failure ([facility]       Plan:  Rate controlled. Renal function at baseline. Stable. Stable. Blood glucose ok, continue to adjust basal/bolus insulin therapy  Blood pressure ok, continue current medications  Continue Pt/Ot and risk factor modifications. Continue aggressive lipid therapy  Continue synthroid. Secondary to CHF. Diurese as BP and renal function allow.   Pt/Ot evaluations for discharge planning    Pete Cruz    10:37 AM  8/26/2020

## 2020-08-26 NOTE — PROGRESS NOTES
Pharmacy Consultation Note  (Warfarin Dosing and Monitoring)    Initial consult date: 8/24  Consulting physician: Vicki Linn    Allergies:  Pcn [penicillins]    80 y.o. female    Ht Readings from Last 1 Encounters:   08/24/20 5' 1\" (1.549 m)     Wt Readings from Last 1 Encounters:   08/26/20 271 lb 6.4 oz (123.1 kg)         Warfarin Indication Target   INR Range Home Dose  (if applicable) Diet/Feeding Tube   (Enteral feeds, nutritional drinks, increased Vitamin K in diet can decrease INR)   Afib 2-2.5 2mg daily Carb Control       x Home Med? Meds Increasing INR x Home Med?  Meds Decreasing INR     Allopurinol    Azathioprine     Amiodarone/Propafenone/Dronedarone   Carbamazepine     Androgens   Cholestyramine     Chemotherapy (BBW: Capecitabine)   Estrogen     Ciprofloxacin/Levofloxacin   Nafcillin/Dicloxacillin     Clarithromycin/Erythromycin/Azithromycin   Barbiturates      Fluconazole/Itraconazole/Voriconazole/Ketoconazole   Phenytoin (Variable)     Metronidazole   Rifampin     Phenytoin (Variable)   Steroids (Variable/Dose Dependent)      Statins/Fenofibrate/Gemfibrozil   Sucralfate     Steroids (Variable/Dose Dependent)   Other:     Sulfamethoxazole/Trimethoprim        Tramadol         Other:        Comments regarding medication interactions:      x Diseases Affecting INR x Increased Bleeding Risk    CHF Exacerbation (Increases)  History GI Bleed/PUD    Liver Disease (Increases)  Chronic NSAID Use    Thyroid: Hyper (Increases)  Hypo (Decreases)  Chronic ASA/Antiplatelet Use (Clopidogrel/ Dipyridamole/Prasugrel/Ticagrelor)      Malignancy (Increases)  Abnormal Renal Function (dialysis, renal transplant, SCr ? 2.3 mg/dL)     History of EtOH Abuse: Acute (Increases)   Chronic (Decreases)  Liver Function (cirrhosis, bilirubin >2x ULN with AST/ALT/AP >3x ULN)    Fever (Increases)  Age > 65 years    Acute infection (Increases)  Hypertension/Uncontrolled BP    Diarrhea/Dehydration (Increases)  History of stroke    Other:

## 2020-08-26 NOTE — PROGRESS NOTES
INPATIENT CARDIOLOGY FOLLOW-UP    Name: Skip Candelario    Age: 80 y.o. 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 Limited fluid mobilization has occurred with renal function and electrolytes remaining stable. Acceptable rate control of her atrial fibrillation therapeutic anticoagulation are presently maintained. Review of Systems: The remainder of a complete multisystem review including consitutional, central nervous, respiratory, circulatory, gastrointestinal, genitourinary, endocrinologic, hematologic, musculoskeletal and psychiatric are negative. Problem List:  Patient Active Problem List   Diagnosis    Chronic atrial fibrillation    CKD (chronic kidney disease) stage 3, GFR 30-59 ml/min ([facility])    History of breast cancer    Chronic anemia    [facility] mellitus type 2, uncontrolled [facility] [address]    Essential hypertension    History of CVA (cerebrovascular accident)    Hyperlipidemia LDL goal <100    Acquired hypothyroidism    Morbid obesity with BMI of 50.0-59.9, adult ([facility] [address]    Acute on chronic respiratory failure with hypoxia (HCC)    Acute on chronic diastolic congestive heart failure (HCC)       Allergies:   Allergies   Allergen Reactions    Pcn [Penicillins] Hives       Current Medications:  Current Facility-Administered Medications   Medication Dose Route Frequency Provider Last Rate Last Dose    docusate sodium (COLACE) capsule 100 mg  100 mg Oral Daily SAVANNA Mcmullen - CNP   100 mg at 08/25/20 1138    senna (SENOKOT) tablet 8.6 mg  1 tablet Oral Nightly SAVANNA Mcmullen - CNP   8.6 mg at 08/25/20 2214    albuterol (PROVENTIL) nebulizer solution 2.5 mg  2.5 mg Nebulization Q4H PRN Coretta Alexander APRN - JESSA        bumetanide (BUMEX) injection 2 mg  2 mg Intravenous 2 times per day Ed Castañeda MD   2 mg at 08/26/20 3759    vitamin D (CHOLECALCIFEROL) tablet 5,000 Units  5,000 Units Oral Daily Ed Castañeda MD   5,000 Units at 08/25/20 6707    docusate sodium (COLACE) capsule 200 mg  200 mg Oral Nightly Ed Castañeda MD   200 mg at 08/25/20 2155    ferrous sulfate (IRON 325) tablet 325 mg  325 mg Oral BID  Ed Castañeda MD   325 mg at 08/25/20 1606    fluticasone (FLONASE) 50 MCG/ACT nasal spray 1 spray  1 spray Each Nostril BID Ed Castañeda MD   1 spray at 08/25/20 2158    gabapentin (NEURONTIN) capsule 300 mg  300 mg Oral Nightly Ed Castañeda MD   300 mg at 08/25/20 2155    hydrOXYzine (ATARAX) tablet 10 mg  10 mg Oral TID PRN Ed Castañeda MD        glucose (GLUTOSE) 40 % oral gel 15 g  15 g Oral PRN Ed Castañeda MD        dextrose 50 % IV solution  12.5 g Intravenous PRN Ed Castañeda MD        glucagon (rDNA) injection 1 mg  1 mg Intramuscular PRN Ed Castañeda MD        dextrose 5 % solution  100 mL/hr Intravenous PRN Ed Castañeda MD        insulin glargine (LANTUS) injection vial 30 Units  30 Units Subcutaneous Nightly Ed Castañeda MD   30 Units at 08/25/20 2209    insulin lispro (HUMALOG) injection vial 8 Units  8 Units Subcutaneous TID  Ed Castañeda MD   8 Units at 08/25/20 1606    insulin lispro (HUMALOG) injection vial 0-12 Units  0-12 Units Subcutaneous TID ALISON Castañeda MD   Stopped at 08/26/20 7312    insulin lispro (HUMALOG) injection vial 0-6 Units  0-6 Units Subcutaneous Nightly Ed Castañeda MD   1 Units at 08/25/20 2209    levothyroxine (SYNTHROID) tablet 25 mcg  25 mcg Oral Daily Ed Castañeda MD   25 mcg at 08/26/20 3241    lidocaine 4 % external patch 1 patch  1 patch Transdermal Daily Ed Castañeda MD   1 patch at 08/25/20 0543  metoprolol succinate (TOPROL XL) extended release tablet 50 mg  50 mg Oral Daily Yeimi Bartlett MD   50 mg at 08/25/20 6765    pantoprazole (PROTONIX) tablet 40 mg  40 mg Oral BID AC Yeimi Bartlett MD   40 mg at 08/26/20 5318    mirabegron (MYRBETRIQ) extended release tablet 50 mg  50 mg Oral Dinner Yeimi Bartlett MD        potassium chloride Charol Epp M) extended release tablet 10 mEq  10 mEq Oral BID Yeimi Bartlett MD   10 mEq at 08/25/20 2157    sodium chloride flush 0.9 % injection 10 mL  10 mL Intravenous 2 times per day Yeimi Bartlett MD   10 mL at 08/25/20 2214    sodium chloride flush 0.9 % injection 10 mL  10 mL Intravenous PRN Yeimi Bartlett MD   10 mL at 08/26/20 9485    acetaminophen (TYLENOL) tablet 650 mg  650 mg Oral Q6H PRN Yeimi Bartlett MD        Or   [facility] [place] acetaminophen (TYLENOL) suppository 650 mg  650 mg Rectal Q6H PRN Yeimi Bartlett MD        polyethylene glycol (GLYCOLAX) packet 17 g  17 g Oral Daily PRN Yeimi Bartlett MD        promethazine (PHENERGAN) tablet 12.5 mg  12.5 mg Oral Q6H PRN Yeimi Bartlett MD        Or    ondansetron [facility]) injection 4 mg  4 mg Intravenous Q6H PRN Yeimi Bartlett MD        hydrALAZINE (APRESOLINE) injection 10 mg  10 mg Intravenous Q6H PRN Yeimi Bartlett MD        warfarin (COUMADIN) daily dosing (placeholder)   Other RX Placeholder Yeimi Bartlett MD        cefTRIAXone (ROCEPHIN) 1 g in sterile water 10 mL IV syringe  1 g Intravenous Q24H Yeimi aBrtlett MD   1 g at 08/25/20 2158    doxycycline hyclate (VIBRAMYCIN) capsule 100 mg  100 mg Oral 2 times per day Yeimi Bartlett MD   100 mg at 08/25/20 2155      dextrose         Physical Exam:  BP (!) 100/52   Pulse 85   Temp 98.1 °F (36.7 °C) (Axillary)   Resp 20   Ht 5' 1\" (1.549 m)   Wt 271 lb 6.4 oz (123.1 kg)   LMP  (LMP Unknown)   SpO2 99%   BMI 51.28 kg/m²   Weight change: -3 lb 9.6 oz (-1.633 kg)  Wt Readings from Last 3 Encounters:   08/26/20 271 lb 6.4 oz (123.1 kg)   08/19/20 283 lb 1.6 oz (128.4 kg) 07/10/20 270 lb (122.5 kg)     The patient is awake, alert and in no discomfort or distress. No gross musculoskeletal deformity is present. No significant skin or nail changes are present. Gross examination of head, eyes, nose and throat are negative with the exception of poor dentition. Jugular venous pressure is normal and no carotid bruits are present. Normal respiratory effort is noted with no accessory muscle usage present. Lung fields are clear to ascultation while on BiPAP. Cardiac examination is notable for a regular rate and rhythm with no palpable thrill. No gallop rhythm or cardiac murmur are identified. A benign abdominal examination is present with the exception of obesity and no masses or organomegaly. Intact pulses are present throughout all extremities and no significant peripheral edema is present. No focal neurologic deficits are present. Intake/Output:    Intake/Output Summary (Last 24 hours) at 8/26/2020 0830  Last data filed at 8/25/2020 0929  Gross per 24 hour   Intake --   Output 300 ml   Net -300 ml     No intake/output data recorded. Laboratory Tests:  Lab Results   Component Value Date    CREATININE 2.0 (H) 08/26/2020    BUN 27 (H) 08/26/2020     08/26/2020    K 4.2 08/26/2020    CL 93 (L) 08/26/2020    CO2 40 (H) 08/26/2020     No results for input(s): CKTOTAL, CKMB in the last 72 hours.     Invalid input(s): TROPONONI  No results found for: BNP  Lab Results   Component Value Date    WBC 3.9 08/26/2020    RBC 3.39 08/26/2020    HGB 11.0 08/26/2020    HCT 35.2 08/26/2020    .8 08/26/2020    MCH 32.4 08/26/2020    MCHC 31.3 08/26/2020    RDW 13.8 08/26/2020     08/26/2020    MPV 9.8 08/26/2020     Recent Labs     08/24/20  1425 08/26/20  0416   ALKPHOS 67 64   ALT 19 19   AST 24 23   PROT 6.9 7.0   BILITOT 0.3 0.2   LABALBU 3.6 3.6     Lab Results   Component Value Date    MG 1.9 08/26/2020     Lab Results   Component Value Date    PROTIME 29.5 08/26/2020    INR

## 2020-08-26 NOTE — PROGRESS NOTES
Physician Progress Note      Christina Cook  [facility] #:                  [id number]  :                       1935  ADMIT DATE:       2020 1:55 PM  [address] [place] DATE:  RESPONDING  PROVIDER #:        María Nguyen MD          QUERY TEXT:    Patient admitted with shortness of breath. Noted documentation of acute   respiratory failure in H&P and pulmonary progress notes. Please indicate one   of the following and document in the medical record: The medical record reflects the following:  Risk Factors: advanced age, GOSIA  Clinical Indicators: resp 16-23, 100% 6L, unlabored breathing per nursing, per   H&P \". Jesus Siddiqi Acute on chronic respiratory failure with hypoxia. Jesus Devang Dobbinsn Bluenes Secondary to   CHF. Jesus Drones Jesus Drones \"  Treatment: O2    Acute Respiratory Failure Clinical Indicators per 3M MS-DRG Training Guide and   Quick Reference Guide:  pO2 < 60 mmHg or SpO2 (pulse oximetry) < 91% breathing room air  pCO2 > 50 and pH < 7.35  P/F ratio (pO2 / FIO2) < 300  pO2 decrease or pCO2 increase by 10 mmHg from baseline (if known)  Supplemental oxygen of 40% or more  Presence of respiratory distress, tachypnea, dyspnea, shortness of breath,   wheezing  Unable to speak in complete sentences  Use of accessory muscles to breathe  Extreme anxiety and feeling of impending doom  Tripod position  Confusion/altered mental status/obtunded    Thank you,  Seema Felix RN, BSN, CDIS  Clinical Documentation Improvement  [email or web address]  Options provided:  -- Acute Respiratory Failure currently as evidenced by, Please document   evidence.   -- Currently resolved Acute Respiratory Failure was evidenced by, Please   document evidence  -- Acute Respiratory Failure ruled out after study  -- Other - I will add my own diagnosis  -- Disagree - Not applicable / Not valid  -- Disagree - Clinically unable to determine / Unknown  -- Refer to Clinical Documentation Reviewer    PROVIDER RESPONSE TEXT:    Acute Respiratory Failure has been ruled out after study.    Query created by: Misa Espitia on 8/26/2020 12:55 PM      QUERY TEXT:    Dear Attending Physician,    Patient has documentation of Acute on chronic diastolic CHF per IM and Chronic   diastolic CHF per cardiology consult. If possible, please document in progress notes and discharge summary if you   are evaluating and/or treating any of the following: The medical record reflects the following:  Risk Factors: advanced age  Clinical Indicators: , +1 bilateral leg edema, diminished lung sounds   per nursing, per IM \". Mary Boop Mary Boop Acute on chronic diastolic congestive heart   failure. Mary Boop Mary Boop \" and per cardio \". ..evidence of chronic hypercapnic respiratory   failure in the face of morbid obesity and presently no clinical or objective   evidence of significant volume overload. Her chest x-ray in fact suggests   evidence of pleural effusions in the absence of interstitial infiltrates   likely contributing to her dyspnea with a 50% reduction of her proBNP level   from that of her recent hospitalization with reported decomp  Treatment: IV Lasix & Bumex    yara you,  April Thompson Moreno RN, BSN, CDIS  Clinical Documentation Improvement  [email or web address]  Options provided:  -- Acute on chronic diastolic CHF confirmed and Chronic diastolic CHF ruled   out  -- Chronic diastolic CHF confirmed and Acute on chronic diastolic CHF ruled   out  -- Other - I will add my own diagnosis  -- Disagree - Not applicable / Not valid  -- Disagree - Clinically unable to determine / Unknown  -- Refer to Clinical Documentation Reviewer    PROVIDER RESPONSE TEXT:    After study, Acute on chronic diastolic CHF confirmed and Chronic diastolic   CHF ruled out.     Query created by: Misa Espitia on 8/26/2020 1:02 PM      Electronically signed by:  Ashwini Hart MD 8/26/2020 1:30 PM

## 2020-08-26 NOTE — PROGRESS NOTES
Occupational Therapy  OT BEDSIDE TREATMENT NOTE      Date:2020  Patient Name: Marvel Ruby  MRN: [id number]  : 1935  Room: [address] [place] [address]     Referring Provider: Alfredo Saab MD     Evaluating OT: Stephen Jefferson OTR/L [id number]     AM-PAC Daily Activity Raw Score:      Recommended Adaptive Equipment: TBD     Diagnosis: CHF. Pt presents to ED from [facility] with SOB. Pertinent Medical History: DM, afib, CKD, h/o CVA, arthritis    Precautions:  Falls, O2     Home Living: Pt is a resident of [facility] skilled nursing facility. Pt reports she recently has been primarily bed level reporting she does not remember the last time she stood or got in to a wheelchair. Pt reports staff assist in all ADL/IADLs.    Pain Level: No c/o pain     Cognition: Alert and conversing with ability to follow mutli-step commands. Functional Assessment:    Initial Eval Status  Date: 20 Treatment session: 20 Short Term Goals  Treatment frequency: 2-5x/wk PRN x1-3 wks      Feeding Set up       Grooming Min A   Set up   UB Dressing Max A  Management of hospital gown   Min A   LB Dressing Dependent       Bathing Max A   Mod A   Toileting Dependent Max A  For delio care while standing  Max A   Bed Mobility  Supine <> sit: Max A x2 Supine <> sit: max A x2      Functional Transfers STS: NT   Pt declines  STS: Max A x2 from EOB 2xs  Max A   Functional Mobility         Balance Sitting: varying levels from poor to fair minus seated EOB     Standing: NT Sitting: Mod- CGA   Standing: Max A x2     Activity Tolerance Poor  O2 throughout session maintained 98% O2 sat  Limited activity tolerance with request to return to supine  Limited standing tolerance. sitting suleman x10-12 min with fair balance during self care tasks               Comments: Upon arrival pt was supine in bed with visitor present. At end of session pt was transferred back to bed with alarm on, all lines and tubes intact and call light within reach. Treatment: Pt required vc and assistance to correct posterior lean seated at EOB. Instructed pt on core strengthening exercises to improve sitting balance and promote anterior weight shifting. Pt incontinent of urine upon standing requiring assistance with hygiene. Vc provided for pursed lip breathing throughout tx. SpO2 was above 90% with activity. Education: Pursed lip breathing, techniques to improve sitting balance and safe transfer training all to maximize independence with ADLs. · Pt has made good progress towards set goals.    · Continue with current plan of care      Treatment Charges: Mins Units   Ther Ex  [id number]     Manual Therapy 53462     Thera Activities 29863 12 1   ADL/Home Mgt 08918 5 0   Neuro Re-ed 65344     Group Therapy      Orthotic manage/training  [id number]     Non-Billable Time     Total Timed Treatment 17 2       [address] MCCALL/L [id number]

## 2020-08-26 NOTE — CARE COORDINATION
Updated discharge plan of care. Continue iv bumex, PT/OT following. COVID negative. Plan remains return to [facility].  Will follow-O

## 2020-08-27 NOTE — PROGRESS NOTES
[facility] MD Jim Serrato, MD Darin Roberts, MD Karen Hicks, JESSA Stewart, JEWEL  Progress Note    8/26/2020    SUBJECTIVE:   8/26: \"I do not feel good, but I do not feel bad either. \"  Fatigue, malaise, generalized weakness. Breathing much more easily now. No wheeze. No cough.   No dyspnea at rest.      PROBLEM LIST:    Principal Problem:    Acute on chronic diastolic congestive heart failure ([facility])  Active Problems:    Chronic atrial fibrillation    CKD (chronic kidney disease) stage 3, GFR 30-59 ml/min ([facility])    Chronic anemia    History of breast cancer    Diabetes mellitus type 2, uncontrolled ([facility])    Essential hypertension    History of CVA (cerebrovascular accident)    Hyperlipidemia LDL goal <100    Acquired hypothyroidism    Morbid obesity with BMI of 50.0-59.9, adult ([facility])    Acute on chronic respiratory failure with hypoxia ([facility])  Resolved Problems:    Acute on chronic diastolic CHF (congestive heart failure) ([facility])    CHF (NYHA class IV, ACC/AHA stage D) ([facility])         DIET:    DIET CARB CONTROL;     MEDS (scheduled):    docusate sodium  100 mg Oral Daily    senna  1 tablet Oral Nightly    bumetanide  2 mg Intravenous 2 times per day    vitamin D  5,000 Units Oral Daily    docusate sodium  200 mg Oral Nightly    ferrous sulfate  325 mg Oral BID WC    fluticasone  1 spray Each Nostril BID    gabapentin  300 mg Oral Nightly    insulin glargine  30 Units Subcutaneous Nightly    insulin lispro  8 Units Subcutaneous TID WC    insulin lispro  0-12 Units Subcutaneous TID WC    insulin lispro  0-6 Units Subcutaneous Nightly    levothyroxine  25 mcg Oral Daily    lidocaine  1 patch Transdermal Daily    metoprolol succinate  50 mg Oral Daily    pantoprazole  40 mg Oral BID AC    mirabegron  50 mg Oral Dinner    potassium chloride  10 mEq Oral BID    sodium chloride flush  10 mL Intravenous 2 times per day    warfarin (COUMADIN) daily dosing (placeholder)   Other RX Placeholder       MEDS (infusions):   dextrose         MEDS (prn):  albuterol, hydrOXYzine, glucose, dextrose, glucagon (rDNA), dextrose, sodium chloride flush, acetaminophen **OR** acetaminophen, polyethylene glycol, promethazine **OR** ondansetron, hydrALAZINE    PHYSICAL EXAM:     Patient Vitals for the past 24 hrs:   BP Temp Temp src Pulse Resp SpO2 Weight   08/26/20 2006 (!) 145/65 98.2 °F (36.8 °C) Oral 75 18 100 % --   08/26/20 1500 139/70 98.2 °F (36.8 °C) Oral 88 20 100 % --   08/26/20 1015 137/70 97.8 °F (36.6 °C) Oral 81 20 100 % --   08/26/20 0358 -- -- -- -- 20 -- --   08/26/20 0300 -- -- -- -- -- -- 271 lb 6.4 oz (123.1 kg)   08/26/20 0106 (!) 100/52 98.1 °F (36.7 °C) Axillary 85 18 99 % --   08/26/20 0015 -- -- -- -- 18 -- --   08/25/20 2259 -- -- -- -- 23 -- --   @      Intake/Output Summary (Last 24 hours) at 8/26/2020 2053  Last data filed at 8/26/2020 1541  Gross per 24 hour   Intake --   Output 685 ml   Net -685 ml         Wt Readings from Last 3 Encounters:   08/26/20 271 lb 6.4 oz (123.1 kg)   08/19/20 283 lb 1.6 oz (128.4 kg)   07/10/20 270 lb (122.5 kg)       Constitutional:  in no acute distress  HEENT: NC/AT, EOMI, sclera and conjunctiva are clear and anicteric, mucus membranes moist  Neck: Trachea midline, no JVD  Cardiovascular: S1, S2 regular rhythm, no murmur,or rub  Respiratory: Basilar crackles, quite mild, generally poor air entry diffusely.   No wheeze  Gastrointestinal:  Soft, nontender, nondistended, NABS  Ext: no edema distally, feet warm  Skin: dry, no rash  Neuro: awake, alert, interactive      DATA:    Recent Labs     08/24/20  1425 08/25/20  0625 08/26/20  0416   WBC 4.6 3.5* 3.9*   HGB 10.8* 11.0* 11.0*   HCT 34.5 36.0 35.2   .0* 103.2* 103.8*    229 217     Recent Labs     08/24/20  1425 08/25/20  0625 08/26/20  0416    141 139   K 4.5 3.9  3.9 4.2   CL 96* 96* 93*   CO2 36* 36* 40*   MG  --  2.0 1.9

## 2020-08-27 NOTE — PROGRESS NOTES
Prasanna Bennett M.D.,[facility]  Yelena Arias D.O., F.A.C.O.I., Donavon Floyd M.D. Alesia Alvarez M.D., Danette Riggs M.D. Desi Gutierrez D.O. Daily Pulmonary Progress Note    Patient:  Madhavi Carrizales 80 y.o. female MRN: [id number]     Date of Service: 8/27/2020      Synopsis     We are following patient for Dyspnea and acute on chronic respiratory failure with hypoxia and hypercapnia    \"CC\" shortness of breath    Code status: FULL       Subjective      Patient was seen and examined. Currently on 4 liters NC off NIV . Wore bipap 18/6 last HS . No family present at bedside questions answered. She denies cough. Review of Systems:  Constitutional: Denies fever, weight loss, night sweats, and fatigue  Skin: Denies pigmentation, dark lesions, and rashes   HEENT: Denies hearing loss, tinnitus, ear drainage, epistaxis, sore throat, and hoarseness. Cardiovascular: Denies palpitations, chest pain, and chest pressure.   Respiratory: Denies cough, dyspnea at rest, hemoptysis, apnea, and choking. +intermittent dyspnea  Gastrointestinal: Denies nausea, vomiting, poor appetite, diarrhea, heartburn or reflux  Genitourinary: Denies dysuria, frequency, urgency or hematuria  Musculoskeletal: Denies myalgias, muscle weakness, and bone pain  Neurological: Denies dizziness, vertigo, headache, and focal weakness  Psychological: Denies anxiety and depression  Endocrine: Denies heat intolerance and cold intolerance  Hematopoietic/Lymphatic: Denies bleeding problems and blood transfusions    24-hour events:  None     Objective   Vitals: BP (!) 159/77   Pulse 81   Temp 98.5 °F (36.9 °C) (Oral)   Resp 20   Ht 5' 1\" (1.549 m)   Wt 271 lb 6.4 oz (123.1 kg)   LMP  (LMP Unknown)   SpO2 100%   BMI 51.28 kg/m²     I/O:    Intake/Output Summary (Last 24 hours) at 8/27/2020 1206  Last data filed at 8/27/2020 0800  Gross per 24 hour   Intake 180 ml   Output 1685 ml   Net -1505 ml       Vent Mental status: Alert and Oriented X3 . Pertinent/ New Labs and Imaging Studies     Imaging Personally Reviewed:       The heart is unremarkable    The lung fields demonstrate no significant pulmonary vascular    congestion and edema. The aorta is tortuous ectatic    There are findings throughout the lung fields which are likely chronic              Impression    Findings compatible with atherosclerotic disease    Moderate bilateral pleural effusions best seen on the lateral view    with likely associated atelectasis                      Echo:        Echo:  From 2018     Summary   Ejection fraction is visually estimated at 60%.   No regional wall motion abnormalities seen.   Normal right ventricle size with reduced function. TAPSE is 1.5 cm.   Mild aortic stenosis is present.   Moderate mitral valve stenosis. Mean transmitral gradient 9 mmHg.   Moderate mitral regurgitation is present.   Mild and tricuspid regurgitation.   Pulmonary hypertension is severe. RVSP is 72 mmHg.              Labs:  Lab Results   Component Value Date    WBC 3.9 08/26/2020    HGB 11.0 08/26/2020    HCT 35.2 08/26/2020    .8 08/26/2020    MCH 32.4 08/26/2020    MCHC 31.3 08/26/2020    RDW 13.8 08/26/2020     08/26/2020    MPV 9.8 08/26/2020     Lab Results   Component Value Date     08/27/2020    K 4.1 08/27/2020    K 3.9 08/25/2020    CL 92 08/27/2020    CO2 39 08/27/2020    BUN 27 08/27/2020    CREATININE 1.8 08/27/2020    LABALBU 3.6 08/26/2020    CALCIUM 10.1 08/27/2020    GFRAA 32 08/27/2020    LABGLOM 27 08/27/2020     Lab Results   Component Value Date    PROTIME 27.3 08/27/2020    INR 2.3 08/27/2020     Recent Labs     08/24/20  1425   PROBNP 763*     Recent Labs     08/26/20  0416   PROCAL 0.07     This SmartLink has not been configured with any valid records. Micro:  No results for input(s): CULTRESP in the last 72 hours. No results for input(s): LABGRAM in the last 72 hours.   No results for input(s): LEGUR in the last 72 hours. Recent Labs     08/25/20  1530   STREPNEUMAGU Presumptive negative- suggests no current or recent  pneumococcal infection. Infection due to Strep pneumoniae cannot be  ruled out since the antigen present in the sample  may be below the detection limit of the test.  Normal Range:Presumptive Negative       No results for input(s): LP1UAG in the last 72 hours. Assessment:    1. Acute on chronic hypoxic and hypercapnic respiratory failure  2. Bilateral pleural effusions with atelectasis  3. Pulmonary HTN, worsening RV function on ECHO compared to 2018. Multifactorial due to untreated GOSIA, OHS. Volume overload. 4. Acute on chronic CHF  5. Acute on chronic kidney disease  6. Chronic A. fib on anticoagulation-previously on Eliquis now on warfarin due to BMI greater than 40  7. Chronic kidney disease  8. Diabetes mellitus type 2  9. Morbid obesity-BMI 52.3      Plan:   1. Oxygen therapy 4 L nasal cannula keep >94%  2. Noninvasive ventilation in the form of BiPAP at HS 18/6 BUR 18 FIO2 60%-we previously arranged trilogy noninvasive ventilator for her facility last week prior to discharge. previous settings were AVAPS volume 400 backup rate 12 EPAP +5 FiO2 40%  3. bronchodilator therapy- albuterol PRN  4. Continue incentive spirometer and EZ Pap  5. Flonase and Zyrtec  6. Discontinue antibiotics  7. Coumadin for A. fib INR 2.3  8. Follow chest x-ray. 9. DVT, GI prophylaxis. 10. Fluid management per nephrology-monitor renal function creatinine 2.1 based on Bumex 2 mg IV twice daily. 11. covid 19 test negative on admission   12. Bowel regimen- colace,   This plan of care was reviewed in collaboration with Dr. Ramona Redding  Electronically signed by SAVANNA Kohler CNP on 8/27/2020 at 12:06 PM      I personally saw, examined, and cared for the patient. Labs, medications, radiographs reviewed. I agree with history exam and plans detailed in NP note.       Electronically signed by Myles De Leon DO on 8/27/2020 at 5:04 PM

## 2020-08-27 NOTE — PROGRESS NOTES
Subjective: The patient is awake and alert. Just finished breakfast.  Off BiPAP. No problems overnight. Denies chest pain, angina. Overall breathing better. Denies abdominal pain. Tolerating diet. No nausea or vomiting. Objective:    BP (!) 145/65   Pulse 75   Temp 98.2 °F (36.8 °C) (Oral)   Resp 20   Ht 5' 1\" (1.549 m)   Wt 271 lb 6.4 oz (123.1 kg)   LMP  (LMP Unknown)   SpO2 100%   BMI 51.28 kg/m²     Current medications that patient is taking have been reviewed. Heart:  RRR, no murmurs, gallops, or rubs.   Lungs:  CTA bilaterally, no wheeze, rales or rhonchi  Abd: bowel sounds present, soft, nontender, nondistended, no masses  Extrem:  No cyanosis or edema    CBC with Differential:    Lab Results   Component Value Date    WBC 3.9 08/26/2020    RBC 3.39 08/26/2020    HGB 11.0 08/26/2020    HCT 35.2 08/26/2020     08/26/2020    .8 08/26/2020    MCH 32.4 08/26/2020    MCHC 31.3 08/26/2020    RDW 13.8 08/26/2020    NRBC 0.9 08/15/2020    LYMPHOPCT 19.6 08/26/2020    MONOPCT 12.9 08/26/2020    BASOPCT 0.3 08/26/2020    MONOSABS 0.50 08/26/2020    LYMPHSABS 0.76 08/26/2020    EOSABS 0.17 08/26/2020    BASOSABS 0.01 08/26/2020     CMP:    Lab Results   Component Value Date     08/27/2020    K 4.1 08/27/2020    K 3.9 08/25/2020    CL 92 08/27/2020    CO2 39 08/27/2020    BUN 27 08/27/2020    CREATININE 1.8 08/27/2020    GFRAA 32 08/27/2020    LABGLOM 27 08/27/2020    GLUCOSE 138 08/27/2020    PROT 7.0 08/26/2020    LABALBU 3.6 08/26/2020    CALCIUM 10.1 08/27/2020    BILITOT 0.2 08/26/2020    ALKPHOS 64 08/26/2020    AST 23 08/26/2020    ALT 19 08/26/2020     BMP:    Lab Results   Component Value Date     08/27/2020    K 4.1 08/27/2020    K 3.9 08/25/2020    CL 92 08/27/2020    CO2 39 08/27/2020    BUN 27 08/27/2020    LABALBU 3.6 08/26/2020    CREATININE 1.8 08/27/2020    CALCIUM 10.1 08/27/2020    GFRAA 32 08/27/2020    LABGLOM 27 08/27/2020    GLUCOSE 138 08/27/2020 Magnesium:    Lab Results   Component Value Date    MG 1.9 08/26/2020     Phosphorus:    Lab Results   Component Value Date    PHOS 3.4 08/26/2020     PT/INR:    Lab Results   Component Value Date    PROTIME 27.3 08/27/2020    INR 2.3 08/27/2020     PTT:    Lab Results   Component Value Date    APTT 33.3 07/22/2018   [APTT}     Assessment:    Patient Active Problem List   Diagnosis    Chronic atrial fibrillation    CKD (chronic kidney disease) stage 3, GFR 30-59 ml/min ([facility])    History of breast cancer    Chronic anemia    Diabetes mellitus type 2, uncontrolled [facility]    Essential hypertension    History of CVA (cerebrovascular accident)    Hyperlipidemia LDL goal <100    Acquired hypothyroidism    Morbid obesity with BMI of 50.0-59.9, adult ([facility]    Acute on chronic respiratory failure with hypoxia ([facility])    Acute on chronic diastolic congestive heart failure ([facility]       Plan:  Rate controlled. Renal function at baseline. Stable. Stable. Blood glucose ok, continue to adjust basal/bolus insulin therapy  Blood pressure ok, continue current medications  Continue Pt/Ot and risk factor modifications. Continue aggressive lipid therapy  Continue synthroid. Secondary to CHF. Diurese as BP and renal function allow.   Pt/Ot evaluations for discharge planning    Denzel Maldonado    8:38 AM  8/27/2020

## 2020-08-27 NOTE — PROGRESS NOTES
[facility] Quality Flow/Interdisciplinary Rounds Progress Note        Quality Flow Rounds held on August 27, 2020    Disciplines Attending:  Bedside Nurse, ,  and Nursing Unit Leadership    Cristin Sanchez was admitted on 8/24/2020  1:55 PM    Anticipated Discharge Date:  Expected Discharge Date: 08/27/20    Disposition:    Cole Score:  Cole Scale Score: 17    Readmission Risk              Risk of Unplanned Readmission:        27           Discussed patient goal for the day, patient clinical progression, and barriers to discharge. The following Goal(s) of the Day/Commitment(s) have been identified:  Wean IV Bumex to PO-check Pulmonary plan.       Constanza Mo  August 27, 2020

## 2020-08-27 NOTE — PROGRESS NOTES
Occupational Therapy  OT BEDSIDE TREATMENT NOTE      Date:2020  Patient Name: Carlos Irby  MRN: [id number]  : 1935  Room: [address] [place], [place] [address]     *Referring Bebeto Doss MD     Evaluating OT: Chichi Amaro OTR/L [id number]     AM-PAC Daily Activity Raw Score:      Recommended Adaptive Equipment: TBD     Diagnosis: CHF. Pt presents to ED from [facility] with SOB.        Pertinent Medical History: DM, afib, CKD, h/o CVA, arthritis    Precautions:  Falls, O2     Home Living: Pt is a resident of [facility] nursing [facility]. Pt reports she recently has been primarily bed level reporting she does not remember the last time she stood or got in to a wheelchair. Pt reports staff assist in all ADL/IADLs.    Pain Level: Chronic L UE pain with movement- Moderate     Cognition: Alert and conversing with good ability to follow commands.                Functional Assessment:    Initial Eval Status  Date: 20 Treatment session: 20 Short Term Goals  Treatment frequency: 2-5x/wk PRN x1-3 wks      Feeding Set up       Grooming Min A   Set up   UB Dressing Max A  Management of hospital gown   Min A   LB Dressing Dependent       Bathing Max A   Mod A   Toileting Dependent Pt declined ADLs at this time Max A   Bed Mobility  Supine <> sit: Max A x2 NT. Pt unable to facilitate EOB activity due to fatigue.     Functional Transfers STS: NT   Pt declines   Max A   Functional Mobility         Balance Sitting: varying levels from poor to fair minus seated EOB     Standing: NT NT     Activity Tolerance Poor  O2 throughout session maintained 98% O2 sat  Limited activity tolerance with request to return to supine Poor sitting suleman x10-12 min with fair balance during self care tasks                   Comments: Upon arrival pt was supine in bed. At end of session pt was supine in bed with alarm on, all lines and tubes intact and call light within reach.      Treatment: Instructed pt on 2x10 reps of R UE AROM shoulder flexion and 2x10 reps of B UE AROM shoulder horizontal abduction, shoulder ER and elbow flexion/ extension requiring mod vc, tactile cues and demos for correct form. Exercises were performed to improve strength and endurance to maximize participation during ADLs/ transfers. L UE inhibited activity tolerance due to chronic pain with movement. Education: Benefits of EOB activity, energy conservation training, adaptive exercise techniques and benefits of exercises all to improve participation during ADLs. · Pt has made fair progress towards set goals.    · Continue with current plan of care      Treatment Charges: Mins Units   Ther Ex  [id number] 15 1   Manual Therapy [id number]     Thera Activities [id number]     ADL/Home Mgt [id number]     Neuro Re-ed [id number]     Group Therapy      Orthotic manage/training  [id number]     Non-Billable Time     Total Timed Treatment 15 1       Demetrice MCCALL/ANDREW [id number]

## 2020-08-27 NOTE — PROGRESS NOTES
08/26/20 0625    lidocaine 4 % external patch 1 patch  1 patch Transdermal Daily Homero Quinteros MD   1 patch at 08/26/20 1025    metoprolol succinate (TOPROL XL) extended release tablet 50 mg  50 mg Oral Daily Homero Quinteros MD   50 mg at 08/26/20 1024    pantoprazole (PROTONIX) tablet 40 mg  40 mg Oral BID AC Homero Quinteros MD   40 mg at 08/26/20 1631    mirabegron (MYRBETRIQ) extended release tablet 50 mg  50 mg Oral Dinner Homero Quinteros MD        potassium chloride (KLOR-CON M) extended release tablet 10 mEq  10 mEq Oral BID Homero Quinteros MD   10 mEq at 08/26/20 2019    sodium chloride flush 0.9 % injection 10 mL  10 mL Intravenous 2 times per day Homero Quinteros MD   10 mL at 08/26/20 2021    sodium chloride flush 0.9 % injection 10 mL  10 mL Intravenous PRN Homero Quinteros MD   10 mL at 08/26/20 1631    acetaminophen (TYLENOL) tablet 650 mg  650 mg Oral Q6H PRN Homero Quinteros MD        Or   Konrad Gilding acetaminophen (TYLENOL) suppository 650 mg  650 mg Rectal Q6H PRN Homero Quinteros MD        polyethylene glycol (GLYCOLAX) packet 17 g  17 g Oral Daily PRN Homero Quinteros MD        promethazine (PHENERGAN) tablet 12.5 mg  12.5 mg Oral Q6H PRN Homero Quinteros MD        Or    [facility]ansetron [facility] injection 4 mg  4 mg Intravenous Q6H PRN Homero Quinteros MD        hydrALAZINE (APRESOLINE) injection 10 mg  10 mg Intravenous Q6H PRN Homero Quinteros MD        warfarin (COUMADIN) daily dosing (placeholder)   Other RX Placeholder Homero Quinteros MD          dextrose         Physical Exam:  BP (!) 145/65   Pulse 75   Temp 98.2 °F (36.8 °C) (Oral)   Resp 20   Ht 5' 1\" (1.549 m)   Wt 271 lb 6.4 oz (123.1 kg)   LMP  (LMP Unknown)   SpO2 100%   BMI 51.28 kg/m²   Weight change: Wt Readings from Last 3 Encounters:   08/26/20 271 lb 6.4 oz (123.1 kg)   08/19/20 283 lb 1.6 oz (128.4 kg)   07/10/20 270 lb (122.5 kg)     The patient is awake, alert and in no discomfort or distress. No gross musculoskeletal deformity is present.  No significant skin or nail changes are present. Gross examination of head, eyes, nose and throat are negative. Jugular venous pressure is normal and no carotid bruits are present. Normal respiratory effort is noted with no accessory muscle usage present. Lung fields are clear to ascultation. Cardiac examination is notable for an irregular rhythm with no palpable thrill. No gallop rhythm or cardiac murmur are identified. A benign abdominal examination is present with the exception of obesity and no masses or organomegaly. Intact pulses are present throughout all extremities and no significant peripheral edema is present. No focal neurologic deficits are present. Intake/Output:    Intake/Output Summary (Last 24 hours) at 8/27/2020 0627  Last data filed at 8/26/2020 2223  Gross per 24 hour   Intake --   Output 1685 ml   Net -1685 ml     No intake/output data recorded. Laboratory Tests:  Lab Results   Component Value Date    CREATININE 1.8 (H) 08/27/2020    BUN 27 (H) 08/27/2020     08/27/2020    K 4.1 08/27/2020    CL 92 (L) 08/27/2020    CO2 39 (H) 08/27/2020     No results for input(s): CKTOTAL, CKMB in the last 72 hours.     Invalid input(s): TROPONONI  No results found for: BNP  Lab Results   Component Value Date    WBC 3.9 08/26/2020    RBC 3.39 08/26/2020    HGB 11.0 08/26/2020    HCT 35.2 08/26/2020    .8 08/26/2020    MCH 32.4 08/26/2020    MCHC 31.3 08/26/2020    RDW 13.8 08/26/2020     08/26/2020    MPV 9.8 08/26/2020     Recent Labs     08/24/20  1425 08/26/20  0416   ALKPHOS 67 64   ALT 19 19   AST 24 23   PROT 6.9 7.0   BILITOT 0.3 0.2   LABALBU 3.6 3.6     Lab Results   Component Value Date    MG 1.9 08/26/2020     Lab Results   Component Value Date    PROTIME 27.3 08/27/2020    INR 2.3 08/27/2020     Lab Results   Component Value Date    TSH 1.430 07/23/2018     No components found for: CHLPL  Lab Results   Component Value Date    TRIG 103 07/23/2018    TRIG 146 12/27/2017     Lab Results   Component Value Date    HDL 39 07/23/2018    HDL 44 12/27/2017     Lab Results   Component Value Date    LDLCALC 34 07/23/2018    LDLCALC 52 12/27/2017       Cardiac Tests:  Telemetry findings reviewed: atrial fibrillation with a mean ventricular response of approximately 80 bpm, no new tachy/bradyarrhythmias overnight      ASSESSMENT / PLAN: On a clinical basis, the patient remains stable from a cardiovascular standpoint with continued fluid mobilization and acceptable rate control of her atrial fibrillation and no changes of renal function or electrolytes with present diuresis. Continued management of her chronic hypercapnic respiratory failure and chronic kidney disease will largely be deferred to the pulmonary and nephrology services including the management of her diuretics. A continued rate control and anticoagulation strategy will be maintained in the face of her atrial arrhythmias with needs of ongoing monitoring of anticoagulation goals of maintaining prothrombin times in a range of 2.0-2.5 times INR control. Ongoing aggressive risk factor modification blood pressure and serum lipids will be essential to reducing her risk of future atherosclerotic development. Based on the relatively stable nature of her cardiovascular status, we will further evaluate her during her present hospitalization should additional cardiovascular difficulties or concerns arise with needs of outpatient follow-up with her primary cardiologist, Kelin Hills following discharge. Note: This report was completed utilizing computer voice recognition software. Every effort has been made to ensure accuracy, however; inadvertent computerized transcription errors may be present. Mannie Peterson.  Zion Nam, [address] [place] [address] Cardiology

## 2020-08-27 NOTE — PROGRESS NOTES
[facility] MD Azeb Vuong, MD Nehemiah Kaur, MD Sylvester Moeller, MD Chung Montes, CNP   Libra Stewart, JEWEL  Progress Note    8/27/2020    SUBJECTIVE:   8/26: \"I do not feel good, but I do not feel bad either. \"  Fatigue, malaise, generalized weakness. Breathing much more easily now. No wheeze. No cough. No dyspnea at rest.  8/27: Feeling a little bit better today. Tired. Breathing about the same as yesterday.       PROBLEM LIST:    Principal Problem:    Acute on chronic diastolic congestive heart failure ([facility])  Active Problems:    Chronic atrial fibrillation    CKD (chronic kidney disease) stage 3, GFR 30-59 ml/min ([facility])    Chronic anemia    History of breast cancer    Diabetes mellitus type 2, uncontrolled ([facility])    Essential hypertension    History of CVA (cerebrovascular accident)    Hyperlipidemia LDL goal <100    Acquired hypothyroidism    Morbid obesity with BMI of 50.0-59.9, adult ([facility])    Acute on chronic respiratory failure with hypoxia ([facility])  Resolved Problems:    Acute on chronic diastolic CHF (congestive heart failure) ([facility])    CHF (NYHA class IV, ACC/AHA stage D) ([facility])         DIET:    DIET CARB CONTROL;     MEDS (scheduled):    warfarin  2 mg Oral Once    docusate sodium  100 mg Oral Daily    senna  1 tablet Oral Nightly    bumetanide  2 mg Intravenous 2 times per day    vitamin D  5,000 Units Oral Daily    docusate sodium  200 mg Oral Nightly    ferrous sulfate  325 mg Oral BID WC    fluticasone  1 spray Each Nostril BID    gabapentin  300 mg Oral Nightly    insulin glargine  30 Units Subcutaneous Nightly    insulin lispro  8 Units Subcutaneous TID WC    insulin lispro  0-12 Units Subcutaneous TID WC    insulin lispro  0-6 Units Subcutaneous Nightly    levothyroxine  25 mcg Oral Daily    lidocaine  1 patch Transdermal Daily    metoprolol succinate  50 mg Oral Daily    pantoprazole  40 mg Oral BID AC    mirabegron  50 mg Oral Dinner    potassium chloride  10 mEq Oral BID    sodium chloride flush  10 mL Intravenous 2 times per day    warfarin (COUMADIN) daily dosing (placeholder)   Other RX Placeholder       MEDS (infusions):   dextrose         MEDS (prn):  albuterol, hydrOXYzine, glucose, dextrose, glucagon (rDNA), dextrose, sodium chloride flush, acetaminophen **OR** acetaminophen, polyethylene glycol, promethazine **OR** ondansetron, hydrALAZINE    PHYSICAL EXAM:     Patient Vitals for the past 24 hrs:   BP Temp Temp src Pulse Resp SpO2   08/27/20 0945 (!) 159/77 98.5 °F (36.9 °C) Oral 81 20 100 %   08/27/20 0335 -- -- -- -- 20 --   08/26/20 2234 -- -- -- -- 22 --   08/26/20 2006 (!) 145/65 98.2 °F (36.8 °C) Oral 75 18 100 %   08/26/20 1500 139/70 98.2 °F (36.8 °C) Oral 88 20 100 %   @      Intake/Output Summary (Last 24 hours) at 8/27/2020 1328  Last data filed at 8/27/2020 0800  Gross per 24 hour   Intake 180 ml   Output 1685 ml   Net -1505 ml         Wt Readings from Last 3 Encounters:   08/26/20 271 lb 6.4 oz (123.1 kg)   08/19/20 283 lb 1.6 oz (128.4 kg)   07/10/20 270 lb (122.5 kg)       Constitutional:  in no acute distress  HEENT: NC/AT, EOMI, sclera and conjunctiva are clear and anicteric, mucus membranes moist  Neck: Trachea midline, no JVD  Cardiovascular: S1, S2 regular rhythm, no murmur,or rub  Respiratory: Basilar crackles, quite mild, generally poor air entry diffusely.   No wheeze  Gastrointestinal:  Soft, nontender, nondistended, NABS  Ext: no edema distally, feet warm  Skin: dry, no rash  Neuro: awake, alert, interactive      DATA:    Recent Labs     08/24/20  1425 08/25/20  0625 08/26/20  0416   WBC 4.6 3.5* 3.9*   HGB 10.8* 11.0* 11.0*   HCT 34.5 36.0 35.2   .0* 103.2* 103.8*    229 217     Recent Labs     08/24/20  1425 08/25/20  0625 08/26/20  0416 08/27/20  0340    141 139 138   K 4.5 3.9  3.9 4.2 4.1   CL 96* 96* 93* 92*   CO2 36* 36* 40* 39*   MG  --  2.0 1.9  --    PHOS  --  3.3 3.4  -- BUN 30* 27* 27* 27*   CREATININE 2.1* 1.9* 2.0* 1.8*   ALT 19  --  19  --    AST 24  --  23  --    BILITOT 0.3  --  0.2  --    ALKPHOS 67  --  64  --        Lab Results   Component Value Date    LABPROT 0.3 (H) 04/27/2018    LABPROT 0.3 04/27/2018       ASSESSMENT / RECOMMENDATIONS:    1-chronic kidney disease stage IIIB baseline cr 1.9-2.2 with no proteinuria      2-Acute /chronic respiratory failure multifactorial, secondary combination of obstructive lung disease, systolic and diastolic CHF     3-LVH      4-anemia of chronic disease. Mild. JENIFER not warranted. Mildly macrocytic.   Folate and B12 normal.  Iron stores on the low side     5-Mineral bone disease     6-elevated serum bicarbonate, due to metabolic compensation for chronic respiratory acidosis probably exacerbated by contraction alkalosis due to diuretic therapy     Azotemia stable, cr at baseline.      --> Continue Bumex 2 mg IV every 12 hours for now  --> Transition oral diuretic therapy in the next 1 to 3 days  --> IV Ferrlecit while here  --> Follow labs, UO  --> Continue supportive care    Electronically signed by Bob Davis MD on 8/27/2020

## 2020-08-28 NOTE — PROGRESS NOTES
Pharmacy Consultation Note  (Warfarin Dosing and Monitoring)    Initial consult date: 8/24  Consulting physician: Kamini Oliveira    Allergies:  Pcn [penicillins]    80 y.o. female    Ht Readings from Last 1 Encounters:   08/24/20 5' 1\" (1.549 m)     Wt Readings from Last 1 Encounters:   08/28/20 271 lb 6.4 oz (123.1 kg)         Warfarin Indication Target   INR Range Home Dose  (if applicable) Diet/Feeding Tube   (Enteral feeds, nutritional drinks, increased Vitamin K in diet can decrease INR)   Afib 2-2.5 2mg daily Carb Control       x Home Med? Meds Increasing INR x Home Med?  Meds Decreasing INR     Allopurinol    Azathioprine     Amiodarone/Propafenone/Dronedarone   Carbamazepine     Androgens   Cholestyramine     Chemotherapy (BBW: Capecitabine)   Estrogen     Ciprofloxacin/Levofloxacin   Nafcillin/Dicloxacillin     Clarithromycin/Erythromycin/Azithromycin   Barbiturates      Fluconazole/Itraconazole/Voriconazole/Ketoconazole   Phenytoin (Variable)     Metronidazole   Rifampin     Phenytoin (Variable)   Steroids (Variable/Dose Dependent)      Statins/Fenofibrate/Gemfibrozil   Sucralfate     Steroids (Variable/Dose Dependent)   Other:     Sulfamethoxazole/Trimethoprim        Tramadol         Other:        Comments regarding medication interactions:      x Diseases Affecting INR x Increased Bleeding Risk    CHF Exacerbation (Increases)  History GI Bleed/PUD    Liver Disease (Increases)  Chronic NSAID Use    Thyroid: Hyper (Increases)  Hypo (Decreases)  Chronic ASA/Antiplatelet Use (Clopidogrel/ Dipyridamole/Prasugrel/Ticagrelor)      Malignancy (Increases)  Abnormal Renal Function (dialysis, renal transplant, SCr ? 2.3 mg/dL)     History of EtOH Abuse: Acute (Increases)   Chronic (Decreases)  Liver Function (cirrhosis, bilirubin >2x ULN with AST/ALT/AP >3x ULN)    Fever (Increases)  Age > 65 years    Acute infection (Increases)  Hypertension/Uncontrolled BP    Diarrhea/Dehydration (Increases)  History of stroke    Other: __________________  Other:___________________       Vitamin K or Blood product  Administration Date                    TSH:    Lab Results   Component Value Date    TSH 1.430 07/23/2018        Hepatic Function Panel:                            Lab Results   Component Value Date    ALKPHOS 64 08/26/2020    ALT 19 08/26/2020    AST 23 08/26/2020    PROT 7.0 08/26/2020    BILITOT 0.2 08/26/2020    LABALBU 3.6 08/26/2020       Date Warfarin Dose INR Heparin or LMWH HGB/HCT PLT Comment   8/24 -- 3.5 -- 10.8/34.5 224    8/25 1mg 3.1 -- 11/36 229    8/26 1mg 2.4 -- 11/35.2 217    8/27 2 mg 2.3 X      8/28 1 mg 2.1 X                          Assessment and Plan:  · Pt is a 81 yo female on warfarin PTA for Afib.  Patient was recently here with a very labile INR at that time   · Goal INR 2-2.5 per Cardiology  · INR 2.1 today  · Warfarin 1mg tonight  · With labile INR, narrow therapeutic window, and having received warfarin 2 mg last night, will opt for warfarin 1 mg PO x 1.  · Daily PT/INR until the INR is stable within the therapeutic range  · Pharmacist will follow and monitor/adjust dosing as necessary    Thank you for this consult,    Major Felix, [id number] [facility] PharmD 8/28/2020 1:42 PM

## 2020-08-28 NOTE — PROGRESS NOTES
[facility] MD Ashley Romano MD Clarinda Rutherford, MD Brent Marcus, MD Mabel Raid, CNP   Libra Stewart, JEWEL  Progress Note    8/28/2020    SUBJECTIVE:   8/26: \"I do not feel good, but I do not feel bad either. \"  Fatigue, malaise, generalized weakness. Breathing much more easily now. No wheeze. No cough. No dyspnea at rest.  8/27: Feeling a little bit better today. Tired. Breathing about the same as yesterday. 8/28: \"About the same. \"  Ongoing fatigue and generalized weakness. Breathing easily at rest.  No peripheral swelling.     PROBLEM LIST:    Principal Problem:    Acute on chronic diastolic congestive heart failure ([facility])  Active Problems:    Chronic atrial fibrillation    CKD (chronic kidney disease) stage 3, GFR 30-59 ml/min ([facility])    Chronic anemia    History of breast cancer    Diabetes mellitus type 2, uncontrolled ([facility])    Essential hypertension    History of CVA (cerebrovascular accident)    Hyperlipidemia LDL goal <100    Acquired hypothyroidism    Morbid obesity with BMI of 50.0-59.9, adult ([facility])    Acute on chronic respiratory failure with hypoxia ([facility])  Resolved Problems:    Acute on chronic diastolic CHF (congestive heart failure) ([facility])    CHF (NYHA class IV, ACC/AHA stage D) ([facility])         DIET:    DIET CARB CONTROL;     MEDS (scheduled):    docusate sodium  100 mg Oral Daily    senna  1 tablet Oral Nightly    bumetanide  2 mg Intravenous 2 times per day    vitamin D  5,000 Units Oral Daily    docusate sodium  200 mg Oral Nightly    ferrous sulfate  325 mg Oral BID WC    fluticasone  1 spray Each Nostril BID    gabapentin  300 mg Oral Nightly    insulin glargine  30 Units Subcutaneous Nightly    insulin lispro  8 Units Subcutaneous TID WC    insulin lispro  0-12 Units Subcutaneous TID WC    insulin lispro  0-6 Units Subcutaneous Nightly    levothyroxine  25 mcg Oral Daily    lidocaine  1 patch Transdermal Daily    metoprolol succinate  50 mg Oral Daily    pantoprazole  40 mg Oral BID AC    mirabegron  50 mg Oral Dinner    potassium chloride  10 mEq Oral BID    sodium chloride flush  10 mL Intravenous 2 times per day    warfarin (COUMADIN) daily dosing (placeholder)   Other RX Placeholder       MEDS (infusions):   dextrose         MEDS (prn):  albuterol, hydrOXYzine, glucose, dextrose, glucagon (rDNA), dextrose, sodium chloride flush, acetaminophen **OR** acetaminophen, polyethylene glycol, promethazine **OR** ondansetron, hydrALAZINE    PHYSICAL EXAM:     Patient Vitals for the past 24 hrs:   BP Temp Temp src Pulse Resp SpO2 Weight   08/28/20 0845 (!) 162/63 98.1 °F (36.7 °C) Oral 97 18 98 % --   08/28/20 0114 -- -- -- -- -- -- 271 lb 6.4 oz (123.1 kg)   08/28/20 0015 -- -- -- -- 18 -- --   08/27/20 2330 (!) 146/82 97.6 °F (36.4 °C) Oral 67 19 98 % --   08/27/20 2211 -- -- -- -- 19 -- --   08/27/20 1445 (!) 142/70 98.9 °F (37.2 °C) Oral 84 20 99 % --   @      Intake/Output Summary (Last 24 hours) at 8/28/2020 1218  Last data filed at 8/28/2020 0919  Gross per 24 hour   Intake 300 ml   Output 1675 ml   Net -1375 ml         Wt Readings from Last 3 Encounters:   08/28/20 271 lb 6.4 oz (123.1 kg)   08/19/20 283 lb 1.6 oz (128.4 kg)   07/10/20 270 lb (122.5 kg)       Constitutional:  in no acute distress  HEENT: NC/AT, EOMI, sclera and conjunctiva are clear and anicteric, mucus membranes moist  Neck: Trachea midline, no JVD  Cardiovascular: S1, S2 regular rhythm, no murmur,or rub  Respiratory: Basilar crackles, quite mild, generally poor air entry diffusely.   No wheeze  Gastrointestinal:  Soft, nontender, nondistended, NABS  Ext: no edema distally, feet warm  Skin: dry, no rash  Neuro: awake, alert, interactive      DATA:    Recent Labs     08/26/20 0416   WBC 3.9*   HGB 11.0*   HCT 35.2   .8*        Recent Labs     08/26/20 0416 08/27/20  0340    138   K 4.2 4.1   CL 93* 92*   CO2 40* 39*   MG 1.9  --    PHOS 3.4 --    BUN 27* 27*   CREATININE 2.0* 1.8*   ALT 19  --    AST 23  --    BILITOT 0.2  --    ALKPHOS 64  --        Lab Results   Component Value Date    LABPROT 0.3 (H) 04/27/2018    LABPROT 0.3 04/27/2018       ASSESSMENT / RECOMMENDATIONS:    1-chronic kidney disease stage IIIB baseline cr 1.9-2.2 with no proteinuria      2-Acute /chronic respiratory failure multifactorial, secondary combination of obstructive lung disease, systolic and diastolic CHF     3-LVH      4-anemia of chronic disease. Mild. JENIFER not warranted. Mildly macrocytic.   Folate and B12 normal.  Iron stores on the low side     5-Mineral bone disease     6-elevated serum bicarbonate, due to metabolic compensation for chronic respiratory acidosis probably exacerbated by contraction alkalosis due to diuretic therapy     Azotemia stable, cr at baseline.      --> Continue Bumex 2 mg IV every 12 hours for now  --> Transition oral diuretic therapy soon  --> IV Ferrlecit while here  --> Follow labs, UO  --> Continue supportive care    Electronically signed by Belia Elaine MD on 8/28/2020

## 2020-08-28 NOTE — PROGRESS NOTES
Tati Mccormick M.D.,[facility]  Adithya Zhao D.O., FSOLISOCARL., Stacie Jiménez M.D. Mag Vasquez M.D., Gisselle Pappas M.D. Marisel Toledo D.O. Daily Pulmonary Progress Note    Patient:  Mag Redding 80 y.o. female MRN: [id number]     Date of Service: 8/28/2020      Synopsis     We are following patient for Dyspnea and acute on chronic respiratory failure with hypoxia and hypercapnia    \"CC\" shortness of breath    Code status: FULL       Subjective      Patient was seen and examined. Up to the chair. Currently on 4 liters NC off NIV . Wore bipap 18/6 last HS . She states that she is feeling slightly better with her breathing. She denies cough. Review of Systems:  Constitutional: Denies fever, weight loss, night sweats, and fatigue  Skin: Denies pigmentation, dark lesions, and rashes   HEENT: Denies hearing loss, tinnitus, ear drainage, epistaxis, sore throat, and hoarseness. Cardiovascular: Denies palpitations, chest pain, and chest pressure.   Respiratory: Denies cough, dyspnea at rest, hemoptysis, apnea, and choking. +intermittent dyspnea  Gastrointestinal: Denies nausea, vomiting, poor appetite, diarrhea, heartburn or reflux  Genitourinary: Denies dysuria, frequency, urgency or hematuria  Musculoskeletal: Denies myalgias, muscle weakness, and bone pain  Neurological: Denies dizziness, vertigo, headache, and focal weakness  Psychological: Denies anxiety and depression  Endocrine: Denies heat intolerance and cold intolerance  Hematopoietic/Lymphatic: Denies bleeding problems and blood transfusions    24-hour events:  None     Objective   Vitals: BP (!) 162/63   Pulse 97   Temp 98.1 °F (36.7 °C) (Oral)   Resp 18   Ht 5' 1\" (1.549 m)   Wt 271 lb 6.4 oz (123.1 kg)   LMP  (LMP Unknown)   SpO2 98%   BMI 51.28 kg/m²     I/O:    Intake/Output Summary (Last 24 hours) at 8/28/2020 1405  Last data filed at 8/28/2020 1238  Gross per 24 hour   Intake 580 ml   Output 2025 ml Net -1445 ml       Vent Information  Skin Assessment: Clean, dry, & intact  FiO2 : 50 %  SpO2: 98 %  SpO2/FiO2 ratio: 165  I Time/ I Time %: 1 s  Mask Type: Full face mask  Mask Size: Medium       IPAP: 18 cmH20  CPAP/EPAP: 6 cmH2O     CURRENT MEDS :  Scheduled Meds:   warfarin  1 mg Oral Once    docusate sodium  100 mg Oral Daily    senna  1 tablet Oral Nightly    bumetanide  2 mg Intravenous 2 times per day    vitamin D  5,000 Units Oral Daily    docusate sodium  200 mg Oral Nightly    ferrous sulfate  325 mg Oral BID WC    fluticasone  1 spray Each Nostril BID    gabapentin  300 mg Oral Nightly    insulin glargine  30 Units Subcutaneous Nightly    insulin lispro  8 Units Subcutaneous TID WC    insulin lispro  0-12 Units Subcutaneous TID WC    insulin lispro  0-6 Units Subcutaneous Nightly    levothyroxine  25 mcg Oral Daily    lidocaine  1 patch Transdermal Daily    metoprolol succinate  50 mg Oral Daily    pantoprazole  40 mg Oral BID AC    mirabegron  50 mg Oral Dinner    potassium chloride  10 mEq Oral BID    sodium chloride flush  10 mL Intravenous 2 times per day    warfarin (COUMADIN) daily dosing (placeholder)   Other RX Placeholder       Physical Exam:  General Appearance: appears comfortable in no acute distress. HEENT: Normocephalic atraumatic without obvious abnormality   Neck: Lips, mucosa, and tongue normal.  Supple, symmetrical, trachea midline, no adenopathy;thyroid:  no enlargement/tenderness/nodules or JVD. Lung: Breath sounds diminished fewer bibasilar crackles Respirations   unlabored. Symmetrical expansion. Heart: RRR, normal S1, S2. Murmur noted  Abdomen: Soft, NT, ND. BS present x 4 quadrants. No bruit or organomegaly. Extremities: Pedal pulses 2+ symmetric b/l. Extremities normal, no cyanosis, clubbing, or edema. Musculokeletal: No joint swelling, no muscle tenderness. ROM normal in all joints of extremities.    Neurologic: Mental status: Alert and Oriented X3 .    Pertinent/ New Labs and Imaging Studies     Imaging Personally Reviewed:    Chest x-ray 8/27/2020  Improving       Stable cardiomegaly. Visualization of lower lung fields is limited by overlying soft tissue    of the abdomen. Small bilateral pleural effusions. The aorta is unremarkable.              Impression    Small bilateral pleural effusions.                  8/26/2020 ultrasound of chest including mediastinum  Small pleural effusions          Findings:    Ultrasound the chest demonstrates mild bilateral pleural fluid         Impression    Mild bilateral pleural fluid                     Echo:  From 2018     Summary   Ejection fraction is visually estimated at 60%.   No regional wall motion abnormalities seen.   Normal right ventricle size with reduced function. TAPSE is 1.5 cm.   Mild aortic stenosis is present.   Moderate mitral valve stenosis. Mean transmitral gradient 9 mmHg.   Moderate mitral regurgitation is present.   Mild and tricuspid regurgitation.   Pulmonary hypertension is severe. RVSP is 72 mmHg.              Labs:  Lab Results   Component Value Date    WBC 3.9 08/26/2020    HGB 11.0 08/26/2020    HCT 35.2 08/26/2020    .8 08/26/2020    MCH 32.4 08/26/2020    MCHC 31.3 08/26/2020    RDW 13.8 08/26/2020     08/26/2020    MPV 9.8 08/26/2020     Lab Results   Component Value Date     08/28/2020    K 3.8 08/28/2020    K 3.9 08/25/2020    CL 92 08/28/2020    CO2 40 08/28/2020    BUN 29 08/28/2020    CREATININE 2.0 08/28/2020    LABALBU 3.6 08/26/2020    CALCIUM 9.9 08/28/2020    GFRAA 29 08/28/2020    LABGLOM 24 08/28/2020     Lab Results   Component Value Date    PROTIME 24.0 08/28/2020    INR 2.1 08/28/2020     No results for input(s): PROBNP in the last 72 hours. Recent Labs     08/26/20  0416   PROCAL 0.07     This SmartLink has not been configured with any valid records. Micro:  No results for input(s): CULTRESP in the last 72 hours.   No results for input(s): LABGRAM in the last 72 hours. No results for input(s): LEGUR in the last 72 hours. Recent Labs     08/25/20  1530   STREPNEUMAGU Presumptive negative- suggests no current or recent  pneumococcal infection. Infection due to Strep pneumoniae cannot be  ruled out since the antigen present in the sample  may be below the detection limit of the test.  Normal Range:Presumptive Negative       No results for input(s): LP1UAG in the last 72 hours. Assessment:    1. Acute on chronic hypoxic and hypercapnic respiratory failure  2. Bilateral pleural effusions with atelectasis  3. Pulmonary HTN, worsening RV function on ECHO compared to 2018. Multifactorial due to untreated GOSIA, OHS. Volume overload. 4. Acute on chronic CHF  5. Acute on chronic kidney disease  6. Chronic A. fib on anticoagulation-previously on Eliquis now on warfarin due to BMI greater than 40  7. Chronic kidney disease  8. Diabetes mellitus type 2  9. Morbid obesity-BMI 52.3      Plan:   1. Oxygen therapy 4 L nasal cannula keep >94%  2. Noninvasive ventilation in the form of BiPAP at HS 18/6 BUR 18 FIO2 60%-we previously arranged trilogy noninvasive ventilator for her facility last week prior to discharge. previous settings were AVAPS volume 400 backup rate 12 EPAP +5 FiO2 40%  3. bronchodilator therapy- albuterol PRN  4. Continue incentive spirometer and EZ Pap  5. Flonase and Zyrtec  6. Discontinue antibiotics  7. Ultrasound of chest very small amount of pleural fluid not enough to perform thoracentesis  8. Coumadin for A. fib INR 2.1  9. Follow chest x-ray. 8/27/2020  10. DVT, GI prophylaxis. 11. Fluid management per nephrology-monitor renal function creatinine 2.1 based on Bumex 2 mg IV twice daily. 12. covid 19 test negative on admission   13. Bowel regimen- colace  14.  Okay to DC from a pulmonary perspective when okay with all others  This plan of care was reviewed in collaboration with Dr. Bharati Dunn  Electronically signed by Wilfrido Bui Clark Hamilton - CNP on 8/28/2020 at 2:05 PM         I personally saw, examined, and cared for the patient. Labs, medications, radiographs reviewed. I agree with history exam and plans detailed in NP note.       Electronically signed by Nathaniel Dillon DO on 8/28/2020 at 3:26 PM

## 2020-08-28 NOTE — PROGRESS NOTES
[facility] Quality Flow/Interdisciplinary Rounds Progress Note        Quality Flow Rounds held on August 28, 2020    Disciplines Attending:  Bedside Nurse, ,  and Nursing Unit Leadership    Ana Austin was admitted on 8/24/2020  1:55 PM    Anticipated Discharge Date:  Expected Discharge Date: 08/27/20    Disposition:    Cole Score:  Cole Scale Score: 18    Readmission Risk              Risk of Unplanned Readmission:        27           Discussed patient goal for the day, patient clinical progression, and barriers to discharge. The following Goal(s) of the Day/Commitment(s) have been identified:  Wean IV Bumex to PO, check Cardiology plan.       Barrett Butcher  August 28, 2020

## 2020-08-28 NOTE — PROGRESS NOTES
Call placed to Dr. Willie Lindquist regarding transfer to Med-Surg with tele service, awaiting response. Transfer order not obtained.    Electronically signed by Jeanna Cutler RN on 8/28/2020 at 9:39 AM

## 2020-08-28 NOTE — PROGRESS NOTES
Physical Therapy    [facility]Department[facility]  Treatment note    NAME: Brett Wolfe  : 1935  MRN: [id number]    Date of Service: 2020               Patient Diagnosis(es): The primary encounter diagnosis was Acute on chronic congestive heart failure, unspecified [facility] A diagnosis of Dyspnea and respiratory abnormalities was also pertinent to this visit. has a past medical history of Anemia due to acute blood loss, Arthritis, Blood transfusion reaction, Chronic atrial fibrillation, CKD (chronic kidney disease) stage 3, GFR 30-59 ml/min ([facility]), Diabetes mellitus ([facility] History of blood transfusion, History of breast cancer, History of stroke, Hyperlipidemia, Hypertension, Hypothyroidism, and Volume overload.   has a past surgical history that includes Breast surgery; Cholecystectomy; Gastric bypass surgery; other surgical history (2017); Nasal sinus surgery; pr ctrl nosebleed,anter,complex (N/A, 2018); bronchoscopy (2018); and Appendectomy. Evaluating Therapist: Lou Pond PT     Referring Provider:  Dr. London Smyth #: [id number]   DIAGNOSIS : CHF  Additional Pertinent History:A-fib, CKD, HTN, Gastric bypass, CHF  PRECAUTIONS: falls, O2     Social:  Per chart pt admitted from nursing home. Pt questionable historian with difficuly answering questions about prior level of function. States she walks is bed bound; does not get OOB        Initial Evaluation  Date: 2020 Treatment    2020  Short Term/ Long Term   Goals   Was pt agreeable to Eval/treatment? With encouragement  yes     Does pt have pain? No c/o pain No complaints      Bed Mobility  Rolling: max assist  Supine to sit: max assist x 2   Sit to supine: max assist x 2   Scooting: max assist x 2  Rolling: Max A  Supine to sit: Mod A of 2  Scooting: Max A of 2 to EOB and up in bed once supine Mod assist   Transfers NT . Pt reports unable  Sit to stand:  Mod A of 2  Stand to sit: Mod A of 2  Stand Pivot; Mod A of 2 without A/D   Max assist x 2   Ambulation    NT NT TBD    LE strength     3-/5     3+/5   balance      Sitting balance edge of bed SBA        AM-PAC Raw score               6/24 10/24             Pt is alert able to follow instruction  Balance: poor standing without A/D    Pt performed therapeutic exercise of the following: seated B ankle pumps AROM; B LAQ's/hamstring curls, hip ABd/ADd and heel slide motions with minimal manual resistance x 20    Patient education  Pt was educated on exercise, bed mobility, transfer participation    Patient response to education:   Pt verbalized understanding Pt demonstrated skill Pt requires further education in this area   yes With instruction yes     ASSESSMENT:   Comments: Nurse ok with rx. Pt found in bed, agreed to rx, was assisted to EOB, sat EOB min A for balance. Pt stood off EOB approx 30 seconds to ashley brief, pivot performed bed to chair without A/D, Pt able to WB and advance LEs during pivot but displays poor balance. Exercise performed with Pt seated in the chair   Treatment: Pt practiced and was instructed in the following treatment: LE exercise promoting circulation and strengthening, LE participation with standing balance and transfer    Pt was left upright in a recliner chair with call light in reach, chair alarm active, antonieta pad placed to assist with transfer back to bed if needed. Time in 0805  Time out 0835  Total Treatment Time 30 minutes   CPT codes:     Therapeutic activities 30855 15 minutes   Therapeutic exercises 82603 15 minutes       Pt is making good progress toward established Physical Therapy goals as per ability to transfer OOB today and exercise performed. Continue with physical therapy current plan of care promoting functional mobility as able.     Ursula Hawkins PTA   License Number: [id number]

## 2020-08-28 NOTE — PROGRESS NOTES
I provided Malcolm Leventhal with the Heart Failure book, the HF Zones, and the Sodium Content pamphlet. We reviewed the HF zones, signs and symptoms to report on day 1 of onset, medication compliance, daily weights, low sodium diet (label reading)   I advised patient you can reduce the risk for heart failure exacerbations by modifying/controlling the risk factors they have. We discussed self-managed care which includes the following: to take medications as prescribed- to call the doctor with any side effects do not just stop taking the medication, follow a cardiac heart healthy / low sodium diet, do daily weights, exercise regularly- per doctor recommendation and not to smoke. I stressed the importance of informing the cardiologist the first day of onset of signs and symptoms in the \"Yellow Zone\" of the HF Zones. Verbalizes understanding     patient was unsure if she has a HX of CHF or not    Echocardiogram / EF: 8/14/2020   Summary   Technically suboptimal and limited limited study. Left ventricular internal dimensions were normal in diastole and systole. Moderate left ventricular concentric hypertrophy noted. No regional wall motion abnormalities seen. Normal left ventricular ejection fraction. The left atrium is borderline dilated. Borderline dilated right ventricle. Right ventricle global systolic function is reduced . Mildly enlarged right atrium size. Mild thickening of the mitral valve leaflets. Moderate mitral annular calcification. Mild mitral regurgitation is present. Mild to moderate functional mitral stenosis . Mild aortic stenosis. Mild tricuspid regurgitation. Pulmonary hypertension is mild .     No results found for: BNP   Lab Results   Component Value Date    PROBNP 763 (H) 08/24/2020        History of:  Hypothyroidism, HTN, HLD, HX of stroke, breast CA, diabetes, CKD, chronic afib, arthritis, anemia     Medications:    docusate sodium  100 mg Oral Daily    senna  1 tablet Oral Nightly    bumetanide  2 mg Intravenous 2 times per day    vitamin D  5,000 Units Oral Daily    docusate sodium  200 mg Oral Nightly    ferrous sulfate  325 mg Oral BID WC    fluticasone  1 spray Each Nostril BID    gabapentin  300 mg Oral Nightly    insulin glargine  30 Units Subcutaneous Nightly    insulin lispro  8 Units Subcutaneous TID WC    insulin lispro  0-12 Units Subcutaneous TID WC    insulin lispro  0-6 Units Subcutaneous Nightly    levothyroxine  25 mcg Oral Daily    lidocaine  1 patch Transdermal Daily    metoprolol succinate  50 mg Oral Daily    pantoprazole  40 mg Oral BID AC    mirabegron  50 mg Oral Dinner    potassium chloride  10 mEq Oral BID    sodium chloride flush  10 mL Intravenous 2 times per day    warfarin (COUMADIN) daily dosing (placeholder)   Other RX Placeholder      dextrose       Code Status: DNR-CCA     The patient is ordered:  Diet (sodium restriction): carb control  Sodium/fluid restriction daily ordered (fluid restriction recommended if patient is hyponatremic and/or diuretic is initiated or increased): No  FR: not ordered  Daily Weights:   Patient Vitals for the past 96 hrs (Last 3 readings):   Weight   08/28/20 0114 271 lb 6.4 oz (123.1 kg)   08/26/20 0300 271 lb 6.4 oz (123.1 kg)   08/25/20 0231 277 lb (125.6 kg)     I/O:     Intake/Output Summary (Last 24 hours) at 8/28/2020 1126  Last data filed at 8/28/2020 0919  Gross per 24 hour   Intake 300 ml   Output 1675 ml   Net -1375 ml        I provided the patient with the following:   The Heart Failure Book, \"Caring for Your Heart: Living Well with Heart Failure\"    The Heart Failure Zones    Sodium-Free Flavoring Tips    Low-Sodium Nutrition Therapy   ?   The Heart Failure Booklet was given to the patient, which addresses:   Signs and symptoms of HF to report    Home Self Management- activity, weight tracking, taking medications as prescribed, meals/diet planning (sodium and fluid restriction), how to read food labels, keeping physician follow ups, smoking cessation, follow the Heart Failure Zones      Instructed to call 911 if you have any of the following symptoms: ?   Struggling to breathe unrelieved with rest,    Having chest pain, confusion or can't think clearly    Have confusion or cant think clearly  Discharge Plan: I placed HF Home Instructions in the discharge instructions. Readmission Risk Score: 27       Reminder: Discharge Instructions: activity, daily weights, diet, S/S, discharge medications & when to call the doctor.   I&O added to ordered  MSG sent to [facility] cardio for post DC f/u  Electronically signed by Nikko Spear RN on 8/28/2020 at 11:29 AM

## 2020-08-28 NOTE — PROGRESS NOTES
Occupational Therapy  OT BEDSIDE TREATMENT NOTE      Date:2020  Patient Name: Ana Austin  MRN: [id number]  : 1935  Room: [address] [place] [address]-A     *Referring Ana Huynh MD     Evaluating OT: Shannon Berry OTR/L [id number]     AM-PAC Daily Activity Raw Score:      Recommended Adaptive Equipment: TBD     Diagnosis: CHF. Pt presents to ED from ECF with SOB.        Pertinent Medical History: DM, afib, CKD, h/o CVA, arthritis    Precautions:  Falls, O2     Home Living: Pt is a resident of [facility] skilled nursing facility. Pt reports she recently has been primarily bed level reporting she does not remember the last time she stood or got in to a wheelchair. Pt reports staff assist in all ADL/IADLs.    Pain Level: No c/o pain     Cognition: Alert and conversing with good ability to follow multi- step commands.                Functional Assessment:    Initial Eval Status  Date: 20 Treatment session: 20 Short Term Goals  Treatment frequency: 2-5x/wk PRN x1-3 wks      Feeding Set up       Grooming Min A   Set up   UB Dressing Max A  Management of hospital gown   Min A   LB Dressing Dependent  Max A   To don B socks and brief     Bathing Max A   Mod A   Toileting Dependent Mod Ax2  For delio care while standing Max A   Bed Mobility  Supine <> sit: Max A x2 Supine to sit: Mod A x2     Functional Transfers STS: NT   Pt declines   STS: Max A from EOB 2xs  SPT: Mod A x2 from bed to chair without AD Max A   Functional Mobility         Balance Sitting: varying levels from poor to fair minus seated EOB     Standing: NT Sitting: SBA  Standing: Max A     Activity Tolerance Poor  O2 throughout session maintained 98% O2 sat  Limited activity tolerance with request to return to supine Limited standing tolerance sitting suleman x10-12 min with fair balance during self care tasks                     Comments: Upon arrival pt was supine in bed.  At end of session pt was transferred to chair with antonieta pad in place, alarm on, all lines and tubes intact and call light within reach. Treatment: Pt found soiled requiring assistance with hygiene while standing. Cues for hand placement and weigh shifting provided during STS transfers. Pt activity tolerance improved from previous sessions. Education: Transfer training, benefits of OOB activity and ADL retraining. · Pt has made good progress towards set goals.    · Continue with current plan of care      Treatment Charges: Mins Units   Ther Ex  22840     Manual Therapy 13841     Thera Activities 83720 10 1   ADL/Home Mgt [id number] 6 0   Neuro Re-ed [id number]     Group Therapy      Orthotic manage/training  80702     Non-Billable Time     Total Timed Treatment 16 1       Meg MCCALL/ANDREW [id number]

## 2020-08-28 NOTE — CARE COORDINATION
CM NOTE: Per QFR--- working with therapy, continues on IV bumex bid, Plan remains return to SNF. Therapy updated---does not need to wait for insurance auth.

## 2020-08-28 NOTE — PROGRESS NOTES
Subjective: The patient is awake and alert. Just finished breakfast.  Off BiPAP. No problems overnight. Denies chest pain, angina. Overall breathing better. Denies abdominal pain. Tolerating diet. No nausea or vomiting. Objective:    BP (!) 162/63   Pulse 97   Temp 98.1 °F (36.7 °C) (Oral)   Resp 18   Ht 5' 1\" (1.549 m)   Wt 271 lb 6.4 oz (123.1 kg)   LMP  (LMP Unknown)   SpO2 98%   BMI 51.28 kg/m²     Current medications that patient is taking have been reviewed. Heart:  RRR, no murmurs, gallops, or rubs.   Lungs:  CTA bilaterally, no wheeze, rales or rhonchi  Abd: bowel sounds present, soft, nontender, nondistended, no masses  Extrem:  No cyanosis or edema    CBC with Differential:    Lab Results   Component Value Date    WBC 3.9 08/26/2020    RBC 3.39 08/26/2020    HGB 11.0 08/26/2020    HCT 35.2 08/26/2020     08/26/2020    .8 08/26/2020    MCH 32.4 08/26/2020    MCHC 31.3 08/26/2020    RDW 13.8 08/26/2020    NRBC 0.9 08/15/2020    LYMPHOPCT 19.6 08/26/2020    MONOPCT 12.9 08/26/2020    BASOPCT 0.3 08/26/2020    MONOSABS 0.50 08/26/2020    LYMPHSABS 0.76 08/26/2020    EOSABS 0.17 08/26/2020    BASOSABS 0.01 08/26/2020     CMP:    Lab Results   Component Value Date     08/27/2020    K 4.1 08/27/2020    K 3.9 08/25/2020    CL 92 08/27/2020    CO2 39 08/27/2020    BUN 27 08/27/2020    CREATININE 1.8 08/27/2020    GFRAA 32 08/27/2020    LABGLOM 27 08/27/2020    GLUCOSE 138 08/27/2020    PROT 7.0 08/26/2020    LABALBU 3.6 08/26/2020    CALCIUM 10.1 08/27/2020    BILITOT 0.2 08/26/2020    ALKPHOS 64 08/26/2020    AST 23 08/26/2020    ALT 19 08/26/2020     BMP:    Lab Results   Component Value Date     08/27/2020    K 4.1 08/27/2020    K 3.9 08/25/2020    CL 92 08/27/2020    CO2 39 08/27/2020    BUN 27 08/27/2020    LABALBU 3.6 08/26/2020    CREATININE 1.8 08/27/2020    CALCIUM 10.1 08/27/2020    GFRAA 32 08/27/2020    LABGLOM 27 08/27/2020    GLUCOSE 138 08/27/2020 Magnesium:    Lab Results   Component Value Date    MG 1.9 08/26/2020     Phosphorus:    Lab Results   Component Value Date    PHOS 3.4 08/26/2020     PT/INR:    Lab Results   Component Value Date    PROTIME 27.3 08/27/2020    INR 2.3 08/27/2020     PTT:    Lab Results   Component Value Date    APTT 33.3 07/22/2018   [APTT}     Assessment:    Patient Active Problem List   Diagnosis    Chronic atrial fibrillation    CKD (chronic kidney disease) stage 3, GFR 30-59 ml/min ([facility])    History of breast cancer    Chronic anemia    Diabetes mellitus type 2, uncontrolled ([facility]    Essential hypertension    History of CVA (cerebrovascular accident)    Hyperlipidemia LDL goal <100    Acquired hypothyroidism    Morbid obesity with BMI of 50.0-59.9, adult ([facility]    Acute on chronic respiratory failure with hypoxia ([facility])    Acute on chronic diastolic congestive heart failure ([facility]       Plan:  Rate controlled. Renal function at baseline. Stable. Stable. Blood glucose ok, continue to adjust basal/bolus insulin therapy  Blood pressure ok, continue current medications  Continue Pt/Ot and risk factor modifications. Continue aggressive lipid therapy  Continue synthroid. Secondary to CHF. Diurese as BP and renal function allow. Pt/Ot evaluations for discharge planning.   Discharge once ok with nephrology    Farzaneh Factor    11:48 AM  8/28/2020

## 2020-08-29 NOTE — PROGRESS NOTES
Pharmacy Consultation Note  (Warfarin Dosing and Monitoring)    Initial consult date: 8/24  Consulting physician: Vicki Linn    Allergies:  Pcn [penicillins]    80 y.o. female    Ht Readings from Last 1 Encounters:   08/24/20 5' 1\" (1.549 m)     Wt Readings from Last 1 Encounters:   08/28/20 271 lb 6.4 oz (123.1 kg)         Warfarin Indication Target   INR Range Home Dose  (if applicable) Diet/Feeding Tube   (Enteral feeds, nutritional drinks, increased Vitamin K in diet can decrease INR)   Afib 2-2.5 2mg daily Carb Control       x Home Med? Meds Increasing INR x Home Med?  Meds Decreasing INR     Allopurinol    Azathioprine     Amiodarone/Propafenone/Dronedarone   Carbamazepine     Androgens   Cholestyramine     Chemotherapy (BBW: Capecitabine)   Estrogen     Ciprofloxacin/Levofloxacin   Nafcillin/Dicloxacillin     Clarithromycin/Erythromycin/Azithromycin   Barbiturates      Fluconazole/Itraconazole/Voriconazole/Ketoconazole   Phenytoin (Variable)     Metronidazole   Rifampin     Phenytoin (Variable)   Steroids (Variable/Dose Dependent)      Statins/Fenofibrate/Gemfibrozil   Sucralfate     Steroids (Variable/Dose Dependent)   Other:     Sulfamethoxazole/Trimethoprim        Tramadol         Other:        Comments regarding medication interactions:      x Diseases Affecting INR x Increased Bleeding Risk    CHF Exacerbation (Increases)  History GI Bleed/PUD    Liver Disease (Increases)  Chronic NSAID Use    Thyroid: Hyper (Increases)  Hypo (Decreases)  Chronic ASA/Antiplatelet Use (Clopidogrel/ Dipyridamole/Prasugrel/Ticagrelor)      Malignancy (Increases)  Abnormal Renal Function (dialysis, renal transplant, SCr ? 2.3 mg/dL)     History of EtOH Abuse: Acute (Increases)   Chronic (Decreases)  Liver Function (cirrhosis, bilirubin >2x ULN with AST/ALT/AP >3x ULN)    Fever (Increases)  Age > 65 years    Acute infection (Increases)  Hypertension/Uncontrolled BP    Diarrhea/Dehydration (Increases)  History of stroke    Other:

## 2020-08-29 NOTE — PROGRESS NOTES
Chief Complaint:  Chief Complaint   Patient presents with    Shortness of Breath     [facility], [facility]     Acute on chronic diastolic congestive heart failure ([address]     Subjective:    Patient feels good, breathing is comfortable, she says breathing s back to bsaeline    Objective:    BP (!) 149/76   Pulse 79   Temp 97.7 °F (36.5 °C) (Oral)   Resp 18   Ht 5' 1\" (1.549 m)   Wt 271 lb 6.4 oz (123.1 kg)   LMP  (LMP Unknown)   SpO2 97%   BMI 51.28 kg/m²     Current medications that patient is taking have been reviewed.     General appearance: NAD, conversant, morbidly obese   HEENT: AT/NC, MMM  Neck: FROM, supple  Lungs: Clear to auscultation  CV: RRR, no MRGs  Abdomen: Soft, non-tender; no masses or HSM, +BS  Extremities: No peripheral edema or digital cyanosis  Skin: no rash, lesions or ulcers  Psych: Calm and cooperative  Neuro: Alert and interactive, nonfocal    Labs:  CBC:   Lab Results   Component Value Date    WBC 3.9 08/26/2020    RBC 3.39 08/26/2020    HGB 11.0 08/26/2020    HCT 35.2 08/26/2020    .8 08/26/2020    MCH 32.4 08/26/2020    MCHC 31.3 08/26/2020    RDW 13.8 08/26/2020     08/26/2020    MPV 9.8 08/26/2020     CMP:    Lab Results   Component Value Date     08/28/2020    K 3.8 08/28/2020    K 3.9 08/25/2020    CL 92 08/28/2020    CO2 40 08/28/2020    BUN 29 08/28/2020    CREATININE 2.0 08/28/2020    GFRAA 29 08/28/2020    LABGLOM 24 08/28/2020    GLUCOSE 148 08/28/2020    PROT 7.0 08/26/2020    LABALBU 3.6 08/26/2020    CALCIUM 9.9 08/28/2020    BILITOT 0.2 08/26/2020    ALKPHOS 64 08/26/2020    AST 23 08/26/2020    ALT 19 08/26/2020        Assessment/Plan:  Principal Problem:    Acute on chronic diastolic congestive heart failure (HCC)  Active Problems:    Chronic atrial fibrillation    CKD (chronic kidney disease) stage 3, GFR 30-59 ml/min (HCC)    Chronic anemia    History of breast cancer    Diabetes mellitus type 2, uncontrolled ([facility]    Essential hypertension    History of CVA (cerebrovascular accident)    Hyperlipidemia LDL goal <100    Acquired hypothyroidism    Morbid obesity with BMI of 50.0-59.9, adult ([facility]    Acute on chronic respiratory failure with hypoxia ([facility])  Resolved Problems:    CHF (NYHA class IV, ACC/AHA stage D) (HCC)       Continue diuresis    Weight 275 on admission, 271 currently    AF controlled continue warfarin, metoprolol    Continue synthroid    BP well controlled    Glucose well controlled    Requires continued inpatient level of care   Miguel Hernández    9:08 AM  8/29/2020  Cell: [phone]

## 2020-08-29 NOTE — PROGRESS NOTES
[facility] MD Breanna Bower MD Genaro Meager, MD Brynn Chapman, MD Marily Ouch, JESSA Stewart, JEWEL  Progress Note    8/29/2020    SUBJECTIVE:   8/26: \"I do not feel good, but I do not feel bad either. \"  Fatigue, malaise, generalized weakness. Breathing much more easily now. No wheeze. No cough. No dyspnea at rest.  8/27: Feeling a little bit better today. Tired. Breathing about the same as yesterday.   8/29: Overall doing well feeling more energetic today    PROBLEM LIST:    Principal Problem:    Acute on chronic diastolic congestive heart failure (HCC)  Active Problems:    Chronic atrial fibrillation    CKD (chronic kidney disease) stage 3, GFR 30-59 ml/min ([facility])    Chronic anemia    History of breast cancer    Diabetes mellitus type 2, uncontrolled ([facility])    Essential hypertension    History of CVA (cerebrovascular accident)    Hyperlipidemia LDL goal <100    Acquired hypothyroidism    Morbid obesity with BMI of 50.0-59.9, adult ([facility]    Acute on chronic respiratory failure with hypoxia ([facility])  Resolved Problems:    CHF (NYHA class IV, ACC/AHA stage D) ([facility])         DIET:    DIET CARB CONTROL;     MEDS (scheduled):    sennosides-docusate sodium  2 tablet Oral BID    polyethylene glycol  17 g Oral BID    warfarin  1 mg Oral Once    bumetanide  2 mg Intravenous 2 times per day    vitamin D  5,000 Units Oral Daily    ferrous sulfate  325 mg Oral BID WC    fluticasone  1 spray Each Nostril BID    gabapentin  300 mg Oral Nightly    insulin glargine  30 Units Subcutaneous Nightly    insulin lispro  8 Units Subcutaneous TID WC    insulin lispro  0-12 Units Subcutaneous TID WC    insulin lispro  0-6 Units Subcutaneous Nightly    levothyroxine  25 mcg Oral Daily    lidocaine  1 patch Transdermal Daily    metoprolol succinate  50 mg Oral Daily    pantoprazole  40 mg Oral BID AC    mirabegron  50 mg Oral Dinner    potassium chloride  10 mEq Oral BID    sodium chloride flush  10 mL Intravenous 2 times per day    warfarin (COUMADIN) daily dosing (placeholder)   Other RX Placeholder       MEDS (infusions):   dextrose         MEDS (prn):  bisacodyl, albuterol, hydrOXYzine, glucose, dextrose, glucagon (rDNA), dextrose, sodium chloride flush, acetaminophen **OR** acetaminophen, polyethylene glycol, promethazine **OR** ondansetron, hydrALAZINE    PHYSICAL EXAM:     Patient Vitals for the past 24 hrs:   BP Temp Temp src Pulse Resp SpO2   08/29/20 1000 (!) 142/68 97.8 °F (36.6 °C) Temporal 82 18 --   08/29/20 0405 -- -- -- -- 18 --   08/29/20 0205 -- -- -- -- 18 --   08/29/20 0000 (!) 149/76 97.7 °F (36.5 °C) Oral 79 19 97 %   08/28/20 2243 -- -- -- -- 21 --   @      Intake/Output Summary (Last 24 hours) at 8/29/2020 1601  Last data filed at 8/29/2020 1000  Gross per 24 hour   Intake 360 ml   Output 1600 ml   Net -1240 ml         Wt Readings from Last 3 Encounters:   08/28/20 271 lb 6.4 oz (123.1 kg)   08/19/20 283 lb 1.6 oz (128.4 kg)   07/10/20 270 lb (122.5 kg)       Constitutional:  in no acute distress  HEENT: NC/AT, EOMI, sclera and conjunctiva are clear and anicteric, mucus membranes moist  Neck: Trachea midline, no JVD  Cardiovascular: S1, S2 regular rhythm, no murmur,or rub  Respiratory: Basilar crackles, quite mild, generally poor air entry diffusely. No wheeze  Gastrointestinal:  Soft, nontender, nondistended, NABS  Ext: no edema distally, feet warm  Skin: dry, no rash  Neuro: awake, alert, interactive      DATA:    No results for input(s): WBC, HGB, HCT, MCV, PLT in the last 72 hours.   Recent Labs     08/27/20  0340 08/28/20  1155 08/29/20  1040    140 142   K 4.1 3.8 3.6   CL 92* 92* 93*   CO2 39* 40* 39*   MG  --  1.8 1.9   PHOS  --  2.7  --    BUN 27* 29* 29*   CREATININE 1.8* 2.0* 1.9*       Lab Results   Component Value Date    LABPROT 0.3 (H) 04/27/2018    LABPROT 0.3 04/27/2018       ASSESSMENT / RECOMMENDATIONS:    1-chronic kidney

## 2020-08-29 NOTE — PROGRESS NOTES
[facility] Quality Flow/Interdisciplinary Rounds Progress Note        Quality Flow Rounds held on August 29, 2020    Disciplines Attending:  Bedside Nurse, ,  and Nursing Unit Leadership    Keira Lopez was admitted on 8/24/2020  1:55 PM    Anticipated Discharge Date:  Expected Discharge Date: 08/29/20    Disposition:    Cole Score:  Cole Scale Score: 18    Readmission Risk              Risk of Unplanned Readmission:        25           Discussed patient goal for the day, patient clinical progression, and barriers to discharge. The following Goal(s) of the Day/Commitment(s) have been identified:  Attempt to wean IV Bumex to oral, check Nephrology plan.       Briseyda Johnson  August 29, 2020

## 2020-08-30 NOTE — PROGRESS NOTES
Chief Complaint:  Chief Complaint   Patient presents with    Shortness of Breath     [facility], [facility]     Acute on chronic diastolic congestive heart failure ([address]     Subjective:    Patient feels good, breathing is comfortable, she says breathing s back to baseline    Objective:    /75   Pulse 99   Temp 97.3 °F (36.3 °C) (Temporal)   Resp 18   Ht 5' 1\" (1.549 m)   Wt 275 lb 4.8 oz (124.9 kg)   LMP  (LMP Unknown)   SpO2 100%   BMI 52.02 kg/m²     Current medications that patient is taking have been reviewed.     General appearance: NAD, conversant, morbidly obese   HEENT: AT/NC, MMM  Neck: FROM, supple  Lungs: Clear to auscultation  CV: RRR, no MRGs  Abdomen: Soft, non-tender; no masses or HSM, +BS  Extremities: No peripheral edema or digital cyanosis  Skin: no rash, lesions or ulcers  Psych: Calm and cooperative  Neuro: Alert and interactive, nonfocal    Labs:  CBC:   Lab Results   Component Value Date    WBC 3.9 08/26/2020    RBC 3.39 08/26/2020    HGB 11.0 08/26/2020    HCT 35.2 08/26/2020    .8 08/26/2020    MCH 32.4 08/26/2020    MCHC 31.3 08/26/2020    RDW 13.8 08/26/2020     08/26/2020    MPV 9.8 08/26/2020     CMP:    Lab Results   Component Value Date     08/30/2020    K 4.4 08/30/2020    K 3.9 08/25/2020    CL 92 08/30/2020    CO2 41 08/30/2020    BUN 31 08/30/2020    CREATININE 1.9 08/30/2020    GFRAA 30 08/30/2020    LABGLOM 25 08/30/2020    GLUCOSE 155 08/30/2020    PROT 7.0 08/26/2020    LABALBU 3.6 08/26/2020    CALCIUM 9.8 08/30/2020    BILITOT 0.2 08/26/2020    ALKPHOS 64 08/26/2020    AST 23 08/26/2020    ALT 19 08/26/2020        Assessment/Plan:  Principal Problem:    Acute on chronic diastolic congestive heart failure (HCC)  Active Problems:    Chronic atrial fibrillation    CKD (chronic kidney disease) stage 3, GFR 30-59 ml/min (HCC)    Chronic anemia    History of breast cancer    Diabetes mellitus type 2, uncontrolled ([facility]    Essential hypertension    History of CVA (cerebrovascular accident)    Hyperlipidemia LDL goal <100    Acquired hypothyroidism    Morbid obesity with BMI of 50.0-59.9, adult ([facility]    Acute on chronic respiratory failure with hypoxia (HCC)  Resolved Problems:    CHF (NYHA class IV, ACC/AHA stage D) ([facility]       Transition back to home dose diuretic.  100%/4LNC breathing comfortably. Weight unchanged from admission despite -6 L cumulative I/O (may be inaccurate). But clinically seems to be doing well.     AF controlled continue warfarin, metoprolol    Continue synthroid    BP well controlled    Glucose well controlled    CKD3 stable    Medically ready for d/c back to SNF  Madeline Fall    1:15 PM  8/30/2020  Cell: [phone]

## 2020-08-30 NOTE — PROGRESS NOTES
[facility] Quality Flow/Interdisciplinary Rounds Progress Note        Quality Flow Rounds held on August 30, 2020    Disciplines Attending:  Bedside Nurse, ,  and Nursing Unit Leadership    Saulo Rodriguez was admitted on 8/24/2020  1:55 PM    Anticipated Discharge Date:  Expected Discharge Date: 08/29/20    Disposition:    Cole Score:  Cole Scale Score: 17    Readmission Risk              Risk of Unplanned Readmission:        25           Discussed patient goal for the day, patient clinical progression, and barriers to discharge. The following Goal(s) of the Day/Commitment(s) have been identified:  Transitioned to oral bumex, possible discharge today.       Yudi Fraser  August 30, 2020

## 2020-08-30 NOTE — PROGRESS NOTES
Pharmacy Consultation Note  (Warfarin Dosing and Monitoring)    Initial consult date: 8/24  Consulting physician: Jennifer Molina    Allergies:  Pcn [penicillins]    80 y.o. female    Ht Readings from Last 1 Encounters:   08/24/20 5' 1\" (1.549 m)     Wt Readings from Last 1 Encounters:   08/30/20 275 lb 4.8 oz (124.9 kg)         Warfarin Indication Target   INR Range Home Dose  (if applicable) Diet/Feeding Tube   (Enteral feeds, nutritional drinks, increased Vitamin K in diet can decrease INR)   Afib 2-2.5 2mg daily Carb Control       x Home Med? Meds Increasing INR x Home Med?  Meds Decreasing INR     Allopurinol    Azathioprine     Amiodarone/Propafenone/Dronedarone   Carbamazepine     Androgens   Cholestyramine     Chemotherapy (BBW: Capecitabine)   Estrogen     Ciprofloxacin/Levofloxacin   Nafcillin/Dicloxacillin     Clarithromycin/Erythromycin/Azithromycin   Barbiturates      Fluconazole/Itraconazole/Voriconazole/Ketoconazole   Phenytoin (Variable)     Metronidazole   Rifampin     Phenytoin (Variable)   Steroids (Variable/Dose Dependent)      Statins/Fenofibrate/Gemfibrozil   Sucralfate     Steroids (Variable/Dose Dependent)   Other:     Sulfamethoxazole/Trimethoprim        Tramadol         Other:        Comments regarding medication interactions:      x Diseases Affecting INR x Increased Bleeding Risk    CHF Exacerbation (Increases)  History GI Bleed/PUD    Liver Disease (Increases)  Chronic NSAID Use    Thyroid: Hyper (Increases)  Hypo (Decreases)  Chronic ASA/Antiplatelet Use (Clopidogrel/ Dipyridamole/Prasugrel/Ticagrelor)      Malignancy (Increases)  Abnormal Renal Function (dialysis, renal transplant, SCr ? 2.3 mg/dL)     History of EtOH Abuse: Acute (Increases)   Chronic (Decreases)  Liver Function (cirrhosis, bilirubin >2x ULN with AST/ALT/AP >3x ULN)    Fever (Increases)  Age > 65 years    Acute infection (Increases)  Hypertension/Uncontrolled BP    Diarrhea/Dehydration (Increases)  History of stroke    Other: __________________  Other:___________________       Vitamin K or Blood product  Administration Date                    TSH:    Lab Results   Component Value Date    TSH 1.430 07/23/2018        Hepatic Function Panel:                            Lab Results   Component Value Date    ALKPHOS 64 08/26/2020    ALT 19 08/26/2020    AST 23 08/26/2020    PROT 7.0 08/26/2020    BILITOT 0.2 08/26/2020    LABALBU 3.6 08/26/2020       Date Warfarin Dose INR Heparin or LMWH HGB/HCT PLT Comment   8/24 -- 3.5 -- 10.8/34.5 224    8/25 1mg 3.1 -- 11/36 229    8/26 1mg 2.4 -- 11/35.2 217    8/27 2 mg 2.3 X      8/28 1 mg 2.1 X      8/29 1 mg  2.1 X -- --    8/30 2 mg 2 x -- --      Assessment and Plan:  · Pt is a 79 yo female on warfarin PTA for Afib.  Patient was recently here with a very labile INR at that time   · Goal INR 2-2.5 per Cardiology  · INR 2 today  · Warfarin 2 mg tonight  · Daily PT/INR until the INR is stable within the therapeutic range  · Pharmacist will follow and monitor/adjust dosing as necessary    Thank you for this consult,    Ganga Marin, PharmD, BCPS 8/30/2020 8:11 AM

## 2020-08-30 NOTE — PROGRESS NOTES
Date: 8/29/2020    Time: 10:56 PM    Patient Placed On BIPAP/CPAP/ Non-Invasive Ventilation? Yes    If no must comment. Facial area red/color change? No           If YES are Blister/Lesion present? No   If yes must notify nursing staff  BIPAP/CPAP skin barrier?   Yes    Skin barrier type:duoderm       Comments:       08/29/20 3071   NIV Type   Skin Protection for O2 Device Yes   NIV Started/Stopped On   Equipment Type v60   Mode Biphasic   Mask Type Full face mask   Mask Size Medium   Settings/Measurements   IPAP 18 cmH20   CPAP/EPAP 6 cmH2O   Rate Ordered 18   Resp 19   Insp Rise Time (%) 1 %   FiO2  50 %   I Time/ I Time % 1 s   Vt Exhaled 501 mL   Minute Volume 9.9 Liters   Mask Leak (lpm) 44 lpm   Comfort Level Good   Using Accessory Muscles No           Mili Keep

## 2020-08-30 NOTE — PROGRESS NOTES
[facility] MD Toy Manzo MD Renee Oka, MD York Cress, MD Chauncy Flatten, JESSA Stewart, JEWEL  Progress Note    8/30/2020    SUBJECTIVE:   8/26: \"I do not feel good, but I do not feel bad either. \"  Fatigue, malaise, generalized weakness. Breathing much more easily now. No wheeze. No cough. No dyspnea at rest.  8/27: Feeling a little bit better today. Tired. Breathing about the same as yesterday.   8/29: Overall doing well feeling more energetic today  8/30: Patient stable will be discharged today  PROBLEM LIST:    Principal Problem:    Acute on chronic diastolic congestive heart failure (HCC)  Active Problems:    Chronic atrial fibrillation    CKD (chronic kidney disease) stage 3, GFR 30-59 ml/min ([facility])    Chronic anemia    History of breast cancer    Diabetes mellitus type 2, uncontrolled ([facility])    Essential hypertension    History of CVA (cerebrovascular accident)    Hyperlipidemia LDL goal <100    Acquired hypothyroidism    Morbid obesity with BMI of 50.0-59.9, adult ([facility] [address]    Acute on chronic respiratory failure with hypoxia ([facility])  Resolved Problems:    CHF (NYHA class IV, ACC/AHA stage D) ([facility])         DIET:    DIET CARB CONTROL;     MEDS (scheduled):    bumetanide  1 mg Oral BID    warfarin  2 mg Oral Once    sennosides-docusate sodium  2 tablet Oral BID    polyethylene glycol  17 g Oral BID    vitamin D  5,000 Units Oral Daily    ferrous sulfate  325 mg Oral BID WC    fluticasone  1 spray Each Nostril BID    gabapentin  300 mg Oral Nightly    insulin glargine  30 Units Subcutaneous Nightly    insulin lispro  8 Units Subcutaneous TID WC    insulin lispro  0-12 Units Subcutaneous TID     insulin lispro  0-6 Units Subcutaneous Nightly    levothyroxine  25 mcg Oral Daily    lidocaine  1 patch Transdermal Daily    metoprolol succinate  50 mg Oral Daily    pantoprazole  40 mg Oral BID AC    mirabegron  50 mg Oral Dinner   [facility] Component Value Date    LABPROT 0.3 (H) 04/27/2018    LABPROT 0.3 04/27/2018       ASSESSMENT / RECOMMENDATIONS:    1-chronic kidney disease stage IIIB baseline cr 1.9-2.2 with no proteinuria      2-Acute /chronic respiratory failure multifactorial, secondary combination of obstructive lung disease, systolic and diastolic CHF     3-LVH      4-anemia of chronic disease. Mild. JENIFER not warranted. Mildly macrocytic.   Folate and B12 normal.  Iron stores on the low side     5-Mineral bone disease     6-elevated serum bicarbonate, due to metabolic compensation for chronic respiratory acidosis probably exacerbated by contraction alkalosis due to diuretic therapy     Azotemia stable, cr at baseline.      --> Continue Bumex 2 mg IV every 12 hours for now  --> Transition oral diuretic therapy in the next 1 to 3 days  --> IV Ferrlecit while here  --> Follow labs, UO  --> Continue supportive care    Electronically signed by Georgina Rios MD on 8/30/2020

## 2020-08-31 NOTE — CONSULTS
Patient unable to bear weight and wants to build strength up before and deciding on attending the CHF clinic. I will followup with her at [facility] in 2 weeks and revisit her attending the CHF clinic.    Electronically signed by Merritt Alexandra RN on 8/31/2020 at 11:26 AM

## 2020-08-31 NOTE — PROGRESS NOTES
Consult to CHF RN sent out via [employer].   Electronically signed by Adelina Townsend RN on 8/31/2020 at 9:05 AM

## 2020-08-31 NOTE — DISCHARGE SUMMARY
Physician Discharge Summary     Patient ID:  Floresita Maher  [id number]  80 y.o.  1935    Admit date: 8/24/2020    Discharge date and time:  8/31/2020    Admission Diagnoses:   Patient Active Problem List   Diagnosis    Chronic atrial fibrillation    CKD (chronic kidney disease) stage 3, GFR 30-59 ml/min (HCC)    History of breast cancer    Chronic anemia    Diabetes mellitus type 2, uncontrolled ([facility] [address]    Essential hypertension    History of CVA (cerebrovascular accident)    Hyperlipidemia LDL goal <100    Acquired hypothyroidism    Morbid obesity with BMI of 50.0-59.9, adult ([facility] [address]    Acute on chronic respiratory failure with hypoxia ([facility]    [facility]       Discharge Diagnoses: as above    Consults: cardiology, pulmonary/intensive care and nephrology    Procedures: see chart    Hospital Course: patient was admitted with shortness of breath. She was found to suffer acute on chronic respiratory failure secondary to CHF. She was seen by cardiology, pulmonology and nephrology. She was diuresed. She improved. She was seen by Pt/Ot and rehab was recommended. She was set up with the CHF clinic prior to discharge.       Discharge Exam:  See progress note from today    Condition:  stable    Disposition: SNF    Patient Instructions:   Current Discharge Medication List      CONTINUE these medications which have NOT CHANGED    Details   warfarin (COUMADIN) 2 MG tablet Take 1 tablet by mouth daily  Qty: 30 tablet, Refills: 0      metoprolol succinate (TOPROL XL) 50 MG extended release tablet Take 1 tablet by mouth daily  Qty: 30 tablet, Refills: 0      bumetanide (BUMEX) 1 MG tablet Take 1 tablet by mouth 2 times daily  Qty: 60 tablet, Refills: 0      Multiple Vitamins-Minerals (THERAPEUTIC MULTIVITAMIN-MINERALS) tablet Take 1 tablet by mouth daily      lidocaine 4 % external patch Place 1 patch onto the skin daily Remove at HS      potassium chloride (KLOR-CON M) 10 MEQ extended release tablet Take 10 mEq by mouth 2 times daily      acetaminophen (TYLENOL) 325 MG tablet Take 650 mg by mouth every 6 hours as needed for Pain      fluticasone (FLONASE) 50 MCG/ACT nasal spray 1 spray by Each Nostril route daily  Qty: 2 Bottle, Refills: 1      calcitRIOL (ROCALTROL) 0.25 MCG capsule Take 0.5 mcg by mouth daily       loratadine (CLARITIN) 10 MG tablet Take 10 mg by mouth daily      melatonin 5 MG TABS tablet Take 10 mg by mouth nightly       guaiFENesin (MUCINEX) 600 MG extended release tablet Take 600 mg by mouth 2 times daily       Dulaglutide (TRULICITY) 1.5 Upper sorbian/8.7NU SOPN Inject 1.5 mg into the skin once a week Indications: weekly on Thursday      Cholecalciferol (VITAMIN D3) 5000 units TABS Take 5,000 Units by mouth daily       insulin lispro (HUMALOG) 100 UNIT/ML injection vial Take 12 units before meals + sliding scale.  MAX 60 units daily  Qty: 3 vial, Refills: 5    Associated Diagnoses: Type 2 diabetes mellitus with complication, with long-term current use of insulin ([facility])      insulin glargine (TOUJEO SOLOSTAR) 300 UNIT/ML injection pen Inject 50 Units into the skin nightly  Qty: 12 pen, Refills: 5    Associated Diagnoses: Type 2 diabetes mellitus with complication, with long-term current use of insulin ([facility])      ferrous sulfate 325 (65 Fe) MG tablet Take 1 tablet by mouth 2 times daily (with meals)  Qty: 30 tablet, Refills: 0      magnesium hydroxide (MILK OF MAGNESIA) 400 MG/5ML suspension Take 30 mLs by mouth daily as needed for Constipation      docusate sodium (COLACE, DULCOLAX) 100 MG CAPS Take 200 mg by mouth nightly      hydrOXYzine (ATARAX) 10 MG tablet Take 10 mg by mouth 3 times daily as needed for Itching      levothyroxine (SYNTHROID) 25 MCG tablet Take 1 tablet by mouth daily  Qty: 30 tablet, Refills: 3      Mirabegron ER 50 MG TB24 Take 50 mg by mouth Daily with supper       pantoprazole (PROTONIX) 40 MG tablet Take 1 tablet by mouth 2 times daily (before meals). Qty: 60 tablet, Refills: 1      simvastatin (ZOCOR) 40 MG tablet Take 40 mg by mouth nightly.      gabapentin (NEURONTIN) 300 MG capsule Take 300 mg by mouth nightly. .           Activity: activity as tolerated  Diet: low fat, low cholesterol diet    Follow up with dr Gabriela Rocha in 1 week. Follow up with dr Alice Mayo in 2-3 weeks. Follow up with dr Jenna Jones in 2-3 weeks. Follow up with dr Leeanna Hoyt in 2-3 weeks. Follow up with the CHF clinic as scheduled.       Note that over 30 minutes was spent in preparing discharge papers, discussing discharge with patient, medication review, etc.    Signed:  Chelita Silva    8/31/2020  8:47 AM

## 2020-08-31 NOTE — PROGRESS NOTES
Magnesium:    Lab Results   Component Value Date    MG 1.9 08/29/2020     Phosphorus:    Lab Results   Component Value Date    PHOS 2.7 08/28/2020     PT/INR:    Lab Results   Component Value Date    PROTIME 21.5 08/31/2020    INR 1.9 08/31/2020     PTT:    Lab Results   Component Value Date    APTT 33.3 07/22/2018   [APTT}     Assessment:    Patient Active Problem List   Diagnosis    Chronic atrial fibrillation    CKD (chronic kidney disease) stage 3, GFR 30-59 ml/min ([facility])    History of breast cancer    Chronic anemia    Diabetes mellitus type 2, uncontrolled ([facility] [address]    Essential hypertension    History of CVA (cerebrovascular accident)    Hyperlipidemia LDL goal <100    Acquired hypothyroidism    Morbid obesity with BMI of 50.0-59.9, adult ([facility] [address]    Acute on chronic respiratory failure with hypoxia ([facility])    Acute on chronic diastolic congestive heart failure ([facility] [address]       Plan:  Rate controlled. Renal function at baseline. Stable. Stable. Blood glucose ok, continue to adjust basal/bolus insulin therapy  Blood pressure ok, continue current medications  Continue Pt/Ot and risk factor modifications. Continue aggressive lipid therapy  Continue synthroid. Secondary to CHF. Diurese as BP and renal function allow. Pt/Ot evaluations for discharge planning. Discharge.       Pete Cruz    8:44 AM  8/31/2020

## 2020-08-31 NOTE — PROGRESS NOTES
[facility] MD Kiesha Allen MD Marit Oliphant, MD Norberto Candy, MD Hilton Evert, JESSA Stewart, JEWEL  Progress Note    8/31/2020    SUBJECTIVE:   8/26: \"I do not feel good, but I do not feel bad either. \"  Fatigue, malaise, generalized weakness. Breathing much more easily now. No wheeze. No cough. No dyspnea at rest.  8/27: Feeling a little bit better today. Tired. Breathing about the same as yesterday. 8/29: Overall doing well feeling more energetic today  8/30: Patient stable will be discharged today  8/31: Resting comfortably. No new complaint. Awaiting discharge.       PROBLEM LIST:    Principal Problem:    Acute on chronic diastolic congestive heart failure (HCC)  Active Problems:    Chronic atrial fibrillation    CKD (chronic kidney disease) stage 3, GFR 30-59 ml/min ([facility])    Chronic anemia    History of breast cancer    Diabetes mellitus type 2, uncontrolled ([facility])    Essential hypertension    History of CVA (cerebrovascular accident)    Hyperlipidemia LDL goal <100    Acquired hypothyroidism    Morbid obesity with BMI of 50.0-59.9, adult ([facility])    Acute on chronic respiratory failure with hypoxia ([facility])  Resolved Problems:    CHF (NYHA class IV, ACC/AHA stage D) ([facility])         DIET:    DIET CARB CONTROL;     MEDS (scheduled):    warfarin  2 mg Oral Once    bumetanide  1 mg Oral BID    sennosides-docusate sodium  2 tablet Oral BID    polyethylene glycol  17 g Oral BID    vitamin D  5,000 Units Oral Daily    ferrous sulfate  325 mg Oral BID WC    fluticasone  1 spray Each Nostril BID    gabapentin  300 mg Oral Nightly    insulin glargine  30 Units Subcutaneous Nightly    insulin lispro  8 Units Subcutaneous TID WC    insulin lispro  0-12 Units Subcutaneous TID WC    insulin lispro  0-6 Units Subcutaneous Nightly    levothyroxine  25 mcg Oral Daily    lidocaine  1 patch Transdermal Daily    metoprolol succinate  50 mg Oral Daily    pantoprazole  40 mg Oral BID AC    mirabegron  50 mg Oral Dinner    potassium chloride  10 mEq Oral BID    sodium chloride flush  10 mL Intravenous 2 times per day    warfarin (COUMADIN) daily dosing (placeholder)   Other RX Placeholder       MEDS (infusions):   dextrose         MEDS (prn):  bisacodyl, albuterol, hydrOXYzine, glucose, dextrose, glucagon (rDNA), dextrose, sodium chloride flush, acetaminophen **OR** acetaminophen, polyethylene glycol, promethazine **OR** ondansetron, hydrALAZINE    PHYSICAL EXAM:     Patient Vitals for the past 24 hrs:   BP Temp Temp src Pulse Resp SpO2 Weight   08/31/20 0815 (!) 138/57 98.5 °F (36.9 °C) Oral 92 18 100 % --   08/31/20 0553 -- -- -- -- -- -- 271 lb 3.2 oz (123 kg)   08/30/20 2243 -- -- -- -- 18 -- --   08/30/20 2011 135/70 97.5 °F (36.4 °C) Oral 90 18 99 % --   08/30/20 1641 (!) 143/76 98.1 °F (36.7 °C) Oral 78 18 100 % --   @      Intake/Output Summary (Last 24 hours) at 8/31/2020 1338  Last data filed at 8/31/2020 1117  Gross per 24 hour   Intake 180 ml   Output 1000 ml   Net -820 ml         Wt Readings from Last 3 Encounters:   08/31/20 271 lb 3.2 oz (123 kg)   08/19/20 283 lb 1.6 oz (128.4 kg)   07/10/20 270 lb (122.5 kg)       Constitutional:  in no acute distress  HEENT: NC/AT, EOMI, sclera and conjunctiva are clear and anicteric, mucus membranes moist  Neck: Trachea midline, no JVD  Cardiovascular: S1, S2 regular rhythm, no murmur,or rub  Respiratory: Basilar crackles, quite mild, generally poor air entry diffusely.   No wheeze  Gastrointestinal:  Soft, nontender, nondistended, NABS  Ext: no edema distally, feet warm  Skin: dry, no rash  Neuro: awake, alert, interactive      DATA:    Recent Labs     08/31/20  0740   WBC 3.3*   HGB 11.4*   HCT 36.9   .5*        Recent Labs     08/29/20  1040 08/30/20  0355 08/31/20  0740    140 141   K 3.6 4.4 4.1   CL 93* 92* 91*   CO2 39* 41* 42*   MG 1.9  --   --    BUN 29* 31* 32*   CREATININE 1.9* 1. 9* 1.9*       Lab Results   Component Value Date    LABPROT 0.3 (H) 04/27/2018    LABPROT 0.3 04/27/2018       ASSESSMENT / RECOMMENDATIONS:    1-chronic kidney disease stage IIIB baseline cr 1.9-2.2 with no proteinuria      2-Acute /chronic respiratory failure multifactorial, secondary combination of obstructive lung disease, systolic and diastolic CHF     3-LVH      4-anemia of chronic disease. Mild. JENIFER not warranted. Mildly macrocytic.   Folate and B12 normal.  Iron stores on the low side     5-Mineral bone disease     6-elevated serum bicarbonate, due to metabolic compensation for chronic respiratory acidosis probably exacerbated by contraction alkalosis due to diuretic therapy     Azotemia stable, cr at baseline.      --> Continue Bumex 1 mg p.o. twice daily  -->  UO respiratory status closely post discharge  --> Follow labs, UO  --> Continue supportive care  --> Follow-up with me in the next 2 to 3 weeks    Electronically signed by Ciara Mckenna MD on 8/31/2020

## 2020-08-31 NOTE — ADT AUTH CERT
Utilization Reviews         Discharge planning note by Glenis Garrison RN         Review Status  Review Entered    In [facility]  8/28/2020 12:53        Criteria Review    Discharge planning note:     Renal a/p:  1-chronic kidney disease stage IIIB baseline cr 1.9-2.2 with no proteinuria      2-Acute /chronic respiratory failure multifactorial, secondary combination of obstructive lung disease, systolic and diastolic CHF     3-LVH      4-anemia of chronic disease.  Mild.  JENIFER not warranted.  Mildly macrocytic.  Folate and B12 normal.  Iron stores on the low side     5-Mineral bone disease      6-elevated serum bicarbonate, due to metabolic compensation for chronic respiratory acidosis probably exacerbated by contraction alkalosis due to diuretic therapy     Azotemia stable, cr at baseline.      --> Continue Bumex 2 mg IV every 12 hours for now   --> Transition oral diuretic therapy soon  --> IV Ferrlecit while here  --> Follow labs, UO  --> Continue supportive care           CM NOTE:   Per QFR--- working with therapy,   continues on IV bumex bid, Plan remains return to SNF.     Glenis Garrison   -----------------------------------------------------------------------------------------------------

## 2020-08-31 NOTE — PROGRESS NOTES
BID    polyethylene glycol  17 g Oral BID    vitamin D  5,000 Units Oral Daily    ferrous sulfate  325 mg Oral BID     fluticasone  1 spray Each Nostril BID    gabapentin  300 mg Oral Nightly    insulin glargine  30 Units Subcutaneous Nightly    insulin lispro  8 Units Subcutaneous TID     insulin lispro  0-12 Units Subcutaneous TID     insulin lispro  0-6 Units Subcutaneous Nightly    levothyroxine  25 mcg Oral Daily    lidocaine  1 patch Transdermal Daily    metoprolol succinate  50 mg Oral Daily    pantoprazole  40 mg Oral BID AC    mirabegron  50 mg Oral Dinner    potassium chloride  10 mEq Oral BID    sodium chloride flush  10 mL Intravenous 2 times per day    warfarin (COUMADIN) daily dosing (placeholder)   Other RX Placeholder       Physical Exam:  General Appearance: appears comfortable in no acute distress. HEENT: Normocephalic atraumatic without obvious abnormality   Neck: Lips, mucosa, and tongue normal.  Supple, symmetrical, trachea midline, no adenopathy;thyroid:  no enlargement/tenderness/nodules or JVD. Lung: Breath sounds CTA, diminished bilateral bases. Respirations   unlabored. Symmetrical expansion. Heart: RRR, normal S1, S2. No MRG  Abdomen: Soft, NT, ND. BS present x 4 quadrants. No bruit or organomegaly. Extremities: Pedal pulses 2+ symmetric b/l. Extremities normal, no cyanosis, clubbing, or edema. Musculokeletal: No joint swelling, no muscle tenderness. ROM normal in all joints of extremities. Neurologic: Mental status: Alert and Oriented X3 .     Pertinent/ New Labs and Imaging Studies     Labs:  Lab Results   Component Value Date    WBC 3.3 08/31/2020    HGB 11.4 08/31/2020    HCT 36.9 08/31/2020    .5 08/31/2020    MCH 31.7 08/31/2020    MCHC 30.9 08/31/2020    RDW 13.2 08/31/2020     08/31/2020    MPV 10.0 08/31/2020     Lab Results   Component Value Date     08/31/2020    K 4.1 08/31/2020    K 3.9 08/25/2020    CL 91 08/31/2020    CO2 42 08/31/2020    BUN 32 08/31/2020    CREATININE 1.9 08/31/2020    LABALBU 3.6 08/26/2020    CALCIUM 9.9 08/31/2020    GFRAA 30 08/31/2020    LABGLOM 25 08/31/2020     Lab Results   Component Value Date    PROTIME 21.5 08/31/2020    INR 1.9 08/31/2020        Assessment:    1. Acute on chronic hypoxic and hypercapnic respiratory failure  2. Bilateral pleural effusions with atelectasis  3.  Pulmonary HTN, worsening RV function on ECHO compared to 2018. Multifactorial due to untreated GOSIA, OHS. Volume overload. 4. Acute on chronic CHF  5. Acute on chronic kidney disease  6. Chronic A. fib on anticoagulation-previously on Eliquis now on warfarin due to BMI greater than 40  7. Chronic kidney disease  8. Diabetes mellitus type 2  9. Morbid obesity-BMI 52.3      Plan:   10. Oxygen therapy 4 L nasal cannula keep >94%  11. Noninvasive ventilation in the form of BiPAP at HS 18/6 BUR 18 FIO2 60%-we previously arranged trilogy noninvasive ventilator for her facility last week prior to discharge. previous settings were AVAPS volume 400 backup rate 12 EPAP +5 FiO2 40%  12. bronchodilator therapy- albuterol PRN  13. Continue incentive spirometer and EZ Pap  14. Flonase and Zyrtec  15. Follow chest x-ray  16. DVT, GI prophylaxis.     17. Fluid management per nephrology   18. Bowel regimen- colace  19. Okay to TN from a pulmonary perspective when okay with all others, follow-up routed to office see in 2 weeks      This plan of care was reviewed in collaboration with Dr. Roxie Kussmaul  Electronically signed by SAVANNA Novoa on 8/31/2020 at 11:33 AM    I personally saw, examined, and cared for the patient. Labs, medications, radiographs reviewed. I agree with history exam and plans detailed in NP note.   Noy Nixon MD

## 2020-08-31 NOTE — PROGRESS NOTES
Nutrition Assessment     Type and Reason for Visit: RD Nutrition Re-Screen/LOS    Nutrition Recommendations/Plan: Continue current diet    Nutrition Assessment:  Pt w/ stable nutritional status at this time w/ good PO intakes since admit and stable wt hx. Will f/up per policy. Current Nutrition Therapies:    DIET CARB CONTROL;     Electronically signed by Mona Mims MS, RD, LD on 8/31/20 at 1:00 PM EDT    Contact: [phone]

## 2020-08-31 NOTE — PROGRESS NOTES
PIV and telemetry D/C'd. Pt discharged via [facility] back to [place] [address]. Son was @ bedside visiting.

## 2020-08-31 NOTE — PROGRESS NOTES
Date: 8/30/2020    Time: 10:45 PM    Patient Placed On BIPAP/CPAP/ Non-Invasive Ventilation? YES      Facial area red/color change? NO           If YES are Blister/Lesion present? NO}   If yes must notify nursing staff  BIPAP/CPAP skin barrier?  YES   Skin barrier type: duoderm spot on bridge of nose      Comments: placed on BIPAP at this time for HS        [employer]

## 2020-09-09 NOTE — PATIENT INSTRUCTIONS
1. Continue current cardiac medications. 2. Please try to wear your CPAP every night -- please assist the patient in finding a mask that is comfortable for her. 3. Return visit with Dr. Serena Hernandez in 1-2 months    4. Weigh yourself daily    -Stay Hydrated    -Diet should sodium restricted to 2 grams    -Again watch your daily weight trends and if you gain water weight please follow below instructions.    -If you gain 3-5 pounds in 2-3 days OR notice that you are retaining fluid in anyway just like you did before then take an extra dose of your water pill (bumetanide/Bumex) every day until you lose the weight or feel better.    -If you notice that you have taken more than 3 extra doses in 1 week then please call and let us know. -If at any time you feel that you are retaining fluid, your medications are not working, or you feel ill in anyway, then please call us for either same day appointment or the next day, and for instructions. Our goal is to keep you out of the emergency room and the hospital and we have ways to do it. You just need to call us in a timely manner.     -If you become sick for other reasons, and notice that you are not urinating as much, the urine is very dark, you have significant diarrhea or vomiting, then please DO NOT take your water pill and CALL US immediately.

## 2020-09-09 NOTE — PROGRESS NOTES
[id number] [facility] Cardiology  Office Visit         Reason for Visit: Heart failure    Primary Cardiologist: Dr. Sergio Zaidi       History of Present Illness:     Ms. Daphnie Hall is an 80year old female with a PMHx of chronic HFpEF, RV dysfunction, VHD, chronic atrial fibrillation, HTN, HLD, morbid obesity and GOSIA. She was recently hospitalized twice for complaints of increased shortness of breath in the month of August, 2020. She was most recently admitted for acute heart failure in the setting of untreated GOSIA as she was unable to tolerate trilogy at the rehab facility. She was treated with IV diuretics with good urinary response and subjective improvement. She presents today in post acute heart failure hospitalization follow-up, since discharge from the hospital she is still only intermittently wearing trilogy during sleep approximately 2-3 nights a week. She has good urinary response to oral diuretics and her weight is down 25-30 pounds since the beginning of August.    She is currently in a rehab facility and unable to monitor her diet for sodium content however is not using any supplemental salt on her food. She has chronic dyspnea with exertion, shortness of breath, or decline in overall functional capacity. She denies orthopnea, PND, nocturnal cough or hemoptysis. She denies abdominal distention or bloating, early satiety, anorexia/change in appetite, unintentional weight loss. She does not lower extremity edema. She denies exertional lightheadedness. She denies palpitations, syncope or near syncope. She denies any strokelike symptoms. Review of systems is negative for chest pain, pressure, discomfort. When ambulating on an incline, She does not leg claudication. History is negative for neurological symptoms including transient loss of vision, asymmetric weakness, aphasia, dysphasia, numbness, tingling.        Patient Active Problem List    Diagnosis Date Noted    Chronic atrial fibrillation 12/29/2017 Priority: High     Class: Chronic     CHADS-VASC = 5  REFUSES ANTICOAGULATION      Chronic anemia 04/26/2018     Priority: Medium    CKD (chronic kidney disease) stage 3, GFR 30-59 ml/min ([facility]) 12/29/2017     Priority: Medium     Class: Chronic    Acute on chronic diastolic congestive heart failure ([facility] 08/24/2020    Diabetes mellitus type 2, uncontrolled ([facility] 08/14/2020    Essential hypertension 08/14/2020    History of CVA (cerebrovascular accident) 08/14/2020    Hyperlipidemia LDL goal <100 08/14/2020    Acquired hypothyroidism 08/14/2020    Morbid obesity with BMI of 50.0-59.9, adult ([facility] 08/14/2020    Acute on chronic [facility] failure with hypoxia [facility] 08/14/2020    History of breast cancer      breast cancer Right             Past Medical History:   Diagnosis Date    Anemia due to acute blood loss 12/29/2017    Arthritis     Blood transfusion reaction     Chronic atrial fibrillation 12/29/2017    CKD (chronic kidney disease) stage 3, GFR 30-59 ml/min ([facility]) 12/29/2017    Diabetes mellitus ([facility]     History of blood transfusion     History of breast cancer     breast cancer Right    History of stroke     Hyperlipidemia     Hypertension     Hypothyroidism     Volume overload 12/30/2017    As cause of dyspnea     PAST MEDICAL HISTORY  1. Lifelong non smoker  2. HTN  3. HLD  4. PUD  5. Super Morbid Obesity BMI 51.1  6. CKD stage III (baseline SCr 1.4-1.5)  7. T2 IDDM  8. GERD and Large hiatal hernia  9. Anemia with history of transfusions  10. Hypothyroidism on replacement therapy  11. cRBBB  12. Chronic Atrial Fibrillation Hx of coumadin (stopped 4/2014  due to GIB). [id number]= 9 (CHF, HTN, age, DM, CVA, vascular disease, and female)  15. R breast carcinoma 1997 s/p partial mastectomy s/p XRT   14. PAD s/p R CEA  15. Hx CVA per patient reports no deficits. 16. R rotator cuff repair  17.  Gastric Bypass in her late 29's was down to 125 pounds problems with electrolyte derangement and was gastric bypass was reversed  18. Cholecystomy, Appendectomy, bilateral cataracts. 19. GIB s/p EGD with biopsy 4/30/2014: GERD. Large hiatal hernia  20. GIB s/p Colonoscopy 4/30/2014: Large hiatal hernia, mild gastritis, no active bleeding. 21. Epistaxis 5/16/2015 s/p nasal packing  22. Vertigo 11/2016 evaluated by Dr Lucía Bhandari placed on Meclizine  23. 11/6/2016 p-  24. Epistaxis 7/15/2017 s/p cautery and packing  25. Recurrent Epistaxis 7/27/2017  S/p control of epistaxis with endoscopic cautery and  Right sphenopalatine artery ligation 7/27/2017  26. Developed AF RVR with HTN post-op 7/27/2017 endoscopic cautery and  right sphenopalatine artery ligation. 27. Recurrent Epistaxis 10/15/2017 s/p packing with discharge with outpatient follow up with Dr Haile Leal. 28. Recurrent Epitsaxis 12/27/2017  S/p packing. Transfused.  Hgb 7.9, Bun/Cr 29/1.3. EKG AF CVR, RBBB  29. TTE 12/20/2017 Dr Michelle Gandhi ventricular size is grossly normal. Mild concentric LVH. EF visually estimated at 60%. Mild AS/TR/MR. Mildly enlarged RA. RVSP 37 mmHg. 30. 12/27/2017 p-BNP 1005   31. 12/29/2017 p-BNP 1505  32. [facility] 2/5/2018 Worsening VILLANUEVA. Hgb 9.2, , K+ 5.4, Bun/Cr 38/1.9, p-BNP 1209, troponin 0.03. Discharged to home. 33. Lexiscan MPS 2/23/2018: EF 75%. Non ischemic. NWM. 34. [facility] 4/25/2018-5/8/2018 Recurrent  Epitaxis s/p bilateral nasal packing. Hgb 8.4>>7.6. Transfused. 35. 4/27/2018 p-BNP 3112  36. 4/28/2018 endoscopic guided control of epistaxis(Septal perforation anteriorly about 1 cm to1 1/2cm) with septal button placement. Intra-operative bronchoscopy (no clots or mucous plugging), but dynamic airway collapse with ventilation. Left on ventilator to wean in ICU. + Enterobacter cloaca (no ATB's per ID). Discharged on Bumex 1 mg BID, Lopressor 25 TID.  Wean O2 as outpatient  37. 4/30/2018 Extubated  38. 5/1/2018 Dysphagia note on swallowing evaluation>>dysphagia II mechanically altered  39. 5/4/2018 DL Su Chong MD at [address]   Allergen Reactions    Pcn [Penicillins] Hives         Outpatient Medications Marked as Taking for the 9/9/20 encounter (Office Visit) with SAVANNA Field - CNP   Medication Sig Dispense Refill    metoprolol succinate (TOPROL XL) 50 MG extended release tablet Take 1 tablet by mouth daily 30 tablet 0    bumetanide (BUMEX) 1 MG tablet Take 1 tablet by mouth 2 times daily (Patient taking differently: Take 1 mg by mouth 2 times daily Rise and noon) 60 tablet 0    Multiple Vitamins-Minerals (THERAPEUTIC MULTIVITAMIN-MINERALS) tablet Take 1 tablet by mouth daily      lidocaine 4 % external patch Place 1 patch onto the skin daily Remove at HS      potassium chloride (KLOR-CON M) 10 MEQ extended release tablet Take 10 mEq by mouth 2 times daily      acetaminophen (TYLENOL) 325 MG tablet Take 650 mg by mouth every 6 hours as needed for Pain      fluticasone (FLONASE) 50 MCG/ACT nasal spray 1 spray by Each Nostril route daily (Patient taking differently: 1 spray by Each Nostril route 2 times daily ) 2 Bottle 1    calcitRIOL (ROCALTROL) 0.25 MCG capsule Take 0.5 mcg by mouth daily       loratadine (CLARITIN) 10 MG tablet Take 10 mg by mouth daily      melatonin 5 MG TABS tablet Take 10 mg by mouth nightly       guaiFENesin (MUCINEX) 600 MG extended release tablet Take 600 mg by mouth 2 times daily as needed       Dulaglutide (TRULICITY) 1.5 HE/5.1EP SOPN Inject 1.5 mg into the skin once a week Indications: weekly on Thursday      Cholecalciferol (VITAMIN D3) 5000 units TABS Take 5,000 Units by mouth daily       insulin lispro (HUMALOG) 100 UNIT/ML injection vial Take 12 units before meals + sliding scale.  MAX 60 units daily (Patient taking differently: Inject 12 Units into the skin 3 times daily (before meals) 12 units tid with meals plus sliding scale) 3 vial 5    insulin glargine (TOUJEO SOLOSTAR) 300 UNIT/ML injection pen Inject 50 Units into the skin nightly 12 pen 5    ferrous sulfate 325 (65 Fe) MG tablet Take 1 tablet by mouth 2 times daily (with meals) 30 tablet 0    magnesium hydroxide (MILK OF MAGNESIA) 400 MG/5ML suspension Take 30 mLs by mouth daily as needed for Constipation      docusate sodium (COLACE, DULCOLAX) 100 MG CAPS Take 200 mg by mouth nightly      hydrOXYzine (ATARAX) 10 MG tablet Take 10 mg by mouth 3 times daily as needed for Itching      levothyroxine (SYNTHROID) 25 MCG tablet Take 1 tablet by mouth daily (Patient taking differently: Take 25 mcg by mouth every morning ) 30 tablet 3    Mirabegron ER 50 MG TB24 Take 50 mg by mouth Daily with supper       pantoprazole (PROTONIX) 40 MG tablet Take 1 tablet by mouth 2 times daily (before meals). (Patient taking differently: Take 40 mg by mouth 2 times daily ) 60 tablet 1    simvastatin (ZOCOR) 40 MG tablet Take 40 mg by mouth nightly.  gabapentin (NEURONTIN) 300 MG capsule Take 300 mg by mouth nightly. .         Review of Systems:   Cardiac: As per HPI  General: No fever, chills, rigors  Pulmonary: As per HPI  HEENT: No visual disturbances, difficult swallowing  GI: No nausea, vomiting, abdominal pain  : No dysuria or hematuria  Endocrine: No thyroid disease or diabetes  Musculoskeletal: SALOMON x 4, no focal motor deficits  Skin: Intact, no rashes  Neuro/Psych: No headache or seizures      Weights: Wt Readings from Last 3 Encounters:   09/09/20 271 lb (122.9 kg)   08/31/20 271 lb 3.2 oz (123 kg)   08/19/20 283 lb 1.6 oz (128.4 kg)       Physical Examination:     /68 (Site: Left Upper Arm, Position: Sitting, Cuff Size: Large Adult)   Pulse 76   Resp 18   Ht 5' 1\" (1.549 m)   Wt 271 lb (122.9 kg) Comment: Weight from yesterday at facility  LMP  (LMP Unknown)   SpO2 100% Comment: On 4L O2  BMI 51.21 kg/m²     CONSTITUTIONAL: Alert and oriented times 3, no acute distress and cooperative to examination with proper mood and affect.   SKIN: Skin color, texture, turgor normal. No rashes or lesions. LYMPH: no cervical nodes, no inguinal nodes  HEENT: Head is normocephalic, atraumatic. EOMI, PERRLA. NECK: Supple, symmetrical, trachea midline, no adenopathy, thyroid symmetric, not enlarged and no tenderness, skin normal. Unable to assess jvd/hjr. CHEST/LUNGS: chest symmetric with normal A/P diameter, normal respiratory rate and rhythm, lungs clear to auscultation without wheezes, rales or rhonchi. No accessory muscle use. Scars None   CARDIOVASCULAR: Heart sounds are normal.  Irregular rate and rhythm without murmur, gallop or rub. Normal S1 and S2. . Carotid and femoral pulses 2+/4 and equal bilaterally. ABDOMEN: Morbidly obese. No and Laparoscopic scar(s) present. Normal bowel sounds. No bruits. soft, nondistended, no masses or organomegaly. no evidence of hernia. Percussion: Normal without hepatosplenomegally. Tenderness: absent. RECTAL: deferred, not clinically indicated  NEUROLOGIC: There are no focalizing motor or sensory deficits. CN II-XII are grossly intact. Nabil Sorrow EXTREMITIES: no cyanosis, no clubbing and no edema. Warm and well perfused. All the following diagnostics were personally reviewed and interpreted by me. LAB DATA:     8/31/2020 07:40   Sodium 141   Potassium 4.1   Chloride 91 (L)   CO2 42 (HH)   BUN 32 (H)   Creatinine 1.9 (H)   Anion Gap 8   GFR Non- 25   GFR African American 30   Glucose 165 (H)   Calcium 9.9   WBC 3.3 (L)   RBC 3.60   Hemoglobin Quant 11.4 (L)   Hematocrit 36.9   .5 (H)   MCH 31.7   MCHC 30.9 (L)   MPV 10.0   RDW 13.2   Platelet Count 697   Prothrombin Time 21.5 (H)   INR 1.9       IMAGING:    CXR (8/27/2020)  Findings:   Stable cardiomegaly. Visualization of lower lung fields is limited by overlying soft tissue   of the abdomen. Small bilateral pleural effusions. The aorta is unremarkable. Impression   Small bilateral pleural effusions.      CARDIAC TESTING:    NM Stress (2/23/2018)  Gated SPECT left

## 2020-09-24 NOTE — PROGRESS NOTES
[facility] called to cancel today's appointment . They are unable to make it and the patient said she did not need to come in.  was scheduled to be the HAF, she has a second appointment scheduled 10/29 for her follow up. Patient will keep her 10/29 appointment as her HAF. No authorization required, per [facility]. Test will not be  by this date. Will call her facility if anything changes. [phone].      Electronically signed by Felipe Mitchell on 2020 at 9:46 AM

## 2020-09-25 PROBLEM — J90 BILATERAL PLEURAL EFFUSION: Status: ACTIVE | Noted: 2020-01-01

## 2020-09-25 NOTE — PROGRESS NOTES
[facility],    Your patient is on a medication that requires a renal dose adjustment. Renal Function Assessment:    Date Body Weight IBW Adj. Body Weight SCr CrCl Dialysis status   9/25/2020 124.3 kg   2.0 26 ml/min        Pharmacy has renally dose-adjusted the following medication(s):    Date Medication Original Dosing Regimen New Dosing Regimen   9/25/2020 Lovenox  40 mg Sub-Q daily 30 mg Sub-Q daily           These changes were made per protocol according to the [facility] Renal Function-Based Dose Adjustments Policy    *Please note this dose may need readjusted if your patient's renal function significantly improves. Please contact pharmacy with any questions regarding these changes.

## 2020-09-25 NOTE — PROGRESS NOTES
Database complete. Medications reconciled. Care plans and education initiated. [facility]. Wears 5L 02 continuous and Bipap nightly(does not wear all night). Uses wheelchair and states she has not walked in past 4 months. Right breast mastectomy (no labs or BP's in RUE please).

## 2020-09-25 NOTE — ED NOTES
[facility] faxed to floor , copy of fax verificarion received and in chart, [facility] will transport .      Sherrie Boeck, RN  09/25/20 [id number]

## 2020-09-25 NOTE — ED NOTES
Bed: 04  Expected date:   Expected time:   Means of arrival:   Comments:  EMS - LF     Maria Dolores Falk.  Ana Rider RN  09/25/20 [id number]

## 2020-09-25 NOTE — ED PROVIDER NOTES
27-year-old female brought in by EMS from her nursing home [facility]. EMS states that they get a call about her being short of breath and the she was 84% on 5 L oxygen for them. Patient states she is on 5 L of oxygen all the time for shortness of breath. Patient's pulse ox reading here on 5 L is 100%. Patient states she is unsure why she got brought in. States that she does not have any worsening shortness of breath than her usual state. Patient states she feels pretty good right now not too short of breath. Nothing makes her shortness breath better or worse and she is constantly shortness of breath at baseline. Patient also states she felt fine this morning no complaints of shortness of breath to her nursing home staff. Patient has no other complaints at this time. Past medical history-A. fib, CHF, CKD, anemia, diabetes, high blood pressure,    Patient is DNR [facility]           Review of Systems   Constitutional: Negative for chills, fatigue and fever. HENT: Negative for congestion, sinus pressure, sinus pain and sore throat. Respiratory: Positive for shortness of breath. Negative for cough and chest tightness. Cardiovascular: Negative for chest pain and leg swelling. Gastrointestinal: Negative for abdominal distention, abdominal pain, constipation, diarrhea, nausea and vomiting. Endocrine: Negative for polyuria. Genitourinary: Negative for difficulty urinating, dysuria, frequency, hematuria, urgency, vaginal bleeding and vaginal discharge. Musculoskeletal: Negative for arthralgias and back pain. Skin: Negative for color change and pallor. Neurological: Negative for dizziness and weakness. Physical Exam  Vitals signs and nursing note reviewed. Constitutional:       Appearance: Normal appearance. She is normal weight. HENT:      Head: Normocephalic and atraumatic.    Eyes:      Conjunctiva/sclera: Conjunctivae normal.   Cardiovascular:      Rate and Rhythm: Normal rate and regular rhythm. Pulses: Normal pulses. Heart sounds: Normal heart sounds. No murmur. No gallop. Pulmonary:      Effort: Pulmonary effort is normal. No respiratory distress. Breath sounds: Normal breath sounds. No wheezing or rales. Abdominal:      General: Abdomen is flat. Bowel sounds are normal. There is no distension. Palpations: Abdomen is soft. Tenderness: There is no abdominal tenderness. There is no guarding. Skin:     General: Skin is warm and dry. Capillary Refill: Capillary refill takes less than 2 seconds. Neurological:      General: No focal deficit present. Mental Status: She is alert and oriented to person, place, and time. Procedures     MDM  Number of Diagnoses or Management Options  Bilateral pleural effusion:   Dyspnea, unspecified type:   Diagnosis management comments: 80-year-old female presents ED with complaints of shortness of breath. He is on 5 L of oxygen at home and does have a history of CHF did start a work-up for CHF exacerbation patient BNP was not very elevated. Her EKG and troponin are also unremarkable troponin had a slight elevation but that is chronic for her. She also had crackles at the bilateral bases as well. As a result, did also get a CT scan of her chest for further evaluation which showed that she has 2 bilateral pleural effusion and also a kidney stone on the right. Also start DuoNeb treatment and some lites that has helped with her pleural effusions. Patient here in the ED has been saturating at 100% on 5 L of oxygen no complaints of shortness of breath while here. Due to this decision was made to admit the patient she is agreeable with this.        Amount and/or Complexity of Data Reviewed  Clinical lab tests: reviewed  Tests in the medicine section of CPT®: reviewed  Decide to obtain previous medical records or to obtain history from someone other than the patient: yes         ED Course as of Sep 25 1536   Fri Sep 25, 2020   1104 Will been 10.5 however very close to her baseline as well.    [CB]   1110 BNP elevated at 1300 however that is below her last BNP which was in the 3000's. [CB]   1319 Troponin 0.03 however that is her baseline.    [CB]   1432 COVID negative    [CB]   6186 CT scan showed a large pleural effusions cannot exclude pneumonia on the CT at [facility]. And a right renal stone    [CB]   1535 With Dr. Abhilash Mantilla about admitting the patient he was agreeable. [CB]      ED Course User Index  [CB] Nina Peter MD        ED Course as of Sep 25 1536   Fri Sep 25, 2020   1104 Will been 10.5 however very close to her baseline as well.    [CB]   1110 BNP elevated at 1300 however that is below her last BNP which was in the 3000's. [CB]   1319 Troponin 0.03 however that is her baseline.    [CB]   1432 COVID negative    [CB]   6099 CT scan showed a large pleural effusions cannot exclude pneumonia on the CT at [facility]. And a right renal stone    [CB]   1535 With Dr. Abhilash Mantilla about admitting the patient he was agreeable. [CB]      ED Course User Index  [CB] Nina Peter MD       --------------------------------------------- PAST HISTORY ---------------------------------------------  Past Medical History:  has a past medical history of Anemia due to acute blood loss, Arthritis, Blood transfusion reaction, Chronic atrial fibrillation, CKD (chronic kidney disease) stage 3, GFR 30-59 ml/min ([facility] [address] Diabetes mellitus ([facility] [address] History of blood transfusion, History of breast cancer, History of stroke, Hyperlipidemia, Hypertension, Hypothyroidism, and Volume overload. Past Surgical History:  has a past surgical history that includes Breast surgery; Cholecystectomy; Gastric bypass surgery; other surgical history (07/27/2017); Nasal sinus surgery; pr ctrl nosebleed,anter,complex (N/A, 4/28/2018); bronchoscopy (4/28/2018); and Appendectomy. Social History:  reports that she has never smoked.  She has never used smokeless tobacco. She reports previous alcohol use. She reports that she does not use drugs. Family History: family history includes Diabetes in her father and mother; Heart Disease in her mother; Kidney Disease in her mother; Stroke in her father. The patients home medications have been reviewed.     Allergies: Pcn [penicillins]    -------------------------------------------------- RESULTS -------------------------------------------------    LABS:  Results for orders placed or performed during the hospital encounter of 09/25/20   CBC Auto Differential   Result Value Ref Range    WBC 4.4 (L) 4.5 - 11.5 E9/L    RBC 3.33 (L) 3.50 - 5.50 E12/L    Hemoglobin 10.5 (L) 11.5 - 15.5 g/dL    Hematocrit 35.9 34.0 - 48.0 %    .8 (H) 80.0 - 99.9 fL    MCH 31.5 26.0 - 35.0 pg    MCHC 29.2 (L) 32.0 - 34.5 %    RDW 13.2 11.5 - 15.0 fL    Platelets 355 333 - 569 E9/L    MPV 10.1 7.0 - 12.0 fL    Neutrophils % 72.4 43.0 - 80.0 %    Immature Granulocytes % 1.1 0.0 - 5.0 %    Lymphocytes % 12.8 (L) 20.0 - 42.0 %    Monocytes % 10.5 2.0 - 12.0 %    Eosinophils % 3.0 0.0 - 6.0 %    Basophils % 0.2 0.0 - 2.0 %    Neutrophils Absolute 3.16 1.80 - 7.30 E9/L    Immature Granulocytes # 0.05 E9/L    Lymphocytes Absolute 0.56 (L) 1.50 - 4.00 E9/L    Monocytes Absolute 0.46 0.10 - 0.95 E9/L    Eosinophils Absolute 0.13 0.05 - 0.50 E9/L    Basophils Absolute 0.01 0.00 - 0.20 E9/L    Polychromasia 1+    Comprehensive Metabolic Panel w/ Reflex to MG   Result Value Ref Range    Sodium 143 132 - 146 mmol/L    Potassium reflex Magnesium 4.4 3.5 - 5.0 mmol/L    Chloride 93 (L) 98 - 107 mmol/L    CO2 48 (HH) 22 - 29 mmol/L    Anion Gap 2 (L) 7 - 16 mmol/L    Glucose 117 (H) 74 - 99 mg/dL    BUN 38 (H) 8 - 23 mg/dL    CREATININE 2.0 (H) 0.5 - 1.0 mg/dL    GFR Non-African American 24 >=60 mL/min/1.73    GFR African American 29     Calcium 10.4 (H) 8.6 - 10.2 mg/dL    Total Protein 6.8 6.4 - 8.3 g/dL    Alb 3.4 (L) 3.5 - 5.2 g/dL    Total Bilirubin 0.4 0.0 - 1.2 mg/dL    Alkaline Phosphatase 63 35 - 104 U/L    ALT 13 0 - 32 U/L    AST 24 0 - 31 U/L   Troponin   Result Value Ref Range    Troponin 0.03 0.00 - 0.03 ng/mL   Brain Natriuretic Peptide   Result Value Ref Range    Pro-BNP 1,301 (H) 0 - 450 pg/mL   COVID-19   Result Value Ref Range    SARS-CoV-2, NAAT Not Detected Not Detected   EKG 12 Lead   Result Value Ref Range    Ventricular Rate 94 BPM    Atrial Rate 94 BPM    QRS Duration 134 ms    Q-T Interval 364 ms    QTc Calculation (Bazett) 455 ms    R Axis 0 degrees    T Axis 7 degrees       RADIOLOGY:  CT CHEST WO CONTRAST   Final Result   There are large pleural effusion likely with associated compressive   atelectasis. Underlying lesion or pneumonia cannot be excluded   There is probable pulmonary vascular congestion and   Right renal calculus                                ------------------------- NURSING NOTES AND VITALS REVIEWED ---------------------------  Date / Time Roomed:  9/25/2020 10:24 AM  ED Bed Assignment:  04/04    The nursing notes within the ED encounter and vital signs as below have been reviewed. Patient Vitals for the past 24 hrs:   BP Temp Temp src Pulse Resp SpO2 Height Weight   09/25/20 1455 101/73 98.7 °F (37.1 °C) Oral 92 23 100 % -- --   09/25/20 1112 -- 97.8 °F (36.6 °C) -- -- -- -- -- --   09/25/20 1030 (!) 141/66 -- -- 97 18 100 % 5' 1\" (1.549 m) 274 lb (124.3 kg)       Oxygen Saturation Interpretation: Normal    ------------------------------------------ PROGRESS NOTES ------------------------------------------  Re-evaluation(s):  Time: 1430  Patients symptoms show no change  Repeat physical examination is not changed    Counseling:  I have spoken with the patient and discussed todays results, in addition to providing specific details for the plan of care and counseling regarding the diagnosis and prognosis.   Their questions are answered at this time and they are agreeable with the plan of admission.    --------------------------------- ADDITIONAL PROVIDER NOTES ---------------------------------  Consultations:  Time: 6965. Spoke with Dr. Belynda Skiff. Discussed case. They will admit the patient. This patient's ED course included: a personal history and physicial examination, re-evaluation prior to disposition, multiple bedside re-evaluations, IV medications, cardiac monitoring and continuous pulse oximetry    This patient has remained hemodynamically stable during their ED course. Diagnosis:  1. Bilateral pleural effusion    2. Dyspnea, unspecified type        Disposition:  Patient's disposition: Admit to telemetry  Patient's condition is stable.          Cain Armstrong MD  Resident  09/25/20 9163

## 2020-09-25 NOTE — CARE COORDINATION
Social Work/Transition of Care:    Pt presents from [place] [address], will contact liaison for bed hold if pt admitted. Electronically signed by Brea Mckeon on 2/41/5339 at 10:29 AM     SW received update from Palak Whitman., Liaison for [facility] pt is a bedhold, Tyra Pressley will follow to assist with disposition.     Electronically signed by Brea Mckeon on 6/24/2177 at 3:43 PM

## 2020-09-26 NOTE — H&P
[address] [facility]  Attending History and Physical      CHIEF COMPLAINT:  Shortness of breath      HISTORY OF PRESENT ILLNESS:      The patient is a 80 y.o. female patient of dr Mu Beltrán who presents with complains of shortness of breath. Patient is well known to the hospitalist service. An extensive chart review was performed to aid in the history. The patient was discharged from the hospital on 8/31/2020. At that time the patient was admitted with shortness of breath. She was found to suffer acute on chronic respiratory failure secondary to CHF. She was seen by cardiology, pulmonology and nephrology. She was diuresed. She improved. She was seen by Pt/Ot and rehab was recommended. She was set up with the CHF clinic prior to discharge. she comes back to the hospital with complaints of shortness of breath. She had some swelling in her legs. She denied chest pain, abdominal pain, nausea, vomiting, fevers, chills and diaphoresis. She does not ambulate. She is on supplemental oxygen. She feels a little better since presentation. She did not take anything for her symptoms as an outpatient.          Past Medical History:    Past Medical History:   Diagnosis Date    Anemia due to acute blood loss 12/29/2017    Arthritis     Blood transfusion reaction     Chronic atrial fibrillation 12/29/2017    CKD (chronic kidney disease) stage 3, GFR 30-59 ml/min ([facility]) 12/29/2017    Diabetes mellitus ([facility] [address]     History of blood transfusion     History of breast cancer     breast cancer Right    History of stroke     Hyperlipidemia     Hypertension     Hypothyroidism     Volume overload 12/30/2017    As cause of dyspnea       Past Surgical History:    Past Surgical History:   Procedure Laterality Date    APPENDECTOMY      BREAST SURGERY      right    BRONCHOSCOPY  4/28/2018    BRONCHOSCOPY DIAGNOSTIC performed by Kamran Matos MD at [address] [place] [address] SURGERY     [facility] NASAL SINUS SURGERY      august 27 2017. cauterized her nasal passage.  OTHER SURGICAL HISTORY  07/27/2017    endoscopic conrtol of epistaxis dr Don Biggs N/A 4/28/2018    ENDOSCOPIC GUIDED CONTROL EPISTAXIS  WITH SEPTAL BUTTON PLACEMENT. INTRA-OPERATIVE BRONCHOSCOPY.  performed by Darrel Grimes MD at [facility] OR       Medications Prior to Admission:    Medications Prior to Admission: hydrOXYzine (ATARAX) 25 MG tablet, Take 25 mg by mouth nightly  polyethylene glycol (GLYCOLAX) 17 GM/SCOOP powder, Take 17 g by mouth daily  bisacodyl (DULCOLAX) 10 MG suppository, Place 10 mg rectally daily as needed for Constipation  warfarin (COUMADIN) 2 MG tablet, Take 1 tablet by mouth daily (Patient taking differently: Take 2 mg by mouth nightly )  metoprolol succinate (TOPROL XL) 50 MG extended release tablet, Take 1 tablet by mouth daily (Patient taking differently: Take 50 mg by mouth every morning )  bumetanide (BUMEX) 1 MG tablet, Take 1 tablet by mouth 2 times daily (Patient taking differently: Take 1 mg by mouth 2 times daily Rise and noon)  Multiple Vitamins-Minerals (THERAPEUTIC MULTIVITAMIN-MINERALS) tablet, Take 1 tablet by mouth daily  lidocaine 4 % external patch, Place 1 patch onto the skin daily Remove at HS  potassium chloride (KLOR-CON M) 10 MEQ extended release tablet, Take 10 mEq by mouth 2 times daily  fluticasone (FLONASE) 50 MCG/ACT nasal spray, 1 spray by Each Nostril route daily  calcitRIOL (ROCALTROL) 0.25 MCG capsule, Take 0.5 mcg by mouth daily   loratadine (CLARITIN) 10 MG tablet, Take 10 mg by mouth daily  melatonin 5 MG TABS tablet, Take 10 mg by mouth nightly   guaiFENesin (MUCINEX) 600 MG extended release tablet, Take 600 mg by mouth 2 times daily as needed   Dulaglutide (TRULICITY) 1.5 BH/1.9ST SOPN, Inject 1.5 mg into the skin once a week Indications: weekly on Thursday  Cholecalciferol (VITAMIN D3) 5000 units TABS, Take 5,000 Units by mouth daily   insulin lispro (HUMALOG) 100 UNIT/ML injection vial, Take 12 units before meals + sliding scale. MAX 60 units daily (Patient taking differently: Inject 12 Units into the skin 3 times daily (before meals) 12 units tid with meals plus sliding scale)  insulin glargine (TOUJEO SOLOSTAR) 300 UNIT/ML injection pen, Inject 50 Units into the skin nightly  ferrous sulfate 325 (65 Fe) MG tablet, Take 1 tablet by mouth 2 times daily (with meals)  docusate sodium (COLACE, DULCOLAX) 100 MG CAPS, Take 200 mg by mouth nightly  levothyroxine (SYNTHROID) 25 MCG tablet, Take 1 tablet by mouth daily (Patient taking differently: Take 25 mcg by mouth every morning )  Mirabegron ER 50 MG TB24, Take 50 mg by mouth Daily with supper   pantoprazole (PROTONIX) 40 MG tablet, Take 1 tablet by mouth 2 times daily (before meals). (Patient taking differently: Take 40 mg by mouth 2 times daily )  simvastatin (ZOCOR) 40 MG tablet, Take 40 mg by mouth nightly.  gabapentin (NEURONTIN) 300 MG capsule, Take 300 mg by mouth nightly. .  bumetanide (BUMEX) 1 MG tablet, Take 1 mg by mouth daily as needed  acetaminophen (TYLENOL) 325 MG tablet, Take 650 mg by mouth every 6 hours as needed for Pain  magnesium hydroxide (MILK OF MAGNESIA) 400 MG/5ML suspension, Take 30 mLs by mouth daily as needed for Constipation  hydrOXYzine (ATARAX) 10 MG tablet, Take 10 mg by mouth 3 times daily as needed for Itching    Allergies:    Pcn [penicillins]    Social History:    reports that she has never smoked. She has never used smokeless tobacco. She reports previous alcohol use. She reports that she does not use drugs. Family History:   family history includes Diabetes in her father and mother; Heart Disease in her mother; Kidney Disease in her mother; Stroke in her father.     REVIEW OF SYSTEMS:  As above in the HPI, otherwise negative    PHYSICAL EXAM:    Vitals:  BP (!) 134/90   Pulse 96   Temp 98.1 °F (36.7 °C) (Oral)   Resp 20   Ht 5' 1\" (1.549 m)   Wt BUN 35 09/26/2020    LABALBU 3.4 09/25/2020    CREATININE 1.7 09/26/2020    CALCIUM 10.5 09/26/2020    GFRAA 35 09/26/2020    LABGLOM 29 09/26/2020    GLUCOSE 109 09/26/2020     Magnesium:    Lab Results   Component Value Date    MG 2.2 09/26/2020     Phosphorus:    Lab Results   Component Value Date    PHOS 2.7 08/28/2020     PT/INR:    Lab Results   Component Value Date    PROTIME 21.5 08/31/2020    INR 1.9 08/31/2020     PTT:    Lab Results   Component Value Date    APTT 33.3 07/22/2018   [APTT}  Troponin:    Lab Results   Component Value Date    TROPONINI 0.03 09/25/2020     Last 3 Troponin:    Lab Results   Component Value Date    TROPONINI 0.03 09/25/2020    TROPONINI 0.03 08/24/2020    TROPONINI 0.03 08/13/2020     U/A:    Lab Results   Component Value Date    COLORU Yellow 08/13/2020    PROTEINU Negative 08/13/2020    PHUR 5.5 08/13/2020    WBCUA 1-3 08/13/2020    RBCUA >20 08/13/2020    BACTERIA NONE SEEN 08/13/2020    CLARITYU Clear 08/13/2020    SPECGRAV 1.015 08/13/2020    LEUKOCYTESUR SMALL 08/13/2020    UROBILINOGEN 0.2 08/13/2020    BILIRUBINUR Negative 08/13/2020    BLOODU LARGE 08/13/2020    GLUCOSEU Negative 08/13/2020    AMORPHOUS NONE 07/20/2018     HgBA1c:    Lab Results   Component Value Date    LABA1C 5.7 08/24/2020     FLP:    Lab Results   Component Value Date    TRIG 155 09/26/2020    HDL 61 09/26/2020    LDLCALC 46 09/26/2020    LABVLDL 31 09/26/2020     TSH:    Lab Results   Component Value Date    TSH 2.570 09/25/2020       ASSESSMENT:      Patient Active Problem List   Diagnosis    Chronic atrial fibrillation    CKD (chronic kidney disease) stage 3, GFR 30-59 ml/min ([facility])    History of breast cancer    Chronic anemia    Diabetes mellitus type 2, uncontrolled ([facility]    Essential hypertension    History of CVA (cerebrovascular accident)    Hyperlipidemia LDL goal <100    Acquired hypothyroidism    Morbid obesity with BMI of 50.0-59.9, adult ([facility]    Acute on chronic respiratory failure with hypoxia (HCC)    Acute on chronic diastolic congestive heart failure (HCC)    Bilateral pleural effusion         PLAN:    Rate controlled. Renal function at baseline. No acute issues. Hgb stable. Blood glucose ok, continue to adjust basal/bolus insulin therapy  Blood pressure ok, continue current medications  Continue Pt/Ot and risk factor modifications. Continue aggressive lipid therapy  Continue synthroid. Continue to encourage weight loss. Pulmonology consulted. Echo from 8/14/2020 reviewed. EF of 65-70% with indeterminate diastolic dysfunction. Diurese as BP and renal function allow. Pulmonology evaluation. Pt/Ot evaluations for discharge planning. Discharge soon.     Estephania Mann MD  8:23 AM  9/26/2020

## 2020-09-26 NOTE — CONSULTS
Inpatient Cardiology Consultation    Reason for Consult: CHF    Consulting Physician: Dr. Awan Comes    Requesting Physician: Dr. Hazel Christopher    Date of Consultation: 9/26/2020    HISTORY PRESENT ILLNESS    This 75-year-old female has a history of congestive heart failure, permanent atrial fibrillation and mild valvular heart disease and is followed by [facility] cardiology. Romeo Martin She wears a Trilogy. She usually weighs around 271 pounds. She was admitted in August of this year with CHF and respiratory failure and diuresed. She presented to the emergency room from [facility] due to dyspnea and hypoxia. O2 saturation was 84% on 4 to 5 L at the facility but on 5 L she was 100% in the emergency room. She has chronic dyspnea with exertion that was worsening. She denies chest pain and is unsure about swelling of the lower extremities EKG on admission showed atrial fibrillation with a right bundle branch block and no acute changes. WBCs were 4.4 with a hemoglobin of 10.5 and hematocrit of 35.9. Potassium was 4.4 with a BUN and creatinine were 38 and 2. COVID-19 was negative. CT of the chest showed a large pleural effusion likely associated with compressive atelectasis and probable pulmonary vascular congestion. Blood pressure was 141/66 with a heart rate of 97. PAST MEDICAL HISTORY  1. Lifelong non smoker  2. HTN  3. HLD  4. PUD  5. Super Morbid Obesity BMI 51.1  6. CKD stage III (baseline SCr 1.4-1.5)  7. T2 IDDM  8. GERD and Large hiatal hernia  9. Anemia with history of transfusions  10. Hypothyroidism on replacement therapy  11. cRBBB  12. Chronic Atrial Fibrillation Hx of coumadin (stopped 4/2014  due to GIB). [id number]= 9 (CHF, HTN, age, DM, CVA, vascular disease, and female)  15. R breast carcinoma 1997 s/p partial mastectomy s/p XRT   14. PAD s/p R CEA  15. Hx CVA per patient reports no deficits. 16. R rotator cuff repair  17.  Gastric Bypass in her late 29's was down to 125 pounds problems with electrolyte derangement and was gastric bypass was reversed  18. Cholecystomy, Appendectomy, bilateral cataracts. 19. GIB s/p EGD with biopsy 4/30/2014: GERD. Large hiatal hernia  20. GIB s/p Colonoscopy 4/30/2014: Large hiatal hernia, mild gastritis, no active bleeding. 21. Epistaxis 5/16/2015 s/p nasal packing  22. Vertigo 11/2016 evaluated by Dr Smith Strickland placed on Meclizine  23. 11/6/2016 p-  24. Epistaxis 7/15/2017 s/p cautery and packing  25. Recurrent Epistaxis 7/27/2017  S/p control of epistaxis with endoscopic cautery and  Right sphenopalatine artery ligation 7/27/2017  26. Developed AF RVR with HTN post-op 7/27/2017 endoscopic cautery and  right sphenopalatine artery ligation. 27. Recurrent Epistaxis 10/15/2017 s/p packing with discharge with outpatient follow up with Dr Ambar Ann. 28. Recurrent Epitsaxis 12/27/2017  S/p packing. Transfused.  Hgb 7.9, Bun/Cr 29/1.3. EKG AF CVR, RBBB  29. TTE 12/20/2017 Dr Cabral [place] ventricular size is grossly normal. Mild concentric LVH. EF visually estimated at 60%. Mild AS/TR/MR. Mildly enlarged RA. RVSP 37 mmHg. 30. 12/27/2017 p-BNP 1005   31. 12/29/2017 p-BNP 1505  32. [facility] 2/5/2018 Worsening VILLANUEVA. Hgb 9.2, , K+ 5.4, Bun/Cr 38/1.9, p-BNP 1209, troponin 0.03. Discharged to home. 33. Lexiscan MPS 2/23/2018: EF 75%. Non ischemic. NWM. 34. [facility] 4/25/2018-5/8/2018 Recurrent  Epitaxis s/p bilateral nasal packing. Hgb 8.4>>7.6. Transfused. 35. 4/27/2018 p-BNP 3112  36. 4/28/2018 endoscopic guided control of epistaxis(Septal perforation anteriorly about 1 cm to1 1/2cm) with septal button placement. Intra-operative bronchoscopy (no clots or mucous plugging), but dynamic airway collapse with ventilation. Left on ventilator to wean in ICU. + Enterobacter cloaca (no ATB's per ID). Discharged on Bumex 1 mg BID, Lopressor 25 TID.  Wean O2 as outpatient  37. 4/30/2018 Extubated  38. 5/1/2018 Dysphagia note on swallowing evaluation>>dysphagia II mechanically altered  39. 5/4/2018 LUE DVT>>placed on Eliquis 5 mg BID x 7 days then 2.5 mg BID  40. 5/4/2018 L Thoracentesis>>450 cc transudative   41. 5/2018 Chronic Hypercapnic respiratory failure/tracheobronchomalacia  42. 5/2018 Probable GOSIA recommended C-pap at hs  43. Discharged to home end of June 2018 from Rehab on no O2 or C-pap. According to family has been not able to get out of house and has not followed up with PCP, pulmonary since discharge.   40. EGD/ Colonoscopy 7/18  45. Admission 8/13/20 for dyspnea, respiratory failure, CHF, DALILA, started on Coumadin discharged on trilogy,   46. 2d echo August 14, 2020   Technically suboptimal and limited limited study.   Left ventricular internal dimensions were normal in diastole and systole.   Moderate left ventricular concentric hypertrophy noted.   No regional wall motion abnormalities seen.   Normal left ventricular ejection fraction.   The left atrium is borderline dilated.   Borderline dilated right ventricle.   Right ventricle global systolic function is reduced .   Mildly enlarged right atrium size.   Mild thickening of the mitral valve leaflets.   Moderate mitral annular calcification.   Mild mitral regurgitation is present.   Mild to moderate functional mitral stenosis .   Mild aortic stenosis.   Mild tricuspid regurgitation.   Pulmonary hypertension is mild . EF 65-70     47. Bronchoscopy April 2018  48. Bilateral pleural effusions with atelectasis and pulmonary hypertension with worsening RV function on echo compared to 2018, due to untreated sleep apnea  49. Medications Prior to admit:  Prior to Admission medications    Medication Sig Start Date End Date Taking?  Authorizing Provider   hydrOXYzine (ATARAX) 25 MG tablet Take 25 mg by mouth nightly   Yes Historical Provider, MD   polyethylene glycol (GLYCOLAX) 17 GM/SCOOP powder Take 17 g by mouth daily   Yes Historical Provider, MD   bisacodyl (DULCOLAX) 10 MG suppository Place 10 mg rectally daily as needed for Constipation   Yes Historical Provider, MD   warfarin (COUMADIN) 2 MG tablet Take 1 tablet by mouth daily  Patient taking differently: Take 2 mg by mouth nightly  8/19/20  Yes Chalo Turner MD   metoprolol succinate (TOPROL XL) 50 MG extended release tablet Take 1 tablet by mouth daily  Patient taking differently: Take 50 mg by mouth every morning  8/20/20  Yes Chalo Turner MD   bumetanide (BUMEX) 1 MG tablet Take 1 tablet by mouth 2 times daily  Patient taking differently: Take 1 mg by mouth 2 times daily Rise and noon 8/19/20  Yes Chalo Turner MD   Multiple Vitamins-Minerals (THERAPEUTIC MULTIVITAMIN-MINERALS) tablet Take 1 tablet by mouth daily   Yes Historical Provider, MD   lidocaine 4 % external patch Place 1 patch onto the skin daily Remove at HS   Yes Historical Provider, MD   potassium chloride (KLOR-CON M) 10 MEQ extended release tablet Take 10 mEq by mouth 2 times daily   Yes Historical Provider, MD   fluticasone (FLONASE) 50 MCG/ACT nasal spray 1 spray by Each Nostril route daily 7/10/20  Yes Kandi Reyes,    calcitRIOL (ROCALTROL) 0.25 MCG capsule Take 0.5 mcg by mouth daily    Yes Historical Provider, MD   loratadine (CLARITIN) 10 MG tablet Take 10 mg by mouth daily   Yes Historical Provider, MD   melatonin 5 MG TABS tablet Take 10 mg by mouth nightly    Yes Historical Provider, MD   guaiFENesin (MUCINEX) 600 MG extended release tablet Take 600 mg by mouth 2 times daily as needed    Yes Historical Provider, MD   Dulaglutide (TRULICITY) 1.5 HG/4.5CB SOPN Inject 1.5 mg into the skin once a week Indications: weekly on Thursday   Yes Historical Provider, MD   Cholecalciferol (VITAMIN D3) 5000 units TABS Take 5,000 Units by mouth daily    Yes Historical Provider, MD   insulin lispro (HUMALOG) 100 UNIT/ML injection vial Take 12 units before meals + sliding scale.  MAX 60 units daily  Patient taking differently: Inject 12 Units into the skin 3 times daily (before meals) 12 units tid with meals plus sliding scale 5/1/19  Yes Dean Ashley MD   insulin glargine (TOUJEO SOLOSTAR) 300 UNIT/ML injection pen Inject 50 Units into the skin nightly 5/1/19  Yes Dean Ashley MD   ferrous sulfate 325 (65 Fe) MG tablet Take 1 tablet by mouth 2 times daily (with meals) 7/26/18  Yes Jd De La Rosa DO   docusate sodium (COLACE, DULCOLAX) 100 MG CAPS Take 200 mg by mouth nightly 7/26/18  Yes Jd De La Rosa,    levothyroxine (SYNTHROID) 25 MCG tablet Take 1 tablet by mouth daily  Patient taking differently: Take 25 mcg by mouth every morning  12/31/17  Yes Kayode Mistry, DO   Mirabegron ER 50 MG TB24 Take 50 mg by mouth Daily with supper  8/29/17  Yes Historical Provider, MD   pantoprazole (PROTONIX) 40 MG tablet Take 1 tablet by mouth 2 times daily (before meals). Patient taking differently: Take 40 mg by mouth 2 times daily  5/2/14  Yes Zak Stokes,    simvastatin (ZOCOR) 40 MG tablet Take 40 mg by mouth nightly. Yes Historical Provider, MD   gabapentin (NEURONTIN) 300 MG capsule Take 300 mg by mouth nightly.  .   Yes Historical Provider, MD   bumetanide (BUMEX) 1 MG tablet Take 1 mg by mouth daily as needed    Historical Provider, MD   acetaminophen (TYLENOL) 325 MG tablet Take 650 mg by mouth every 6 hours as needed for Pain    Historical Provider, MD   magnesium hydroxide (MILK OF MAGNESIA) 400 MG/5ML suspension Take 30 mLs by mouth daily as needed for Constipation 7/26/18   Jd De La Rosa DO   hydrOXYzine (ATARAX) 10 MG tablet Take 10 mg by mouth 3 times daily as needed for Itching    Historical Provider, MD       Current Medications:    Current Facility-Administered Medications: bisacodyl (DULCOLAX) suppository 10 mg, 10 mg, Rectal, Daily PRN  ipratropium-albuterol (DUONEB) nebulizer solution 1 ampule, 1 ampule, Inhalation, Q4H WA  calcitRIOL (ROCALTROL) capsule 0.5 mcg, 0.5 mcg, Oral, Daily  docusate sodium (COLACE) capsule 200 mg, 200 mg, Oral, Nightly  [START ON 10/1/2020] Dulaglutide (TRULICITY) SOPN 1.5 mg, 1.5 mg, Subcutaneous, Weekly  ferrous sulfate (IRON 325) tablet 325 mg, 325 mg, Oral, BID WC  fluticasone (FLONASE) 50 MCG/ACT nasal spray 1 spray, 1 spray, Each Nostril, BID  gabapentin (NEURONTIN) capsule 300 mg, 300 mg, Oral, Nightly  hydrOXYzine (ATARAX) tablet 10 mg, 10 mg, Oral, TID PRN  insulin glargine (LANTUS) injection vial 40 Units, 40 Units, Subcutaneous, Nightly  levothyroxine (SYNTHROID) tablet 25 mcg, 25 mcg, Oral, Daily  lidocaine 4 % external patch 1 patch, 1 patch, Transdermal, Daily  metoprolol succinate (TOPROL XL) extended release tablet 50 mg, 50 mg, Oral, Daily  mirabegron (MYRBETRIQ) extended release tablet 50 mg, 50 mg, Oral, Dinner  potassium chloride (KLOR-CON M) extended release tablet 10 mEq, 10 mEq, Oral, BID   atorvastatin (LIPITOR) tablet 20 mg, 20 mg, Oral, Daily  warfarin (COUMADIN) tablet 2 mg, 2 mg, Oral, Daily  sodium chloride flush 0.9 % injection 10 mL, 10 mL, Intravenous, 2 times per day  sodium chloride flush 0.9 % injection 10 mL, 10 mL, Intravenous, PRN  acetaminophen (TYLENOL) tablet 650 mg, 650 mg, Oral, Q6H PRN **OR** acetaminophen (TYLENOL) suppository 650 mg, 650 mg, Rectal, Q6H PRN  magnesium hydroxide (MILK OF MAGNESIA) 400 MG/5ML suspension 30 mL, 30 mL, Oral, Daily PRN  promethazine (PHENERGAN) tablet 12.5 mg, 12.5 mg, Oral, Q6H PRN **OR** ondansetron (ZOFRAN) injection 4 mg, 4 mg, Intravenous, Q6H PRN  insulin lispro (HUMALOG) injection vial 0-6 Units, 0-6 Units, Subcutaneous, TID WC  insulin lispro (HUMALOG) injection vial 0-3 Units, 0-3 Units, Subcutaneous, Nightly  glucose (GLUTOSE) 40 % oral gel 15 g, 15 g, Oral, PRN  dextrose 50 % IV solution, 12.5 g, Intravenous, PRN  glucagon (rDNA) injection 1 mg, 1 mg, Intramuscular, PRN  dextrose 5 % solution, 100 mL/hr, Intravenous, PRN  bumetanide (BUMEX) injection 1 mg, 1 mg, Intravenous, BID  enoxaparin (LOVENOX) injection 30 mg, 30 mg, Subcutaneous, Q24H    Allergies:  Pcn [penicillins]    Social History: Non-smoker nondrinker lives in a facility      Family History:   Family History   Problem Relation Age of Onset    Heart Disease Mother     Kidney Disease Mother         dialysis    Diabetes Mother     Stroke Father     Diabetes Father        REVIEW OF SYSTEMS:     · Constitutional: Denies fatigue, fevers, chills or night sweats  · Eyes: Denies visual changes or drainage  · ENT: Denies headaches or hearing loss. No mouth sores or sore throat. No epistaxis   · Cardiovascular: Denies chest pain, pressure or palpitations. No lower extremity swelling. · Respiratory: Chronic VILLANUEVA, cough, orthopnea or PND. No hemoptysis   · Gastrointestinal: Denies hematemesis or anorexia. No hematochezia or melena    · Genitourinary: Denies urgency, dysuria or hematuria. · Musculoskeletal: Positive gait disturbance, weakness or joint complaints does not ambulate/  · Integumentary: Denies rash, hives or pruritis   · Neurological: Denies dizziness, headaches or seizures. No numbness or tingling but admits to poor memory  · Psychiatric: Denies anxiety or depression. · Endocrine: Denies temperature intolerance. No recent weight change. .  · Hematologic/Lymphatic: Denies abnormal bruising or bleeding. No swollen lymph nodes    PHYSICAL EXAM:   BP (!) 134/90   Pulse 96   Temp 98.1 °F (36.7 °C) (Oral)   Resp 20   Ht 5' 1\" (1.549 m)   Wt 278 lb 9.6 oz (126.4 kg)   LMP  (LMP Unknown)   SpO2 99%   BMI 52.64 kg/m²   CONST:  Well developed, well nourished who appears of stated age. Awake, alert and cooperative. No apparent distress. HEENT:   Head- Normocephalic, atraumatic   Eyes- Conjunctivae pink, anicteric  Throat- Oral mucosa pink and moist  Neck-  No stridor, trachea midline, no jugular venous distention. No carotid bruit. CHEST: Chest symmetrical and non-tender to palpation.  No accessory muscle use or intercostal retractions  RESPIRATORY: Lung sounds - clear throughout fields , diminished in the bases  CARDIOVASCULAR:     Heart Inspection- shows no noted pulsations  Heart Palpation- no heaves or thrills; PMI is non-displaced   Heart Ausculation- irregular rate and rhythm, no murmur. No s3, s4 or rub   PV: trace lower extremity edema. No varicosities. Pedal pulses palpable, no clubbing or cyanosis   ABDOMEN: Soft, non-tender to light palpation. Bowel sounds present. No palpable masses no organomegaly; no abdominal bruit  MS: Good muscle strength and tone. No atrophy or abnormal movements. : Deferred  SKIN: Warm and dry no statis dermatitis or ulcers   NEURO / PSYCH: Oriented to person, place and time. Speech clear and appropriate. Follows all commands. Pleasant affect     DATA:    ECG / Tele strips: a fib  Diagnostic:      Intake/Output Summary (Last 24 hours) at 9/26/2020 1055  Last data filed at 9/26/2020 1046  Gross per 24 hour   Intake 180 ml   Output 425 ml   Net -245 ml       Labs:   CBC:   Recent Labs     09/25/20  1104   WBC 4.4*   HGB 10.5*   HCT 35.9        BMP:   Recent Labs     09/25/20  1104 09/26/20  0330    144   K 4.4 4.1   CO2 48* 46*   BUN 38* 35*   CREATININE 2.0* 1.7*   LABGLOM 24 29   CALCIUM 10.4* 10.5*     Mag:   Recent Labs     09/26/20  0330   MG 2.2     Phos: No results for input(s): PHOS in the last 72 hours. TSH:   Recent Labs     09/25/20  1104   TSH 2.570     HgA1c:   Lab Results   Component Value Date    LABA1C 5.7 (H) 09/26/2020     No results found for: EAG  proBNP:   Recent Labs     09/25/20  1104   PROBNP 1,301*     PT/INR: No results for input(s): PROTIME, INR in the last 72 hours. APTT:No results for input(s): APTT in the last 72 hours.   CARDIAC ENZYMES:  Recent Labs     09/25/20  1104   TROPONINI 0.03     FASTING LIPID PANEL:  Lab Results   Component Value Date    CHOL 138 09/26/2020    HDL 61 09/26/2020    LDLCALC 46 09/26/2020    TRIG 155 09/26/2020     LIVER PROFILE:  Recent Labs     09/25/20  1104   AST 24   ALT 13   LABALBU 3.4* Assessment and plan  1. History of pleural effusion and thoracentesis in May 2018 pleural effusion, right associated with compressive atelectasis and a mass could not be excluded. She had small bilateral pleural effusions on recent chest x-ray in August of this year  2. Chronic hypoxic and hypercapnic respiratory failure /pulmonary hypertension  3. Congestive heart failure with preserved EF but with reduced right ventricular systolic function   4. Mild to moderate mitral stenosis with mean gradient of 9 mmHg and  mild mitral regurgitation and mild pulmonary hypertension  5. Untreated obstructive sleep apnea  6. Chronic A. fib now on warfarin due to BMI greater than 40, INR is pending   7. Diabetes  8. Chronic kidney disease  9. Chronic Coumadin therapy    Agree with IV diuresis  Monitor INR renal function and electrolytes,  No need for further cardiac testing        Electronically signed by SAVANNA Horne CNP on 9/26/2020 at 10:55 AM     Assessment and plan by Héctor Campuzano    ______________________________________________________________________  I independently interviewed and examined the patient. I have reviewed the above documentation completed by the ZAHIDA. Please see my additional contributions to the HPI, physical exam, and assessment / medical decision making.     HPI, ROS, PMH, PSH, FMH, SH, and medications independently reviewed (agree; see above documentation)    Review of Systems:  Cardiac: As per HPI  General: No fever, chills  Pulmonary: As per HPI  GI: No nausea, vomiting  Musculoskeletal: SALOMON x 4, no focal motor deficits  Skin: Intact, no rashes  Neuro/Psych: No headache or seizures    Physical Exam:  BP (!) 134/90   Pulse 96   Temp 98.1 °F (36.7 °C) (Oral)   Resp 20   Ht 5' 1\" (1.549 m)   Wt 278 lb 9.6 oz (126.4 kg)   LMP  (LMP Unknown)   SpO2 99%   BMI 52.64 kg/m²   Appearance: Awake, alert, no acute respiratory distress  Skin: Intact, no rash  Head: Normocephalic, atraumatic  Neck: Supple, no carotid bruits  Lungs: Decreased breathing sounds bilaterally. Cardiac: Regular rate and rhythm, +S1S2, no murmurs apparent  Abdomen: Soft, +bowel sounds  Extremities: Moves all extremities x 4, no lower extremity edema  Neurologic: No focal motor deficits apparent, normal mood and affect    Assessment/Plan:  26-year-old female with past medical history of permanent atrial fibrillation, heart failure with preserved ejection fraction, hypertension, hyperlipidemia, breast cancer post partial mastectomy and radiation, right carotid endarterectomy and history of stroke, wheelchair-bound, gastric bypass, morbid obesity who presents with increasing shortness of breath. CT scan showed large bilateral pleural effusion. proBNP was elevated at 1300. Recommendations  Congestive heart failure due to heart failure preserved ejection fraction. Continue diuresis. Strict input output. I provided heart failure management education to the patient.     Domingo Habermann, MD  [facility]) Cardiology

## 2020-09-26 NOTE — PROGRESS NOTES
Secure message sent to Dr. Elie Sacks regarding new consult information.  Electronically signed by Keily Villarreal RN on 9/26/2020 at 8:26 AM

## 2020-09-26 NOTE — DISCHARGE INSTR - COC
28           Discharging to Facility/ Agency   · Name: Christine Ozuna   · ISSGSUT:[id number] [place], [address] [facility]  · VUJ:[phone]  · Fax:[phone]    Dialysis Facility (if applicable)   · Name:  · Address:  · Dialysis Schedule:  · Phone:  · Fax:    / signature: Electronically signed by THEODORE Moseley on 9/26/20 at 10:44 AM EDT    PHYSICIAN SECTION    Prognosis: {Prognosis:[id number]:::0}    Condition at Discharge: Stable    Rehab Potential (if transferring to Rehab): {Prognosis:2722626372:::0}    Recommended Labs or Other Treatments After Discharge: ***    Physician Certification: I certify the above information and transfer of Matthew Rutherford  is necessary for the continuing treatment of the diagnosis listed and that she requires Intermediate Nursing Care for greater 30 days. Update Admission H&P: {CHP DME Changes in HandP:[id number]:::0}    PHYSICIAN SIGNATURE: Electronically signed by Anila Price MD on 9/26/2020 at 7:39 AM    Follow up with dr Erica Welsh in 1 week. Follow up with dr Bernie Rosenthal in 2-3 weeks. Follow up with dr Sera Fischer in 2-3 weeks. Follow up with dr Torri Aburto in 2-3 weeks. Follow up with the CHF clinic as scheduled.      Dr. Ita Catalan - Resume previous Trilogy/NIV

## 2020-09-26 NOTE — PROGRESS NOTES
Secure message sent to Brenda Szymanski NP regarding new consult information.  Electronically signed by Ag De Guzman RN on 9/26/2020 at 8:31 AM

## 2020-09-26 NOTE — PLAN OF CARE
Problem: Falls - Risk of:  Goal: Will remain free from falls  Description: Will remain free from falls  9/26/2020 0018 by Magdy Li RN  Outcome: Met This Shift     Problem: Falls - Risk of:  Goal: Absence of physical injury  Description: Absence of physical injury  9/26/2020 0018 by Magdy Li RN  Outcome: Met This Shift     Problem: Skin Integrity:  Goal: Will show no infection signs and symptoms  Description: Will show no infection signs and symptoms  9/26/2020 0018 by Magdy Li RN  Outcome: Met This Shift     Problem: Skin Integrity:  Goal: Absence of new skin breakdown  Description: Absence of new skin breakdown  9/26/2020 0018 by Magdy Li RN  Outcome: Met This Shift

## 2020-09-27 NOTE — CONSULTS
Lis Mejia M.D.,[facility]  Qiana Ball D.O., F.A.C.O.I., Tyler Coe M.D. Vandy Burkitt, M.D., Diane Mccall M.D. Kwame Gutierrez D.O. Patient:  Amber Jovel 80 y.o. female MRN: [id number]     Date of Service: 9/26/2020      PULMONARY CONSULTATION    Reason for Consultation: acute on chronic respiratory failure   Referring Physician: mark     Communication with the referring physician will be sent via the electronic medical record. Chief Complaint: sen     CODE STATUS: full     SUBJECTIVE:  HPI:    Mrs. Ora Denis is a 80 y.o. female patient who is known to our group due to recurrent admissions with respiratory failure. Last discharge was in august 2020. Patient presents with complains of shortness of breath at the nursing home where she resides,  [place] [address]. EMS records states that her oxygen saturations were 84% on 5 L oxygen. Patient's pulse ox reading here on 5 L is 100%. She complains of shortness of breath associated with lower extremity edema. She denied chest pain, abdominal pain, nausea, vomiting, fevers, chills and diaphoresis. She does not ambulate. cardiology is following.      Past Medical History:   Diagnosis Date    Anemia due to acute blood loss 12/29/2017    Arthritis     Blood transfusion reaction     Chronic atrial fibrillation 12/29/2017    CKD (chronic kidney disease) stage 3, GFR 30-59 ml/min ([facility]) 12/29/2017    Diabetes mellitus ([facility] [address]     History of blood transfusion     History of breast cancer     breast cancer Right    History of stroke     Hyperlipidemia     Hypertension     Hypothyroidism     Volume overload 12/30/2017    As cause of dyspnea       Past Surgical History:   Procedure Laterality Date    APPENDECTOMY      BREAST SURGERY      right    BRONCHOSCOPY  4/28/2018    BRONCHOSCOPY DIAGNOSTIC performed by Kat Zepeda MD at [place] [address] august 27 2017. cauterized her nasal passage.  OTHER SURGICAL HISTORY  07/27/2017    endoscopic conrtol of epistaxis dr Ron Deejsus N/A 4/28/2018    ENDOSCOPIC GUIDED CONTROL EPISTAXIS  WITH SEPTAL BUTTON PLACEMENT. INTRA-OPERATIVE BRONCHOSCOPY. performed by Carmen Luna MD at [facility] OR       Family History   Problem Relation Age of Onset    Heart Disease Mother     Kidney Disease Mother         dialysis    Diabetes Mother     Stroke Father     Diabetes Father        Social History:   Social History     Socioeconomic History    Marital status:      Spouse name: Not on file    Number of children: Not on file    Years of education: Not on file    Highest education level: Not on file   Occupational History    Not on file   Social Needs    Financial resource strain: Not on file    Food insecurity     Worry: Not on file     Inability: Not on file    Transportation needs     Medical: Not on file     Non-medical: Not on file   Tobacco Use    Smoking status: Never Smoker    Smokeless tobacco: Never Used   Substance and Sexual Activity    Alcohol use: Not Currently     Alcohol/week: 0.0 standard drinks     Comment:      Drug use: Never    Sexual activity: Never   Lifestyle    Physical activity     Days per week: Not on file     Minutes per session: Not on file    Stress: Not on file   Relationships    Social connections     Talks on phone: Not on file     Gets together: Not on file     Attends Confucianist service: Not on file     Active member of club or organization: Not on file     Attends meetings of clubs or organizations: Not on file     Relationship status: Not on file    Intimate partner violence     Fear of current or ex partner: Not on file     Emotionally abused: Not on file     Physically abused: Not on file     Forced sexual activity: Not on file   Other Topics Concern    Not on file   Social History Narrative    2 cups coffee daily     *. ETOH:   reports previous alcohol use. Immunization History   Administered Date(s) Administered    Influenza Vaccine, unspecified formulation 10/20/2017    Influenza Virus Vaccine 10/16/2019    Pneumococcal Conjugate Vaccine 03/20/2015    Pneumococcal Vaccine 09/25/2019        Home Meds: Medications Prior to Admission: hydrOXYzine (ATARAX) 25 MG tablet, Take 25 mg by mouth nightly  polyethylene glycol (GLYCOLAX) 17 GM/SCOOP powder, Take 17 g by mouth daily  bisacodyl (DULCOLAX) 10 MG suppository, Place 10 mg rectally daily as needed for Constipation  warfarin (COUMADIN) 2 MG tablet, Take 1 tablet by mouth daily (Patient taking differently: Take 2 mg by mouth nightly )  metoprolol succinate (TOPROL XL) 50 MG extended release tablet, Take 1 tablet by mouth daily (Patient taking differently: Take 50 mg by mouth every morning )  bumetanide (BUMEX) 1 MG tablet, Take 1 tablet by mouth 2 times daily (Patient taking differently: Take 1 mg by mouth 2 times daily Rise and noon)  Multiple Vitamins-Minerals (THERAPEUTIC MULTIVITAMIN-MINERALS) tablet, Take 1 tablet by mouth daily  lidocaine 4 % external patch, Place 1 patch onto the skin daily Remove at HS  potassium chloride (KLOR-CON M) 10 MEQ extended release tablet, Take 10 mEq by mouth 2 times daily  fluticasone (FLONASE) 50 MCG/ACT nasal spray, 1 spray by Each Nostril route daily  calcitRIOL (ROCALTROL) 0.25 MCG capsule, Take 0.5 mcg by mouth daily   loratadine (CLARITIN) 10 MG tablet, Take 10 mg by mouth daily  melatonin 5 MG TABS tablet, Take 10 mg by mouth nightly   guaiFENesin (MUCINEX) 600 MG extended release tablet, Take 600 mg by mouth 2 times daily as needed   Dulaglutide (TRULICITY) 1.5 JY/7.9ZN SOPN, Inject 1.5 mg into the skin once a week Indications: weekly on Thursday  Cholecalciferol (VITAMIN D3) 5000 units TABS, Take 5,000 Units by mouth daily   insulin lispro (HUMALOG) 100 UNIT/ML injection vial, Take 12 units before meals + sliding scale.  MAX 60 units daily (Patient taking differently: Inject 12 Units into the skin 3 times daily (before meals) 12 units tid with meals plus sliding scale)  insulin glargine (TOUJEO SOLOSTAR) 300 UNIT/ML injection pen, Inject 50 Units into the skin nightly  ferrous sulfate 325 (65 Fe) MG tablet, Take 1 tablet by mouth 2 times daily (with meals)  docusate sodium (COLACE, DULCOLAX) 100 MG CAPS, Take 200 mg by mouth nightly  levothyroxine (SYNTHROID) 25 MCG tablet, Take 1 tablet by mouth daily (Patient taking differently: Take 25 mcg by mouth every morning )  Mirabegron ER 50 MG TB24, Take 50 mg by mouth Daily with supper   pantoprazole (PROTONIX) 40 MG tablet, Take 1 tablet by mouth 2 times daily (before meals). (Patient taking differently: Take 40 mg by mouth 2 times daily )  simvastatin (ZOCOR) 40 MG tablet, Take 40 mg by mouth nightly.  gabapentin (NEURONTIN) 300 MG capsule, Take 300 mg by mouth nightly.  .  bumetanide (BUMEX) 1 MG tablet, Take 1 mg by mouth daily as needed  acetaminophen (TYLENOL) 325 MG tablet, Take 650 mg by mouth every 6 hours as needed for Pain  magnesium hydroxide (MILK OF MAGNESIA) 400 MG/5ML suspension, Take 30 mLs by mouth daily as needed for Constipation  hydrOXYzine (ATARAX) 10 MG tablet, Take 10 mg by mouth 3 times daily as needed for Itching    CURRENT MEDS :  Scheduled Meds:   bumetanide  2 mg Intravenous BID    ipratropium-albuterol  1 ampule Inhalation Q4H WA    calcitRIOL  0.5 mcg Oral Daily    docusate sodium  200 mg Oral Nightly    [START ON 10/1/2020] Dulaglutide  1.5 mg Subcutaneous Weekly    ferrous sulfate  325 mg Oral BID     fluticasone  1 spray Each Nostril BID    gabapentin  300 mg Oral Nightly    insulin glargine  40 Units Subcutaneous Nightly    levothyroxine  25 mcg Oral Daily    lidocaine  1 patch Transdermal Daily    metoprolol succinate  50 mg Oral Daily    mirabegron  50 mg Oral Dinner    potassium chloride  10 mEq Oral BID WC    atorvastatin  20 mg Oral Daily    warfarin  2 mg Oral Daily    sodium chloride flush  10 mL Intravenous 2 times per day    insulin lispro  0-6 Units Subcutaneous TID     insulin lispro  0-3 Units Subcutaneous Nightly    enoxaparin  30 mg Subcutaneous Q24H       Continuous Infusions:   dextrose         Allergies   Allergen Reactions    Pcn [Penicillins] Hives       REVIEW OF SYSTEMS:  Constitutional: Denies fever, weight loss, night sweats, and fatigue  Skin: Denies pigmentation, dark lesions, and rashes   HEENT: Denies hearing loss, tinnitus, ear drainage, epistaxis, sore throat, and hoarseness. Cardiovascular: Denies palpitations, chest pain, and chest pressure. Respiratory: Denies cough, dyspnea at rest, hemoptysis, apnea, and choking. Gastrointestinal: Denies nausea, vomiting, poor appetite, diarrhea, heartburn or reflux  Genitourinary: Denies dysuria, frequency, urgency or hematuria  Musculoskeletal: Denies myalgias, muscle weakness, and bone pain  Neurological: Denies dizziness, vertigo, headache, and focal weakness  Psychological: Denies anxiety and depression  Endocrine: Denies heat intolerance and cold intolerance  Hematopoietic/Lymphatic: Denies bleeding problems and blood transfusions    OBJECTIVE:   BP (!) 147/63   Pulse 79   Temp 97.5 °F (36.4 °C) (Oral)   Resp 16   Ht 5' 1\" (1.549 m)   Wt 278 lb 9.6 oz (126.4 kg)   LMP  (LMP Unknown)   SpO2 98%   BMI 52.64 kg/m²   SpO2 Readings from Last 1 Encounters:   09/26/20 98%        I/O:    Intake/Output Summary (Last 24 hours) at 9/26/2020 2103  Last data filed at 9/26/2020 1649  Gross per 24 hour   Intake 180 ml   Output 825 ml   Net -645 ml     Vent Information  SpO2: 98 %                Physical Exam:    General Appearance: appears comfortable in no acute distress.    HEENT: Normocephalic atraumatic without obvious abnormality   Neck: Lips, mucosa, and tongue normal.  Supple, symmetrical, trachea midline, no adenopathy;thyroid:  no enlargement/tenderness/nodules or JVD.  Lung: Breath sounds CTA, diminished bilateral bases. Respirations   unlabored. Symmetrical expansion. Heart: RRR, normal S1, S2. No MRG  Abdomen: Soft, NT, ND. BS present x 4 quadrants. No bruit or organomegaly. Extremities: Pedal pulses 2+ symmetric b/l. Extremities normal, no cyanosis, clubbing, or edema. Musculokeletal: No joint swelling, no muscle tenderness. ROM normal in all joints of extremities. Neurologic: Mental status: Alert and Oriented X3  Pulmonary Function Testing personally reviewed and interpreted      Imaging personally reviewed:      Cxr reviewed     Labs:  Lab Results   Component Value Date    WBC 4.4 09/25/2020    HGB 10.5 09/25/2020    HCT 35.9 09/25/2020    .8 09/25/2020    MCH 31.5 09/25/2020    MCHC 29.2 09/25/2020    RDW 13.2 09/25/2020     09/25/2020    MPV 10.1 09/25/2020     Lab Results   Component Value Date     09/26/2020    K 4.1 09/26/2020    K 4.4 09/25/2020    CL 91 09/26/2020    CO2 46 09/26/2020    BUN 35 09/26/2020    CREATININE 1.7 09/26/2020    LABALBU 3.4 09/25/2020    CALCIUM 10.5 09/26/2020    GFRAA 35 09/26/2020    LABGLOM 29 09/26/2020     Lab Results   Component Value Date    PROTIME 27.5 09/26/2020    INR 2.3 09/26/2020     Recent Labs     09/25/20  1104   PROBNP 1,301*     Recent Labs     09/25/20  1104   TROPONINI 0.03     Recent Labs     09/26/20  0330   PROCAL 0.10*     This SmartLink has not been configured with any valid records. Micro:  No results for input(s): CULTRESP in the last 72 hours. No results for input(s): LABGRAM in the last 72 hours. No results for input(s): LEGUR in the last 72 hours. No results for input(s): STREPNEUMAGU in the last 72 hours. No results for input(s): LP1UAG in the last 72 hours. Assessment:    1. Acute on chronic hypoxic and hypercapnic respiratory failure  2. Bilateral pleural effusions with atelectasis  3.  Acute on chronic HFpEF  4.  Pulmonary HTN, secondary to CLASS II/III worsening RV function on ECHO compared to 2018. Multifactorial due to untreated GOSIA, OHS. Volume overload. 5. hronic kidney disease  6. Chronic A. fib on anticoagulation-previously on Eliquis now on warfarin due to BMI greater than 40  7. Chronic kidney disease  8. Diabetes mellitus type 2  9. Morbid obesity-BMI 52.3       Plan:  1. Diuresis   2. Bronchodilators   3. Fluid restriction  4. Wean o2 as tolerated   5. bipap as needed for respiratory support  6. Follow cxr     Thank you for allowing me to participate in the care of [facility]. Please feel free to call with questions. Jenaro Jacobs M.D.    Pulmonary/Critical Care Medicine

## 2020-09-27 NOTE — PROGRESS NOTES
Subjective: The patient is awake and alert. No problems overnight. Denies chest pain, angina. Breathing better. Denies abdominal pain. Tolerating diet. No nausea or vomiting. Objective:    /60   Pulse 88   Temp 97.9 °F (36.6 °C) (Oral)   Resp 18   Ht 5' 1\" (1.549 m)   Wt 277 lb (125.6 kg)   LMP  (LMP Unknown)   SpO2 100%   BMI 52.34 kg/m²     Current medications that patient is taking have been reviewed. Heart:  RRR, no murmurs, gallops, or rubs.   Lungs:  CTA bilaterally, no wheeze, rales or rhonchi  Abd: bowel sounds present, soft, nontender, nondistended, no masses  Extrem:  No cyanosis or edema    CBC with Differential:    Lab Results   Component Value Date    WBC 4.4 09/25/2020    RBC 3.33 09/25/2020    HGB 10.5 09/25/2020    HCT 35.9 09/25/2020     09/25/2020    .8 09/25/2020    MCH 31.5 09/25/2020    MCHC 29.2 09/25/2020    RDW 13.2 09/25/2020    NRBC 0.9 08/15/2020    LYMPHOPCT 12.8 09/25/2020    MONOPCT 10.5 09/25/2020    BASOPCT 0.2 09/25/2020    MONOSABS 0.46 09/25/2020    LYMPHSABS 0.56 09/25/2020    EOSABS 0.13 09/25/2020    BASOSABS 0.01 09/25/2020     CMP:    Lab Results   Component Value Date     09/27/2020    K 4.1 09/27/2020    K 4.4 09/25/2020    CL 92 09/27/2020    CO2 45 09/27/2020    BUN 36 09/27/2020    CREATININE 1.7 09/27/2020    GFRAA 35 09/27/2020    LABGLOM 29 09/27/2020    GLUCOSE 131 09/27/2020    PROT 6.8 09/25/2020    LABALBU 3.4 09/25/2020    CALCIUM 10.1 09/27/2020    BILITOT 0.4 09/25/2020    ALKPHOS 63 09/25/2020    AST 24 09/25/2020    ALT 13 09/25/2020     BMP:    Lab Results   Component Value Date     09/27/2020    K 4.1 09/27/2020    K 4.4 09/25/2020    CL 92 09/27/2020    CO2 45 09/27/2020    BUN 36 09/27/2020    LABALBU 3.4 09/25/2020    CREATININE 1.7 09/27/2020    CALCIUM 10.1 09/27/2020    GFRAA 35 09/27/2020    LABGLOM 29 09/27/2020    GLUCOSE 131 09/27/2020     Magnesium:    Lab Results   Component Value Date    MG 2.5 09/27/2020     Phosphorus:    Lab Results   Component Value Date    PHOS 2.7 08/28/2020     PT/INR:    Lab Results   Component Value Date    PROTIME 28.4 09/27/2020    INR 2.4 09/27/2020     PTT:    Lab Results   Component Value Date    APTT 33.3 07/22/2018   [APTT}     Assessment:    Patient Active Problem List   Diagnosis    Chronic atrial fibrillation    CKD (chronic kidney disease) stage 3, GFR 30-59 ml/min ([facility])    History of breast cancer    Chronic anemia    Diabetes mellitus type 2, uncontrolled ([facility] [address]    Essential hypertension    History of CVA (cerebrovascular accident)    Hyperlipidemia LDL goal <100    Acquired hypothyroidism    Morbid obesity with BMI of 50.0-59.9, adult ([facility] [address]    Acute on chronic respiratory failure with hypoxia (HCC)    Acute on chronic diastolic congestive heart failure (HCC)    Bilateral pleural effusion       Plan:  Rate controlled. Renal function at baseline. Stable. Hgb stable. Blood glucose ok, continue to adjust basal/bolus insulin therapy  Blood pressure ok, continue current medications  Continue Pt/Ot and risk factor modifications. Continue aggressive lipid therapy  Continue synthroid. Continue to encourage weight loss. HD stable. Diurese as BP and renal function allow. Appreciate pulmonology input. Pt/Ot evaluations for discharge planning. Discharge soon.     Heaven Wright    10:57 AM  9/27/2020

## 2020-09-28 NOTE — PROGRESS NOTES
Occupational Therapy  OCCUPATIONAL THERAPY INITIAL EVALUATION      Date:2020  Patient Name: Matthew Rutherford  MRN: [id number]  : 1935  Room: [address] [place] [address]    Referring Provider: Anila Price MD   Evaluating OT: Kem Parham. Caren Morales - [id number]    AM-PAC Daily Activity Raw Score:       Recommended Adaptive Equipment: Continue to assess. Diagnosis: Bilateral pleural effusion [J90]    Pertinent Medical History: CKD, DM, HTN, a-fib, arthritis      Precautions: falls, skin integrity, bed alarm, O2 via nasal cannula    Home Living: Patient is a resident of [facility]. Prior Level of Function (PLOF): Per patient, she needed assistance with ADLs and IADLs. Patient reported that the [facility] staff members use a antonieta lift with all functional transfers; patient stated that she occasionally spends a few hours sitting up in a manual wheelchair (assistance needed with propulsion/manuevering wheelchair). Pain Level: Patient denied experiencing pain while supine in bed; patient reported feeling \"uncomfortable all over\" while seated at EOB. Patient did not rate/describe her discomfort. Cognition: Patient alert and oriented to person and place. Fair+ command follow demonstrated. Memory: Fair   Sequencing: Fair   Problem Solving: Fair   Judgement/Safety: Fair    Functional Assessment:   Initial Eval Status  Date: 2020 Treatment Status  Date:  Short Term Goals   Feeding Min A  Setup   Grooming Mod A  Min A  (seated)   UB Dressing Max A to [facility]/don hospital gown. Min A   LB Dressing Max A  Min A - with use of AE, as needed/appropriate   Bathing Max A  Mod A - with use of AE/DME, as needed/appropriate   Toileting Dependent for perineal hygiene following episode of urinary incontinence. Max A   Bed Mobility  Supine-to-Sit: Max A  Sit-to-Supine: Max A  Rolling: Min A to Mod A with use of bed rail for change of bed linens and facilitation of perineal hygiene      Functional Transfers Not assessed.

## 2020-09-28 NOTE — PROGRESS NOTES
in the objective portion of this evaluation. Patient education  Pt educated on PT objectives    Patient response to education:   Pt verbalized understanding Pt demonstrated skill Pt requires further education in this area   yes           Comments:  Pt with poor activity tolerance. C/o dizziness sitting up edge of bed      Pt's/ family goals   1. To return to [facility]    Patient and or family understand(s) diagnosis, prognosis, and plan of care. PLAN:    PLAN OF CARE:    Current Treatment Recommendations     [x] Strengthening     [] ROM   [x] Balance Training   [] Endurance Training   [x] Transfer Training   [] Gait Training   [] Stair Training   [] Positioning   [x] Safety and Education Training   [x] Patient/Caregiver Education   [] HEP  [] Other     Frequency of treatments: 1-3x/week x 3 .days    Time in  110  Time out  125        Evaluation Time includes thorough review of current medical information, gathering information on past medical history/social history and prior level of function, completion of standardized testing/informal observation of tasks, assessment of data and education on plan of care and goals.     CPT codes:  [x] Low Complexity PT evaluation 58646  [] Moderate Complexity PT evaluation 75195  [] High Complexity PT evaluation 95767  [] PT Re-evaluation 46561  [] Gait training 88155 minutes  [] Manual therapy 09194 minutes  [] Therapeutic activities 55962 minutes  [] Therapeutic exercises 74237 minutes  [] Neuromuscular reeducation 54955 minutes     [facility] PSYCHIATRY PT [id number]

## 2020-09-28 NOTE — CARE COORDINATION
9/28/2020  Social Work Discharge Planning:MISTY set up ambulance transport via [facility] for Pt to go to [facility] at 5:30pm today. Nurse here and Maya Felix at [facility] were notified. Voicemail left for son (POA) Bill . WILL NEED A 'RESUME PREVIOUS TRILOGY/NIV' ORDER FOR JASPER AT DISCHARGE.  notified nurse.   Electronically signed by THEODORE Lin on 9/28/2020 at 1:44 PM

## 2020-09-28 NOTE — PROGRESS NOTES
INPATIENT CARDIOLOGY FOLLOW-UP    Name: Hugo Hernandez    Age: 80 y.o. Date of Admission: 9/25/2020 10:24 AM    Date of Service: 9/28/2020    Chief Complaint: Follow-up for CHF      Interim History:  No new overnight cardiac complaints. Currently with no complaints of CP, SOB, palpitations, dizziness, or lightheadedness. She is being discharged back to nursing home, she told me that she does not ambulate for the past several months. Review of Systems:   Cardiac: As per HPI  General: No fever, chills  Pulmonary: As per HPI  HEENT: No visual disturbances, difficult swallowing  GI: No nausea, vomiting  Endocrine: No thyroid disease or DM  Musculoskeletal: SALOMON x 4, no focal motor deficits  Skin: Intact, no rashes  Neuro/Psych: No headache or seizures    Problem List:  Patient Active Problem List   Diagnosis    Chronic atrial fibrillation    CKD (chronic kidney disease) stage 3, GFR 30-59 ml/min ([facility])    History of breast cancer    Chronic anemia    Diabetes mellitus type 2, uncontrolled ([facility] [address]    Essential hypertension    History of CVA (cerebrovascular accident)    Hyperlipidemia LDL goal <100    Acquired hypothyroidism    Morbid obesity with BMI of 50.0-59.9, adult ([facility] [address]    Acute on chronic respiratory failure with hypoxia (HCC)    Acute on chronic diastolic congestive heart failure (HCC)    Bilateral pleural effusion       Allergies:   Allergies   Allergen Reactions    Pcn [Penicillins] Hives       Current Medications:  Current Facility-Administered Medications   Medication Dose Route Frequency Provider Last Rate Last Dose    bumetanide (BUMEX) tablet 1 mg  1 mg Oral BID Zara Matias MD   1 mg at 09/28/20 0850    metOLazone (ZAROXOLYN) tablet 5 mg  5 mg Oral Daily Domingo Habermann, MD   5 mg at 09/28/20 0844    bisacodyl (DULCOLAX) suppository 10 mg  10 mg Rectal Daily PRN Zara Matias MD   10 mg at 09/28/20 1634    ipratropium-albuterol (DUONEB) nebulizer solution 1 ampule  1 ampule Inhalation Q4H WA Cecilio Bush MD   1 ampule at 09/28/20 1558    calcitRIOL (ROCALTROL) capsule 0.5 mcg  0.5 mcg Oral Daily Angeli Knott MD   0.5 mcg at 09/28/20 1983    docusate sodium (COLACE) capsule 200 mg  200 mg Oral Nightly Angeli Knott MD   200 mg at 09/27/20 2137    [START ON 10/1/2020] Dulaglutide (TRULICITY) SOPN 1.5 mg  1.5 mg Subcutaneous Weekly Angeli Knott MD        ferrous sulfate (IRON 325) tablet 325 mg  325 mg Oral BID  Angeli Knott MD   325 mg at 09/28/20 1633    fluticasone (FLONASE) 50 MCG/ACT nasal spray 1 spray  1 spray Each Nostril BID Angeli Knott MD   1 spray at 09/28/20 0843    gabapentin (NEURONTIN) capsule 300 mg  300 mg Oral Nightly Angeli Knott MD   300 mg at 09/27/20 2137    hydrOXYzine (ATARAX) tablet 10 mg  10 mg Oral TID PRN Angeli Knott MD        insulin glargine (LANTUS) injection vial 40 Units  40 Units Subcutaneous Nightly Angeli Knott MD   40 Units at 09/27/20 2141    levothyroxine (SYNTHROID) tablet 25 mcg  25 mcg Oral Daily Angeli Knott MD   25 mcg at 09/28/20 0842    lidocaine 4 % external patch 1 patch  1 patch Transdermal Daily Angeli Knott MD   1 patch at 09/28/20 0854    metoprolol succinate (TOPROL XL) extended release tablet 50 mg  50 mg Oral Daily Angeli Knott MD   50 mg at 09/28/20 0842    mirabegron (MYRBETRIQ) extended release tablet 50 mg  50 mg Oral Dinner Angeli Knott MD        potassium chloride Claudetta Bias M) extended release tablet 10 mEq  10 mEq Oral BID  Angeli Knott MD   10 mEq at 09/28/20 1633    atorvastatin (LIPITOR) tablet 20 mg  20 mg Oral Daily Angeli Knott MD   20 mg at 09/27/20 2137    warfarin (COUMADIN) tablet 2 mg  2 mg Oral Daily Angeli Knott MD   2 mg at 09/28/20 1633    sodium chloride flush 0.9 % injection 10 mL  10 mL Intravenous 2 times per day Angeli Knott MD   10 mL at 09/28/20 9765    sodium chloride flush 0.9 % injection 10 mL  10 mL Intravenous PRN Jake Nelson MD        acetaminophen (TYLENOL) tablet 650 mg  650 mg Oral Q6H PRN Jake Nelson MD        Or    acetaminophen (TYLENOL) suppository 650 mg  650 mg Rectal Q6H PRN Jake Nelson MD        magnesium hydroxide (MILK OF MAGNESIA) 400 MG/5ML suspension 30 mL  30 mL Oral Daily PRN Jake Nelson MD   30 mL at 09/26/20 0150    promethazine (PHENERGAN) tablet 12.5 mg  12.5 mg Oral Q6H PRN Jake Nelson MD        Or    [facility] injection 4 mg  4 mg Intravenous Q6H PRN Jake Nelson MD        insulin lispro (HUMALOG) injection vial 0-6 Units  0-6 Units Subcutaneous TID WC Jake Nelson MD   2 Units at 09/28/20 1644    insulin lispro (HUMALOG) injection vial 0-3 Units  0-3 Units Subcutaneous Nightly Jake Nelson MD   1 Units at 09/27/20 2142    glucose (GLUTOSE) 40 % oral gel 15 g  15 g Oral PRN Jake Nelson MD        dextrose 50 % IV solution  12.5 g Intravenous PRN Jake Nelson MD        glucagon (rDNA) injection 1 mg  1 mg Intramuscular PRN Jake Nelson MD        dextrose 5 % solution  100 mL/hr Intravenous PRN Jkae Nelson MD          dextrose         Physical Exam:  /75   Pulse 90   Temp 98 °F (36.7 °C) (Oral)   Resp 20   Ht 5' 1\" (1.549 m)   Wt 273 lb 9.6 oz (124.1 kg)   LMP  (LMP Unknown)   SpO2 98%   BMI 51.70 kg/m²   Wt Readings from Last 3 Encounters:   09/28/20 273 lb 9.6 oz (124.1 kg)   09/09/20 271 lb (122.9 kg)   08/31/20 271 lb 3.2 oz (123 kg)     Appearance: Awake, alert, no acute respiratory distress  Skin: Intact, no rash  Head: Normocephalic, atraumatic  Eyes: EOMI, no conjunctival erythema  ENMT: No pharyngeal erythema, MMM, no rhinorrhea  Neck: Supple, elevated JVP, no carotid bruits  Lungs: Clear to auscultation bilaterally. No wheezes, rales, or rhonchi.   Cardiac: Regular rate and rhythm, +S1S2, ESM 3/6 heard over the right upper sternal border  Abdomen: Soft, nontender, +bowel sounds  Extremities: Moves all extremities x 4, no lower extremity edema  Neurologic: No focal motor deficits apparent, normal mood and affect  Peripheral Pulses: Intact posterior tibial pulses bilaterally    Intake/Output:    Intake/Output Summary (Last 24 hours) at 9/28/2020 1651  Last data filed at 9/28/2020 1553  Gross per 24 hour   Intake 1020 ml   Output 1550 ml   Net -530 ml     I/O this shift:  In: -   Out: 500 [Urine:500]    Laboratory Tests:  Recent Labs     09/26/20  0330 09/27/20  0558 09/28/20  0320    143 140   K 4.1 4.1 4.0   CL 91* 92* 84*   CO2 46* 45* 49*   BUN 35* 36* 38*   CREATININE 1.7* 1.7* 1.9*   GLUCOSE 109* 131* 141*   CALCIUM 10.5* 10.1 10.5*     Lab Results   Component Value Date    MG 2.3 09/28/2020     No results for input(s): ALKPHOS, ALT, AST, PROT, BILITOT, BILIDIR, LABALBU in the last 72 hours.   Recent Labs     09/28/20  0320   WBC 3.4*   RBC 3.41*   HGB 10.7*   HCT 35.4   .8*   MCH 31.4   MCHC 30.2*   RDW 13.2      MPV 9.5     Lab Results   Component Value Date    TROPONINI 0.03 09/25/2020    TROPONINI 0.03 08/24/2020    TROPONINI 0.03 08/13/2020     Lab Results   Component Value Date    INR 2.5 09/28/2020    INR 2.4 09/27/2020    INR 2.3 09/26/2020    PROTIME 29.9 (H) 09/28/2020    PROTIME 28.4 (H) 09/27/2020    PROTIME 27.5 (H) 09/26/2020     Lab Results   Component Value Date    TSH 2.570 09/25/2020     Lab Results   Component Value Date    LABA1C 5.7 (H) 09/26/2020     No results found for: EAG  Lab Results   Component Value Date    CHOL 138 09/26/2020    CHOL 94 07/23/2018    CHOL 125 12/27/2017     Lab Results   Component Value Date    TRIG 155 (H) 09/26/2020    TRIG 103 07/23/2018    TRIG 146 12/27/2017     Lab Results   Component Value Date    HDL 61 09/26/2020    HDL 39 07/23/2018    HDL 44 12/27/2017     Lab Results   Component Value Date    LDLCALC 46 09/26/2020    LDLCALC 34 07/23/2018    [address] 12/27/2017     Lab Results   Component Value Date    LABVLDL 31 09/26/2020    LABVLDL 21 07/23/2018    LABVLDL 29 12/27/2017     No results found for: CHOLHDLRATIO    Cardiac Tests:    Telemetry findings reviewed: off the monitor     Labs and vitals were reviewed: Blood pressure 139/75 with heart rate of 90. BUN/creatinine 36/1.7>>> 38/1.9, proBNP 1462, LDL 46 WBC 3.4, H&H 10.7/35.4    Chest X-ray:       Echocardiogram-8/13/2020:   LVEF 65 to 70%  Technically suboptimal and limited limited study. Left ventricular internal dimensions were normal in diastole and systole. Moderate left ventricular concentric hypertrophy noted. No regional wall motion abnormalities seen. Normal left ventricular ejection fraction. The left atrium is borderline dilated. Borderline dilated right ventricle. Right ventricle global systolic function is reduced . Mildly enlarged right atrium size. Mild thickening of the mitral valve leaflets. Moderate mitral annular calcification. Mild mitral regurgitation is present. Mild to moderate functional mitral stenosis . Mild aortic stenosis. Mild tricuspid regurgitation. Pulmonary hypertension is mild . Stress test-2/23/2018: EF 75% no reversible ischemia, no scars, normal wall motion, low risk pharmacological stress test.      Cardiac catheterization:       ASSESSMENT:  1. History of pleural effusion and thoracentesis in May 2018 pleural effusion, right associated with compressive atelectasis and a mass could not be excluded. She had small bilateral pleural effusions on recent chest x-ray in August of this year  2. Chronic hypoxic and hypercapnic respiratory failure /pulmonary hypertension, improved  3. Congestive heart failure with preserved EF but with reduced right ventricular systolic function, appears euvolemic  4.  VHD: Mild to moderate mitral stenosis with mean gradient of 9 mmHg and  mild mitral regurgitation, mild aortic stenosis and mild pulmonary hypertension  5. Untreated obstructive sleep apnea  6. Chronic A. fib now on warfarin due to BMI greater than 40, INR is pending   7. Diabetes  8. Chronic kidney disease  Chronic Coumadin therapy. Plan:   1. Continue Bumex p.o. twice daily, periodically monitor renal functions and electrolytes  2. Continue cardiac diet and restrict daily salt intake to less than 2 g  3. Continue atorvastatin  4. Continue warfarin for anticoagulation  5. She stable from a cardiology standpoint for discharge, will sign off please call if any further questions or concerns. Rina Valentin MD., [facility].   [facility]) Cardiology

## 2020-09-28 NOTE — CONSULTS
9/28/2020  9:31 AM           Nutrition Note    Consult for CHF Diet teaching. Pt is from [address] and discharge plan is for her to return there. After discharge, pt's diet will be moderated and provided by the facility. Diet teaching not appropriate at this time.       Electronically signed by Jake Martínez RD, CNSC, LD on 9/28/20 at 9:31 AM EDT    Contact: [phone]

## 2020-09-28 NOTE — PROGRESS NOTES
[START ON 10/1/2020] Dulaglutide  1.5 mg Subcutaneous Weekly    ferrous sulfate  325 mg Oral BID     fluticasone  1 spray Each Nostril BID    gabapentin  300 mg Oral Nightly    insulin glargine  40 Units Subcutaneous Nightly    levothyroxine  25 mcg Oral Daily    lidocaine  1 patch Transdermal Daily    metoprolol succinate  50 mg Oral Daily    mirabegron  50 mg Oral Dinner    potassium chloride  10 mEq Oral BID WC    atorvastatin  20 mg Oral Daily    warfarin  2 mg Oral Daily    sodium chloride flush  10 mL Intravenous 2 times per day    insulin lispro  0-6 Units Subcutaneous TID WC    insulin lispro  0-3 Units Subcutaneous Nightly       Physical Exam:  General Appearance: appears comfortable in no acute distress. Obese  HEENT: Normocephalic atraumatic without obvious abnormality   Neck: Lips, mucosa, and tongue normal.  Supple, symmetrical, trachea midline, no adenopathy;thyroid:  no enlargement/tenderness/nodules or JVD. Lung: Breath sounds CTA diminished bilaterally. Respirations   unlabored. Symmetrical expansion. Heart: RRR, normal S1, S2. No MRG  Abdomen: Soft, NT, ND. BS present x 4 quadrants. No bruit or organomegaly. Extremities: Pedal pulses 2+ symmetric b/l. Extremities normal, no cyanosis, clubbing, or edema. Musculokeletal: No joint swelling, no muscle tenderness. ROM normal in all joints of extremities. Neurologic: Mental status: Alert and Oriented X3 . Pertinent/ New Labs and Imaging Studies     Imaging Personally Reviewed:  Pending     ECHO   Conclusions      Summary   Technically suboptimal and limited limited study. Left ventricular internal dimensions were normal in diastole and systole. Moderate left ventricular concentric hypertrophy noted. No regional wall motion abnormalities seen. Normal left ventricular ejection fraction. The left atrium is borderline dilated. Borderline dilated right ventricle.    Right ventricle global systolic function is reduced .   Mildly enlarged right atrium size. Mild thickening of the mitral valve leaflets. Moderate mitral annular calcification. Mild mitral regurgitation is present. Mild to moderate functional mitral stenosis . Mild aortic stenosis. Mild tricuspid regurgitation. Pulmonary hypertension is mild . Signature    Labs:  Lab Results   Component Value Date    WBC 3.4 09/28/2020    HGB 10.7 09/28/2020    HCT 35.4 09/28/2020    .8 09/28/2020    MCH 31.4 09/28/2020    MCHC 30.2 09/28/2020    RDW 13.2 09/28/2020     09/28/2020    MPV 9.5 09/28/2020     Lab Results   Component Value Date     09/28/2020    K 4.0 09/28/2020    K 4.4 09/25/2020    CL 84 09/28/2020    CO2 49 09/28/2020    BUN 38 09/28/2020    CREATININE 1.9 09/28/2020    LABALBU 3.4 09/25/2020    CALCIUM 10.5 09/28/2020    GFRAA 30 09/28/2020    LABGLOM 25 09/28/2020     Lab Results   Component Value Date    PROTIME 29.9 09/28/2020    INR 2.5 09/28/2020     Recent Labs     09/27/20  0558   PROBNP 1,462*     Recent Labs     09/26/20  0330   PROCAL 0.10*     This SmartLink has not been configured with any valid records. Micro:  No results for input(s): CULTRESP in the last 72 hours. No results for input(s): LABGRAM in the last 72 hours. No results for input(s): LEGUR in the last 72 hours. No results for input(s): STREPNEUMAGU in the last 72 hours. No results for input(s): LP1UAG in the last 72 hours. Assessment:    1. Acute on chronic hypoxic and hypercapnic respiratory failure  2. Bilateral pleural effusions with atelectasis  3. Acute on chronic HFpEF  4.  Pulmonary HTN, secondary to CLASS II/III  worsening RV function on ECHO compared to 2018. Multifactorial due to untreated GOSIA, OHS. Volume overload. 5. chronic kidney disease  6. Chronic A. fib on anticoagulation-previously on Eliquis now on warfarin due to BMI greater than 40  7. Chronic kidney disease  8. Diabetes mellitus type 2  9.  Morbid obesity-BMI 52.3         Plan:   1. Diuresis -Bumex 1 mg twice daily  2. Bronchodilators -DuoNebs every 4 hours while awake  3. Fluid restriction  4. I& O -last 24 hrs -1,790  5. bipap as needed for respiratory support  6. Follow cxr will check today   7. Currently requiring 4 L nasal cannula 90% wean as tolerated  8. Okay to discharge from pulmonary point of view can follow-up in the office in 2 weeks, rounded to office      This plan of care was reviewed in collaboration with Dr. Sherryle Greenhouse  Electronically signed by SAVANNA Skaggs on 9/28/2020 at 12:50 PM      I personally saw, examined, and cared for the patient. Labs, medications, radiographs reviewed. I agree with history exam and plans detailed in NP note. Katie Alexander M.D.    Pulmonary/Critical Care Medicine

## 2020-09-28 NOTE — PROGRESS NOTES
Subjective: The patient is awake and alert. No problems overnight. Denies chest pain, angina. Breathing better. Denies abdominal pain. Tolerating diet. No nausea or vomiting. Objective:    /63   Pulse 92   Temp 97.9 °F (36.6 °C) (Oral)   Resp 18   Ht 5' 1\" (1.549 m)   Wt 273 lb 9.6 oz (124.1 kg)   LMP  (LMP Unknown)   SpO2 98%   BMI 51.70 kg/m²     Current medications that patient is taking have been reviewed. Heart:  RRR, no murmurs, gallops, or rubs.   Lungs:  CTA bilaterally, no wheeze, rales or rhonchi  Abd: bowel sounds present, soft, nontender, nondistended, no masses  Extrem:  No cyanosis or edema    CBC with Differential:    Lab Results   Component Value Date    WBC 3.4 09/28/2020    RBC 3.41 09/28/2020    HGB 10.7 09/28/2020    HCT 35.4 09/28/2020     09/28/2020    .8 09/28/2020    MCH 31.4 09/28/2020    MCHC 30.2 09/28/2020    RDW 13.2 09/28/2020    NRBC 0.9 08/15/2020    LYMPHOPCT 18.2 09/28/2020    MONOPCT 11.4 09/28/2020    BASOPCT 0.3 09/28/2020    MONOSABS 0.39 09/28/2020    LYMPHSABS 0.62 09/28/2020    EOSABS 0.17 09/28/2020    BASOSABS 0.01 09/28/2020     CMP:    Lab Results   Component Value Date     09/28/2020    K 4.0 09/28/2020    K 4.4 09/25/2020    CL 84 09/28/2020    CO2 49 09/28/2020    BUN 38 09/28/2020    CREATININE 1.9 09/28/2020    GFRAA 30 09/28/2020    LABGLOM 25 09/28/2020    GLUCOSE 141 09/28/2020    PROT 6.8 09/25/2020    LABALBU 3.4 09/25/2020    CALCIUM 10.5 09/28/2020    BILITOT 0.4 09/25/2020    ALKPHOS 63 09/25/2020    AST 24 09/25/2020    ALT 13 09/25/2020     BMP:    Lab Results   Component Value Date     09/28/2020    K 4.0 09/28/2020    K 4.4 09/25/2020    CL 84 09/28/2020    CO2 49 09/28/2020    BUN 38 09/28/2020    LABALBU 3.4 09/25/2020    CREATININE 1.9 09/28/2020    CALCIUM 10.5 09/28/2020    GFRAA 30 09/28/2020    LABGLOM 25 09/28/2020    GLUCOSE 141 09/28/2020     Magnesium:    Lab Results   Component Value Date MG 2.3 09/28/2020     Phosphorus:    Lab Results   Component Value Date    PHOS 2.7 08/28/2020     PT/INR:    Lab Results   Component Value Date    PROTIME 29.9 09/28/2020    INR 2.5 09/28/2020     PTT:    Lab Results   Component Value Date    APTT 33.3 07/22/2018   [APTT}     Assessment:    Patient Active Problem List   Diagnosis    Chronic atrial fibrillation    CKD (chronic kidney disease) stage 3, GFR 30-59 ml/min ([facility])    History of breast cancer    Chronic anemia    Diabetes mellitus type 2, uncontrolled ([facility] [address]    Essential hypertension    History of CVA (cerebrovascular accident)    Hyperlipidemia LDL goal <100    Acquired hypothyroidism    Morbid obesity with BMI of 50.0-59.9, adult ([facility] [address]    Acute on chronic respiratory failure with hypoxia (HCC)    Acute on chronic diastolic congestive heart failure (HCC)    Bilateral pleural effusion       Plan:  Rate controlled. Renal function at baseline. Stable. Hgb stable. Blood glucose ok, continue to adjust basal/bolus insulin therapy  Blood pressure ok, continue current medications  Continue Pt/Ot and risk factor modifications. Continue aggressive lipid therapy  Continue synthroid. Continue to encourage weight loss. HD stable. Diurese as BP and renal function allow. Appreciate pulmonology input. Pt/Ot evaluations for discharge planning.   Discharge    Aimeesuhas Gurinder    12:43 PM  9/28/2020

## 2020-09-28 NOTE — CARE COORDINATION
9/28/2020 Social Work Discharge Planning: COVID NEG. Pt is from [place] at [facility] and is a bed hold. Precert will be needed. Awaiting therapy evals. N-17 generated and transport form is in chart. SW confirmed with Pt that plan is to return. Electronically signed by THEODORE Carlos on 9/28/2020 at 10:28 AM

## 2020-09-28 NOTE — DISCHARGE SUMMARY
Physician Discharge Summary     Patient ID:  Pretty Todd  [id number]  80 y.o.  1935    Admit date: 9/25/2020    Discharge date and time:  9/28/2020    Admission Diagnoses:   Patient Active Problem List   Diagnosis    Chronic atrial fibrillation    CKD (chronic kidney disease) stage 3, GFR 30-59 ml/min (HCC)    History of breast cancer    Chronic anemia    Diabetes mellitus type 2, uncontrolled ([facility]    Essential hypertension    History of CVA (cerebrovascular accident)    Hyperlipidemia LDL goal <100    Acquired hypothyroidism    Morbid obesity with BMI of 50.0-59.9, adult ([facility]    Acute on chronic respiratory failure with hypoxia (HCC)    Acute on chronic diastolic congestive heart failure (HCC)    Bilateral pleural effusion       Discharge Diagnoses: as above    Consults: cardiology, pulmonary/intensive care and nephrology    Procedures: see chart    Hospital Course: patient was admitted with shortness of breath. She was found to be in CHF. She was seen by cardiology,, pulmonology and nephrology. She was diuresed. She improved. She was seen by Pt/Ot. She was discharged back to her nursing home.       Discharge Exam:  See progress note from today    Condition:  stable    Disposition: SNF    Patient Instructions:   Current Discharge Medication List      START taking these medications    Details   metOLazone (ZAROXOLYN) 5 MG tablet Take 1 tablet by mouth daily  Qty: 14 tablet, Refills: 0         CONTINUE these medications which have NOT CHANGED    Details   polyethylene glycol (GLYCOLAX) 17 GM/SCOOP powder Take 17 g by mouth daily      bisacodyl (DULCOLAX) 10 MG suppository Place 10 mg rectally daily as needed for Constipation      warfarin (COUMADIN) 2 MG tablet Take 1 tablet by mouth daily  Qty: 30 tablet, Refills: 0      metoprolol succinate (TOPROL XL) 50 MG extended release tablet Take 1 tablet by mouth daily  Qty: 30 tablet, Refills: 0      bumetanide (BUMEX) 1 MG tablet Take 1 tablet by mouth 2 times daily  Qty: 60 tablet, Refills: 0      Multiple Vitamins-Minerals (THERAPEUTIC MULTIVITAMIN-MINERALS) tablet Take 1 tablet by mouth daily      lidocaine 4 % external patch Place 1 patch onto the skin daily Remove at HS      potassium chloride (KLOR-CON M) 10 MEQ extended release tablet Take 10 mEq by mouth 2 times daily      fluticasone (FLONASE) 50 MCG/ACT nasal spray 1 spray by Each Nostril route daily  Qty: 2 Bottle, Refills: 1      calcitRIOL (ROCALTROL) 0.25 MCG capsule Take 0.5 mcg by mouth daily       loratadine (CLARITIN) 10 MG tablet Take 10 mg by mouth daily      melatonin 5 MG TABS tablet Take 10 mg by mouth nightly       guaiFENesin (MUCINEX) 600 MG extended release tablet Take 600 mg by mouth 2 times daily as needed       Dulaglutide (TRULICITY) 1.5 OO/2.4OQ SOPN Inject 1.5 mg into the skin once a week Indications: weekly on Thursday      Cholecalciferol (VITAMIN D3) 5000 units TABS Take 5,000 Units by mouth daily       insulin lispro (HUMALOG) 100 UNIT/ML injection vial Take 12 units before meals + sliding scale. MAX 60 units daily  Qty: 3 vial, Refills: 5    Associated Diagnoses: Type 2 diabetes mellitus with complication, with long-term current use of insulin ([facility])      insulin glargine (TOUJEO SOLOSTAR) 300 UNIT/ML injection pen Inject 50 Units into the skin nightly  Qty: 12 pen, Refills: 5    Associated Diagnoses: Type 2 diabetes mellitus with complication, with long-term current use of insulin ([facility])      ferrous sulfate 325 (65 Fe) MG tablet Take 1 tablet by mouth 2 times daily (with meals)  Qty: 30 tablet, Refills: 0      docusate sodium (COLACE, DULCOLAX) 100 MG CAPS Take 200 mg by mouth nightly      levothyroxine (SYNTHROID) 25 MCG tablet Take 1 tablet by mouth daily  Qty: 30 tablet, Refills: 3      Mirabegron ER 50 MG TB24 Take 50 mg by mouth Daily with supper       pantoprazole (PROTONIX) 40 MG tablet Take 1 tablet by mouth 2 times daily (before meals).   Qty: 60 tablet, Refills: 1      simvastatin (ZOCOR) 40 MG tablet Take 40 mg by mouth nightly.      gabapentin (NEURONTIN) 300 MG capsule Take 300 mg by mouth nightly. Stoney Glow acetaminophen (TYLENOL) 325 MG tablet Take 650 mg by mouth every 6 hours as needed for Pain      magnesium hydroxide (MILK OF MAGNESIA) 400 MG/5ML suspension Take 30 mLs by mouth daily as needed for Constipation      hydrOXYzine (ATARAX) 10 MG tablet Take 10 mg by mouth 3 times daily as needed for Itching           Activity: activity as tolerated  Diet: low fat, low cholesterol diet    Follow up with dr Yasmin Bragg in 1 week. Follow up with dr Toy Kaur in 2-3 weeks. Follow up with dr Sherif Arguelles in 2-3 weeks. Follow up with dr Lorena More in 2-3 weeks. Follow up with the CHF clinic as scheduled.      Note that over 30 minutes was spent in preparing discharge papers, discussing discharge with patient, medication review, etc.    Signed:  Leeanna Mccarthy    9/28/2020  7:59 AM

## 2020-10-29 NOTE — PROGRESS NOTES
Fit with binaural Gigi Rye ITE  hearing aids. Instructed in use and care. Gave initial supply of batteries, warranty information and scheduled 30 day check for 11/12/2020 at 11:30. Made following adjustments: disabled VC (she wants easy to use), increased gain overall and decreased gain for loud sounds. She states sounds good, even with loud music playing in background. Patient was satisfied and will follow up on above date, unless problems arise. No charge visit today. Will bill at 30 day follow up per [facility] guidelines.      Trevon Oreilly M.A., [address] [facility] [id number]  Electronically signed by Matthieu Reyes on 10/29/2020 at 3:36 PM

## 2020-11-12 NOTE — PROGRESS NOTES
Received message from Pre-Service at ENT office that [phone] [facility] did not arrive in time for audiology appointment today.  Will call to reschedule [phone]    Electronically signed by Emmanuel Price on 11/12/2020 at 2:08 PM

## 2020-11-12 NOTE — TELEPHONE ENCOUNTER
Rachna form [employer] called in wanting to let the office know, pt missed her audiology appt at the hospital today, Shiva Iqbal never showed up to take her to the appt. Thank you.

## 2020-11-12 NOTE — PROGRESS NOTES
Patient could not come today due to insurance information. Called back to reschedule but  is not available. They will call tomorrow.      Electronically signed by Raul Warren on 11/12/2020 at 3:38 PM

## 2021-01-01 ENCOUNTER — APPOINTMENT (OUTPATIENT)
Dept: GENERAL RADIOLOGY | Age: 86
DRG: 189 | End: 2021-01-01
Payer: COMMERCIAL

## 2021-01-01 ENCOUNTER — APPOINTMENT (OUTPATIENT)
Dept: ULTRASOUND IMAGING | Age: 86
End: 2021-01-01
Payer: COMMERCIAL

## 2021-01-01 ENCOUNTER — APPOINTMENT (OUTPATIENT)
Dept: GENERAL RADIOLOGY | Age: 86
DRG: 291 | End: 2021-01-01
Payer: COMMERCIAL

## 2021-01-01 ENCOUNTER — ANESTHESIA EVENT (OUTPATIENT)
Dept: NON INVASIVE DIAGNOSTICS | Age: 86
DRG: 291 | End: 2021-01-01
Payer: COMMERCIAL

## 2021-01-01 ENCOUNTER — HOSPITAL ENCOUNTER (OUTPATIENT)
Dept: AUDIOLOGY | Age: 86
Discharge: HOME | End: 2021-02-11
Payer: COMMERCIAL

## 2021-01-01 ENCOUNTER — HOSPITAL ENCOUNTER (OUTPATIENT)
Age: 86
Setting detail: OBSERVATION
Discharge: NURSING, REHAB OR LONG-TERM CARE | End: 2021-04-16
Attending: EMERGENCY MEDICINE | Admitting: INTERNAL MEDICINE
Payer: COMMERCIAL

## 2021-01-01 ENCOUNTER — APPOINTMENT (OUTPATIENT)
Dept: CT IMAGING | Age: 86
DRG: 189 | End: 2021-01-01
Payer: COMMERCIAL

## 2021-01-01 ENCOUNTER — APPOINTMENT (OUTPATIENT)
Dept: GENERAL RADIOLOGY | Age: 86
End: 2021-01-01
Payer: COMMERCIAL

## 2021-01-01 ENCOUNTER — APPOINTMENT (OUTPATIENT)
Dept: CT IMAGING | Age: 86
End: 2021-01-01
Payer: COMMERCIAL

## 2021-01-01 ENCOUNTER — APPOINTMENT (OUTPATIENT)
Dept: ULTRASOUND IMAGING | Age: 86
DRG: 291 | End: 2021-01-01
Payer: COMMERCIAL

## 2021-01-01 ENCOUNTER — APPOINTMENT (OUTPATIENT)
Dept: MRI IMAGING | Age: 86
End: 2021-01-01
Payer: COMMERCIAL

## 2021-01-01 ENCOUNTER — HOSPITAL ENCOUNTER (INPATIENT)
Age: 86
LOS: 11 days | Discharge: NURSING, REHAB OR LONG-TERM CARE | DRG: 291 | End: 2021-05-24
Attending: EMERGENCY MEDICINE | Admitting: INTERNAL MEDICINE
Payer: COMMERCIAL

## 2021-01-01 ENCOUNTER — ANESTHESIA (OUTPATIENT)
Dept: NON INVASIVE DIAGNOSTICS | Age: 86
DRG: 291 | End: 2021-01-01
Payer: COMMERCIAL

## 2021-01-01 ENCOUNTER — APPOINTMENT (OUTPATIENT)
Dept: ULTRASOUND IMAGING | Age: 86
DRG: 189 | End: 2021-01-01
Payer: COMMERCIAL

## 2021-01-01 ENCOUNTER — HOSPITAL ENCOUNTER (INPATIENT)
Age: 86
LOS: 5 days | Discharge: OTHER INSTITUTION | DRG: 189 | End: 2021-02-22
Attending: EMERGENCY MEDICINE | Admitting: INTERNAL MEDICINE
Payer: COMMERCIAL

## 2021-01-01 ENCOUNTER — APPOINTMENT (OUTPATIENT)
Dept: NON INVASIVE DIAGNOSTICS | Age: 86
DRG: 291 | End: 2021-01-01
Payer: COMMERCIAL

## 2021-01-01 VITALS
TEMPERATURE: 98.1 F | HEART RATE: 92 BPM | BODY MASS INDEX: 53.92 KG/M2 | HEIGHT: 62 IN | WEIGHT: 293 LBS | OXYGEN SATURATION: 100 % | RESPIRATION RATE: 18 BRPM | SYSTOLIC BLOOD PRESSURE: 120 MMHG | DIASTOLIC BLOOD PRESSURE: 60 MMHG

## 2021-01-01 VITALS
SYSTOLIC BLOOD PRESSURE: 140 MMHG | HEART RATE: 86 BPM | DIASTOLIC BLOOD PRESSURE: 71 MMHG | TEMPERATURE: 97.5 F | RESPIRATION RATE: 18 BRPM | HEIGHT: 61 IN | BODY MASS INDEX: 49.05 KG/M2 | OXYGEN SATURATION: 100 % | WEIGHT: 259.8 LBS

## 2021-01-01 VITALS
RESPIRATION RATE: 16 BRPM | WEIGHT: 238.31 LBS | BODY MASS INDEX: 43.86 KG/M2 | HEIGHT: 62 IN | OXYGEN SATURATION: 91 % | HEART RATE: 87 BPM | SYSTOLIC BLOOD PRESSURE: 118 MMHG | DIASTOLIC BLOOD PRESSURE: 75 MMHG | TEMPERATURE: 97.4 F

## 2021-01-01 VITALS
RESPIRATION RATE: 26 BRPM | SYSTOLIC BLOOD PRESSURE: 140 MMHG | TEMPERATURE: 98.6 F | OXYGEN SATURATION: 92 % | DIASTOLIC BLOOD PRESSURE: 54 MMHG

## 2021-01-01 DIAGNOSIS — Z99.81 SUPPLEMENTAL OXYGEN DEPENDENT: ICD-10-CM

## 2021-01-01 DIAGNOSIS — E87.6 HYPOKALEMIA: ICD-10-CM

## 2021-01-01 DIAGNOSIS — E11.8 TYPE 2 DIABETES MELLITUS WITH COMPLICATION, WITH LONG-TERM CURRENT USE OF INSULIN (HCC): ICD-10-CM

## 2021-01-01 DIAGNOSIS — N39.0 URINARY TRACT INFECTION IN FEMALE: ICD-10-CM

## 2021-01-01 DIAGNOSIS — Z79.01 SUBTHERAPEUTIC ANTICOAGULATION: ICD-10-CM

## 2021-01-01 DIAGNOSIS — J96.21 ACUTE ON CHRONIC RESPIRATORY FAILURE WITH HYPOXIA AND HYPERCAPNIA (HCC): Primary | ICD-10-CM

## 2021-01-01 DIAGNOSIS — Z51.81 SUBTHERAPEUTIC ANTICOAGULATION: ICD-10-CM

## 2021-01-01 DIAGNOSIS — J96.22 ACUTE ON CHRONIC RESPIRATORY FAILURE WITH HYPERCAPNIA (HCC): Primary | ICD-10-CM

## 2021-01-01 DIAGNOSIS — E87.5 HYPERKALEMIA: ICD-10-CM

## 2021-01-01 DIAGNOSIS — R77.8 ELEVATED TROPONIN: ICD-10-CM

## 2021-01-01 DIAGNOSIS — J96.22 ACUTE ON CHRONIC RESPIRATORY FAILURE WITH HYPOXIA AND HYPERCAPNIA (HCC): Primary | ICD-10-CM

## 2021-01-01 DIAGNOSIS — Z79.4 TYPE 2 DIABETES MELLITUS WITH COMPLICATION, WITH LONG-TERM CURRENT USE OF INSULIN (HCC): ICD-10-CM

## 2021-01-01 LAB
AADO2: 117.5 MMHG
ACINETOBACTER BAUMANNII BY PCR: NOT DETECTED
ALBUMIN SERPL-MCNC: 3.1 G/DL (ref 3.5–5.2)
ALBUMIN SERPL-MCNC: 3.2 G/DL (ref 3.5–5.2)
ALBUMIN SERPL-MCNC: 3.3 G/DL (ref 3.5–5.2)
ALBUMIN SERPL-MCNC: 3.4 G/DL (ref 3.5–5.2)
ALBUMIN SERPL-MCNC: 3.4 G/DL (ref 3.5–5.2)
ALBUMIN SERPL-MCNC: 3.7 G/DL (ref 3.5–5.2)
ALBUMIN SERPL-MCNC: 3.9 G/DL (ref 3.5–5.2)
ALP BLD-CCNC: 52 U/L (ref 35–104)
ALP BLD-CCNC: 54 U/L (ref 35–104)
ALP BLD-CCNC: 56 U/L (ref 35–104)
ALP BLD-CCNC: 57 U/L (ref 35–104)
ALP BLD-CCNC: 58 U/L (ref 35–104)
ALP BLD-CCNC: 62 U/L (ref 35–104)
ALP BLD-CCNC: 76 U/L (ref 35–104)
ALT SERPL-CCNC: 12 U/L (ref 0–32)
ALT SERPL-CCNC: 14 U/L (ref 0–32)
ALT SERPL-CCNC: 15 U/L (ref 0–32)
ALT SERPL-CCNC: 20 U/L (ref 0–32)
ALT SERPL-CCNC: <5 U/L (ref 0–32)
AMORPHOUS: ABNORMAL
ANION GAP SERPL CALCULATED.3IONS-SCNC: 12 MMOL/L (ref 7–16)
ANION GAP SERPL CALCULATED.3IONS-SCNC: 13 MMOL/L (ref 7–16)
ANION GAP SERPL CALCULATED.3IONS-SCNC: 15 MMOL/L (ref 7–16)
ANION GAP SERPL CALCULATED.3IONS-SCNC: 3 MMOL/L (ref 7–16)
ANION GAP SERPL CALCULATED.3IONS-SCNC: 4 MMOL/L (ref 7–16)
ANION GAP SERPL CALCULATED.3IONS-SCNC: 5 MMOL/L (ref 7–16)
ANION GAP SERPL CALCULATED.3IONS-SCNC: 6 MMOL/L (ref 7–16)
ANION GAP SERPL CALCULATED.3IONS-SCNC: 7 MMOL/L (ref 7–16)
ANION GAP SERPL CALCULATED.3IONS-SCNC: 8 MMOL/L (ref 7–16)
ANION GAP SERPL CALCULATED.3IONS-SCNC: 9 MMOL/L (ref 7–16)
AST SERPL-CCNC: 21 U/L (ref 0–31)
AST SERPL-CCNC: 21 U/L (ref 0–31)
AST SERPL-CCNC: 23 U/L (ref 0–31)
AST SERPL-CCNC: 25 U/L (ref 0–31)
AST SERPL-CCNC: 26 U/L (ref 0–31)
AST SERPL-CCNC: 27 U/L (ref 0–31)
AST SERPL-CCNC: 31 U/L (ref 0–31)
B.E.: 12.4 MMOL/L (ref -3–0)
B.E.: 15.1 MMOL/L (ref -3–3)
B.E.: 16.6 MMOL/L (ref -3–3)
B.E.: 17.1 MMOL/L (ref -3–3)
B.E.: 20.1 MMOL/L (ref -3–3)
B.E.: 22.1 MMOL/L (ref -3–0)
B.E.: 22.3 MMOL/L (ref -3–0)
B.E.: 9.5 MMOL/L (ref -3–3)
BACTERIA: ABNORMAL /HPF
BASOPHILS ABSOLUTE: 0.01 E9/L (ref 0–0.2)
BASOPHILS ABSOLUTE: 0.02 E9/L (ref 0–0.2)
BASOPHILS ABSOLUTE: 0.02 E9/L (ref 0–0.2)
BASOPHILS ABSOLUTE: 0.04 E9/L (ref 0–0.2)
BASOPHILS RELATIVE PERCENT: 0.2 % (ref 0–2)
BASOPHILS RELATIVE PERCENT: 0.3 % (ref 0–2)
BASOPHILS RELATIVE PERCENT: 0.4 % (ref 0–2)
BASOPHILS RELATIVE PERCENT: 0.4 % (ref 0–2)
BASOPHILS RELATIVE PERCENT: 0.8 % (ref 0–2)
BILIRUB SERPL-MCNC: 0.2 MG/DL (ref 0–1.2)
BILIRUB SERPL-MCNC: 0.3 MG/DL (ref 0–1.2)
BILIRUB SERPL-MCNC: 0.4 MG/DL (ref 0–1.2)
BILIRUB SERPL-MCNC: 0.5 MG/DL (ref 0–1.2)
BILIRUB SERPL-MCNC: 0.6 MG/DL (ref 0–1.2)
BILIRUBIN URINE: ABNORMAL
BILIRUBIN URINE: ABNORMAL
BILIRUBIN URINE: NEGATIVE
BILIRUBIN URINE: NEGATIVE
BLOOD CULTURE, ROUTINE: ABNORMAL
BLOOD CULTURE, ROUTINE: NORMAL
BLOOD CULTURE, ROUTINE: NORMAL
BLOOD, URINE: ABNORMAL
BOTTLE TYPE: ABNORMAL
BUN BLDV-MCNC: 21 MG/DL (ref 6–23)
BUN BLDV-MCNC: 21 MG/DL (ref 6–23)
BUN BLDV-MCNC: 22 MG/DL (ref 6–23)
BUN BLDV-MCNC: 24 MG/DL (ref 6–23)
BUN BLDV-MCNC: 24 MG/DL (ref 6–23)
BUN BLDV-MCNC: 25 MG/DL (ref 6–23)
BUN BLDV-MCNC: 26 MG/DL (ref 6–23)
BUN BLDV-MCNC: 26 MG/DL (ref 8–23)
BUN BLDV-MCNC: 27 MG/DL (ref 6–23)
BUN BLDV-MCNC: 30 MG/DL (ref 6–23)
BUN BLDV-MCNC: 30 MG/DL (ref 6–23)
BUN BLDV-MCNC: 32 MG/DL (ref 6–23)
BUN BLDV-MCNC: 32 MG/DL (ref 8–23)
BUN BLDV-MCNC: 32 MG/DL (ref 8–23)
BUN BLDV-MCNC: 36 MG/DL (ref 6–23)
BUN BLDV-MCNC: 36 MG/DL (ref 8–23)
BUN BLDV-MCNC: 37 MG/DL (ref 6–23)
BUN BLDV-MCNC: 37 MG/DL (ref 8–23)
BUN BLDV-MCNC: 38 MG/DL (ref 8–23)
BUN BLDV-MCNC: 38 MG/DL (ref 8–23)
BUN BLDV-MCNC: 39 MG/DL (ref 8–23)
BUN BLDV-MCNC: 39 MG/DL (ref 8–23)
BUN BLDV-MCNC: 41 MG/DL (ref 8–23)
BUN BLDV-MCNC: 44 MG/DL (ref 8–23)
C-REACTIVE PROTEIN: 4.2 MG/DL (ref 0–0.4)
C-REACTIVE PROTEIN: 4.6 MG/DL (ref 0–0.4)
CALCIUM SERPL-MCNC: 10.1 MG/DL (ref 8.6–10.2)
CALCIUM SERPL-MCNC: 10.1 MG/DL (ref 8.6–10.2)
CALCIUM SERPL-MCNC: 10.5 MG/DL (ref 8.6–10.2)
CALCIUM SERPL-MCNC: 10.7 MG/DL (ref 8.6–10.2)
CALCIUM SERPL-MCNC: 10.8 MG/DL (ref 8.6–10.2)
CALCIUM SERPL-MCNC: 11 MG/DL (ref 8.6–10.2)
CALCIUM SERPL-MCNC: 11.2 MG/DL (ref 8.6–10.2)
CALCIUM SERPL-MCNC: 11.3 MG/DL (ref 8.6–10.2)
CALCIUM SERPL-MCNC: 11.3 MG/DL (ref 8.6–10.2)
CALCIUM SERPL-MCNC: 11.4 MG/DL (ref 8.6–10.2)
CALCIUM SERPL-MCNC: 11.5 MG/DL (ref 8.6–10.2)
CALCIUM SERPL-MCNC: 11.8 MG/DL (ref 8.6–10.2)
CALCIUM SERPL-MCNC: 11.8 MG/DL (ref 8.6–10.2)
CALCIUM SERPL-MCNC: 9.1 MG/DL (ref 8.6–10.2)
CALCIUM SERPL-MCNC: 9.5 MG/DL (ref 8.6–10.2)
CALCIUM SERPL-MCNC: 9.5 MG/DL (ref 8.6–10.2)
CALCIUM SERPL-MCNC: 9.6 MG/DL (ref 8.6–10.2)
CALCIUM SERPL-MCNC: 9.7 MG/DL (ref 8.6–10.2)
CALCIUM SERPL-MCNC: 9.8 MG/DL (ref 8.6–10.2)
CANDIDA ALBICANS BY PCR: NOT DETECTED
CANDIDA GLABRATA BY PCR: NOT DETECTED
CANDIDA KRUSEI BY PCR: NOT DETECTED
CANDIDA PARAPSILOSIS BY PCR: NOT DETECTED
CANDIDA TROPICALIS BY PCR: NOT DETECTED
CHLORIDE BLD-SCNC: 81 MMOL/L (ref 98–107)
CHLORIDE BLD-SCNC: 83 MMOL/L (ref 98–107)
CHLORIDE BLD-SCNC: 84 MMOL/L (ref 98–107)
CHLORIDE BLD-SCNC: 84 MMOL/L (ref 98–107)
CHLORIDE BLD-SCNC: 86 MMOL/L (ref 98–107)
CHLORIDE BLD-SCNC: 87 MMOL/L (ref 98–107)
CHLORIDE BLD-SCNC: 87 MMOL/L (ref 98–107)
CHLORIDE BLD-SCNC: 88 MMOL/L (ref 98–107)
CHLORIDE BLD-SCNC: 89 MMOL/L (ref 98–107)
CHLORIDE BLD-SCNC: 90 MMOL/L (ref 98–107)
CHLORIDE BLD-SCNC: 90 MMOL/L (ref 98–107)
CHLORIDE BLD-SCNC: 91 MMOL/L (ref 98–107)
CHLORIDE BLD-SCNC: 92 MMOL/L (ref 98–107)
CHLORIDE BLD-SCNC: 93 MMOL/L (ref 98–107)
CHLORIDE BLD-SCNC: 94 MMOL/L (ref 98–107)
CHLORIDE BLD-SCNC: 95 MMOL/L (ref 98–107)
CHLORIDE BLD-SCNC: 95 MMOL/L (ref 98–107)
CHLORIDE BLD-SCNC: 96 MMOL/L (ref 98–107)
CHOLESTEROL, TOTAL: 140 MG/DL (ref 0–199)
CHP ED QC CHECK: NORMAL
CLARITY: ABNORMAL
CO2: 37 MMOL/L (ref 22–29)
CO2: 37 MMOL/L (ref 22–29)
CO2: 38 MMOL/L (ref 22–29)
CO2: 39 MMOL/L (ref 22–29)
CO2: 40 MMOL/L (ref 22–29)
CO2: 40 MMOL/L (ref 22–29)
CO2: 41 MMOL/L (ref 22–29)
CO2: 42 MMOL/L (ref 22–29)
CO2: 43 MMOL/L (ref 22–29)
CO2: 44 MMOL/L (ref 22–29)
CO2: 45 MMOL/L (ref 22–29)
CO2: 49 MMOL/L (ref 22–29)
COHB: 0.7 % (ref 0–1.5)
COHB: 0.8 % (ref 0–1.5)
COHB: 0.9 % (ref 0–1.5)
COHB: 1.4 % (ref 0–1.5)
COHB: 1.5 % (ref 0–1.5)
COLOR: ABNORMAL
COLOR: ABNORMAL
COLOR: YELLOW
COLOR: YELLOW
COMMENT: ABNORMAL
CREAT SERPL-MCNC: 1.3 MG/DL (ref 0.5–1)
CREAT SERPL-MCNC: 1.3 MG/DL (ref 0.5–1)
CREAT SERPL-MCNC: 1.4 MG/DL (ref 0.5–1)
CREAT SERPL-MCNC: 1.5 MG/DL (ref 0.5–1)
CREAT SERPL-MCNC: 1.6 MG/DL (ref 0.5–1)
CREAT SERPL-MCNC: 1.7 MG/DL (ref 0.5–1)
CREAT SERPL-MCNC: 1.8 MG/DL (ref 0.5–1)
CREAT SERPL-MCNC: 1.9 MG/DL (ref 0.5–1)
CREAT SERPL-MCNC: 2 MG/DL (ref 0.5–1)
CREAT SERPL-MCNC: 2 MG/DL (ref 0.5–1)
CREAT SERPL-MCNC: 2.1 MG/DL (ref 0.5–1)
CREAT SERPL-MCNC: 2.2 MG/DL (ref 0.5–1)
CRITICAL NOTIFICATION: YES
CRITICAL: ABNORMAL
CRYSTALS, UA: ABNORMAL /HPF
CULTURE, BLOOD 2: ABNORMAL
CULTURE, BLOOD 2: NORMAL
DATE ANALYZED: ABNORMAL
DATE OF COLLECTION: ABNORMAL
DELIVERY SYSTEMS: ABNORMAL
DELIVERY SYSTEMS: ABNORMAL
DEVICE: ABNORMAL
EKG ATRIAL RATE: 129 BPM
EKG ATRIAL RATE: 326 BPM
EKG ATRIAL RATE: 56 BPM
EKG Q-T INTERVAL: 336 MS
EKG Q-T INTERVAL: 416 MS
EKG Q-T INTERVAL: 430 MS
EKG QRS DURATION: 116 MS
EKG QRS DURATION: 124 MS
EKG QRS DURATION: 148 MS
EKG QTC CALCULATION (BAZETT): 446 MS
EKG QTC CALCULATION (BAZETT): 486 MS
EKG QTC CALCULATION (BAZETT): 503 MS
EKG R AXIS: -30 DEGREES
EKG R AXIS: -40 DEGREES
EKG R AXIS: -9 DEGREES
EKG T AXIS: -14 DEGREES
EKG T AXIS: -6 DEGREES
EKG T AXIS: -8 DEGREES
EKG VENTRICULAR RATE: 106 BPM
EKG VENTRICULAR RATE: 77 BPM
EKG VENTRICULAR RATE: 88 BPM
ENTEROBACTER CLOACAE COMPLEX BY PCR: NOT DETECTED
ENTEROBACTERALES BY PCR: NOT DETECTED
ENTEROCOCCUS BY PCR: DETECTED
EOSINOPHILS ABSOLUTE: 0.01 E9/L (ref 0.05–0.5)
EOSINOPHILS ABSOLUTE: 0.09 E9/L (ref 0.05–0.5)
EOSINOPHILS ABSOLUTE: 0.1 E9/L (ref 0.05–0.5)
EOSINOPHILS ABSOLUTE: 0.11 E9/L (ref 0.05–0.5)
EOSINOPHILS ABSOLUTE: 0.15 E9/L (ref 0.05–0.5)
EOSINOPHILS ABSOLUTE: 0.17 E9/L (ref 0.05–0.5)
EOSINOPHILS ABSOLUTE: 0.17 E9/L (ref 0.05–0.5)
EOSINOPHILS ABSOLUTE: 0.21 E9/L (ref 0.05–0.5)
EOSINOPHILS ABSOLUTE: 0.23 E9/L (ref 0.05–0.5)
EOSINOPHILS ABSOLUTE: 0.26 E9/L (ref 0.05–0.5)
EOSINOPHILS ABSOLUTE: 0.27 E9/L (ref 0.05–0.5)
EOSINOPHILS ABSOLUTE: 0.37 E9/L (ref 0.05–0.5)
EOSINOPHILS RELATIVE PERCENT: 0.2 % (ref 0–6)
EOSINOPHILS RELATIVE PERCENT: 1.7 % (ref 0–6)
EOSINOPHILS RELATIVE PERCENT: 2.3 % (ref 0–6)
EOSINOPHILS RELATIVE PERCENT: 2.4 % (ref 0–6)
EOSINOPHILS RELATIVE PERCENT: 4.4 % (ref 0–6)
EOSINOPHILS RELATIVE PERCENT: 4.4 % (ref 0–6)
EOSINOPHILS RELATIVE PERCENT: 4.5 % (ref 0–6)
EOSINOPHILS RELATIVE PERCENT: 5.3 % (ref 0–6)
EOSINOPHILS RELATIVE PERCENT: 5.6 % (ref 0–6)
EOSINOPHILS RELATIVE PERCENT: 5.9 % (ref 0–6)
EOSINOPHILS RELATIVE PERCENT: 7.2 % (ref 0–6)
EOSINOPHILS RELATIVE PERCENT: 7.2 % (ref 0–6)
ESCHERICHIA COLI BY PCR: NOT DETECTED
FERRITIN: 293 NG/ML
FIO2 ARTERIAL: 3
FIO2 ARTERIAL: 40
FIO2 ARTERIAL: 50
FIO2: 40 %
GFR AFRICAN AMERICAN: 26
GFR AFRICAN AMERICAN: 27
GFR AFRICAN AMERICAN: 29
GFR AFRICAN AMERICAN: 29
GFR AFRICAN AMERICAN: 30
GFR AFRICAN AMERICAN: 32
GFR AFRICAN AMERICAN: 35
GFR AFRICAN AMERICAN: 37
GFR AFRICAN AMERICAN: 40
GFR AFRICAN AMERICAN: 43
GFR AFRICAN AMERICAN: 47
GFR AFRICAN AMERICAN: 47
GFR NON-AFRICAN AMERICAN: 21 ML/MIN/1.73
GFR NON-AFRICAN AMERICAN: 22 ML/MIN/1.73
GFR NON-AFRICAN AMERICAN: 24 ML/MIN/1.73
GFR NON-AFRICAN AMERICAN: 24 ML/MIN/1.73
GFR NON-AFRICAN AMERICAN: 25 ML/MIN/1.73
GFR NON-AFRICAN AMERICAN: 27 ML/MIN/1.73
GFR NON-AFRICAN AMERICAN: 29 ML/MIN/1.73
GFR NON-AFRICAN AMERICAN: 31 ML/MIN/1.73
GFR NON-AFRICAN AMERICAN: 33 ML/MIN/1.73
GFR NON-AFRICAN AMERICAN: 36 ML/MIN/1.73
GFR NON-AFRICAN AMERICAN: 39 ML/MIN/1.73
GFR NON-AFRICAN AMERICAN: 39 ML/MIN/1.73
GLUCOSE BLD-MCNC: 105 MG/DL (ref 74–99)
GLUCOSE BLD-MCNC: 113 MG/DL (ref 74–99)
GLUCOSE BLD-MCNC: 119 MG/DL (ref 74–99)
GLUCOSE BLD-MCNC: 123 MG/DL (ref 74–99)
GLUCOSE BLD-MCNC: 124 MG/DL
GLUCOSE BLD-MCNC: 124 MG/DL (ref 74–99)
GLUCOSE BLD-MCNC: 124 MG/DL (ref 74–99)
GLUCOSE BLD-MCNC: 134 MG/DL (ref 74–99)
GLUCOSE BLD-MCNC: 136 MG/DL (ref 74–99)
GLUCOSE BLD-MCNC: 148 MG/DL (ref 74–99)
GLUCOSE BLD-MCNC: 149 MG/DL (ref 74–99)
GLUCOSE BLD-MCNC: 160 MG/DL (ref 74–99)
GLUCOSE BLD-MCNC: 164 MG/DL (ref 74–99)
GLUCOSE BLD-MCNC: 169 MG/DL (ref 74–99)
GLUCOSE BLD-MCNC: 171 MG/DL (ref 74–99)
GLUCOSE BLD-MCNC: 173 MG/DL (ref 74–99)
GLUCOSE BLD-MCNC: 174 MG/DL (ref 74–99)
GLUCOSE BLD-MCNC: 191 MG/DL (ref 74–99)
GLUCOSE BLD-MCNC: 197 MG/DL (ref 74–99)
GLUCOSE BLD-MCNC: 199 MG/DL (ref 74–99)
GLUCOSE BLD-MCNC: 208 MG/DL (ref 74–99)
GLUCOSE BLD-MCNC: 243 MG/DL (ref 74–99)
GLUCOSE BLD-MCNC: 245 MG/DL (ref 74–99)
GLUCOSE BLD-MCNC: 252 MG/DL (ref 74–99)
GLUCOSE BLD-MCNC: 255 MG/DL (ref 74–99)
GLUCOSE BLD-MCNC: 90 MG/DL (ref 74–99)
GLUCOSE BLD-MCNC: 91 MG/DL (ref 74–99)
GLUCOSE URINE: 100 MG/DL
GLUCOSE URINE: NEGATIVE MG/DL
HAEMOPHILUS INFLUENZAE BY PCR: NOT DETECTED
HBA1C MFR BLD: 5.8 % (ref 4–5.6)
HBA1C MFR BLD: 6 % (ref 4–5.6)
HCO3 ARTERIAL: 41.8 MMOL/L (ref 22–26)
HCO3 ARTERIAL: 52.5 MMOL/L (ref 22–26)
HCO3 ARTERIAL: 53.6 MMOL/L (ref 22–26)
HCO3: 39.9 MMOL/L (ref 22–26)
HCO3: 41.1 MMOL/L (ref 22–26)
HCO3: 45.4 MMOL/L (ref 22–26)
HCO3: 48.4 MMOL/L (ref 22–26)
HCO3: 49.8 MMOL/L (ref 22–26)
HCT VFR BLD CALC: 28.3 % (ref 34–48)
HCT VFR BLD CALC: 28.7 % (ref 34–48)
HCT VFR BLD CALC: 29.9 % (ref 34–48)
HCT VFR BLD CALC: 31.6 % (ref 34–48)
HCT VFR BLD CALC: 34.7 % (ref 34–48)
HCT VFR BLD CALC: 34.8 % (ref 34–48)
HCT VFR BLD CALC: 35.5 % (ref 34–48)
HCT VFR BLD CALC: 35.8 % (ref 34–48)
HCT VFR BLD CALC: 37.1 % (ref 34–48)
HCT VFR BLD CALC: 37.2 % (ref 34–48)
HCT VFR BLD CALC: 37.8 % (ref 34–48)
HCT VFR BLD CALC: 39.1 % (ref 34–48)
HCT VFR BLD CALC: 39.2 % (ref 34–48)
HCT VFR BLD CALC: 42.5 % (ref 34–48)
HCT VFR BLD CALC: 44.5 % (ref 34–48)
HDLC SERPL-MCNC: 39 MG/DL
HEMOGLOBIN: 10.5 G/DL (ref 11.5–15.5)
HEMOGLOBIN: 10.7 G/DL (ref 11.5–15.5)
HEMOGLOBIN: 11 G/DL (ref 11.5–15.5)
HEMOGLOBIN: 11.1 G/DL (ref 11.5–15.5)
HEMOGLOBIN: 11.6 G/DL (ref 11.5–15.5)
HEMOGLOBIN: 11.8 G/DL (ref 11.5–15.5)
HEMOGLOBIN: 11.9 G/DL (ref 11.5–15.5)
HEMOGLOBIN: 12 G/DL (ref 11.5–15.5)
HEMOGLOBIN: 12 G/DL (ref 11.5–15.5)
HEMOGLOBIN: 13.4 G/DL (ref 11.5–15.5)
HEMOGLOBIN: 14.7 G/DL (ref 11.5–15.5)
HEMOGLOBIN: 8.5 G/DL (ref 11.5–15.5)
HEMOGLOBIN: 9 G/DL (ref 11.5–15.5)
HEMOGLOBIN: 9.3 G/DL (ref 11.5–15.5)
HEMOGLOBIN: 9.6 G/DL (ref 11.5–15.5)
HHB: 1.5 % (ref 0–5)
HHB: 1.6 % (ref 0–5)
HHB: 10.7 % (ref 0–5)
HHB: 2.1 % (ref 0–5)
HHB: 2.9 % (ref 0–5)
IMMATURE GRANULOCYTES #: 0.01 E9/L
IMMATURE GRANULOCYTES #: 0.02 E9/L
IMMATURE GRANULOCYTES #: 0.02 E9/L
IMMATURE GRANULOCYTES #: 0.03 E9/L
IMMATURE GRANULOCYTES #: 0.04 E9/L
IMMATURE GRANULOCYTES #: 0.04 E9/L
IMMATURE GRANULOCYTES #: 0.06 E9/L
IMMATURE GRANULOCYTES #: 0.15 E9/L
IMMATURE GRANULOCYTES %: 0.3 % (ref 0–5)
IMMATURE GRANULOCYTES %: 0.6 % (ref 0–5)
IMMATURE GRANULOCYTES %: 0.7 % (ref 0–5)
IMMATURE GRANULOCYTES %: 0.8 % (ref 0–5)
IMMATURE GRANULOCYTES %: 0.8 % (ref 0–5)
IMMATURE GRANULOCYTES %: 1 % (ref 0–5)
IMMATURE GRANULOCYTES %: 1.3 % (ref 0–5)
IMMATURE GRANULOCYTES %: 3.2 % (ref 0–5)
INR BLD: 1
INR BLD: 1.1
INR BLD: 1.2
INR BLD: 1.3
INR BLD: 2.1
INR BLD: 2.4
IRON SATURATION: 28 % (ref 15–50)
IRON: 59 MCG/DL (ref 37–145)
KETONES, URINE: ABNORMAL MG/DL
KETONES, URINE: NEGATIVE MG/DL
KLEBSIELLA OXYTOCA BY PCR: NOT DETECTED
KLEBSIELLA PNEUMONIAE GROUP BY PCR: NOT DETECTED
LAB: ABNORMAL
LACTIC ACID, SEPSIS: 0.9 MMOL/L (ref 0.5–1.9)
LACTIC ACID: 1.4 MMOL/L (ref 0.5–2.2)
LACTIC ACID: 2.4 MMOL/L (ref 0.5–2.2)
LDL CHOLESTEROL CALCULATED: 36 MG/DL (ref 0–99)
LEUKOCYTE ESTERASE, URINE: ABNORMAL
LEUKOCYTE ESTERASE, URINE: NEGATIVE
LISTERIA MONOCYTOGENES BY PCR: NOT DETECTED
LYMPHOCYTES ABSOLUTE: 0.45 E9/L (ref 1.5–4)
LYMPHOCYTES ABSOLUTE: 0.62 E9/L (ref 1.5–4)
LYMPHOCYTES ABSOLUTE: 0.77 E9/L (ref 1.5–4)
LYMPHOCYTES ABSOLUTE: 0.79 E9/L (ref 1.5–4)
LYMPHOCYTES ABSOLUTE: 0.81 E9/L (ref 1.5–4)
LYMPHOCYTES ABSOLUTE: 0.82 E9/L (ref 1.5–4)
LYMPHOCYTES ABSOLUTE: 0.86 E9/L (ref 1.5–4)
LYMPHOCYTES ABSOLUTE: 0.88 E9/L (ref 1.5–4)
LYMPHOCYTES ABSOLUTE: 0.89 E9/L (ref 1.5–4)
LYMPHOCYTES ABSOLUTE: 0.93 E9/L (ref 1.5–4)
LYMPHOCYTES ABSOLUTE: 1.03 E9/L (ref 1.5–4)
LYMPHOCYTES ABSOLUTE: 1.29 E9/L (ref 1.5–4)
LYMPHOCYTES RELATIVE PERCENT: 13.1 % (ref 20–42)
LYMPHOCYTES RELATIVE PERCENT: 15 % (ref 20–42)
LYMPHOCYTES RELATIVE PERCENT: 17.1 % (ref 20–42)
LYMPHOCYTES RELATIVE PERCENT: 18.3 % (ref 20–42)
LYMPHOCYTES RELATIVE PERCENT: 20.2 % (ref 20–42)
LYMPHOCYTES RELATIVE PERCENT: 22.6 % (ref 20–42)
LYMPHOCYTES RELATIVE PERCENT: 23.1 % (ref 20–42)
LYMPHOCYTES RELATIVE PERCENT: 24.7 % (ref 20–42)
LYMPHOCYTES RELATIVE PERCENT: 25 % (ref 20–42)
LYMPHOCYTES RELATIVE PERCENT: 25.1 % (ref 20–42)
LYMPHOCYTES RELATIVE PERCENT: 27.7 % (ref 20–42)
LYMPHOCYTES RELATIVE PERCENT: 8.7 % (ref 20–42)
Lab: ABNORMAL
MAGNESIUM: 1.7 MG/DL (ref 1.6–2.6)
MAGNESIUM: 1.8 MG/DL (ref 1.6–2.6)
MAGNESIUM: 1.9 MG/DL (ref 1.6–2.6)
MAGNESIUM: 1.9 MG/DL (ref 1.6–2.6)
MCH RBC QN AUTO: 30.7 PG (ref 26–35)
MCH RBC QN AUTO: 30.9 PG (ref 26–35)
MCH RBC QN AUTO: 31 PG (ref 26–35)
MCH RBC QN AUTO: 31.1 PG (ref 26–35)
MCH RBC QN AUTO: 31.1 PG (ref 26–35)
MCH RBC QN AUTO: 31.4 PG (ref 26–35)
MCH RBC QN AUTO: 31.4 PG (ref 26–35)
MCH RBC QN AUTO: 31.5 PG (ref 26–35)
MCH RBC QN AUTO: 31.5 PG (ref 26–35)
MCH RBC QN AUTO: 31.7 PG (ref 26–35)
MCH RBC QN AUTO: 32 PG (ref 26–35)
MCH RBC QN AUTO: 32 PG (ref 26–35)
MCH RBC QN AUTO: 32.1 PG (ref 26–35)
MCH RBC QN AUTO: 32.1 PG (ref 26–35)
MCH RBC QN AUTO: 32.5 PG (ref 26–35)
MCHC RBC AUTO-ENTMCNC: 30 % (ref 32–34.5)
MCHC RBC AUTO-ENTMCNC: 30.1 % (ref 32–34.5)
MCHC RBC AUTO-ENTMCNC: 30.3 % (ref 32–34.5)
MCHC RBC AUTO-ENTMCNC: 30.4 % (ref 32–34.5)
MCHC RBC AUTO-ENTMCNC: 30.4 % (ref 32–34.5)
MCHC RBC AUTO-ENTMCNC: 30.6 % (ref 32–34.5)
MCHC RBC AUTO-ENTMCNC: 31 % (ref 32–34.5)
MCHC RBC AUTO-ENTMCNC: 31.1 % (ref 32–34.5)
MCHC RBC AUTO-ENTMCNC: 31.2 % (ref 32–34.5)
MCHC RBC AUTO-ENTMCNC: 31.2 % (ref 32–34.5)
MCHC RBC AUTO-ENTMCNC: 31.4 % (ref 32–34.5)
MCHC RBC AUTO-ENTMCNC: 31.5 % (ref 32–34.5)
MCHC RBC AUTO-ENTMCNC: 31.6 % (ref 32–34.5)
MCHC RBC AUTO-ENTMCNC: 32.3 % (ref 32–34.5)
MCHC RBC AUTO-ENTMCNC: 33 % (ref 32–34.5)
MCV RBC AUTO: 100.8 FL (ref 80–99.9)
MCV RBC AUTO: 100.8 FL (ref 80–99.9)
MCV RBC AUTO: 101.1 FL (ref 80–99.9)
MCV RBC AUTO: 101.5 FL (ref 80–99.9)
MCV RBC AUTO: 101.6 FL (ref 80–99.9)
MCV RBC AUTO: 102.1 FL (ref 80–99.9)
MCV RBC AUTO: 102.4 FL (ref 80–99.9)
MCV RBC AUTO: 102.9 FL (ref 80–99.9)
MCV RBC AUTO: 103.1 FL (ref 80–99.9)
MCV RBC AUTO: 104.1 FL (ref 80–99.9)
MCV RBC AUTO: 104.3 FL (ref 80–99.9)
MCV RBC AUTO: 98.2 FL (ref 80–99.9)
MCV RBC AUTO: 98.9 FL (ref 80–99.9)
MCV RBC AUTO: 99.7 FL (ref 80–99.9)
MCV RBC AUTO: 99.8 FL (ref 80–99.9)
METER GLUCOSE: 109 MG/DL (ref 74–99)
METER GLUCOSE: 114 MG/DL (ref 74–99)
METER GLUCOSE: 122 MG/DL (ref 74–99)
METER GLUCOSE: 124 MG/DL (ref 74–99)
METER GLUCOSE: 127 MG/DL (ref 74–99)
METER GLUCOSE: 132 MG/DL (ref 74–99)
METER GLUCOSE: 141 MG/DL (ref 74–99)
METER GLUCOSE: 141 MG/DL (ref 74–99)
METER GLUCOSE: 149 MG/DL (ref 74–99)
METER GLUCOSE: 149 MG/DL (ref 74–99)
METER GLUCOSE: 151 MG/DL (ref 74–99)
METER GLUCOSE: 151 MG/DL (ref 74–99)
METER GLUCOSE: 156 MG/DL (ref 74–99)
METER GLUCOSE: 159 MG/DL (ref 74–99)
METER GLUCOSE: 165 MG/DL (ref 74–99)
METER GLUCOSE: 168 MG/DL (ref 74–99)
METER GLUCOSE: 168 MG/DL (ref 74–99)
METER GLUCOSE: 169 MG/DL (ref 74–99)
METER GLUCOSE: 170 MG/DL (ref 74–99)
METER GLUCOSE: 181 MG/DL (ref 74–99)
METER GLUCOSE: 184 MG/DL (ref 74–99)
METER GLUCOSE: 186 MG/DL (ref 74–99)
METER GLUCOSE: 187 MG/DL (ref 74–99)
METER GLUCOSE: 190 MG/DL (ref 74–99)
METER GLUCOSE: 193 MG/DL (ref 74–99)
METER GLUCOSE: 193 MG/DL (ref 74–99)
METER GLUCOSE: 198 MG/DL (ref 74–99)
METER GLUCOSE: 200 MG/DL (ref 74–99)
METER GLUCOSE: 203 MG/DL (ref 74–99)
METER GLUCOSE: 204 MG/DL (ref 74–99)
METER GLUCOSE: 206 MG/DL (ref 74–99)
METER GLUCOSE: 208 MG/DL (ref 74–99)
METER GLUCOSE: 208 MG/DL (ref 74–99)
METER GLUCOSE: 210 MG/DL (ref 74–99)
METER GLUCOSE: 213 MG/DL (ref 74–99)
METER GLUCOSE: 213 MG/DL (ref 74–99)
METER GLUCOSE: 217 MG/DL (ref 74–99)
METER GLUCOSE: 218 MG/DL (ref 74–99)
METER GLUCOSE: 222 MG/DL (ref 74–99)
METER GLUCOSE: 226 MG/DL (ref 74–99)
METER GLUCOSE: 229 MG/DL (ref 74–99)
METER GLUCOSE: 230 MG/DL (ref 74–99)
METER GLUCOSE: 231 MG/DL (ref 74–99)
METER GLUCOSE: 239 MG/DL (ref 74–99)
METER GLUCOSE: 240 MG/DL (ref 74–99)
METER GLUCOSE: 242 MG/DL (ref 74–99)
METER GLUCOSE: 242 MG/DL (ref 74–99)
METER GLUCOSE: 248 MG/DL (ref 74–99)
METER GLUCOSE: 251 MG/DL (ref 74–99)
METER GLUCOSE: 253 MG/DL (ref 74–99)
METER GLUCOSE: 257 MG/DL (ref 74–99)
METER GLUCOSE: 260 MG/DL (ref 74–99)
METER GLUCOSE: 263 MG/DL (ref 74–99)
METER GLUCOSE: 266 MG/DL (ref 74–99)
METER GLUCOSE: 268 MG/DL (ref 74–99)
METER GLUCOSE: 271 MG/DL (ref 74–99)
METER GLUCOSE: 272 MG/DL (ref 74–99)
METER GLUCOSE: 274 MG/DL (ref 74–99)
METER GLUCOSE: 274 MG/DL (ref 74–99)
METER GLUCOSE: 276 MG/DL (ref 74–99)
METER GLUCOSE: 279 MG/DL (ref 74–99)
METER GLUCOSE: 281 MG/DL (ref 74–99)
METER GLUCOSE: 284 MG/DL (ref 74–99)
METER GLUCOSE: 299 MG/DL (ref 74–99)
METER GLUCOSE: 301 MG/DL (ref 74–99)
METER GLUCOSE: 307 MG/DL (ref 74–99)
METER GLUCOSE: 314 MG/DL (ref 74–99)
METER GLUCOSE: 317 MG/DL (ref 74–99)
METER GLUCOSE: 327 MG/DL (ref 74–99)
METER GLUCOSE: 95 MG/DL (ref 74–99)
METER GLUCOSE: 97 MG/DL (ref 74–99)
METER GLUCOSE: 98 MG/DL (ref 74–99)
METHB: 0.1 % (ref 0–1.5)
METHB: 0.1 % (ref 0–1.5)
METHB: 0.2 % (ref 0–1.5)
METHB: 0.2 % (ref 0–1.5)
METHB: 0.3 % (ref 0–1.5)
MODE: ABNORMAL
MODE: AC
MONOCYTES ABSOLUTE: 0.26 E9/L (ref 0.1–0.95)
MONOCYTES ABSOLUTE: 0.41 E9/L (ref 0.1–0.95)
MONOCYTES ABSOLUTE: 0.44 E9/L (ref 0.1–0.95)
MONOCYTES ABSOLUTE: 0.44 E9/L (ref 0.1–0.95)
MONOCYTES ABSOLUTE: 0.45 E9/L (ref 0.1–0.95)
MONOCYTES ABSOLUTE: 0.46 E9/L (ref 0.1–0.95)
MONOCYTES ABSOLUTE: 0.47 E9/L (ref 0.1–0.95)
MONOCYTES ABSOLUTE: 0.48 E9/L (ref 0.1–0.95)
MONOCYTES ABSOLUTE: 0.48 E9/L (ref 0.1–0.95)
MONOCYTES ABSOLUTE: 0.59 E9/L (ref 0.1–0.95)
MONOCYTES ABSOLUTE: 0.59 E9/L (ref 0.1–0.95)
MONOCYTES ABSOLUTE: 0.66 E9/L (ref 0.1–0.95)
MONOCYTES RELATIVE PERCENT: 11.2 % (ref 2–12)
MONOCYTES RELATIVE PERCENT: 11.6 % (ref 2–12)
MONOCYTES RELATIVE PERCENT: 11.7 % (ref 2–12)
MONOCYTES RELATIVE PERCENT: 12.4 % (ref 2–12)
MONOCYTES RELATIVE PERCENT: 12.8 % (ref 2–12)
MONOCYTES RELATIVE PERCENT: 12.8 % (ref 2–12)
MONOCYTES RELATIVE PERCENT: 12.9 % (ref 2–12)
MONOCYTES RELATIVE PERCENT: 13.2 % (ref 2–12)
MONOCYTES RELATIVE PERCENT: 14.5 % (ref 2–12)
MONOCYTES RELATIVE PERCENT: 5 % (ref 2–12)
MONOCYTES RELATIVE PERCENT: 8.3 % (ref 2–12)
MONOCYTES RELATIVE PERCENT: 8.6 % (ref 2–12)
NEISSERIA MENINGITIDIS BY PCR: NOT DETECTED
NEUTROPHILS ABSOLUTE: 1.65 E9/L (ref 1.8–7.3)
NEUTROPHILS ABSOLUTE: 1.94 E9/L (ref 1.8–7.3)
NEUTROPHILS ABSOLUTE: 2.04 E9/L (ref 1.8–7.3)
NEUTROPHILS ABSOLUTE: 2.15 E9/L (ref 1.8–7.3)
NEUTROPHILS ABSOLUTE: 2.27 E9/L (ref 1.8–7.3)
NEUTROPHILS ABSOLUTE: 2.78 E9/L (ref 1.8–7.3)
NEUTROPHILS ABSOLUTE: 2.82 E9/L (ref 1.8–7.3)
NEUTROPHILS ABSOLUTE: 2.89 E9/L (ref 1.8–7.3)
NEUTROPHILS ABSOLUTE: 3.18 E9/L (ref 1.8–7.3)
NEUTROPHILS ABSOLUTE: 3.41 E9/L (ref 1.8–7.3)
NEUTROPHILS ABSOLUTE: 4.01 E9/L (ref 1.8–7.3)
NEUTROPHILS ABSOLUTE: 4.39 E9/L (ref 1.8–7.3)
NEUTROPHILS RELATIVE PERCENT: 51.9 % (ref 43–80)
NEUTROPHILS RELATIVE PERCENT: 54 % (ref 43–80)
NEUTROPHILS RELATIVE PERCENT: 56.1 % (ref 43–80)
NEUTROPHILS RELATIVE PERCENT: 56.7 % (ref 43–80)
NEUTROPHILS RELATIVE PERCENT: 56.9 % (ref 43–80)
NEUTROPHILS RELATIVE PERCENT: 58.8 % (ref 43–80)
NEUTROPHILS RELATIVE PERCENT: 62.5 % (ref 43–80)
NEUTROPHILS RELATIVE PERCENT: 62.7 % (ref 43–80)
NEUTROPHILS RELATIVE PERCENT: 67.2 % (ref 43–80)
NEUTROPHILS RELATIVE PERCENT: 72 % (ref 43–80)
NEUTROPHILS RELATIVE PERCENT: 74.2 % (ref 43–80)
NEUTROPHILS RELATIVE PERCENT: 85.3 % (ref 43–80)
NITRITE, URINE: NEGATIVE
NITRITE, URINE: POSITIVE
O2 CONTENT: 15.2 ML/DL
O2 CONTENT: 15.5 ML/DL
O2 CONTENT: 16.5 ML/DL
O2 CONTENT: 18.8 ML/DL
O2 CONTENT: 19.4 ML/DL
O2 SATURATION: 49.3 % (ref 92–98.5)
O2 SATURATION: 82.7 % (ref 92–98.5)
O2 SATURATION: 89.1 % (ref 92–98.5)
O2 SATURATION: 97.1 % (ref 92–98.5)
O2 SATURATION: 97.9 % (ref 92–98.5)
O2 SATURATION: 98.4 % (ref 92–98.5)
O2 SATURATION: 98.5 % (ref 92–98.5)
O2 SATURATION: 98.5 % (ref 92–98.5)
O2HB: 87.7 % (ref 94–97)
O2HB: 96 % (ref 94–97)
O2HB: 96.9 % (ref 94–97)
O2HB: 96.9 % (ref 94–97)
O2HB: 97.5 % (ref 94–97)
OPERATOR ID: 2067
OPERATOR ID: 2067
OPERATOR ID: 2086
OPERATOR ID: 3342
OPERATOR ID: 410
OPERATOR ID: 410
OPERATOR ID: ABNORMAL
OPERATOR ID: ABNORMAL
ORDER NUMBER: ABNORMAL
ORGANISM: ABNORMAL
PATIENT TEMP: 37 C
PCO2 ARTERIAL: 81 MMHG (ref 35–45)
PCO2 ARTERIAL: 95.1 MMHG (ref 35–45)
PCO2 ARTERIAL: 98.3 MMHG (ref 35–45)
PCO2: 57.7 MMHG (ref 35–45)
PCO2: 75 MMHG (ref 35–45)
PCO2: 87.4 MMHG (ref 35–45)
PCO2: 92.6 MMHG (ref 35–45)
PCO2: 98.4 MMHG (ref 35–45)
PDW BLD-RTO: 13 FL (ref 11.5–15)
PDW BLD-RTO: 13.1 FL (ref 11.5–15)
PDW BLD-RTO: 13.1 FL (ref 11.5–15)
PDW BLD-RTO: 13.2 FL (ref 11.5–15)
PDW BLD-RTO: 13.3 FL (ref 11.5–15)
PDW BLD-RTO: 13.4 FL (ref 11.5–15)
PDW BLD-RTO: 13.4 FL (ref 11.5–15)
PDW BLD-RTO: 13.5 FL (ref 11.5–15)
PDW BLD-RTO: 13.6 FL (ref 11.5–15)
PDW BLD-RTO: 13.8 FL (ref 11.5–15)
PDW BLD-RTO: 14.6 FL (ref 11.5–15)
PDW BLD-RTO: 14.8 FL (ref 11.5–15)
PDW BLD-RTO: 14.9 FL (ref 11.5–15)
PDW BLD-RTO: 15.1 FL (ref 11.5–15)
PDW BLD-RTO: 15.1 FL (ref 11.5–15)
PEEP/CPAP: 5 CMH2O
PFO2: 2.28 MMHG/%
PH BLOOD GAS: 7.25 (ref 7.35–7.45)
PH BLOOD GAS: 7.31 (ref 7.35–7.45)
PH BLOOD GAS: 7.32 (ref 7.35–7.45)
PH BLOOD GAS: 7.34 (ref 7.35–7.45)
PH BLOOD GAS: 7.35 (ref 7.35–7.45)
PH BLOOD GAS: 7.37 (ref 7.35–7.45)
PH BLOOD GAS: 7.4 (ref 7.35–7.45)
PH BLOOD GAS: 7.47 (ref 7.35–7.45)
PH UA: 6.5 (ref 5–9)
PH UA: 7 (ref 5–9)
PH UA: 7.5 (ref 5–9)
PH UA: >=9 (ref 5–9)
PLATELET # BLD: 154 E9/L (ref 130–450)
PLATELET # BLD: 171 E9/L (ref 130–450)
PLATELET # BLD: 174 E9/L (ref 130–450)
PLATELET # BLD: 175 E9/L (ref 130–450)
PLATELET # BLD: 176 E9/L (ref 130–450)
PLATELET # BLD: 179 E9/L (ref 130–450)
PLATELET # BLD: 181 E9/L (ref 130–450)
PLATELET # BLD: 182 E9/L (ref 130–450)
PLATELET # BLD: 187 E9/L (ref 130–450)
PLATELET # BLD: 190 E9/L (ref 130–450)
PLATELET # BLD: 191 E9/L (ref 130–450)
PLATELET # BLD: 203 E9/L (ref 130–450)
PLATELET # BLD: 206 E9/L (ref 130–450)
PLATELET # BLD: 253 E9/L (ref 130–450)
PLATELET # BLD: 283 E9/L (ref 130–450)
PMV BLD AUTO: 10 FL (ref 7–12)
PMV BLD AUTO: 10 FL (ref 7–12)
PMV BLD AUTO: 10.1 FL (ref 7–12)
PMV BLD AUTO: 10.2 FL (ref 7–12)
PMV BLD AUTO: 10.3 FL (ref 7–12)
PMV BLD AUTO: 10.4 FL (ref 7–12)
PMV BLD AUTO: 10.6 FL (ref 7–12)
PMV BLD AUTO: 10.7 FL (ref 7–12)
PMV BLD AUTO: 10.8 FL (ref 7–12)
PMV BLD AUTO: 9.7 FL (ref 7–12)
PMV BLD AUTO: 9.7 FL (ref 7–12)
PMV BLD AUTO: 9.8 FL (ref 7–12)
PMV BLD AUTO: 9.9 FL (ref 7–12)
PO2 ARTERIAL: 133.7 MMHG (ref 60–80)
PO2 ARTERIAL: 30.5 MMHG (ref 60–80)
PO2 ARTERIAL: 54.2 MMHG (ref 60–80)
PO2: 119.3 MMHG (ref 75–100)
PO2: 127.4 MMHG (ref 75–100)
PO2: 144.3 MMHG (ref 75–100)
PO2: 59.4 MMHG (ref 75–100)
PO2: 91.3 MMHG (ref 75–100)
POIKILOCYTES: ABNORMAL
POSITIVE END EXP PRESS: 5 CMH2O
POSITIVE END EXP PRESS: 8 CMH2O
POTASSIUM REFLEX MAGNESIUM: 2.7 MMOL/L (ref 3.5–5)
POTASSIUM REFLEX MAGNESIUM: 3.1 MMOL/L (ref 3.5–5)
POTASSIUM REFLEX MAGNESIUM: 3.2 MMOL/L (ref 3.5–5)
POTASSIUM REFLEX MAGNESIUM: 3.3 MMOL/L (ref 3.5–5)
POTASSIUM REFLEX MAGNESIUM: 3.5 MMOL/L (ref 3.5–5)
POTASSIUM REFLEX MAGNESIUM: 3.6 MMOL/L (ref 3.5–5)
POTASSIUM REFLEX MAGNESIUM: 4 MMOL/L (ref 3.5–5)
POTASSIUM REFLEX MAGNESIUM: 4.4 MMOL/L (ref 3.5–5)
POTASSIUM REFLEX MAGNESIUM: 6 MMOL/L (ref 3.5–5)
POTASSIUM SERPL-SCNC: 3.1 MMOL/L (ref 3.5–5)
POTASSIUM SERPL-SCNC: 3.2 MMOL/L (ref 3.5–5)
POTASSIUM SERPL-SCNC: 3.3 MMOL/L (ref 3.5–5)
POTASSIUM SERPL-SCNC: 3.4 MMOL/L (ref 3.5–5)
POTASSIUM SERPL-SCNC: 3.5 MMOL/L (ref 3.5–5)
POTASSIUM SERPL-SCNC: 3.6 MMOL/L (ref 3.5–5)
POTASSIUM SERPL-SCNC: 3.7 MMOL/L (ref 3.5–5)
POTASSIUM SERPL-SCNC: 3.7 MMOL/L (ref 3.5–5)
POTASSIUM SERPL-SCNC: 3.8 MMOL/L (ref 3.5–5)
POTASSIUM SERPL-SCNC: 3.9 MMOL/L (ref 3.5–5)
POTASSIUM SERPL-SCNC: 3.9 MMOL/L (ref 3.5–5)
POTASSIUM SERPL-SCNC: 4.1 MMOL/L (ref 3.5–5)
POTASSIUM SERPL-SCNC: 4.1 MMOL/L (ref 3.5–5)
POTASSIUM SERPL-SCNC: 4.2 MMOL/L (ref 3.5–5)
POTASSIUM SERPL-SCNC: 4.5 MMOL/L (ref 3.5–5)
PRO-BNP: 1400 PG/ML (ref 0–450)
PRO-BNP: 1747 PG/ML (ref 0–450)
PRO-BNP: 1766 PG/ML (ref 0–450)
PRO-BNP: 2540 PG/ML (ref 0–450)
PROCALCITONIN: 0.23 NG/ML (ref 0–0.08)
PROCALCITONIN: 0.37 NG/ML (ref 0–0.08)
PROCALCITONIN: 0.68 NG/ML (ref 0–0.08)
PROTEIN UA: 100 MG/DL
PROTEIN UA: >=300 MG/DL
PROTEIN UA: ABNORMAL MG/DL
PROTEIN UA: ABNORMAL MG/DL
PROTEUS SPECIES BY PCR: NOT DETECTED
PROTHROMBIN TIME: 12 SEC (ref 9.3–12.4)
PROTHROMBIN TIME: 13.6 SEC (ref 9.3–12.4)
PROTHROMBIN TIME: 14.6 SEC (ref 9.3–12.4)
PROTHROMBIN TIME: 14.7 SEC (ref 9.3–12.4)
PROTHROMBIN TIME: 15.4 SEC (ref 9.3–12.4)
PROTHROMBIN TIME: 15.5 SEC (ref 9.3–12.4)
PROTHROMBIN TIME: 23.8 SEC (ref 9.3–12.4)
PROTHROMBIN TIME: 28 SEC (ref 9.3–12.4)
PSEUDOMONAS AERUGINOSA BY PCR: NOT DETECTED
RBC # BLD: 2.75 E12/L (ref 3.5–5.5)
RBC # BLD: 2.81 E12/L (ref 3.5–5.5)
RBC # BLD: 2.9 E12/L (ref 3.5–5.5)
RBC # BLD: 3.03 E12/L (ref 3.5–5.5)
RBC # BLD: 3.39 E12/L (ref 3.5–5.5)
RBC # BLD: 3.41 E12/L (ref 3.5–5.5)
RBC # BLD: 3.43 E12/L (ref 3.5–5.5)
RBC # BLD: 3.54 E12/L (ref 3.5–5.5)
RBC # BLD: 3.73 E12/L (ref 3.5–5.5)
RBC # BLD: 3.75 E12/L (ref 3.5–5.5)
RBC # BLD: 3.75 E12/L (ref 3.5–5.5)
RBC # BLD: 3.86 E12/L (ref 3.5–5.5)
RBC # BLD: 3.88 E12/L (ref 3.5–5.5)
RBC # BLD: 4.26 E12/L (ref 3.5–5.5)
RBC # BLD: 4.53 E12/L (ref 3.5–5.5)
RBC UA: >20 /HPF (ref 0–2)
RBC UA: >20 /HPF (ref 0–2)
RBC UA: ABNORMAL /HPF (ref 0–2)
RBC UA: ABNORMAL /HPF (ref 0–2)
RESPIRATORY RATE: 12 B/MIN
RESPIRATORY RATE: 18 B/MIN
RI(T): 129 %
RR MECHANICAL: 16 B/MIN
SARS-COV-2, NAAT: NOT DETECTED
SARS-COV-2, NAAT: NOT DETECTED
SARS-COV-2: NOT DETECTED
SEDIMENTATION RATE, ERYTHROCYTE: 122 MM/HR (ref 0–20)
SEDIMENTATION RATE, ERYTHROCYTE: 123 MM/HR (ref 0–20)
SEDIMENTATION RATE, ERYTHROCYTE: 62 MM/HR (ref 0–20)
SERRATIA MARCESCENS BY PCR: NOT DETECTED
SODIUM BLD-SCNC: 133 MMOL/L (ref 132–146)
SODIUM BLD-SCNC: 135 MMOL/L (ref 132–146)
SODIUM BLD-SCNC: 136 MMOL/L (ref 132–146)
SODIUM BLD-SCNC: 137 MMOL/L (ref 132–146)
SODIUM BLD-SCNC: 138 MMOL/L (ref 132–146)
SODIUM BLD-SCNC: 139 MMOL/L (ref 132–146)
SODIUM BLD-SCNC: 140 MMOL/L (ref 132–146)
SODIUM BLD-SCNC: 141 MMOL/L (ref 132–146)
SODIUM BLD-SCNC: 143 MMOL/L (ref 132–146)
SOURCE OF BLOOD CULTURE: ABNORMAL
SOURCE, BLOOD GAS: ABNORMAL
SOURCE: NORMAL
SPECIFIC GRAVITY UA: 1.01 (ref 1–1.03)
SPECIFIC GRAVITY UA: <=1.005 (ref 1–1.03)
STAPHYLOCOCCUS AUREUS BY PCR: NOT DETECTED
STAPHYLOCOCCUS SPECIES BY PCR: NOT DETECTED
STOMATOCYTES: ABNORMAL
STREPTOCOCCUS AGALACTIAE BY PCR: NOT DETECTED
STREPTOCOCCUS PNEUMONIAE BY PCR: NOT DETECTED
STREPTOCOCCUS PYOGENES  BY PCR: NOT DETECTED
STREPTOCOCCUS SPECIES BY PCR: NOT DETECTED
THB: 11.2 G/DL (ref 11.5–16.5)
THB: 11.9 G/DL (ref 11.5–16.5)
THB: 12.6 G/DL (ref 11.5–16.5)
THB: 13.7 G/DL (ref 11.5–16.5)
THB: 14 G/DL (ref 11.5–16.5)
TIDAL VOLUME: 400 ML
TIME ANALYZED: 1013
TIME ANALYZED: 1026
TIME ANALYZED: 1505
TIME ANALYZED: 604
TIME ANALYZED: 855
TOTAL CK: 26 U/L (ref 20–180)
TOTAL IRON BINDING CAPACITY: 212 MCG/DL (ref 250–450)
TOTAL PROTEIN: 6 G/DL (ref 6.4–8.3)
TOTAL PROTEIN: 6.4 G/DL (ref 6.4–8.3)
TOTAL PROTEIN: 6.4 G/DL (ref 6.4–8.3)
TOTAL PROTEIN: 6.8 G/DL (ref 6.4–8.3)
TOTAL PROTEIN: 6.9 G/DL (ref 6.4–8.3)
TOTAL PROTEIN: 7 G/DL (ref 6.4–8.3)
TOTAL PROTEIN: 7.2 G/DL (ref 6.4–8.3)
TRIGL SERPL-MCNC: 323 MG/DL (ref 0–149)
TROPONIN: 0.03 NG/ML (ref 0–0.03)
TROPONIN: 0.04 NG/ML (ref 0–0.03)
TROPONIN: 0.06 NG/ML (ref 0–0.03)
TSH SERPL DL<=0.05 MIU/L-ACNC: 2.57 UIU/ML (ref 0.27–4.2)
TSH SERPL DL<=0.05 MIU/L-ACNC: 2.74 UIU/ML (ref 0.27–4.2)
URINE CULTURE, ROUTINE: ABNORMAL
URINE CULTURE, ROUTINE: NORMAL
UROBILINOGEN, URINE: 0.2 E.U./DL
UROBILINOGEN, URINE: 0.2 E.U./DL
UROBILINOGEN, URINE: 1 E.U./DL
UROBILINOGEN, URINE: 1 E.U./DL
VANCOMYCIN RESISTANT BY PCR: DETECTED
VITAMIN D 25-HYDROXY: 64 NG/ML (ref 30–100)
VLDLC SERPL CALC-MCNC: 65 MG/DL
VT MECHANICAL: 400 ML
WBC # BLD: 3.2 E9/L (ref 4.5–11.5)
WBC # BLD: 3.4 E9/L (ref 4.5–11.5)
WBC # BLD: 3.6 E9/L (ref 4.5–11.5)
WBC # BLD: 3.8 E9/L (ref 4.5–11.5)
WBC # BLD: 3.9 E9/L (ref 4.5–11.5)
WBC # BLD: 4.1 E9/L (ref 4.5–11.5)
WBC # BLD: 4.2 E9/L (ref 4.5–11.5)
WBC # BLD: 4.6 E9/L (ref 4.5–11.5)
WBC # BLD: 4.7 E9/L (ref 4.5–11.5)
WBC # BLD: 5.1 E9/L (ref 4.5–11.5)
WBC # BLD: 5.2 E9/L (ref 4.5–11.5)
WBC # BLD: 5.3 E9/L (ref 4.5–11.5)
WBC # BLD: 5.4 E9/L (ref 4.5–11.5)
WBC UA: ABNORMAL /HPF (ref 0–5)

## 2021-01-01 PROCEDURE — 2060000000 HC ICU INTERMEDIATE R&B

## 2021-01-01 PROCEDURE — 82962 GLUCOSE BLOOD TEST: CPT

## 2021-01-01 PROCEDURE — 6360000002 HC RX W HCPCS: Performed by: FAMILY MEDICINE

## 2021-01-01 PROCEDURE — 94660 CPAP INITIATION&MGMT: CPT

## 2021-01-01 PROCEDURE — 2500000003 HC RX 250 WO HCPCS

## 2021-01-01 PROCEDURE — 6370000000 HC RX 637 (ALT 250 FOR IP): Performed by: PHYSICIAN ASSISTANT

## 2021-01-01 PROCEDURE — 80048 BASIC METABOLIC PNL TOTAL CA: CPT

## 2021-01-01 PROCEDURE — 82728 ASSAY OF FERRITIN: CPT

## 2021-01-01 PROCEDURE — 2700000000 HC OXYGEN THERAPY PER DAY

## 2021-01-01 PROCEDURE — 6370000000 HC RX 637 (ALT 250 FOR IP): Performed by: FAMILY MEDICINE

## 2021-01-01 PROCEDURE — 94640 AIRWAY INHALATION TREATMENT: CPT

## 2021-01-01 PROCEDURE — 2580000003 HC RX 258: Performed by: PHYSICIAN ASSISTANT

## 2021-01-01 PROCEDURE — 6370000000 HC RX 637 (ALT 250 FOR IP): Performed by: INTERNAL MEDICINE

## 2021-01-01 PROCEDURE — 2580000003 HC RX 258: Performed by: FAMILY MEDICINE

## 2021-01-01 PROCEDURE — 85025 COMPLETE CBC W/AUTO DIFF WBC: CPT

## 2021-01-01 PROCEDURE — 84484 ASSAY OF TROPONIN QUANT: CPT

## 2021-01-01 PROCEDURE — 36415 COLL VENOUS BLD VENIPUNCTURE: CPT

## 2021-01-01 PROCEDURE — 6360000002 HC RX W HCPCS: Performed by: EMERGENCY MEDICINE

## 2021-01-01 PROCEDURE — 82550 ASSAY OF CK (CPK): CPT

## 2021-01-01 PROCEDURE — 6360000002 HC RX W HCPCS: Performed by: PHYSICIAN ASSISTANT

## 2021-01-01 PROCEDURE — 87040 BLOOD CULTURE FOR BACTERIA: CPT

## 2021-01-01 PROCEDURE — 84145 PROCALCITONIN (PCT): CPT

## 2021-01-01 PROCEDURE — 93010 ELECTROCARDIOGRAM REPORT: CPT | Performed by: INTERNAL MEDICINE

## 2021-01-01 PROCEDURE — G0378 HOSPITAL OBSERVATION PER HR: HCPCS

## 2021-01-01 PROCEDURE — 82805 BLOOD GASES W/O2 SATURATION: CPT

## 2021-01-01 PROCEDURE — 87088 URINE BACTERIA CULTURE: CPT

## 2021-01-01 PROCEDURE — 6360000002 HC RX W HCPCS: Performed by: INTERNAL MEDICINE

## 2021-01-01 PROCEDURE — 81001 URINALYSIS AUTO W/SCOPE: CPT

## 2021-01-01 PROCEDURE — 6360000002 HC RX W HCPCS

## 2021-01-01 PROCEDURE — 6360000002 HC RX W HCPCS: Performed by: SPECIALIST

## 2021-01-01 PROCEDURE — 83735 ASSAY OF MAGNESIUM: CPT

## 2021-01-01 PROCEDURE — 7100000011 HC PHASE II RECOVERY - ADDTL 15 MIN

## 2021-01-01 PROCEDURE — 87635 SARS-COV-2 COVID-19 AMP PRB: CPT

## 2021-01-01 PROCEDURE — 71045 X-RAY EXAM CHEST 1 VIEW: CPT

## 2021-01-01 PROCEDURE — 85610 PROTHROMBIN TIME: CPT

## 2021-01-01 PROCEDURE — 94664 DEMO&/EVAL PT USE INHALER: CPT

## 2021-01-01 PROCEDURE — 76770 US EXAM ABDO BACK WALL COMP: CPT

## 2021-01-01 PROCEDURE — 93005 ELECTROCARDIOGRAM TRACING: CPT | Performed by: EMERGENCY MEDICINE

## 2021-01-01 PROCEDURE — 97530 THERAPEUTIC ACTIVITIES: CPT | Performed by: PHYSICAL THERAPIST

## 2021-01-01 PROCEDURE — 83036 HEMOGLOBIN GLYCOSYLATED A1C: CPT

## 2021-01-01 PROCEDURE — 80053 COMPREHEN METABOLIC PANEL: CPT

## 2021-01-01 PROCEDURE — 2580000003 HC RX 258: Performed by: INTERNAL MEDICINE

## 2021-01-01 PROCEDURE — 70551 MRI BRAIN STEM W/O DYE: CPT

## 2021-01-01 PROCEDURE — 2500000003 HC RX 250 WO HCPCS: Performed by: EMERGENCY MEDICINE

## 2021-01-01 PROCEDURE — 2580000003 HC RX 258: Performed by: SPECIALIST

## 2021-01-01 PROCEDURE — 2500000003 HC RX 250 WO HCPCS: Performed by: INTERNAL MEDICINE

## 2021-01-01 PROCEDURE — 2580000003 HC RX 258: Performed by: EMERGENCY MEDICINE

## 2021-01-01 PROCEDURE — 70450 CT HEAD/BRAIN W/O DYE: CPT

## 2021-01-01 PROCEDURE — 87077 CULTURE AEROBIC IDENTIFY: CPT

## 2021-01-01 PROCEDURE — 83880 ASSAY OF NATRIURETIC PEPTIDE: CPT

## 2021-01-01 PROCEDURE — 51798 US URINE CAPACITY MEASURE: CPT

## 2021-01-01 PROCEDURE — 93971 EXTREMITY STUDY: CPT

## 2021-01-01 PROCEDURE — 2500000003 HC RX 250 WO HCPCS: Performed by: SPECIALIST

## 2021-01-01 PROCEDURE — 97110 THERAPEUTIC EXERCISES: CPT

## 2021-01-01 PROCEDURE — 82803 BLOOD GASES ANY COMBINATION: CPT

## 2021-01-01 PROCEDURE — 96375 TX/PRO/DX INJ NEW DRUG ADDON: CPT

## 2021-01-01 PROCEDURE — 7100000010 HC PHASE II RECOVERY - FIRST 15 MIN

## 2021-01-01 PROCEDURE — 97161 PT EVAL LOW COMPLEX 20 MIN: CPT | Performed by: PHYSICAL THERAPIST

## 2021-01-01 PROCEDURE — 99225 PR SBSQ OBSERVATION CARE/DAY 25 MINUTES: CPT | Performed by: NURSE PRACTITIONER

## 2021-01-01 PROCEDURE — 85027 COMPLETE CBC AUTOMATED: CPT

## 2021-01-01 PROCEDURE — 83605 ASSAY OF LACTIC ACID: CPT

## 2021-01-01 PROCEDURE — 83540 ASSAY OF IRON: CPT

## 2021-01-01 PROCEDURE — 84443 ASSAY THYROID STIM HORMONE: CPT

## 2021-01-01 PROCEDURE — 3700000001 HC ADD 15 MINUTES (ANESTHESIA)

## 2021-01-01 PROCEDURE — 74176 CT ABD & PELVIS W/O CONTRAST: CPT

## 2021-01-01 PROCEDURE — 76937 US GUIDE VASCULAR ACCESS: CPT

## 2021-01-01 PROCEDURE — 6370000000 HC RX 637 (ALT 250 FOR IP): Performed by: EMERGENCY MEDICINE

## 2021-01-01 PROCEDURE — 80061 LIPID PANEL: CPT

## 2021-01-01 PROCEDURE — 99285 EMERGENCY DEPT VISIT HI MDM: CPT

## 2021-01-01 PROCEDURE — 83550 IRON BINDING TEST: CPT

## 2021-01-01 PROCEDURE — 96374 THER/PROPH/DIAG INJ IV PUSH: CPT

## 2021-01-01 PROCEDURE — 85651 RBC SED RATE NONAUTOMATED: CPT

## 2021-01-01 PROCEDURE — 3700000000 HC ANESTHESIA ATTENDED CARE

## 2021-01-01 PROCEDURE — 97165 OT EVAL LOW COMPLEX 30 MIN: CPT

## 2021-01-01 PROCEDURE — 84132 ASSAY OF SERUM POTASSIUM: CPT

## 2021-01-01 PROCEDURE — 36569 INSJ PICC 5 YR+ W/O IMAGING: CPT

## 2021-01-01 PROCEDURE — 86140 C-REACTIVE PROTEIN: CPT

## 2021-01-01 PROCEDURE — 97161 PT EVAL LOW COMPLEX 20 MIN: CPT

## 2021-01-01 PROCEDURE — 96376 TX/PRO/DX INJ SAME DRUG ADON: CPT

## 2021-01-01 PROCEDURE — 2580000003 HC RX 258: Performed by: CLINICAL NURSE SPECIALIST

## 2021-01-01 PROCEDURE — 87186 SC STD MICRODIL/AGAR DIL: CPT

## 2021-01-01 PROCEDURE — C1751 CATH, INF, PER/CENT/MIDLINE: HCPCS

## 2021-01-01 PROCEDURE — 93308 TTE F-UP OR LMTD: CPT

## 2021-01-01 PROCEDURE — 2580000003 HC RX 258

## 2021-01-01 PROCEDURE — 93005 ELECTROCARDIOGRAM TRACING: CPT | Performed by: FAMILY MEDICINE

## 2021-01-01 PROCEDURE — 93880 EXTRACRANIAL BILAT STUDY: CPT

## 2021-01-01 PROCEDURE — 93880 EXTRACRANIAL BILAT STUDY: CPT | Performed by: RADIOLOGY

## 2021-01-01 PROCEDURE — 2500000003 HC RX 250 WO HCPCS: Performed by: NURSE ANESTHETIST, CERTIFIED REGISTERED

## 2021-01-01 PROCEDURE — V5260 HEARING AID, DIGIT, BIN, ITE: HCPCS | Performed by: AUDIOLOGIST

## 2021-01-01 PROCEDURE — 99204 OFFICE O/P NEW MOD 45 MIN: CPT | Performed by: PSYCHIATRY & NEUROLOGY

## 2021-01-01 PROCEDURE — 6360000002 HC RX W HCPCS: Performed by: NURSE ANESTHETIST, CERTIFIED REGISTERED

## 2021-01-01 PROCEDURE — 2580000003 HC RX 258: Performed by: NURSE ANESTHETIST, CERTIFIED REGISTERED

## 2021-01-01 PROCEDURE — 36600 WITHDRAWAL OF ARTERIAL BLOOD: CPT

## 2021-01-01 PROCEDURE — 02HV33Z INSERTION OF INFUSION DEVICE INTO SUPERIOR VENA CAVA, PERCUTANEOUS APPROACH: ICD-10-PCS | Performed by: INTERNAL MEDICINE

## 2021-01-01 PROCEDURE — 87150 DNA/RNA AMPLIFIED PROBE: CPT

## 2021-01-01 PROCEDURE — 94667 MNPJ CHEST WALL 1ST: CPT

## 2021-01-01 PROCEDURE — V5160 DISPENSING FEE BINAURAL: HCPCS | Performed by: AUDIOLOGIST

## 2021-01-01 PROCEDURE — 6360000002 HC RX W HCPCS: Performed by: CLINICAL NURSE SPECIALIST

## 2021-01-01 PROCEDURE — 6360000004 HC RX CONTRAST MEDICATION: Performed by: SPECIALIST

## 2021-01-01 PROCEDURE — 97530 THERAPEUTIC ACTIVITIES: CPT

## 2021-01-01 RX ORDER — ONDANSETRON 4 MG/1
4 TABLET, FILM COATED ORAL EVERY 6 HOURS PRN
COMMUNITY

## 2021-01-01 RX ORDER — SODIUM CHLORIDE 0.9 % (FLUSH) 0.9 %
10 SYRINGE (ML) INJECTION EVERY 12 HOURS SCHEDULED
Status: DISCONTINUED | OUTPATIENT
Start: 2021-01-01 | End: 2021-01-01 | Stop reason: HOSPADM

## 2021-01-01 RX ORDER — SODIUM CHLORIDE 0.9 % (FLUSH) 0.9 %
5-40 SYRINGE (ML) INJECTION EVERY 12 HOURS SCHEDULED
Status: DISCONTINUED | OUTPATIENT
Start: 2021-01-01 | End: 2021-01-01 | Stop reason: SDUPTHER

## 2021-01-01 RX ORDER — BUMETANIDE 1 MG/1
1 TABLET ORAL DAILY
Qty: 30 TABLET | Refills: 3 | Status: ON HOLD
Start: 2021-01-01 | End: 2021-01-01 | Stop reason: HOSPADM

## 2021-01-01 RX ORDER — DOCUSATE SODIUM 100 MG/1
200 CAPSULE, LIQUID FILLED ORAL NIGHTLY
Status: DISCONTINUED | OUTPATIENT
Start: 2021-01-01 | End: 2021-01-01 | Stop reason: HOSPADM

## 2021-01-01 RX ORDER — ALBUTEROL SULFATE 2.5 MG/3ML
2.5 SOLUTION RESPIRATORY (INHALATION) 4 TIMES DAILY
Status: DISCONTINUED | OUTPATIENT
Start: 2021-01-01 | End: 2021-01-01 | Stop reason: HOSPADM

## 2021-01-01 RX ORDER — GUAIFENESIN 400 MG/1
400 TABLET ORAL 3 TIMES DAILY PRN
Status: DISCONTINUED | OUTPATIENT
Start: 2021-01-01 | End: 2021-01-01 | Stop reason: HOSPADM

## 2021-01-01 RX ORDER — BUMETANIDE 1 MG/1
1 TABLET ORAL 2 TIMES DAILY
Status: DISCONTINUED | OUTPATIENT
Start: 2021-01-01 | End: 2021-01-01

## 2021-01-01 RX ORDER — ACETAZOLAMIDE 500 MG/5ML
250 INJECTION, POWDER, LYOPHILIZED, FOR SOLUTION INTRAVENOUS ONCE
Status: COMPLETED | OUTPATIENT
Start: 2021-01-01 | End: 2021-01-01

## 2021-01-01 RX ORDER — FLUTICASONE PROPIONATE 50 MCG
1 SPRAY, SUSPENSION (ML) NASAL DAILY
COMMUNITY

## 2021-01-01 RX ORDER — POTASSIUM CHLORIDE 750 MG/1
10 TABLET, EXTENDED RELEASE ORAL 2 TIMES DAILY
Status: DISCONTINUED | OUTPATIENT
Start: 2021-01-01 | End: 2021-01-01 | Stop reason: DRUGHIGH

## 2021-01-01 RX ORDER — DEXTROSE MONOHYDRATE 25 G/50ML
12.5 INJECTION, SOLUTION INTRAVENOUS PRN
Status: DISCONTINUED | OUTPATIENT
Start: 2021-01-01 | End: 2021-01-01 | Stop reason: HOSPADM

## 2021-01-01 RX ORDER — CALCITRIOL 0.25 UG/1
0.5 CAPSULE, LIQUID FILLED ORAL DAILY
Status: DISCONTINUED | OUTPATIENT
Start: 2021-01-01 | End: 2021-01-01 | Stop reason: HOSPADM

## 2021-01-01 RX ORDER — SIMVASTATIN 40 MG
40 TABLET ORAL NIGHTLY
Status: DISCONTINUED | OUTPATIENT
Start: 2021-01-01 | End: 2021-01-01

## 2021-01-01 RX ORDER — LORATADINE 10 MG/1
10 TABLET ORAL DAILY
Status: DISCONTINUED | OUTPATIENT
Start: 2021-01-01 | End: 2021-01-01

## 2021-01-01 RX ORDER — NICOTINE POLACRILEX 4 MG
15 LOZENGE BUCCAL PRN
Status: DISCONTINUED | OUTPATIENT
Start: 2021-01-01 | End: 2021-01-01 | Stop reason: HOSPADM

## 2021-01-01 RX ORDER — DEXTROSE MONOHYDRATE 25 G/50ML
12.5 INJECTION, SOLUTION INTRAVENOUS ONCE
Status: COMPLETED | OUTPATIENT
Start: 2021-01-01 | End: 2021-01-01

## 2021-01-01 RX ORDER — BUMETANIDE 1 MG/1
2 TABLET ORAL 2 TIMES DAILY
Status: DISCONTINUED | OUTPATIENT
Start: 2021-01-01 | End: 2021-01-01

## 2021-01-01 RX ORDER — INSULIN GLARGINE 100 [IU]/ML
40 INJECTION, SOLUTION SUBCUTANEOUS NIGHTLY
Status: DISCONTINUED | OUTPATIENT
Start: 2021-01-01 | End: 2021-01-01

## 2021-01-01 RX ORDER — METOLAZONE 5 MG/1
5 TABLET ORAL DAILY
Status: DISCONTINUED | OUTPATIENT
Start: 2021-01-01 | End: 2021-01-01 | Stop reason: HOSPADM

## 2021-01-01 RX ORDER — DAPTOMYCIN 50 MG/ML
1000 INJECTION, POWDER, LYOPHILIZED, FOR SOLUTION INTRAVENOUS DAILY
DISCHARGE
Start: 2021-01-01 | End: 2021-07-03

## 2021-01-01 RX ORDER — SODIUM CHLORIDE 0.9 % (FLUSH) 0.9 %
5-40 SYRINGE (ML) INJECTION EVERY 12 HOURS SCHEDULED
Status: DISCONTINUED | OUTPATIENT
Start: 2021-01-01 | End: 2021-01-01 | Stop reason: HOSPADM

## 2021-01-01 RX ORDER — INSULIN GLARGINE 100 [IU]/ML
50 INJECTION, SOLUTION SUBCUTANEOUS NIGHTLY
Status: DISCONTINUED | OUTPATIENT
Start: 2021-01-01 | End: 2021-01-01 | Stop reason: HOSPADM

## 2021-01-01 RX ORDER — BUMETANIDE 1 MG/1
2 TABLET ORAL DAILY
Status: DISCONTINUED | OUTPATIENT
Start: 2021-01-01 | End: 2021-01-01 | Stop reason: HOSPADM

## 2021-01-01 RX ORDER — SODIUM CHLORIDE 0.9 % (FLUSH) 0.9 %
10 SYRINGE (ML) INJECTION PRN
Status: DISCONTINUED | OUTPATIENT
Start: 2021-01-01 | End: 2021-01-01 | Stop reason: HOSPADM

## 2021-01-01 RX ORDER — PANTOPRAZOLE SODIUM 40 MG/1
40 TABLET, DELAYED RELEASE ORAL 2 TIMES DAILY
Status: DISCONTINUED | OUTPATIENT
Start: 2021-01-01 | End: 2021-01-01 | Stop reason: HOSPADM

## 2021-01-01 RX ORDER — HEPARIN SODIUM (PORCINE) LOCK FLUSH IV SOLN 100 UNIT/ML 100 UNIT/ML
3 SOLUTION INTRAVENOUS EVERY 12 HOURS SCHEDULED
Status: DISCONTINUED | OUTPATIENT
Start: 2021-01-01 | End: 2021-01-01 | Stop reason: HOSPADM

## 2021-01-01 RX ORDER — BUMETANIDE 2 MG/1
2 TABLET ORAL DAILY
Qty: 30 TABLET | Refills: 3 | DISCHARGE
Start: 2021-01-01

## 2021-01-01 RX ORDER — 0.9 % SODIUM CHLORIDE 0.9 %
1000 INTRAVENOUS SOLUTION INTRAVENOUS ONCE
Status: COMPLETED | OUTPATIENT
Start: 2021-01-01 | End: 2021-01-01

## 2021-01-01 RX ORDER — POTASSIUM CHLORIDE 7.45 MG/ML
10 INJECTION INTRAVENOUS
Status: COMPLETED | OUTPATIENT
Start: 2021-01-01 | End: 2021-01-01

## 2021-01-01 RX ORDER — LIDOCAINE 4 G/G
1 PATCH TOPICAL DAILY
Status: DISCONTINUED | OUTPATIENT
Start: 2021-01-01 | End: 2021-01-01 | Stop reason: HOSPADM

## 2021-01-01 RX ORDER — KETAMINE HYDROCHLORIDE 10 MG/ML
INJECTION, SOLUTION INTRAMUSCULAR; INTRAVENOUS PRN
Status: DISCONTINUED | OUTPATIENT
Start: 2021-01-01 | End: 2021-01-01 | Stop reason: SDUPTHER

## 2021-01-01 RX ORDER — PANTOPRAZOLE SODIUM 40 MG/1
40 TABLET, DELAYED RELEASE ORAL 2 TIMES DAILY
COMMUNITY

## 2021-01-01 RX ORDER — ATORVASTATIN CALCIUM 20 MG/1
20 TABLET, FILM COATED ORAL NIGHTLY
Status: DISCONTINUED | OUTPATIENT
Start: 2021-01-01 | End: 2021-01-01 | Stop reason: HOSPADM

## 2021-01-01 RX ORDER — MAGNESIUM SULFATE IN WATER 40 MG/ML
2000 INJECTION, SOLUTION INTRAVENOUS ONCE
Status: COMPLETED | OUTPATIENT
Start: 2021-01-01 | End: 2021-01-01

## 2021-01-01 RX ORDER — SODIUM CHLORIDE 9 MG/ML
INJECTION, SOLUTION INTRAVENOUS CONTINUOUS PRN
Status: DISCONTINUED | OUTPATIENT
Start: 2021-01-01 | End: 2021-01-01 | Stop reason: SDUPTHER

## 2021-01-01 RX ORDER — DEXTROSE MONOHYDRATE 50 MG/ML
100 INJECTION, SOLUTION INTRAVENOUS PRN
Status: DISCONTINUED | OUTPATIENT
Start: 2021-01-01 | End: 2021-01-01 | Stop reason: HOSPADM

## 2021-01-01 RX ORDER — BUMETANIDE 1 MG/1
1 TABLET ORAL 2 TIMES DAILY
Status: DISCONTINUED | OUTPATIENT
Start: 2021-01-01 | End: 2021-01-01 | Stop reason: HOSPADM

## 2021-01-01 RX ORDER — GABAPENTIN 300 MG/1
300 CAPSULE ORAL NIGHTLY
Status: DISCONTINUED | OUTPATIENT
Start: 2021-01-01 | End: 2021-01-01 | Stop reason: HOSPADM

## 2021-01-01 RX ORDER — LANOLIN ALCOHOL/MO/W.PET/CERES
5 CREAM (GRAM) TOPICAL NIGHTLY
Status: DISCONTINUED | OUTPATIENT
Start: 2021-01-01 | End: 2021-01-01 | Stop reason: HOSPADM

## 2021-01-01 RX ORDER — METOLAZONE 5 MG/1
5 TABLET ORAL NIGHTLY
Status: DISCONTINUED | OUTPATIENT
Start: 2021-01-01 | End: 2021-01-01 | Stop reason: HOSPADM

## 2021-01-01 RX ORDER — SODIUM CHLORIDE 9 MG/ML
INJECTION, SOLUTION INTRAVENOUS CONTINUOUS
Status: ACTIVE | OUTPATIENT
Start: 2021-01-01 | End: 2021-01-01

## 2021-01-01 RX ORDER — HYDROXYZINE HYDROCHLORIDE 10 MG/1
10 TABLET, FILM COATED ORAL 3 TIMES DAILY PRN
Status: DISCONTINUED | OUTPATIENT
Start: 2021-01-01 | End: 2021-01-01 | Stop reason: HOSPADM

## 2021-01-01 RX ORDER — TROSPIUM CHLORIDE 20 MG/1
20 TABLET, FILM COATED ORAL
Status: DISCONTINUED | OUTPATIENT
Start: 2021-01-01 | End: 2021-01-01 | Stop reason: HOSPADM

## 2021-01-01 RX ORDER — IPRATROPIUM BROMIDE AND ALBUTEROL SULFATE 2.5; .5 MG/3ML; MG/3ML
1 SOLUTION RESPIRATORY (INHALATION)
Status: COMPLETED | OUTPATIENT
Start: 2021-01-01 | End: 2021-01-01

## 2021-01-01 RX ORDER — ACETAMINOPHEN 325 MG/1
650 TABLET ORAL EVERY 4 HOURS PRN
Status: DISCONTINUED | OUTPATIENT
Start: 2021-01-01 | End: 2021-01-01 | Stop reason: ALTCHOICE

## 2021-01-01 RX ORDER — METOPROLOL SUCCINATE 50 MG/1
50 TABLET, EXTENDED RELEASE ORAL DAILY
COMMUNITY

## 2021-01-01 RX ORDER — POTASSIUM CHLORIDE 20 MEQ/1
40 TABLET, EXTENDED RELEASE ORAL ONCE
Status: COMPLETED | OUTPATIENT
Start: 2021-01-01 | End: 2021-01-01

## 2021-01-01 RX ORDER — POLYETHYLENE GLYCOL 3350 17 G/17G
17 POWDER, FOR SOLUTION ORAL DAILY
COMMUNITY

## 2021-01-01 RX ORDER — ACETAMINOPHEN 650 MG/1
650 SUPPOSITORY RECTAL EVERY 6 HOURS PRN
Status: DISCONTINUED | OUTPATIENT
Start: 2021-01-01 | End: 2021-01-01 | Stop reason: HOSPADM

## 2021-01-01 RX ORDER — LEVOTHYROXINE SODIUM 0.03 MG/1
25 TABLET ORAL DAILY
Status: DISCONTINUED | OUTPATIENT
Start: 2021-01-01 | End: 2021-01-01 | Stop reason: HOSPADM

## 2021-01-01 RX ORDER — SODIUM CHLORIDE 0.9 % (FLUSH) 0.9 %
5-40 SYRINGE (ML) INJECTION PRN
Status: DISCONTINUED | OUTPATIENT
Start: 2021-01-01 | End: 2021-01-01 | Stop reason: HOSPADM

## 2021-01-01 RX ORDER — LIDOCAINE HYDROCHLORIDE 10 MG/ML
5 INJECTION, SOLUTION EPIDURAL; INFILTRATION; INTRACAUDAL; PERINEURAL ONCE
Status: COMPLETED | OUTPATIENT
Start: 2021-01-01 | End: 2021-01-01

## 2021-01-01 RX ORDER — ACETAMINOPHEN 325 MG/1
650 TABLET ORAL EVERY 6 HOURS PRN
Status: DISCONTINUED | OUTPATIENT
Start: 2021-01-01 | End: 2021-01-01 | Stop reason: HOSPADM

## 2021-01-01 RX ORDER — BISACODYL 10 MG
10 SUPPOSITORY, RECTAL RECTAL DAILY PRN
Status: DISCONTINUED | OUTPATIENT
Start: 2021-01-01 | End: 2021-01-01 | Stop reason: HOSPADM

## 2021-01-01 RX ORDER — LEVOTHYROXINE SODIUM 0.03 MG/1
25 TABLET ORAL DAILY
COMMUNITY

## 2021-01-01 RX ORDER — POTASSIUM CHLORIDE 20 MEQ/1
40 TABLET, EXTENDED RELEASE ORAL
Status: COMPLETED | OUTPATIENT
Start: 2021-01-01 | End: 2021-01-01

## 2021-01-01 RX ORDER — IPRATROPIUM BROMIDE AND ALBUTEROL SULFATE 2.5; .5 MG/3ML; MG/3ML
1 SOLUTION RESPIRATORY (INHALATION)
Status: DISCONTINUED | OUTPATIENT
Start: 2021-01-01 | End: 2021-01-01

## 2021-01-01 RX ORDER — TROSPIUM CHLORIDE 20 MG/1
20 TABLET, FILM COATED ORAL NIGHTLY
Status: DISCONTINUED | OUTPATIENT
Start: 2021-01-01 | End: 2021-01-01 | Stop reason: HOSPADM

## 2021-01-01 RX ORDER — BUMETANIDE 0.25 MG/ML
1 INJECTION, SOLUTION INTRAMUSCULAR; INTRAVENOUS 2 TIMES DAILY
Status: DISCONTINUED | OUTPATIENT
Start: 2021-01-01 | End: 2021-01-01

## 2021-01-01 RX ORDER — ACETAZOLAMIDE 500 MG/5ML
500 INJECTION, POWDER, LYOPHILIZED, FOR SOLUTION INTRAVENOUS ONCE
Status: COMPLETED | OUTPATIENT
Start: 2021-01-01 | End: 2021-01-01

## 2021-01-01 RX ORDER — INSULIN GLARGINE 100 [IU]/ML
30 INJECTION, SOLUTION SUBCUTANEOUS ONCE
Status: COMPLETED | OUTPATIENT
Start: 2021-01-01 | End: 2021-01-01

## 2021-01-01 RX ORDER — PANTOPRAZOLE SODIUM 40 MG/1
40 TABLET, DELAYED RELEASE ORAL
Status: DISCONTINUED | OUTPATIENT
Start: 2021-01-01 | End: 2021-01-01 | Stop reason: HOSPADM

## 2021-01-01 RX ORDER — BUMETANIDE 0.25 MG/ML
2 INJECTION, SOLUTION INTRAMUSCULAR; INTRAVENOUS 2 TIMES DAILY
Status: DISCONTINUED | OUTPATIENT
Start: 2021-01-01 | End: 2021-01-01

## 2021-01-01 RX ORDER — ATORVASTATIN CALCIUM 20 MG/1
20 TABLET, FILM COATED ORAL DAILY
Status: DISCONTINUED | OUTPATIENT
Start: 2021-01-01 | End: 2021-01-01

## 2021-01-01 RX ORDER — METOPROLOL SUCCINATE 50 MG/1
50 TABLET, EXTENDED RELEASE ORAL DAILY
Status: DISCONTINUED | OUTPATIENT
Start: 2021-01-01 | End: 2021-01-01 | Stop reason: HOSPADM

## 2021-01-01 RX ORDER — IPRATROPIUM BROMIDE AND ALBUTEROL SULFATE 2.5; .5 MG/3ML; MG/3ML
1 SOLUTION RESPIRATORY (INHALATION) EVERY 8 HOURS PRN
Status: DISCONTINUED | OUTPATIENT
Start: 2021-01-01 | End: 2021-01-01 | Stop reason: HOSPADM

## 2021-01-01 RX ORDER — HEPARIN SODIUM (PORCINE) LOCK FLUSH IV SOLN 100 UNIT/ML 100 UNIT/ML
3 SOLUTION INTRAVENOUS PRN
Status: DISCONTINUED | OUTPATIENT
Start: 2021-01-01 | End: 2021-01-01 | Stop reason: HOSPADM

## 2021-01-01 RX ORDER — FERROUS SULFATE 325(65) MG
325 TABLET ORAL 2 TIMES DAILY WITH MEALS
Status: DISCONTINUED | OUTPATIENT
Start: 2021-01-01 | End: 2021-01-01 | Stop reason: HOSPADM

## 2021-01-01 RX ORDER — POTASSIUM CHLORIDE 7.45 MG/ML
10 INJECTION INTRAVENOUS PRN
Status: DISCONTINUED | OUTPATIENT
Start: 2021-01-01 | End: 2021-01-01 | Stop reason: HOSPADM

## 2021-01-01 RX ORDER — FLUTICASONE PROPIONATE 50 MCG
1 SPRAY, SUSPENSION (ML) NASAL DAILY
Status: DISCONTINUED | OUTPATIENT
Start: 2021-01-01 | End: 2021-01-01 | Stop reason: HOSPADM

## 2021-01-01 RX ORDER — INSULIN GLARGINE 300 U/ML
40 INJECTION, SOLUTION SUBCUTANEOUS NIGHTLY
COMMUNITY

## 2021-01-01 RX ORDER — SODIUM CHLORIDE 9 MG/ML
25 INJECTION, SOLUTION INTRAVENOUS PRN
Status: DISCONTINUED | OUTPATIENT
Start: 2021-01-01 | End: 2021-01-01 | Stop reason: HOSPADM

## 2021-01-01 RX ORDER — POLYETHYLENE GLYCOL 3350 17 G/17G
17 POWDER, FOR SOLUTION ORAL DAILY
Status: DISCONTINUED | OUTPATIENT
Start: 2021-01-01 | End: 2021-01-01 | Stop reason: HOSPADM

## 2021-01-01 RX ORDER — MECOBALAMIN 5000 MCG
5 TABLET,DISINTEGRATING ORAL NIGHTLY
Status: DISCONTINUED | OUTPATIENT
Start: 2021-01-01 | End: 2021-01-01 | Stop reason: HOSPADM

## 2021-01-01 RX ORDER — IPRATROPIUM BROMIDE AND ALBUTEROL SULFATE 2.5; .5 MG/3ML; MG/3ML
1 SOLUTION RESPIRATORY (INHALATION)
Status: DISCONTINUED | OUTPATIENT
Start: 2021-01-01 | End: 2021-01-01 | Stop reason: HOSPADM

## 2021-01-01 RX ORDER — METOLAZONE 5 MG/1
5 TABLET ORAL NIGHTLY
Status: ON HOLD | COMMUNITY
End: 2021-01-01 | Stop reason: HOSPADM

## 2021-01-01 RX ORDER — ALBUTEROL SULFATE 2.5 MG/3ML
2.5 SOLUTION RESPIRATORY (INHALATION) 4 TIMES DAILY
Status: DISCONTINUED | OUTPATIENT
Start: 2021-01-01 | End: 2021-01-01 | Stop reason: SDUPTHER

## 2021-01-01 RX ORDER — M-VIT,TX,IRON,MINS/CALC/FOLIC 27MG-0.4MG
1 TABLET ORAL DAILY
Status: DISCONTINUED | OUTPATIENT
Start: 2021-01-01 | End: 2021-01-01 | Stop reason: HOSPADM

## 2021-01-01 RX ORDER — HYDROXYZINE HYDROCHLORIDE 25 MG/1
25 TABLET, FILM COATED ORAL NIGHTLY
Status: ON HOLD | COMMUNITY
End: 2021-01-01 | Stop reason: HOSPADM

## 2021-01-01 RX ORDER — GUAIFENESIN 600 MG/1
600 TABLET, EXTENDED RELEASE ORAL EVERY 12 HOURS PRN
Status: DISCONTINUED | OUTPATIENT
Start: 2021-01-01 | End: 2021-01-01 | Stop reason: CLARIF

## 2021-01-01 RX ORDER — BUMETANIDE 0.25 MG/ML
1 INJECTION, SOLUTION INTRAMUSCULAR; INTRAVENOUS 3 TIMES DAILY
Status: DISCONTINUED | OUTPATIENT
Start: 2021-01-01 | End: 2021-01-01

## 2021-01-01 RX ORDER — CETIRIZINE HYDROCHLORIDE 10 MG/1
5 TABLET ORAL DAILY
Status: DISCONTINUED | OUTPATIENT
Start: 2021-01-01 | End: 2021-01-01 | Stop reason: HOSPADM

## 2021-01-01 RX ORDER — CETIRIZINE HYDROCHLORIDE 10 MG/1
10 TABLET ORAL DAILY
Status: DISCONTINUED | OUTPATIENT
Start: 2021-01-01 | End: 2021-01-01 | Stop reason: HOSPADM

## 2021-01-01 RX ORDER — IPRATROPIUM BROMIDE AND ALBUTEROL SULFATE 2.5; .5 MG/3ML; MG/3ML
3 SOLUTION RESPIRATORY (INHALATION) ONCE
Status: COMPLETED | OUTPATIENT
Start: 2021-01-01 | End: 2021-01-01

## 2021-01-01 RX ORDER — CALCIUM CARBONATE 200(500)MG
500 TABLET,CHEWABLE ORAL 3 TIMES DAILY PRN
Status: DISCONTINUED | OUTPATIENT
Start: 2021-01-01 | End: 2021-01-01 | Stop reason: HOSPADM

## 2021-01-01 RX ORDER — CALCITRIOL 0.25 UG/1
0.5 CAPSULE, LIQUID FILLED ORAL DAILY
Status: DISCONTINUED | OUTPATIENT
Start: 2021-01-01 | End: 2021-01-01

## 2021-01-01 RX ORDER — BUMETANIDE 1 MG/1
1 TABLET ORAL DAILY
Status: DISCONTINUED | OUTPATIENT
Start: 2021-01-01 | End: 2021-01-01

## 2021-01-01 RX ORDER — POTASSIUM CHLORIDE 20 MEQ/1
40 TABLET, EXTENDED RELEASE ORAL PRN
Status: DISCONTINUED | OUTPATIENT
Start: 2021-01-01 | End: 2021-01-01 | Stop reason: HOSPADM

## 2021-01-01 RX ORDER — CEPHALEXIN 250 MG/1
250 CAPSULE ORAL 3 TIMES DAILY
Qty: 15 CAPSULE | Refills: 0
Start: 2021-01-01 | End: 2021-01-01

## 2021-01-01 RX ORDER — METOPROLOL SUCCINATE 50 MG/1
50 TABLET, EXTENDED RELEASE ORAL EVERY MORNING
Status: DISCONTINUED | OUTPATIENT
Start: 2021-01-01 | End: 2021-01-01 | Stop reason: HOSPADM

## 2021-01-01 RX ORDER — PROPOFOL 10 MG/ML
INJECTION, EMULSION INTRAVENOUS PRN
Status: DISCONTINUED | OUTPATIENT
Start: 2021-01-01 | End: 2021-01-01 | Stop reason: SDUPTHER

## 2021-01-01 RX ORDER — SODIUM CHLORIDE 9 MG/ML
25 INJECTION, SOLUTION INTRAVENOUS PRN
Status: DISCONTINUED | OUTPATIENT
Start: 2021-01-01 | End: 2021-01-01 | Stop reason: SDUPTHER

## 2021-01-01 RX ORDER — CEPHALEXIN 250 MG/1
250 CAPSULE ORAL EVERY 8 HOURS SCHEDULED
Status: DISCONTINUED | OUTPATIENT
Start: 2021-01-01 | End: 2021-01-01 | Stop reason: HOSPADM

## 2021-01-01 RX ORDER — MORPHINE SULFATE 2 MG/ML
2 INJECTION, SOLUTION INTRAMUSCULAR; INTRAVENOUS EVERY 6 HOURS PRN
Status: DISCONTINUED | OUTPATIENT
Start: 2021-01-01 | End: 2021-01-01 | Stop reason: HOSPADM

## 2021-01-01 RX ORDER — POLYETHYLENE GLYCOL 3350 17 G/17G
17 POWDER, FOR SOLUTION ORAL DAILY PRN
Status: DISCONTINUED | OUTPATIENT
Start: 2021-01-01 | End: 2021-01-01 | Stop reason: HOSPADM

## 2021-01-01 RX ORDER — CHOLECALCIFEROL (VITAMIN D3) 125 MCG
10 CAPSULE ORAL NIGHTLY
Status: DISCONTINUED | OUTPATIENT
Start: 2021-01-01 | End: 2021-01-01 | Stop reason: HOSPADM

## 2021-01-01 RX ORDER — BUDESONIDE 0.5 MG/2ML
1 INHALANT ORAL 2 TIMES DAILY
Status: DISCONTINUED | OUTPATIENT
Start: 2021-01-01 | End: 2021-01-01 | Stop reason: HOSPADM

## 2021-01-01 RX ORDER — LIDOCAINE 4 G/G
1 PATCH TOPICAL 2 TIMES DAILY
Status: DISCONTINUED | OUTPATIENT
Start: 2021-01-01 | End: 2021-01-01 | Stop reason: DRUGHIGH

## 2021-01-01 RX ORDER — BUMETANIDE 1 MG/1
1 TABLET ORAL 2 TIMES DAILY
Status: ON HOLD | COMMUNITY
End: 2021-01-01 | Stop reason: SDUPTHER

## 2021-01-01 RX ORDER — ARFORMOTEROL TARTRATE 15 UG/2ML
15 SOLUTION RESPIRATORY (INHALATION) 2 TIMES DAILY
Status: DISCONTINUED | OUTPATIENT
Start: 2021-01-01 | End: 2021-01-01 | Stop reason: HOSPADM

## 2021-01-01 RX ORDER — SODIUM CHLORIDE 0.9 % (FLUSH) 0.9 %
5-40 SYRINGE (ML) INJECTION PRN
Status: DISCONTINUED | OUTPATIENT
Start: 2021-01-01 | End: 2021-01-01 | Stop reason: SDUPTHER

## 2021-01-01 RX ORDER — LEVOTHYROXINE SODIUM 0.03 MG/1
25 TABLET ORAL EVERY MORNING
Status: DISCONTINUED | OUTPATIENT
Start: 2021-01-01 | End: 2021-01-01 | Stop reason: HOSPADM

## 2021-01-01 RX ORDER — HYDROCODONE BITARTRATE AND ACETAMINOPHEN 5; 325 MG/1; MG/1
1 TABLET ORAL EVERY 6 HOURS PRN
Status: DISCONTINUED | OUTPATIENT
Start: 2021-01-01 | End: 2021-01-01 | Stop reason: HOSPADM

## 2021-01-01 RX ORDER — METOLAZONE 2.5 MG/1
2.5 TABLET ORAL DAILY
Status: DISCONTINUED | OUTPATIENT
Start: 2021-01-01 | End: 2021-01-01

## 2021-01-01 RX ORDER — HYDROXYZINE PAMOATE 25 MG/1
25 CAPSULE ORAL NIGHTLY
Status: DISCONTINUED | OUTPATIENT
Start: 2021-01-01 | End: 2021-01-01

## 2021-01-01 RX ORDER — SODIUM CHLORIDE 9 MG/ML
INJECTION, SOLUTION INTRAVENOUS CONTINUOUS
Status: DISCONTINUED | OUTPATIENT
Start: 2021-01-01 | End: 2021-01-01

## 2021-01-01 RX ADMIN — INSULIN LISPRO 2 UNITS: 100 INJECTION, SOLUTION INTRAVENOUS; SUBCUTANEOUS at 21:43

## 2021-01-01 RX ADMIN — LEVOTHYROXINE SODIUM 25 MCG: 25 TABLET ORAL at 06:34

## 2021-01-01 RX ADMIN — LEVOTHYROXINE SODIUM 25 MCG: 25 TABLET ORAL at 06:07

## 2021-01-01 RX ADMIN — GABAPENTIN 300 MG: 300 CAPSULE ORAL at 20:36

## 2021-01-01 RX ADMIN — INSULIN LISPRO 2 UNITS: 100 INJECTION, SOLUTION INTRAVENOUS; SUBCUTANEOUS at 06:47

## 2021-01-01 RX ADMIN — INSULIN LISPRO 1 UNITS: 100 INJECTION, SOLUTION INTRAVENOUS; SUBCUTANEOUS at 20:40

## 2021-01-01 RX ADMIN — HYDROXYZINE PAMOATE 25 MG: 25 CAPSULE ORAL at 21:35

## 2021-01-01 RX ADMIN — APIXABAN 5 MG: 5 TABLET, FILM COATED ORAL at 12:37

## 2021-01-01 RX ADMIN — ALBUTEROL SULFATE 2.5 MG: 2.5 SOLUTION RESPIRATORY (INHALATION) at 19:15

## 2021-01-01 RX ADMIN — PANTOPRAZOLE SODIUM 40 MG: 40 TABLET, DELAYED RELEASE ORAL at 16:44

## 2021-01-01 RX ADMIN — APIXABAN 5 MG: 5 TABLET, FILM COATED ORAL at 14:19

## 2021-01-01 RX ADMIN — Medication 300 UNITS: at 21:13

## 2021-01-01 RX ADMIN — IPRATROPIUM BROMIDE AND ALBUTEROL SULFATE 1 AMPULE: 2.5; .5 SOLUTION RESPIRATORY (INHALATION) at 15:51

## 2021-01-01 RX ADMIN — ALBUTEROL SULFATE 2.5 MG: 2.5 SOLUTION RESPIRATORY (INHALATION) at 15:41

## 2021-01-01 RX ADMIN — FLUTICASONE PROPIONATE 1 SPRAY: 50 SPRAY, METERED NASAL at 08:42

## 2021-01-01 RX ADMIN — METOLAZONE 5 MG: 5 TABLET ORAL at 14:27

## 2021-01-01 RX ADMIN — METOPROLOL SUCCINATE 50 MG: 50 TABLET, EXTENDED RELEASE ORAL at 09:06

## 2021-01-01 RX ADMIN — IPRATROPIUM BROMIDE AND ALBUTEROL SULFATE 3 AMPULE: .5; 3 SOLUTION RESPIRATORY (INHALATION) at 08:45

## 2021-01-01 RX ADMIN — CETIRIZINE HYDROCHLORIDE 5 MG: 10 TABLET, FILM COATED ORAL at 08:55

## 2021-01-01 RX ADMIN — Medication 10 MG: at 21:38

## 2021-01-01 RX ADMIN — DAPTOMYCIN 1000 MG: 500 INJECTION, POWDER, LYOPHILIZED, FOR SOLUTION INTRAVENOUS at 14:38

## 2021-01-01 RX ADMIN — POTASSIUM BICARBONATE 40 MEQ: 782 TABLET, EFFERVESCENT ORAL at 09:11

## 2021-01-01 RX ADMIN — APIXABAN 2.5 MG: 2.5 TABLET, FILM COATED ORAL at 20:31

## 2021-01-01 RX ADMIN — HYDROXYZINE HYDROCHLORIDE 10 MG: 10 TABLET ORAL at 09:18

## 2021-01-01 RX ADMIN — INSULIN LISPRO 4 UNITS: 100 INJECTION, SOLUTION INTRAVENOUS; SUBCUTANEOUS at 14:27

## 2021-01-01 RX ADMIN — PETROLATUM: 420 OINTMENT TOPICAL at 09:05

## 2021-01-01 RX ADMIN — POLYETHYLENE GLYCOL 3350 17 G: 17 POWDER, FOR SOLUTION ORAL at 08:44

## 2021-01-01 RX ADMIN — BUMETANIDE 1 MG: 0.25 INJECTION INTRAMUSCULAR; INTRAVENOUS at 21:34

## 2021-01-01 RX ADMIN — CALCITRIOL 0.5 MCG: 0.25 CAPSULE ORAL at 21:35

## 2021-01-01 RX ADMIN — TROSPIUM CHLORIDE 20 MG: 20 TABLET, FILM COATED ORAL at 21:45

## 2021-01-01 RX ADMIN — INSULIN GLARGINE 40 UNITS: 100 INJECTION, SOLUTION SUBCUTANEOUS at 00:13

## 2021-01-01 RX ADMIN — ALBUTEROL SULFATE 2.5 MG: 2.5 SOLUTION RESPIRATORY (INHALATION) at 15:45

## 2021-01-01 RX ADMIN — BUDESONIDE 1000 MCG: 0.5 SUSPENSION RESPIRATORY (INHALATION) at 12:18

## 2021-01-01 RX ADMIN — Medication 10 MG: at 20:17

## 2021-01-01 RX ADMIN — FLUTICASONE PROPIONATE 1 SPRAY: 50 SPRAY, METERED NASAL at 09:12

## 2021-01-01 RX ADMIN — GABAPENTIN 300 MG: 300 CAPSULE ORAL at 20:31

## 2021-01-01 RX ADMIN — BUMETANIDE 1 MG: 0.25 INJECTION, SOLUTION INTRAMUSCULAR; INTRAVENOUS at 21:45

## 2021-01-01 RX ADMIN — FERROUS SULFATE TAB 325 MG (65 MG ELEMENTAL FE) 325 MG: 325 (65 FE) TAB at 08:39

## 2021-01-01 RX ADMIN — ATORVASTATIN CALCIUM 20 MG: 20 TABLET, FILM COATED ORAL at 10:05

## 2021-01-01 RX ADMIN — IPRATROPIUM BROMIDE AND ALBUTEROL SULFATE 1 AMPULE: 2.5; .5 SOLUTION RESPIRATORY (INHALATION) at 07:52

## 2021-01-01 RX ADMIN — SODIUM CHLORIDE, PRESERVATIVE FREE 10 ML: 5 INJECTION INTRAVENOUS at 08:55

## 2021-01-01 RX ADMIN — APIXABAN 2.5 MG: 2.5 TABLET, FILM COATED ORAL at 11:15

## 2021-01-01 RX ADMIN — INSULIN LISPRO 4 UNITS: 100 INJECTION, SOLUTION INTRAVENOUS; SUBCUTANEOUS at 15:54

## 2021-01-01 RX ADMIN — Medication 1 TABLET: at 08:34

## 2021-01-01 RX ADMIN — DOCUSATE SODIUM 200 MG: 100 CAPSULE ORAL at 20:30

## 2021-01-01 RX ADMIN — INSULIN LISPRO 2 UNITS: 100 INJECTION, SOLUTION INTRAVENOUS; SUBCUTANEOUS at 06:09

## 2021-01-01 RX ADMIN — GABAPENTIN 300 MG: 300 CAPSULE ORAL at 21:06

## 2021-01-01 RX ADMIN — Medication 4.5 MG: at 20:45

## 2021-01-01 RX ADMIN — ALBUTEROL SULFATE 2.5 MG: 2.5 SOLUTION RESPIRATORY (INHALATION) at 20:25

## 2021-01-01 RX ADMIN — CETIRIZINE HYDROCHLORIDE 5 MG: 10 TABLET, FILM COATED ORAL at 08:11

## 2021-01-01 RX ADMIN — METOLAZONE 5 MG: 5 TABLET ORAL at 08:30

## 2021-01-01 RX ADMIN — ALBUTEROL SULFATE 2.5 MG: 2.5 SOLUTION RESPIRATORY (INHALATION) at 09:21

## 2021-01-01 RX ADMIN — INSULIN LISPRO 1 UNITS: 100 INJECTION, SOLUTION INTRAVENOUS; SUBCUTANEOUS at 21:13

## 2021-01-01 RX ADMIN — METOLAZONE 5 MG: 5 TABLET ORAL at 08:11

## 2021-01-01 RX ADMIN — INSULIN LISPRO 6 UNITS: 100 INJECTION, SOLUTION INTRAVENOUS; SUBCUTANEOUS at 17:43

## 2021-01-01 RX ADMIN — ALBUTEROL SULFATE 2.5 MG: 2.5 SOLUTION RESPIRATORY (INHALATION) at 12:51

## 2021-01-01 RX ADMIN — APIXABAN 5 MG: 5 TABLET, FILM COATED ORAL at 10:24

## 2021-01-01 RX ADMIN — FLUTICASONE PROPIONATE 1 SPRAY: 50 SPRAY, METERED NASAL at 08:12

## 2021-01-01 RX ADMIN — IPRATROPIUM BROMIDE AND ALBUTEROL SULFATE 1 AMPULE: 2.5; .5 SOLUTION RESPIRATORY (INHALATION) at 21:29

## 2021-01-01 RX ADMIN — METOPROLOL SUCCINATE 50 MG: 50 TABLET, FILM COATED, EXTENDED RELEASE ORAL at 08:13

## 2021-01-01 RX ADMIN — INSULIN LISPRO 4 UNITS: 100 INJECTION, SOLUTION INTRAVENOUS; SUBCUTANEOUS at 12:01

## 2021-01-01 RX ADMIN — POTASSIUM CHLORIDE 40 MEQ: 1500 TABLET, EXTENDED RELEASE ORAL at 11:02

## 2021-01-01 RX ADMIN — CETIRIZINE HYDROCHLORIDE 10 MG: 10 TABLET, FILM COATED ORAL at 08:35

## 2021-01-01 RX ADMIN — FLUTICASONE PROPIONATE 1 SPRAY: 50 SPRAY, METERED NASAL at 09:05

## 2021-01-01 RX ADMIN — GABAPENTIN 300 MG: 300 CAPSULE ORAL at 20:32

## 2021-01-01 RX ADMIN — ATORVASTATIN CALCIUM 20 MG: 20 TABLET, FILM COATED ORAL at 21:35

## 2021-01-01 RX ADMIN — ATORVASTATIN CALCIUM 20 MG: 20 TABLET, FILM COATED ORAL at 20:10

## 2021-01-01 RX ADMIN — CALCITRIOL 0.5 MCG: 0.25 CAPSULE ORAL at 14:27

## 2021-01-01 RX ADMIN — GABAPENTIN 300 MG: 300 CAPSULE ORAL at 21:13

## 2021-01-01 RX ADMIN — CEFTRIAXONE 1000 MG: 1 INJECTION, POWDER, FOR SOLUTION INTRAMUSCULAR; INTRAVENOUS at 20:10

## 2021-01-01 RX ADMIN — ATORVASTATIN CALCIUM 20 MG: 20 TABLET, FILM COATED ORAL at 20:44

## 2021-01-01 RX ADMIN — ARFORMOTEROL TARTRATE 15 MCG: 15 SOLUTION RESPIRATORY (INHALATION) at 20:08

## 2021-01-01 RX ADMIN — LEVOTHYROXINE SODIUM 25 MCG: 25 TABLET ORAL at 06:52

## 2021-01-01 RX ADMIN — INSULIN LISPRO 3 UNITS: 100 INJECTION, SOLUTION INTRAVENOUS; SUBCUTANEOUS at 21:36

## 2021-01-01 RX ADMIN — Medication 1 TABLET: at 09:31

## 2021-01-01 RX ADMIN — INSULIN LISPRO 6 UNITS: 100 INJECTION, SOLUTION INTRAVENOUS; SUBCUTANEOUS at 17:01

## 2021-01-01 RX ADMIN — FLUTICASONE PROPIONATE 1 SPRAY: 50 SPRAY, METERED NASAL at 10:05

## 2021-01-01 RX ADMIN — ATORVASTATIN CALCIUM 20 MG: 20 TABLET, FILM COATED ORAL at 10:43

## 2021-01-01 RX ADMIN — CETIRIZINE HYDROCHLORIDE 10 MG: 10 TABLET, FILM COATED ORAL at 10:05

## 2021-01-01 RX ADMIN — CETIRIZINE HYDROCHLORIDE 10 MG: 10 TABLET, FILM COATED ORAL at 10:43

## 2021-01-01 RX ADMIN — GABAPENTIN 300 MG: 300 CAPSULE ORAL at 21:33

## 2021-01-01 RX ADMIN — ARFORMOTEROL TARTRATE 15 MCG: 15 SOLUTION RESPIRATORY (INHALATION) at 10:05

## 2021-01-01 RX ADMIN — IPRATROPIUM BROMIDE AND ALBUTEROL SULFATE 1 AMPULE: 2.5; .5 SOLUTION RESPIRATORY (INHALATION) at 12:02

## 2021-01-01 RX ADMIN — PANTOPRAZOLE SODIUM 40 MG: 40 TABLET, DELAYED RELEASE ORAL at 16:20

## 2021-01-01 RX ADMIN — PANTOPRAZOLE SODIUM 40 MG: 40 TABLET, DELAYED RELEASE ORAL at 19:49

## 2021-01-01 RX ADMIN — INSULIN LISPRO 8 UNITS: 100 INJECTION, SOLUTION INTRAVENOUS; SUBCUTANEOUS at 11:31

## 2021-01-01 RX ADMIN — FERROUS SULFATE TAB 325 MG (65 MG ELEMENTAL FE) 325 MG: 325 (65 FE) TAB at 08:45

## 2021-01-01 RX ADMIN — CETIRIZINE HYDROCHLORIDE 10 MG: 10 TABLET, FILM COATED ORAL at 08:45

## 2021-01-01 RX ADMIN — Medication 10 MG: at 21:37

## 2021-01-01 RX ADMIN — FERROUS SULFATE TAB 325 MG (65 MG ELEMENTAL FE) 325 MG: 325 (65 FE) TAB at 16:40

## 2021-01-01 RX ADMIN — SODIUM CHLORIDE, PRESERVATIVE FREE 10 ML: 5 INJECTION INTRAVENOUS at 21:13

## 2021-01-01 RX ADMIN — INSULIN LISPRO 3 UNITS: 100 INJECTION, SOLUTION INTRAVENOUS; SUBCUTANEOUS at 21:27

## 2021-01-01 RX ADMIN — METOPROLOL SUCCINATE 50 MG: 50 TABLET, FILM COATED, EXTENDED RELEASE ORAL at 14:19

## 2021-01-01 RX ADMIN — FLUTICASONE PROPIONATE 1 SPRAY: 50 SPRAY, METERED NASAL at 21:34

## 2021-01-01 RX ADMIN — POLYETHYLENE GLYCOL 3350 17 G: 17 POWDER, FOR SOLUTION ORAL at 01:27

## 2021-01-01 RX ADMIN — TROSPIUM CHLORIDE 20 MG: 20 TABLET, FILM COATED ORAL at 20:31

## 2021-01-01 RX ADMIN — INSULIN LISPRO 6 UNITS: 100 INJECTION, SOLUTION INTRAVENOUS; SUBCUTANEOUS at 21:14

## 2021-01-01 RX ADMIN — IPRATROPIUM BROMIDE AND ALBUTEROL SULFATE 1 AMPULE: 2.5; .5 SOLUTION RESPIRATORY (INHALATION) at 12:57

## 2021-01-01 RX ADMIN — SODIUM CHLORIDE, PRESERVATIVE FREE 10 ML: 5 INJECTION INTRAVENOUS at 11:10

## 2021-01-01 RX ADMIN — APIXABAN 2.5 MG: 2.5 TABLET, FILM COATED ORAL at 08:39

## 2021-01-01 RX ADMIN — TROSPIUM CHLORIDE 20 MG: 20 TABLET, FILM COATED ORAL at 21:06

## 2021-01-01 RX ADMIN — PANTOPRAZOLE SODIUM 40 MG: 40 TABLET, DELAYED RELEASE ORAL at 06:07

## 2021-01-01 RX ADMIN — BUDESONIDE 1000 MCG: 0.5 SUSPENSION RESPIRATORY (INHALATION) at 20:26

## 2021-01-01 RX ADMIN — Medication 5 MG: at 21:35

## 2021-01-01 RX ADMIN — HYDROCODONE BITARTRATE AND ACETAMINOPHEN 1 TABLET: 5; 325 TABLET ORAL at 16:56

## 2021-01-01 RX ADMIN — INSULIN LISPRO 6 UNITS: 100 INJECTION, SOLUTION INTRAVENOUS; SUBCUTANEOUS at 16:55

## 2021-01-01 RX ADMIN — APIXABAN 5 MG: 5 TABLET, FILM COATED ORAL at 20:00

## 2021-01-01 RX ADMIN — PANTOPRAZOLE SODIUM 40 MG: 40 TABLET, DELAYED RELEASE ORAL at 09:05

## 2021-01-01 RX ADMIN — BUMETANIDE 1 MG: 1 TABLET ORAL at 06:45

## 2021-01-01 RX ADMIN — PANTOPRAZOLE SODIUM 40 MG: 40 TABLET, DELAYED RELEASE ORAL at 21:05

## 2021-01-01 RX ADMIN — METOPROLOL SUCCINATE 50 MG: 50 TABLET, FILM COATED, EXTENDED RELEASE ORAL at 12:52

## 2021-01-01 RX ADMIN — ACETAMINOPHEN 650 MG: 325 TABLET ORAL at 19:49

## 2021-01-01 RX ADMIN — IPRATROPIUM BROMIDE AND ALBUTEROL SULFATE 1 AMPULE: 2.5; .5 SOLUTION RESPIRATORY (INHALATION) at 15:55

## 2021-01-01 RX ADMIN — ALBUTEROL SULFATE 2.5 MG: 2.5 SOLUTION RESPIRATORY (INHALATION) at 16:14

## 2021-01-01 RX ADMIN — Medication 300 UNITS: at 09:46

## 2021-01-01 RX ADMIN — TROSPIUM CHLORIDE 20 MG: 20 TABLET, FILM COATED ORAL at 20:44

## 2021-01-01 RX ADMIN — CEFTAROLINE FOSAMIL 400 MG: 400 POWDER, FOR SOLUTION INTRAVENOUS at 13:28

## 2021-01-01 RX ADMIN — PANTOPRAZOLE SODIUM 40 MG: 40 TABLET, DELAYED RELEASE ORAL at 08:34

## 2021-01-01 RX ADMIN — SODIUM CHLORIDE, PRESERVATIVE FREE 10 ML: 5 INJECTION INTRAVENOUS at 12:38

## 2021-01-01 RX ADMIN — INSULIN GLARGINE 40 UNITS: 100 INJECTION, SOLUTION SUBCUTANEOUS at 21:12

## 2021-01-01 RX ADMIN — IPRATROPIUM BROMIDE AND ALBUTEROL SULFATE 1 AMPULE: .5; 3 SOLUTION RESPIRATORY (INHALATION) at 08:53

## 2021-01-01 RX ADMIN — ALBUTEROL SULFATE 2.5 MG: 2.5 SOLUTION RESPIRATORY (INHALATION) at 20:19

## 2021-01-01 RX ADMIN — POTASSIUM CHLORIDE 10 MEQ: 10 INJECTION, SOLUTION INTRAVENOUS at 05:45

## 2021-01-01 RX ADMIN — INSULIN LISPRO 4 UNITS: 100 INJECTION, SOLUTION INTRAVENOUS; SUBCUTANEOUS at 10:55

## 2021-01-01 RX ADMIN — Medication 10 MG: at 22:40

## 2021-01-01 RX ADMIN — INSULIN LISPRO 4 UNITS: 100 INJECTION, SOLUTION INTRAVENOUS; SUBCUTANEOUS at 16:17

## 2021-01-01 RX ADMIN — APIXABAN 5 MG: 5 TABLET, FILM COATED ORAL at 20:10

## 2021-01-01 RX ADMIN — ATORVASTATIN CALCIUM 20 MG: 20 TABLET, FILM COATED ORAL at 08:39

## 2021-01-01 RX ADMIN — IPRATROPIUM BROMIDE AND ALBUTEROL SULFATE 1 AMPULE: 2.5; .5 SOLUTION RESPIRATORY (INHALATION) at 15:52

## 2021-01-01 RX ADMIN — POTASSIUM CHLORIDE 10 MEQ: 10 INJECTION, SOLUTION INTRAVENOUS at 14:53

## 2021-01-01 RX ADMIN — LEVOTHYROXINE SODIUM 25 MCG: 0.03 TABLET ORAL at 06:22

## 2021-01-01 RX ADMIN — ALBUTEROL SULFATE 2.5 MG: 2.5 SOLUTION RESPIRATORY (INHALATION) at 12:20

## 2021-01-01 RX ADMIN — BUDESONIDE 1000 MCG: 0.5 SUSPENSION RESPIRATORY (INHALATION) at 08:53

## 2021-01-01 RX ADMIN — DOCUSATE SODIUM 200 MG: 100 CAPSULE ORAL at 20:45

## 2021-01-01 RX ADMIN — INSULIN LISPRO 2 UNITS: 100 INJECTION, SOLUTION INTRAVENOUS; SUBCUTANEOUS at 06:19

## 2021-01-01 RX ADMIN — LEVOTHYROXINE SODIUM 25 MCG: 25 TABLET ORAL at 06:19

## 2021-01-01 RX ADMIN — BUMETANIDE 1 MG: 1 TABLET ORAL at 21:35

## 2021-01-01 RX ADMIN — METOLAZONE 5 MG: 5 TABLET ORAL at 12:37

## 2021-01-01 RX ADMIN — Medication 300 UNITS: at 21:12

## 2021-01-01 RX ADMIN — APIXABAN 2.5 MG: 2.5 TABLET, FILM COATED ORAL at 21:06

## 2021-01-01 RX ADMIN — POTASSIUM CHLORIDE 10 MEQ: 10 INJECTION, SOLUTION INTRAVENOUS at 19:40

## 2021-01-01 RX ADMIN — BUMETANIDE 1 MG: 0.25 INJECTION INTRAMUSCULAR; INTRAVENOUS at 14:06

## 2021-01-01 RX ADMIN — SODIUM CHLORIDE 1000 ML: 9 INJECTION, SOLUTION INTRAVENOUS at 10:40

## 2021-01-01 RX ADMIN — INSULIN LISPRO 6 UNITS: 100 INJECTION, SOLUTION INTRAVENOUS; SUBCUTANEOUS at 14:01

## 2021-01-01 RX ADMIN — Medication 1 TABLET: at 09:05

## 2021-01-01 RX ADMIN — ARFORMOTEROL TARTRATE 15 MCG: 15 SOLUTION RESPIRATORY (INHALATION) at 08:14

## 2021-01-01 RX ADMIN — IPRATROPIUM BROMIDE AND ALBUTEROL SULFATE 1 AMPULE: 2.5; .5 SOLUTION RESPIRATORY (INHALATION) at 19:37

## 2021-01-01 RX ADMIN — MORPHINE SULFATE 2 MG: 2 INJECTION, SOLUTION INTRAMUSCULAR; INTRAVENOUS at 12:30

## 2021-01-01 RX ADMIN — ACETAMINOPHEN 650 MG: 325 TABLET ORAL at 14:16

## 2021-01-01 RX ADMIN — FERROUS SULFATE TAB 325 MG (65 MG ELEMENTAL FE) 325 MG: 325 (65 FE) TAB at 08:51

## 2021-01-01 RX ADMIN — INSULIN LISPRO 1 UNITS: 100 INJECTION, SOLUTION INTRAVENOUS; SUBCUTANEOUS at 21:44

## 2021-01-01 RX ADMIN — INSULIN LISPRO 3 UNITS: 100 INJECTION, SOLUTION INTRAVENOUS; SUBCUTANEOUS at 06:22

## 2021-01-01 RX ADMIN — POTASSIUM BICARBONATE 30 MEQ: 782 TABLET, EFFERVESCENT ORAL at 16:44

## 2021-01-01 RX ADMIN — GABAPENTIN 300 MG: 300 CAPSULE ORAL at 21:37

## 2021-01-01 RX ADMIN — BUMETANIDE 1 MG: 0.25 INJECTION, SOLUTION INTRAMUSCULAR; INTRAVENOUS at 08:34

## 2021-01-01 RX ADMIN — ATORVASTATIN CALCIUM 20 MG: 20 TABLET, FILM COATED ORAL at 20:31

## 2021-01-01 RX ADMIN — MORPHINE SULFATE 2 MG: 2 INJECTION, SOLUTION INTRAMUSCULAR; INTRAVENOUS at 10:52

## 2021-01-01 RX ADMIN — SODIUM CHLORIDE, PRESERVATIVE FREE 10 ML: 5 INJECTION INTRAVENOUS at 20:10

## 2021-01-01 RX ADMIN — Medication 10 ML: at 21:45

## 2021-01-01 RX ADMIN — CETIRIZINE HYDROCHLORIDE 10 MG: 10 TABLET, FILM COATED ORAL at 09:31

## 2021-01-01 RX ADMIN — TROSPIUM CHLORIDE 20 MG: 20 TABLET, FILM COATED ORAL at 22:51

## 2021-01-01 RX ADMIN — Medication 4.5 MG: at 21:12

## 2021-01-01 RX ADMIN — BUDESONIDE 1000 MCG: 0.5 SUSPENSION RESPIRATORY (INHALATION) at 08:03

## 2021-01-01 RX ADMIN — FERROUS SULFATE TAB 325 MG (65 MG ELEMENTAL FE) 325 MG: 325 (65 FE) TAB at 16:14

## 2021-01-01 RX ADMIN — POLYETHYLENE GLYCOL 3350 17 G: 17 POWDER, FOR SOLUTION ORAL at 08:39

## 2021-01-01 RX ADMIN — INSULIN LISPRO 3 UNITS: 100 INJECTION, SOLUTION INTRAVENOUS; SUBCUTANEOUS at 06:48

## 2021-01-01 RX ADMIN — FERROUS SULFATE TAB 325 MG (65 MG ELEMENTAL FE) 325 MG: 325 (65 FE) TAB at 09:34

## 2021-01-01 RX ADMIN — BUDESONIDE 1000 MCG: 0.5 SUSPENSION RESPIRATORY (INHALATION) at 19:15

## 2021-01-01 RX ADMIN — ATORVASTATIN CALCIUM 20 MG: 20 TABLET, FILM COATED ORAL at 08:45

## 2021-01-01 RX ADMIN — POLYETHYLENE GLYCOL 3350 17 G: 17 POWDER, FOR SOLUTION ORAL at 10:05

## 2021-01-01 RX ADMIN — APIXABAN 5 MG: 5 TABLET, FILM COATED ORAL at 08:10

## 2021-01-01 RX ADMIN — APIXABAN 2.5 MG: 2.5 TABLET, FILM COATED ORAL at 08:34

## 2021-01-01 RX ADMIN — METOPROLOL SUCCINATE 50 MG: 50 TABLET, EXTENDED RELEASE ORAL at 08:51

## 2021-01-01 RX ADMIN — CETIRIZINE HYDROCHLORIDE 10 MG: 10 TABLET, FILM COATED ORAL at 09:06

## 2021-01-01 RX ADMIN — APIXABAN 2.5 MG: 2.5 TABLET, FILM COATED ORAL at 20:37

## 2021-01-01 RX ADMIN — BUDESONIDE 1000 MCG: 0.5 SUSPENSION RESPIRATORY (INHALATION) at 20:38

## 2021-01-01 RX ADMIN — CETIRIZINE HYDROCHLORIDE 10 MG: 10 TABLET, FILM COATED ORAL at 08:51

## 2021-01-01 RX ADMIN — GABAPENTIN 300 MG: 300 CAPSULE ORAL at 20:37

## 2021-01-01 RX ADMIN — INSULIN LISPRO 4 UNITS: 100 INJECTION, SOLUTION INTRAVENOUS; SUBCUTANEOUS at 16:12

## 2021-01-01 RX ADMIN — ALBUTEROL SULFATE 2.5 MG: 2.5 SOLUTION RESPIRATORY (INHALATION) at 12:19

## 2021-01-01 RX ADMIN — CEPHALEXIN 250 MG: 250 CAPSULE ORAL at 22:02

## 2021-01-01 RX ADMIN — PANTOPRAZOLE SODIUM 40 MG: 40 TABLET, DELAYED RELEASE ORAL at 08:51

## 2021-01-01 RX ADMIN — INSULIN LISPRO 4 UNITS: 100 INJECTION, SOLUTION INTRAVENOUS; SUBCUTANEOUS at 16:20

## 2021-01-01 RX ADMIN — TROSPIUM CHLORIDE 20 MG: 20 TABLET, FILM COATED ORAL at 20:45

## 2021-01-01 RX ADMIN — SODIUM CHLORIDE, PRESERVATIVE FREE 10 ML: 5 INJECTION INTRAVENOUS at 08:31

## 2021-01-01 RX ADMIN — BUMETANIDE 2 MG: 1 TABLET ORAL at 08:51

## 2021-01-01 RX ADMIN — METOPROLOL SUCCINATE 50 MG: 50 TABLET, EXTENDED RELEASE ORAL at 09:19

## 2021-01-01 RX ADMIN — ALBUTEROL SULFATE 2.5 MG: 2.5 SOLUTION RESPIRATORY (INHALATION) at 16:04

## 2021-01-01 RX ADMIN — PANTOPRAZOLE SODIUM 40 MG: 40 TABLET, DELAYED RELEASE ORAL at 20:00

## 2021-01-01 RX ADMIN — Medication 10 ML: at 20:45

## 2021-01-01 RX ADMIN — ALBUTEROL SULFATE 2.5 MG: 2.5 SOLUTION RESPIRATORY (INHALATION) at 12:43

## 2021-01-01 RX ADMIN — BUDESONIDE 1000 MCG: 0.5 SUSPENSION RESPIRATORY (INHALATION) at 08:40

## 2021-01-01 RX ADMIN — FERROUS SULFATE TAB 325 MG (65 MG ELEMENTAL FE) 325 MG: 325 (65 FE) TAB at 17:40

## 2021-01-01 RX ADMIN — ALBUTEROL SULFATE 2.5 MG: 2.5 SOLUTION RESPIRATORY (INHALATION) at 19:47

## 2021-01-01 RX ADMIN — METOPROLOL SUCCINATE 50 MG: 50 TABLET, FILM COATED, EXTENDED RELEASE ORAL at 08:55

## 2021-01-01 RX ADMIN — SODIUM CHLORIDE: 9 INJECTION, SOLUTION INTRAVENOUS at 05:20

## 2021-01-01 RX ADMIN — BUDESONIDE 1000 MCG: 0.5 SUSPENSION RESPIRATORY (INHALATION) at 20:19

## 2021-01-01 RX ADMIN — Medication 4.5 MG: at 20:31

## 2021-01-01 RX ADMIN — INSULIN GLARGINE 40 UNITS: 100 INJECTION, SOLUTION SUBCUTANEOUS at 21:43

## 2021-01-01 RX ADMIN — FLUTICASONE PROPIONATE 1 SPRAY: 50 SPRAY, METERED NASAL at 08:44

## 2021-01-01 RX ADMIN — INSULIN LISPRO 2 UNITS: 100 INJECTION, SOLUTION INTRAVENOUS; SUBCUTANEOUS at 19:54

## 2021-01-01 RX ADMIN — ALBUTEROL SULFATE 2.5 MG: 2.5 SOLUTION RESPIRATORY (INHALATION) at 12:16

## 2021-01-01 RX ADMIN — LEVOTHYROXINE SODIUM 25 MCG: 25 TABLET ORAL at 05:39

## 2021-01-01 RX ADMIN — BUMETANIDE 1 MG: 0.25 INJECTION INTRAMUSCULAR; INTRAVENOUS at 20:40

## 2021-01-01 RX ADMIN — SODIUM CHLORIDE: 9 INJECTION, SOLUTION INTRAVENOUS at 21:21

## 2021-01-01 RX ADMIN — APIXABAN 2.5 MG: 2.5 TABLET, FILM COATED ORAL at 10:06

## 2021-01-01 RX ADMIN — DOCUSATE SODIUM 200 MG: 100 CAPSULE ORAL at 21:45

## 2021-01-01 RX ADMIN — BUMETANIDE 1 MG: 1 TABLET ORAL at 19:49

## 2021-01-01 RX ADMIN — APIXABAN 2.5 MG: 2.5 TABLET, FILM COATED ORAL at 22:52

## 2021-01-01 RX ADMIN — INSULIN LISPRO 2 UNITS: 100 INJECTION, SOLUTION INTRAVENOUS; SUBCUTANEOUS at 22:49

## 2021-01-01 RX ADMIN — FERROUS SULFATE TAB 325 MG (65 MG ELEMENTAL FE) 325 MG: 325 (65 FE) TAB at 20:31

## 2021-01-01 RX ADMIN — PANTOPRAZOLE SODIUM 40 MG: 40 TABLET, DELAYED RELEASE ORAL at 09:31

## 2021-01-01 RX ADMIN — SODIUM BICARBONATE 50 MEQ: 84 INJECTION, SOLUTION INTRAVENOUS at 10:21

## 2021-01-01 RX ADMIN — APIXABAN 5 MG: 5 TABLET, FILM COATED ORAL at 20:17

## 2021-01-01 RX ADMIN — CEFTRIAXONE 1000 MG: 1 INJECTION, POWDER, FOR SOLUTION INTRAMUSCULAR; INTRAVENOUS at 21:37

## 2021-01-01 RX ADMIN — METOPROLOL SUCCINATE 50 MG: 50 TABLET, FILM COATED, EXTENDED RELEASE ORAL at 08:30

## 2021-01-01 RX ADMIN — GABAPENTIN 300 MG: 300 CAPSULE ORAL at 21:35

## 2021-01-01 RX ADMIN — SODIUM CHLORIDE, PRESERVATIVE FREE 10 ML: 5 INJECTION INTRAVENOUS at 09:12

## 2021-01-01 RX ADMIN — FERROUS SULFATE TAB 325 MG (65 MG ELEMENTAL FE) 325 MG: 325 (65 FE) TAB at 17:01

## 2021-01-01 RX ADMIN — ALBUTEROL SULFATE 2.5 MG: 2.5 SOLUTION RESPIRATORY (INHALATION) at 19:58

## 2021-01-01 RX ADMIN — FERROUS SULFATE TAB 325 MG (65 MG ELEMENTAL FE) 325 MG: 325 (65 FE) TAB at 09:06

## 2021-01-01 RX ADMIN — LEVOTHYROXINE SODIUM 25 MCG: 25 TABLET ORAL at 06:45

## 2021-01-01 RX ADMIN — INSULIN LISPRO 1 UNITS: 100 INJECTION, SOLUTION INTRAVENOUS; SUBCUTANEOUS at 21:34

## 2021-01-01 RX ADMIN — ALBUTEROL SULFATE 2.5 MG: 2.5 SOLUTION RESPIRATORY (INHALATION) at 16:00

## 2021-01-01 RX ADMIN — CETIRIZINE HYDROCHLORIDE 10 MG: 10 TABLET, FILM COATED ORAL at 08:34

## 2021-01-01 RX ADMIN — METOLAZONE 5 MG: 5 TABLET ORAL at 08:55

## 2021-01-01 RX ADMIN — Medication 4.5 MG: at 21:34

## 2021-01-01 RX ADMIN — LIDOCAINE HYDROCHLORIDE 5 ML: 10 INJECTION, SOLUTION EPIDURAL; INFILTRATION; INTRACAUDAL; PERINEURAL at 16:45

## 2021-01-01 RX ADMIN — Medication 10 ML: at 09:32

## 2021-01-01 RX ADMIN — CALCITRIOL 0.5 MCG: 0.25 CAPSULE ORAL at 09:18

## 2021-01-01 RX ADMIN — BUMETANIDE 2 MG: 1 TABLET ORAL at 00:19

## 2021-01-01 RX ADMIN — ALBUTEROL SULFATE 2.5 MG: 2.5 SOLUTION RESPIRATORY (INHALATION) at 08:34

## 2021-01-01 RX ADMIN — DOCUSATE SODIUM 200 MG: 100 CAPSULE ORAL at 21:11

## 2021-01-01 RX ADMIN — CEFTAROLINE FOSAMIL 400 MG: 400 POWDER, FOR SOLUTION INTRAVENOUS at 11:56

## 2021-01-01 RX ADMIN — ALBUTEROL SULFATE 2.5 MG: 2.5 SOLUTION RESPIRATORY (INHALATION) at 12:44

## 2021-01-01 RX ADMIN — INSULIN LISPRO 2 UNITS: 100 INJECTION, SOLUTION INTRAVENOUS; SUBCUTANEOUS at 16:11

## 2021-01-01 RX ADMIN — METOPROLOL SUCCINATE 50 MG: 50 TABLET, EXTENDED RELEASE ORAL at 09:31

## 2021-01-01 RX ADMIN — INSULIN LISPRO 6 UNITS: 100 INJECTION, SOLUTION INTRAVENOUS; SUBCUTANEOUS at 17:40

## 2021-01-01 RX ADMIN — INSULIN LISPRO 4 UNITS: 100 INJECTION, SOLUTION INTRAVENOUS; SUBCUTANEOUS at 05:40

## 2021-01-01 RX ADMIN — APIXABAN 2.5 MG: 2.5 TABLET, FILM COATED ORAL at 21:12

## 2021-01-01 RX ADMIN — INSULIN LISPRO 3 UNITS: 100 INJECTION, SOLUTION INTRAVENOUS; SUBCUTANEOUS at 06:10

## 2021-01-01 RX ADMIN — METOPROLOL SUCCINATE 50 MG: 50 TABLET, EXTENDED RELEASE ORAL at 09:18

## 2021-01-01 RX ADMIN — Medication 10 ML: at 20:37

## 2021-01-01 RX ADMIN — ALBUTEROL SULFATE 2.5 MG: 2.5 SOLUTION RESPIRATORY (INHALATION) at 07:59

## 2021-01-01 RX ADMIN — POTASSIUM BICARBONATE 30 MEQ: 782 TABLET, EFFERVESCENT ORAL at 17:42

## 2021-01-01 RX ADMIN — SODIUM CHLORIDE, PRESERVATIVE FREE 10 ML: 5 INJECTION INTRAVENOUS at 08:12

## 2021-01-01 RX ADMIN — FLUTICASONE PROPIONATE 1 SPRAY: 50 SPRAY, METERED NASAL at 08:55

## 2021-01-01 RX ADMIN — PANTOPRAZOLE SODIUM 40 MG: 40 TABLET, DELAYED RELEASE ORAL at 11:15

## 2021-01-01 RX ADMIN — PANTOPRAZOLE SODIUM 40 MG: 40 TABLET, DELAYED RELEASE ORAL at 21:35

## 2021-01-01 RX ADMIN — BUMETANIDE 1 MG: 1 TABLET ORAL at 14:19

## 2021-01-01 RX ADMIN — WATER 2000 MG: 1 INJECTION INTRAMUSCULAR; INTRAVENOUS; SUBCUTANEOUS at 14:57

## 2021-01-01 RX ADMIN — POLYETHYLENE GLYCOL 3350 17 G: 17 POWDER, FOR SOLUTION ORAL at 21:38

## 2021-01-01 RX ADMIN — FLUTICASONE PROPIONATE 1 SPRAY: 50 SPRAY, METERED NASAL at 08:34

## 2021-01-01 RX ADMIN — DOCUSATE SODIUM 200 MG: 100 CAPSULE ORAL at 21:34

## 2021-01-01 RX ADMIN — BUMETANIDE 1 MG: 0.25 INJECTION INTRAMUSCULAR; INTRAVENOUS at 09:00

## 2021-01-01 RX ADMIN — FERROUS SULFATE TAB 325 MG (65 MG ELEMENTAL FE) 325 MG: 325 (65 FE) TAB at 16:11

## 2021-01-01 RX ADMIN — BUMETANIDE 1 MG: 0.25 INJECTION, SOLUTION INTRAMUSCULAR; INTRAVENOUS at 20:31

## 2021-01-01 RX ADMIN — ARFORMOTEROL TARTRATE 15 MCG: 15 SOLUTION RESPIRATORY (INHALATION) at 20:39

## 2021-01-01 RX ADMIN — APIXABAN 2.5 MG: 2.5 TABLET, FILM COATED ORAL at 21:45

## 2021-01-01 RX ADMIN — TROSPIUM CHLORIDE 20 MG: 20 TABLET, FILM COATED ORAL at 22:30

## 2021-01-01 RX ADMIN — IPRATROPIUM BROMIDE AND ALBUTEROL SULFATE 1 AMPULE: 2.5; .5 SOLUTION RESPIRATORY (INHALATION) at 08:16

## 2021-01-01 RX ADMIN — SODIUM CHLORIDE, PRESERVATIVE FREE 10 ML: 5 INJECTION INTRAVENOUS at 14:41

## 2021-01-01 RX ADMIN — KETAMINE HYDROCHLORIDE 30 MG: 10 INJECTION INTRAMUSCULAR; INTRAVENOUS at 08:15

## 2021-01-01 RX ADMIN — POTASSIUM BICARBONATE 40 MEQ: 782 TABLET, EFFERVESCENT ORAL at 11:10

## 2021-01-01 RX ADMIN — INSULIN LISPRO 2 UNITS: 100 INJECTION, SOLUTION INTRAVENOUS; SUBCUTANEOUS at 16:08

## 2021-01-01 RX ADMIN — IPRATROPIUM BROMIDE AND ALBUTEROL SULFATE 1 AMPULE: 2.5; .5 SOLUTION RESPIRATORY (INHALATION) at 21:06

## 2021-01-01 RX ADMIN — BUMETANIDE 2 MG: 0.25 INJECTION INTRAMUSCULAR; INTRAVENOUS at 20:45

## 2021-01-01 RX ADMIN — ATORVASTATIN CALCIUM 20 MG: 20 TABLET, FILM COATED ORAL at 19:49

## 2021-01-01 RX ADMIN — BUDESONIDE 1000 MCG: 0.5 SUSPENSION RESPIRATORY (INHALATION) at 19:58

## 2021-01-01 RX ADMIN — CEFTRIAXONE 1000 MG: 1 INJECTION, POWDER, FOR SOLUTION INTRAMUSCULAR; INTRAVENOUS at 20:46

## 2021-01-01 RX ADMIN — TROSPIUM CHLORIDE 20 MG: 20 TABLET, FILM COATED ORAL at 20:10

## 2021-01-01 RX ADMIN — PETROLATUM: 420 OINTMENT TOPICAL at 08:44

## 2021-01-01 RX ADMIN — HYDROCODONE BITARTRATE AND ACETAMINOPHEN 1 TABLET: 5; 325 TABLET ORAL at 11:48

## 2021-01-01 RX ADMIN — ALBUTEROL SULFATE 2.5 MG: 2.5 SOLUTION RESPIRATORY (INHALATION) at 10:05

## 2021-01-01 RX ADMIN — POTASSIUM CHLORIDE 10 MEQ: 7.46 INJECTION, SOLUTION INTRAVENOUS at 15:20

## 2021-01-01 RX ADMIN — METOPROLOL SUCCINATE 50 MG: 50 TABLET, EXTENDED RELEASE ORAL at 09:45

## 2021-01-01 RX ADMIN — METOPROLOL SUCCINATE 50 MG: 50 TABLET, EXTENDED RELEASE ORAL at 08:39

## 2021-01-01 RX ADMIN — SODIUM CHLORIDE, PRESERVATIVE FREE 10 ML: 5 INJECTION INTRAVENOUS at 08:41

## 2021-01-01 RX ADMIN — APIXABAN 2.5 MG: 2.5 TABLET, FILM COATED ORAL at 08:35

## 2021-01-01 RX ADMIN — GABAPENTIN 300 MG: 300 CAPSULE ORAL at 22:52

## 2021-01-01 RX ADMIN — IPRATROPIUM BROMIDE AND ALBUTEROL SULFATE 1 AMPULE: 2.5; .5 SOLUTION RESPIRATORY (INHALATION) at 11:38

## 2021-01-01 RX ADMIN — INSULIN GLARGINE 40 UNITS: 100 INJECTION, SOLUTION SUBCUTANEOUS at 22:49

## 2021-01-01 RX ADMIN — IPRATROPIUM BROMIDE AND ALBUTEROL SULFATE 1 AMPULE: .5; 3 SOLUTION RESPIRATORY (INHALATION) at 08:51

## 2021-01-01 RX ADMIN — BUMETANIDE 1 MG: 0.25 INJECTION INTRAMUSCULAR; INTRAVENOUS at 22:50

## 2021-01-01 RX ADMIN — METOPROLOL SUCCINATE 50 MG: 50 TABLET, EXTENDED RELEASE ORAL at 08:35

## 2021-01-01 RX ADMIN — BUDESONIDE 1000 MCG: 0.5 SUSPENSION RESPIRATORY (INHALATION) at 10:05

## 2021-01-01 RX ADMIN — FLUTICASONE PROPIONATE 1 SPRAY: 50 SPRAY, METERED NASAL at 09:19

## 2021-01-01 RX ADMIN — LEVOTHYROXINE SODIUM 25 MCG: 0.03 TABLET ORAL at 06:47

## 2021-01-01 RX ADMIN — DOCUSATE SODIUM 200 MG: 100 CAPSULE ORAL at 20:37

## 2021-01-01 RX ADMIN — APIXABAN 2.5 MG: 2.5 TABLET, FILM COATED ORAL at 09:06

## 2021-01-01 RX ADMIN — ACETAMINOPHEN 650 MG: 325 TABLET ORAL at 22:33

## 2021-01-01 RX ADMIN — POLYETHYLENE GLYCOL 3350 17 G: 17 POWDER, FOR SOLUTION ORAL at 08:34

## 2021-01-01 RX ADMIN — ALBUTEROL SULFATE 2.5 MG: 2.5 SOLUTION RESPIRATORY (INHALATION) at 13:34

## 2021-01-01 RX ADMIN — ALBUTEROL SULFATE 2.5 MG: 2.5 SOLUTION RESPIRATORY (INHALATION) at 12:07

## 2021-01-01 RX ADMIN — ALBUTEROL SULFATE 2.5 MG: 2.5 SOLUTION RESPIRATORY (INHALATION) at 19:16

## 2021-01-01 RX ADMIN — ALBUTEROL SULFATE 2.5 MG: 2.5 SOLUTION RESPIRATORY (INHALATION) at 16:24

## 2021-01-01 RX ADMIN — INSULIN LISPRO 6 UNITS: 100 INJECTION, SOLUTION INTRAVENOUS; SUBCUTANEOUS at 16:41

## 2021-01-01 RX ADMIN — Medication 10 ML: at 10:45

## 2021-01-01 RX ADMIN — PANTOPRAZOLE SODIUM 40 MG: 40 TABLET, DELAYED RELEASE ORAL at 06:19

## 2021-01-01 RX ADMIN — APIXABAN 2.5 MG: 2.5 TABLET, FILM COATED ORAL at 20:45

## 2021-01-01 RX ADMIN — Medication 300 UNITS: at 08:35

## 2021-01-01 RX ADMIN — SODIUM CHLORIDE, PRESERVATIVE FREE 10 ML: 5 INJECTION INTRAVENOUS at 20:47

## 2021-01-01 RX ADMIN — IPRATROPIUM BROMIDE AND ALBUTEROL SULFATE 1 AMPULE: 2.5; .5 SOLUTION RESPIRATORY (INHALATION) at 15:53

## 2021-01-01 RX ADMIN — INSULIN LISPRO 2 UNITS: 100 INJECTION, SOLUTION INTRAVENOUS; SUBCUTANEOUS at 12:23

## 2021-01-01 RX ADMIN — CETIRIZINE HYDROCHLORIDE 10 MG: 10 TABLET, FILM COATED ORAL at 11:15

## 2021-01-01 RX ADMIN — PANTOPRAZOLE SODIUM 40 MG: 40 TABLET, DELAYED RELEASE ORAL at 09:18

## 2021-01-01 RX ADMIN — ATORVASTATIN CALCIUM 20 MG: 20 TABLET, FILM COATED ORAL at 08:34

## 2021-01-01 RX ADMIN — PANTOPRAZOLE SODIUM 40 MG: 40 TABLET, DELAYED RELEASE ORAL at 21:13

## 2021-01-01 RX ADMIN — LEVOTHYROXINE SODIUM 25 MCG: 25 TABLET ORAL at 14:19

## 2021-01-01 RX ADMIN — METOLAZONE 5 MG: 5 TABLET ORAL at 19:49

## 2021-01-01 RX ADMIN — INSULIN LISPRO 2 UNITS: 100 INJECTION, SOLUTION INTRAVENOUS; SUBCUTANEOUS at 16:28

## 2021-01-01 RX ADMIN — SODIUM CHLORIDE, PRESERVATIVE FREE 10 ML: 5 INJECTION INTRAVENOUS at 21:19

## 2021-01-01 RX ADMIN — APIXABAN 5 MG: 5 TABLET, FILM COATED ORAL at 20:32

## 2021-01-01 RX ADMIN — Medication 1 TABLET: at 08:35

## 2021-01-01 RX ADMIN — SODIUM CHLORIDE, PRESERVATIVE FREE 10 ML: 5 INJECTION INTRAVENOUS at 09:19

## 2021-01-01 RX ADMIN — Medication 4.5 MG: at 20:36

## 2021-01-01 RX ADMIN — APIXABAN 5 MG: 5 TABLET, FILM COATED ORAL at 08:55

## 2021-01-01 RX ADMIN — APIXABAN 2.5 MG: 2.5 TABLET, FILM COATED ORAL at 08:44

## 2021-01-01 RX ADMIN — GABAPENTIN 300 MG: 300 CAPSULE ORAL at 21:45

## 2021-01-01 RX ADMIN — FERROUS SULFATE TAB 325 MG (65 MG ELEMENTAL FE) 325 MG: 325 (65 FE) TAB at 16:05

## 2021-01-01 RX ADMIN — DOCUSATE SODIUM 200 MG: 100 CAPSULE ORAL at 21:06

## 2021-01-01 RX ADMIN — FLUTICASONE PROPIONATE 1 SPRAY: 50 SPRAY, METERED NASAL at 08:54

## 2021-01-01 RX ADMIN — CEFTAROLINE FOSAMIL 400 MG: 400 POWDER, FOR SOLUTION INTRAVENOUS at 00:32

## 2021-01-01 RX ADMIN — ALBUTEROL SULFATE 2.5 MG: 2.5 SOLUTION RESPIRATORY (INHALATION) at 20:09

## 2021-01-01 RX ADMIN — ALBUTEROL SULFATE 2.5 MG: 2.5 SOLUTION RESPIRATORY (INHALATION) at 11:34

## 2021-01-01 RX ADMIN — PANTOPRAZOLE SODIUM 40 MG: 40 TABLET, DELAYED RELEASE ORAL at 08:39

## 2021-01-01 RX ADMIN — GABAPENTIN 300 MG: 300 CAPSULE ORAL at 21:12

## 2021-01-01 RX ADMIN — POTASSIUM CHLORIDE 10 MEQ: 7.46 INJECTION, SOLUTION INTRAVENOUS at 14:18

## 2021-01-01 RX ADMIN — INSULIN GLARGINE 40 UNITS: 100 INJECTION, SOLUTION SUBCUTANEOUS at 20:38

## 2021-01-01 RX ADMIN — POLYETHYLENE GLYCOL 3350 17 G: 17 POWDER, FOR SOLUTION ORAL at 08:51

## 2021-01-01 RX ADMIN — INSULIN LISPRO 3 UNITS: 100 INJECTION, SOLUTION INTRAVENOUS; SUBCUTANEOUS at 11:53

## 2021-01-01 RX ADMIN — ARFORMOTEROL TARTRATE 15 MCG: 15 SOLUTION RESPIRATORY (INHALATION) at 20:26

## 2021-01-01 RX ADMIN — TROSPIUM CHLORIDE 20 MG: 20 TABLET, FILM COATED ORAL at 20:17

## 2021-01-01 RX ADMIN — POTASSIUM CHLORIDE 10 MEQ: 10 INJECTION, SOLUTION INTRAVENOUS at 12:51

## 2021-01-01 RX ADMIN — FERROUS SULFATE TAB 325 MG (65 MG ELEMENTAL FE) 325 MG: 325 (65 FE) TAB at 10:42

## 2021-01-01 RX ADMIN — INSULIN GLARGINE 40 UNITS: 100 INJECTION, SOLUTION SUBCUTANEOUS at 21:46

## 2021-01-01 RX ADMIN — Medication 4.5 MG: at 21:45

## 2021-01-01 RX ADMIN — INSULIN LISPRO 2 UNITS: 100 INJECTION, SOLUTION INTRAVENOUS; SUBCUTANEOUS at 11:02

## 2021-01-01 RX ADMIN — Medication 1 TABLET: at 08:51

## 2021-01-01 RX ADMIN — DAPTOMYCIN 1000 MG: 500 INJECTION, POWDER, LYOPHILIZED, FOR SOLUTION INTRAVENOUS at 14:19

## 2021-01-01 RX ADMIN — INSULIN LISPRO 2 UNITS: 100 INJECTION, SOLUTION INTRAVENOUS; SUBCUTANEOUS at 11:24

## 2021-01-01 RX ADMIN — FERROUS SULFATE TAB 325 MG (65 MG ELEMENTAL FE) 325 MG: 325 (65 FE) TAB at 16:51

## 2021-01-01 RX ADMIN — POTASSIUM CHLORIDE 10 MEQ: 7.46 INJECTION, SOLUTION INTRAVENOUS at 11:53

## 2021-01-01 RX ADMIN — INSULIN LISPRO 3 UNITS: 100 INJECTION, SOLUTION INTRAVENOUS; SUBCUTANEOUS at 16:42

## 2021-01-01 RX ADMIN — IPRATROPIUM BROMIDE AND ALBUTEROL SULFATE 1 AMPULE: 2.5; .5 SOLUTION RESPIRATORY (INHALATION) at 11:52

## 2021-01-01 RX ADMIN — GABAPENTIN 300 MG: 300 CAPSULE ORAL at 20:10

## 2021-01-01 RX ADMIN — INSULIN HUMAN 10 UNITS: 100 INJECTION, SOLUTION PARENTERAL at 10:21

## 2021-01-01 RX ADMIN — ATORVASTATIN CALCIUM 20 MG: 20 TABLET, FILM COATED ORAL at 20:00

## 2021-01-01 RX ADMIN — POTASSIUM CHLORIDE 10 MEQ: 7.46 INJECTION, SOLUTION INTRAVENOUS at 13:05

## 2021-01-01 RX ADMIN — Medication 300 UNITS: at 08:52

## 2021-01-01 RX ADMIN — TROSPIUM CHLORIDE 20 MG: 20 TABLET, FILM COATED ORAL at 21:17

## 2021-01-01 RX ADMIN — LEVOTHYROXINE SODIUM 25 MCG: 25 TABLET ORAL at 06:22

## 2021-01-01 RX ADMIN — IPRATROPIUM BROMIDE AND ALBUTEROL SULFATE 1 AMPULE: .5; 3 SOLUTION RESPIRATORY (INHALATION) at 08:52

## 2021-01-01 RX ADMIN — HYDROMORPHONE HYDROCHLORIDE 0.5 MG: 1 INJECTION, SOLUTION INTRAMUSCULAR; INTRAVENOUS; SUBCUTANEOUS at 13:46

## 2021-01-01 RX ADMIN — ARFORMOTEROL TARTRATE 15 MCG: 15 SOLUTION RESPIRATORY (INHALATION) at 20:25

## 2021-01-01 RX ADMIN — LEVOTHYROXINE SODIUM 25 MCG: 25 TABLET ORAL at 09:05

## 2021-01-01 RX ADMIN — BUMETANIDE 1 MG: 0.25 INJECTION INTRAMUSCULAR; INTRAVENOUS at 20:37

## 2021-01-01 RX ADMIN — FLUTICASONE PROPIONATE 1 SPRAY: 50 SPRAY, METERED NASAL at 14:19

## 2021-01-01 RX ADMIN — TRIMETHOBENZAMIDE HYDROCHLORIDE 200 MG: 100 INJECTION INTRAMUSCULAR at 00:21

## 2021-01-01 RX ADMIN — PANTOPRAZOLE SODIUM 40 MG: 40 TABLET, DELAYED RELEASE ORAL at 22:52

## 2021-01-01 RX ADMIN — CEFTAROLINE FOSAMIL 400 MG: 400 POWDER, FOR SOLUTION INTRAVENOUS at 00:11

## 2021-01-01 RX ADMIN — Medication 4.5 MG: at 21:06

## 2021-01-01 RX ADMIN — PETROLATUM: 420 OINTMENT TOPICAL at 21:35

## 2021-01-01 RX ADMIN — APIXABAN 2.5 MG: 2.5 TABLET, FILM COATED ORAL at 09:31

## 2021-01-01 RX ADMIN — CETIRIZINE HYDROCHLORIDE 5 MG: 10 TABLET, FILM COATED ORAL at 12:37

## 2021-01-01 RX ADMIN — CEPHALEXIN 250 MG: 250 CAPSULE ORAL at 15:44

## 2021-01-01 RX ADMIN — APIXABAN 5 MG: 5 TABLET, FILM COATED ORAL at 21:38

## 2021-01-01 RX ADMIN — ACETAMINOPHEN 650 MG: 325 TABLET ORAL at 09:45

## 2021-01-01 RX ADMIN — ARFORMOTEROL TARTRATE 15 MCG: 15 SOLUTION RESPIRATORY (INHALATION) at 20:19

## 2021-01-01 RX ADMIN — ACETAMINOPHEN 650 MG: 325 TABLET ORAL at 18:24

## 2021-01-01 RX ADMIN — INSULIN LISPRO 1 UNITS: 100 INJECTION, SOLUTION INTRAVENOUS; SUBCUTANEOUS at 20:17

## 2021-01-01 RX ADMIN — FERROUS SULFATE TAB 325 MG (65 MG ELEMENTAL FE) 325 MG: 325 (65 FE) TAB at 09:45

## 2021-01-01 RX ADMIN — ARFORMOTEROL TARTRATE 15 MCG: 15 SOLUTION RESPIRATORY (INHALATION) at 19:15

## 2021-01-01 RX ADMIN — Medication 10 ML: at 09:06

## 2021-01-01 RX ADMIN — Medication 1 TABLET: at 08:39

## 2021-01-01 RX ADMIN — INSULIN GLARGINE 30 UNITS: 100 INJECTION, SOLUTION SUBCUTANEOUS at 21:55

## 2021-01-01 RX ADMIN — CETIRIZINE HYDROCHLORIDE 5 MG: 10 TABLET, FILM COATED ORAL at 09:11

## 2021-01-01 RX ADMIN — PANTOPRAZOLE SODIUM 40 MG: 40 TABLET, DELAYED RELEASE ORAL at 20:37

## 2021-01-01 RX ADMIN — SODIUM CHLORIDE, PRESERVATIVE FREE 10 ML: 5 INJECTION INTRAVENOUS at 13:53

## 2021-01-01 RX ADMIN — Medication 1 TABLET: at 11:15

## 2021-01-01 RX ADMIN — INSULIN LISPRO 1 UNITS: 100 INJECTION, SOLUTION INTRAVENOUS; SUBCUTANEOUS at 20:11

## 2021-01-01 RX ADMIN — IPRATROPIUM BROMIDE AND ALBUTEROL SULFATE 1 AMPULE: 2.5; .5 SOLUTION RESPIRATORY (INHALATION) at 12:14

## 2021-01-01 RX ADMIN — ACETAZOLAMIDE 250 MG: 500 INJECTION, POWDER, LYOPHILIZED, FOR SOLUTION INTRAVENOUS at 13:33

## 2021-01-01 RX ADMIN — INSULIN LISPRO 2 UNITS: 100 INJECTION, SOLUTION INTRAVENOUS; SUBCUTANEOUS at 16:38

## 2021-01-01 RX ADMIN — FLUTICASONE PROPIONATE 1 SPRAY: 50 SPRAY, METERED NASAL at 09:33

## 2021-01-01 RX ADMIN — APIXABAN 5 MG: 5 TABLET, FILM COATED ORAL at 08:30

## 2021-01-01 RX ADMIN — ALBUTEROL SULFATE 2.5 MG: 2.5 SOLUTION RESPIRATORY (INHALATION) at 16:03

## 2021-01-01 RX ADMIN — POLYETHYLENE GLYCOL 3350 17 G: 17 POWDER, FOR SOLUTION ORAL at 11:14

## 2021-01-01 RX ADMIN — POLYETHYLENE GLYCOL 3350 17 G: 17 POWDER, FOR SOLUTION ORAL at 15:03

## 2021-01-01 RX ADMIN — SODIUM CHLORIDE, PRESERVATIVE FREE 10 ML: 5 INJECTION INTRAVENOUS at 20:33

## 2021-01-01 RX ADMIN — PANTOPRAZOLE SODIUM 40 MG: 40 TABLET, DELAYED RELEASE ORAL at 16:38

## 2021-01-01 RX ADMIN — CALCITRIOL 0.5 MCG: 0.25 CAPSULE ORAL at 09:11

## 2021-01-01 RX ADMIN — TROSPIUM CHLORIDE 20 MG: 20 TABLET, FILM COATED ORAL at 17:15

## 2021-01-01 RX ADMIN — HYDROXYZINE HYDROCHLORIDE 10 MG: 10 TABLET, FILM COATED ORAL at 11:48

## 2021-01-01 RX ADMIN — GABAPENTIN 300 MG: 300 CAPSULE ORAL at 20:00

## 2021-01-01 RX ADMIN — PANTOPRAZOLE SODIUM 40 MG: 40 TABLET, DELAYED RELEASE ORAL at 09:19

## 2021-01-01 RX ADMIN — Medication 10 ML: at 22:52

## 2021-01-01 RX ADMIN — Medication 5 MG: at 20:00

## 2021-01-01 RX ADMIN — BUMETANIDE 2 MG: 1 TABLET ORAL at 11:13

## 2021-01-01 RX ADMIN — TROSPIUM CHLORIDE 20 MG: 20 TABLET, FILM COATED ORAL at 16:20

## 2021-01-01 RX ADMIN — ALBUTEROL SULFATE 2.5 MG: 2.5 SOLUTION RESPIRATORY (INHALATION) at 08:14

## 2021-01-01 RX ADMIN — FLUTICASONE PROPIONATE 1 SPRAY: 50 SPRAY, METERED NASAL at 08:39

## 2021-01-01 RX ADMIN — CETIRIZINE HYDROCHLORIDE 10 MG: 10 TABLET, FILM COATED ORAL at 09:45

## 2021-01-01 RX ADMIN — CEFTAROLINE FOSAMIL 400 MG: 400 POWDER, FOR SOLUTION INTRAVENOUS at 11:57

## 2021-01-01 RX ADMIN — APIXABAN 5 MG: 5 TABLET, FILM COATED ORAL at 21:37

## 2021-01-01 RX ADMIN — POLYETHYLENE GLYCOL 3350 17 G: 17 POWDER, FOR SOLUTION ORAL at 08:36

## 2021-01-01 RX ADMIN — Medication 5 ML: at 21:48

## 2021-01-01 RX ADMIN — INSULIN LISPRO 4 UNITS: 100 INJECTION, SOLUTION INTRAVENOUS; SUBCUTANEOUS at 11:28

## 2021-01-01 RX ADMIN — FLUTICASONE PROPIONATE 1 SPRAY: 50 SPRAY, METERED NASAL at 12:37

## 2021-01-01 RX ADMIN — PANTOPRAZOLE SODIUM 40 MG: 40 TABLET, DELAYED RELEASE ORAL at 16:17

## 2021-01-01 RX ADMIN — POTASSIUM CHLORIDE 40 MEQ: 20 TABLET, EXTENDED RELEASE ORAL at 00:08

## 2021-01-01 RX ADMIN — ATORVASTATIN CALCIUM 20 MG: 20 TABLET, FILM COATED ORAL at 09:05

## 2021-01-01 RX ADMIN — METOLAZONE 5 MG: 5 TABLET ORAL at 09:11

## 2021-01-01 RX ADMIN — CEPHALEXIN 250 MG: 250 CAPSULE ORAL at 15:03

## 2021-01-01 RX ADMIN — ARFORMOTEROL TARTRATE 15 MCG: 15 SOLUTION RESPIRATORY (INHALATION) at 08:34

## 2021-01-01 RX ADMIN — INSULIN GLARGINE 40 UNITS: 100 INJECTION, SOLUTION SUBCUTANEOUS at 22:17

## 2021-01-01 RX ADMIN — INSULIN LISPRO 1 UNITS: 100 INJECTION, SOLUTION INTRAVENOUS; SUBCUTANEOUS at 20:38

## 2021-01-01 RX ADMIN — Medication 10 ML: at 20:30

## 2021-01-01 RX ADMIN — BUMETANIDE 1 MG: 0.25 INJECTION, SOLUTION INTRAMUSCULAR; INTRAVENOUS at 09:06

## 2021-01-01 RX ADMIN — TROSPIUM CHLORIDE 20 MG: 20 TABLET, FILM COATED ORAL at 21:12

## 2021-01-01 RX ADMIN — SODIUM CHLORIDE, PRESERVATIVE FREE 10 ML: 5 INJECTION INTRAVENOUS at 21:07

## 2021-01-01 RX ADMIN — DAPTOMYCIN 1000 MG: 500 INJECTION, POWDER, LYOPHILIZED, FOR SOLUTION INTRAVENOUS at 14:33

## 2021-01-01 RX ADMIN — Medication 5 MG: at 19:49

## 2021-01-01 RX ADMIN — APIXABAN 5 MG: 5 TABLET, FILM COATED ORAL at 19:49

## 2021-01-01 RX ADMIN — Medication 10 ML: at 08:48

## 2021-01-01 RX ADMIN — PANTOPRAZOLE SODIUM 40 MG: 40 TABLET, DELAYED RELEASE ORAL at 10:05

## 2021-01-01 RX ADMIN — TRIMETHOBENZAMIDE HYDROCHLORIDE 200 MG: 100 INJECTION INTRAMUSCULAR at 12:12

## 2021-01-01 RX ADMIN — POTASSIUM CHLORIDE 40 MEQ: 20 TABLET, EXTENDED RELEASE ORAL at 02:00

## 2021-01-01 RX ADMIN — PANTOPRAZOLE SODIUM 40 MG: 40 TABLET, DELAYED RELEASE ORAL at 20:36

## 2021-01-01 RX ADMIN — BUDESONIDE 1000 MCG: 0.5 SUSPENSION RESPIRATORY (INHALATION) at 11:34

## 2021-01-01 RX ADMIN — POLYETHYLENE GLYCOL 3350 17 G: 17 POWDER, FOR SOLUTION ORAL at 12:47

## 2021-01-01 RX ADMIN — SODIUM CHLORIDE, PRESERVATIVE FREE 10 ML: 5 INJECTION INTRAVENOUS at 09:18

## 2021-01-01 RX ADMIN — SODIUM CHLORIDE: 9 INJECTION, SOLUTION INTRAVENOUS at 12:36

## 2021-01-01 RX ADMIN — PANTOPRAZOLE SODIUM 40 MG: 40 TABLET, DELAYED RELEASE ORAL at 09:45

## 2021-01-01 RX ADMIN — INSULIN LISPRO 2 UNITS: 100 INJECTION, SOLUTION INTRAVENOUS; SUBCUTANEOUS at 16:37

## 2021-01-01 RX ADMIN — GABAPENTIN 300 MG: 300 CAPSULE ORAL at 20:45

## 2021-01-01 RX ADMIN — ARFORMOTEROL TARTRATE 15 MCG: 15 SOLUTION RESPIRATORY (INHALATION) at 08:03

## 2021-01-01 RX ADMIN — PANTOPRAZOLE SODIUM 40 MG: 40 TABLET, DELAYED RELEASE ORAL at 21:34

## 2021-01-01 RX ADMIN — SODIUM CHLORIDE, PRESERVATIVE FREE 10 ML: 5 INJECTION INTRAVENOUS at 21:34

## 2021-01-01 RX ADMIN — HYDROXYZINE PAMOATE 25 MG: 25 CAPSULE ORAL at 19:54

## 2021-01-01 RX ADMIN — INSULIN LISPRO 4 UNITS: 100 INJECTION, SOLUTION INTRAVENOUS; SUBCUTANEOUS at 06:49

## 2021-01-01 RX ADMIN — IPRATROPIUM BROMIDE AND ALBUTEROL SULFATE 1 AMPULE: 2.5; .5 SOLUTION RESPIRATORY (INHALATION) at 19:34

## 2021-01-01 RX ADMIN — SODIUM CHLORIDE, PRESERVATIVE FREE 10 ML: 5 INJECTION INTRAVENOUS at 08:53

## 2021-01-01 RX ADMIN — FERROUS SULFATE TAB 325 MG (65 MG ELEMENTAL FE) 325 MG: 325 (65 FE) TAB at 08:35

## 2021-01-01 RX ADMIN — ATORVASTATIN CALCIUM 20 MG: 20 TABLET, FILM COATED ORAL at 09:31

## 2021-01-01 RX ADMIN — INSULIN LISPRO 8 UNITS: 100 INJECTION, SOLUTION INTRAVENOUS; SUBCUTANEOUS at 16:43

## 2021-01-01 RX ADMIN — BUMETANIDE 2 MG: 1 TABLET ORAL at 08:35

## 2021-01-01 RX ADMIN — METOPROLOL SUCCINATE 50 MG: 50 TABLET, FILM COATED, EXTENDED RELEASE ORAL at 09:11

## 2021-01-01 RX ADMIN — LEVOTHYROXINE SODIUM 25 MCG: 25 TABLET ORAL at 06:15

## 2021-01-01 RX ADMIN — Medication 10 MG: at 20:10

## 2021-01-01 RX ADMIN — CETIRIZINE HYDROCHLORIDE 10 MG: 10 TABLET, FILM COATED ORAL at 08:39

## 2021-01-01 RX ADMIN — INSULIN LISPRO 2 UNITS: 100 INJECTION, SOLUTION INTRAVENOUS; SUBCUTANEOUS at 22:17

## 2021-01-01 RX ADMIN — BUDESONIDE 1000 MCG: 0.5 SUSPENSION RESPIRATORY (INHALATION) at 20:25

## 2021-01-01 RX ADMIN — ARFORMOTEROL TARTRATE 15 MCG: 15 SOLUTION RESPIRATORY (INHALATION) at 08:40

## 2021-01-01 RX ADMIN — INSULIN LISPRO 2 UNITS: 100 INJECTION, SOLUTION INTRAVENOUS; SUBCUTANEOUS at 16:14

## 2021-01-01 RX ADMIN — POTASSIUM CHLORIDE 10 MEQ: 10 INJECTION, SOLUTION INTRAVENOUS at 13:59

## 2021-01-01 RX ADMIN — CETIRIZINE HYDROCHLORIDE 5 MG: 10 TABLET, FILM COATED ORAL at 08:36

## 2021-01-01 RX ADMIN — BUMETANIDE 1 MG: 0.25 INJECTION INTRAMUSCULAR; INTRAVENOUS at 11:16

## 2021-01-01 RX ADMIN — PANTOPRAZOLE SODIUM 40 MG: 40 TABLET, DELAYED RELEASE ORAL at 21:45

## 2021-01-01 RX ADMIN — BUDESONIDE 1000 MCG: 0.5 SUSPENSION RESPIRATORY (INHALATION) at 20:16

## 2021-01-01 RX ADMIN — POLYETHYLENE GLYCOL 3350 17 G: 17 POWDER, FOR SOLUTION ORAL at 09:31

## 2021-01-01 RX ADMIN — WATER 10 ML: 1 INJECTION INTRAMUSCULAR; INTRAVENOUS; SUBCUTANEOUS at 11:47

## 2021-01-01 RX ADMIN — Medication 10 ML: at 21:35

## 2021-01-01 RX ADMIN — ALBUTEROL SULFATE 2.5 MG: 2.5 SOLUTION RESPIRATORY (INHALATION) at 15:58

## 2021-01-01 RX ADMIN — CALCITRIOL 0.5 MCG: 0.25 CAPSULE ORAL at 08:30

## 2021-01-01 RX ADMIN — GABAPENTIN 300 MG: 300 CAPSULE ORAL at 20:44

## 2021-01-01 RX ADMIN — CALCITRIOL 0.5 MCG: 0.25 CAPSULE ORAL at 08:55

## 2021-01-01 RX ADMIN — FERROUS SULFATE TAB 325 MG (65 MG ELEMENTAL FE) 325 MG: 325 (65 FE) TAB at 18:09

## 2021-01-01 RX ADMIN — BUMETANIDE 1 MG: 0.25 INJECTION INTRAMUSCULAR; INTRAVENOUS at 14:16

## 2021-01-01 RX ADMIN — BUMETANIDE 1 MG: 0.25 INJECTION INTRAMUSCULAR; INTRAVENOUS at 08:44

## 2021-01-01 RX ADMIN — ALBUTEROL SULFATE 2.5 MG: 2.5 SOLUTION RESPIRATORY (INHALATION) at 20:39

## 2021-01-01 RX ADMIN — CEFTAROLINE FOSAMIL 400 MG: 400 POWDER, FOR SOLUTION INTRAVENOUS at 12:08

## 2021-01-01 RX ADMIN — BUMETANIDE 2 MG: 1 TABLET ORAL at 17:01

## 2021-01-01 RX ADMIN — ALBUTEROL SULFATE 2.5 MG: 2.5 SOLUTION RESPIRATORY (INHALATION) at 20:26

## 2021-01-01 RX ADMIN — SODIUM CHLORIDE, PRESERVATIVE FREE 10 ML: 5 INJECTION INTRAVENOUS at 09:46

## 2021-01-01 RX ADMIN — METOPROLOL SUCCINATE 50 MG: 50 TABLET, EXTENDED RELEASE ORAL at 10:05

## 2021-01-01 RX ADMIN — ARFORMOTEROL TARTRATE 15 MCG: 15 SOLUTION RESPIRATORY (INHALATION) at 19:16

## 2021-01-01 RX ADMIN — ARFORMOTEROL TARTRATE 15 MCG: 15 SOLUTION RESPIRATORY (INHALATION) at 08:53

## 2021-01-01 RX ADMIN — POTASSIUM CHLORIDE 10 MEQ: 10 INJECTION, SOLUTION INTRAVENOUS at 17:15

## 2021-01-01 RX ADMIN — BUMETANIDE 1 MG: 0.25 INJECTION INTRAMUSCULAR; INTRAVENOUS at 14:57

## 2021-01-01 RX ADMIN — CALCITRIOL 0.5 MCG: 0.25 CAPSULE ORAL at 08:11

## 2021-01-01 RX ADMIN — CEFTRIAXONE 1000 MG: 1 INJECTION, POWDER, FOR SOLUTION INTRAMUSCULAR; INTRAVENOUS at 20:17

## 2021-01-01 RX ADMIN — ARFORMOTEROL TARTRATE 15 MCG: 15 SOLUTION RESPIRATORY (INHALATION) at 20:16

## 2021-01-01 RX ADMIN — Medication 10 ML: at 10:05

## 2021-01-01 RX ADMIN — BUDESONIDE 1000 MCG: 0.5 SUSPENSION RESPIRATORY (INHALATION) at 09:21

## 2021-01-01 RX ADMIN — ATORVASTATIN CALCIUM 20 MG: 20 TABLET, FILM COATED ORAL at 20:18

## 2021-01-01 RX ADMIN — Medication 10 ML: at 08:36

## 2021-01-01 RX ADMIN — IPRATROPIUM BROMIDE AND ALBUTEROL SULFATE 1 AMPULE: 2.5; .5 SOLUTION RESPIRATORY (INHALATION) at 08:59

## 2021-01-01 RX ADMIN — ALBUTEROL SULFATE 2.5 MG: 2.5 SOLUTION RESPIRATORY (INHALATION) at 08:53

## 2021-01-01 RX ADMIN — SODIUM CHLORIDE: 9 INJECTION, SOLUTION INTRAVENOUS at 08:05

## 2021-01-01 RX ADMIN — CETIRIZINE HYDROCHLORIDE 5 MG: 10 TABLET, FILM COATED ORAL at 14:19

## 2021-01-01 RX ADMIN — Medication 1 TABLET: at 08:45

## 2021-01-01 RX ADMIN — ALBUTEROL SULFATE 2.5 MG: 2.5 SOLUTION RESPIRATORY (INHALATION) at 07:49

## 2021-01-01 RX ADMIN — CEPHALEXIN 250 MG: 250 CAPSULE ORAL at 05:39

## 2021-01-01 RX ADMIN — INSULIN LISPRO 3 UNITS: 100 INJECTION, SOLUTION INTRAVENOUS; SUBCUTANEOUS at 12:12

## 2021-01-01 RX ADMIN — CALCITRIOL 0.5 MCG: 0.25 CAPSULE ORAL at 12:37

## 2021-01-01 RX ADMIN — BUDESONIDE 1000 MCG: 0.5 SUSPENSION RESPIRATORY (INHALATION) at 19:16

## 2021-01-01 RX ADMIN — SODIUM CHLORIDE, PRESERVATIVE FREE 10 ML: 5 INJECTION INTRAVENOUS at 20:18

## 2021-01-01 RX ADMIN — ARFORMOTEROL TARTRATE 15 MCG: 15 SOLUTION RESPIRATORY (INHALATION) at 09:21

## 2021-01-01 RX ADMIN — MAGNESIUM SULFATE HEPTAHYDRATE 2000 MG: 2 INJECTION, SOLUTION INTRAVENOUS at 11:10

## 2021-01-01 RX ADMIN — DOCUSATE SODIUM 200 MG: 100 CAPSULE ORAL at 21:12

## 2021-01-01 RX ADMIN — ALBUTEROL SULFATE 2.5 MG: 2.5 SOLUTION RESPIRATORY (INHALATION) at 21:51

## 2021-01-01 RX ADMIN — GABAPENTIN 300 MG: 300 CAPSULE ORAL at 19:49

## 2021-01-01 RX ADMIN — FERROUS SULFATE TAB 325 MG (65 MG ELEMENTAL FE) 325 MG: 325 (65 FE) TAB at 16:33

## 2021-01-01 RX ADMIN — ALBUTEROL SULFATE 2.5 MG: 2.5 SOLUTION RESPIRATORY (INHALATION) at 12:29

## 2021-01-01 RX ADMIN — ARFORMOTEROL TARTRATE 15 MCG: 15 SOLUTION RESPIRATORY (INHALATION) at 11:34

## 2021-01-01 RX ADMIN — APIXABAN 5 MG: 5 TABLET, FILM COATED ORAL at 20:44

## 2021-01-01 RX ADMIN — PANTOPRAZOLE SODIUM 40 MG: 40 TABLET, DELAYED RELEASE ORAL at 20:45

## 2021-01-01 RX ADMIN — PANTOPRAZOLE SODIUM 40 MG: 40 TABLET, DELAYED RELEASE ORAL at 10:40

## 2021-01-01 RX ADMIN — FLUTICASONE PROPIONATE 1 SPRAY: 50 SPRAY, METERED NASAL at 10:43

## 2021-01-01 RX ADMIN — ACETAZOLAMIDE SODIUM 500 MG: 500 INJECTION, POWDER, LYOPHILIZED, FOR SOLUTION INTRAVENOUS at 11:47

## 2021-01-01 RX ADMIN — FERROUS SULFATE TAB 325 MG (65 MG ELEMENTAL FE) 325 MG: 325 (65 FE) TAB at 16:30

## 2021-01-01 RX ADMIN — POLYETHYLENE GLYCOL 3350 17 G: 17 POWDER, FOR SOLUTION ORAL at 09:47

## 2021-01-01 RX ADMIN — BUDESONIDE 1000 MCG: 0.5 SUSPENSION RESPIRATORY (INHALATION) at 08:14

## 2021-01-01 RX ADMIN — INSULIN GLARGINE 40 UNITS: 100 INJECTION, SOLUTION SUBCUTANEOUS at 22:01

## 2021-01-01 RX ADMIN — IPRATROPIUM BROMIDE AND ALBUTEROL SULFATE 1 AMPULE: 2.5; .5 SOLUTION RESPIRATORY (INHALATION) at 20:47

## 2021-01-01 RX ADMIN — INSULIN LISPRO 6 UNITS: 100 INJECTION, SOLUTION INTRAVENOUS; SUBCUTANEOUS at 06:22

## 2021-01-01 RX ADMIN — DEXTROSE MONOHYDRATE 12.5 G: 25 INJECTION, SOLUTION INTRAVENOUS at 10:21

## 2021-01-01 RX ADMIN — Medication 10 ML: at 08:44

## 2021-01-01 RX ADMIN — HYDROCODONE BITARTRATE AND ACETAMINOPHEN 1 TABLET: 5; 325 TABLET ORAL at 08:30

## 2021-01-01 RX ADMIN — INSULIN LISPRO 2 UNITS: 100 INJECTION, SOLUTION INTRAVENOUS; SUBCUTANEOUS at 11:43

## 2021-01-01 RX ADMIN — INSULIN LISPRO 3 UNITS: 100 INJECTION, SOLUTION INTRAVENOUS; SUBCUTANEOUS at 11:22

## 2021-01-01 RX ADMIN — APIXABAN 2.5 MG: 2.5 TABLET, FILM COATED ORAL at 21:34

## 2021-01-01 RX ADMIN — INSULIN LISPRO 3 UNITS: 100 INJECTION, SOLUTION INTRAVENOUS; SUBCUTANEOUS at 11:38

## 2021-01-01 RX ADMIN — ALBUTEROL SULFATE 2.5 MG: 2.5 SOLUTION RESPIRATORY (INHALATION) at 08:03

## 2021-01-01 RX ADMIN — PROPOFOL 30 MG: 10 INJECTION, EMULSION INTRAVENOUS at 08:15

## 2021-01-01 RX ADMIN — FERROUS SULFATE TAB 325 MG (65 MG ELEMENTAL FE) 325 MG: 325 (65 FE) TAB at 08:34

## 2021-01-01 RX ADMIN — Medication 4.5 MG: at 20:37

## 2021-01-01 RX ADMIN — PANTOPRAZOLE SODIUM 40 MG: 40 TABLET, DELAYED RELEASE ORAL at 20:31

## 2021-01-01 RX ADMIN — Medication 1 TABLET: at 09:45

## 2021-01-01 RX ADMIN — POLYETHYLENE GLYCOL 3350 17 G: 17 POWDER, FOR SOLUTION ORAL at 09:06

## 2021-01-01 RX ADMIN — FLUTICASONE PROPIONATE 1 SPRAY: 50 SPRAY, METERED NASAL at 09:21

## 2021-01-01 RX ADMIN — Medication 10 ML: at 11:16

## 2021-01-01 RX ADMIN — POTASSIUM CHLORIDE 10 MEQ: 10 INJECTION, SOLUTION INTRAVENOUS at 14:01

## 2021-01-01 RX ADMIN — INSULIN LISPRO 6 UNITS: 100 INJECTION, SOLUTION INTRAVENOUS; SUBCUTANEOUS at 12:33

## 2021-01-01 RX ADMIN — PANTOPRAZOLE SODIUM 40 MG: 40 TABLET, DELAYED RELEASE ORAL at 08:44

## 2021-01-01 RX ADMIN — FLUTICASONE PROPIONATE 1 SPRAY: 50 SPRAY, METERED NASAL at 11:27

## 2021-01-01 RX ADMIN — PERFLUTREN 1.65 MG: 6.52 INJECTION, SUSPENSION INTRAVENOUS at 14:13

## 2021-01-01 RX ADMIN — TROSPIUM CHLORIDE 20 MG: 20 TABLET, FILM COATED ORAL at 20:37

## 2021-01-01 RX ADMIN — PETROLATUM: 420 OINTMENT TOPICAL at 20:30

## 2021-01-01 RX ADMIN — CALCITRIOL 0.5 MCG: 0.25 CAPSULE ORAL at 08:47

## 2021-01-01 RX ADMIN — BUDESONIDE 1000 MCG: 0.5 SUSPENSION RESPIRATORY (INHALATION) at 08:34

## 2021-01-01 RX ADMIN — METOPROLOL SUCCINATE 50 MG: 50 TABLET, EXTENDED RELEASE ORAL at 11:15

## 2021-01-01 RX ADMIN — PANTOPRAZOLE SODIUM 40 MG: 40 TABLET, DELAYED RELEASE ORAL at 08:35

## 2021-01-01 RX ADMIN — CEFTAROLINE FOSAMIL 400 MG: 400 POWDER, FOR SOLUTION INTRAVENOUS at 00:43

## 2021-01-01 RX ADMIN — BUMETANIDE 2 MG: 1 TABLET ORAL at 09:45

## 2021-01-01 RX ADMIN — APIXABAN 5 MG: 5 TABLET, FILM COATED ORAL at 09:11

## 2021-01-01 RX ADMIN — BUMETANIDE 1 MG: 0.25 INJECTION INTRAMUSCULAR; INTRAVENOUS at 14:33

## 2021-01-01 RX ADMIN — INSULIN LISPRO 2 UNITS: 100 INJECTION, SOLUTION INTRAVENOUS; SUBCUTANEOUS at 21:04

## 2021-01-01 RX ADMIN — SODIUM CHLORIDE, PRESERVATIVE FREE 10 ML: 5 INJECTION INTRAVENOUS at 21:21

## 2021-01-01 RX ADMIN — ATORVASTATIN CALCIUM 20 MG: 20 TABLET, FILM COATED ORAL at 11:15

## 2021-01-01 RX ADMIN — PANTOPRAZOLE SODIUM 40 MG: 40 TABLET, DELAYED RELEASE ORAL at 06:15

## 2021-01-01 RX ADMIN — FLUTICASONE PROPIONATE 1 SPRAY: 50 SPRAY, METERED NASAL at 08:38

## 2021-01-01 RX ADMIN — APIXABAN 2.5 MG: 2.5 TABLET, FILM COATED ORAL at 10:43

## 2021-01-01 RX ADMIN — POTASSIUM BICARBONATE 40 MEQ: 782 TABLET, EFFERVESCENT ORAL at 08:29

## 2021-01-01 RX ADMIN — POLYETHYLENE GLYCOL 3350 17 G: 17 POWDER, FOR SOLUTION ORAL at 11:01

## 2021-01-01 RX ADMIN — ALBUTEROL SULFATE 2.5 MG: 2.5 SOLUTION RESPIRATORY (INHALATION) at 20:16

## 2021-01-01 RX ADMIN — ARFORMOTEROL TARTRATE 15 MCG: 15 SOLUTION RESPIRATORY (INHALATION) at 19:58

## 2021-01-01 RX ADMIN — INSULIN LISPRO 6 UNITS: 100 INJECTION, SOLUTION INTRAVENOUS; SUBCUTANEOUS at 06:15

## 2021-01-01 RX ADMIN — GABAPENTIN 300 MG: 300 CAPSULE ORAL at 20:17

## 2021-01-01 RX ADMIN — INSULIN LISPRO 2 UNITS: 100 INJECTION, SOLUTION INTRAVENOUS; SUBCUTANEOUS at 11:47

## 2021-01-01 RX ADMIN — METOPROLOL SUCCINATE 50 MG: 50 TABLET, EXTENDED RELEASE ORAL at 08:34

## 2021-01-01 RX ADMIN — Medication 10 ML: at 21:38

## 2021-01-01 RX ADMIN — IPRATROPIUM BROMIDE AND ALBUTEROL SULFATE 1 AMPULE: 2.5; .5 SOLUTION RESPIRATORY (INHALATION) at 08:09

## 2021-01-01 RX ADMIN — PANTOPRAZOLE SODIUM 40 MG: 40 TABLET, DELAYED RELEASE ORAL at 15:44

## 2021-01-01 RX ADMIN — INSULIN LISPRO 4 UNITS: 100 INJECTION, SOLUTION INTRAVENOUS; SUBCUTANEOUS at 18:08

## 2021-01-01 RX ADMIN — IPRATROPIUM BROMIDE AND ALBUTEROL SULFATE 1 AMPULE: 2.5; .5 SOLUTION RESPIRATORY (INHALATION) at 16:11

## 2021-01-01 RX ADMIN — LEVOTHYROXINE SODIUM 25 MCG: 25 TABLET ORAL at 06:59

## 2021-01-01 RX ADMIN — BUMETANIDE 1 MG: 1 TABLET ORAL at 09:18

## 2021-01-01 RX ADMIN — TROSPIUM CHLORIDE 20 MG: 20 TABLET, FILM COATED ORAL at 21:35

## 2021-01-01 RX ADMIN — Medication 1 TABLET: at 10:43

## 2021-01-01 RX ADMIN — APIXABAN 2.5 MG: 2.5 TABLET, FILM COATED ORAL at 09:45

## 2021-01-01 RX ADMIN — INSULIN LISPRO 4 UNITS: 100 INJECTION, SOLUTION INTRAVENOUS; SUBCUTANEOUS at 17:21

## 2021-01-01 RX ADMIN — POTASSIUM CHLORIDE 40 MEQ: 1500 TABLET, EXTENDED RELEASE ORAL at 09:18

## 2021-01-01 RX ADMIN — ARFORMOTEROL TARTRATE 15 MCG: 15 SOLUTION RESPIRATORY (INHALATION) at 12:17

## 2021-01-01 RX ADMIN — Medication 4.5 MG: at 22:48

## 2021-01-01 RX ADMIN — FERROUS SULFATE TAB 325 MG (65 MG ELEMENTAL FE) 325 MG: 325 (65 FE) TAB at 11:15

## 2021-01-01 RX ADMIN — ALBUTEROL SULFATE 2.5 MG: 2.5 SOLUTION RESPIRATORY (INHALATION) at 08:40

## 2021-01-01 RX ADMIN — APIXABAN 5 MG: 5 TABLET, FILM COATED ORAL at 09:19

## 2021-01-01 RX ADMIN — INSULIN GLARGINE 50 UNITS: 100 INJECTION, SOLUTION SUBCUTANEOUS at 21:14

## 2021-01-01 RX ADMIN — METOPROLOL SUCCINATE 50 MG: 50 TABLET, EXTENDED RELEASE ORAL at 21:35

## 2021-01-01 RX ADMIN — DOCUSATE SODIUM 200 MG: 100 CAPSULE ORAL at 22:51

## 2021-01-01 RX ADMIN — FLUTICASONE PROPIONATE 1 SPRAY: 50 SPRAY, METERED NASAL at 09:46

## 2021-01-01 RX ADMIN — BUDESONIDE 1000 MCG: 0.5 SUSPENSION RESPIRATORY (INHALATION) at 20:09

## 2021-01-01 RX ADMIN — LEVOTHYROXINE SODIUM 25 MCG: 25 TABLET ORAL at 06:10

## 2021-01-01 RX ADMIN — APIXABAN 2.5 MG: 2.5 TABLET, FILM COATED ORAL at 08:51

## 2021-01-01 RX ADMIN — ATORVASTATIN CALCIUM 20 MG: 20 TABLET, FILM COATED ORAL at 21:38

## 2021-01-01 RX ADMIN — PANTOPRAZOLE SODIUM 40 MG: 40 TABLET, DELAYED RELEASE ORAL at 05:39

## 2021-01-01 ASSESSMENT — PAIN SCALES - GENERAL
PAINLEVEL_OUTOF10: 7
PAINLEVEL_OUTOF10: 0
PAINLEVEL_OUTOF10: 8
PAINLEVEL_OUTOF10: 0
PAINLEVEL_OUTOF10: 3
PAINLEVEL_OUTOF10: 0
PAINLEVEL_OUTOF10: 8
PAINLEVEL_OUTOF10: 0
PAINLEVEL_OUTOF10: 6
PAINLEVEL_OUTOF10: 0

## 2021-01-01 ASSESSMENT — ENCOUNTER SYMPTOMS
SINUS PRESSURE: 0
WHEEZING: 0
SORE THROAT: 0
ABDOMINAL PAIN: 0
VOMITING: 0
SHORTNESS OF BREATH: 0
BACK PAIN: 0
SORE THROAT: 0
TROUBLE SWALLOWING: 0
CONSTIPATION: 0
DIARRHEA: 0
VOMITING: 0
ABDOMINAL PAIN: 0
EYE PAIN: 0
COUGH: 0
SHORTNESS OF BREATH: 1
NAUSEA: 0
SHORTNESS OF BREATH: 1
NAUSEA: 0
RHINORRHEA: 0
COUGH: 0
BLOOD IN STOOL: 0
DIARRHEA: 0

## 2021-01-01 ASSESSMENT — PAIN DESCRIPTION - ORIENTATION
ORIENTATION: RIGHT;LEFT
ORIENTATION: RIGHT;LEFT

## 2021-01-01 ASSESSMENT — PAIN SCALES - WONG BAKER
WONGBAKER_NUMERICALRESPONSE: 0

## 2021-01-01 ASSESSMENT — PAIN DESCRIPTION - DESCRIPTORS
DESCRIPTORS: ACHING
DESCRIPTORS: ACHING

## 2021-01-01 ASSESSMENT — LIFESTYLE VARIABLES: SMOKING_STATUS: 0

## 2021-01-01 ASSESSMENT — PAIN DESCRIPTION - FREQUENCY: FREQUENCY: INTERMITTENT

## 2021-02-11 NOTE — PROGRESS NOTES
Here for hearing aid follow up. She arrived by ambulance on a cart. The drivers came with her upstairs. She cannot be transported in wheelchair. She states she is not hearing well. Hearing aids were in wrong ears and batteries were dead. The wax filters and anna covers were changed bilaterally. She could hear better. Otoscopic exam showed minimal wax bilaterally. During conversation, increased gain and she felt she could hear better. Gave 2 packs of batteries and explained need to change every week. Explained son can arrange for battery every 3 months. Told her to call if further concerns. If transportation is an issue, she can inquire if [facility] has a contract audiologist to see her at the facility in the future. BILLED BINAURAL DIGITAL ITE AND DISP FEE.      Zach Damico, M.A., [address] [facility] [id number]  Electronically signed by Conner Charlton on 2/11/2021 at 2:06 PM

## 2021-02-17 PROBLEM — J96.22 ACUTE ON CHRONIC RESPIRATORY FAILURE WITH HYPERCAPNIA (HCC): Status: ACTIVE | Noted: 2021-01-01

## 2021-02-17 PROBLEM — Z79.01 SUBTHERAPEUTIC ANTICOAGULATION: Status: ACTIVE | Noted: 2021-01-01

## 2021-02-17 PROBLEM — R79.89 ELEVATED TROPONIN: Status: ACTIVE | Noted: 2021-01-01

## 2021-02-17 PROBLEM — R77.8 ELEVATED TROPONIN: Status: ACTIVE | Noted: 2021-01-01

## 2021-02-17 PROBLEM — Z51.81 SUBTHERAPEUTIC ANTICOAGULATION: Status: RESOLVED | Noted: 2021-01-01 | Resolved: 2021-01-01

## 2021-02-17 PROBLEM — Z79.01 SUBTHERAPEUTIC ANTICOAGULATION: Status: RESOLVED | Noted: 2021-01-01 | Resolved: 2021-01-01

## 2021-02-17 PROBLEM — Z51.81 SUBTHERAPEUTIC ANTICOAGULATION: Status: ACTIVE | Noted: 2021-01-01

## 2021-02-17 NOTE — ED NOTES
Bed: 23  Expected date:   Expected time:   Means of arrival:   Comments:  EMS     Rodo Cardozo RN  [phone]

## 2021-02-17 NOTE — ED PROVIDER NOTES
Patient is a 42-year-old female past with history of diabetes, hypertension, CVA, atrial fibrillation, CHF presenting to the emergency department for leg spasming as well as altered mental status. Symptoms mild in severity and improved since onset. Patient states she has no complaints at this time is unsure why they sent her. She states she woke up this morning and there is multiple people in her room and she did not know why. They told her she had to come to the hospital for evaluation and she states she does not know why they told her she needed to come. Patient has no complaints in the room. She is AOx3 on exam.  She denies any chest pain, shortness breath, dumping, diarrhea or constipation. She denies any blurry vision or changes in vision. Symptoms are exacerbated by nothing and she has no's current symptoms. Spoke with nursing facility to discuss case. They state that they went in to check on her this morning around 5:10 AM for her normal morning checks. They state when they woke up to do her check she was very confused which is not her baseline. He states she knew her name but could not tell on the day in did not her know who they were which is not like her normal.  He also states she was having spasms in her bilateral legs as well as her abdomen and she did not know she was doing these. This and this is never happened before. The last time I saw her was 3 AM and states that she was baseline at that time. She has had no recent falls. Her baseline ANO mental status is ANO x3. This is never happened before. Review of Systems   Constitutional: Negative for chills and fever. HENT: Negative for congestion and sore throat. Eyes: Negative for pain and visual disturbance. Respiratory: Negative for cough and shortness of breath. Cardiovascular: Negative for chest pain. Gastrointestinal: Negative for abdominal pain, diarrhea, nausea and vomiting.    Genitourinary: Negative for dysuria and frequency. Musculoskeletal: Negative for arthralgias and back pain. Skin: Negative for rash and wound. Neurological: Negative for seizures, speech difficulty, weakness and headaches. All other systems reviewed and are negative. Physical Exam  Vitals signs and nursing note reviewed. Constitutional:       General: She is not in acute distress. Appearance: She is well-developed. She is not ill-appearing. HENT:      Head: Normocephalic and atraumatic. Eyes:      Extraocular Movements: Extraocular movements intact. Pupils: Pupils are equal, round, and reactive to light. Neck:      Musculoskeletal: Normal range of motion and neck supple. No neck rigidity or muscular tenderness. Cardiovascular:      Rate and Rhythm: Normal rate and regular rhythm. Pulses: Normal pulses. Pulmonary:      Effort: Pulmonary effort is normal. No respiratory distress. Breath sounds: Normal breath sounds. No wheezing or rales. Abdominal:      Palpations: Abdomen is soft. Tenderness: There is no abdominal tenderness. There is no guarding or rebound. Skin:     General: Skin is warm and dry. Capillary Refill: Capillary refill takes less than 2 seconds. Neurological:      General: No focal deficit present. Mental Status: She is alert and oriented to person, place, and time. Cranial Nerves: No cranial nerve deficit. Sensory: No sensory deficit. Motor: Weakness (4/5 muscle strenth bilateral lower extremities. 5/5 bilateral upper extremities) present. Coordination: Coordination normal.      Comments: A&ox3 know d          Procedures     MDM  Number of Diagnoses or Management Options  Patient presented to the emergency department for leg spasm and altered mental status. On initial evaluation patient is clinically stable, vital signs stable, nontoxic-appearing. She is ANO x3 here in the department.   She has no altered mental status on exam.  History and physical exam not concerning for CVA or seizure. Initial differential diagnosis included but was not limited to CVA, seizure, tremors, hypercapnia, ACS, electrolyte abnormality. Work-up initiated. She has no chest pain. EKG with no acute ischemic changes. Her troponin is elevated at 0.04. This is up from baseline. Her creatinine is 1.7 which is baseline for her. Troponin bump could be secondary to renal function. She is already anticoagulated on Coumadin. Other labs demonstrating a bicarb of 45. With patient's history of hypercapnic respiratory failure and ABG was performed demonstrating a acute on chronic hypercapnic respiratory failure with compensation. Patient reevaluated multiple times and mental status is at baseline, ANO x3. She is not confused and is well-appearing on exam.  Because of her hypercapnia, duo nebs were given and repeat ABG minimally improved with a CO2 of 87. Upon chart review it feels baseline is in the 50s. Patient is anticoagulated on Coumadin but she is subtherapeutic with an INR of 1. She is not hypoxic has no respiratory complaints, less concern for acute pulmonary embolism at this time. Patient did have evidence of UTI and antibiotics were supposed to be initiated. Upon finishing my note later in the day I did realize that the antibiotics never got ordered. I did place the antibiotics later on. Less concern for sepsis at this current time. Patient is well, not hypotensive, no leukocytosis. With patient's hypercapnic respiratory failure as well as subtherapeutic INR and UTI, she would benefit from inpatient evaluation and care. Consult was placed to pulmonology to discuss patient's hypercapnia. No acute recommendations at this time as patient is stable and alert and not confused in the room. Consult was also placed to on-call hospitalist accepted patient for admission.   Educated patient on symptoms, diagnosis and need to stay in the hospital for further evaluation Platelets 061 462 - 304 E9/L    MPV 10.2 7.0 - 12.0 fL    Neutrophils % 56.9 43.0 - 80.0 %    Immature Granulocytes % 0.8 0.0 - 5.0 %    Lymphocytes % 24.7 20.0 - 42.0 %    Monocytes % 11.7 2.0 - 12.0 %    Eosinophils % 5.6 0.0 - 6.0 %    Basophils % 0.3 0.0 - 2.0 %    Neutrophils Absolute 2.15 1.80 - 7.30 E9/L    Immature Granulocytes # 0.03 E9/L    Lymphocytes Absolute 0.93 (L) 1.50 - 4.00 E9/L    Monocytes Absolute 0.44 0.10 - 0.95 E9/L    Eosinophils Absolute 0.21 0.05 - 0.50 E9/L    Basophils Absolute 0.01 0.00 - 0.20 E9/L   Comprehensive Metabolic Panel w/ Reflex to MG   Result Value Ref Range    Sodium 139 132 - 146 mmol/L    Potassium reflex Magnesium 4.0 3.5 - 5.0 mmol/L    Chloride 88 (L) 98 - 107 mmol/L    CO2 45 (HH) 22 - 29 mmol/L    Anion Gap 6 (L) 7 - 16 mmol/L    Glucose 124 (H) 74 - 99 mg/dL    BUN 38 (H) 8 - 23 mg/dL    CREATININE 1.7 (H) 0.5 - 1.0 mg/dL    GFR Non-African American 29 >=60 mL/min/1.73    GFR African American 35     Calcium 11.5 (H) 8.6 - 10.2 mg/dL    Total Protein 6.8 6.4 - 8.3 g/dL    Albumin 3.4 (L) 3.5 - 5.2 g/dL    Total Bilirubin 0.3 0.0 - 1.2 mg/dL    Alkaline Phosphatase 62 35 - 104 U/L    ALT 12 0 - 32 U/L    AST 25 0 - 31 U/L   Troponin   Result Value Ref Range    Troponin 0.04 (H) 0.00 - 0.03 ng/mL   Urinalysis, reflex to microscopic   Result Value Ref Range    Color, UA BLOODY Straw/Yellow    Clarity, UA TURBID (A) Clear    Glucose, Ur 100 (A) Negative mg/dL    Bilirubin Urine MODERATE (A) Negative    Ketones, Urine TRACE (A) Negative mg/dL    Specific Gravity, UA <=1.005 1.005 - 1.030    Blood, Urine LARGE (A) Negative    pH, UA >=9.0 5.0 - 9.0    Protein, UA >=300 (A) Negative mg/dL    Urobilinogen, Urine 1.0 <2.0 E.U./dL    Nitrite, Urine POSITIVE (A) Negative    Leukocyte Esterase, Urine LARGE (A) Negative   Protime-INR   Result Value Ref Range    Protime 12.0 9.3 - 12.4 sec    INR 1.0    TSH without Reflex   Result Value Ref Range    TSH 2.740 0.270 - 4.200 uIU/mL   Arterial Blood Gas, Respiratory Only   Result Value Ref Range    Source: Arterial     FIO2 Arterial 3.0     pH, Blood Gas 7.345 (L) 7.350 - 7.450    pCO2, Arterial 98.3 (HH) 35.0 - 45.0 mmHg    pO2, Arterial 133.7 (H) 60.0 - 80.0 mmHg    HCO3, Arterial 53.6 (H) 22.0 - 26.0 mmol/L    B.E. 22.3 (H) -3.0 - 0.0 mmol/L    O2 Sat 98.5 92.0 - 98.5 %          DEVICE 15,065,521,400,820     Critical Notification Yes    Blood Gas, Arterial   Result Value Ref Range    Date Analyzed [id number]     Time Analyzed 1013     Source: Blood Arterial     pH, Blood Gas 7.374 7.350 - 7.450    PCO2 87.4 (HH) 35.0 - 45.0 mmHg    PO2 59.4 (L) 75.0 - 100.0 mmHg    HCO3 49.8 (H) 22.0 - 26.0 mmol/L    B.E. 20.1 (H) -3.0 - 3.0 mmol/L    O2 Sat 89.1 (L) 92.0 - 98.5 %    O2Hb 87.7 (L) 94.0 - 97.0 %    COHb 1.5 0.0 - 1.5 %    MetHb 0.1 0.0 - 1.5 %    O2 Content 15.5 mL/dL    HHb 10.7 (H) 0.0 - 5.0 %    tHb (est) 12.6 11.5 - 16.5 g/dL    Mode NC-  3L     Date Of Collection      Time Collected      Pt Temp 37.0 C     ID [id number]     Lab [id number]     Critical(s) Notified Handed report to Dr/RN    Troponin   Result Value Ref Range    Troponin 0.04 (H) 0.00 - 0.03 ng/mL   Microscopic Urinalysis   Result Value Ref Range    WBC, UA 10-20 (A) 0 - 5 /HPF    RBC, UA >20 0 - 2 /HPF    Bacteria, UA MANY (A) None Seen /HPF    Amorphous, UA FEW     Crystals, UA Few (A) None Seen /HPF   POCT Glucose   Result Value Ref Range    Glucose 124 mg/dL    QC OK? ok    POCT Glucose   Result Value Ref Range    Meter Glucose 124 (H) 74 - 99 mg/dL   POCT Glucose   Result Value Ref Range    Meter Glucose 206 (H) 74 - 99 mg/dL   POCT Glucose   Result Value Ref Range    Meter Glucose 240 (H) 74 - 99 mg/dL   EKG 12 Lead   Result Value Ref Range    Ventricular Rate 88 BPM    Atrial Rate 326 BPM    QRS Duration 124 ms    Q-T Interval 416 ms    QTc Calculation (Bazett) 503 ms    R Axis -30 degrees    T Axis -8 degrees       RADIOLOGY:    All Radiology results interpreted by Radiologist unless otherwise noted. CT HEAD WO CONTRAST   Final Result   1. Slightly limited exam   2. Sequela of atherosclerotic arterial and chronic microvascular ischemic   disease/remote infarction, as described. Hyperacute/acute-on-chronic   infarction is difficult to exclude by this exam.   3.  Age-appropriate, generalized parenchymal volume loss. 4.  Paranasal sinus, bilateral mastoid air cell complex/middle ear cavity,   bilateral external auditory canal, and dental disease, as described. .   RECOMMENDATIONS:   1. If unexplained symptoms persist, consider MRI of the brain, without and   with contrast, for further evaluation. 2.   Impression 4: Correlate with ENT exam.   .      XR CHEST PORTABLE   Final Result   1. Somewhat limited exam   2. Borderline cardiomegaly and equivalent central pulmonary vascular   fullness, with trace bibasilar subsegmental atelectasis versus infiltrate. 3.  Otherwise, no significant change. .          EKG: This EKG is signed and interpreted by ED Physician. Time:  0730   Rate: 88  Rhythm: Atrial fibrillation. Interpretation: Atrial fibrillation, chronic. QTc 503. Left axis deviation. Right bundle branch block. Comparison: changes compared to previous EKG.    ---------------------------- NURSING NOTES AND VITALS REVIEWED -------------------------   The nursing notes within the ED encounter and vital signs as below have been reviewed.    BP (!) 141/61   Pulse 101   Temp 97.6 °F (36.4 °C) (Oral)   Resp 20   Ht 5' 1\" (1.549 m)   Wt 256 lb 12.8 oz (116.5 kg)   LMP  (LMP Unknown)   SpO2 96%   BMI 48.52 kg/m²   Oxygen Saturation Interpretation: Normal      ------------------------------------------PROGRESS NOTES -------------------------------------------    ED COURSE MEDICATIONS:                Medications   bumetanide (BUMEX) tablet 1 mg (1 mg Oral Given 2/17/21 1419)   calcitRIOL (ROCALTROL) capsule 0.5 mcg (0.5 mcg Oral Given 2/17/21 1427)   fluticasone (FLONASE) 50 MCG/ACT nasal spray 1 spray (1 spray Each Nostril Given 2/17/21 1419)   gabapentin (NEURONTIN) capsule 300 mg (has no administration in time range)   hydrOXYzine (ATARAX) tablet 10 mg (has no administration in time range)   levothyroxine (SYNTHROID) tablet 25 mcg (25 mcg Oral Given 2/17/21 1419)   lidocaine 4 % external patch 1 patch (1 patch Transdermal Patch Applied 2/17/21 1416)   melatonin tablet 10 mg (has no administration in time range)   metOLazone (ZAROXOLYN) tablet 5 mg (5 mg Oral Given 2/17/21 1427)   metoprolol succinate (TOPROL XL) extended release tablet 50 mg (50 mg Oral Given 2/17/21 1419)   pantoprazole (PROTONIX) tablet 40 mg (40 mg Oral Given 2/17/21 1617)   glucose (GLUTOSE) 40 % oral gel 15 g (has no administration in time range)   dextrose 50 % IV solution (has no administration in time range)   glucagon (rDNA) injection 1 mg (has no administration in time range)   dextrose 5 % solution (has no administration in time range)   insulin lispro (HUMALOG) injection vial 0-12 Units (4 Units Subcutaneous Given 2/17/21 1617)   insulin lispro (HUMALOG) injection vial 0-6 Units (has no administration in time range)   ipratropium-albuterol (DUONEB) nebulizer solution 1 ampule (1 ampule Inhalation Given 2/17/21 1555)   sodium chloride flush 0.9 % injection 10 mL (has no administration in time range)   sodium chloride flush 0.9 % injection 10 mL (has no administration in time range)   acetaminophen (TYLENOL) tablet 650 mg (has no administration in time range)     Or   acetaminophen (TYLENOL) suppository 650 mg (has no administration in time range)   polyethylene glycol (GLYCOLAX) packet 17 g (has no administration in time range)   trimethobenzamide (TIGAN) injection 200 mg (has no administration in time range)   cetirizine (ZYRTEC) tablet 5 mg (5 mg Oral Given 2/17/21 1419)   trospium (SANCTURA) tablet 20 mg (has no administration in time range)   atorvastatin (LIPITOR) tablet 20 mg (has no administration in time range)   apixaban (ELIQUIS) tablet 5 mg (5 mg Oral Given 2/17/21 1419)   cefTRIAXone (ROCEPHIN) 1,000 mg in sterile water 10 mL IV syringe (has no administration in time range)   ipratropium-albuterol (DUONEB) nebulizer solution 1 ampule (1 ampule Inhalation Given 2/17/21 3195)       CONSULTATIONS:            Consultation:  I Spoke with Dr. Romeo Angeles (pulmonology). Discussed case. They will provide consultation. Consultation:  I Spoke with di, on with Dr. Lydia Mccall (Medicine). Discussed case. They will admit this patient. Patrick Bennett PROCEDURES:            none. COUNSELING:   I have spoken with the patient and discussed todays results, in addition to providing specific details for the plan of care and counseling regarding the diagnosis and prognosis.     --------------------------------------- IMPRESSION & DISPOSITION --------------------------------     IMPRESSION(s):  1. Acute on chronic respiratory failure with hypercapnia (HCC)    2. Subtherapeutic anticoagulation    3. Elevated troponin        This patient's ED course included: a personal history and physicial examination, re-evaluation prior to disposition, multiple bedside re-evaluations, cardiac monitoring and continuous pulse oximetry    This patient has been closely monitored during their ED course. DISPOSITION:  Disposition: Admit to telemetry. Patient condition is stable. END OF PROVIDER NOTE.             GIOVANY Aguilar DO  Resident  02/17/21 5448

## 2021-02-18 NOTE — CARE COORDINATION
2/18/2021  Social Work Discharge Planning:Pt is from [place] at [facility] and per [facility] is a bedhold and has a trilogy there. Will need a precert and a COVID test. SW confirmed with spouse that plan is for Pt to return. N-17 generated and transport form is in chart. Awaiting therapy evals. Electronically signed by THEODORE Dempsey on 2/18/2021 at 12:48 PM'

## 2021-02-18 NOTE — CONSULTS
2/18/2021 2:41 PM  Service: Urology  Group: [facility] (Anatoliy/Johnson/Christine)    Raffaele Maxwell  [id number]     Chief Complaint:    Staghorn Calculi, UTI       History of Present Illness: The patient is a 80 y.o. female patient who presented to the hospital 1 day ago with complaints of shortness of breath. She was admitted for acute on chronic hypoxemia respiratory failure. She complained of lower abdominal pain and nausea and a CT abdomen pelvis was performed that showed bilateral nonobstructing renal calculi. Urine culture is +Proteus. Currently I am unable to obtain any information from her. She is very lethargic. Per nursing she just received Morphine as well. Past Medical History:   Diagnosis Date    Anemia due to acute blood loss 12/29/2017    Arthritis     Blood transfusion reaction     Chronic atrial fibrillation (HCC) 12/29/2017    CKD (chronic kidney disease) stage 3, GFR 30-59 ml/min 12/29/2017    Diabetes mellitus ([address]     History of blood transfusion     History of breast cancer     breast cancer Right    History of stroke     Hyperlipidemia     Hypertension     Hypothyroidism     Volume overload 12/30/2017    As cause of dyspnea         Past Surgical History:   Procedure Laterality Date    APPENDECTOMY      BREAST SURGERY      right    BRONCHOSCOPY  4/28/2018    BRONCHOSCOPY DIAGNOSTIC performed by Young Bejarano MD at [address] [place] [address] NASAL SINUS SURGERY      august 27 2017. cauterized her nasal passage.  OTHER SURGICAL HISTORY  07/27/2017    endoscopic conrtol of epistaxis dr [place] N/A 4/28/2018    ENDOSCOPIC GUIDED CONTROL EPISTAXIS  WITH SEPTAL BUTTON PLACEMENT. INTRA-OPERATIVE BRONCHOSCOPY.  performed by Young Bejarano MD at [facility] OR       Medications Prior to Admission:    Medications Prior to Admission: apixaban (ELIQUIS) 5 MG TABS tablet, Take 5 mg by mouth 2 times daily  hydrOXYzine (ATARAX) 25 MG tablet, Take 25 mg by mouth nightly Bedtime for insomnia  metOLazone (ZAROXOLYN) 5 MG tablet, Take 1 tablet by mouth daily  polyethylene glycol (GLYCOLAX) 17 GM/SCOOP powder, Take 17 g by mouth daily  bisacodyl (DULCOLAX) 10 MG suppository, Place 10 mg rectally daily as needed for Constipation  warfarin (COUMADIN) 2 MG tablet, Take 1 tablet by mouth daily (Patient taking differently: Take 2 mg by mouth nightly )  metoprolol succinate (TOPROL XL) 50 MG extended release tablet, Take 1 tablet by mouth daily (Patient taking differently: Take 50 mg by mouth every morning )  bumetanide (BUMEX) 1 MG tablet, Take 1 tablet by mouth 2 times daily (Patient taking differently: Take 1 mg by mouth 2 times daily Rise and noon)  Multiple Vitamins-Minerals (THERAPEUTIC MULTIVITAMIN-MINERALS) tablet, Take 1 tablet by mouth daily  lidocaine 4 % external patch, Place 1 patch onto the skin daily Remove at HS  potassium chloride (KLOR-CON M) 10 MEQ extended release tablet, Take 10 mEq by mouth 2 times daily  fluticasone (FLONASE) 50 MCG/ACT nasal spray, 1 spray by Each Nostril route daily  calcitRIOL (ROCALTROL) 0.25 MCG capsule, Take 0.5 mcg by mouth daily   loratadine (CLARITIN) 10 MG tablet, Take 10 mg by mouth daily  melatonin 5 MG TABS tablet, Take 10 mg by mouth nightly   guaiFENesin (MUCINEX) 600 MG extended release tablet, Take 600 mg by mouth 2 times daily as needed   Dulaglutide (TRULICITY) 1.5 NT/6.7FX SOPN, Inject 1.5 mg into the skin once a week Indications: weekly on Thursday  Cholecalciferol (VITAMIN D3) 5000 units TABS, Take 5,000 Units by mouth daily   insulin lispro (HUMALOG) 100 UNIT/ML injection vial, Take 12 units before meals + sliding scale.  MAX 60 units daily (Patient taking differently: Inject 12 Units into the skin 3 times daily (before meals) 12 units tid with meals plus sliding scale)  insulin glargine (TOUJEO SOLOSTAR) 300 UNIT/ML injection pen, Inject 50 Units into the skin nightly  ferrous sulfate 325 (65 Fe) MG tablet, Take 1 tablet by mouth 2 times daily (with meals)  magnesium hydroxide (MILK OF MAGNESIA) 400 MG/5ML suspension, Take 30 mLs by mouth daily as needed for Constipation  docusate sodium (COLACE, DULCOLAX) 100 MG CAPS, Take 200 mg by mouth nightly  levothyroxine (SYNTHROID) 25 MCG tablet, Take 1 tablet by mouth daily (Patient taking differently: Take 25 mcg by mouth every morning )  Mirabegron ER 50 MG TB24, Take 50 mg by mouth Daily with supper   pantoprazole (PROTONIX) 40 MG tablet, Take 1 tablet by mouth 2 times daily (before meals). (Patient taking differently: Take 40 mg by mouth 2 times daily )  simvastatin (ZOCOR) 40 MG tablet, Take 40 mg by mouth nightly.  gabapentin (NEURONTIN) 300 MG capsule, Take 300 mg by mouth nightly. Jayleen Haynes albuterol-ipratropium (COMBIVENT RESPIMAT)  MCG/ACT AERS inhaler, Inhale 1 puff into the lungs every 6 hours  acetaminophen (TYLENOL) 325 MG tablet, Take 650 mg by mouth every 6 hours as needed for Pain  hydrOXYzine (ATARAX) 10 MG tablet, Take 10 mg by mouth 3 times daily as needed for Itching    Allergies:    Pcn [penicillins]    Social History:    reports that she has never smoked. She has never used smokeless tobacco. She reports previous alcohol use. She reports that she does not use drugs. Family History:   Non-contributory to this Urological problem  family history includes Diabetes in her father and mother; Heart Disease in her mother; Kidney Disease in her mother; Stroke in her father. Review of Systems:  Unable to obtain due to lethargy     Physical Exam:     Vitals:  BP (!) 140/77   Pulse 94   Temp 98 °F (36.7 °C) (Oral)   Resp 18   Ht 5' 1\" (1.549 m)   Wt 260 lb (117.9 kg)   LMP  (LMP Unknown)   SpO2 96%   BMI 49.13 kg/m²     General: sleeping, eyes open to tactile stimuli  HEENT:  Normocephalic, atraumatic. Lungs:  Respirations symmetric and non-labored.   Abdomen:  soft, nontender, no masses  Extremities:  No clubbing, cyanosis, or edema  Skin:  Warm and dry, no open lesions or rashes  Neuro: There are no motor or sensory deficits in the 4 quadrant extremities   Rectal: deferred  Genitourinary:  No douglas     Labs:     Recent Labs     02/17/21  0644 02/18/21  0423   WBC 3.8* 5.2   RBC 3.86 3.88   HGB 12.0 11.9   HCT 39.2 39.1   .6* 100.8*   MCH 31.1 30.7   MCHC 30.6* 30.4*   RDW 13.3 13.2    203   MPV 10.2 10.0         Recent Labs     02/17/21  0644 02/18/21  0423   CREATININE 1.7* 1.7*       Imaging:   Narrative   EXAMINATION:   CT OF THE ABDOMEN AND PELVIS WITHOUT CONTRAST 2/18/2021 10:01 am   TECHNIQUE:   CT of the abdomen and pelvis was performed without the administration of   intravenous contrast. Multiplanar reformatted images are provided for review. Dose modulation, iterative reconstruction, and/or weight based adjustment of   the mA/kV was utilized to reduce the radiation dose to as low as reasonably   achievable. COMPARISON:   CT abdomen and pelvis, July 28, 2019   HISTORY:   ORDERING SYSTEM PROVIDED HISTORY: pain   TECHNOLOGIST PROVIDED HISTORY:   Reason for exam:->pain   FINDINGS:   Lower Chest: Small right and small to moderate left pleural effusions. Compressive atelectasis in the lower lobes, left greater than right.  The   heart is normal in size.  Prominent calcifications seen along the mitral   annulus. Organs:   Liver: Unremarkable. Gallbladder: Surgically absent. Pancreas: Severely atrophic.  No mass, ductal dilatation or edema is seen. Spleen:  Unremarkable. Adrenals: Unremarkable. Kidneys: Bilateral renal cortical atrophy, especially severe on the left. Staghorn calculi are seen in the kidneys bilaterally, with the larger one on   the right measuring approximately 2.2 cm in length.  No ureterolithiasis or   hydronephrosis. GI/Bowel: Moderate distal colonic diverticulosis without diverticulitis.  No   bowel wall thickening or obstruction. Pelvis: The urinary bladder is unremarkable.  Arcuate artery calcifications   seen in the uterus, which is otherwise grossly unremarkable by CT. Peritoneum/Retroperitoneum: No lymphadenopathy. No free air or free fluid is   seen.  Prominent calcified atherosclerosis is seen in the aorta.  No aneurysm. Bones/Soft Tissues: The visualized bones are intact without fracture or focal   lesion.  Prominent degenerative changes are seen in the lumbar spine.  There   is a chronic compression fracture deformity of the L2 vertebral body with   approximately 50% loss of height.       Impression   1.  No acute abnormality is seen in the abdomen or the pelvis. 2. Bilateral nephrolithiasis.  The right renal staghorn calculus has   significantly enlarged since 2019.  No hydronephrosis. 3. Renal cortical atrophy, especially prominent on the left. 4. Small right and small to moderate left pleural effusions with overlying   compressive atelectasis. 5. Moderate distal colonic diverticulosis without diverticulitis               Assessment/plan:  Right Renal calculi  Left renal calculi   Proteus UTI       Creatinine baseline  No leukocytosis  Reviewed  Urine culture +Proteus with sensitivities pending  Cont antibiotics per primary   CT abdomen pelvis reviewed. Bilateral non-obstructing renal calculi. There is no hydronephrosis noted. Stones can continue to be managed nonoperatively at this time  Bladder PVR ordered   Urine Cytology ordered   Cont antibiotics per primary   No acute  interventions at this time  Stones can be monitored as an outpatient   Please call with any further questions or concerns                 Electronically signed by SAVANNA Weinberg CNP on 2/18/2021 at 2:41 PM         The patient was seen and examined. I have reviewed the medical record in detail. I agree with the plan as outlined by EILEEN Weinberg.     Lila Rahman MD  5:05 PM  2/18/2021

## 2021-02-18 NOTE — PROGRESS NOTES
Physical Therapy    Facility/Department: [address] INTERNAL MEDICINE 2  Initial Assessment    NAME: Amara Oconnell  : 1935  MRN: [id number]    Date of Service: 2021       REQUIRES PT FOLLOW UP: Yes       Patient Diagnosis(es): The primary encounter diagnosis was Acute on chronic respiratory failure with hypercapnia ([facility] [address] Diagnoses of Subtherapeutic anticoagulation, Elevated troponin, and Urinary tract infection in female were also pertinent to this visit. has a past medical history of Anemia due to acute blood loss, Arthritis, Blood transfusion reaction, Chronic atrial fibrillation ([address] CKD (chronic kidney disease) stage 3, GFR 30-59 ml/min, Diabetes mellitus ([facility] [address] History of blood transfusion, History of breast cancer, History of stroke, Hyperlipidemia, Hypertension, Hypothyroidism, and Volume overload.   has a past surgical history that includes Breast surgery; Cholecystectomy; Gastric bypass surgery; other surgical history (2017); Nasal sinus surgery; pr ctrl nosebleed,anter,complex (N/A, 2018); bronchoscopy (2018); and Appendectomy.         Evaluating Therapist: Mauro Ley PT      Referring Provider:              Room #: 315   DIAGNOSIS : acute on chronic resp failure   Additional Pertinent History:A-fib, CKD, HTN, Gastric bypass, CHF  PRECAUTIONS: falls, O2     Social:  Per chart pt admitted from nursing home. Pt reports she is bed bound and does not get OOB. Pt's son present and confirms        Initial Evaluation  Date: 2021 Treatment      Short Term/ Long Term   Goals   Was pt agreeable to Eval/treatment?  With encouragement       Does pt have pain? abd pain , RN informed        Bed Mobility  Rolling: max assist  Supine to sit: max assist   Sit to supine: max assist   Scooting: max assist x 2    Mod assist   Transfers NT .    N/A , Nate    Ambulation    NT   N/A   LE strength     3- to 3+/5     3+ to 4-/5   AM-PAC Raw score

## 2021-02-18 NOTE — PROGRESS NOTES
bed mobility. Long-Term Goal: Patient will increase functional independence to PLOF in order to allow patient to live in least restrictive environment. Strength: ROM: Additional Information:    R UE  Shoulder: 3/5  Distally: 3+/5 0 - 90 degrees of active-assisted shoulder flexion; distal AROM WFL    L UE Shoulder: 2-/5  Distally: 3/5 grossly  0 - 45 degrees of active-assisted shoulder flexion; distal AROM WFL Patient reported experiencing L shoulder pain with gentle AAROM. Hand Dominance: Right    Hearing: Impaired  Sensation: No complaints of numbness/tingling in B UEs. Tone: WFL  Edema: No (B UEs)    Comments: Upon arrival, patient supine in bed with family member present. At end of session, patient supine in bed with call light and phone within reach, family member present, and all lines and tubes intact. Patient would benefit from continued skilled OT to increase safety and independence with completion of ADL/IADL tasks for functional independence and quality of life.      Assessment of Current Deficits:   Functional Mobility [x]  ADLs [x] Strength [x]  Cognition []  Functional Transfers  [x] IADLs [x] Safety Awareness [x]  Endurance [x]  Fine Motor Coordination [] Balance [x] Vision/Perception [] Sensation []   Gross Motor Coordination [] ROM [] Delirium []                  Motor Control []    Plan of Care: 1-3 days/week for 1-2 weeks PRN  [x]ADL retraining/adaptive techniques and AE recommendations to increase functional independence within precautions  [x]Energy conservation techniques to improve tolerance for ADLs/IADLs  [x]Functional transfer/mobility training/DME recommendations for increased independence, safety and fall prevention  [x]Patient/family education to increase safety and functional independence  [x]Environmental modifications for safe mobility and completion of ADLs/IADLs  []Cognitive retraining exercises to improve problem solving skills & safe participation in ADLs/IADLs []Sensory re-education techniques to improve extremity awareness, maintain skin integrity and improve hand function   []Visual/Perceptual retraining  to improve body awareness and safety during transfers and ADLs   []Splinting/positioning needs to maintain joint/skin integrity and prevent contractures   [x]Therapeutic activity to improve functional performance during ADLs/IADLs  [x]Therapeutic exercise to improve tolerance and functional strength for ADLs/IADLs  [x]Balance retraining/tolerance tasks for facilitation of postural control with dynamic challenges during ADLs   [x]Neuromuscular re-education: facilitate righting/equilibrium reactions, midline orientation, scapular stability/mobility, normalize muscle tone, and facilitate active functional movement/attention  []Delirium prevention/treatment   []Positioning to improve functional independence and prevent skin breakdown   []Other:     Rehab Potential: Good for established goals. Patient / Family Goal: Patient did not state a goal.  Patient and/or family were instructed on functional diagnosis, prognosis/goals, and OT plan of care. Demonstrated Fair understanding. Eval Complexity: Low    Time In: 1133  Time Out: 1148  Total Treatment Time: 0 minutes      Minutes Units   OT Eval Low 95232 15 1   OT Eval Medium 33347     OT Eval High 31281     OT Re-Eval [id number]     Therapeutic Ex 24401     Therapeutic Activities 35399     ADL/Self Care 79967     Orthotic Management 15667     Neuro Re-Ed 77411     Non-Billable Time N/A ---     Evaluation time includes thorough review of current medical information, gathering information on past medical history/social history and prior level of function, completion of standardized testing/informal observation of tasks, assessment of data, and education on plan of care and goals. Daniela Frances, OTR/L  License Number: [id number]

## 2021-02-18 NOTE — PROGRESS NOTES
RN advised that the patient was nauseous with lower abdominal pain.  Encouraged to try tigan as ordered and CT abdomen/pelvis ordered

## 2021-02-18 NOTE — CONSULTS
Effie Moseley M.D.,[facility]  Sloan Brar D.O., F.A.C.O.I., Higinio Smith M.D. Adi Christensen M.D., Kisha Amaya M.D. Saeid Madison D.O. Patient:  Mary Avila 80 y.o. female MRN: [id number]     Date of Service: 2/18/2021      PULMONARY CONSULTATION    Reason for Consultation: hypercapnia   Referring Physician: Dr. Clementina Solorio DO    Communication with the referring physician will be sent via the electronic medical record. Chief Complaint: shortness of breath      CODE STATUS: DNR CCA    SUBJECTIVE:  HPI:  Mary Avila is a 80 y.o.  female who asked to evaluate for hypercapnia. 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 She has undergone left side thoracentesis 450 cc removed in 2018 by Dr. Crockett Cargo,  pleural fluid was considered transudate related to heart failure cytology cultures negative. She has also undergone bronchoscopy in the past in 2018 during ICU stay she was intubated for an ENT procedure. Bronchoscopy was emergent because of difficulty ventilating patient, which only showed dynamic airway collapse. We previously ordered her a noninvasive home ventilator to be used at her nursing facility. She has been compliant with this.     Her past medical history includes chronic anemia, stroke, diabetes mellitus type 2, CHF, chronic A. fib on Coumadin, history of blood transfusions, breast cancer right breast, history of stroke, hypertension, hyperlipidemia, hypothyroidism, volume overload, chronic kidney disease, pleural effusions, arthritis, gastric bypass surgery, nasal sinus surgery, and CTRL NOSEBLEED,ANTER,COMPLEX N/A 4/28/2018    ENDOSCOPIC GUIDED CONTROL EPISTAXIS  WITH SEPTAL BUTTON PLACEMENT. INTRA-OPERATIVE BRONCHOSCOPY. performed by Vince Boswell MD at [facility] OR       Family History   Problem Relation Age of Onset    Heart Disease Mother     Kidney Disease Mother         dialysis    Diabetes Mother     Stroke Father     Diabetes Father        Social History:   Social History     Socioeconomic History    Marital status:      Spouse name: Not on file    Number of children: Not on file    Years of education: Not on file    Highest education level: Not on file   Occupational History    Not on file   Social Needs    Financial resource strain: Not on file    Food insecurity     Worry: Not on file     Inability: Not on file    Transportation needs     Medical: Not on file     Non-medical: Not on file   Tobacco Use    Smoking status: Never Smoker    Smokeless tobacco: Never Used   Substance and Sexual Activity    Alcohol use: Not Currently     Alcohol/week: 0.0 standard drinks     Comment:      Drug use: Never    Sexual activity: Never   Lifestyle    Physical activity     Days per week: Not on file     Minutes per session: Not on file    Stress: Not on file   Relationships    Social connections     Talks on phone: Not on file     Gets together: Not on file     Attends Protestant service: Not on file     Active member of club or organization: Not on file     Attends meetings of clubs or organizations: Not on file     Relationship status: Not on file    Intimate partner violence     Fear of current or ex partner: Not on file     Emotionally abused: Not on file     Physically abused: Not on file     Forced sexual activity: Not on file   Other Topics Concern    Not on file   Social History Narrative    2 cups coffee daily     Smoking history: The patient is a lifelong non-smoker    ETOH:   reports previous alcohol use. Exposures:  There  is not history of TB or TB daily  melatonin 5 MG TABS tablet, Take 10 mg by mouth nightly   guaiFENesin (MUCINEX) 600 MG extended release tablet, Take 600 mg by mouth 2 times daily as needed   Dulaglutide (TRULICITY) 1.5 IO/6.5FR SOPN, Inject 1.5 mg into the skin once a week Indications: weekly on Thursday  Cholecalciferol (VITAMIN D3) 5000 units TABS, Take 5,000 Units by mouth daily   insulin lispro (HUMALOG) 100 UNIT/ML injection vial, Take 12 units before meals + sliding scale. MAX 60 units daily (Patient taking differently: Inject 12 Units into the skin 3 times daily (before meals) 12 units tid with meals plus sliding scale)  insulin glargine (TOUJEO SOLOSTAR) 300 UNIT/ML injection pen, Inject 50 Units into the skin nightly  ferrous sulfate 325 (65 Fe) MG tablet, Take 1 tablet by mouth 2 times daily (with meals)  magnesium hydroxide (MILK OF MAGNESIA) 400 MG/5ML suspension, Take 30 mLs by mouth daily as needed for Constipation  docusate sodium (COLACE, DULCOLAX) 100 MG CAPS, Take 200 mg by mouth nightly  levothyroxine (SYNTHROID) 25 MCG tablet, Take 1 tablet by mouth daily (Patient taking differently: Take 25 mcg by mouth every morning )  Mirabegron ER 50 MG TB24, Take 50 mg by mouth Daily with supper   pantoprazole (PROTONIX) 40 MG tablet, Take 1 tablet by mouth 2 times daily (before meals). (Patient taking differently: Take 40 mg by mouth 2 times daily )  simvastatin (ZOCOR) 40 MG tablet, Take 40 mg by mouth nightly.  gabapentin (NEURONTIN) 300 MG capsule, Take 300 mg by mouth nightly. Adonna Dayhoff   albuterol-ipratropium (COMBIVENT RESPIMAT)  MCG/ACT AERS inhaler, Inhale 1 puff into the lungs every 6 hours  acetaminophen (TYLENOL) 325 MG tablet, Take 650 mg by mouth every 6 hours as needed for Pain  hydrOXYzine (ATARAX) 10 MG tablet, Take 10 mg by mouth 3 times daily as needed for Itching    CURRENT MEDS :  Scheduled Meds:   cefTRIAXone (ROCEPHIN) IV  1,000 mg Intravenous Q24H    calcitRIOL  0.5 mcg Oral Daily    fluticasone  1 spray Each 96 %                Physical Exam:  General: The patient is lying in bed comfortably without any distress. Breathing is not labored  HEENT: Pupils are equal round and reactive to light, there are no oral lesions and no post-nasal drip   Neck: supple without adenopathy  Cardiovascular: regular rate and rhythm without murmur or gallop  Respiratory: Clear to auscultation bilaterally without wheezing or crackles. Air entry is symmetric  Abdomen: soft, non-tender, non-distended, normal bowel sounds  Extremities: warm, no edema, no clubbing  Skin: no rash or lesion  Neurologic: CN II-XII grossly intact, no focal deficits    Pulmonary Function Testing none on file      Imaging personally reviewed:  2/18/21  CT abdomen lung windows  FINDINGS:   Lower Chest: Small right and small to moderate left pleural effusions. Compressive atelectasis in the lower lobes, left greater than right.  The   heart is normal in size.  Prominent calcifications seen along the mitral   annulus. 2/17 CXR  FINDINGS:   This exam is somewhat limited by patient body habitus, as well as AP   portable, semi-upright technique.  The right costophrenic angle is also   inadvertently partially excluded. There is borderline cardiomegaly and equivalent central pulmonary vascular   fullness, with trace bibasilar subsegmental atelectasis versus infiltrate. Minimal probable epicardial fat partially silhouettes the cardiac apex. Minimal to mild scattered atherosclerotic calcifications are most notable   within the aortic arch. There is unchanged osteopenia osseous degenerative disease. No other clinically-significant changes are noted. .       Impression   1.  Somewhat limited exam   2.  Borderline cardiomegaly and equivalent central pulmonary vascular   fullness, with trace bibasilar subsegmental atelectasis versus infiltrate. 3.  Otherwise, no significant change.    .         Echo: 8/14/2020  Conclusions      Summary   Technically failure with hypoxia and hypercapnia  2. GOSIA not compliant with NIV  3. Small bilateral pleural effusions with compressive atelectasis -left greater than right. 4. Pulmonary hypertension WHO class II/III  5. Congestive heart failure-Echo August 2020 with worsening RV function compared to 2018  6. A. fib -on Coumadin, subtherapeutic INR  7. Chronic kidney disease stage III  8. Diabetes mellitus type 2  9. Morbid obesity  10. History of right breast cancer  11. GERD  12. Altered mental status likely secondary to UTI/?pyelonephritis    Plan:  1. Oxygen therapy 3 L nasal cannula  2. ABG with hypercapnia-compensated  3. Noninvasive ventilation in the form of AVAPS at at bedtime and during daytime with naps  4. Urine culture pending placed on Rocephin per PCP  5. Await cultures, procalcitonin pending  6. CT abd lung windows with small effusions L>R with compressive atelectasis. Add recruitment techniques, IS, EZPAP  7. DuoNeb's every 4 hours while awake  8. DVT, GI prophylaxis. Subtherapeutic INR 1.1, switch to Eliquis 5 mg twice daily  9. Gabapentin for peripheral neuropathy leg spasming  10. Gentle hydration per primary team      Thank you for allowing me to participate in the care of [facility]. Please feel free to call with questions. This plan of care was reviewed in collaboration with Dr. Felton Polk     Electronically signed by SAVANNA Angel CNP on 2/18/2021 at 12:29 PM      I personally saw, examined, and cared for the patient. Labs, medications, radiographs reviewed. I agree with history exam and plans detailed in NP note. Patient is not willing to trial niv to help improve ventilation status when I explained that rising co2 may be her demise she stated \" im ok with that im not worried about it at all. Meli Ordonez \"   A tracheostomy would remedy the upper airway obstruction. She would never agree to this. Consult palliative care     Rudolph Little M.D.    Pulmonary/Critical Care Medicine

## 2021-02-18 NOTE — DISCHARGE INSTR - COC
Continuity of Care Form    Patient Name: Aziza Almanzar   :  1935  MRN:  [id number]    Admit date:  2021  Discharge date:  21    Code Status Order: DNR-CCA   Advance Directives:   [facility] Documentation     Date/Time Healthcare Directive Type of Healthcare Directive Copy in [address] [place] [address] [address] Agent's Name Healthcare Agent's Phone Number    [phone]  No, patient does not have an advance directive for healthcare treatment -- -- -- -- --          Admitting Physician:  Mckenzie Presley MD  PCP: Leslee Sibley DO    Discharging Nurse: Tania Rosa Unit/Room#: 4450/2091-P  Discharging Unit Phone Number: [phone]    Emergency Contact:   Extended Emergency Contact Information  Primary Emergency Contact: Navarro Whittington [facility])  Address: [address] [place] [address] Dr Omkar Veloz [address] Phone: [phone]  Mobile Phone: [phone]  Relation: Child   needed? No  Secondary Emergency Contact: Wil Oron[address] Phone: [phone]  Mobile Phone: [phone]  Relation: Child   needed? No    Past Surgical History:  Past Surgical History:   Procedure Laterality Date    APPENDECTOMY      BREAST SURGERY      right    BRONCHOSCOPY  2018    BRONCHOSCOPY DIAGNOSTIC performed by Frank Dunn MD at [address] [place] [address] NASAL SINUS SURGERY      2017. cauterized her nasal passage.  OTHER SURGICAL HISTORY  2017    endoscopic conrtol of epistaxis dr Ashley Guerrero N/A 2018    ENDOSCOPIC GUIDED CONTROL EPISTAXIS  WITH SEPTAL BUTTON PLACEMENT. INTRA-OPERATIVE BRONCHOSCOPY.  performed by Frank Dunn MD at [facility] OR       Immunization History:   Immunization History   Administered Date(s) Administered    Influenza Vaccine, unspecified formulation 10/20/2017    Influenza Virus Vaccine (Ordered)   08/30/20 Rule-Out Test Resulted    COVID-19 Rule Out 08/24/20 08/24/20 08/24/20 COVID-19 (Ordered)   08/24/20 Rule-Out Test Resulted    COVID-19 Rule Out 08/13/20 08/13/20 08/13/20 COVID-19 (Ordered)   08/13/20 Rule-Out Test Resulted          Nurse Assessment:  Last Vital Signs: /77   Pulse 88   Temp 98 °F (36.7 °C) (Oral)   Resp 18   Ht 5' 1\" (1.549 m)   Wt 260 lb (117.9 kg)   LMP  (LMP Unknown)   SpO2 96%   BMI 49.13 kg/m²     Last documented pain score (0-10 scale): Pain Level: 10  Last Weight:   Wt Readings from Last 1 Encounters:   02/18/21 260 lb (117.9 kg)     Mental Status:  alert    IV Access:  - None    Nursing Mobility/ADLs:  Walking   Dependent  Transfer  Dependent  Bathing  Dependent  Dressing  Dependent  Toileting  Dependent  Feeding  Assisted  Med Admin  Assisted  Med Delivery   whole    Wound Care Documentation and Therapy:  Wound 08/15/20 Nose Mid (Active)   Number of days: 186        Elimination:  Continence:   · Bowel: No  · Bladder: No  Urinary Catheter: None   Colostomy/Ileostomy/Ileal Conduit: No       Date of Last BM: 2/22/21    Intake/Output Summary (Last 24 hours) at 2/18/2021 1252  Last data filed at 2/17/2021 2217  Gross per 24 hour   Intake --   Output 650 ml   Net -650 ml     I/O last 3 completed shifts:  In: -   Out: 650 [Urine:650]    Safety Concerns: At Risk for Falls    Impairments/Disabilities:      None    Nutrition Therapy:  Current Nutrition Therapy:   - Oral Diet:  Carb Control 4 carbs/meal (1800kcals/day)    Routes of Feeding: Oral  Liquids: Thin Liquids  Daily Fluid Restriction: no  Last Modified Barium Swallow with Video (Video Swallowing Test): not done    Treatments at the Time of Hospital Discharge:   Respiratory Treatments: ***  Oxygen Therapy:  is on oxygen at 2L L/min per nasal cannula.   Ventilator:    {MH CC Vent QLIY:501452944}    Rehab Therapies: Physical Therapy and Occupational Therapy  Weight Bearing Status/Restrictions: No weight bearing restirctions  Other Medical Equipment (for information only, NOT a DME order):  hospital bed  Other Treatments: ***    Patient's personal belongings (please select all that are sent with patient):  None    RN SIGNATURE:  Electronically signed by Ale Prescott RN on 2/22/21 at 2:56 PM EST    CASE MANAGEMENT/SOCIAL WORK SECTION    Inpatient Status Date: ***    Readmission Risk Assessment Score:  Readmission Risk              Risk of Unplanned Readmission:        30           Discharging to Facility/ Agency   · Name: Conner Kemp at [facility]  · QKKPJDM:[address] [place], [place] [address]Pola [place]  · QBD:[phone]  · Fax:[phone]    Dialysis Facility (if applicable)   · Name:  · Address:  · Dialysis Schedule:  · Phone:  · Fax:  / signature: Electronically signed by THEODORE Horner on 2/18/2021 at 12:52 PM      PHYSICIAN SECTION    Prognosis: {Prognosis:3662210465}    Condition at Discharge: [facility] Patient Condition:[id number]}    Rehab Potential (if transferring to Rehab): {Prognosis:3195207176}    Recommended Labs or Other Treatments After Discharge: ***    Physician Certification: I certify the above information and transfer of Carlos Alberto Lin  is necessary for the continuing treatment of the diagnosis listed and that she requires skilled snf for {LESS:395885370} 30 days.      Update Admission H&P: {CHP DME Changes in BKRPB:286987908}    PHYSICIAN SIGNATURE:  {Esignature:288116887}

## 2021-02-18 NOTE — H&P
[address] [facility]  History and Physical      CHIEF COMPLAINT:  Shortness of breath. Patient of Tete Reddy DO presents with:  Acute on chronic respiratory failure with hypercapnia ([facility] [address]    History of Present Illness:      Patient is an 80year old woman with a history of CVA, afib, CHF, HTN, DM. Patient was a transfer from facility with shortness of breath and leg spasms. Patient does not recall when she became short of breath and is unable to tell me how long it has been going on. Patient didn't understand what was happening when she woke up and there was a lot of people in her room that stated she was going to the ER. At this time patient is alert and oriented and denies any chest pain, shortness of breath, fever, or chills. Patient had a urinalysis and it patient is positive for a UTI. REVIEW OF SYSTEMS:  Pertinent negatives are above in HPI. 10 point ROS otherwise negative. Past Medical History:   Diagnosis Date    Anemia due to acute blood loss 12/29/2017    Arthritis     Blood transfusion reaction     Chronic atrial fibrillation (HCC) 12/29/2017    CKD (chronic kidney disease) stage 3, GFR 30-59 ml/min 12/29/2017    Diabetes mellitus ([facility] [address]     History of blood transfusion     History of breast cancer     breast cancer Right    History of stroke     Hyperlipidemia     Hypertension     Hypothyroidism     Volume overload 12/30/2017    As cause of dyspnea         Past Surgical History:   Procedure Laterality Date    APPENDECTOMY      BREAST SURGERY      right    BRONCHOSCOPY  4/28/2018    BRONCHOSCOPY DIAGNOSTIC performed by Rubi Calderon MD at [address] [place] [address] NASAL SINUS SURGERY      august 27 2017. cauterized her nasal passage.     OTHER SURGICAL HISTORY  07/27/2017    endoscopic conrtol of epistaxis dr Lalita Bell N/A 4/28/2018    ENDOSCOPIC GUIDED CONTROL EPISTAXIS  WITH SEPTAL BUTTON PLACEMENT. INTRA-OPERATIVE BRONCHOSCOPY.  performed by Kiara Washington MD at [facility] OR       Medications Prior to Admission:    Medications Prior to Admission: apixaban (ELIQUIS) 5 MG TABS tablet, Take 5 mg by mouth 2 times daily  hydrOXYzine (ATARAX) 25 MG tablet, Take 25 mg by mouth nightly Bedtime for insomnia  metOLazone (ZAROXOLYN) 5 MG tablet, Take 1 tablet by mouth daily  polyethylene glycol (GLYCOLAX) 17 GM/SCOOP powder, Take 17 g by mouth daily  bisacodyl (DULCOLAX) 10 MG suppository, Place 10 mg rectally daily as needed for Constipation  warfarin (COUMADIN) 2 MG tablet, Take 1 tablet by mouth daily (Patient taking differently: Take 2 mg by mouth nightly )  metoprolol succinate (TOPROL XL) 50 MG extended release tablet, Take 1 tablet by mouth daily (Patient taking differently: Take 50 mg by mouth every morning )  bumetanide (BUMEX) 1 MG tablet, Take 1 tablet by mouth 2 times daily (Patient taking differently: Take 1 mg by mouth 2 times daily Rise and noon)  Multiple Vitamins-Minerals (THERAPEUTIC MULTIVITAMIN-MINERALS) tablet, Take 1 tablet by mouth daily  lidocaine 4 % external patch, Place 1 patch onto the skin daily Remove at HS  potassium chloride (KLOR-CON M) 10 MEQ extended release tablet, Take 10 mEq by mouth 2 times daily  fluticasone (FLONASE) 50 MCG/ACT nasal spray, 1 spray by Each Nostril route daily  calcitRIOL (ROCALTROL) 0.25 MCG capsule, Take 0.5 mcg by mouth daily   loratadine (CLARITIN) 10 MG tablet, Take 10 mg by mouth daily  melatonin 5 MG TABS tablet, Take 10 mg by mouth nightly   guaiFENesin (MUCINEX) 600 MG extended release tablet, Take 600 mg by mouth 2 times daily as needed   Dulaglutide (TRULICITY) 1.5 QZ/0.8PW SOPN, Inject 1.5 mg into the skin once a week Indications: weekly on Thursday  Cholecalciferol (VITAMIN D3) 5000 units TABS, Take 5,000 Units by mouth daily   insulin lispro (HUMALOG) 100 UNIT/ML injection vial, Take 12 units before meals + sliding scale. MAX 60 units daily (Patient taking differently: Inject 12 Units into the skin 3 times daily (before meals) 12 units tid with meals plus sliding scale)  insulin glargine (TOUJEO SOLOSTAR) 300 UNIT/ML injection pen, Inject 50 Units into the skin nightly  ferrous sulfate 325 (65 Fe) MG tablet, Take 1 tablet by mouth 2 times daily (with meals)  magnesium hydroxide (MILK OF MAGNESIA) 400 MG/5ML suspension, Take 30 mLs by mouth daily as needed for Constipation  docusate sodium (COLACE, DULCOLAX) 100 MG CAPS, Take 200 mg by mouth nightly  levothyroxine (SYNTHROID) 25 MCG tablet, Take 1 tablet by mouth daily (Patient taking differently: Take 25 mcg by mouth every morning )  Mirabegron ER 50 MG TB24, Take 50 mg by mouth Daily with supper   pantoprazole (PROTONIX) 40 MG tablet, Take 1 tablet by mouth 2 times daily (before meals). (Patient taking differently: Take 40 mg by mouth 2 times daily )  simvastatin (ZOCOR) 40 MG tablet, Take 40 mg by mouth nightly.  gabapentin (NEURONTIN) 300 MG capsule, Take 300 mg by mouth nightly. Vonzella Goodpasture albuterol-ipratropium (COMBIVENT RESPIMAT)  MCG/ACT AERS inhaler, Inhale 1 puff into the lungs every 6 hours  acetaminophen (TYLENOL) 325 MG tablet, Take 650 mg by mouth every 6 hours as needed for Pain  hydrOXYzine (ATARAX) 10 MG tablet, Take 10 mg by mouth 3 times daily as needed for Itching    Note that the patient's home medications were reviewed and the above list is accurate to the best of my knowledge at the time of the exam.    Allergies:    Pcn [penicillins]    Social History:    reports that she has never smoked. She has never used smokeless tobacco. She reports previous alcohol use. She reports that she does not use drugs. Family History:   family history includes Diabetes in her father and mother; Heart Disease in her mother; Kidney Disease in her mother; Stroke in her father.       PHYSICAL EXAM:    Vitals:  /89   Pulse 97   Temp 97.8 °F (36.6 °C) (Oral) Resp 20   Ht 5' 1\" (1.549 m)   Wt 256 lb 12.8 oz (116.5 kg)   LMP  (LMP Unknown)   SpO2 95%   BMI 48.52 kg/m²       General appearance: NAD, conversant,   Eyes: Sclerae anicteric, PERRLA  HEENT: AT/NC, MMM  Neck: FROM, supple, no thyromegaly  Lymph: No cervical / supraclavicular lymphadenopathy  Lungs: Clear to auscultation, WOB normal  CV: afib, no MRGs, bilateral  lower extremity edema 1+ edema  Abdomen: Soft, non-tender; no masses or HSM, +BS  Extremities: Left shoulder limited ROM, wearing a lidocaine patch without synovitis. No clubbing or cyanosis of the hands. Skin: no rash, induration, lesions, or ulcers  Psych: Calm and cooperative. Normal judgement and insight. Normal mood and affect. Neuro: Alert and interactive, face symmetric, speech fluent. LABS:  All labs reviewed. Of note:  CBC with Differential:    Lab Results   Component Value Date    WBC 3.8 02/17/2021    RBC 3.86 02/17/2021    HGB 12.0 02/17/2021    HCT 39.2 02/17/2021     02/17/2021    .6 02/17/2021    MCH 31.1 02/17/2021    MCHC 30.6 02/17/2021    RDW 13.3 02/17/2021    NRBC 0.9 08/15/2020    LYMPHOPCT 24.7 02/17/2021    MONOPCT 11.7 02/17/2021    BASOPCT 0.3 02/17/2021    MONOSABS 0.44 02/17/2021    LYMPHSABS 0.93 02/17/2021    EOSABS 0.21 02/17/2021    BASOSABS 0.01 02/17/2021     BMP:    Lab Results   Component Value Date     02/17/2021    K 4.0 02/17/2021    CL 88 02/17/2021    CO2 45 02/17/2021    BUN 38 02/17/2021    LABALBU 3.4 02/17/2021    CREATININE 1.7 02/17/2021    CALCIUM 11.5 02/17/2021    GFRAA 35 02/17/2021    LABGLOM 29 02/17/2021    GLUCOSE 124 02/17/2021       Imaging:  Cardiomegaly and equivalent central pulmonary vascular fullness, with tract of bibasilar subsegmental atelectasis versus infiltrate. EKG:   Afib with PVC's    ASSESSMENT/PLAN:  Principal Problem:    Acute on chronic respiratory failure with hypercapnia (HCC)  Active Problems:    CKD (chronic kidney disease) stage 3, GFR 30-59 ml/min (HCC)    Diabetes mellitus type 2, uncontrolled (HCC)    Elevated troponin  Resolved Problems:    Subtherapeutic anticoagulation    80year old female admitted with shortness of breath     1. Telemetry monitoring  2. Rocephin  3. Maintain O2 saturations >92%  4. Urine culture  5. Consult Pulmonary  6. Medications for other co morbidities continue as appropriate with dosage adjustments as necessary. 7.  ISS for blood sugar ac/hs  8. Duonebs q 4 hours, while awake    DVT Prophylaxis  PT OT  Discharge Planning    Code status: DNR CCA  Requires inpatient level of care  Deirdre Gardner Tatiana  APRN-BC  9:18 PM  2/17/2021     Admitted for evaluation of acute on chronic hypoxemic respiratory failure  Patient has no recollection of what brought her into the emergency department from her nursing facility  On my evaluation she is able to answer some basic questions of where she is and what year it is but essentially indicates \"I do not know\" to everything else  Significant urinary tract infection-check retroperitoneal ultrasound to rule out pyelonephritis  Mild renal insufficiency noted-IV fluids  Hypercarbic respiratory failure-BiPAP , and pulmonology to follow        I personally saw, examined and provided care for the patient. Radiographs, labs and medication list were reviewed by me independently. The case was discussed in detail and plans for care were established. Review of [address] [place] [address], documentation was conducted and revisions were made as appropriate directly by me. I agree with the above documented exam, problem list, and plan of care.      Wellington Donato MD  9:00 AM  2/17/2021

## 2021-02-18 NOTE — PROGRESS NOTES
Subjective: The patient is awake and alert. No acute events overnight.     Denies chest pain, angina, SOB   Not feeling well, complains of abdominal pain diffusely   CT abdomen unremarkable     Objective:    /77   Pulse 88   Temp 98 °F (36.7 °C) (Oral)   Resp 18   Ht 5' 1\" (1.549 m)   Wt 260 lb (117.9 kg)   LMP  (LMP Unknown)   SpO2 96%   BMI 49.13 kg/m²     In: -   Out: 650   In: -   Out: 650 [Urine:650]    General appearance: NAD, conversant, uncomfotable   HEENT: AT/NC, MMM  Neck: FROM, supple  Lungs: Clear to auscultation  CV: RRR, no MRGs  Vasc: Radial pulses 2+  Abdomen: Soft, non-tender; no masses or HSM- + bowel sounds   Extremities: No peripheral edema or digital cyanosis  Skin: no rash, lesions or ulcers  Psych: Alert and oriented to person, place and time  Neuro: Alert and interactive     Recent Labs     02/17/21  0644 02/18/21  0423   WBC 3.8* 5.2   HGB 12.0 11.9   HCT 39.2 39.1    203       Recent Labs     02/17/21  0644 02/18/21  0423    140   K 4.0 3.6   CL 88* 84*   CO2 45* 43*   BUN 38* 41*   CREATININE 1.7* 1.7*   CALCIUM 11.5* 11.8*       Assessment:    Principal Problem:    Acute on chronic respiratory failure with hypercapnia (HCC)  Active Problems:    CKD (chronic kidney disease) stage 3, GFR 30-59 ml/min (HCC)    Diabetes mellitus type 2, uncontrolled (HCC)    Elevated troponin  Resolved Problems:    Subtherapeutic anticoagulation      Plan:    Acute on chronic respiratory failure   Pulmonary consult pending  Continue oxygen therapy  Continue nebs    UTI  Continue Rocephin- proteus , narrow based on sensi   Continue IVF    Abdominal pain, no flank pain   Imaging reveals non-obstructing renal stones  Urology evaluation given stag horn calculus s  non obstructing stones   Follow labs     Hx DM  Continue SSI with blood sugar checks  Increase dose of iss     Hx CVA, AF, CHF, HTN  Continue home meds  Adjust accordingly      DVT Prophylaxis   PT/OT  Discharge planning - back to facility once work-up complete    Daphne Xavier MD  10:40 AM  2/18/2021

## 2021-02-19 NOTE — PROGRESS NOTES
Date: 2/19/2021    Time: 3:57 PM    Patient Placed On BIPAP/CPAP/ Non-Invasive Ventilation? No    If no must comment. Facial area red/color change? No           If YES are Blister/Lesion present? No   If yes must notify nursing staff  BIPAP/CPAP skin barrier?   Yes    Skin barrier type:mepilexlite       Comments: pt already on, red outlet        Mahsa José

## 2021-02-19 NOTE — CONSULTS
GENERAL SURGERY  CONSULT NOTE  2021    Physician Consulted: Dr. Tommy Slater  Reason for Consult: abdominal pain  Referring Physician: Dr. Sarah Beth Rivera is a 80 y.o. female who presents for evaluation of Shortness of breath and leg spasms, transferred from SNF. PMH CVA, afib, CHF, HTN, DM. In the ED appears patient had no complaints and was unsure why she was sent from nursing facility to for evaluation. Per staff on morning vitals check patient was very confused and thus sent for evaluation. She was diagnosed with UTI and found to have bilateral nonobstructing kidney stones. Urine culture positive for Proteus and she is on Rocephin. She complains of severe abdominal pain since yesterday with intermittent nausea and small amount of emesis but no emesis today. She is passing small amounts of flatus but has not had a bowel movement since hospitalization. Surgery consulted for evaluation of abdominal pain due to concern for severity of tenderness on exam.    At time of my visit patient is sleeping and resting comfortably and states that her pain is much improved after administration of pain medications. She presently denies nausea. Denies changes in bowel habits or blood per rectum. History of cholecystectomy,   Denies tobacco or alcohol use    noncontrast CT a/p reviewed - bowel decompressed, no free fluid. No obvious pneumatosis. B/l kidney stones without hydronephrosis. Calcific aorta and b/l iliacs. LA yesterday 2.4. Cr 1.6, which appears to be close to baseline. No leukocytosis, afebrile, no episodes of hypotension.         Past Medical History:   Diagnosis Date    Anemia due to acute blood loss 2017    Arthritis     Blood transfusion reaction     Chronic atrial fibrillation (HCC) 2017    CKD (chronic kidney disease) stage 3, GFR 30-59 ml/min 2017    Diabetes mellitus ([facility] [address]     History of blood transfusion     History of breast cancer     breast cancer Right    History of stroke     Hyperlipidemia     Hypertension     Hypothyroidism     Volume overload 12/30/2017    As cause of dyspnea       Past Surgical History:   Procedure Laterality Date    APPENDECTOMY      BREAST SURGERY      right    BRONCHOSCOPY  4/28/2018    BRONCHOSCOPY DIAGNOSTIC performed by Malcom Ko MD at [address] [place] [address] NASAL SINUS SURGERY      august 27 2017. cauterized her nasal passage.  OTHER SURGICAL HISTORY  07/27/2017    endoscopic conrtol of epistaxis dr Priyanka Tate N/A 4/28/2018    ENDOSCOPIC GUIDED CONTROL EPISTAXIS  WITH SEPTAL BUTTON PLACEMENT. INTRA-OPERATIVE BRONCHOSCOPY. performed by Malcom Ko MD at [facility] OR       Medications Prior to Admission    Prior to Admission medications    Medication Sig Start Date End Date Taking?  Authorizing Provider   apixaban (ELIQUIS) 5 MG TABS tablet Take 5 mg by mouth 2 times daily   Yes Historical Provider, MD   hydrOXYzine (ATARAX) 25 MG tablet Take 25 mg by mouth nightly Bedtime for insomnia   Yes Historical Provider, MD   metOLazone (ZAROXOLYN) 5 MG tablet Take 1 tablet by mouth daily 9/28/20  Yes Kurt Patel MD   polyethylene glycol [facility]) 17 GM/SCOOP powder Take 17 g by mouth daily   Yes Historical Provider, MD   bisacodyl (DULCOLAX) 10 MG suppository Place 10 mg rectally daily as needed for Constipation   Yes Historical Provider, MD   warfarin (COUMADIN) 2 MG tablet Take 1 tablet by mouth daily  Patient taking differently: Take 2 mg by mouth nightly  8/19/20  Yes Kurt Patel MD   metoprolol succinate (TOPROL XL) 50 MG extended release tablet Take 1 tablet by mouth daily  Patient taking differently: Take 50 mg by mouth every morning  8/20/20  Yes Kurt Patel MD   bumetanide (BUMEX) 1 MG tablet Take 1 tablet by mouth 2 times daily  Patient taking differently: Take 1 mg by mouth 2 times daily Rise and noon 8/19/20 Yes Cesia Olvera MD   Multiple Vitamins-Minerals (THERAPEUTIC MULTIVITAMIN-MINERALS) tablet Take 1 tablet by mouth daily   Yes Historical Provider, MD   lidocaine 4 % external patch Place 1 patch onto the skin daily Remove at HS   Yes Historical Provider, MD   potassium chloride (KLOR-CON M) 10 MEQ extended release tablet Take 10 mEq by mouth 2 times daily   Yes Historical Provider, MD   fluticasone (FLONASE) 50 MCG/ACT nasal spray 1 spray by Each Nostril route daily 7/10/20  Yes Vianey Walter,    calcitRIOL (ROCALTROL) 0.25 MCG capsule Take 0.5 mcg by mouth daily    Yes Historical Provider, MD   loratadine (CLARITIN) 10 MG tablet Take 10 mg by mouth daily   Yes Historical Provider, MD   melatonin 5 MG TABS tablet Take 10 mg by mouth nightly    Yes Historical Provider, MD   guaiFENesin (MUCINEX) 600 MG extended release tablet Take 600 mg by mouth 2 times daily as needed    Yes Historical Provider, MD   Dulaglutide (TRULICITY) 1.5 PE/9.8WF SOPN Inject 1.5 mg into the skin once a week Indications: weekly on Thursday   Yes Historical Provider, MD   Cholecalciferol (VITAMIN D3) 5000 units TABS Take 5,000 Units by mouth daily    Yes Historical Provider, MD   insulin lispro (HUMALOG) 100 UNIT/ML injection vial Take 12 units before meals + sliding scale.  MAX 60 units daily  Patient taking differently: Inject 12 Units into the skin 3 times daily (before meals) 12 units tid with meals plus sliding scale 5/1/19  Yes Magdaleno Laughlin MD   insulin glargine (TOUJEO SOLOSTAR) 300 UNIT/ML injection pen Inject 50 Units into the skin nightly 5/1/19  Yes Magdaleno Laughlin MD   ferrous sulfate 325 (65 Fe) MG tablet Take 1 tablet by mouth 2 times daily (with meals) 7/26/18  Yes Jd De La Rosa,    magnesium hydroxide (MILK OF MAGNESIA) 400 MG/5ML suspension Take 30 mLs by mouth daily as needed for Constipation 7/26/18  Yes Jd De La Rosa, DO   docusate sodium (COLACE, DULCOLAX) 100 MG CAPS Take 200 mg by mouth nightly 7/26/18  Yes Jd De La Rosa, DO   levothyroxine (SYNTHROID) 25 MCG tablet Take 1 tablet by mouth daily  Patient taking differently: Take 25 mcg by mouth every morning  12/31/17  Yes Kayode Mistry, DO   Mirabegron ER 50 MG TB24 Take 50 mg by mouth Daily with supper  8/29/17  Yes Historical Provider, MD   pantoprazole (PROTONIX) 40 MG tablet Take 1 tablet by mouth 2 times daily (before meals). Patient taking differently: Take 40 mg by mouth 2 times daily  5/2/14  Yes Kelly Andre,    simvastatin (ZOCOR) 40 MG tablet Take 40 mg by mouth nightly. Yes Historical Provider, MD   gabapentin (NEURONTIN) 300 MG capsule Take 300 mg by mouth nightly. .   Yes Historical Provider, MD   albuterol-ipratropium (COMBIVENT RESPIMAT)  MCG/ACT AERS inhaler Inhale 1 puff into the lungs every 6 hours    Historical Provider, MD   acetaminophen (TYLENOL) 325 MG tablet Take 650 mg by mouth every 6 hours as needed for Pain    Historical Provider, MD   hydrOXYzine (ATARAX) 10 MG tablet Take 10 mg by mouth 3 times daily as needed for Itching    Historical Provider, MD       Allergies   Allergen Reactions    Pcn [Penicillins] Hives       Family History   Problem Relation Age of Onset    Heart Disease Mother     Kidney Disease Mother         dialysis    Diabetes Mother     Stroke Father     Diabetes Father        Social History     Tobacco Use    Smoking status: Never Smoker    Smokeless tobacco: Never Used   Substance Use Topics    Alcohol use: Not Currently     Alcohol/week: 0.0 standard drinks     Comment:      Drug use: Never         Review of Systems:   General ROS: negative  Hematological and Lymphatic ROS: negative  Respiratory ROS: on AVAPS, denies SOB  Cardiovascular ROS: no chest pain or dyspnea on exertion  Gastrointestinal ROS: see HPI  Genito-Urinary ROS: UTI  Musculoskeletal ROS: negative      PHYSICAL EXAM:    Vitals:    02/19/21 1204   BP:    Pulse:    Resp: 28   Temp:    SpO2:        GENERAL:  NAD. A&Ox3.  HEAD:  Normocephalic. Atraumatic. EYES:   No scleral icterus. PERRL. LUNGS:  No increased work of breathing. On Bipap. Lawrance Potter CARDIOVASCULAR: Regular rate  ABDOMEN:  Soft, non-distended, minimal diffuse TTP. No guarding, rigidity, rebound. No peritoneal. Obese. EXTREMITIES:   MAEx4. Atraumatic. No LE edema. SKIN:  Warm and dry, no rashes or lesion  NEUROLOGIC:GCS 15, no focal deficits    LABS:    CBC  Recent Labs     02/19/21  0330   WBC 4.2*   HGB 11.0*   HCT 34.8        BMP  Recent Labs     02/19/21  0330      K 3.2*   CL 90*   CO2 49*   BUN 38*   CREATININE 1.6*   CALCIUM 11.3*     Liver Function  Recent Labs     02/17/21  0644   BILITOT 0.3   AST 25   ALT 12   ALKPHOS 62   PROT 6.8   LABALBU 3.4*     No results for input(s): LACTATE in the last 72 hours. Recent Labs     02/19/21  0650   INR 1.3       RADIOLOGY    Ct Abdomen Pelvis Wo Contrast    Result Date: 2/18/2021  EXAMINATION: CT OF THE ABDOMEN AND PELVIS WITHOUT CONTRAST 2/18/2021 10:01 am TECHNIQUE: CT of the abdomen and pelvis was performed without the administration of intravenous contrast. Multiplanar reformatted images are provided for review. Dose modulation, iterative reconstruction, and/or weight based adjustment of the mA/kV was utilized to reduce the radiation dose to as low as reasonably achievable. COMPARISON: CT abdomen and pelvis, July 28, 2019 HISTORY: ORDERING SYSTEM PROVIDED HISTORY: pain TECHNOLOGIST PROVIDED HISTORY: Reason for exam:->pain FINDINGS: Lower Chest: Small right and small to moderate left pleural effusions. Compressive atelectasis in the lower lobes, left greater than right. The heart is normal in size. Prominent calcifications seen along the mitral annulus. Organs: Liver: Unremarkable. Gallbladder: Surgically absent. Pancreas: Severely atrophic. No mass, ductal dilatation or edema is seen. Spleen:  Unremarkable. Adrenals: Unremarkable.  Kidneys: Bilateral renal cortical atrophy, especially severe on the left. Staghorn calculi are seen in the kidneys bilaterally, with the larger one on the right measuring approximately 2.2 cm in length. No ureterolithiasis or hydronephrosis. GI/Bowel: Moderate distal colonic diverticulosis without diverticulitis. No bowel wall thickening or obstruction. Pelvis: The urinary bladder is unremarkable. Arcuate artery calcifications seen in the uterus, which is otherwise grossly unremarkable by CT. Peritoneum/Retroperitoneum: No lymphadenopathy. No free air or free fluid is seen. Prominent calcified atherosclerosis is seen in the aorta. No aneurysm. Bones/Soft Tissues: The visualized bones are intact without fracture or focal lesion. Prominent degenerative changes are seen in the lumbar spine. There is a chronic compression fracture deformity of the L2 vertebral body with approximately 50% loss of height. 1.  No acute abnormality is seen in the abdomen or the pelvis. 2. Bilateral nephrolithiasis. The right renal staghorn calculus has significantly enlarged since 2019. No hydronephrosis. 3. Renal cortical atrophy, especially prominent on the left. 4. Small right and small to moderate left pleural effusions with overlying compressive atelectasis. 5. Moderate distal colonic diverticulosis without diverticulitis. Ct Head Wo Contrast    Result Date: 2/17/2021  EXAMINATION: CT OF THE HEAD WITHOUT CONTRAST  2/17/2021 8:01 am TECHNIQUE: CT of the head was performed without the administration of intravenous contrast. Dose modulation, iterative reconstruction, and/or weight based adjustment of the mA/kV was utilized to reduce the radiation dose to as low as reasonably achievable. COMPARISON: Noncontrast head CT 11/06/2016 HISTORY: ORDERING SYSTEM PROVIDED HISTORY: [facility] TECHNOLOGIST PROVIDED HISTORY: Reason for exam:->[facility] Has a \"code stroke\" or \"stroke alert\" been called? ->No Decision Support Exception->Emergency Medical Condition (MA) FINDINGS: This exam is somewhat limited by beam-hardening artifacts related in part to patient motion. Given these factors: BRAIN/VENTRICLES: This exam is grossly negative for acute intracranial hemorrhage, other intra-/extra-axial fluid collection, or mass effect/midline shift. Severe atherosclerotic calcification is scattered about the distal petrous, cavernous, and supraclinoid segments of both internal carotid arteries. Multiple chronic lacunar infarcts are scattered about both centrum semiovale, as well as both basal ganglia, more prominent on the left. There is otherwise mild, mixed confluent and  scattered leukoaraiosis, compatible with sequela of chronic microvascular ischemic disease. Hyperacute/acute-on-chronic infarction is difficult to exclude by this exam. There is age-appropriate, generalized parenchymal volume loss. ORBITS: Both ocular lenses have been previously surgically replaced. SINUSES: The frontal sinus is severely/markedly hypoplastic, slightly more pronounced on the right. Trace to minimal scattered paranasal sinus mucosal thickening is most notable within the ethmoid sinus. Both mastoid air cell complexes and middle ear cavities are significantly occupied by intermediate-density fluid. Minimal probable cerumen is present within both external auditory canals. There are multiple absent teeth. SOFT TISSUES/SKULL: There is background diffuse osteopenia, with at least mild upper cervical degenerative disease only incidentally demonstrated. Both distal cervical internal carotid arteries are at least mildly medially deviated at the level of the nasopharynx, only incidentally demonstrated. .    1. Slightly limited exam 2. Sequela of atherosclerotic arterial and chronic microvascular ischemic disease/remote infarction, as described. Hyperacute/acute-on-chronic infarction is difficult to exclude by this exam. 3.  Age-appropriate, generalized parenchymal volume loss.  4.  Paranasal sinus, bilateral mastoid air cell complex/middle ear cavity, bilateral external auditory canal, and dental disease, as described. . RECOMMENDATIONS: 1. If unexplained symptoms persist, consider MRI of the brain, without and with contrast, for further evaluation. 2.   Impression 4: Correlate with ENT exam. .    Xr Chest Portable    Result Date: 2/17/2021  EXAMINATION: ONE XRAY VIEW OF THE CHEST 2/17/2021 7:33 am COMPARISON: AP portable chest radiograph 09/28/2020 HISTORY: ORDERING SYSTEM PROVIDED HISTORY: AMS TECHNOLOGIST PROVIDED HISTORY: Reason for exam:->AMS FINDINGS: This exam is somewhat limited by patient body habitus, as well as AP portable, semi-upright technique. The right costophrenic angle is also inadvertently partially excluded. There is borderline cardiomegaly and equivalent central pulmonary vascular fullness, with trace bibasilar subsegmental atelectasis versus infiltrate. Minimal probable epicardial fat partially silhouettes the cardiac apex. Minimal to mild scattered atherosclerotic calcifications are most notable within the aortic arch. There is unchanged osteopenia osseous degenerative disease. No other clinically-significant changes are noted. .    1. Somewhat limited exam 2. Borderline cardiomegaly and equivalent central pulmonary vascular fullness, with trace bibasilar subsegmental atelectasis versus infiltrate. 3.  Otherwise, no significant change. .    Us Retroperitoneal Complete    Result Date: 2/18/2021  EXAMINATION: RETROPERITONEAL ULTRASOUND OF THE KIDNEYS AND URINARY BLADDER 2/18/2021 COMPARISON: None HISTORY: ORDERING SYSTEM PROVIDED HISTORY: Rule out pyelonephritis TECHNOLOGIST PROVIDED HISTORY: Reason for exam:  Rule out pyelonephritis FINDINGS: Kidneys: The examination is limited due to the patient's body habitus. The right kidney measures 10 cm in length and the left kidney measures 8.6 cm in length. Kidneys demonstrate normal cortical echogenicity. There is a 2 cm stone seen within the superior pole of the right kidney.  There is also a nonobstructing 1.5 cm stone seen within the inferior pole of the left kidney. Bladder: Unremarkable appearance of the bladder. No significant post void residual.    1. Bilateral renal cortical thinning. 2. Nonobstructing right and left renal calculus. 3. Please refer to the CT scan of the abdomen and pelvis. Due to the patient's body habitus the renal ultrasound is limited. ASSESSMENT/PLAN:  80 y.o. female with abdominal pain, diffuse being treated for Proteus UTI, b/l nonobstructing kidney stones    Overall care per Primary  Stat Lactic acid  Presently with reassuring abdominal exam, does not have pain out of proportion on exam. ?pain 2/2 kidney stones/UTI  Would make NPO if abdominal pain returns/worsens and consider contrast imaging  Will monitor abdominal exam      Plan discussed with Dr. Helen Price.     Rosa Lopes DO  Resident, PGY-2  2/19/2021  3:37 PM

## 2021-02-19 NOTE — CARE COORDINATION
2/19/2021  Social Work Discharge Planning:AM PAC 7/24. Pt is to return to [facility]. SW prompted Frederick Guerra to start precert. Will need a COVID test.  N-17 generated and transport form is in chart.  Electronically signed by THEODORE Cohen on 2/19/2021 at 9:10 AM

## 2021-02-19 NOTE — PROGRESS NOTES
Subjective: The patient is currently sleeping on 40% AVAPS    No acute events overnight.     Denies chest pain, angina, SOB      Objective:    /69   Pulse 70   Temp 97.2 °F (36.2 °C) (Axillary)   Resp 16   Ht 5' 1\" (1.549 m)   Wt 260 lb (117.9 kg)   LMP  (LMP Unknown)   SpO2 99%   BMI 49.13 kg/m²     In: 750 [I.V.:750]  Out: 400   In: 750   Out: 400 [Urine:400]    General appearance: NAD, conversant, uncomfotable   HEENT: AT/NC, MMM  Neck: FROM, supple  Lungs: Clear to auscultation  CV: RRR, no MRGs  Vasc: Radial pulses 2+  Abdomen: Soft, non-tender; no masses or HSM- + bowel sounds   Extremities: No peripheral edema or digital cyanosis  Skin: no rash, lesions or ulcers  Psych: Alert and oriented to person, place and time  Neuro: Alert and interactive     Recent Labs     02/17/21  0644 02/18/21  0423 02/19/21  0330   WBC 3.8* 5.2 4.2*   HGB 12.0 11.9 11.0*   HCT 39.2 39.1 34.8    203 175       Recent Labs     02/17/21  0644 02/18/21  0423 02/19/21  0330    140 143   K 4.0 3.6 3.2*   CL 88* 84* 90*   CO2 45* 43* 49*   BUN 38* 41* 38*   CREATININE 1.7* 1.7* 1.6*   CALCIUM 11.5* 11.8* 11.3*       Assessment:    Principal Problem:    Acute on chronic respiratory failure with hypercapnia (HCC)  Active Problems:    CKD (chronic kidney disease) stage 3, GFR 30-59 ml/min (HCC)    Diabetes mellitus type 2, uncontrolled (HCC)    Elevated troponin  Resolved Problems:    Subtherapeutic anticoagulation      Plan:    Acute on chronic respiratory failure   Pulmonary consult appreciated  Continue oxygen therapy  Continue nebs  Continue AVAPS    UTI  Continue Rocephin- proteus mirabilis, sensitivity noted     Abdominal pain, no flank pain   Imaging reveals non-obstructing renal stones  Urology evaluation appreciated-no surgical intervention at this time-to be monitored OP  gen surgery eval as she is yelling out in pain - discussed with surgical resident   Follow labs     Hx DM  Continue SSI with blood sugar checks  Continue ISS    Hx CVA, AF, CHF, HTN  Continue home meds  Adjust accordingly      DVT Prophylaxis   PT/OT  Discharge planning - continue to monitor-back to facility at discharge    Frank Foley MD  10:29 AM  2/19/2021

## 2021-02-19 NOTE — CARE COORDINATION
CM NOTE: Therapy notes reviewed & pt plan is [facility] at discharge. Refusing BiPAP at HS but wearing during day. Elevated CO2 level-49. Sw had SNF initiate insurance precert for return. WILL NEED COVID TEST PRIOR TO RETURN. Will continue to follow pt.

## 2021-02-19 NOTE — PROGRESS NOTES
Date: 2/19/2021    Time: 12:05 PM    Patient Placed On BIPAP/CPAP/ Non-Invasive Ventilation? Yes    If no must comment. Facial area red/color change? No           If YES are Blister/Lesion present? No   If yes must notify nursing staff  BIPAP/CPAP skin barrier?   Yes    Skin barrier type:mepilexlite       Comments:        Juliet Geller

## 2021-02-19 NOTE — PROGRESS NOTES
Kun Bello M.D.,[facility]  Stormy Wiley D.O., FSOLISOPolaI., Sha Colon M.D. Francisco Javier Varma M.D., Padilla Cameron M.D. Denton Felty, D.O. Daily Pulmonary Progress Note    Patient:  Johnny Hawk 80 y.o. female MRN: [id number]     Date of Service: 2/19/2021      Synopsis     We are following patient for hypercapnia    \"CC\" shortness of breath    Code status: DNR CCA      Subjective      Patient was seen and examined. More awake today. In a lot of discomfort related to kidney stones. She is using noninvasive ventilation in the form of BiPAP for respiratory support. Receiving analgesics as needed for pain. Review of Systems:  Constitutional: Denies fever, weight loss, night sweats, and fatigue  Skin: Denies pigmentation, dark lesions, and rashes   HEENT: Denies hearing loss, tinnitus, ear drainage, epistaxis, sore throat, and hoarseness. Cardiovascular: Denies palpitations, chest pain, and chest pressure. Respiratory: Denies cough, dyspnea at rest, hemoptysis, apnea, and choking.   Gastrointestinal: Denies nausea, vomiting, poor appetite, diarrhea, heartburn or reflux abdominal discomfort related to kidney stones  Genitourinary: Denies dysuria, frequency, urgency or hematuria  Musculoskeletal: Denies myalgias, muscle weakness, and bone pain  Neurological: Denies dizziness, vertigo, headache, and focal weakness  Psychological: Denies anxiety and depression  Endocrine: Denies heat intolerance and cold intolerance  Hematopoietic/Lymphatic: Denies bleeding problems and blood transfusions    24-hour events:  None    Objective   Vitals: /69   Pulse 70   Temp 97.2 °F (36.2 °C) (Axillary)   Resp 28   Ht 5' 1\" (1.549 m)   Wt 260 lb (117.9 kg)   LMP  (LMP Unknown)   SpO2 99%   BMI 49.13 kg/m²     I/O:    Intake/Output Summary (Last 24 hours) at 2/19/2021 1326  Last data filed at 2/19/2021 0024  Gross per 24 hour   Intake 750 ml   Output 800 ml   Net -50 ml       Vent Information  Vt Ordered: 400 mL  FiO2 : 40 %  SpO2: 99 %  SpO2/FiO2 ratio: 247.5  I Time/ I Time %: 0.9 s  Mask Type: Full face mask  Mask Size: Medium          CPAP/EPAP: 5 cmH2O     CURRENT MEDS :  Scheduled Meds:   potassium chloride  10 mEq Intravenous Q1H    cefTRIAXone (ROCEPHIN) IV  1,000 mg Intravenous Q24H    calcitRIOL  0.5 mcg Oral Daily    fluticasone  1 spray Each Nostril Daily    gabapentin  300 mg Oral Nightly    levothyroxine  25 mcg Oral QAM    lidocaine  1 patch Transdermal Daily    melatonin  10 mg Oral Nightly    metOLazone  5 mg Oral Daily    metoprolol succinate  50 mg Oral QAM    pantoprazole  40 mg Oral BID AC    insulin lispro  0-12 Units Subcutaneous TID WC    insulin lispro  0-6 Units Subcutaneous Nightly    ipratropium-albuterol  1 ampule Inhalation Q4H WA    sodium chloride flush  10 mL Intravenous 2 times per day    cetirizine  5 mg Oral Daily    trospium  20 mg Oral Nightly    atorvastatin  20 mg Oral Nightly    apixaban  5 mg Oral BID       Physical Exam:  General Appearance: appears comfortable in no acute distress. HEENT: Normocephalic atraumatic without obvious abnormality   Neck: Lips, mucosa, and tongue normal.  Supple, symmetrical, trachea midline, no adenopathy;thyroid:  no enlargement/tenderness/nodules or JVD. Lung: Breath sounds CTA. Respirations   unlabored. Symmetrical expansion. Heart: RRR, normal S1, S2. No MRG  Abdomen: Soft, NT, ND. BS present x 4 quadrants. No bruit or organomegaly. Extremities: Pedal pulses 2+ symmetric b/l. Extremities normal, no cyanosis, clubbing, or edema. Musculokeletal: No joint swelling, no muscle tenderness. ROM normal in all joints of extremities. Neurologic: Mental status: Alert and Oriented X3 . Pertinent/ New Labs and Imaging Studies     Imaging Personally Reviewed:  2/18/21  CT abdomen lung windows  FINDINGS:  Lower Chest: Small right and small to moderate left pleural effusions.   Compressive atelectasis in the lower lobes, left greater than right.  The  heart is normal in size.  Prominent calcifications seen along the mitral  annulus.       2/17 CXR  FINDINGS:  This exam is somewhat limited by patient body habitus, as well as AP  portable, semi-upright technique.  The right costophrenic angle is also  inadvertently partially excluded. There is borderline cardiomegaly and equivalent central pulmonary vascular  fullness, with trace bibasilar subsegmental atelectasis versus infiltrate. Minimal probable epicardial fat partially silhouettes the cardiac apex. Minimal to mild scattered atherosclerotic calcifications are most notable  within the aortic arch. There is unchanged osteopenia osseous degenerative disease. No other clinically-significant changes are noted. .     Impression  1.  Somewhat limited exam  2.  Borderline cardiomegaly and equivalent central pulmonary vascular  fullness, with trace bibasilar subsegmental atelectasis versus infiltrate. 3.  Otherwise, no significant change. .           Echo: 8/14/2020  Conclusions      Summary   Technically suboptimal and limited limited study.   Left ventricular internal dimensions were normal in diastole and systole.   Moderate left ventricular concentric hypertrophy noted.   No regional wall motion abnormalities seen.   Normal left ventricular ejection fraction.   The left atrium is borderline dilated.   Borderline dilated right ventricle.   Right ventricle global systolic function is reduced .   Mildly enlarged right atrium size.   Mild thickening of the mitral valve leaflets.   Moderate mitral annular calcification.   Mild mitral regurgitation is present.   Mild to moderate functional mitral stenosis .   Mild aortic stenosis.   Mild tricuspid regurgitation.   Pulmonary hypertension is mild .        Labs:  Lab Results   Component Value Date    WBC 4.2 02/19/2021    HGB 11.0 02/19/2021    HCT 34.8 02/19/2021    .5 02/19/2021    MCH 32.1 02/19/2021    MCHC 31.6 02/19/2021    RDW 13.5 02/19/2021     02/19/2021    MPV 10.3 02/19/2021     Lab Results   Component Value Date     02/19/2021    K 3.2 02/19/2021    CL 90 02/19/2021    CO2 49 02/19/2021    BUN 38 02/19/2021    CREATININE 1.6 02/19/2021    LABALBU 3.4 02/17/2021    CALCIUM 11.3 02/19/2021    GFRAA 37 02/19/2021    LABGLOM 31 02/19/2021     Lab Results   Component Value Date    PROTIME 15.5 02/19/2021    INR 1.3 02/19/2021     No results for input(s): PROBNP in the last 72 hours. No results for input(s): PROCAL in the last 72 hours. This SmartLink has not been configured with any valid records. Micro:  No results for input(s): CULTRESP in the last 72 hours. No results for input(s): LABGRAM in the last 72 hours. No results for input(s): LEGUR in the last 72 hours. No results for input(s): STREPNEUMAGU in the last 72 hours. No results for input(s): LP1UAG in the last 72 hours. Assessment:    1. Acute on chronic respiratory failure with hypoxia and hypercapnia  2. GOSIA not compliant with NIV  3. Small bilateral pleural effusions with compressive atelectasis -left greater than right. 4. Pulmonary hypertension WHO class II/III  5. Congestive heart failure-Echo August 2020 with worsening RV function compared to 2018  6. A. fib -on Coumadin, subtherapeutic INR  7. Chronic kidney disease stage III  8. Diabetes mellitus type 2  9. Morbid obesity  10. History of right breast cancer  11. GERD  12. Altered mental status likely secondary to UTI/?pyelonephritis  13. Staghorn calculi      Plan:   1. Oxygen therapy 3 L nasal cannula  2. ABG with hypercapnia-compensated  3. Noninvasive ventilation in the form of AVAPS at at bedtime and during daytime with naps-patient has been using  4. Urine culture pending placed on Rocephin per PCP for Proteus UTI  5. Await cultures, procalcitonin pending  6. CT abd lung windows with small effusions L>R with compressive atelectasis.   Continue recruitment techniques, IS, EZPAP  7. DuoNeb's every 4 hours while awake  8. DVT, GI prophylaxis. Subtherapeutic INR 1.1, switch to Eliquis 5 mg twice daily  9. Gabapentin for peripheral neuropathy leg spasming  10. Urology consult no intervention planned at this time. Nonobstructing bilateral renal calculi noted.     This plan of care was reviewed in collaboration with Dr. Felton Polk  Electronically signed by SAVANNA Angel CNP on 2/19/2021 at 1:26 PM    I personally saw, examined, and cared for the patient. Labs, medications, radiographs reviewed. I agree with history exam and plans detailed in NP note. Rudolph Little M.D.    Pulmonary/Critical Care Medicine

## 2021-02-19 NOTE — PROGRESS NOTES
Date: 2/18/2021    Time: 10:49 PM    Patient Placed On BIPAP/CPAP/ Non-Invasive Ventilation? No    If no must comment. Facial area red/color change? No           If YES are Blister/Lesion present? No   If yes must notify nursing staff  BIPAP/CPAP skin barrier? No    Skin barrier type:n/a       Comments: pt is refusing Bipap because she is afraid of wearing it at night, she is willing to wear it during the day tomorrow. Told patient to call if needed, on standby in the room.         Dalila Tanner

## 2021-02-20 NOTE — PROGRESS NOTES
Subjective:    Patient seen and examined at bedside, no complaints. Currently on NC 2L, denies abdominal pain      Objective:    /82   Pulse 84   Temp 98 °F (36.7 °C) (Axillary)   Resp 16   Ht 5' 1\" (1.549 m)   Wt 260 lb (117.9 kg)   LMP  (LMP Unknown)   SpO2 98%   BMI 49.13 kg/m²     Current medications that patient is taking have been reviewed. GEN: NAD AAOx3  Heart:  RRR, no murmurs, gallops, or rubs. Lungs:  CTA bilaterally, no wheeze, rales or rhonchi  Abd: bowel sounds present, soft, nontender, nondistended, no masses  Extrem:  No cyanosis or edema    CBC:   Lab Results   Component Value Date    WBC 3.4 02/20/2021    RBC 3.39 02/20/2021    HGB 10.5 02/20/2021    HCT 34.7 02/20/2021    .4 02/20/2021    MCH 31.0 02/20/2021    MCHC 30.3 02/20/2021    RDW 13.4 02/20/2021     02/20/2021    MPV 10.0 02/20/2021     BMP:    Lab Results   Component Value Date     02/20/2021    K 3.1 02/20/2021    CL 88 02/20/2021    CO2 44 02/20/2021    BUN 37 02/20/2021    LABALBU 3.4 02/17/2021    CREATININE 1.7 02/20/2021    CALCIUM 11.3 02/20/2021    GFRAA 35 02/20/2021    LABGLOM 29 02/20/2021    GLUCOSE 160 02/20/2021        Assessment:    Patient Active Problem List   Diagnosis    Chronic atrial fibrillation    CKD (chronic kidney disease) stage 3, GFR 30-59 ml/min ([facility])    History of breast cancer    Chronic anemia    Diabetes mellitus type 2, uncontrolled ([facility] [address]    Essential hypertension    History of CVA (cerebrovascular accident)    Hyperlipidemia LDL goal <100    Acquired hypothyroidism    Morbid obesity with BMI of 50.0-59.9, adult ([facility] [address]    Acute on chronic respiratory failure with hypoxia (HCC)    Acute on chronic diastolic congestive heart failure (HCC)    Bilateral pleural effusion    Acute on chronic respiratory failure with hypercapnia (HCC)    Elevated troponin       Plan:     1.  Acute on chronic respiratory failure    Pulmonary consult appreciated, currently at baseline   Continue AVAPS QHS and daytime with naps  2. UTI   Continue Rocephin- proteus mirabilis, sensitivity noted    3. Abdominal pain, no flank pain    [facility] with [facility]   Urology evaluation appreciated-no surgical intervention at this time-to be monitored OP   Seen by general surgery - no intervention planned  4.  DM2   Continue sliding scale insulin    Ok for discharge to SNF -precert pending    Please call my cell phone between 8pm-6am 21 [phone]    Corrie Ye    3:02 PM  2/20/2021

## 2021-02-20 NOTE — PROGRESS NOTES
Date: 2/19/2021    Time: 11:01 PM    Patient Placed On BIPAP/CPAP/ Non-Invasive Ventilation? Yes    If no must comment. Facial area red/color change? No           If YES are Blister/Lesion present? No   If yes must notify nursing staff  BIPAP/CPAP skin barrier?   Yes    Skin barrier type:mepilexlite       Comments:        Wesley Chavez

## 2021-02-20 NOTE — PROGRESS NOTES
Date: 2/20/2021    Time: 0005    Patient Placed On BIPAP/CPAP/ Non-Invasive Ventilation? No    If no must comment. Facial area red/color change? If YES are Blister/Lesion present? If yes must notify nursing staff  BIPAP/CPAP skin barrier?   Yes    Skin barrier type:mepilexlite       Comments:  Red outlet already on  Unable to see under mepilex      Jael Rascon

## 2021-02-20 NOTE — PROGRESS NOTES
Seen/examined  See consult  Quiet night, no events reported  Denies abdominal pain at present  Apparently abdominal pain reported previously resolved after bowel movement  Afebrile  Abdomen soft and nontender  Imaging reviewed  Patient does have risk factors for mesenteric ischemia  No objective evidence of acute mesenteric ischemia, although this is always a difficult diagnosis to make  No objective evidence of chronic mesenteric ischemia either; the #1 finding being unexplained weight loss  Okay to continue diet  Continue bowel regimen  Surgery will sign off  Stacy Ordonez MD

## 2021-02-20 NOTE — PROGRESS NOTES
Celestina Banda M.D.,[facility]  Dwight Meyers D.O., F.A.C.O.I., Hollis Ruelas M.D. Franko Chery M.D., Kyleigh Grier M.D. Daniel Wise D.O. Daily Pulmonary Progress Note    Patient:  Willow Jalloh 80 y.o. female MRN: [id number]     Date of Service: 2/20/2021      Synopsis     We are following patient for hypercapnia    \"CC\" shortness of breath    Code status: DNR CCA      Subjective      Patient was seen and examined. More awake today. In a lot of discomfort related to kidney stones. She is using noninvasive ventilation in the form of BiPAP for respiratory support. Receiving analgesics as needed for pain. Review of Systems:  Constitutional: Denies fever, weight loss, night sweats, and fatigue  Skin: Denies pigmentation, dark lesions, and rashes   HEENT: Denies hearing loss, tinnitus, ear drainage, epistaxis, sore throat, and hoarseness. Cardiovascular: Denies palpitations, chest pain, and chest pressure. Respiratory: Denies cough, dyspnea at rest, hemoptysis, apnea, and choking.   Gastrointestinal: Denies nausea, vomiting, poor appetite, diarrhea, heartburn or reflux abdominal discomfort related to kidney stones  Genitourinary: Denies dysuria, frequency, urgency or hematuria  Musculoskeletal: Denies myalgias, muscle weakness, and bone pain  Neurological: Denies dizziness, vertigo, headache, and focal weakness  Psychological: Denies anxiety and depression  Endocrine: Denies heat intolerance and cold intolerance  Hematopoietic/Lymphatic: Denies bleeding problems and blood transfusions    24-hour events:  None    Objective   Vitals: /82   Pulse 84   Temp 98 °F (36.7 °C) (Axillary)   Resp 16   Ht 5' 1\" (1.549 m)   Wt 260 lb (117.9 kg)   LMP  (LMP Unknown)   SpO2 98%   BMI 49.13 kg/m²     I/O:    Intake/Output Summary (Last 24 hours) at 2/20/2021 1332  Last data filed at 2/20/2021 0931  Gross per 24 hour   Intake 500 ml   Output 850 ml   Net -350 ml       Vent Information  Skin Assessment: Clean, dry, & intact  Vt Ordered: 400 mL  FiO2 : 40 %  SpO2: 98 %  SpO2/FiO2 ratio: 245  I Time/ I Time %: 0.9 s  Mask Type: Full face mask  Mask Size: Medium          CPAP/EPAP: 5 cmH2O     CURRENT MEDS :  Scheduled Meds:   potassium bicarb-citric acid  40 mEq Oral Daily    cefTRIAXone (ROCEPHIN) IV  1,000 mg Intravenous Q24H    calcitRIOL  0.5 mcg Oral Daily    fluticasone  1 spray Each Nostril Daily    gabapentin  300 mg Oral Nightly    levothyroxine  25 mcg Oral QAM    lidocaine  1 patch Transdermal Daily    melatonin  10 mg Oral Nightly    metOLazone  5 mg Oral Daily    metoprolol succinate  50 mg Oral QAM    pantoprazole  40 mg Oral BID AC    insulin lispro  0-12 Units Subcutaneous TID WC    insulin lispro  0-6 Units Subcutaneous Nightly    ipratropium-albuterol  1 ampule Inhalation Q4H WA    sodium chloride flush  10 mL Intravenous 2 times per day    cetirizine  5 mg Oral Daily    trospium  20 mg Oral Nightly    atorvastatin  20 mg Oral Nightly    apixaban  5 mg Oral BID       Physical Exam:  General Appearance: appears comfortable in no acute distress. HEENT: Normocephalic atraumatic without obvious abnormality   Neck: Lips, mucosa, and tongue normal.  Supple, symmetrical, trachea midline, no adenopathy;thyroid:  no enlargement/tenderness/nodules or JVD. Lung: Breath sounds CTA. Respirations   unlabored. Symmetrical expansion. Heart: RRR, normal S1, S2. No MRG  Abdomen: Soft, NT, ND. BS present x 4 quadrants. No bruit or organomegaly. Extremities: Pedal pulses 2+ symmetric b/l. Extremities normal, no cyanosis, clubbing, or edema. Musculokeletal: No joint swelling, no muscle tenderness. ROM normal in all joints of extremities. Neurologic: Mental status: Alert and Oriented X3 .     Pertinent/ New Labs and Imaging Studies     Imaging Personally Reviewed:  2/18/21  CT abdomen lung windows  FINDINGS:  Lower Chest: Small right and small to moderate left pleural effusions. Compressive atelectasis in the lower lobes, left greater than right.  The  heart is normal in size.  Prominent calcifications seen along the mitral  annulus.       2/17 CXR  FINDINGS:  This exam is somewhat limited by patient body habitus, as well as AP  portable, semi-upright technique.  The right costophrenic angle is also  inadvertently partially excluded. There is borderline cardiomegaly and equivalent central pulmonary vascular  fullness, with trace bibasilar subsegmental atelectasis versus infiltrate. Minimal probable epicardial fat partially silhouettes the cardiac apex. Minimal to mild scattered atherosclerotic calcifications are most notable  within the aortic arch. There is unchanged osteopenia osseous degenerative disease. No other clinically-significant changes are noted. .     Impression  1.  Somewhat limited exam  2.  Borderline cardiomegaly and equivalent central pulmonary vascular  fullness, with trace bibasilar subsegmental atelectasis versus infiltrate. 3.  Otherwise, no significant change. .           Echo: 8/14/2020  Conclusions      Summary   Technically suboptimal and limited limited study.   Left ventricular internal dimensions were normal in diastole and systole.   Moderate left ventricular concentric hypertrophy noted.   No regional wall motion abnormalities seen.   Normal left ventricular ejection fraction.   The left atrium is borderline dilated.   Borderline dilated right ventricle.   Right ventricle global systolic function is reduced .   Mildly enlarged right atrium size.   Mild thickening of the mitral valve leaflets.   Moderate mitral annular calcification.   Mild mitral regurgitation is present.   Mild to moderate functional mitral stenosis .   Mild aortic stenosis.   Mild tricuspid regurgitation.   Pulmonary hypertension is mild .        Labs:  Lab Results   Component Value Date    WBC 3.4 02/20/2021    HGB 10.5 02/20/2021    HCT 34.7 02/20/2021    MCV

## 2021-02-21 NOTE — PROGRESS NOTES
Patient refused to wear BiPAP. RN explained the importance of wearing it she stated \" I don't care, I'm not wearing it. \"

## 2021-02-21 NOTE — PROGRESS NOTES
Physical Therapy    Facility/Department: [address] INTERNAL MEDICINE 2  Treatment Note  NAME: Roldan Harrington  : 1935  MRN: [id number]    Date of Service: 2021     Evaluating Therapist: Rico Venegas PT      Referring Provider:  Author Flynn      Room #: 406   DIAGNOSIS : acute on chronic resp failure   Additional Pertinent History:A-fib, CKD, HTN, Gastric bypass, CHF  PRECAUTIONS: falls, O2     Social:  Per chart pt admitted from nursing home. Pt reports she is bed bound and does not get OOB. Pt's son present and confirms        Initial Evaluation  Date: 2021 Treatment      Short Term/ Long Term   Goals   Was pt agreeable to Eval/treatment? With encouragement  yes     Does pt have pain? abd pain , RN informed   no c/o pain     Bed Mobility  Rolling: max assist  Supine to sit: max assist   Sit to supine: max assist   Scooting: max assist x 2  Rolling: MAX A  Supine to sit: MAX A  Sit to supine: MAX A  Scooting: MAX A  Mod assist   Transfers NT . NA  N/A , Nate    Ambulation    NT NA  N/A   LE strength     3- to 3+/5 3-/5 to 4-/5   3+ to 4-/5   AM-PAC Raw score                       Balance: sitting EOB MOD A/MAX A    Vitals:  Pt sat on EOB and c/o SOB, but did not appear to have SOB. She was on 2L O2 throughout PT session and O2 sat sitting EOB when she c/o SOB was 96-98%. Therapeutic Exercises:  Pt was instructed in/performed supine exercises with BLE: ankle PF/DF AROM 5 reps each and  heel slides and quad sets AROM 5 reps each. Patient education  Pt educated on bed mobility. Patient response to education:   Pt verbalized understanding Pt demonstrated skill Pt requires further education in this area   yes Yes with VCs and assist yes     ASSESSMENT:    Comments:  Pt was found lying in bed and was agreeable to PT. I asked pt when she walked last and she reported she sen not know. I asked her what happened that she no longer walks and again she reported she did not know.   Pt is able to move her legs lying in bed, but due to size of her abdomen has difficulty sitting up on EOB and has tendency to lean posteriorly and required MOD A/MAX A to maintain her balance and not fall backwards. Pt c/o fatigue after sitting EOB x 3 min and asked to lie back down. She c/o SOB while sitting EOB, but did not appear to be SOB and her O2 sat was in upper 90s. Pt was assisted back to bed. Treatment:  Patient practiced and was instructed in the following treatment:     Bed mobility and sitting EOB to improve functional strength and endurance. Pt was left supine in bed with call light left by patient and visitor present. Chair/bed alarm: bed alarm was re-activated. PLAN:    Pt is making fair progress toward established Physical Therapy goals. Continue with physical therapy current plan of care. Time in  12:15  Time out  12:30    Total Treatment Time 15 minutes     Evaluation Time includes thorough review of current medical information, gathering information on past medical history/social history and prior level of function, completion of standardized testing/informal observation of tasks, assessment of data and education on plan of care and goals. CPT codes:  [] Low Complexity PT evaluation 43190  [] Moderate Complexity PT evaluation 35596  [] High Complexity PT evaluation 99090  [] PT Re-evaluation 13906  [] Gait training 44137 ** minutes  [] Manual therapy 07690 ** minutes  [x] Therapeutic activities 01698 15 minutes  [] Therapeutic exercises 60166 ** minutes  [] Neuromuscular reeducation 69963 ** minutes     Hema Garsia., P.T.   License Number: [id number]

## 2021-02-21 NOTE — PROGRESS NOTES
Subjective:    Patient seen and examined at bedside, no complaints. Currently on 2 L via nasal cannula (normally on 5 from home). Objective:    /64   Pulse 92   Temp 97.7 °F (36.5 °C) (Oral)   Resp 18   Ht 5' 1\" (1.549 m)   Wt 261 lb (118.4 kg)   LMP  (LMP Unknown)   SpO2 95%   BMI 49.32 kg/m²     Current medications that patient is taking have been reviewed. GEN: NAD AAOx3  Heart:  RRR, no murmurs, gallops, or rubs. Lungs:  CTA bilaterally, no wheeze, rales or rhonchi  Abd: bowel sounds present, soft, nontender, nondistended, no masses  Extrem:  No cyanosis or edema    CBC:   Lab Results   Component Value Date    WBC 3.4 02/20/2021    RBC 3.39 02/20/2021    HGB 10.5 02/20/2021    HCT 34.7 02/20/2021    .4 02/20/2021    MCH 31.0 02/20/2021    MCHC 30.3 02/20/2021    RDW 13.4 02/20/2021     02/20/2021    MPV 10.0 02/20/2021     BMP:    Lab Results   Component Value Date     02/21/2021    K 3.3 02/21/2021    CL 88 02/21/2021    CO2 44 02/21/2021    BUN 36 02/21/2021    LABALBU 3.4 02/17/2021    CREATININE 1.8 02/21/2021    CALCIUM 11.2 02/21/2021    GFRAA 32 02/21/2021    LABGLOM 27 02/21/2021    GLUCOSE 191 02/21/2021        Assessment:    Patient Active Problem List   Diagnosis    Chronic atrial fibrillation    CKD (chronic kidney disease) stage 3, GFR 30-59 ml/min ([facility])    History of breast cancer    Chronic anemia    Diabetes mellitus type 2, uncontrolled ([facility]    Essential hypertension    History of CVA (cerebrovascular accident)    Hyperlipidemia LDL goal <100    Acquired hypothyroidism    Morbid obesity with BMI of 50.0-59.9, adult ([facility]    Acute on chronic respiratory failure with hypoxia (HCC)    Acute on chronic diastolic congestive heart failure (HCC)    Bilateral pleural effusion    Acute on chronic respiratory failure with hypercapnia (HCC)    Elevated troponin       Plan:    1.  Acute on chronic respiratory failure               Pulmonary consult appreciated, currently at baseline              Continue AVAPS QHS and daytime with naps  2. UTI   Change Rocephin to Keflex   3. Abdominal pain, no flank pain               Improved with [facility]              Urology evaluation appreciated-no surgical intervention at this time-to be monitored OP              Seen by general surgery - no intervention planned  4.  DM2              Continue sliding scale insulin     Ok for discharge to SNF -precert pending    Please call my cell phone between 8pm-6am 21 [phone]    Derrikc Chand    2:23 PM  2/21/2021

## 2021-02-21 NOTE — PROGRESS NOTES
Date: 2/20/2021    Time: 10:07 PM    Patient Placed On BIPAP/CPAP/ Non-Invasive Ventilation? Yes    If no must comment. Facial area red/color change? No           If YES are Blister/Lesion present? No   If yes must notify nursing staff  BIPAP/CPAP skin barrier? Yes    Skin barrier type:mepilexlite       Comments: Placed patient on BIPAP without problems, patient tolerating well.          [facility]

## 2021-02-22 NOTE — PROGRESS NOTES
Patient agreed to use her BiPap at 0250 and left it on until 0537. Patient went back on NC at 2 Lpm at 0538.

## 2021-02-22 NOTE — PROGRESS NOTES
Subjective:    Patient seen and examined at bedside, on 2 L via nasal cannula. Wore BiPAP for 3 hrs last night    Objective:    BP (!) 150/69   Pulse 83   Temp 98 °F (36.7 °C) (Oral)   Resp 16   Ht 5' 1\" (1.549 m)   Wt 259 lb 12.8 oz (117.8 kg)   LMP  (LMP Unknown)   SpO2 99%   BMI 49.09 kg/m²     Current medications that patient is taking have been reviewed. GEN: NAD AAOx3  Heart:  RRR, no murmurs, gallops, or rubs.   Lungs:  CTA bilaterally, no wheeze, rales or rhonchi  Abd: bowel sounds present, soft, nontender, nondistended, no masses  Extrem:  No cyanosis or edema    CBC:   Lab Results   Component Value Date    WBC 3.4 02/20/2021    RBC 3.39 02/20/2021    HGB 10.5 02/20/2021    HCT 34.7 02/20/2021    .4 02/20/2021    MCH 31.0 02/20/2021    MCHC 30.3 02/20/2021    RDW 13.4 02/20/2021     02/20/2021    MPV 10.0 02/20/2021     BMP:    Lab Results   Component Value Date     02/22/2021    K 3.5 02/22/2021    CL 87 02/22/2021    CO2 43 02/22/2021    BUN 32 02/22/2021    LABALBU 3.4 02/17/2021    CREATININE 2.0 02/22/2021    CALCIUM 10.8 02/22/2021    GFRAA 29 02/22/2021    LABGLOM 24 02/22/2021    GLUCOSE 199 02/22/2021        Assessment:    Patient Active Problem List   Diagnosis    Chronic atrial fibrillation    CKD (chronic kidney disease) stage 3, GFR 30-59 ml/min ([facility])    History of breast cancer    Chronic anemia    [facility] type 2, uncontrolled [facility]    Essential hypertension    History of CVA (cerebrovascular accident)    Hyperlipidemia LDL goal <100    Acquired hypothyroidism    Morbid obesity with BMI of 50.0-59.9, adult ([facility]    Acute on chronic respiratory failure with hypoxia (HCC)    Acute on chronic diastolic congestive heart failure (HCC)    Bilateral pleural effusion    Acute on chronic respiratory failure with hypercapnia (HCC)    Elevated troponin       Plan:    1. Acute on chronic respiratory failure               Pulmonary consult appreciated, currently at baseline              Continue AVAPS QHS and daytime with naps  2. UTI - proteus >100k              Continue Keflex   3. Abdominal pain, no flank pain               Improved with BM              Urology evaluation appreciated-no surgical intervention at this time-to be monitored OP              Seen by general surgery - no intervention planned  4. DM2              Continue sliding scale insulin    Awaiting pre-CERT for [facility]    Please call my cell phone between 8pm-6am [phone]    1:20 PM  2/22/2021

## 2021-02-22 NOTE — PROGRESS NOTES
Occupational Therapy  OT BEDSIDE TREATMENT NOTE      Date:2021  Patient Name: Domo Mccullough  MRN: [id number]  : 1935  Room: [address] [place] [address]   Referring Provider: EMERALD Swain  Evaluating OT: Ana Mathis. Elvira OTR/L - [id number]     AM-PAC Daily Activity Raw Score:      Recommended Adaptive Equipment: Continue to assess.      Diagnosis: Acute on chronic respiratory failure with hypercapnia (HCC) [J96.22]      Pertinent Medical History: DM, CKD, HTN, a-fib, arthritis      Precautions: falls, skin integrity, O2 via high-flow nasal cannula, hearing impairment     Home Living: Patient admitted from [facility].     Prior Level of Function (PLOF): Patient and patient's son reported that patient has not been getting out of bed recently. Staff assistance needed with most ADLs.    Pain Level: Patient reported experiencing abdominal pain; nursing notified of patient's complaints of pain. Cognition: Patient alert and oriented grossly with safety cues provided     Functional Assessment:    Initial Eval Status  Date: 2021 Treatment Status  Date: 21 Short Term Goals   Feeding Setup       Grooming Mod A   Min A  (supine in bed)   UB Dressing Mod A Mod A  Due to L UE weakness  Min A   LB Dressing Dependent Dep A   To manage socks  Max A - with use of AE, as needed/appropriate   Bathing Max A   Mod A - with use of AE/DME, as needed/appropriate   Toileting Dependent   Max A   Bed Mobility  Log Rolling: Max A  Supine-to-Long-Sitting: Max A  Patient declined EOB sitting. Supine <> sit: Max A      Functional Transfers Not assessed. Nate lift used at [facility].       Functional Mobility Not assessed.       Balance Sitting: Poor+  (in long-sitting) Sitting: Poor      Activity Tolerance Poor       Visual/  Perceptual WFL grossly      N/A   B UE Strength R Shoulder: 3/5  R Distal UE: 3+/5  L Shoulder: 2-/5  L Distal UE: 3/5 grossly Instructed pt on UE exercises.   See comments  Patient will demonstrate 4-/5 distal UE strength in order to maximize independence with ADLs and bed mobility.            Comments: Upon arrival pt was supine in bed. At end of session pt was transferred back to bed post session alarm on, all lines and tubes intact and call light within reach. Treatment: Instructed pt om AROM R UE and PROM L UE shoulder flexion and shoulder horizontal abduction requiring mod vc and demos for correct form. AROM B elbow flexion/ extension completed. Exercises were performed to improve strength and decrease stiffness during ADLs. Upon transferring pt exhibited strong posterior lean without any attempt top assist in correcting seated balance despite cues for hand placement/ weight shifting. Pt transferred back to bed at end of tx. Education: Weight shifting and hand placement to improve sitting balance align with benefits of EOB/ UE exercises to maximize independence with ADLs. · Pt has made fair progress towards set goals. Plan of Care: 1-3 days/week for 1-2 weeks PRN  [x]? ?ADL retraining/adaptive techniques and AE recommendations to increase functional independence within precautions  [x]? ? Energy conservation techniques to improve tolerance for ADLs/IADLs  [x]? ? Functional transfer/mobility training/DME recommendations for increased independence, safety and fall prevention  [x]? ?Patient/family education to increase safety and functional independence  [x]? ? Environmental modifications for safe mobility and completion of ADLs/IADLs  []? ?Cognitive retraining exercises to improve problem solving skills & safe participation in ADLs/IADLs   []? ?Sensory re-education techniques to improve extremity awareness, maintain skin integrity and improve hand function   []? ? Visual/Perceptual retraining  to improve body awareness and safety during transfers and ADLs   []? ? Splinting/positioning needs to maintain joint/skin integrity and prevent contractures   [x]? ? Therapeutic activity to improve functional performance during ADLs/IADLs  [x]? ? Therapeutic exercise to improve tolerance and functional strength for ADLs/IADLs  [x]? ? Balance retraining/tolerance tasks for facilitation of postural control with dynamic challenges during ADLs   [x]? ? Neuromuscular re-education: facilitate righting/equilibrium reactions, midline orientation, scapular stability/mobility, normalize muscle tone, and facilitate active functional movement/attention  []? ? Delirium prevention/treatment   []? Positioning to improve functional independence and prevent skin breakdown   []? ?Other:        Treatment Charges: Mins Units   Ther Ex  28032 10 1   Manual Therapy 35721     Thera Activities 91670 5 0   ADL/Home Mgt 13686     Neuro Re-ed [id number]     Group Therapy      Orthotic manage/training  [id number]     Non-Billable Time       Time In: 10:22  Time Out: 10:33  Total Time: [address] MCCALL/L [id number]

## 2021-02-22 NOTE — CARE COORDINATION
2/22/2021  Social Work Discharge Planning:Pt is to return to [facility] and per Taiwo can return when ready. Will need a COVID test.  MISTY set up tentative discharge transport via [address] ([address] [id number]) for Pt to be picked up at 3pm-waiting for a phone call confirmation from Beau. Misty notified Taiwo at [facility] and son Zaynab Daniel and voicemail left for son Jannie Mena. Awaiting discharge order. If Pt doesn't discharge then all need to be called and transport cancelled. Electronically signed by THEODORE Perea on 2/22/2021 at 10:19 AM    2/22/2021  Social Work Discharge Planning: MISTY was informed that Pts transport needs to be later. Misty called [facility] and informed Joanne Wadsworth to change transport time. Time was changed to 7pm. MISTY notified Taiwo at [facility]. Electronically signed by THEODORE Perea on 2/22/2021 at 2:45 PM    2/22/2021  Social Work Discharge Planning:Pt will need a bipap at [facility]. MISTY notified Taiwo at [facility]. Electronically signed by THEODORE Perea on 2/22/2021 at 3:05 PM'    2/22/2021  Social Work Discharge Planning:  MISTY was informed by PERRY that phys. is fine with Pt continuing with trilogy at [facility] instead of bipap.  Electronically signed by THEODORE Perea on 2/22/2021 at 3:24 PM

## 2021-02-22 NOTE — CARE COORDINATION
COVID negative 2/15-nursing prompted to get repeat rapid COVID test today as required per nursing home. Awaiting auth for [facility]- initiated 2/19. DNR-[facility]. Requiring O2 2lNC. Nursing to check if bipap is required at nursing home- pt mostly refuses to wear.  Will follow Jose Sanford

## 2021-02-23 NOTE — PLAN OF CARE
Problem: Falls - Risk of:  Goal: Will remain free from falls  Description: Will remain free from falls  Outcome: Completed  Goal: Absence of physical injury  Description: Absence of physical injury  Outcome: Completed     Problem: Skin Integrity:  Goal: Will show no infection signs and symptoms  Description: Will show no infection signs and symptoms  Outcome: Completed  Goal: Absence of new skin breakdown  Description: Absence of new skin breakdown  Outcome: Completed

## 2021-02-23 NOTE — PROGRESS NOTES
245  I Time/ I Time %: 0.9 s  Mask Type: Full face mask  Mask Size: Small          CPAP/EPAP: 5 cmH2O     CURRENT MEDS :  Scheduled Meds:   potassium bicarb-citric acid  30 mEq Oral QPM    cephALEXin  250 mg Oral 3 times per day    potassium bicarb-citric acid  40 mEq Oral Daily    calcitRIOL  0.5 mcg Oral Daily    fluticasone  1 spray Each Nostril Daily    gabapentin  300 mg Oral Nightly    levothyroxine  25 mcg Oral QAM    lidocaine  1 patch Transdermal Daily    melatonin  10 mg Oral Nightly    metOLazone  5 mg Oral Daily    metoprolol succinate  50 mg Oral QAM    pantoprazole  40 mg Oral BID AC    insulin lispro  0-12 Units Subcutaneous TID WC    insulin lispro  0-6 Units Subcutaneous Nightly    ipratropium-albuterol  1 ampule Inhalation Q4H WA    sodium chloride flush  10 mL Intravenous 2 times per day    cetirizine  5 mg Oral Daily    trospium  20 mg Oral Nightly    atorvastatin  20 mg Oral Nightly    apixaban  5 mg Oral BID       Physical Exam:  General Appearance: appears comfortable in no acute distress. HEENT: Normocephalic atraumatic without obvious abnormality   Neck: Lips, mucosa, and tongue normal.  Supple, symmetrical, trachea midline, no adenopathy;thyroid:  no enlargement/tenderness/nodules or JVD. Lung: Breath sounds CTA. Respirations   unlabored. Symmetrical expansion. Heart: RRR, normal S1, S2. No MRG  Abdomen: Soft, NT, ND. BS present x 4 quadrants. No bruit or organomegaly. Extremities: Pedal pulses 2+ symmetric b/l. Extremities normal, no cyanosis, clubbing, or edema. Musculokeletal: No joint swelling, no muscle tenderness. ROM normal in all joints of extremities. Neurologic: Mental status: Alert and Oriented X3 . Pertinent/ New Labs and Imaging Studies     .        Labs:  Lab Results   Component Value Date    WBC 3.4 02/20/2021    HGB 10.5 02/20/2021    HCT 34.7 02/20/2021    .4 02/20/2021    MCH 31.0 02/20/2021    MCHC 30.3 02/20/2021    RDW 13.4 02/20/2021     02/20/2021    MPV 10.0 02/20/2021     Lab Results   Component Value Date     02/22/2021    K 3.5 02/22/2021    CL 87 02/22/2021    CO2 43 02/22/2021    BUN 32 02/22/2021    CREATININE 2.0 02/22/2021    LABALBU 3.4 02/17/2021    CALCIUM 10.8 02/22/2021    GFRAA 29 02/22/2021    LABGLOM 24 02/22/2021     Lab Results   Component Value Date    PROTIME 14.6 02/22/2021    INR 1.2 02/22/2021     No results for input(s): PROBNP in the last 72 hours. No results for input(s): PROCAL in the last 72 hours. This SmartLink has not been configured with any valid records. Micro:  No results for input(s): CULTRESP in the last 72 hours. No results for input(s): LABGRAM in the last 72 hours. No results for input(s): LEGUR in the last 72 hours. No results for input(s): STREPNEUMAGU in the last 72 hours. No results for input(s): LP1UAG in the last 72 hours. Assessment:    1. Acute on chronic respiratory failure with hypoxia and hypercapnia  2. GOSIA not compliant with NIV  3. Small bilateral pleural effusions with compressive atelectasis -left greater than right. 4. Pulmonary hypertension WHO class II/III  5. Congestive heart failure-Echo August 2020 with worsening RV function compared to 2018  6. A. fib -on Coumadin, subtherapeutic INR  7. Chronic kidney disease stage III  8. Diabetes mellitus type 2  9. Morbid obesity  10. History of right breast cancer  11. GERD  12. Altered mental status likely secondary to UTI/?pyelonephritis  13. Staghorn calculi      Plan:   1. Oxygen therapy 3 L nasal cannula  2. DuoNeb's every 4 hours while awake  3. Eliquis 5 mg twice daily  4. Marceil Ditch for [employer] home on home alie Hercules M.D.    Pulmonary/Critical Care Medicine

## 2021-03-08 NOTE — DISCHARGE SUMMARY
Physician Discharge Summary     Patient ID:  Cristina Ward  [id number]  80 y.o.  1935    Admit date: 2/17/2021    Discharge date and time:  2/22 7pm    Admission Diagnoses:   Patient Active Problem List   Diagnosis    Chronic atrial fibrillation    CKD (chronic kidney disease) stage 3, GFR 30-59 ml/min (HCC)    History of breast cancer    Chronic anemia    [facility] ([facility]    Essential hypertension    History of CVA (cerebrovascular accident)    Hyperlipidemia LDL goal <100    Acquired hypothyroidism    Morbid obesity with BMI of 50.0-59.9, [facility]    Acute on chronic respiratory failure with hypoxia (HCC)    Acute on chronic diastolic congestive heart failure (HCC)    Bilateral pleural effusion    Acute on chronic respiratory failure with hypercapnia ([facility])    Elevated troponin       Discharge Diagnoses: Acute on chronic resp failure    Consults: pulmonary/intensive care and general surgery    Procedures: None    Hospital Course:   1. Acute on chronic respiratory failure               Pulmonary consult appreciated, continue AVAPS QHS and daytime with naps  2. UTI - proteus >100k   Treated with Keflex   3. Abdominal pain, no flank pain               Improved with BM.  Seen by urology and gen surgery - no interventions  4. DM2              Continue sliding scale insulin    Discharge Exam:  Gen - NAD AAOx3  CV - RRR s1/s2, no M/R/G  Pulm - Fair air entry bilaterally, no wheeze  Abd - Soft NT ND  Ext - No LE edema    Condition:  Stable    Disposition: SNF    Patient Instructions:   Discharge Medication List as of 2/22/2021  6:07 PM      START taking these medications    Details   cephALEXin (KEFLEX) 250 MG capsule Take 1 capsule by mouth 3 times daily for 5 days, Disp-15 capsule, R-0NO PRINT         CONTINUE these medications which have NOT CHANGED    Details   apixaban (ELIQUIS) 5 MG TABS tablet Take 5 mg by mouth 2 times dailyHistorical Med      !! hydrOXYzine (ATARAX) 25 MG tablet Take 25 mg by mouth nightly Bedtime for insomniaHistorical Med      albuterol-ipratropium (COMBIVENT RESPIMAT)  MCG/ACT AERS inhaler Inhale 1 puff into the lungs every 6 hoursHistorical Med      metOLazone (ZAROXOLYN) 5 MG tablet Take 1 tablet by mouth daily, Disp-14 tablet,R-0Normal      polyethylene glycol (GLYCOLAX) 17 GM/SCOOP powder Take 17 g by mouth dailyHistorical Med      bisacodyl (DULCOLAX) 10 MG suppository Place 10 mg rectally daily as needed for ConstipationHistorical Med      metoprolol succinate (TOPROL XL) 50 MG extended release tablet Take 1 tablet by mouth daily, Disp-30 tablet,R-0Normal      bumetanide (BUMEX) 1 MG tablet Take 1 tablet by mouth 2 times daily, Disp-60 tablet,R-0Normal      Multiple Vitamins-Minerals (THERAPEUTIC MULTIVITAMIN-MINERALS) tablet Take 1 tablet by mouth dailyHistorical Med      lidocaine 4 % external patch Place 1 patch onto the skin daily Remove at HS, Transdermal, DAILY, Historical Med      potassium chloride (KLOR-CON M) 10 MEQ extended release tablet Take 10 mEq by mouth 2 times dailyHistorical Med      acetaminophen (TYLENOL) 325 MG tablet Take 650 mg by mouth every 6 hours as needed for PainHistorical Med      fluticasone (FLONASE) 50 MCG/ACT nasal spray 1 spray by Each Nostril route daily, Disp-2 Bottle, R-1Normal      calcitRIOL (ROCALTROL) 0.25 MCG capsule Take 0.5 mcg by mouth daily Historical Med      loratadine (CLARITIN) 10 MG tablet Take 10 mg by mouth dailyHistorical Med      melatonin 5 MG TABS tablet Take 10 mg by mouth nightly Historical Med      guaiFENesin (MUCINEX) 600 MG extended release tablet Take 600 mg by mouth 2 times daily as needed Historical Med      Dulaglutide (TRULICITY) 1.5 QT/8.9MS SOPN Inject 1.5 mg into the skin once a week Indications: weekly on ThursdayHistorical Med      Cholecalciferol (VITAMIN D3) 5000 units TABS Take 5,000 Units by mouth daily Historical Med      insulin lispro (HUMALOG) 100 UNIT/ML injection vial Take 12 units before meals + sliding scale. MAX 60 units daily, Disp-3 vial, R-5NO PRINT      insulin glargine (TOUJEO SOLOSTAR) 300 UNIT/ML injection pen Inject 50 Units into the skin nightly, Disp-12 pen, R-5NO PRINT      ferrous sulfate 325 (65 Fe) MG tablet Take 1 tablet by mouth 2 times daily (with meals), Disp-30 tablet, R-0DC to SNF      magnesium hydroxide (MILK OF MAGNESIA) 400 MG/5ML suspension Take 30 mLs by mouth daily as needed for ConstipationDC to SNF      docusate sodium (COLACE, DULCOLAX) 100 MG CAPS Take 200 mg by mouth nightlyDC to SNF      !! hydrOXYzine (ATARAX) 10 MG tablet Take 10 mg by mouth 3 times daily as needed for ItchingHistorical Med      levothyroxine (SYNTHROID) 25 MCG tablet Take 1 tablet by mouth daily, Disp-30 tablet, R-3Print      Mirabegron ER 50 MG TB24 Take 50 mg by mouth Daily with supper Historical Med      pantoprazole (PROTONIX) 40 MG tablet Take 1 tablet by mouth 2 times daily (before meals). , Disp-60 tablet, R-1      simvastatin (ZOCOR) 40 MG tablet Take 40 mg by mouth nightly.      gabapentin (NEURONTIN) 300 MG capsule Take 300 mg by mouth nightly. Fadumo Bearden Historical Med       !! - Potential duplicate medications found. Please discuss with provider.       STOP taking these medications       warfarin (COUMADIN) 2 MG tablet Comments:   Reason for Stopping:             Activity: activity as tolerated and no driving for today  Diet: diabetic diet    Follow-up with PCP in 2 weeks    Note that over 30 minutes was spent in preparing discharge papers, discussing discharge with patient, medication review, etc.    Signed:  Jennifer Woods    3/8/2021  1:26 PM

## 2021-03-09 PROBLEM — A88.1 EPIDEMIC VERTIGO: Status: ACTIVE | Noted: 2017-08-28

## 2021-03-09 PROBLEM — K21.9 GERD (GASTROESOPHAGEAL REFLUX DISEASE): Status: ACTIVE | Noted: 2021-01-01

## 2021-03-19 PROBLEM — R79.89 ELEVATED TROPONIN: Status: RESOLVED | Noted: 2021-01-01 | Resolved: 2021-01-01

## 2021-03-19 PROBLEM — R77.8 ELEVATED TROPONIN: Status: RESOLVED | Noted: 2021-01-01 | Resolved: 2021-01-01

## 2021-04-14 PROBLEM — R41.82 AMS (ALTERED MENTAL STATUS): Status: ACTIVE | Noted: 2021-01-01

## 2021-04-14 NOTE — ED PROVIDER NOTES
nosebleed,anter,complex (N/A, 4/28/2018); bronchoscopy (4/28/2018); and Appendectomy. Social History:  reports that she has never smoked. She has never used smokeless tobacco. She reports previous alcohol use. She reports that she does not use drugs. Family History: family history includes Diabetes in her father and mother; Heart Disease in her mother; Kidney Disease in her mother; Stroke in her father. . Unless otherwise noted, family history is non contributory    The patients home medications have been reviewed. Allergies: Pcn [penicillins]        ---------------------------------------------------PHYSICAL EXAM--------------------------------------    Constitutional/General: Alert and oriented x3  Head: Normocephalic and atraumatic  Eyes: PERRL, EOMI, sclera non icteric  Mouth: Oropharynx clear, handling secretions, no trismus, no asymmetry of the posterior oropharynx or uvular edema  Neck: Supple, full ROM, no stridor, no meningeal signs  Respiratory: Diminished throughout not in respiratory distress  Cardiovascular: IRRegularly irregular 2+ distal pulses. Equal extremity pulses. Chest: No chest wall tenderness  GI:  Abdomen Soft, Non tender, Non distended. No rebound, guarding, or rigidity. Musculoskeletal: Moves all extremities x 4. Warm and well perfused, no clubbing, cyanosis, or +3 edema. Capillary refill <3 seconds  Integument: skin warm and dry. No rashes. Neurologic: GCS 15, there is mild left-sided weakness when compared to the right. There is no sensory deficit. Patient states this is baseline for her  Psychiatric: Normal Affect      EKG: Interpreted by emergency department physician, Dr. Catherine Horner   This EKG is signed and interpreted by me. 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 QRS is 148, QTc is 486.   Nonspecific T changes that are stable compared to prior  Comparison: stable as compared to patient's most recent EKG      -------------------------------------------------- RESULTS -------------------------------------------------  I have personally reviewed all laboratory and imaging results for this patient. Results are listed below.      LABS: (Lab results interpreted by me)  Results for orders placed or performed during the hospital encounter of 04/14/21   Troponin   Result Value Ref Range    Troponin 0.06 (H) 0.00 - 0.03 ng/mL   CBC Auto Differential   Result Value Ref Range    WBC 5.2 4.5 - 11.5 E9/L    RBC 4.53 3.50 - 5.50 E12/L    Hemoglobin 14.7 11.5 - 15.5 g/dL    Hematocrit 44.5 34.0 - 48.0 %    MCV 98.2 80.0 - 99.9 fL    MCH 32.5 26.0 - 35.0 pg    MCHC 33.0 32.0 - 34.5 %    RDW 13.1 11.5 - 15.0 fL    Platelets 141 214 - 961 E9/L    MPV 10.6 7.0 - 12.0 fL    Neutrophils % 54.0 43.0 - 80.0 %    Immature Granulocytes % 0.6 0.0 - 5.0 %    Lymphocytes % 25.0 20.0 - 42.0 %    Monocytes % 12.8 (H) 2.0 - 12.0 %    Eosinophils % 7.2 (H) 0.0 - 6.0 %    Basophils % 0.4 0.0 - 2.0 %    Neutrophils Absolute 2.78 1.80 - 7.30 E9/L    Immature Granulocytes # 0.03 E9/L    Lymphocytes Absolute 1.29 (L) 1.50 - 4.00 E9/L    Monocytes Absolute 0.66 0.10 - 0.95 E9/L    Eosinophils Absolute 0.37 0.05 - 0.50 E9/L    Basophils Absolute 0.02 0.00 - 0.20 E9/L   Comprehensive Metabolic Panel   Result Value Ref Range    Sodium 138 132 - 146 mmol/L    Potassium 3.2 (L) 3.5 - 5.0 mmol/L    Chloride 81 (L) 98 - 107 mmol/L    CO2 42 (HH) 22 - 29 mmol/L    Anion Gap 15 7 - 16 mmol/L    Glucose 197 (H) 74 - 99 mg/dL    BUN 44 (H) 8 - 23 mg/dL    CREATININE 2.1 (H) 0.5 - 1.0 mg/dL    GFR Non-African American 22 >=60 mL/min/1.73    GFR African American 27     Calcium 10.8 (H) 8.6 - 10.2 mg/dL    Total Protein 7.2 6.4 - 8.3 g/dL    Albumin 3.9 3.5 - 5.2 g/dL    Total Bilirubin 0.6 0.0 - 1.2 mg/dL    Alkaline Phosphatase 56 35 - 104 U/L    ALT 20 0 - 32 U/L    AST 31 0 - 31 U/L   Brain Natriuretic Peptide   Result Value Ref Range    Pro-BNP 1,400 (H) 0 - 450 pg/mL   Blood Gas, Arterial   Result Value Ref Range    Date Analyzed [id number]     Time Analyzed 1026     Source: Blood Arterial     pH, Blood Gas 7.310 (L) 7.350 - 7.450    PCO2 98.4 (HH) 35.0 - 45.0 mmHg    PO2 119.3 (H) 75.0 - 100.0 mmHg    HCO3 48.4 (H) 22.0 - 26.0 mmol/L    B.E. 17.1 (H) -3.0 - 3.0 mmol/L    O2 Sat 97.9 92.0 - 98.5 %    O2Hb 96.9 94.0 - 97.0 %    COHb 0.9 0.0 - 1.5 %    MetHb 0.1 0.0 - 1.5 %    O2 Content 18.8 mL/dL    HHb 2.1 0.0 - 5.0 %    tHb (est) 13.7 11.5 - 16.5 g/dL    Mode NC- 3 L     Date Of Collection      Time Collected      Pt Temp 37.0 C     ID 2067     Lab 03914     Critical(s) Notified Handed report to Dr/RN    Blood Gas, Arterial   Result Value Ref Range    Date Analyzed [id number]     Time Analyzed 1505     Source: Blood Arterial     pH, Blood Gas 7.400 7.350 - 7.450    PCO2 75.0 (HH) 35.0 - 45.0 mmHg    PO2 127.4 (H) 75.0 - 100.0 mmHg    HCO3 45.4 (H) 22.0 - 26.0 mmol/L    B.E. 16.6 (H) -3.0 - 3.0 mmol/L    O2 Sat 98.4 92.0 - 98.5 %    O2Hb 97.5 (H) 94.0 - 97.0 %    COHb 0.7 0.0 - 1.5 %    MetHb 0.2 0.0 - 1.5 %    O2 Content 19.4 mL/dL    HHb 1.6 0.0 - 5.0 %    tHb (est) 14.0 11.5 - 16.5 g/dL    Mode NC- 2 L     Date Of Collection      Time Collected      Pt Temp 37.0 C     ID 2067     Lab 82568     Critical(s) Notified Handed report to Dr/RN    POCT Glucose   Result Value Ref Range    Meter Glucose 217 (H) 74 - 99 mg/dL   EKG 12 Lead   Result Value Ref Range    Ventricular Rate 77 BPM    Atrial Rate 56 BPM    QRS Duration 148 ms    Q-T Interval 430 ms    QTc Calculation (Bazett) 486 ms    R Axis -40 degrees    T Axis -14 degrees   ,       RADIOLOGY:  Interpreted by Radiologist unless otherwise specified  CT HEAD WO CONTRAST   Final Result   No acute intracranial abnormality. XR CHEST PORTABLE   Final Result   No acute process.                           ------------------------- NURSING NOTES AND VITALS REVIEWED ---------------------------   The nursing notes within the ED encounter and vital signs as below have been reviewed by myself  /88   Pulse 77   Temp 98.3 °F (36.8 °C)   Resp 19   LMP  (LMP Unknown)   SpO2 97%     Oxygen Saturation Interpretation: Normal    The cardiac monitor revealed A fib with a heart rate in the 80s as interpreted by me. The cardiac monitor was ordered secondary to the patient's heart rate and to monitor the patient for dysrhythmia. CPT 27988    The patients available past medical records and past encounters were reviewed. ------------------------------ ED COURSE/MEDICAL DECISION MAKING----------------------  Medications   potassium bicarb-citric acid (EFFER-K) effervescent tablet 40 mEq (40 mEq Oral Not Given 4/14/21 1131)   potassium chloride 10 mEq/100 mL IVPB (Peripheral Line) (10 mEq Intravenous New Bag 4/14/21 1453)                    Medical Decision Making:     I, Dr. Judson Abarca am the primary provider of record    Patient presents with reported episode of left-sided weakness and aphasia. Upon arrival she is ANO x3. She reports her left side is always weak, review of prior charts shows she did have symmetric movement previously. CMP noted, ABG was obtained which shows acute on chronic hypercapnic respiratory failure. She was put on BiPAP. Spoke with medicine patient will be admitted      Re-Evaluations:          Re-evaluation. Patients symptoms show no change  Repeat physical examination is not changed       Re-evaluation. Patients symptoms are improving  Repeat physical examination is improved    This patient's ED course included: a personal history and physicial examination, multiple bedside re-evaluations, IV medications, cardiac monitoring, continuous pulse oximetry and complex medical decision making and emergency management    This patient has been closely monitored during their ED course.       Consultations:  Spoke with Missy for Dr. Lillette Bamberger (Medicine). Discussed case. They will admit this patient. Counseling: The emergency provider has spoken with the patient and discussed todays results, in addition to providing specific details for the plan of care and counseling regarding the diagnosis and prognosis. Questions are answered at this time and they are agreeable with the plan.       --------------------------------- IMPRESSION AND DISPOSITION ---------------------------------    IMPRESSION  1. Acute on chronic respiratory failure with hypercapnia (HCC)    2. Supplemental oxygen dependent    3. Hypokalemia    4. Nursing home resident        DISPOSITION  Disposition: Admit to telemetry  Patient condition is stable        NOTE: This report was transcribed using voice recognition software.  Every effort was made to ensure accuracy; however, inadvertent computerized transcription errors may be present       Gabriela Hoyt DO  04/14/21 1534

## 2021-04-14 NOTE — LETTER
[address] [facility] Encounter Date/Time: 2021 [address] Account: [de-identified]    MRN: [id number]    Patient: Jesica Child    Contact Serial #: [id number]      ENCOUNTER          Patient Class: Observation Private Enc? No Unit  [facility] [id number]/[id number]   Hospital Service: Intermediate   Encounter DX: Acute on chronic respira*   ADM Provider: Bismark Bailey MD   Procedure:     ATT Provider: Bismark Bailey MD   REF Provider:        Admission DX: Acute on chronic respiratory failure with hypercapnia ([facility] [address] and codes:       PATIENT   [place] Department Medicaid  CERTIFICATION OF NECESSITY  FOR NON-EMERGENCY TRANSPORTATION   BY GROUND AMBULANCE      Individual Information   1. Name: Jesica Child 2. [place] Medicaid Billing Number: [id number]   6. Address: [place] [address]      Transportation Provider Information   4. Provider Name: physician ambulance   5. [place] Medicaid Provider Number:  National Provider Identifier (NPI):      Certification  7. Criteria:  During transport, this individual requires:  [x] Medical treatment or continuous     supervision by an EMT. [x] The administration or regulation of oxygen by another person. [] Supervised protective restraint. 8. Period Beginning Date: 4/15/2021   9. Length  [x] Not more than 1 day(s)  [] One Year     Additional Information Relevant to Certification   10. Comments or Explanations, If Necessary or Appropriate   Elder Lies life, bed ridden, hx of stroke, 3LO2     Certifying Practitioner Information   11. Name of Practitioner: Dr. [place] Pablo Goins   12. [place] Medicaid Provider Number, If Applicable:  [address] Provider Identifier (NPI):      Signature Information   14. Date of Signature: 4/15/2021 15. Name of Person Signing: Madison Roland LSW   16.  Signature and Professional DesignationRhmalina Jacob \[facility] [place] [facility]\""     OD [address]39[address]  Rev. 2015                 Name: Jesica Child : 1935 (85 yrs)   [facility] [place] Sex: Female   City: [address] [place] [address]         Marital Status:    Employer: NOT EMPLOYED         Spiritism: Hoahaoism   Primary Care Provider: Dinora Avendaño DO         Primary Phone: [employer]   Contact Name Legal Guardian? Relationship to Patient Home Phone Work Phone   1. Peglorena Pedroza  2. Vena Furbish No  No Child  Child [phone]              GUARANTOR            Guarantor: Jethro Chandler     : 1935   [place] [address] Sex: Female     [place] [address]     Relation to Patient: Self       Home Phone: [phone]   Guarantor ID: [id number]       Work Phone:     Guarantor Employer: NOT EMPLOYED         Status: NOT EMPLO*      COVERAGE        PRIMARY INSURANCE   Payor: [address] [place] [address] 62*   Payor Address: [address], [address]       Group Number: [id number] Type: INDEMNITY   Subscriber Name: Fifi Pacheco Subscriber : 1935   Subscriber ID: [id number] Pat. Rel. to Sub: Self   SECONDARY INSURANCE   Payor:   Plan:     Payor Address:  ,           Group Number:   Insurance Type:     Subscriber Name:   Subscriber :     Subscriber ID:   Pat.  Rel. to Sub:

## 2021-04-14 NOTE — CARE COORDINATION
Social Work /Discharge Planning:    Pt presents to the ED secondary to left sided weakness and aphasia. Pt is from [facility]. Per [facility], pt is a bed hold and able to return upon discharge. SW/CM to follow.

## 2021-04-15 NOTE — CARE COORDINATION
Met with pt who would like to return to [place] [address] at discharge. Pt states that she usually is in bed or gets hoyered into a wheelchair at facility. Pt is currently on 3LO2. Pt states that her son Sahra Shipley is her . Pt is observation, MRI pending. Per Zeus Brown, pt will not need a COVID and if therapy sees they rachel start precert for skilled services can take prior to obtaining precert. Envelope and ambulance form in soft chart. Donalda Mohs LSW

## 2021-04-15 NOTE — PROGRESS NOTES
Occupational Therapy  OCCUPATIONAL THERAPY INITIAL EVALUATION      Date:4/15/2021  Patient Name: Ray Chávez  MRN: [id number]  : 1935  Room: [address] [place], [place] [address]    Evaluating OT: QUYNH Yanez, OTR/L #[id number]    Referring physician: EMERALD Tracy  AM-PAC Daily Activity Raw Score: 10/24  Recommended Adaptive Equipment: TBD     Diagnosis: Acute on chronic respiratory failure with hypercapnia ([facility] [address] [J96.22]  Reason for Admission: Patient presented to hospital with possible TIA due to L sided weakness and aphasia   Pertinent Medical History/Surgery: anemia, arthritis, blood transfusion reaction, chronic A-fib, CKD, DM, breast cancer s/p surgery, HTN, HLD, hypothyroidism, volume overload, GERD,  Morbid obesity, insomnia, epidemic vertigo, s/p gastric bypass, cholecystectomy    Precautions:  Falls, tele, TAPS, supplemental O2, [place], impaired cognition     Home Living: Pt is a resident of a long term care facility ([place] [address])  Prior Level of Function:  Per patient, patient able to complete feeding, grooming, and UB dressing at bed level. Assistance required with toileting, bathing, and LB dressing. Patient reports she is typically bedbound and transfers via antonieta lift to wheelchair if needed. Pain Level:  Moderate L UE pain  Cognition: A&O: self:yes, place:no, time: yes, situation:no  Communication: [place], repeition required  Follows 1 step directions   Memory:  fair -   Sequencing:  fair    Problem solving:  poor   Judgement/safety:  poor     Functional Assessment:   Initial Eval Status  Date: 4/15/21 Treatment Status  Date: Short Term Goals=LTG  Treatment frequency: PRN 1-2 x/week   Feeding Set-up/SBA  Observed completing supine with HOB elevated   Modified Yell    Grooming Moderate Assist   Set-up/SBA to wash face semi-supine in bed, anticipate increased assistance with additional ADLs  Set-up/SBA     UB Dressing Maximal Assist  Doffed/ donned hospital gown semi-supine in bed   Minimal Assist    LB Dressing Dependent       Bathing Dependent   Maximal Assist    Toileting Dependent   Total A for delio-hygiene using bedpan       Bed Mobility  L rolling: Max A  Supine to sit: NT   Sit to supine: NT  Supine to sit: Mod A   Sit to supine: Mod A   Functional Transfers Sit to stand: NT  Stand to sit: NT  Stand pivot: NT  Patient does not complete at baseline     Functional Mobility NT     Balance Sitting:     Static:NT    Dynamic:NT    Patient will demonstrate 10 minutes of unsupported sitting balance with SBA to improve activity tolerance and participation with ADLs   Activity Tolerance Poor+  Fair-   Visual/  Perceptual Glasses/corrective lenses: no         Safety       Poor                  Fair     Upper Extremities:   Hand Dominance: Right [x]  Left []      ROM and Strength Coordination Short Term Goals=LTG   Right UE Active ROM:   Shoulder: ~0-90 degrees flexion/extension  Elbow-hand: WFL         Strength: 3+/5 Fine/gross Motor Coordination:Impaired    Left UE Active ROM:   Shoulder: ~0-20 degrees flexion/extension, reports pain with additional ROM  Elbow-hand: WFL    Passive ROM:  Shoulder: ~0-20 degrees flexion/extension, reports pain with additional ROM    -Patient unclear if L shoulder ROM are chronic vs. acute     Strength: 3+/5 Fine/gross Motor Coordination: Impaired        Hearing: [place]  Sensation:  No c/o numbness or tingling  Tone:  WFL  Edema:  observed B UE                            Comments: Upon arrival, patient semi-supine in bed and agreeable to session. OT evaluation performed with education provided regarding the purpose and benefits of OT session, along with mobility and I/ADL completion. Overall, patient presents with decreased activity tolerance, impaired balance, impaired cognition, and weakness limiting ability to complete ADLs, along with functional mobility/transfers.  Patient would benefit from continued skilled OT to increase safety and functional performance with ADLs/ functional mobility to maximize functional outcomes in order for patient to return to PLOF. At end of session, patient semi-supine in bed with call light and phone within reach, along with all lines and tubes intact. Nursing present to complete assessment. Treatment: OT treatment provided this date includes:    Skilled monitoring of vitals:  Skilled monitoring of the patient's response throughout treatment.    SpO2 during activity 98%, patient on 3 L O2      Eval Complexity: Low     Assessment of current deficits   Functional mobility [x]  ADLs [x] Strength [x]  Cognition [x]  Functional transfers  [x] IADLs [x] Safety Awareness [x]  Endurance/Activity tolerance [x]  Fine Motor Coordination [x] Balance [x] Vision/perception [] Sensation []   Gross Motor Coordination [x] ROM [x] Delirium []                  Motor Control []    Plan of Care:  OT 1-2x/week for 1-2 weeks PRN   [x] ADL retraining/modified techniques, along with assistive device recommendations to maximize patient safety and performance with self-care while maintaining precautions   [x]Balance retraining/tolerance tasks for facilitation of postural control with dynamic challenges during ADLs and functional activities   [x] Delirium Prevention/treatment to maximize functional outcomes   [x] Cognitive Re-Training/ executive function skills for safe participation in ADLs/IADLs, along with functional activities   [x] Energy conservation techniques/Strategies to improve activity tolerance      [x] Environmental modifications for safe mobility and completion of ADLs    [x] Functional mobility training/DME recommendations for increased performance, safety and fall prevention  while maintaining precautions     [x] Functional transfer/mobility training/DME recommendations for increased performance, safety and fall prevention while maintaining precautions           []Neuromuscular re-education: facilitation of righting/equilibrium reactions, midline orientation, scapular stability/mobility, normalization muscle tone and facilitation active functional movement/Attention   [x] Patient/Family education to increase safety and functional independence   [x] Positioning to improve functional independence, safety, and skin integrity   []Sensory re-education techniques to improve extremity awareness, maintain skin integrity and improve hand function         [x]Splinting/positioning needs to maintain joint/skin integrity and prevent contractures       [x] Therapeutic Activity to improve activity tolerance for functional activities and ADLs    [x]Therapeutic Exercise to improve UE strength and activity tolerance for functional transfers and ADLs   [] Visual/Perceptual retraining to improve body awareness and safety during functional activities and ADLs   []      Rehab Potential:  Good for established goals    Patient / Family Goal: Patient did not verbalize any goals     Evaluation Time includes thorough review of current medical information, gathering information on past medical history/social history and prior level of function, completion of standardized testing/informal observation of tasks, assessment of data and education on plan of care and goals. Patient and/or family were instructed on functional diagnosis, prognosis/goals and OT plan of care. Demonstrated fair- understanding.     Time In: 12:35  Time Out: 12:55  Total Session Time: 20 minutes  Total Treatment Time: n/a    Min Units   OT Eval Low 97165  X  1   OT Eval Medium 51616      OT Eval High [id number]       OT Re-Eval [id number]       Therapeutic Ex [id number]       Therapeutic Activities 46693       ADL/Self Care 03303       Orthotic Management 21648       Neuro Re-Ed 80687       Non-Billable Time          Low OT Evaluation     QUYNH Cabral, OTR/L #[id number]

## 2021-04-15 NOTE — PROGRESS NOTES
Date: 4/15/2021    Time: 12:10 AM    Patient Placed On BIPAP/CPAP/ Non-Invasive Ventilation? Yes    If no must comment. Facial area red/color change? No           If YES are Blister/Lesion present? No   If yes must notify nursing staff  BIPAP/CPAP skin barrier?   Yes    Skin barrier type:mepilexlite       Comments:        Kelvin Gomez

## 2021-04-15 NOTE — PROGRESS NOTES
Physical Therapy    Physical Therapy Initial Assessment     Name: Ray Chávez  : 1935  MRN: [id number]    Referring Provider:  EMERALD Tracy    Date of Service: 4/15/2021    Evaluating PT:  Ioana Horan PT, DPT [id number]      Room #:  0846/0491-L  Diagnosis:  Acute on chronic respiratory failure with hypercapnia  PMHx/PSHx:  HTN, diabetes, arthritis, hypothyroidism, HLD, chronic Afib, anemia due to acute blood loss, CKD stage 3, volume overload, CVA, breast CA, R breast surgery, gastric bypass surgery, endoscopic guided control epistaxis, diagnostic bronchoscopy  Procedure/Surgery:  None this admission  Precautions:  Falls, urinary incontinence, bed alarm, [place], cognition, O2  Equipment Needs:  TBD    SUBJECTIVE:    Pt admitted from [facility]. She reports she has been primarily bed-bound recently and does not transfer OOB with staff. OBJECTIVE:   Initial Evaluation  Date: 4/15/21 Treatment Short Term/ Long Term   Goals   AM-PAC 6 Clicks      Was pt agreeable to Eval/treatment? yes     Does pt have pain? No c/o pain     Bed Mobility  Rolling: Mod A  Supine to sit: Max A  Sit to supine: Max A  Scooting: Max A to EOB  Rolling: Ngozi  Supine to sit: Ngozi  Sit to supine: Ngozi  Scooting: Ngozi   Transfers Sit to stand: NT  Stand to sit: NT  Stand pivot: NT  Sit to stand: Mod A  Stand to sit: Mod A  Stand pivot: TBD   Ambulation    NT  TBD   Stair negotiation: ascended and descended NT  NA   ROM BUE:  Limited shoulder flexion and abduction <90 degrees  BLE:  WNL     Strength BUE:  3+/5  BLE:  3+/5     Balance Sitting EOB:  Min<>CGA  Dynamic Standing:  NT  Sitting EOB:  Supervision  Dynamic Standing: Mod A with AAD     Pt is A & O x 2-3 self, place, month. Pt not oriented to year  Sensation:  Pt denies numbness and tingling to extremities  Edema:   Moderate edema of B LEs and B UEs    Patient education  Pt educated on role of therapy, safety with mobility, importance of progressive activity and upright tolerance    Patient response to education:   Pt verbalized understanding Pt demonstrated skill Pt requires further education in this area   yes yes yes     ASSESSMENT:    Comments:  Pt resting semi-supine upon arrival, agreeable to PT eval. Pt incontinent of large amount of urine at beginning of session and required assist for rolling to complete hygiene and placement of clean linens/gown. Pt was able to assist with B LE initiation towards EOB but requires near total assist for trunk lift. She became somewhat lightheaded with initial upright sitting. Pt was able to tolerate approx 3 min EOB prior to fatiguing and requesting to return to supine. Pt completed additional rolling for adjustment of TAPs and lina pad. Skilled positioning implemented for decreased risk of breakdown and preservation of joint integrity. Pt will benefit from continued skilled PT services to improve independence with all bed mobility, sitting balance, and initiation of transfers as appropriate to allow OOB activities. Treatment:  Patient practiced and was instructed in the following treatment:     Bed mobility: cues for sequencing and engagement of B UEs/LEs, manual assist for B LE and trunk negotiation for supine<>sit and rolling   Therapeutic activities: cues for improved sitting balance and importance of upright activity, skilled positioning    Pt's/ family goals   1. To get stronger    Patient and or family understand(s) diagnosis, prognosis, and plan of care. yes    PLAN OF CARE:    Current Treatment Recommendations   [x] Strengthening     [] ROM   [x] Balance Training   [x] Endurance Training   [x] Transfer Training   [] Gait Training   [] Stair Training   [x] Positioning   [x] Safety and Education Training   [x] Patient/Caregiver Education   [] HEP  [] Other       PT care will be provided in accordance with the objectives noted above. Whenever appropriate, clear delegation orders will be provided for nursing staff. Exercises and functional mobility practice will be used as well as appropriate assistive devices or modalities to obtain goals. Patient and family education will also be administered as needed. Frequency of treatments: 2-5x/week x 7-10 days. Time in  0825  Time out  0855    Total Treatment Time  15 minutes     Evaluation Time includes thorough review of current medical information, gathering information on past medical history/social history and prior level of function, completion of standardized testing/informal observation of tasks, assessment of data and education on plan of care and goals.     CPT codes:  [x] Low Complexity PT evaluation 75520  [] Moderate Complexity PT evaluation 91039  [] High Complexity PT evaluation 33355  [] PT Re-evaluation 55645  [] Gait training 18005 0 minutes  [] Manual therapy 88029 0 minutes  [x] Therapeutic activities 22397 15 minutes  [] Therapeutic exercises 81186 0 minutes  [] Neuromuscular reeducation 25381 0 minutes     Leonard Ray, PT, DPT  [id number]

## 2021-04-15 NOTE — CONSULTS
[facility]  Neurology Consult    Date:  4/15/2021  Patient Name:  Jake Bangura  YOB: 1935  MRN: [id number]     PCP:  Libby Zayas DO   Referring:  No ref. provider found      Chief Complaint: Left-sided weakness    History obtained from: Patient, chart    Assessment    80-year-old female with prior history of CVA, presenting with left-sided weakness, altered mentation - likely recrudescence of baseline focal neurologic deficits exacerbated by musculoskeletal shoulder pain and acute on chronic hypercapnic respiratory failure    Plan    · MRI brain and ultrasound of the carotids per primary  · Continue simvastatin  · Continue Eliquis for underlying atrial fibrillation  · Will defer antiplatelet therapy at this time pending MRI results        History of Present Illness:  Jake Bangura is a 80 y.o. right handed female with an extensive past medical history of CVA (2016), HTN, HLD, IDDM 2, A. fib on Eliquis, HFpEF, CKD, breast CA, hypothyroidism, pulmonary hypertension, chronic hypoxic/hypercapnic respiratory failure, GOSIA presenting for evaluation of left-sided weakness. Patient presents from skilled nursing facility 4/14/2021 with complaints of left-sided weakness and some difficulty with speech per nursing staff. When speaking with the patient today, she explains that she has residual left-sided weakness from her previous stroke and this has recently been compounded by acute left shoulder pain, which limits her range of motion and strength in the left upper extremity. Speech is normal on assessment today. She denies any new focal weakness, numbness or tingling, blurry vision, loss of vision, diplopia, chest pain, increased shortness of breath from baseline, abdominal pain, headache. Patient is chronically hypoxic and maintained on 3 L nasal cannula.   She is being followed by pulmonology, who is previously recommended NIV, however the patient has adamantly refused. On arrival to ED, patient was hemodynamically stable, afebrile, but hypoxic on room air. Initial labs significant for hypokalemia 3.2, DALILA on CKD with creatinine 2.1. ABG obtained in ED revealed hypercapnic respiratory acidosis, for which patient was briefly placed on BiPAP before transferring to monitored floor. CT of the head was negative for any acute infarct or hemorrhage. Review of Systems:  Constitutional  · Weight loss: no  · Fever: no    Eyes  · Double Vision: no  · Visions loss: no    Ears, Nose, Mouth, and Throat  · Difficulty swallowing: no    Cardiovascular  · Chest Pain: no    Respiratory  · Shortness of Breath: no    Gastrointestinal  · Abdominal Pain: no    Genitourinary  · Difficulty with Urination: no    Integumentary  · Rash: no    Musculoskeletal  · Back Pain: no    Neurological  · Headaches: no  · Weakness: no  · Numbness: no  · Seizures: no  · Difficulty with Memory: no    Psychiatric  · Anxiety: no  · Depression: no  · Other mental health issues: no      Medical History:   Past Medical History:   Diagnosis Date    Anemia due to acute blood loss 12/29/2017    Arthritis     Blood transfusion reaction     Chronic atrial fibrillation (HCC) 12/29/2017    CKD (chronic kidney disease) stage 3, GFR 30-59 ml/min 12/29/2017    Diabetes mellitus ([facility]     History of blood transfusion     History of breast cancer     breast cancer Right    History of stroke     Hyperlipidemia     Hypertension     Hypothyroidism     Volume overload 12/30/2017    As cause of dyspnea        Surgical History:   Past Surgical History:   Procedure Laterality Date    APPENDECTOMY      BREAST SURGERY      right    BRONCHOSCOPY  4/28/2018    BRONCHOSCOPY DIAGNOSTIC performed by Kelsey Mcdaniel MD at [address] [place] [address]  NASAL SINUS SURGERY      august 27 2017. cauterized her nasal passage.     OTHER SURGICAL HISTORY  07/27/2017    endoscopic conrtol of epistaxis dr Linda Perez N/A 4/28/2018    ENDOSCOPIC GUIDED CONTROL EPISTAXIS  WITH SEPTAL BUTTON PLACEMENT. INTRA-OPERATIVE BRONCHOSCOPY. performed by Shan Chamorro MD at [facility] OR        Family History:   Family History   Problem Relation Age of Onset    Heart Disease Mother     Kidney Disease Mother         dialysis    Diabetes Mother     Stroke Father     Diabetes Father        Family History of Neurologic Disease: None per chart.     Social History:  Social History     Tobacco Use    Smoking status: Never Smoker    Smokeless tobacco: Never Used   Substance Use Topics    Alcohol use: Not Currently     Alcohol/week: 0.0 standard drinks     Comment:      Drug use: Never        Current Medications:      Current Facility-Administered Medications   Medication Dose Route Frequency Provider Last Rate Last Admin    potassium bicarb-citric acid (EFFER-K) effervescent tablet 40 mEq  40 mEq Oral Once Merdis Northern, DO        apixaban (ELIQUIS) tablet 5 mg  5 mg Oral BID Adriana Gross PA   5 mg at 04/15/21 0919    bumetanide (BUMEX) tablet 1 mg  1 mg Oral BID Adriana Gross PA   1 mg at 04/15/21 3463    calcitRIOL (ROCALTROL) capsule 0.5 mcg  0.5 mcg Oral Daily Adriana Gross, PA   0.5 mcg at 04/15/21 0918    fluticasone (FLONASE) 50 MCG/ACT nasal spray 1 spray  1 spray Nasal Daily Adriana Gross PA   1 spray at 04/15/21 0919    gabapentin (NEURONTIN) capsule 300 mg  300 mg Oral Nightly Adriana Gross PA   300 mg at 04/14/21 2135    guaiFENesin tablet 400 mg  400 mg Oral TID PRN Adriana Gross PA        hydrOXYzine (ATARAX) tablet 10 mg  10 mg Oral TID PRN Adriana Gross PA   10 mg at 04/15/21 0918    hydrOXYzine (VISTARIL) capsule 25 mg  25 mg Oral Nightly Adriana Gross PA   25 mg at 04/14/21 2135    levothyroxine (SYNTHROID) tablet 25 mcg  25 mcg Oral Daily Adriana Gross PA   25 mcg at 04/15/21 0622    lidocaine 4 % external patch 1 patch  1 patch Transdermal Daily Yvette Montaño   1 patch at 04/15/21 0920    melatonin disintegrating tablet 5 mg  5 mg Oral Nightly EMERALD Montaño   5 mg at 04/14/21 2135    metOLazone (ZAROXOLYN) tablet 5 mg  5 mg Oral Nightly Holly Austin [place]        metoprolol succinate (TOPROL XL) extended release tablet 50 mg  50 mg Oral Daily EMERALD Montaño   50 mg at 04/15/21 0919    trospium (SANCTURA) tablet 20 mg  20 mg Oral [place], [place]   20 mg at 04/14/21 2135    pantoprazole (PROTONIX) tablet 40 mg  40 mg Oral BID EMERALD Montaño   40 mg at 04/15/21 0919    atorvastatin (LIPITOR) tablet 20 mg  20 mg Oral Nightly Holly Austin PA   20 mg at 04/14/21 2135    sodium chloride flush 0.9 % injection 10 mL  10 mL Intravenous 2 times per day EMERALD Montaño   10 mL at 04/15/21 0919    sodium chloride flush 0.9 % injection 10 mL  10 mL Intravenous PRN Holly Austin PA   10 mL at 04/14/21 2121    0.9 % sodium chloride infusion  25 mL Intravenous PRN EMERALD Montaño        potassium chloride (KLOR-CON M) extended release tablet 40 mEq  40 mEq Oral PRN EMERALD Montaño        Or    potassium bicarb-citric acid (EFFER-K) effervescent tablet 40 mEq  40 mEq Oral PRN EMERALD Montaño        Or    potassium chloride 10 mEq/100 mL IVPB (Peripheral Line)  10 mEq Intravenous PRN Holly Austin  mL/hr at 04/15/21 0545 10 mEq at 04/15/21 0545    magnesium hydroxide (MILK OF MAGNESIA) 400 MG/5ML suspension 30 mL  30 mL Oral Daily PRN EMERALD Montaño        acetaminophen (TYLENOL) tablet 650 mg  650 mg Oral Q6H PRN EMERALD Montaño        Or    acetaminophen (TYLENOL) suppository 650 mg  650 mg Rectal Q6H PRN EMERALD Montaño        insulin lispro (HUMALOG) injection vial 0-6 Units  0-6 Units Subcutaneous TID WC EMERALD Montaño   3 Units at 04/15/21 0622    insulin lispro (HUMALOG) injection vial 0-3 Units  0-3 Units Subcutaneous Nightly Tommy NAGY EMERALD Soliz   1 Units at 04/14/21 2134    0.9 % sodium chloride infusion   Intravenous Continuous EMERALD Zhou 75 mL/hr at 04/14/21 2121 New Bag at 04/14/21 2121    trimethobenzamide (TIGAN) injection 200 mg  200 mg Intramuscular Q6H PRN EMERALD Zhou        glucose (GLUTOSE) 40 % oral gel 15 g  15 g Oral PRN EMERALD Zhou        dextrose 50 % IV solution  12.5 g Intravenous PRN EMERALD Zhou        glucagon (rDNA) injection 1 mg  1 mg Intramuscular PRN EMERALD Zhou        dextrose 5 % solution  100 mL/hr Intravenous PRN EMERALD Zhou        ipratropium-albuterol (DUONEB) nebulizer solution 1 ampule  1 ampule Inhalation Q8H PRN EMERALD Zhou            Allergies: Allergies   Allergen Reactions    Pcn [Penicillins] Hives        Physical Examination  Vitals   Vitals:    04/14/21 2134 04/14/21 2345 04/15/21 0009 04/15/21 0830   BP: (!) 142/69 128/82  (!) 127/59   Pulse: 88 87  70   Resp:  18 16 17   Temp:  96 °F (35.6 °C)  97 °F (36.1 °C)   TempSrc:  Temporal  Temporal   SpO2:  99%  100%   Weight:       Height:            General: Patient appears in no acute distress with an obese body habitus  HEENT: Normocephalic, atraumatic  Chest: Diminished breath sounds bilaterally, no wheezes/rales/rhonchi  Heart: Regular rate and rhythm, no murmurs appreciated  Extremities: No edema or cyanosis noted    Neurologic Examination    Mental Status  Alert, and oriented to person, place and time with normal speech and language. No evidence of aphasia during conversation. No evidence of memory impairment. Attention and concentration appeared normal.     Cranial Nerves  II. Visual fields full to confrontation bilaterally. III, IV, VI: Pupils equally round and reactive to light, 3 to 2 mm bilaterally. EOMs: full, no nystagmus. V. Facial sensation diminished L lower face  VII: Mild L sided facial droop  VIII: Hearing intact to voice  IX,X: Palate elevates symmetrically.  No dysarthria  XI: Sternocleidomastoid and trapezius 5/5 bilaterally   XII: Tongue is midline    Motor     Right Left   Right Left   Deltoid 5 4-  Hip Flexion 5 5   Biceps      5  4-  Knee Extension 5 5   Triceps 5 4-  Knee Flexion 5 5   Handgrip 5 4-  Ankle Dorsiflexion 5 5       Ankle Plantarflexion 5 5     Tone: Normal in all four limbs    Bulk: Normal in all four limbs with no evidence of atrophy    Sensation  · Light Touch: Intact distally in all four limbs    Reflexes     Right Left   Biceps 2 2   Brachioradialis 2 2   Triceps 2 2   Patellar 2 2   Achilles 2 2   ankle clonus none none     Toes down going bilaterally. Coordination  Rapid alternating movements normal in bilateral upper extremities  Finger to nose testing normal bilaterally  Heel to shin testing normal bilaterally    Imaging  US CAROTID ARTERY BILATERAL   Final Result   Mild atherosclerotic disease. No hemodynamically significant stenosis   is identified            CT HEAD WO CONTRAST   Final Result   No acute intracranial abnormality. XR CHEST PORTABLE   Final Result   No acute process. MRI BRAIN WO CONTRAST    (Results Pending)         Electronically signed by:  Rayray Carter MD, 4/15/2021 9:41 AM

## 2021-04-16 PROBLEM — Z91.199 NONCOMPLIANCE WITH TREATMENT: Chronic | Status: ACTIVE | Noted: 2021-01-01

## 2021-04-16 PROBLEM — J96.22 ACUTE ON CHRONIC RESPIRATORY FAILURE WITH HYPERCAPNIA (HCC): Status: ACTIVE | Noted: 2021-01-01

## 2021-04-16 PROBLEM — E87.6 HYPOKALEMIA: Status: ACTIVE | Noted: 2021-01-01

## 2021-04-16 PROBLEM — J96.11 CHRONIC RESPIRATORY FAILURE WITH HYPOXIA AND HYPERCAPNIA (HCC): Status: ACTIVE | Noted: 2021-01-01

## 2021-04-16 PROBLEM — E66.01 MORBID OBESITY WITH BMI OF 40.0-44.9, ADULT (HCC): Status: ACTIVE | Noted: 2020-01-01

## 2021-04-16 PROBLEM — J96.12 CHRONIC RESPIRATORY FAILURE WITH HYPOXIA AND HYPERCAPNIA (HCC): Status: ACTIVE | Noted: 2021-01-01

## 2021-04-16 NOTE — DISCHARGE INSTR - COC
Continuity of Care Form    Patient Name: Citlalli Jordan   :  1935  MRN:  [id number]    Admit date:  2021  Discharge date:  ***    Code Status Order: Full Code   Advance Directives:   Advance Care Flowsheet Documentation       Date/Time Healthcare Directive Type of Healthcare Directive Copy in [address] Agent's Name Healthcare Agent's Phone Number    214  No, patient does not have an advance directive for healthcare treatment -- -- -- -- --            Admitting Physician:  Alexsandra Hart MD  PCP: Steffi Reynaga DO    Discharging Nurse: [facility] Unit/Room#: 0200/0380-Z  Discharging Unit Phone Number: ***    Emergency Contact:   Extended Emergency Contact Information  Primary Emergency Contact: Keith Fall  Address: [address] [place] [address], [address] [place] [address] Phone: [phone]  Mobile Phone: [phone]  Relation: Child   needed? No  Secondary Emergency Contact: Omid Isaacs [facility] Phone: [phone]  Mobile Phone: [phone]  Relation: Child   needed? No    Past Surgical History:  Past Surgical History:   Procedure Laterality Date    APPENDECTOMY      BREAST SURGERY      right    BRONCHOSCOPY  2018    BRONCHOSCOPY DIAGNOSTIC performed by Ruthann Chaney MD at [facility]      NASAL SINUS SURGERY      2017. cauterized her nasal passage. OTHER SURGICAL HISTORY  2017    endoscopic conrtol of epistaxis dr Abi Looney N/A 2018    ENDOSCOPIC GUIDED CONTROL EPISTAXIS  WITH SEPTAL BUTTON PLACEMENT. INTRA-OPERATIVE BRONCHOSCOPY.  performed by Ruthann Chaney MD at [address] [place] [address]       Immunization History:   Immunization History   Administered Date(s) Administered    Influenza Vaccine, unspecified formulation 10/20/2017    Influenza Virus Vaccine 10/07/2010, 10/02/2013, 10/27/2014, 09/26/2017, 10/16/2019    Influenza, High Dose (Fluzone 65 yrs and older) 10/07/2010, 10/02/2013, 10/27/2014    Influenza, Quadv, IM, (6 mo and older Fluzone, Flulaval, Fluarix and 3 yrs and older Afluria) 09/26/2017    Pneumococcal Conjugate Vaccine 03/20/2015    Pneumococcal Vaccine 09/25/2019       Active Problems:  Patient Active Problem List   Diagnosis Code    Chronic atrial fibrillation I48.20    CKD (chronic kidney disease) stage 3, GFR 30-59 ml/min ([facility]) N18.30    History of breast cancer Z85. 3    Chronic anemia D64.9    Diabetes mellitus type 2, uncontrolled ([facility] E11.65    Essential hypertension I10    History of CVA (cerebrovascular accident) Z86.73    Hyperlipidemia LDL goal <100 E78.5    Acquired hypothyroidism E03.9    Morbid obesity with BMI of 40.0-44.9, adult ([facility] E66.01, Z68.41    Acute on chronic respiratory failure with hypoxia ([facility]) J96.21    Acute on chronic diastolic congestive heart failure ([facility]) I50.33    Bilateral pleural effusion J90    Chronic respiratory failure with hypoxia and hypercapnia ([facility]) J96.11, J96.12    GERD (gastroesophageal reflux disease) K21.9    Insomnia G47.00    Epidemic vertigo A88.1    AMS (altered mental status) R41.82    Hypokalemia E87.6    Acute on chronic respiratory failure with hypercapnia ([facility]) J96.22       Isolation/Infection:   Isolation            No Isolation          Patient Infection Status       Infection Onset Added Last Indicated Last Indicated By Review Planned Expiration Resolved Resolved By    None active    Resolved    COVID-19 Rule Out 10/26/20 10/26/20 10/26/20 Covid-19 Ambulatory (Ordered)   10/28/20 Rule-Out Test Resulted    COVID-19 Rule Out 10/19/20 10/19/20 10/19/20 Covid-19 Ambulatory (Ordered)   10/21/20 Rule-Out Test Resulted    COVID-19 Rule Out 10/12/20 10/12/20 10/12/20 Covid-19 Ambulatory (Ordered)   10/14/20 Rule-Out Test Resulted    COVID-19 Rule Out 10/05/20 10/05/20 10/05/20 Covid-19 Ambulatory (Ordered)   10/07/20 Rule-Out Test Resulted    COVID-19 Rule Out 09/29/20 09/29/20 09/29/20 Covid-19 Ambulatory (Ordered)   09/30/20 Rule-Out Test Resulted    COVID-19 Rule Out 09/25/20 09/25/20 09/25/20 COVID-19 (Ordered)   09/25/20 Rule-Out Test Resulted    COVID-19 Rule Out 09/15/20 09/15/20 09/15/20 Covid-19 Ambulatory (Ordered)   09/16/20 Rule-Out Test Resulted    COVID-19 Rule Out 08/30/20 08/30/20 08/30/20 COVID-19 (Ordered)   08/30/20 Rule-Out Test Resulted    COVID-19 Rule Out 08/24/20 08/24/20 08/24/20 COVID-19 (Ordered)   08/24/20 Rule-Out Test Resulted    COVID-19 Rule Out 08/13/20 08/13/20 08/13/20 COVID-19 (Ordered)   08/13/20 Rule-Out Test Resulted            Nurse Assessment:  Last Vital Signs: /75   Pulse 87   Temp 97.4 °F (36.3 °C) (Tympanic)   Resp 16   Ht 5' 2\" (1.575 m)   Wt 238 lb 5 oz (108.1 kg)   LMP  (LMP Unknown)   SpO2 91%   BMI 43.59 kg/m²     Last documented pain score (0-10 scale): Pain Level: 7  Last Weight:   Wt Readings from Last 1 Encounters:   04/16/21 238 lb 5 oz (108.1 kg)     Mental Status:  {IP PT MENTAL STATUS:20030:::0}    IV Access:  { KODI IV ACCESS:171322237:::0}    Nursing Mobility/ADLs:  Walking   {CHP DME ADLs:604165797:::0}  Transfer  {CHP DME ADLs:766071133:::0}  Bathing  {CHP DME ADLs:281013605:::0}  Dressing  {CHP DME ADLs:967155915:::0}  Toileting  {CHP DME ADLs:013913289:::0}  Feeding  {CHP DME ADLs:267890242:::0}  Med Admin  {CHP DME ADLs:874446342:::0}  Med Delivery   { KODI MED Delivery:148201447:::0}    Wound Care Documentation and Therapy:  Wound 08/15/20 Nose Mid (Active)   Number of days: 243        Elimination:  Continence:    Bowel: {YES / ZX:67557}  Bladder: {YES / KO:45418}  Urinary Catheter: {Urinary Catheter:167709409:::0}   Colostomy/Ileostomy/Ileal Conduit: {YES / MF:72921}       Date of Last BM: ***    Intake/Output Summary (Last 24 hours) at 4/16/2021 1151  Last data filed at 4/16/2021 1030  Gross per 24 hour   Intake 360 ml   Output 1350 ml   Net -990 ml

## 2021-04-16 NOTE — CARE COORDINATION
Pt discharging today to [place] [address]. Physician ambulanc for 6pm. Called and left message for son Gordon Trivedi of discharge/time. Notified RN, charge RN, and Shivani) of discharge/time. Envelope and ambulance form in soft chart. Margarette Navarro [facility]

## 2021-04-16 NOTE — PROGRESS NOTES
Pedro Moses is a 80 y.o. right handed female     Neurology is following for left-sided weakness    PMH: Prior CVA in 2016, hypertension, hyperlipidemia, diabetes mellitus 2, A. fib on Eliquis, heart failure, CKD, breast cancer, hypothyroidism, pulmonary hypertension, chronic hypoxic/hypercapnic respiratory failure, GOSIA    Patient presented to ED on 4/14 with complaint of left-sided weakness. Patient also found to have aphasia. Patient has a history of strokes with left-sided deficits. She states when she woke up she felt someone was rubbing her chest and she does not remember why she was brought in. Patient was later found to be alert and oriented x3. Patient has a history of A. fib from prior CVA and on Eliquis. CT head showed no acute intracranial abnormality. ABGs obtained showed chronic hypercapnic respiratory failure. MRI brain showed no acute intracranial abnormality; no acute stroke   Carotid ultrasound shows mild atherosclerotic disease; no hemodynamically significant stenosis    Vital signs at present time are stable; on O2 via nasal cannula at 6 L    ROS is limited due to confusion and [place]    There is no family present at bedside. Objective:     BP (!) 146/75   Pulse 75   Temp 96.4 °F (35.8 °C) (Temporal)   Resp 17   Ht 5' 2\" (1.575 m)   Wt 238 lb 5 oz (108.1 kg)   LMP  (LMP Unknown)   SpO2 100%   BMI 43.59 kg/m²     General appearance: alert, appears stated age, cooperative and no distress  Head: normocephalic, without obvious abnormality, atraumatic  Eyes: conjunctivae/corneas clear.  .  Neck: supple, symmetrical, trachea midline   Lungs: clear to auscultation bilaterally  Heart: regular rate and rhythm  Extremities: normal, atraumatic, no cyanosis or edema  Pulses: 2+ and symmetric  Skin: color, texture, turgor normal---no rashes or lesions      Mental Status: Initially asleep, arouses easily to voice, alert and oriented to self, place and situation; confused to time PHUR 7.5 03/19/2021    WBCUA 5-10 03/19/2021    RBCUA 10-20 03/19/2021    BACTERIA MODERATE 03/19/2021    CLARITYU SL CLOUDY 03/19/2021    SPECGRAV 1.015 03/19/2021    LEUKOCYTESUR LARGE 03/19/2021    UROBILINOGEN 1.0 03/19/2021    BILIRUBINUR SMALL 03/19/2021    BLOODU LARGE 03/19/2021    GLUCOSEU Negative 03/19/2021    AMORPHOUS FEW 02/17/2021     HgBA1c:    Lab Results   Component Value Date    LABA1C 6.0 04/15/2021     FLP:    Lab Results   Component Value Date    TRIG 323 04/15/2021    HDL 39 04/15/2021    LDLCALC 36 04/15/2021    LABVLDL 65 04/15/2021     TSH:    Lab Results   Component Value Date    TSH 2.740 02/17/2021     MRI BRAIN WO CONTRAST   Final Result      There is no acute infarction, intracranial hemorrhage, or intracranial mass   lesion. Mild chronic microangiopathic ischemic disease. Mild generalized volume loss. Severe mucosal thickening of bilateral mastoid air cells. US CAROTID ARTERY BILATERAL   Final Result   Mild atherosclerotic disease. No hemodynamically significant stenosis   is identified            CT HEAD WO CONTRAST   Final Result   No acute intracranial abnormality. XR CHEST PORTABLE   Final Result   No acute process. Complete echo 8/14/2020:  Technically suboptimal and limited limited study. Left ventricular internal dimensions were normal in diastole and systole. Moderate left ventricular concentric hypertrophy noted. No regional wall motion abnormalities seen. Normal left ventricular ejection fraction. The left atrium is borderline dilated. Borderline dilated right ventricle. Right ventricle global systolic function is reduced . Mildly enlarged right atrium size. Mild thickening of the mitral valve leaflets. Moderate mitral annular calcification. Mild mitral regurgitation is present. Mild to moderate functional mitral stenosis . Mild aortic stenosis. Mild tricuspid regurgitation.    Pulmonary hypertension is mild .     All labs and images personally reviewed today    Assessment:     Patient presents with left-sided weakness and aphasia   Found to have chronic respiratory failure as patient does not use CPAP machine at home   MRI brain showed no acute pathology or new strokes    Hypokalemia 2.7 on 4/15   Replace per PCP    DALILA on CKD    Chronic hypercapnic respiratory failure   GOSIA noncompliant with BiPAP   Stressed CPAP compliance today from a visit and patient expressed understanding. Plan:     Continue supportive care  Continue Eliquis and statin  A1c 6.0, LDL 36  Continue PT/OT  Continue telemetry  Up with assistance    Okay to discharge from neuro standpoint once medically cleared. Neuro will sign off.     SAVANNA Duarte NP-C   8:36 AM  4/16/2021

## 2021-04-16 NOTE — DISCHARGE SUMMARY
medications which have NOT CHANGED    Details   fluticasone (FLONASE) 50 MCG/ACT nasal spray 1 spray by Nasal route daily      polyethylene glycol (GLYCOLAX) 17 g packet Take 17 g by mouth daily      insulin lispro (HUMALOG) 100 UNIT/ML injection vial Inject 12 Units into the skin 3 times daily (before meals) *Plus Sliding Scale*      levothyroxine (SYNTHROID) 25 MCG tablet Take 25 mcg by mouth Daily      pantoprazole (PROTONIX) 40 MG tablet Take 40 mg by mouth 2 times daily      metoprolol succinate (TOPROL XL) 50 MG extended release tablet Take 50 mg by mouth daily      ondansetron (ZOFRAN) 4 MG tablet Take 4 mg by mouth every 8 hours as needed for Nausea or Vomiting      apixaban (ELIQUIS) 5 MG TABS tablet Take 5 mg by mouth 2 times daily      albuterol-ipratropium (COMBIVENT RESPIMAT)  MCG/ACT AERS inhaler Inhale 2 puffs into the lungs 3 times daily as needed for Shortness of Breath       bisacodyl (DULCOLAX) 10 MG suppository Place 10 mg rectally daily as needed for Constipation      Multiple Vitamins-Minerals (THERAPEUTIC MULTIVITAMIN-MINERALS) tablet Take 1 tablet by mouth daily      lidocaine 4 % external patch Place 1 patch onto the skin daily Apply to left arm/shoulder      potassium chloride (KLOR-CON M) 10 MEQ extended release tablet Take 10 mEq by mouth 2 times daily      acetaminophen (TYLENOL) 325 MG tablet Take 650 mg by mouth every 4 hours as needed for Pain or Fever       calcitRIOL (ROCALTROL) 0.25 MCG capsule Take 0.5 mcg by mouth daily       loratadine (CLARITIN) 10 MG tablet Take 10 mg by mouth daily      melatonin 5 MG TABS tablet Take 5 mg by mouth nightly       guaiFENesin (MUCINEX) 600 MG extended release tablet Take 600 mg by mouth 2 times daily as needed for Congestion       Cholecalciferol (VITAMIN D3) 5000 units TABS Take 5,000 Units by mouth daily       ferrous sulfate 325 (65 Fe) MG tablet Take 1 tablet by mouth 2 times daily (with meals)  Qty: 30 tablet, Refills: 0 magnesium hydroxide (MILK OF MAGNESIA) 400 MG/5ML suspension Take 30 mLs by mouth daily as needed for Constipation      docusate sodium (COLACE, DULCOLAX) 100 MG CAPS Take 200 mg by mouth nightly      Mirabegron ER 50 MG TB24 Take 50 mg by mouth Daily with supper       simvastatin (ZOCOR) 40 MG tablet Take 40 mg by mouth nightly.      gabapentin (NEURONTIN) 300 MG capsule Take 300 mg by mouth nightly. Nimco Sida STOP taking these medications       metOLazone (ZAROXOLYN) 5 MG tablet Comments:   Reason for Stopping:         hydrOXYzine (ATARAX) 25 MG tablet Comments:   Reason for Stopping:         metOLazone (ZAROXOLYN) 5 MG tablet Comments:   Reason for Stopping:         polyethylene glycol (GLYCOLAX) 17 GM/SCOOP powder Comments:   Reason for Stopping:         Dulaglutide (TRULICITY) 1.5 RC/8.1JK SOPN Comments:   Reason for Stopping:         hydrOXYzine (ATARAX) 10 MG tablet Comments:   Reason for Stopping:             Activity: activity as tolerated  Diet: regular diet    Follow-up with PCP in 1 week.     Note that over 30 minutes was spent in preparing discharge papers, discussing discharge with patient, medication review, etc.    Signed:  Alexsandra Hart    4/16/2021  11:52 AM

## 2021-04-16 NOTE — H&P
[address] [facility]  History and Physical      CHIEF COMPLAINT:    Chief Complaint   Patient presents with    Transient Ischemic Attack     possible TIA. per ems, call was placed for left sided weakness and aphasia. per ems, pt was asleep and then awoke when they arrived and pt stated that she didn't know why they were rubbing her chest. per ems, symptoms resolved pta. Patient of Libby Zayas  presents with:  Acute on chronic respiratory failure with hypercapnia ([address]    History of Present Illness: The patient resides in a nursing home. She is chronically hypercapnic to a pretty severe degree. She is supposed to use a trilogy ventilator. She tells me straight out that she never uses it. She says it is uncomfortable. Two days ago she was found in the morning apparently with altered mental status. She was evidently very sleepy and difficult to arouse. It is not clear if there were any focal abnormalities. There was some concern of left arm weakness, but the patient says that is chronic. She was found to be even more hypercapnic than usual with a PCO2 of 98. She was placed on BiPAP that seems to have alleviated her confusion. She is now totally back to baseline. She denies any dyspnea, cough, pain, or discomfort. She really has no complaints. REVIEW OF SYSTEMS:  Pertinent negatives are above in HPI. 10 point ROS otherwise negative.       Past Medical History:   Diagnosis Date    Anemia due to acute blood loss 12/29/2017    Arthritis     Blood transfusion reaction     Chronic atrial fibrillation (HCC) 12/29/2017    CKD (chronic kidney disease) stage 3, GFR 30-59 ml/min 12/29/2017    Diabetes mellitus ([address]     History of blood transfusion     History of breast cancer     breast cancer Right    History of stroke     Hyperlipidemia     Hypertension     Hypothyroidism     Volume overload 12/30/2017    As cause of dyspnea         Past Surgical History: Procedure Laterality Date    APPENDECTOMY      BREAST SURGERY      right    BRONCHOSCOPY  4/28/2018    BRONCHOSCOPY DIAGNOSTIC performed by Vanesa Back MD at [address] [place] [place] [address] [place]  NASAL SINUS SURGERY      august 27 2017. cauterized her nasal passage.  OTHER SURGICAL HISTORY  07/27/2017    endoscopic conrtol of epistaxis dr [place] N/A 4/28/2018    ENDOSCOPIC GUIDED CONTROL EPISTAXIS  WITH SEPTAL BUTTON PLACEMENT. INTRA-OPERATIVE BRONCHOSCOPY.  performed by Vanesa Back MD at [facility] OR       Medications Prior to Admission:    Medications Prior to Admission: bumetanide (BUMEX) 1 MG tablet, Take 1 mg by mouth 2 times daily  fluticasone (FLONASE) 50 MCG/ACT nasal spray, 1 spray by Nasal route daily  polyethylene glycol (GLYCOLAX) 17 g packet, Take 17 g by mouth daily  insulin lispro (HUMALOG) 100 UNIT/ML injection vial, Inject 12 Units into the skin 3 times daily (before meals) *Plus Sliding Scale*  levothyroxine (SYNTHROID) 25 MCG tablet, Take 25 mcg by mouth Daily  metOLazone (ZAROXOLYN) 5 MG tablet, Take 5 mg by mouth nightly  pantoprazole (PROTONIX) 40 MG tablet, Take 40 mg by mouth 2 times daily  metoprolol succinate (TOPROL XL) 50 MG extended release tablet, Take 50 mg by mouth daily  ondansetron (ZOFRAN) 4 MG tablet, Take 4 mg by mouth every 8 hours as needed for Nausea or Vomiting  apixaban (ELIQUIS) 5 MG TABS tablet, Take 5 mg by mouth 2 times daily  hydrOXYzine (ATARAX) 25 MG tablet, Take 25 mg by mouth nightly   albuterol-ipratropium (COMBIVENT RESPIMAT)  MCG/ACT AERS inhaler, Inhale 2 puffs into the lungs 3 times daily as needed for Shortness of Breath   bisacodyl (DULCOLAX) 10 MG suppository, Place 10 mg rectally daily as needed for Constipation  Multiple Vitamins-Minerals (THERAPEUTIC MULTIVITAMIN-MINERALS) tablet, Take 1 tablet by mouth daily  lidocaine 4 % external patch, Place 1 patch onto the skin daily Apply to left arm/shoulder  potassium chloride (KLOR-CON M) 10 MEQ extended release tablet, Take 10 mEq by mouth 2 times daily  acetaminophen (TYLENOL) 325 MG tablet, Take 650 mg by mouth every 4 hours as needed for Pain or Fever   calcitRIOL (ROCALTROL) 0.25 MCG capsule, Take 0.5 mcg by mouth daily   loratadine (CLARITIN) 10 MG tablet, Take 10 mg by mouth daily  melatonin 5 MG TABS tablet, Take 5 mg by mouth nightly   guaiFENesin (MUCINEX) 600 MG extended release tablet, Take 600 mg by mouth 2 times daily as needed for Congestion   Dulaglutide (TRULICITY) 1.5 YL/8.2KV SOPN, Inject 1.5 mg into the skin once a week Indications: weekly on Thursday Given Thursday  Cholecalciferol (VITAMIN D3) 5000 units TABS, Take 5,000 Units by mouth daily   insulin glargine (TOUJEO SOLOSTAR) 300 UNIT/ML injection pen, Inject 50 Units into the skin nightly  ferrous sulfate 325 (65 Fe) MG tablet, Take 1 tablet by mouth 2 times daily (with meals)  magnesium hydroxide (MILK OF MAGNESIA) 400 MG/5ML suspension, Take 30 mLs by mouth daily as needed for Constipation  docusate sodium (COLACE, DULCOLAX) 100 MG CAPS, Take 200 mg by mouth nightly  hydrOXYzine (ATARAX) 10 MG tablet, Take 10 mg by mouth 3 times daily as needed for Itching  Mirabegron ER 50 MG TB24, Take 50 mg by mouth Daily with supper   simvastatin (ZOCOR) 40 MG tablet, Take 40 mg by mouth nightly.  gabapentin (NEURONTIN) 300 MG capsule, Take 300 mg by mouth nightly. .    Note that the patient's home medications were reviewed and the above list is accurate to the best of my knowledge at the time of the exam.    Allergies:    Pcn [penicillins]    Social History:    reports that she has never smoked. She has never used smokeless tobacco. She reports previous alcohol use. She reports that she does not use drugs.     Family History:   family history includes Diabetes in her father and mother; Heart Disease in her mother; Kidney Disease in her mother; Stroke in her disease. No hemodynamically significant stenosis is identified     I've personally reviewed the patient's CXR:  clear    EKG:  I've personally reviewed the patient's EKG:  AF rate controlled RBBB    ASSESSMENT/PLAN:  Principal Problem:    Acute on chronic respiratory failure with hypercapnia ([facility])  Active Problems:    CKD (chronic kidney disease) stage 3, GFR 30-59 ml/min ([facility])    Diabetes mellitus type 2, uncontrolled ([facility])    Essential hypertension    Hyperlipidemia LDL goal <100    Morbid obesity with BMI of 40.0-44.9, adult ([facility])    Chronic respiratory failure with hypoxia and hypercapnia ([facility])    AMS (altered mental status)    Hypokalemia  Resolved Problems:    * No resolved hospital problems. *      Neuro consult appreciated    MRI unremarkable    Rechecking K    Glucose well controlled    CKD stable    No acute respiratory process noted. She reports she is totally noncompliant with Trilogy. I emphasized that she MUST use it. Please stop running her SPO2 to 100%. Target low 90's is fine. I took her off O2 completely and she was 93-94%/RA. Hold diuretics.   Contraction alkalosis may also be contributing to CO2 retention / reduced respiratory drive    Code status: Full  Requires inpatient level of care  Donaldfrannie Hernandez    10:22 AM  4/16/2021

## 2021-05-13 PROBLEM — J96.90 RESPIRATORY FAILURE (HCC): Status: ACTIVE | Noted: 2021-01-01

## 2021-05-13 NOTE — ED PROVIDER NOTES
68-year-old female with a past medical history of A. fib, sleep apnea presented to ER from nursing home with difficulty breathing. She has been refusing to wear her mask at night at the nursing home. EMS stated that patient's O2 sats were dropping while sleeping. She usually wears 5 L of oxygen at nursing home. The patient symptoms are worse when not wearing her CPAP. The patient symptoms are intermittent. Moderate in severity. No treatment aside supplemental oxygen prior to arrival.  Symptoms are improved with wearing the CPAP. Patient denied any chest pain, cough, nausea, vomiting, fever, chills, fall, difficulty urinating, headache, lightheadedness, abdominal pain. Review of Systems   Constitutional: Negative for chills and fever. HENT: Negative for ear pain, rhinorrhea, sinus pressure, sore throat, tinnitus and trouble swallowing. Eyes: Negative for visual disturbance. Respiratory: Positive for shortness of breath. Negative for cough and wheezing. Cardiovascular: Negative for chest pain, palpitations and leg swelling. Gastrointestinal: Negative for abdominal pain, blood in stool, constipation, diarrhea, nausea and vomiting. Endocrine: Negative for polyuria. Genitourinary: Negative for difficulty urinating and hematuria. Musculoskeletal: Negative for arthralgias, myalgias and neck stiffness. Skin: Negative for rash and wound. Neurological: Negative for dizziness, syncope, light-headedness and headaches. Psychiatric/Behavioral: Negative for sleep disturbance. Physical Exam  Vitals signs and nursing note reviewed. Constitutional:       Appearance: Normal appearance. She is obese. HENT:      Head: Normocephalic and atraumatic. Right Ear: External ear normal.      Left Ear: External ear normal.      Nose: Nose normal.      Mouth/Throat:      Mouth: Mucous membranes are moist.   Eyes:      General: No scleral icterus.   Neck:      Musculoskeletal: Normal 29 mmol/L    Anion Gap 6 (L) 7 - 16 mmol/L    Glucose 245 (H) 74 - 99 mg/dL    BUN 25 (H) 6 - 23 mg/dL    CREATININE 1.3 (H) 0.5 - 1.0 mg/dL    GFR Non-African American 39 >=60 mL/min/1.73    GFR African American 47     Calcium 11.0 (H) 8.6 - 10.2 mg/dL    Total Protein 7.0 6.4 - 8.3 g/dL    Albumin 3.2 (L) 3.5 - 5.2 g/dL    Total Bilirubin 0.5 0.0 - 1.2 mg/dL    Alkaline Phosphatase 76 35 - 104 U/L    ALT 15 0 - 32 U/L    AST 27 0 - 31 U/L   Troponin   Result Value Ref Range    Troponin 0.03 0.00 - 0.03 ng/mL   Blood Gas, Arterial   Result Value Ref Range    Date Analyzed [id number]     Time Analyzed 0855     Source: Blood Arterial     pH, Blood Gas 7.252 (L) 7.350 - 7.450    PCO2 92.6 (HH) 35.0 - 45.0 mmHg    PO2 144.3 (H) 75.0 - 100.0 mmHg    HCO3 39.9 (H) 22.0 - 26.0 mmol/L    B.E. 9.5 (H) -3.0 - 3.0 mmol/L    O2 Sat 98.5 92.0 - 98.5 %    O2Hb 96.9 94.0 - 97.0 %    COHb 1.4 0.0 - 1.5 %    MetHb 0.2 0.0 - 1.5 %    O2 Content 16.5 mL/dL    HHb 1.5 0.0 - 5.0 %    tHb (est) 11.9 11.5 - 16.5 g/dL    Mode NRB 15L     Comment w/Read back     Date Of Collection      Time Collected      Pt Temp 37.0 C     ID [id number]     Lab [id number]     Critical(s) Notified Called to XIAO Carroll RN    Arterial Blood Gas, Respiratory Only   Result Value Ref Range    Source: Arterial     FIO2 Arterial 50.0     pH, Blood Gas 7.321 (L) 7.350 - 7.450    pCO2, Arterial 81.0 (HH) 35.0 - 45.0 mmHg    pO2, Arterial 54.2 (L) 60.0 - 80.0 mmHg    HCO3, Arterial 41.8 (H) 22.0 - 26.0 mmol/L    B.E. 12.4 (H) -3.0 - 0.0 mmol/L    O2 Sat 82.7 (L) 92.0 - 98.5 %          DEVICE [id number]     Critical Notification Yes     Positive End EXP Press 8 cmH2O    Respiratory Rate 18 b/min    Delivery Systems BiPAP    EKG 12 Lead   Result Value Ref Range    Ventricular Rate 106 BPM    Atrial Rate 129 BPM    QRS Duration 116 ms    Q-T Interval 336 ms    QTc Calculation (Bazett) 446 ms    R Axis -9 degrees    T Axis -6 degrees RADIOLOGY:  XR CHEST PORTABLE   Final Result   1. Cardiomegaly with bilateral hazy airspace disease favored to represent CHF. 2. Trace left pleural effusion             EKG:  This EKG is signed and interpreted by me. Rate:106  Rhythm: Atrial fibrillation and Rapid ventricular response, no ST segment elevation or depression, QRS duration 116 MS,   Interpretation: atrial fibrillation (chronic)  Comparison: Stable compared to patient's prior EKG      ------------------------- NURSING NOTES AND VITALS REVIEWED ---------------------------  Date / Time Roomed:  5/13/2021  8:20 AM  ED Bed Assignment:  06/06    The nursing notes within the ED encounter and vital signs as below have been reviewed. Patient Vitals for the past 24 hrs:   BP Temp Temp src Pulse Resp SpO2 Height Weight   05/13/21 1122 (!) 126/47 98.1 °F (36.7 °C) Oral 99 22 100 % -- --   05/13/21 1024 (!) 91/55 -- -- 91 -- 99 % -- --   05/13/21 0919 -- -- -- -- 25 -- -- --   05/13/21 0831 (!) 146/74 97.4 °F (36.3 °C) -- 101 24 100 % 5' 2\" (1.575 m) 240 lb (108.9 kg)       Oxygen Saturation Interpretation: Normal    ------------------------------------------ PROGRESS NOTES ------------------------------------------  Re-evaluation(s):  Time: 10:30  Patients symptoms are improving  Repeat physical examination is improved    Counseling:  I have spoken with the patient and discussed todays results, in addition to providing specific details for the plan of care and counseling regarding the diagnosis and prognosis. Their questions are answered at this time and they are agreeable with the plan of admission.    --------------------------------- ADDITIONAL PROVIDER NOTES ---------------------------------  Consultations:  Time: 1100. Spoke with Nir . Discussed case. They will admit the patient.   This patient's ED course included: a personal history and physicial examination, re-evaluation prior to disposition, continuous pulse oximetry and complex medical decision making and emergency management    This patient has remained hemodynamically stable during their ED course. Diagnosis:  1. Acute on chronic respiratory failure with hypoxia and hypercapnia (HCC)    2. Hyperkalemia        Disposition:  Patient's disposition: Admit to med/surg floor  Patient's condition is stable. Lynn Mackay MD  Resident  05/13/21 1116    ATTENDING PROVIDER ATTESTATION:     Elisabeth Fernandez presented to the emergency department for evaluation of Shortness of Breath (came in from nursing home with SOB, brought in on non rebreather, )    I have reviewed and discussed the case, including pertinent history (medical, surgical, family and social) and exam findings with the Resident and the Nurse assigned to Elisabeth Fernandez. I have personally performed and/or participated in the history, exam, medical decision making, and procedures and agree with all pertinent clinical information. I have reviewed my findings and recommendations with Elisabeth Fernandez and members of family present at the time of disposition. I, Dr. Rajeev Oleary am the primary physician of record for this patient. MDM: The patient is 80 y.o. female  with a past medical history of       Diagnosis Date    Anemia due to acute blood loss 12/29/2017    Arthritis     Blood transfusion reaction     Chronic atrial fibrillation (HCC) 12/29/2017    CKD (chronic kidney disease) stage 3, GFR 30-59 ml/min 12/29/2017    Diabetes mellitus ([facility]     History of blood transfusion     History of breast cancer     breast cancer Right    History of stroke     Hyperlipidemia     Hypertension     Hypothyroidism     Volume overload 12/30/2017    As cause of dyspnea     presenting to the emergency department with a chief complaint of shortness of breath. Differential diagnosis includes but not limited to, hypercapnic respiratory failure, COPD exacerbation, pneumonia, pneumothorax.   The patient did arrive and was tried on her 5 L nasal cannula. The patient did have ABG performed which did demonstrate hypercapnic respiratory failure. Patient was placed on BiPAP. Repeat ABG did show improvement. Patient chest x-ray with no acute process. The patient was found to have mild hyperkalemia which was treated. The patient will be admitted for further treatment and evaluation. Critical care:  Critical Care: Please note that the withdrawal or failure to initiate urgent interventions for this patient would likely result in a life threatening deterioration or permanent disability. Accordingly this patient received 35 minutes of critical care time, excluding separately billable procedures. My findings/plan: The primary encounter diagnosis was Acute on chronic respiratory failure with hypoxia and hypercapnia ([address] A diagnosis of Hyperkalemia was also pertinent to this visit.   New Prescriptions    No medications on file     [facility], [address], DO  05/13/21 1131

## 2021-05-13 NOTE — DISCHARGE INSTR - COC
Continuity of Care Form    Patient Name: Aimee Mora   :  1935  MRN:  [id number]    Admit date:  2021  Discharge date:  21    Code Status Order: DNR-CCA   Advance Directives:   Advance Care Flowsheet Documentation       Date/Time Healthcare Directive Type of Healthcare Directive Copy in [address] Agent's Name Healthcare Agent's Phone Number    21 1126  Yes, patient has an advance directive for healthcare treatment  Health care treatment directive  No, copy requested from family  Adult [facility]  [phone]            Admitting Physician:  Shyla Wilkins MD  PCP: Razia Flores DO    Discharging Nurse: [facility] Unit/Room#: 4280/2150-F  Discharging Unit Phone Number: [phone]    Emergency Contact:   Extended Emergency Contact Information  Primary Emergency Contact: Navin Baum  Address: [address] [place] [address], [address] [place] [address] Phone: [phone]  Mobile Phone: [phone]  Relation: Child   needed? No  Secondary Emergency Contact: Omid Salgado [address] Phone: [phone]  Mobile Phone: [phone]  Relation: Child   needed? No    Past Surgical History:  Past Surgical History:   Procedure Laterality Date    APPENDECTOMY      BREAST SURGERY      right    BRONCHOSCOPY  2018    BRONCHOSCOPY DIAGNOSTIC performed by Celena Meade MD at [address] [place] [address]  NASAL SINUS SURGERY      2017. cauterized her nasal passage.  OTHER SURGICAL HISTORY  2017    endoscopic conrtol of epistaxis dr Isaac Hamilton N/A 2018    ENDOSCOPIC GUIDED CONTROL EPISTAXIS  WITH SEPTAL BUTTON PLACEMENT. INTRA-OPERATIVE BRONCHOSCOPY.  performed by Celena Meade MD at [facility] OR       Immunization History:   Immunization History   Administered Date(s) Administered    Influenza Vaccine, unspecified formulation 10/20/2017    Influenza Virus Vaccine 10/07/2010, 10/02/2013, 10/27/2014, 09/26/2017, 10/16/2019, 10/05/2020    Influenza, High Dose (Fluzone 65 yrs and older) 10/07/2010, 10/02/2013, 10/27/2014    Influenza, Johann Lard, IM, (6 mo and older Fluzone, Flulaval, Fluarix and 3 yrs and older Afluria) 09/26/2017    Pneumococcal Conjugate Vaccine 03/20/2015    Pneumococcal Vaccine 10/16/2018, 09/25/2019       Active Problems:  Patient Active Problem List   Diagnosis Code    Chronic atrial fibrillation I48.20    CKD (chronic kidney disease) stage 3, GFR 30-59 ml/min ([facility]) N18.30    History of breast cancer Z85.3    Chronic anemia D64.9    Diabetes mellitus type 2, uncontrolled ([facility] E11.65    Essential hypertension I10    History of CVA (cerebrovascular accident) Z80.78    Hyperlipidemia LDL goal <100 E78.5    Acquired hypothyroidism E03.9    Morbid obesity with BMI of 40.0-44.9, adult ([facility] E66.01, Z68.41    Acute on chronic respiratory failure with hypoxia ([facility]) J96.21    Acute on chronic diastolic congestive heart failure ([facility]) I50.33    Bilateral pleural effusion J90    Chronic respiratory failure with hypoxia and hypercapnia ([facility]) J96.11, J96.12    GERD (gastroesophageal reflux disease) K21.9    Insomnia G47.00    Epidemic vertigo A88.1    AMS (altered mental status) R41.82    Hypokalemia E87.6    Acute on chronic respiratory failure with hypercapnia ([facility]) J96.22    Noncompliance with treatment Z91.19    Respiratory failure ([facility] J96.90       Isolation/Infection:   Isolation            No Isolation          Patient Infection Status       Infection Onset Added Last Indicated Last Indicated By Review Planned Expiration Resolved Resolved By    None active    Resolved    COVID-19 Rule Out 05/13/21 05/13/21 05/13/21 COVID-19, Rapid (Ordered)   05/13/21 Rule-Out Test Resulted    COVID-19 Rule Out 10/26/20 10/26/20 10/26/20 Covid-19 Ambulatory (Ordered)   10/28/20 Rule-Out Test Resulted    COVID-19 Rule Out 10/19/20 10/19/20 10/19/20 Covid-19 Ambulatory (Ordered)   10/21/20 Rule-Out Test Resulted    COVID-19 Rule Out 10/12/20 10/12/20 10/12/20 Covid-19 Ambulatory (Ordered)   10/14/20 Rule-Out Test Resulted    COVID-19 Rule Out 10/05/20 10/05/20 10/05/20 Covid-19 Ambulatory (Ordered)   10/07/20 Rule-Out Test Resulted    COVID-19 Rule Out 09/29/20 09/29/20 09/29/20 Covid-19 Ambulatory (Ordered)   09/30/20 Rule-Out Test Resulted    COVID-19 Rule Out 09/25/20 09/25/20 09/25/20 COVID-19 (Ordered)   09/25/20 Rule-Out Test Resulted    COVID-19 Rule Out 09/15/20 09/15/20 09/15/20 Covid-19 Ambulatory (Ordered)   09/16/20 Rule-Out Test Resulted    COVID-19 Rule Out 08/30/20 08/30/20 08/30/20 COVID-19 (Ordered)   08/30/20 Rule-Out Test Resulted    COVID-19 Rule Out 08/24/20 08/24/20 08/24/20 COVID-19 (Ordered)   08/24/20 Rule-Out Test Resulted    COVID-19 Rule Out 08/13/20 08/13/20 08/13/20 COVID-19 (Ordered)   08/13/20 Rule-Out Test Resulted            Nurse Assessment:  Last Vital Signs: BP (!) 125/57   Pulse 92   Temp 97.5 °F (36.4 °C) (Axillary)   Resp 25   Ht 5' 2\" (1.575 m)   Wt 240 lb (108.9 kg)   LMP  (LMP Unknown)   SpO2 99%   BMI 43.90 kg/m²     Last documented pain score (0-10 scale): Pain Level: 0  Last Weight:   Wt Readings from Last 1 Encounters:   05/13/21 240 lb (108.9 kg)     Mental Status:  oriented, alert and confusion at times    IV Access:  - PICC - site  L Cephalic, insertion date: ***5/22/21 single lumen    Nursing Mobility/ADLs:  Walking   Dependent  Transfer  Dependent  Bathing  Dependent  Dressing  Dependent  Toileting  Dependent  Feeding  Independent  Med Admin  Assisted  Med Delivery   whole    Wound Care Documentation and Therapy:  Wound 08/15/20 Nose Mid (Active)   Number of days: 270        Elimination:  Continence:   · Bowel: No  · Bladder: No  Urinary Catheter: None   Colostomy/Ileostomy/Ileal Conduit: No       Date of Last BM: 5/23/21  No intake or output data in the 24 hours ending 05/13/21 1417  No intake/output data recorded. Safety Concerns: At Risk for Falls and Aspiration Risk    Impairments/Disabilities:      Vision and Hearing    Nutrition Therapy:  Current Nutrition Therapy:   - Oral Diet:  Carb Control 4 carbs/meal (1800kcals/day), Low Sodium (3-4gm) and diabetic oral supplement    Routes of Feeding: Oral  Liquids: Thin Liquids  Daily Fluid Restriction: no  Last Modified Barium Swallow with Video (Video Swallowing Test): not done    Treatments at the Time of Hospital Discharge:   Respiratory Treatments: proventil, Brovana,pulimcort  Oxygen Therapy:  is on oxygen at 4 L/min per nasal cannula.   Ventilator:    - No ventilator support  - BiPAP   IPAP: 20 cmH20, CPAP/EPAP: 8 cmH2O only when sleeping    Rehab Therapies: Physical Therapy and Occupational Therapy  Weight Bearing Status/Restrictions: No weight bearing restirctions  Other Medical Equipment (for information only, NOT a DME order):  hospital bed  Other Treatments:     Patient's personal belongings (please select all that are sent with patient):  None    RN SIGNATURE:  Electronically signed by Rachana Layton RN on 5/24/21 at 3:07 PM EDT    CASE MANAGEMENT/SOCIAL WORK SECTION    Inpatient Status Date: 5/13/2021    Readmission Risk Assessment Score:  Readmission Risk              Risk of Unplanned Readmission:        36           Discharging to Facility/ Agency   · Name: [facility]   · Address: [address] [place] [address]  · Phone: [phone]  · Fax: [phone]    Dialysis Facility (if applicable)   · Name:  · Address:  · Dialysis Schedule:  · Phone:  · Fax:    / signature: Electronically signed by THEODORE Sanz on 5/13/2021 at 2:17 PM    PHYSICIAN SECTION    Prognosis: Good    Condition at Discharge: Stable    Rehab Potential (if transferring to Rehab): {Prognosis:[id number]}    Recommended Labs or Other Treatments After Discharge: ***    Physician Certification: I certify the above information and transfer of Elisabeth Fernandez  is necessary for the continuing treatment of the diagnosis listed and that she requires T[facility] [place] for less 30 days. Update Admission H&P: No change in H&P    PHYSICIAN SIGNATURE:  Electronically signed by Michel Hills MD on 5/14/21 at 1:53 PM EDT    ============================================================================================  [address] [place] [address] [facility]  [address] [facility]  [place], [address] [place] [address]  Phone [phone]   Fax [phone]    [employer] Karlene. Blair Calixto MD, MD Tommie Pelletier MD Munir P. Ernesta Mort, MD Donelle Pretty, MD Luis Carlos Brock, RN, CNP    Jarod Mcmullen, ALLEN Cash, CNS Lazarus Mill, APRN, CNS  Alecia Lange, JESSA               STANDING ORDERS (ID Protocol)     1. Visiting nurses are to write the Primary Care Physician and their own call back number on all laboratory requisition forms. a. Abnormal lab values are called to the physician by the nurse and NOT by the laboratory. b. Fax all labs to the office in a timely manner, during office hours. All faxes should include nurses name and call back number. 2. Vascular Access Devices or VADs (TLC, PICC, Midline, etc) will be replaced as necessary. a. Draw all blood work from VADs, except for drug levels. b. If unable to access a VAD, insert a peripheral catheter temporarily. Contact the Primary Care Physician or [facility] office for surgical referral.  c. Use tPA ([employer]) as per agency protocol to restore patency of VAD. d. Remove VAD upon completion of IV antibiotics, unless otherwise specified by the ordering physician.  i.  If VAD cannot be removed, schedule appointment at office for removal.  3. Notify ordering physician or office if patient requires admission to the hospital with reason for admission. 4. Discontinue all blood work upon completion of IV antibiotics, unless otherwise specified by ordering physician. 5. Notify ordering physician if the patient does not receive the scheduled antibiotic for 24 hours or more. 6. The Pharmacy and [address] [place] [address] may adjust the timing of the infusion and blood work to accommodate the patients home care conditions. 7. When PICC or VAD is to be removed, documentation of length of inserted PICC. PICC or VAD length is to correlate with inserted length and sent to physician at the time of removal.  8. Give the patient a list of antibiotics being administered with:  a. Drug name  b. Route  c. Frequency  d. Start/Stop date      ROUTINE LABS TO BE DRAWN/ORDERED:  1. Twice weekly (preferably every Monday & Thursday):  a. BUN, Ceatinine  b. Complete Blood Count with differential (CBC with dif)  2. Once weekly:  a. C-Reactive Protein (not high sensitivity CRP)  b. Erythrocyte/Westergren Sedimentation Rate (WSR or ESR)  c. Total CPK for patients on Daptomycin (Cubicin®). Obtain CPK more often if the patient is experiencing muscle weakness or myalgias. 3. Clinical Pharmacist is to adjust Vancomycin and Aminoglycosides unless otherwise indicated. a. Draw Vancomycin trough 30 minutes before the third dose  i. After starting drug, or   ii. After the dosing or interval is changed. 1. If the trough level is between 5 and 20 continue dose as ordered. b. Draw troughs twice weekly thereafter until troughs are stable (i.e. until trough is between 10 and 20 mcg/ml for two consecutive laboratory values). i. Once stable check troughs once weekly or every third dose. c. Please do not call physician unless the trough is < 5 or >20.  i. If the trough is <20 continue dosing as ordered. ii. If the trough is >20 call the office for further orders. Do not hold the dose while waiting for the trough result.   4. Amingoglycosides (e.g. Gentamicin, Tobramycin and Amikacin) peaks and troughs should be drawn twice weekly (preferably on Mondays and Thursdays) or every third dose. a. Aminoglycoside peaks are not to be drawn if patient on Once-Daily Dosing (ODD). b. Call physician or office if the trough is:  i. >1 for gentamicin,   ii. >2 for tobramycin, or   iii. >5 for amikacin  5. When clinically indicated obtain:  a. Urine culture. If the patient has a fever with purulent drainage from Sommer or suprapubic catheter, or foul smelling urine. Do not irrigate a clogged Sommer catheter. Replace it.  b. Blood cultures and Wound Gram stain with culture & sensitivity. If the patient has a fever or increasing drainage or foul odor from a wound. Notify the treating physician in a timely manner  c. Stool specimen. If diarrhea occurs while on antibiotics, send stools for C. difficile and WBCs. 6. When a drug is discontinued due to a low white blood cell count (WBCs) draw two consecutive CBC with differential and BUN, Ceatinine. ALLERGIC OR ADVERSE REACTIONS TO MEDICATIONS  1. Mild reaction: (itching, with or without rash):  a. Administer Benadryl 50mg po x 1, then 25mg po q6h prn.   b. Notify office or physician in a timely matter. 2. Moderate reaction (itching with or without rash and/or wheezing, dyspnea, itchy throat):  a. Administer Benadryl 50 mg IV push x 1.   b. Notify office or physician in a timely manner. 3. Severe reaction i.e. Anaphylaxis (wheezing or stidor, sudden rash, lightheadedness, hypotension):  a. Administer epinephrine subcutaneous 0.3mg (1:1000) x 1 dose. b. May repeat twice every 5 minutes if needed. c. Call EMS and notify office or physician immediately. 4. For all above reactions: administer Solu-Cortef 100mg slow IV push x 1. VASCULAR ACCESS DRESSING CHANGE PROTOCOL  1. Cleanse insertion site with ChlorPrep® or equivalent three times. 2. Secure catheter with Steri-Strips®, Bone®, or equivalent securing device.   3. Apply Opsite® 3000 or equivalent transparent dressing. 4. Change dressing twice weekly, maintaining sterile technique. If there is a BioPatch®, SilverSite® or equivalent, change once weekly only or as needed. FOLLOW-UP VISITS  1. Nursing staff should call the office during business hours to schedule a follow-up appointment once the patient is admitted to the service or facility. Every effort should be made to have patient follow-up within 2 weeks of discharge. Exception is made for ventilator-dependent patients. 2. Continue IV antibiotic therapy until patient is seen in the office or unless specific stop date is noted on the original order or unless otherwise ordered by physician. 3. Call office to ensure stop date is correct before stopping antibiotics.         Una Alan MD

## 2021-05-13 NOTE — PLAN OF CARE
- Acute:  Goal: Absence of continued neurological deterioration signs and symptoms  Description: Absence of continued neurological deterioration signs and symptoms  Outcome: Met This Shift  Goal: Mental status will be restored to baseline  Description: Mental status will be restored to baseline  Outcome: Met This Shift     Problem: Discharge Planning:  Goal: Ability to perform activities of daily living will improve  Description: Ability to perform activities of daily living will improve  Outcome: Met This Shift  Goal: Participates in care planning  Description: Participates in care planning  Outcome: Met This Shift     Problem: Injury - Risk of, Physical Injury:  Goal: Will remain free from falls  Description: Will remain free from falls  5/13/2021 1647 by Petrona Wellington RN  Outcome: Met This Shift  5/13/2021 1140 by Lyly Echavarria RN  Outcome: Ongoing  Goal: Absence of physical injury  Description: Absence of physical injury  5/13/2021 1647 by Petrona Wellington RN  Outcome: Met This Shift  5/13/2021 1140 by Lyly Echavarria RN  Outcome: Ongoing     Problem: Mood - Altered:  Goal: Mood stable  Description: Mood stable  Outcome: Met This Shift  Goal: Absence of abusive behavior  Description: Absence of abusive behavior  Outcome: Met This Shift  Goal: Verbalizations of feeling emotionally comfortable while being cared for will increase  Description: Verbalizations of feeling emotionally comfortable while being cared for will increase  Outcome: Met This Shift     Problem: Psychomotor Activity - Altered:  Goal: Absence of psychomotor disturbance signs and symptoms  Description: Absence of psychomotor disturbance signs and symptoms  Outcome: Met This Shift     Problem: Sensory Perception - Impaired:  Goal: Demonstrations of improved sensory functioning will increase  Description: Demonstrations of improved sensory functioning will increase  Outcome: Met This Shift  Goal: Decrease in sensory misperception frequency  Description: Decrease in sensory misperception frequency  Outcome: Met This Shift  Goal: Able to refrain from responding to false sensory perceptions  Description: Able to refrain from responding to false sensory perceptions  Outcome: Met This Shift  Goal: Demonstrates accurate environmental perceptions  Description: Demonstrates accurate environmental perceptions  Outcome: Met This Shift  Goal: Able to distinguish between reality-based and nonreality-based thinking  Description: Able to distinguish between reality-based and nonreality-based thinking  Outcome: Met This Shift  Goal: Able to interrupt nonreality-based thinking  Description: Able to interrupt nonreality-based thinking  Outcome: Met This Shift     Problem: Sleep Pattern Disturbance:  Goal: Appears well-rested  Description: Appears well-rested  Outcome: Met This Shift

## 2021-05-13 NOTE — CARE COORDINATION
Social Work:    Reviewed chart notes. Mrs. Balwinder Reno was admitted to [facility] due to breathing difficulty. She resides at [facility] of [address] [place] [address]. Liaison at [facility] confirmed that Mrs. Balwinder Reno is a Medicaid bedhold and will return snf level of care, without any hold awaiting authorization. Social work confirmed return plans with patient's son Marisela Christianson as well.  (N-17, ambulance completed)    Electronically signed by THEODORE Persaud on 5/13/2021 at 2:16 PM

## 2021-05-13 NOTE — PROGRESS NOTES
Database complete. Medications reconciled by pharmacy. Care plans and education initiated. [facility]. Hearing impaired. Wears 5L02 continuous. HX of right breast mastectomy with restrictions to RUE. Known to Dr. Chloe Estrada, Dr. Montes [facility], and [facility]. 0

## 2021-05-13 NOTE — ED NOTES
Bed: 06  Expected date:   Expected time:   Means of arrival:   Comments:  nydia Sullivan RN  05/13/21 8828

## 2021-05-14 PROBLEM — J96.12 CHRONIC RESPIRATORY FAILURE WITH HYPOXIA AND HYPERCAPNIA (HCC): Chronic | Status: ACTIVE | Noted: 2021-01-01

## 2021-05-14 PROBLEM — I50.32 CHRONIC DIASTOLIC CONGESTIVE HEART FAILURE (HCC): Status: ACTIVE | Noted: 2020-01-01

## 2021-05-14 PROBLEM — Z91.199 NONCOMPLIANCE: Status: ACTIVE | Noted: 2021-01-01

## 2021-05-14 PROBLEM — I48.20 CHRONIC ATRIAL FIBRILLATION (HCC): Chronic | Status: ACTIVE | Noted: 2017-12-29

## 2021-05-14 PROBLEM — J96.21 ACUTE ON CHRONIC RESPIRATORY FAILURE WITH HYPOXIA AND HYPERCAPNIA (HCC): Status: ACTIVE | Noted: 2021-01-01

## 2021-05-14 PROBLEM — J96.11 CHRONIC RESPIRATORY FAILURE WITH HYPOXIA AND HYPERCAPNIA (HCC): Chronic | Status: ACTIVE | Noted: 2021-01-01

## 2021-05-14 PROBLEM — E83.52 HYPERCALCEMIA: Status: ACTIVE | Noted: 2021-01-01

## 2021-05-14 NOTE — PROGRESS NOTES
Date: 5/14/2021    Time: 0000    Patient Placed On BIPAP/CPAP/ Non-Invasive Ventilation? No    If no must comment. Facial area red/color change? If YES are Blister/Lesion present? If yes must notify nursing staff  BIPAP/CPAP skin barrier?   Yes    Skin barrier type:mepilexlite       Comments:  Red outlet already on  Unable to see under mepilex      Jael Rascon

## 2021-05-14 NOTE — PROGRESS NOTES
Occupational Therapy    OT order received and chart reviewed. Pt is a LTC resident at [facility], has staff assist in all ADLs and use of antonieta lift for transfer. No skilled acute care OT services indicated at this time. Confirmed with CM and MISTY. OT order discharged. Thank you.     Yetta Bamberger OTR/L  [id number]

## 2021-05-14 NOTE — CARE COORDINATION
Social Work/Discharge Planning:  Chart reviewed. Patient currently requiring 5 liters of oxygen. Called Theron Williamson with [facility] and confirmed facility can accommodate a maximum of 10 liters of oxygen. Theron Williamson states patient has a bipap at facility. Theron Williamson states no pre-cert needed for patient to return. Will continue to follow.   Electronically signed by THEODORE Carson on 5/14/2021 at 10:26 AM

## 2021-05-14 NOTE — CARE COORDINATION
CASE MANAGEMENT. .. Per conversation with Dr Jaun Hanson, patient is stable for discharge back to [place] [address]. Transport arranged through [facility]. Spoke with [place]. Patient will be picked up via ambulance at 3:30pm. Ref # for patients ride is [phone]. Nursing and facility updated. N 16 in epic. Forms in chart.

## 2021-05-14 NOTE — PROGRESS NOTES
Physical Therapy  Facility/Department: [address] INTERMEDIATE 1    NAME: Dwaine Tompkins  : 1935  MRN: [id number]    Date of Service: 2021     Order received for PT evaluation. Pt is a long term resident of [address] [place] [address]. Uses antonieta lift for transfer. Pt is at baseline level of function. No PT goals identified at this time.    Shannon PT [id number]

## 2021-05-14 NOTE — H&P
[address] [facility]  History and Physical      CHIEF COMPLAINT: Shortness of breasts      HISTORY OF PRESENT ILLNESS:      The patient is a 80 y.o. female patient of Dr Polo Patton history of anemia, chronic A. fib on apixaban, chronic respiratory failure on 3 L home O2, CKD 3 who presents with dyspnea. Patient is brought from nursing home to ED by EMS. Per EMS patient is refusing to wear her CPAP at night. Patient on chronic 3 L home O2. Patient denies any complaints. States she wants to go home. Pt is a poor historian. History is largely obtained from chart review and discussions with staff/family as available. Per nursing staff at facility patient was hypoxic in 80s on 3 L. Past Medical History:    Past Medical History:   Diagnosis Date    Anemia due to acute blood loss 12/29/2017    Arthritis     Blood transfusion reaction     Chronic atrial fibrillation (HCC) 12/29/2017    CKD (chronic kidney disease) stage 3, GFR 30-59 ml/min 12/29/2017    Diabetes mellitus ([facility] [address]     History of blood transfusion     History of breast cancer     breast cancer Right    History of stroke     Hyperlipidemia     Hypertension     Hypothyroidism     Volume overload 12/30/2017    As cause of dyspnea       Past Surgical History:    Past Surgical History:   Procedure Laterality Date    APPENDECTOMY      BREAST SURGERY      right    BRONCHOSCOPY  4/28/2018    BRONCHOSCOPY DIAGNOSTIC performed by Colt Mccabe MD at [address] [place] [address] NASAL SINUS SURGERY      august 27 2017. cauterized her nasal passage.  OTHER SURGICAL HISTORY  07/27/2017    endoscopic conrtol of epistaxis dr Jennifer Gunn N/A 4/28/2018    ENDOSCOPIC GUIDED CONTROL EPISTAXIS  WITH SEPTAL BUTTON PLACEMENT. INTRA-OPERATIVE BRONCHOSCOPY.  performed by Colt Mccabe MD at [facility] OR       Medications Prior to Admission:    Medications Prior to Admission: mineral oil-hydrophilic petrolatum (AQUAPHOR) ointment, Apply topically as needed (DRY FLAKY SKIN SKIN CARE AND PREVENTION) Indications: BLE  miconazole (MICOTIN) 2 % powder, Apply topically as needed (SKIN CARE AND PREVENTION) Indications: CLEANSE UNDER LEFT BREAST WITH SOAP & H20, PAT DRY, THEN APPLY  insulin lispro (HUMALOG) 100 UNIT/ML injection vial, Inject 0-10 Units into the skin 3 times daily (before meals) PER SLIDING SCALE: 150-200=2U, 201-250=4U, 251-300=6U, 301-350=8U, 351-400=10U, CALL MD IF <80 OR >400 *SEE OTHER ORDER*  albuterol-ipratropium (COMBIVENT RESPIMAT)  MCG/ACT AERS inhaler, Inhale 1 puff into the lungs 3 times daily *SEE OTHER ORDER*  magnesium hydroxide (MILK OF MAGNESIA) 400 MG/5ML suspension, Take 30 mLs by mouth daily as needed for Constipation Indications: GIVE IF NO BM IN 48HRS & NO RESULTS FROM HIGH FIBER JUICE  Insulin Glargine, 1 Unit Dial, (TOUJEO SOLOSTAR) 300 UNIT/ML SOPN, Inject 40 Units into the skin nightly Indications: HOLD IF FSBS <80  bumetanide (BUMEX) 1 MG tablet, Take 1 tablet by mouth daily  fluticasone (FLONASE) 50 MCG/ACT nasal spray, 1 spray by Each Nostril route daily   polyethylene glycol (GLYCOLAX) 17 g packet, Take 17 g by mouth daily  insulin lispro (HUMALOG) 100 UNIT/ML injection vial, Inject 12 Units into the skin 3 times daily (before meals) *SEE OTHER ORDER*  pantoprazole (PROTONIX) 40 MG tablet, Take 40 mg by mouth 2 times daily  metoprolol succinate (TOPROL XL) 50 MG extended release tablet, Take 50 mg by mouth daily Indications: HOLD IF SBP <100 AND/OR HR <60   ondansetron (ZOFRAN) 4 MG tablet, Take 4 mg by mouth every 6 hours as needed for Nausea or Vomiting   apixaban (ELIQUIS) 2.5 MG TABS tablet, Take 2.5 mg by mouth 2 times daily   albuterol-ipratropium (COMBIVENT RESPIMAT)  MCG/ACT AERS inhaler, Inhale 2 puffs into the lungs every 6 hours as needed for Shortness of Breath *SEE OTHER ORDER*  bisacodyl (DULCOLAX) 10 MG Wt 240 lb (108.9 kg)   LMP  (LMP Unknown)   SpO2 95%   BMI 43.90 kg/m²     General:  Awake, alert, oriented X 3. Well developed, well nourished, well groomed. No apparent distress. HEENT:  Normocephalic, atraumatic. Pupils equal, round, reactive to light. No scleral icterus. No conjunctival injection. Normal lips, teeth, and gums. No nasal discharge. Neck:  Supple  Heart:  RRR, no murmurs, gallops, rubs  Lungs:  CTA bilaterally, bilat symmetrical expansion, no wheeze, rales, or rhonchi  Abdomen:   Bowel sounds present, soft, nontender, no masses, no organomegaly, no peritoneal signs  Extremities:  No clubbing, cyanosis, 1+ edema  Skin:  Warm and dry, no open lesions or rash  Neuro:  Cranial nerves 2-12 intact, no focal deficits  Breast: deferred  Rectal: deferred  Genitalia:  deferred    LABS:    CBC with Differential:    Lab Results   Component Value Date    WBC 4.1 05/14/2021    RBC 3.03 05/14/2021    HGB 9.6 05/14/2021    HCT 31.6 05/14/2021     05/14/2021    .3 05/14/2021    MCH 31.7 05/14/2021    MCHC 30.4 05/14/2021    RDW 14.6 05/14/2021    NRBC 0.9 08/15/2020    LYMPHOPCT 13.1 05/13/2021    MONOPCT 8.6 05/13/2021    BASOPCT 0.8 05/13/2021    MONOSABS 0.41 05/13/2021    LYMPHSABS 0.62 05/13/2021    EOSABS 0.11 05/13/2021    BASOSABS 0.04 05/13/2021     CMP:    Lab Results   Component Value Date     05/14/2021    K 4.4 05/14/2021    CL 94 05/14/2021    CO2 37 05/14/2021    BUN 24 05/14/2021    CREATININE 1.3 05/14/2021    GFRAA 47 05/14/2021    LABGLOM 39 05/14/2021    GLUCOSE 255 05/14/2021    PROT 7.0 05/13/2021    LABALBU 3.2 05/13/2021    CALCIUM 11.0 05/14/2021    BILITOT 0.5 05/13/2021    ALKPHOS 76 05/13/2021    AST 27 05/13/2021    ALT 15 05/13/2021     Magnesium:    Lab Results   Component Value Date    MG 1.7 04/15/2021     Phosphorus:    Lab Results   Component Value Date    PHOS 2.7 08/28/2020     PT/INR:    Lab Results   Component Value Date    PROTIME 14.6 02/22/2021    INR 1.2 02/22/2021     Last 3 Troponin:    Lab Results   Component Value Date    TROPONINI 0.03 05/13/2021    TROPONINI 0.04 04/15/2021    TROPONINI 0.03 04/14/2021     U/A:    Lab Results   Component Value Date    COLORU RED 03/19/2021    PROTEINU 100 03/19/2021    PHUR 7.5 03/19/2021    WBCUA 5-10 03/19/2021    RBCUA 10-20 03/19/2021    BACTERIA MODERATE 03/19/2021    CLARITYU SL CLOUDY 03/19/2021    SPECGRAV 1.015 03/19/2021    LEUKOCYTESUR LARGE 03/19/2021    UROBILINOGEN 1.0 03/19/2021    BILIRUBINUR SMALL 03/19/2021    BLOODU LARGE 03/19/2021    GLUCOSEU Negative 03/19/2021    AMORPHOUS FEW 02/17/2021     ABG:    Lab Results   Component Value Date    PH 7.321 05/13/2021    PCO2 92.6 05/13/2021    PO2 144.3 05/13/2021    HCO3 39.9 05/13/2021    BE 12.4 05/13/2021    O2SAT 82.7 05/13/2021     HgBA1c:    Lab Results   Component Value Date    LABA1C 6.0 04/15/2021     FLP:    Lab Results   Component Value Date    TRIG 323 04/15/2021    HDL 39 04/15/2021    LDLCALC 36 04/15/2021    LABVLDL 65 04/15/2021     TSH:    Lab Results   Component Value Date    TSH 2.740 02/17/2021       XR CHEST PORTABLE   Final Result   1. Cardiomegaly with bilateral hazy airspace disease favored to represent CHF. 2. Trace left pleural effusion             ASSESSMENT:      Principal Problem:    Acute on chronic respiratory failure with hypercapnia (HCC)  Active Problems:    Chronic atrial fibrillation    Diabetes mellitus type 2, uncontrolled (HCC)    Essential hypertension    History of CVA (cerebrovascular accident)    Hyperlipidemia LDL goal <100    Acquired hypothyroidism    Morbid obesity with BMI of 40.0-44.9, adult (HCC)    Acute on chronic diastolic congestive heart failure ([facility] [address]    Noncompliance with treatment    Noncompliance    Hypercalcemia  Resolved Problems:    * No resolved hospital problems.  *      PLAN:    19-year-old female history of chronic respiratory failure, noncompliance admitted with respiratory failure after refusing to wear her BiPAP at night--there is evidence for acute on chronic diastolic CHF And acute on chronic respiratory failure. Today after using BiPAP overnight respiratory failure is improving. She is on 5 L nasal cannula. Currently    Acute on chronic CHF  Bilateral pulmonary edema chest x-ray  IV Bumex 1 mg twice daily  Monitor I's and O's closely    Acute on chronic respiratory failure  Nocturnal and as needed BiPAP  Supplemental O2 wean as tolerated baseline 3 L per nursing home  Nebulizers    proCalcitonin mildly elevated possibly secondary to CKD  Stop calcitriol as patient is hypercalcemic    Noncompliance-Long discussion regarding portance of compliance with therapy and medications.   Patient acknowledges understanding    Medications for other co morbidities cont as appropriate w dosage adjustments as necessary  PT/OT  DVT PPx  DC planning        Electronically signed by Taylor Delgadillo MD on 5/14/2021 at 11:01 AM

## 2021-05-15 NOTE — PROGRESS NOTES
Date: 5/14/2021    Time: 10:54 PM    Patient Placed On BIPAP/CPAP/ Non-Invasive Ventilation? Yes    If no must comment. Facial area red/color change? No           If YES are Blister/Lesion present? No   If yes must notify nursing staff  BIPAP/CPAP skin barrier?   Yes    Skin barrier type:mepilexlite       Comments:        Anant Cleveland

## 2021-05-15 NOTE — PROGRESS NOTES
Subjective: The patient is awake and alert. No acute events overnight. Denies chest pain, angina, SOB     Objective:    BP (!) 117/55   Pulse 94   Temp 98 °F (36.7 °C) (Oral)   Resp 18   Ht 5' 2\" (1.575 m)   Wt (!) 344 lb (156 kg)   LMP  (LMP Unknown)   SpO2 96%   BMI 62.92 kg/m²     In: 860 [P.O.:860]  Out: 200   In: 860   Out: 200 [Urine:200]    General appearance: NAD, conversant  HEENT: AT/NC, MMM  Neck: FROM, supple  Lungs: Clear to auscultation  CV: RRR, no MRGs  Vasc: Radial pulses 2+  Abdomen: Soft, non-tender; no masses or HSM  Extremities: No peripheral edema or digital cyanosis  Skin: no rash, lesions or ulcers  Psych: Alert and oriented to person, place and time  Neuro: Alert and interactive     Recent Labs     05/13/21  0843 05/14/21  0300   WBC 4.7 4.1*   HGB 10.7* 9.6*   HCT 35.5 31.6*    253       Recent Labs     05/13/21  0843 05/14/21  0300 05/15/21  0315    137 141   K 6.0* 4.4 3.9   CL 94* 94* 96*   CO2 39* 37* 38*   BUN 25* 24* 21   CREATININE 1.3* 1.3* 1.4*   CALCIUM 11.0* 11.0* 11.0*       Assessment:    Principal Problem:    Acute on chronic respiratory failure with hypoxia and hypercapnia (HCC)  Active Problems:    Chronic atrial fibrillation    Diabetes mellitus type 2, uncontrolled (HCC)    Essential hypertension    History of CVA (cerebrovascular accident)    Hyperlipidemia LDL goal <100    Acquired hypothyroidism    Morbid obesity with BMI of 40.0-44.9, adult (HCC)    Acute on chronic diastolic congestive heart failure (HCC)    Noncompliance with treatment    Respiratory failure (HCC)    Noncompliance    Hypercalcemia  Resolved Problems:    * No resolved hospital problems.  *    Assessment:  Acute on chronic hypoxic resp failure   -2/2 acute exacerbation of HFpEF, Echo 8/20   -Baseline 3L NC   Hypercalcemia, in the setting of calcium spplements  Afib, on Eliqquis  CKD angelique 3 - at baseline      Plan:  -Increase Bumex 2mg BID, follow BMP at night with electroytes  -Wean oxygen as able to baseline, keep Spo2 >92%  -Added breathing treatment  -Hold home Calcitriol and follow  -Possible discharge to facility tomorrow      DVT Prophylaxis: ELiquis  PT/OT: Ordered   Discharge planning: possible dc back to facility tomorrow if oxygen down to baseline      Debbie Hathaway MD  12:47 PM  5/15/2021     Above assessment and plan by resident reviewed, I agree     Katharine Harper MD

## 2021-05-15 NOTE — CARE COORDINATION
Social Work/Discharge Planning:  Social Work consult noted. Chart reviewed. Patient on 5 liters of oxygen. Plan is for patient to return to [facility]. No pre-cert needed to return. Facility can accommodate up to a maximum of 10 liters of oxygen. Patient ambulance form in soft chart. Unit will need to contact [address] [place] [address] (ph: [phone]) to arrange Ambulance transport. Will continue to follow.   Electronically signed by THEODORE Murdock on 5/15/2021 at 2:23 PM

## 2021-05-15 NOTE — PROGRESS NOTES
Patient refusing Bipap at this time, wore for approximately 2 hours. Will re attempt later in the night.

## 2021-05-16 NOTE — PROGRESS NOTES
Date: 5/16/2021    Time: 8:19 AM    Patient Placed On BIPAP/CPAP/ Non-Invasive Ventilation? No remains on. If no must comment. Facial area red/color change? No           If YES are Blister/Lesion present? No   If yes must notify nursing staff  BIPAP/CPAP skin barrier?   Yes    Skin barrier type:mepilexlite       Comments:        Suzanne Leary RCP

## 2021-05-16 NOTE — PROGRESS NOTES
exacerbation of HFpEF, Echo 8/20   -Less likely pneumonia, afebrile, no leucocytosis, pocal minimally elevated in the light of CKD   -Baseline 3L NC, currently on 5L (might be new BL)  Hypercalcemia, in the setting of calcium supplements - Improving  Afib, on Eliqquis  CKD angelique 3 - at baseline      Plan:  -Change Bumex to 1mg BID, give Diamox X1 dose  -Wean oxygen as able to baseline, keep Spo2 >92%  -Continue breathing treatments  -No calcitriol on dc   -Possible discharge to facility tomorrow if able to wean      DVT Prophylaxis: ELiquis  PT/OT: Ordered   Discharge planning: possible dc back to facility tomorrow if oxygen down to baseline      Lucy Sheffield MD  10:50 AM  5/16/2021     Above assessment plan by resident reviewed/discussed, I agree  Tammy Sosa MD

## 2021-05-16 NOTE — PROGRESS NOTES
Date: 5/15/2021    Time: 10:36 PM    Patient Placed On BIPAP/CPAP/ Non-Invasive Ventilation? Yes    If no must comment. Facial area red/color change? No           If YES are Blister/Lesion present? No   If yes must notify nursing staff  BIPAP/CPAP skin barrier?   Yes    Skin barrier type:mepilexlite       Comments:        Dwayne Mckenna RCP

## 2021-05-17 PROBLEM — R82.81 PYURIA: Status: ACTIVE | Noted: 2021-01-01

## 2021-05-17 NOTE — PROGRESS NOTES
Occupational Therapy  Date:5/17/2021  Patient Name: Citlalli Jordan  Room: 4344/8546-W     Duplicate Occupational Therapy (OT) order received. Patient is an ECF resident and has assistance from staff for completion of all ADLs; antonieta lift used for functional transfers. No skilled OT needs within acute care hospital setting indicated. Daniela Domínguez, OTR/L  License Number: [id number]

## 2021-05-17 NOTE — PLAN OF CARE
Problem: Falls - Risk of:  Goal: Will remain free from falls  Description: Will remain free from falls  Outcome: Met This Shift  Goal: Absence of physical injury  Description: Absence of physical injury  Outcome: Met This Shift     Problem: Skin Integrity:  Goal: Will show no infection signs and symptoms  Description: Will show no infection signs and symptoms  Outcome: Met This Shift  Goal: Absence of new skin breakdown  Description: Absence of new skin breakdown  Outcome: Met This Shift     Problem: Pain:  Goal: Pain level will decrease  Description: Pain level will decrease  Outcome: Met This Shift  Goal: Control of acute pain  Description: Control of acute pain  Outcome: Met This Shift  Goal: Control of chronic pain  Description: Control of chronic pain  Outcome: Met This Shift     Problem: Gas Exchange - Impaired  Goal: Able to breathe comfortably  Description: Able to breathe comfortably  Outcome: Met This Shift     Problem: Fluid and Electrolyte Imbalance  Goal: Fluid and electrolyte balance are achieved/maintained  Outcome: Met This Shift     Problem: Confusion - Acute:  Goal: Absence of continued neurological deterioration signs and symptoms  Description: Absence of continued neurological deterioration signs and symptoms  Outcome: Met This Shift  Goal: Mental status will be restored to baseline  Description: Mental status will be restored to baseline  Outcome: Met This Shift     Problem: Discharge Planning:  Goal: Ability to perform activities of daily living will improve  Description: Ability to perform activities of daily living will improve  Outcome: Met This Shift  Goal: Participates in care planning  Description: Participates in care planning  Outcome: Met This Shift     Problem: Injury - Risk of, Physical Injury:  Goal: Will remain free from falls  Description: Will remain free from falls  Outcome: Met This Shift  Goal: Absence of physical injury  Description: Absence of physical injury  Outcome:  Met This Shift     Problem: Mood - Altered:  Goal: Mood stable  Description: Mood stable  Outcome: Met This Shift  Goal: Absence of abusive behavior  Description: Absence of abusive behavior  Outcome: Met This Shift  Goal: Verbalizations of feeling emotionally comfortable while being cared for will increase  Description: Verbalizations of feeling emotionally comfortable while being cared for will increase  Outcome: Met This Shift     Problem: Psychomotor Activity - Altered:  Goal: Absence of psychomotor disturbance signs and symptoms  Description: Absence of psychomotor disturbance signs and symptoms  Outcome: Met This Shift     Problem: Sensory Perception - Impaired:  Goal: Demonstrations of improved sensory functioning will increase  Description: Demonstrations of improved sensory functioning will increase  Outcome: Met This Shift  Goal: Decrease in sensory misperception frequency  Description: Decrease in sensory misperception frequency  Outcome: Met This Shift  Goal: Able to refrain from responding to false sensory perceptions  Description: Able to refrain from responding to false sensory perceptions  Outcome: Met This Shift  Goal: Demonstrates accurate environmental perceptions  Description: Demonstrates accurate environmental perceptions  Outcome: Met This Shift  Goal: Able to distinguish between reality-based and nonreality-based thinking  Description: Able to distinguish between reality-based and nonreality-based thinking  Outcome: Met This Shift  Goal: Able to interrupt nonreality-based thinking  Description: Able to interrupt nonreality-based thinking  Outcome: Met This Shift     Problem: Sleep Pattern Disturbance:  Goal: Appears well-rested  Description: Appears well-rested  Outcome: Met This Shift

## 2021-05-17 NOTE — PROGRESS NOTES
Date: 5/17/2021    Time: 3:44 PM    Patient Placed On BIPAP/CPAP/ Non-Invasive Ventilation? No    If no must comment. Facial area red/color change? No           If YES are Blister/Lesion present? No   If yes must notify nursing staff  BIPAP/CPAP skin barrier?   Yes    Skin barrier type:mepilexlite       Comments:        Abiodun Newton RCP

## 2021-05-17 NOTE — PROGRESS NOTES
Subjective:  Feeling tired denies complaints  No CP or SOB  No fever or chills   No uncontrolled pain  No vomiting or diarrhea     Objective:    BP (!) 123/57   Pulse 95   Temp 99 °F (37.2 °C) (Axillary)   Resp 22   Ht 5' 2\" (1.575 m)   Wt (!) 344 lb 8 oz (156.3 kg)   LMP  (LMP Unknown)   SpO2 97%   BMI 63.01 kg/m²     24HR INTAKE/OUTPUT:      Intake/Output Summary (Last 24 hours) at 5/17/2021 0922  Last data filed at 5/17/2021 0541  Gross per 24 hour   Intake 10 ml   Output 650 ml   Net -640 ml       General appearance: NAD, somnolent not conversant on BiPAP  Neck: FROM, supple   Lungs: Clear bilaterally no wheezes, no rhonchi, no crackles  CV: RRR, no MRGs; normal carotid upstroke and amplitude without Bruits  Abdomen: Soft, non-tender; no masses or HSM  Extremities: No edema, no cyanosis, no clubbing  Skin: Intact no rash, no lesions, no ulcers    Psych: Alert and oriented normal affect  Neuro: Nonfocal  Most Recent Labs  Lab Results   Component Value Date    WBC 4.1 (L) 05/14/2021    HGB 9.6 (L) 05/14/2021    HCT 31.6 (L) 05/14/2021     05/14/2021     05/16/2021    K 3.7 05/17/2021    CL 95 (L) 05/17/2021    CREATININE 1.5 (H) 05/17/2021    BUN 24 (H) 05/17/2021    CO2 41 (HH) 05/17/2021    GLUCOSE 149 (H) 05/17/2021    ALT 15 05/13/2021    AST 27 05/13/2021    INR 1.2 02/22/2021    TSH 2.570 05/15/2021    LABA1C 6.0 (H) 04/15/2021     No results for input(s): MG in the last 72 hours. Lab Results   Component Value Date    CALCIUM 10.1 05/17/2021    PHOS 2.7 08/28/2020        XR CHEST PORTABLE   Final Result   1. Cardiomegaly with bilateral hazy airspace disease favored to represent CHF.    2. Trace left pleural effusion             Assessment    Principal Problem:    Acute on chronic respiratory failure with hypoxia and hypercapnia (HCC)  Active Problems:    Chronic atrial fibrillation    Diabetes mellitus type 2, uncontrolled (HCC)    Essential hypertension    History of CVA

## 2021-05-18 NOTE — PROGRESS NOTES
Date: 5/18/2021    Time: 7:17 PM    Patient Placed On BIPAP/CPAP/ Non-Invasive Ventilation? No    If no must comment. Facial area red/color change? No           If YES are Blister/Lesion present? No   If yes must notify nursing staff  BIPAP/CPAP skin barrier?   Yes    Skin barrier type:mepilexlite       Comments: pt already placed on bipap        Ciro Fabian RCP

## 2021-05-18 NOTE — CARE COORDINATION
CASE MANAGEMENT. .. Chart reviewed. Yesterday, iv bumex was increased to tid. Patient is tolerating 4lnc. Patient will return to [place] [address] when ready. Has bipap at the facility. Is a Bed hold. No precert required. Call [address] [place] [address] (ph: [phone]) to arrange Ambulance transport. N 16 in epic. Transport forms in chart. Will cont to follow.

## 2021-05-18 NOTE — PROGRESS NOTES
Date: 5/18/2021    Time: 3:31 AM    Patient Placed On BIPAP/CPAP/ Non-Invasive Ventilation? No    If no must comment. Facial area red/color change? No           If YES are Blister/Lesion present? No   If yes must notify nursing staff  BIPAP/CPAP skin barrier? Yes    Skin barrier type:mepilexlite       Comments: Pt remains on BiPAP at this time. Mepilex in place.         Toby Eli RCP      05/18/21 0331   NIV Type   Mode Bilevel   Mask Type Full face mask   Mask Size Small   Settings/Measurements   IPAP 20 cmH20   CPAP/EPAP 8 cmH2O   Rate Ordered 18   Resp 18   FiO2  40 %   Vt Exhaled 352 mL   Minute Volume 6.2 Liters   Mask Leak (lpm) 36 lpm   Comfort Level Good   Using Accessory Muscles No

## 2021-05-18 NOTE — PROGRESS NOTES
Subjective:  Feeling betterdenies complaints  No CP or SOB  No fever or chills   No uncontrolled pain  No vomiting or diarrhea     Objective:    BP (!) 97/55   Pulse 81   Temp 98.1 °F (36.7 °C) (Oral)   Resp 20   Ht 5' 2\" (1.575 m)   Wt (!) 347 lb 6 oz (157.6 kg)   LMP  (LMP Unknown)   SpO2 95%   BMI 63.54 kg/m²     24HR INTAKE/OUTPUT:      Intake/Output Summary (Last 24 hours) at 5/18/2021 1037  Last data filed at 5/18/2021 0821  Gross per 24 hour   Intake --   Output 600 ml   Net -600 ml       General appearance: NAD, somnolent not conversant on BiPAP  Neck: FROM, supple   Lungs: Clear bilaterally no wheezes, no rhonchi, no crackles  CV: RRR, no MRGs; normal carotid upstroke and amplitude without Bruits  Abdomen: Soft, non-tender; no masses or HSM  Extremities: No edema, no cyanosis, no clubbing  Skin: Intact no rash, no lesions, no ulcers    Psych: Alert and oriented normal affect  Neuro: Nonfocal  Most Recent Labs  Lab Results   Component Value Date    WBC 5.3 05/18/2021    HGB 9.3 (L) 05/18/2021    HCT 29.9 (L) 05/18/2021     05/18/2021     05/18/2021    K 4.5 05/18/2021    CL 95 (L) 05/18/2021    CREATININE 1.8 (H) 05/18/2021    BUN 26 (H) 05/18/2021    CO2 39 (H) 05/18/2021    GLUCOSE 124 (H) 05/18/2021    ALT 15 05/13/2021    AST 27 05/13/2021    INR 1.2 02/22/2021    TSH 2.570 05/15/2021    LABA1C 6.0 (H) 04/15/2021     No results for input(s): MG in the last 72 hours. Lab Results   Component Value Date    CALCIUM 10.1 05/18/2021    PHOS 2.7 08/28/2020        XR CHEST PORTABLE   Final Result   No significant change since the prior study. Bilateral hazy diffuse   opacities may be related to combination of pleural fluid and/or pulmonary   infiltrate/edema         XR CHEST PORTABLE   Final Result   1. Cardiomegaly with bilateral hazy airspace disease favored to represent CHF.    2. Trace left pleural effusion             Assessment    Principal Problem:    Acute on chronic respiratory failure with hypoxia and hypercapnia (HCC)  Active Problems:    Chronic atrial fibrillation    Diabetes mellitus type 2, uncontrolled (HCC)    Essential hypertension    History of CVA (cerebrovascular accident)    Hyperlipidemia LDL goal <100    Acquired hypothyroidism    Morbid obesity with BMI of 40.0-44.9, adult (HCC)    Acute on chronic diastolic congestive heart failure ([address]    Noncompliance with treatment    Respiratory failure ([address]    Noncompliance    Hypercalcemia    Pyuria  Resolved Problems:    * No resolved hospital problems. *      Plan:    70-year-old female history of chronic respiratory failure, noncompliance admitted with respiratory failure after refusing to wear her BiPAP at night--admitted with acute on chronic diastolic CHF and acute on chronic respiratory failure. Acute on diastolic chronic CHF  Bilateral pulmonary edema chest x-ray 5/13  IV Bumex-increase to 3 times daily  Monitor I's and O's closely--1.3 L  Repeat chest x-ray 5/17 personally reviewed continued bilateral infiltrates most consistent with pulmonary edema    Acute on chronic respiratory failure currently 4 L NC satting 97%  Nocturnal and as needed BiPAP  Supplemental O2 wean as tolerated baseline 3 L per nursing home  Nebulizers    Pyuria  Elevated procalcitonin, further increased to 0.68  Repeat UA/UCx    Stop calcitriol as patient is hypercalcemic    Noncompliance-Long discussion regarding importance of compliance with therapy and medications.   Patient acknowledges understanding    Medications for other co morbidities cont as appropriate w dosage adjustments as necessary  PT/OT  DVT PPx  DC planning SNF tmrw        Electronically signed by Ag Rios MD on 5/18/2021 at 10:37 AM

## 2021-05-18 NOTE — PLAN OF CARE
Problem: Falls - Risk of:  Goal: Will remain free from falls  Description: Will remain free from falls  Outcome: Met This Shift  Goal: Absence of physical injury  Description: Absence of physical injury  Outcome: Met This Shift     Problem: Skin Integrity:  Goal: Will show no infection signs and symptoms  Description: Will show no infection signs and symptoms  Outcome: Met This Shift  Goal: Absence of new skin breakdown  Description: Absence of new skin breakdown  Outcome: Met This Shift     Problem: Pain:  Goal: Pain level will decrease  Description: Pain level will decrease  Outcome: Met This Shift  Goal: Control of acute pain  Description: Control of acute pain  Outcome: Met This Shift  Goal: Control of chronic pain  Description: Control of chronic pain  Outcome: Met This Shift     Problem: Gas Exchange - Impaired  Goal: Able to breathe comfortably  Description: Able to breathe comfortably  Outcome: Met This Shift     Problem: Fluid and Electrolyte Imbalance  Goal: Fluid and electrolyte balance are achieved/maintained  Outcome: Met This Shift     Problem: Confusion - Acute:  Goal: Absence of continued neurological deterioration signs and symptoms  Description: Absence of continued neurological deterioration signs and symptoms  Outcome: Met This Shift  Goal: Mental status will be restored to baseline  Description: Mental status will be restored to baseline  Outcome: Met This Shift     Problem: Discharge Planning:  Goal: Ability to perform activities of daily living will improve  Description: Ability to perform activities of daily living will improve  Outcome: Met This Shift  Goal: Participates in care planning  Description: Participates in care planning  Outcome: Met This Shift     Problem: Injury - Risk of, Physical Injury:  Goal: Will remain free from falls  Description: Will remain free from falls  Outcome: Met This Shift  Goal: Absence of physical injury  Description: Absence of physical injury  Outcome:  Met

## 2021-05-18 NOTE — PROGRESS NOTES
Date: 5/17/2021    Time: 10:01 PM    Patient Placed On BIPAP/CPAP/ Non-Invasive Ventilation? Yes    If no must comment. Facial area red/color change? No           If YES are Blister/Lesion present? No   If yes must notify nursing staff  BIPAP/CPAP skin barrier?   Yes    Skin barrier type:mepilexlite       Comments:        Ria Osler, RCP

## 2021-05-19 PROBLEM — Z16.21 BACTEREMIA DUE TO VANCOMYCIN RESISTANT ENTEROCOCCUS: Status: ACTIVE | Noted: 2021-01-01

## 2021-05-19 PROBLEM — B95.2 BACTEREMIA DUE TO VANCOMYCIN RESISTANT ENTEROCOCCUS: Status: ACTIVE | Noted: 2021-01-01

## 2021-05-19 PROBLEM — R78.81 BACTEREMIA DUE TO VANCOMYCIN RESISTANT ENTEROCOCCUS: Status: ACTIVE | Noted: 2021-01-01

## 2021-05-19 NOTE — PLAN OF CARE
Problem: Falls - Risk of:  Goal: Will remain free from falls  Description: Will remain free from falls  Outcome: Met This Shift  Goal: Absence of physical injury  Description: Absence of physical injury  Outcome: Met This Shift     Problem: Skin Integrity:  Goal: Will show no infection signs and symptoms  Description: Will show no infection signs and symptoms  Outcome: Met This Shift  Goal: Absence of new skin breakdown  Description: Absence of new skin breakdown  Outcome: Met This Shift     Problem: Pain:  Goal: Pain level will decrease  Description: Pain level will decrease  Outcome: Met This Shift  Goal: Control of acute pain  Description: Control of acute pain  Outcome: Met This Shift  Goal: Control of chronic pain  Description: Control of chronic pain  Outcome: Met This Shift     Problem: Confusion - Acute:  Goal: Absence of continued neurological deterioration signs and symptoms  Description: Absence of continued neurological deterioration signs and symptoms  Outcome: Met This Shift  Goal: Mental status will be restored to baseline  Description: Mental status will be restored to baseline  Outcome: Met This Shift     Problem: Discharge Planning:  Goal: Ability to perform activities of daily living will improve  Description: Ability to perform activities of daily living will improve  Outcome: Met This Shift  Goal: Participates in care planning  Description: Participates in care planning  Outcome: Met This Shift     Problem: Injury - Risk of, Physical Injury:  Goal: Will remain free from falls  Description: Will remain free from falls  Outcome: Met This Shift  Goal: Absence of physical injury  Description: Absence of physical injury  Outcome: Met This Shift     Problem: Mood - Altered:  Goal: Mood stable  Description: Mood stable  Outcome: Met This Shift  Goal: Absence of abusive behavior  Description: Absence of abusive behavior  Outcome: Met This Shift  Goal: Verbalizations of feeling emotionally comfortable while being cared for will increase  Description: Verbalizations of feeling emotionally comfortable while being cared for will increase  Outcome: Met This Shift     Problem: Psychomotor Activity - Altered:  Goal: Absence of psychomotor disturbance signs and symptoms  Description: Absence of psychomotor disturbance signs and symptoms  Outcome: Met This Shift     Problem: Sensory Perception - Impaired:  Goal: Demonstrations of improved sensory functioning will increase  Description: Demonstrations of improved sensory functioning will increase  Outcome: Met This Shift  Goal: Decrease in sensory misperception frequency  Description: Decrease in sensory misperception frequency  Outcome: Met This Shift  Goal: Able to refrain from responding to false sensory perceptions  Description: Able to refrain from responding to false sensory perceptions  Outcome: Met This Shift  Goal: Demonstrates accurate environmental perceptions  Description: Demonstrates accurate environmental perceptions  Outcome: Met This Shift  Goal: Able to distinguish between reality-based and nonreality-based thinking  Description: Able to distinguish between reality-based and nonreality-based thinking  Outcome: Met This Shift  Goal: Able to interrupt nonreality-based thinking  Description: Able to interrupt nonreality-based thinking  Outcome: Met This Shift

## 2021-05-19 NOTE — CONSULTS
[address] [place] [address] [facility]  Consultation Note     Admit Date: 5/13/2021  8:20 AM    Reason for Consult:   4 of 4 blood culture bottles positive for VRE    Attending Physician:  Angie Hubbard MD    HISTORY OF PRESENT ILLNESS:             The history is obtained from extensive review of available past medical records. The patient is a 80 y.o. female who is known to the ID service. The patient resides at [facility]. She was sent to the ED on 5/13/2021 because of abnormal vital sign. Blood pressure was 147/87, heart rate 110, respiratory rate 24, temperature 97.9 °F and pulse oximetry of 81. Patient was admitted with acute on chronic hypoxic respiratory failure secondary to exacerbation of heart failure. She was placed on a BiPAP but has been noncompliant with this. The patient had a temperature of 99.7 on 5/18/2021. Blood cultures were ordered. 4 bottles returned positive with VRE. ID was asked to see her in consultation. Past Medical History:        Diagnosis Date    Anemia due to acute blood loss 12/29/2017    Arthritis     Blood transfusion reaction     Chronic atrial fibrillation (HCC) 12/29/2017    CKD (chronic kidney disease) stage 3, GFR 30-59 ml/min 12/29/2017    Diabetes mellitus ([facility]     History of blood transfusion     History of breast cancer     breast cancer Right    History of stroke     Hyperlipidemia     Hypertension     Hypothyroidism     Volume overload 12/30/2017    As cause of dyspnea     April 2018. Admitted to [facility] with epistaxis and a large septal perforation. He had endoscopic control of epistaxis and placement of a septal button. She had intraoperative bronchoscopy. Cultures were taken in the grew Enterobacter cloaca from bronchial washings. Treated with Bactrim. Seen by ID. This was deemed to be a colonizer and Bactrim was discontinued.     Past Surgical History:        Procedure Laterality Date    APPENDECTOMY      BREAST SURGERY      right    BRONCHOSCOPY  4/28/2018    BRONCHOSCOPY DIAGNOSTIC performed by Margarito Hunt MD at [address] [place] [address]  NASAL SINUS SURGERY      august 27 2017. cauterized her nasal passage.  OTHER SURGICAL HISTORY  07/27/2017    endoscopic conrtol of epistaxis dr Carolyn Manuel N/A 4/28/2018    ENDOSCOPIC GUIDED CONTROL EPISTAXIS  WITH SEPTAL BUTTON PLACEMENT. INTRA-OPERATIVE BRONCHOSCOPY.  performed by Margarito Hunt MD at [facility] OR     Current Medications:   Scheduled Meds:   acetaZOLAMIDE  500 mg Intravenous Once    bumetanide  1 mg Intravenous TID    Arformoterol Tartrate  15 mcg Nebulization BID    budesonide  1 mg Nebulization BID    insulin lispro  0-12 Units Subcutaneous TID WC    insulin lispro  0-6 Units Subcutaneous Nightly    apixaban  2.5 mg Oral BID    docusate sodium  200 mg Oral Nightly    ferrous sulfate  325 mg Oral BID WC    fluticasone  1 spray Each Nostril Daily    gabapentin  300 mg Oral Nightly    insulin glargine  40 Units Subcutaneous Nightly    levothyroxine  25 mcg Oral Daily    cetirizine  10 mg Oral Daily    melatonin  4.5 mg Oral Nightly    metoprolol succinate  50 mg Oral Daily    trospium  20 mg Oral Nightly    therapeutic multivitamin-minerals  1 tablet Oral Daily    pantoprazole  40 mg Oral BID    polyethylene glycol  17 g Oral Daily    atorvastatin  20 mg Oral Daily    sodium chloride flush  5-40 mL Intravenous 2 times per day    albuterol  2.5 mg Nebulization 4x daily    lidocaine  1 patch Transdermal Daily     Continuous Infusions:   dextrose      sodium chloride       PRN Meds:bisacodyl, miconazole, glucose, dextrose, glucagon (rDNA), dextrose, sodium chloride flush, sodium chloride, magnesium hydroxide, acetaminophen **OR** acetaminophen, trimethobenzamide, white petrolatum, guaiFENesin    Allergies:  Pcn [penicillins]    Social History:   Social History     Socioeconomic History    Marital status:      Spouse name: None    Number of children: None    Years of education: None    Highest education level: None   Occupational History    None   Tobacco Use    Smoking status: Never Smoker    Smokeless tobacco: Never Used   Vaping Use    Vaping Use: Never used   Substance and Sexual Activity    Alcohol use: Not Currently     Alcohol/week: 0.0 standard drinks     Comment:      Drug use: Never    Sexual activity: Never   Other Topics Concern    None   Social History Narrative    2 cups coffee daily     Social Determinants of Health     Financial Resource Strain:     Difficulty of Paying Living Expenses:    Food Insecurity:     Worried About Running Out of Food in the Last Year:     Ran Out of Food in the Last Year:    Transportation Needs:     Lack of Transportation (Medical):  Lack of Transportation (Non-Medical):    Physical Activity:     Days of Exercise per Week:     Minutes of Exercise per Session:    Stress:     Feeling of Stress :    Social Connections:     Frequency of Communication with Friends and Family:     Frequency of Social Gatherings with Friends and Family:     Attends Orthodox Services:     Active Member of Clubs or Organizations:     Attends Club or Organization Meetings:     Marital Status:    Intimate Partner Violence:     Fear of Current or Ex-Partner:     Emotionally Abused:     Physically Abused:     Sexually Abused:       Nursing home resident    Family History:       Problem Relation Age of Onset    Heart Disease Mother     Kidney Disease Mother         dialysis    Diabetes Mother     Stroke Father     Diabetes Father    . Otherwise non-pertinent to the chief complaint.     REVIEW OF SYSTEMS:    Constitutional: Negative for fevers, chills, diaphoresis  Neurologic: Negative   Psychiatric: Negative  Rheumatologic: Negative   Endocrine: Negative  Hematologic: Negative  Immunologic: Negative  ENT: Negative  Respiratory: As in the HPI  Cardiovascular: Negative  GI: Negative  : Negative  Musculoskeletal: Negative  Skin: No rashes. PHYSICAL EXAM:    Vitals:   /68   Pulse 98   Temp 98.5 °F (36.9 °C) (Oral)   Resp 19   Ht 5' 2\" (1.575 m)   Wt (!) 347 lb 6 oz (157.6 kg)   LMP  (LMP Unknown)   SpO2 98%   BMI 63.54 kg/m²   Constitutional: The patient is awake, alert, and oriented. She is morbidly obese. She is lying in bed and in no distress. On BiPAP. Skin: Warm and dry. No rashes were noted. HEENT: Eyes show round, and reactive pupils. No jaundice. Moist mucous membranes, no ulcerations, no thrush. Neck: Supple to movements. No lymphadenopathy. Chest: No use of accessory muscles to breathe. Symmetrical expansion. Auscultation reveals no wheezing, crackles, or rhonchi. Cardiovascular: S1 and S2 are rhythmic and regular. No murmurs appreciated. Abdomen: Positive bowel sounds to auscultation. Benign to palpation. No masses felt. No hepatosplenomegaly. Extremities: No clubbing, no cyanosis, no edema. Lines: peripheral  Pure wick with dark but clear urine.     CBC+dif:  Recent Labs     05/18/21  0853 05/18/21  1547   WBC 5.3 5.2   HGB 9.3* 9.0*   HCT 29.9* 28.7*   .1* 102.1*    190     Lab Results   Component Value Date    CRP 0.1 08/13/2020      No results found for: CRP  Lab Results   Component Value Date    SEDRATE 62 (H) 02/18/2021    SEDRATE 80 (H) 08/13/2020     Lab Results   Component Value Date    ALT 15 05/13/2021    AST 27 05/13/2021    ALKPHOS 76 05/13/2021    BILITOT 0.5 05/13/2021     Lab Results   Component Value Date     05/19/2021    K 3.6 05/19/2021    K 4.4 05/14/2021    CL 92 05/19/2021    CO2 41 05/19/2021    BUN 27 05/19/2021    CREATININE 1.8 05/19/2021    GFRAA 32 05/19/2021    LABGLOM 27 05/19/2021    GLUCOSE 113 05/19/2021    PROT 7.0 05/13/2021    LABALBU 3.2 05/13/2021    CALCIUM 9.6 05/19/2021    BILITOT 0.5 05/13/2021 ALKPHOS 76 05/13/2021    AST 27 05/13/2021    ALT 15 05/13/2021       Lab Results   Component Value Date    PROTIME 14.6 02/22/2021    INR 1.2 02/22/2021       Lab Results   Component Value Date    TSH 2.570 05/15/2021       Lab Results   Component Value Date    COLORU Yellow 05/17/2021    PHUR 7.0 05/17/2021    WBCUA 5-10 05/17/2021    RBCUA >20 05/17/2021    BACTERIA MODERATE 05/17/2021    CLARITYU SL CLOUDY 05/17/2021    SPECGRAV 1.015 05/17/2021    LEUKOCYTESUR MODERATE 05/17/2021    UROBILINOGEN 0.2 05/17/2021    BILIRUBINUR Negative 05/17/2021    BLOODU LARGE 05/17/2021    GLUCOSEU Negative 05/17/2021    AMORPHOUS FEW 02/17/2021       Lab Results   Component Value Date    HCO3 39.9 05/13/2021    BE 12.4 05/13/2021    O2SAT 82.7 05/13/2021    PH 7.321 05/13/2021    PCO2 92.6 05/13/2021    PO2 144.3 05/13/2021     Radiology:  Noted    Microbiology:  Pending  Recent Labs     05/18/21  1547   BC Gram stain performed from blood culture bottle media  Gram positive cocci in chains  *     Recent Labs     05/17/21  1710   ORG Providencia stuartii*     Recent Labs     05/18/21  1605   BLOODCULT2 Gram stain performed from blood culture bottle media  Gram positive cocci in chains  *     No results for input(s): STREPNEUMAGU in the last 72 hours. No results for input(s): LP1UAG in the last 72 hours. No results for input(s): ASO in the last 72 hours. No results for input(s): CULTRESP in the last 72 hours. Recent Labs     05/17/21  0610   PROCAL 0.68*       Assessment:  · VRE bacteremia. The source is not clear. The patient does not have prosthetic heart valves or pacer. Urine cultures are growing different organisms. High-grade bacteremia without any clear source usually points towards infective endocarditis.   · Asymptomatic bacteriuria with Providencia and Proteus  · Hypercapnic respiratory failure, noncompliant with BiPAP  · Chronic kidney disease    Plan:    · Repeat blood cultures x2  · Start Daptomycin 8 mg/kg IV every 48 hours, adjusted to underlying GFR  · Check cultures, baseline ESR, CRP  · 2D echo  · Will follow with you    Thank you for having us see this patient in consultation. I will be discussing this case with the treating physicians. Case discussed with Dr. Velia Quan.     Joaquin Mayberry MD  11:34 AM  5/19/2021

## 2021-05-19 NOTE — PROGRESS NOTES
Date: 5/19/2021    Time: 3:46 PM    Patient Placed On BIPAP/CPAP/ Non-Invasive Ventilation? No    If no must comment. Facial area red/color change? No           If YES are Blister/Lesion present? No   If yes must notify nursing staff  BIPAP/CPAP skin barrier?   Yes    Skin barrier type:mepilexlite        Comments: pt already on bipap        Ramón Ivy RCP

## 2021-05-19 NOTE — PROGRESS NOTES
Date: 5/19/2021    Time: 1:00 PM    Patient Placed On BIPAP/CPAP/ Non-Invasive Ventilation? No    If no must comment. Facial area red/color change? No           If YES are Blister/Lesion present? No   If yes must notify nursing staff  BIPAP/CPAP skin barrier?   Yes    Skin barrier type:mepilexlite       Comments:        Nell Babb RCP

## 2021-05-20 NOTE — PROGRESS NOTES
[address] [facility]  MARIA ESTHER  Progress Note  CC: no distress, + bacteremia   Face to face encounter   SUBJECTIVE:  Patient is tolerating medications. No reported adverse drug reactions. ROS: No nausea, vomiting, diarrhea. Eating lunch. On nasal canula  No distress. Has been afebrile. No rash.      Medications:  Scheduled Meds:   daptomycin (CUBICIN) IVPB  1,000 mg Intravenous Q48H    bumetanide  1 mg Intravenous TID    Arformoterol Tartrate  15 mcg Nebulization BID    budesonide  1 mg Nebulization BID    insulin lispro  0-12 Units Subcutaneous TID WC    insulin lispro  0-6 Units Subcutaneous Nightly    apixaban  2.5 mg Oral BID    docusate sodium  200 mg Oral Nightly    ferrous sulfate  325 mg Oral BID WC    fluticasone  1 spray Each Nostril Daily    gabapentin  300 mg Oral Nightly    insulin glargine  40 Units Subcutaneous Nightly    levothyroxine  25 mcg Oral Daily    cetirizine  10 mg Oral Daily    melatonin  4.5 mg Oral Nightly    metoprolol succinate  50 mg Oral Daily    trospium  20 mg Oral Nightly    therapeutic multivitamin-minerals  1 tablet Oral Daily    pantoprazole  40 mg Oral BID    polyethylene glycol  17 g Oral Daily    atorvastatin  20 mg Oral Daily    sodium chloride flush  5-40 mL Intravenous 2 times per day    albuterol  2.5 mg Nebulization 4x daily    lidocaine  1 patch Transdermal Daily     Continuous Infusions:   dextrose      sodium chloride       PRN Meds:bisacodyl, miconazole, glucose, dextrose, glucagon (rDNA), dextrose, sodium chloride flush, sodium chloride, magnesium hydroxide, acetaminophen **OR** acetaminophen, trimethobenzamide, white petrolatum, guaiFENesin  OBJECTIVE:  Patient Vitals for the past 24 hrs:   BP Temp Temp src Pulse Resp SpO2 Height Weight   05/20/21 1119 -- -- -- -- -- -- 5' 2\" (1.575 m) --   05/20/21 1100 (!) 100/54 98.6 °F (37 °C) Temporal 92 16 92 % -- --   05/20/21 0842 -- -- -- -- 14 100 % -- --   05/20/21 0524 -- -- -- -- -- -- -- (!) 350 lb 15.6 oz (159.2 kg)   05/20/21 0405 -- -- -- -- 20 -- -- --   05/20/21 0100 (!) 114/48 98.5 °F (36.9 °C) Axillary 96 18 98 % -- --   05/20/21 0001 -- -- -- -- 20 -- -- --   05/19/21 2230 (!) 107/51 99.2 °F (37.3 °C) Axillary 85 19 100 % -- --   05/19/21 2200 -- -- -- -- 23 -- -- --   05/19/21 1545 -- -- -- -- 18 -- -- --   05/19/21 1425 130/83 98.4 °F (36.9 °C) Axillary 98 20 97 % -- --   05/19/21 1244 -- -- -- -- 19 -- -- --     Constitutional: The patient is awake, alert, and oriented. Eating lunch  Skin: Warm and dry. No rashes were noted. Head: Eyes show round, and reactive pupils. No jaundice. Mouth: Moist mucous membranes, no ulcerations, no thrush. Poor dentition   Neck: Supple to movements. No lymphadenopathy. Chest: No use of accessory muscles to breathe. Symmetrical expansion. Auscultation reveals diminished breath sounds. On nasal canula. Cardiovascular: S1 and S2 are rhythmic and regular. 2/6 systolic murmur. Abdomen: Positive bowel sounds to auscultation. Benign to palpation. Large   Extremities: ++ edema.    PIV    Laboratory and Tests Review:  Lab Results   Component Value Date    WBC 5.2 05/18/2021    WBC 5.3 05/18/2021    WBC 4.1 (L) 05/14/2021    HGB 9.0 (L) 05/18/2021    HCT 28.7 (L) 05/18/2021    .1 (H) 05/18/2021     05/18/2021     Lab Results   Component Value Date    NEUTROABS 3.41 05/13/2021    NEUTROABS 2.04 04/15/2021    NEUTROABS 2.78 04/14/2021     Lab Results   Component Value Date    CRP 4.2 (H) 05/19/2021    CRP 0.1 08/13/2020     Lab Results   Component Value Date    SEDRATE 122 (H) 05/19/2021    SEDRATE 62 (H) 02/18/2021    SEDRATE 80 (H) 08/13/2020     Lab Results   Component Value Date    ALT 15 05/13/2021    AST 27 05/13/2021    ALKPHOS 76 05/13/2021    BILITOT 0.5 05/13/2021     Lab Results   Component Value Date     05/20/2021    K 3.8 05/20/2021    K 4.4 05/14/2021    CL 89 05/20/2021    CO2 37 05/20/2021    BUN 30 05/20/2021    CREATININE 1.8 05/20/2021    GFRAA 32 05/20/2021    LABGLOM 27 05/20/2021    GLUCOSE 136 05/20/2021    PROT 7.0 05/13/2021    LABALBU 3.2 05/13/2021    CALCIUM 9.7 05/20/2021    BILITOT 0.5 05/13/2021    ALKPHOS 76 05/13/2021    AST 27 05/13/2021    ALT 15 05/13/2021     Radiology:  Reviewed     Microbiology:   5/19/2021- blood culture- Gram positive cocci in chains  5/18/2021- blood culture- Gram positive cocci in chains - VRE  5/17/2021- urine culture- >100,000 providencia stuartii , <10,000    ASSESSMENT:  · 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 I had a face to face encounter with the patient. Agree with exam.  Assessment and plan as outlined above and directed by me. Addition and corrections were done as deemed appropriate. My exam and plan include: The patient seemed to be tolerating antibiotics. Repeat blood cultures are coming back again with VRE. 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 Awaiting for GRACE.     Betty Holman MD  5/20/2021  1:36 PM

## 2021-05-20 NOTE — PLAN OF CARE
Problem: Increased nutrient needs (NI-5.1)  Goal: Food and/or Nutrient Delivery  Description: Individualized approach for food/nutrient provision.   5/20/2021 1147 by Tim Fat, RD, LD  Outcome: Met This Shift  5/20/2021 1146 by Tim Fat, RD, LD  Outcome: Met This Shift  5/20/2021 1145 by Tim Fat, RD, LD  Outcome: Met This Shift  5/20/2021 1145 by Tim Fat, RD, LD  Outcome: Met This Shift  5/20/2021 1145 by Tim Fat, RD, LD  Outcome: Met This Shift  5/20/2021 1144 by Tim Fat, RD, LD  Outcome: Met This Shift  5/20/2021 1144 by Tim Fat, RD, LD  Outcome: Met This Shift

## 2021-05-20 NOTE — CARE COORDINATION
CASE MANAGEMENT. .. Chart reviewed. Noted patient continues on iv bumex tid. Tolerating 4lnc. ID was consulted yesterday for + blood cultures and patient was started on iv dapto q 48hrs. Repeat blood cultures and 2d echo were obtained. Patient will return to [facility]. Facility can accept on iv dapto with midline if needed. Will cont to follow along.

## 2021-05-20 NOTE — PROGRESS NOTES
Consent for GRACE obtained from edwin Donald who is the POA verified by this nurse and charge nurse.

## 2021-05-20 NOTE — CARE COORDINATION
Social Work/Discharge Planning:  Chart reviewed. Patient plan remains to return to [facility] [place]. No pre-cert needed to return. Patient remains on 4 liters of oxygen. Patient was started yesterday on IV daptomycin every 48 hours and to be determined if she will need it at discharge. Called liaison Laura Brown with [facility] and she will verify if facility can accommodate IV daptomycin. Laura Brown states no updated COVID needed since patient is vaccinated. Will continue to follow. Electronically signed by THEODORE Arboleda on 5/20/2021 at 7:50 AM    Addendum:  Laura Brown with [facility] states facility can accommodate IV daptomycin, IF she discharged with it. Laura Brown states patient will need a midline. Provided update to RN. Will continue to follow.   Electronically signed by THEODORE Arboleda on 5/20/2021 at 8:52 AM

## 2021-05-20 NOTE — PLAN OF CARE
Problem: Falls - Risk of:  Goal: Will remain free from falls  Description: Will remain free from falls  5/20/2021 1427 by Joy Spicer RN  Outcome: Met This Shift     Problem: Falls - Risk of:  Goal: Absence of physical injury  Description: Absence of physical injury  5/20/2021 1427 by Joy Spicer RN  Outcome: Met This Shift     Problem: Skin Integrity:  Goal: Will show no infection signs and symptoms  Description: Will show no infection signs and symptoms  5/20/2021 1427 by Joy Spicer RN  Outcome: Ongoing     Problem: Skin Integrity:  Goal: Absence of new skin breakdown  Description: Absence of new skin breakdown  5/20/2021 1427 by Joy Spicer RN  Outcome: Ongoing     Problem: Pain:  Goal: Pain level will decrease  Description: Pain level will decrease  5/20/2021 1427 by Joy Spicer RN  Outcome: Met This Shift     Problem: Pain:  Goal: Control of acute pain  Description: Control of acute pain  5/20/2021 1427 by Joy Spicer RN  Outcome: Met This Shift     Problem: Pain:  Goal: Control of chronic pain  Description: Control of chronic pain  5/20/2021 1427 by Joy Spicer RN  Outcome: Met This Shift     Problem: Confusion - Acute:  Goal: Absence of continued neurological deterioration signs and symptoms  Description: Absence of continued neurological deterioration signs and symptoms  5/20/2021 1427 by Joy Spicer RN  Outcome: Ongoing     Problem: Confusion - Acute:  Goal: Mental status will be restored to baseline  Description: Mental status will be restored to baseline  5/20/2021 1427 by Joy Spicer RN  Outcome: Ongoing     Problem: Discharge Planning:  Goal: Ability to perform activities of daily living will improve  Description: Ability to perform activities of daily living will improve  5/20/2021 1427 by Joy Spicer RN  Outcome: Ongoing     Problem: Discharge Planning:  Goal: Participates in care planning  Description: Participates in care planning  5/20/2021 1427 by Joy Spicer RN  Outcome: Ongoing     Problem: Injury - Risk of, Physical Injury:  Goal: Will remain free from falls  Description: Will remain free from falls  5/20/2021 1427 by Hermes Watson RN  Outcome: Met This Shift     Problem: Injury - Risk of, Physical Injury:  Goal: Absence of physical injury  Description: Absence of physical injury  5/20/2021 1427 by Hermes Watson RN  Outcome: Met This Shift     Problem: Mood - Altered:  Goal: Mood stable  Description: Mood stable  5/20/2021 1427 by Hermes Watson RN  Outcome: Met This Shift     Problem: Mood - Altered:  Goal: Absence of abusive behavior  Description: Absence of abusive behavior  5/20/2021 1427 by Hermes Watson RN  Outcome: Met This Shift     Problem: Mood - Altered:  Goal: Verbalizations of feeling emotionally comfortable while being cared for will increase  Description: Verbalizations of feeling emotionally comfortable while being cared for will increase  5/20/2021 1427 by Hermes Watson RN  Outcome: Met This Shift     Problem: Psychomotor Activity - Altered:  Goal: Absence of psychomotor disturbance signs and symptoms  Description: Absence of psychomotor disturbance signs and symptoms  5/20/2021 1427 by Hermes Watson RN  Outcome: Ongoing

## 2021-05-20 NOTE — PROGRESS NOTES
Subjective:  Feeling better denies complaints  No CP or SOB  No fever or chills   No uncontrolled pain  No vomiting or diarrhea     Objective:    BP (!) 114/48   Pulse 96   Temp 98.5 °F (36.9 °C) (Axillary)   Resp 14   Ht 5' 2\" (1.575 m)   Wt (!) 350 lb 15.6 oz (159.2 kg)   LMP  (LMP Unknown)   SpO2 100%   BMI 64.19 kg/m²     24HR INTAKE/OUTPUT:      Intake/Output Summary (Last 24 hours) at 5/20/2021 1046  Last data filed at 5/20/2021 0612  Gross per 24 hour   Intake 240 ml   Output 600 ml   Net -360 ml       General appearance: NAD, somnolent not conversant on BiPAP  Neck: FROM, supple   Lungs: Clear bilaterally no wheezes, no rhonchi, no crackles  CV: RRR, no MRGs; normal carotid upstroke and amplitude without Bruits  Abdomen: Soft, non-tender; no masses or HSM  Extremities: No edema, no cyanosis, no clubbing  Skin: Intact no rash, no lesions, no ulcers    Psych: Alert and oriented normal affect  Neuro: Nonfocal  Most Recent Labs  Lab Results   Component Value Date    WBC 5.2 05/18/2021    HGB 9.0 (L) 05/18/2021    HCT 28.7 (L) 05/18/2021     05/18/2021     05/20/2021    K 3.8 05/20/2021    CL 89 (L) 05/20/2021    CREATININE 1.8 (H) 05/20/2021    BUN 30 (H) 05/20/2021    CO2 37 (H) 05/20/2021    GLUCOSE 136 (H) 05/20/2021    ALT 15 05/13/2021    AST 27 05/13/2021    INR 1.2 02/22/2021    TSH 2.570 05/15/2021    LABA1C 6.0 (H) 04/15/2021     No results for input(s): MG in the last 72 hours. Lab Results   Component Value Date    CALCIUM 9.7 05/20/2021    PHOS 2.7 08/28/2020        XR CHEST PORTABLE   Final Result   No significant change since the prior study. Bilateral hazy diffuse   opacities may be related to combination of pleural fluid and/or pulmonary   infiltrate/edema         XR CHEST PORTABLE   Final Result   1. Cardiomegaly with bilateral hazy airspace disease favored to represent CHF.    2. Trace left pleural effusion             Assessment    Principal Problem:    Acute on chronic respiratory failure with hypoxia and hypercapnia (HCC)  Active Problems:    Chronic atrial fibrillation    Diabetes mellitus type 2, uncontrolled (HCC)    Essential hypertension    History of CVA (cerebrovascular accident)    Hyperlipidemia LDL goal <100    Acquired hypothyroidism    Morbid obesity with BMI of 40.0-44.9, adult (HCC)    Acute on chronic diastolic congestive heart failure ([facility] [address]    Noncompliance with treatment    Respiratory failure (HCC)    Noncompliance    Hypercalcemia    Pyuria    Bacteremia due to vancomycin resistant Enterococcus  Resolved Problems:    * No resolved hospital problems. *      Plan:    80-year-old female history of chronic respiratory failure, noncompliance admitted with respiratory failure after refusing to wear her BiPAP at night--admitted with acute on chronic diastolic CHF and acute on chronic respiratory failure.  + VRE bacteremia - GRACE    Acute on diastolic chronic CHF  Bilateral pulmonary edema chest x-ray 5/13  IV Bumex-increase to 3 times daily  Monitor I's and O's closely--1.3 L  Repeat chest x-ray 5/17 personally reviewed continued bilateral infiltrates most consistent with pulmonary edema    Bacteremia-VRE  2/2 + blood cultures  Repeat cultures  Daptomycin/ceftriaxone  ID Consult appreciated -discussed case    Acute on chronic respiratory failure currently 4 L NC satting 97%  Nocturnal and as needed BiPAP  Supplemental O2 wean as tolerated baseline 3 L per nursing home  Nebulizers    Pyuria  Elevated procalcitonin, further increased to 0.68  Repeat UA/UCx    Stop calcitriol as patient is hypercalcemic    Noncompliance-Long discussion regarding importance of compliance with therapy and medications.   Patient acknowledges understanding    Medications for other co morbidities cont as appropriate w dosage adjustments as necessary  PT/OT  DVT PPx  DC planning SNF when evaluation completed        Electronically signed by Dm Ortega MD on 5/20/2021 at 10:46 AM

## 2021-05-20 NOTE — PROGRESS NOTES
Comprehensive Nutrition Assessment    Type and Reason for Visit:  Initial    Nutrition Recommendations/Plan: Continue diet and ONS to help meet nutritional needs. Nutrition Assessment:  Pt adm from nursing home to ED w/ dyspnea. Per EMR from EMS pt is refusing to wear her CPAP at night. Pt on chronic 3 L home O2. Adm for acute on CRF w/ hypercapnia. Other active problems inlcude: Acute on chronic diastolic CHF, Chronic A-fib,  Diabetes mellitus type 2, uncontrolled, Essential HTN,  Hyperlipidemia LDL goal <100, hypothyroidism. Noncompliance w/tx. Chronic A-fib, CKD stage 3, GFR 30-59 ml/min, Diabetes mellitus uncontrolled,Hyperlipidemia, HTN, Hypothyroidism, Acute on chronic diastolic CHFand Volume overload. Malnutrition Assessment:  Malnutrition Status:  No malnutrition    Context:  Chronic Illness     Findings of the 6 clinical characteristics of malnutrition:  Energy Intake:  Mild decrease in energy intake (Comment) (Pt stated prior to adm poor po appetite ~2wks)  Weight Loss:  No significant weight loss     Body Fat Loss:  No significant body fat loss     Muscle Mass Loss:  No significant muscle mass loss    Fluid Accumulation:  1 - Mild (Generalized +1 BLE)     Strength:  Not Performed    Estimated Daily Nutrient Needs:  Energy (kcal):  6560-0000 (MSJ  x1.2 SF); Weight Used for Energy Requirements:  Admission (d/t CHF)     Protein (g):  75-90g as tolerated d/t CKD stage 3, GFR 30-59 ml/min (1.5-1.8g/kg IBW);  Weight Used for Protein Requirements:  Ideal        Fluid (ml/day):  2066-1846; Method Used for Fluid Requirements:  1 ml/kcal      Nutrition Related Findings:  A&Ox4, BS active, Abd WDL, -I/Os, Edema generalized +1 BLE      Wounds:   (Redness heels, buttocks & ab folds)       Current Nutrition Therapies:    DIET CARB CONTROL; No Added Salt (3-4 GM)  Dietary Nutrition Supplements: Diabetic Oral Supplement    Anthropometric Measures:  · Height: 5' 2\" (157.5 cm)  · Current Body Weight: 350 lb 15 oz (159.2 kg) (5/20 bedscale)   · Admission Body Weight: 344 lb (156 kg)    · Usual Body Weight: 283 lb 1 oz (128.4 kg) (8/13/20 bedscale)     · Ideal Body Weight: 110 lbs; % Ideal Body Weight 319 %   · BMI: 64.2    · BMI Categories: Obese Class 3 (BMI 40.0 or greater)       Nutrition Diagnosis:   · Increased nutrient needs related to impaired respiratory function (Chronic respiratory failure) as evidenced by intake 51-75%, poor intake prior to admission    Nutrition Interventions:   Food and/or Nutrient Delivery:  Continue Current Diet, Start Oral Nutrition Supplement (Glucerna BID)  Nutrition Education/Counseling:  No recommendation at this time   Coordination of Nutrition Care:  Continue to monitor while inpatient    Goals:  >75% of meals and ONS consumed       Nutrition Monitoring and Evaluation:   Behavioral-Environmental Outcomes:  None Identified   Food/Nutrient Intake Outcomes:  Food and Nutrient Intake, Supplement Intake  Physical Signs/Symptoms Outcomes:  Biochemical Data, Fluid Status or Edema, Nutrition Focused Physical Findings, Skin, Weight     Discharge Planning:     Too soon to determine     Electronically signed by Kike Ambrocio RD, LD on 5/20/21 at 11:48 AM EDT    Contact: [phone]

## 2021-05-20 NOTE — PLAN OF CARE
Problem: Falls - Risk of:  Goal: Will remain free from falls  Description: Will remain free from falls  5/20/2021 0222 by Aamir Gleason RN  Outcome: Met This Shift  5/19/2021 1420 by Trung Coyne RN  Outcome: Met This Shift  Goal: Absence of physical injury  Description: Absence of physical injury  5/20/2021 0222 by Aamir Gleason RN  Outcome: Met This Shift  5/19/2021 1420 by Trung Coyne RN  Outcome: Met This Shift     Problem: Skin Integrity:  Goal: Will show no infection signs and symptoms  Description: Will show no infection signs and symptoms  5/20/2021 0222 by Aamir Gleason RN  Outcome: Met This Shift  5/19/2021 1420 by Trung Coyne RN  Outcome: Met This Shift  Goal: Absence of new skin breakdown  Description: Absence of new skin breakdown  5/20/2021 0222 by Aamir Gleason RN  Outcome: Met This Shift  5/19/2021 1420 by Trung Coyne RN  Outcome: Met This Shift     Problem: Pain:  Goal: Pain level will decrease  Description: Pain level will decrease  5/20/2021 0222 by Aamir Gleason RN  Outcome: Met This Shift  5/19/2021 1420 by Trung Coyne RN  Outcome: Met This Shift  Goal: Control of acute pain  Description: Control of acute pain  5/20/2021 0222 by Aamir Gleason RN  Outcome: Met This Shift  5/19/2021 1420 by Trung Coyne RN  Outcome: Met This Shift  Goal: Control of chronic pain  Description: Control of chronic pain  5/20/2021 0222 by Aamir Gleason RN  Outcome: Met This Shift  5/19/2021 1420 by Trung Coyne RN  Outcome: Met This Shift     Problem: Gas Exchange - Impaired  Goal: Able to breathe comfortably  Description: Able to breathe comfortably  5/20/2021 0222 by Aamir Gleason RN  Outcome: Met This Shift  5/19/2021 1420 by Trung Coyne RN  Outcome: Met This Shift     Problem: Fluid and Electrolyte Imbalance  Goal: Fluid and electrolyte balance are achieved/maintained  5/20/2021 0222 by Aamir Gleason RN  Outcome: Met This Shift  5/19/2021 1420 by Trung Coyne RN  Outcome: Met This Shift     Problem: increase  Description: Verbalizations of feeling emotionally comfortable while being cared for will increase  5/20/2021 0222 by Brodie Hoover RN  Outcome: Met This Shift  5/19/2021 1420 by Heaven Christine RN  Outcome: Met This Shift     Problem: Psychomotor Activity - Altered:  Goal: Absence of psychomotor disturbance signs and symptoms  Description: Absence of psychomotor disturbance signs and symptoms  5/20/2021 0222 by Brodie Hoover RN  Outcome: Met This Shift  5/19/2021 1420 by Heaven Christine RN  Outcome: Met This Shift     Problem: Sensory Perception - Impaired:  Goal: Demonstrations of improved sensory functioning will increase  Description: Demonstrations of improved sensory functioning will increase  5/20/2021 0222 by Brodie Hoover RN  Outcome: Met This Shift  5/19/2021 1420 by Heaven Christine RN  Outcome: Met This Shift  Goal: Decrease in sensory misperception frequency  Description: Decrease in sensory misperception frequency  5/20/2021 0222 by Brodie Hoover RN  Outcome: Met This Shift  5/19/2021 1420 by Heaven Christine RN  Outcome: Met This Shift  Goal: Able to refrain from responding to false sensory perceptions  Description: Able to refrain from responding to false sensory perceptions  5/20/2021 0222 by Brodie Hoover RN  Outcome: Met This Shift  5/19/2021 1420 by Heaven Christine RN  Outcome: Met This Shift  Goal: Demonstrates accurate environmental perceptions  Description: Demonstrates accurate environmental perceptions  5/20/2021 0222 by Brodie Hoover RN  Outcome: Met This Shift  5/19/2021 1420 by Heaven Christine RN  Outcome: Met This Shift  Goal: Able to distinguish between reality-based and nonreality-based thinking  Description: Able to distinguish between reality-based and nonreality-based thinking  5/20/2021 0222 by Brodie Hoover RN  Outcome: Met This Shift  5/19/2021 1420 by Heaven Christine RN  Outcome: Met This Shift  Goal: Able to interrupt nonreality-based thinking  Description: Able to interrupt nonreality-based thinking  5/20/2021 0222 by Napoleon Mahoney RN  Outcome: Met This Shift  5/19/2021 1420 by Azar Lechuga RN  Outcome: Met This Shift     Problem: Sleep Pattern Disturbance:  Goal: Appears well-rested  Description: Appears well-rested  5/20/2021 0222 by Napoleon Mahoney RN  Outcome: Met This Shift  5/19/2021 1420 by Azar Lechuga RN  Outcome: Met This Shift

## 2021-05-20 NOTE — PROGRESS NOTES
Subjective:  Feeling better denies complaints  No CP or SOB  No fever or chills   No uncontrolled pain  No vomiting or diarrhea     Objective:    /83   Pulse 98   Temp 98.4 °F (36.9 °C) (Axillary)   Resp 18   Ht 5' 2\" (1.575 m)   Wt (!) 347 lb 6 oz (157.6 kg)   LMP  (LMP Unknown)   SpO2 97%   BMI 63.54 kg/m²     24HR INTAKE/OUTPUT:      Intake/Output Summary (Last 24 hours) at 5/19/2021 2043  Last data filed at 5/19/2021 1733  Gross per 24 hour   Intake 600 ml   Output --   Net 600 ml       General appearance: NAD, somnolent not conversant on BiPAP  Neck: FROM, supple   Lungs: Clear bilaterally no wheezes, no rhonchi, no crackles  CV: RRR, no MRGs; normal carotid upstroke and amplitude without Bruits  Abdomen: Soft, non-tender; no masses or HSM  Extremities: No edema, no cyanosis, no clubbing  Skin: Intact no rash, no lesions, no ulcers    Psych: Alert and oriented normal affect  Neuro: Nonfocal  Most Recent Labs  Lab Results   Component Value Date    WBC 5.2 05/18/2021    HGB 9.0 (L) 05/18/2021    HCT 28.7 (L) 05/18/2021     05/18/2021     05/19/2021    K 3.6 05/19/2021    CL 92 (L) 05/19/2021    CREATININE 1.8 (H) 05/19/2021    BUN 27 (H) 05/19/2021    CO2 41 (HH) 05/19/2021    GLUCOSE 113 (H) 05/19/2021    ALT 15 05/13/2021    AST 27 05/13/2021    INR 1.2 02/22/2021    TSH 2.570 05/15/2021    LABA1C 6.0 (H) 04/15/2021     No results for input(s): MG in the last 72 hours. Lab Results   Component Value Date    CALCIUM 9.6 05/19/2021    PHOS 2.7 08/28/2020        XR CHEST PORTABLE   Final Result   No significant change since the prior study. Bilateral hazy diffuse   opacities may be related to combination of pleural fluid and/or pulmonary   infiltrate/edema         XR CHEST PORTABLE   Final Result   1. Cardiomegaly with bilateral hazy airspace disease favored to represent CHF.    2. Trace left pleural effusion             Assessment    Principal Problem:    Acute on chronic respiratory

## 2021-05-21 PROBLEM — M25.551 HIP PAIN, ACUTE, RIGHT: Status: ACTIVE | Noted: 2021-01-01

## 2021-05-21 NOTE — PROGRESS NOTES
Date: 5/20/2021    Time: 11:23 PM    Patient Placed On BIPAP/CPAP/ Non-Invasive Ventilation? No    If no must comment. Facial area red/color change? No           If YES are Blister/Lesion present? No   If yes must notify nursing staff  BIPAP/CPAP skin barrier?   Yes    Skin barrier type:mepilexlite       Comments: found pt on BiPAP        Blayne Fraser, [facility]        05/20/21 8545   NIV Type   Skin Protection for O2 Device Yes   NIV Started/Stopped On   Equipment Type V60   Mode Bilevel   Mask Type Full face mask   Mask Size Small   Settings/Measurements   IPAP 20 cmH20   CPAP/EPAP 8 cmH2O   Rate Ordered 18   Resp 21   FiO2  40 %   Vt Exhaled 452 mL   Minute Volume 9.2 Liters   Mask Leak (lpm) 40 lpm   Comfort Level Good   Using Accessory Muscles No

## 2021-05-21 NOTE — ANESTHESIA PRE PROCEDURE
Department of Anesthesiology  Preprocedure Note       Name:  Ludy Pinon   Age:  80 y.o.  :  1935                                          MRN:  [id number]         Date:  2021      Surgeon: Aneta Christopher    Procedure: GRACE    Medications prior to admission:   Prior to Admission medications    Medication Sig Start Date End Date Taking?  Authorizing Provider   mineral oil-hydrophilic petrolatum (AQUAPHOR) ointment Apply topically as needed (DRY FLAKY SKIN SKIN CARE AND PREVENTION) Indications: BLE   Yes Historical Provider, MD   miconazole (MICOTIN) 2 % powder Apply topically as needed (SKIN CARE AND PREVENTION) Indications: CLEANSE UNDER LEFT BREAST WITH SOAP & H20, PAT DRY, THEN APPLY   Yes Historical Provider, MD   insulin lispro (HUMALOG) 100 UNIT/ML injection vial Inject 0-10 Units into the skin 3 times daily (before meals) PER SLIDING SCALE: 150-200=2U, 201-250=4U, 251-300=6U, 301-350=8U, 351-400=10U, CALL MD IF <80 OR >400  *SEE OTHER ORDER*   Yes Historical Provider, MD   albuterol-ipratropium (COMBIVENT RESPIMAT)  MCG/ACT AERS inhaler Inhale 1 puff into the lungs 3 times daily *SEE OTHER ORDER*   Yes Historical Provider, MD   magnesium hydroxide (MILK OF MAGNESIA) 400 MG/5ML suspension Take 30 mLs by mouth daily as needed for Constipation Indications: GIVE IF NO BM IN 48HRS & NO RESULTS FROM HIGH FIBER JUICE   Yes Historical Provider, MD   Insulin Glargine, 1 Unit Dial, (TOUJEO SOLOSTAR) 300 UNIT/ML SOPN Inject 40 Units into the skin nightly Indications: HOLD IF FSBS <80   Yes Historical Provider, MD   bumetanide (BUMEX) 1 MG tablet Take 1 tablet by mouth daily 21  Yes Indy Pina MD   fluticasone (FLONASE) 50 MCG/ACT nasal spray 1 spray by Each Nostril route daily    Yes Historical Provider, MD   polyethylene glycol (GLYCOLAX) 17 g packet Take 17 g by mouth daily   Yes Historical Provider, MD   insulin lispro (HUMALOG) 100 UNIT/ML injection vial Inject 12 Units into the skin 3 times daily (before meals) *SEE OTHER ORDER*   Yes Historical Provider, MD   pantoprazole (PROTONIX) 40 MG tablet Take 40 mg by mouth 2 times daily   Yes Historical Provider, MD   metoprolol succinate (TOPROL XL) 50 MG extended release tablet Take 50 mg by mouth daily Indications: HOLD IF SBP <100 AND/OR HR <60    Yes Historical Provider, MD   ondansetron (ZOFRAN) 4 MG tablet Take 4 mg by mouth every 6 hours as needed for Nausea or Vomiting    Yes Historical Provider, MD   apixaban (ELIQUIS) 2.5 MG TABS tablet Take 2.5 mg by mouth 2 times daily    Yes Historical Provider, MD   albuterol-ipratropium (COMBIVENT RESPIMAT)  MCG/ACT AERS inhaler Inhale 2 puffs into the lungs every 6 hours as needed for Shortness of Breath *SEE OTHER ORDER*   Yes Historical Provider, MD   bisacodyl (DULCOLAX) 10 MG suppository Place 10 mg rectally daily as needed for Constipation Indications: GIVE IF NO RESULTS FROM MOM    Yes Historical Provider, MD   Multiple Vitamins-Minerals (THERAPEUTIC MULTIVITAMIN-MINERALS) tablet Take 1 tablet by mouth daily   Yes Historical Provider, MD   lidocaine 4 % external patch Place 1 patch onto the skin 2 times daily Indications: LEFT ARM/SHOULDER (12HRS ON/12HRS OFF) APPLY IN AM REMOVE AT HS    Yes Historical Provider, MD   potassium chloride (KLOR-CON M) 10 MEQ extended release tablet Take 10 mEq by mouth 2 times daily   Yes Historical Provider, MD   acetaminophen (TYLENOL) 325 MG tablet Take 650 mg by mouth every 4 hours as needed for Pain or Fever    Yes Historical Provider, MD   calcitRIOL (ROCALTROL) 0.25 MCG capsule Take 0.5 mcg by mouth daily    Yes Historical Provider, MD   loratadine (CLARITIN) 10 MG tablet Take 10 mg by mouth daily   Yes Historical Provider, MD   melatonin 5 MG TABS tablet Take 5 mg by mouth nightly    Yes Historical Provider, MD   guaiFENesin (MUCINEX) 600 MG extended release tablet Take 600 mg by mouth every 12 hours as needed for Congestion (COUGH)    Yes Historical Provider, MD Cholecalciferol (VITAMIN D3) 5000 units TABS Take 5,000 Units by mouth daily    Yes Historical Provider, MD   ferrous sulfate 325 (65 Fe) MG tablet Take 1 tablet by mouth 2 times daily (with meals) 7/26/18  Yes Jd De La Rosa DO   docusate sodium (COLACE, DULCOLAX) 100 MG CAPS Take 200 mg by mouth nightly 7/26/18  Yes Jd De La Rosa DO   Mirabegron ER 50 MG TB24 Take 50 mg by mouth every evening  8/29/17  Yes Historical Provider, MD   simvastatin (ZOCOR) 40 MG tablet Take 40 mg by mouth nightly. Yes Historical Provider, MD   gabapentin (NEURONTIN) 300 MG capsule Take 300 mg by mouth nightly.  .   Yes Historical Provider, MD   levothyroxine (SYNTHROID) 25 MCG tablet Take 25 mcg by mouth Daily    Historical Provider, MD       Current medications:    Current Facility-Administered Medications   Medication Dose Route Frequency Provider Last Rate Last Admin    cefTRIAXone (ROCEPHIN) 2,000 mg in sterile water 20 mL IV syringe  2,000 mg Intravenous Q24H SAVANNA Villasenor - CNS   2,000 mg at 05/20/21 1457    bumetanide (BUMEX) tablet 2 mg  2 mg Oral BID Rosana Yañez MD        DAPTOmycin (CUBICIN) 1,000 mg in sodium chloride 0.9 % 50 mL IVPB  1,000 mg Intravenous Q48H Andrae Steve MD   Stopped at 05/19/21 1512    Arformoterol Tartrate (BROVANA) nebulizer solution 15 mcg  15 mcg Nebulization BID Roxie Mauricio MD   15 mcg at 05/20/21 2026    budesonide (PULMICORT) nebulizer suspension 1,000 mcg  1 mg Nebulization BID Roxie Mauricio MD   1,000 mcg at 05/20/21 2026    insulin lispro (HUMALOG) injection vial 0-12 Units  0-12 Units Subcutaneous TID WC Rosana Yañez MD   2 Units at 05/20/21 1614    insulin lispro (HUMALOG) injection vial 0-6 Units  0-6 Units Subcutaneous Nightly Rosana Yañez MD   1 Units at 05/20/21 2134    apixaban (ELIQUIS) tablet 2.5 mg  2.5 mg Oral BID SAVANNA De Leon - CNP   2.5 mg at 05/20/21 2134    bisacodyl (DULCOLAX) suppository 10 mg  10 mg Rectal Daily PRN Rosanna Ok Learn, APRN - CNP        docusate sodium (COLACE) capsule 200 mg  200 mg Oral Nightly Rosanna Ok Learn, APRN - CNP   200 mg at 05/20/21 2134    ferrous sulfate (IRON 325) tablet 325 mg  325 mg Oral BID WC Rosanna Ok Learn, APRN - CNP   325 mg at 05/20/21 1614    fluticasone (FLONASE) 50 MCG/ACT nasal spray 1 spray  1 spray Each Nostril Daily Rosanna Ok Learn, APRN - CNP   1 spray at 05/20/21 1127    gabapentin (NEURONTIN) capsule 300 mg  300 mg Oral Nightly Rosanna Ok Learn, APRN - CNP   300 mg at 05/20/21 2133    insulin glargine (LANTUS) injection vial 40 Units  40 Units Subcutaneous Nightly Rosanna Ok Learn, APRN - CNP   40 Units at 05/20/21 2146    levothyroxine (SYNTHROID) tablet 25 mcg  25 mcg Oral Daily Rosanna Ok Learn, APRN - CNP   25 mcg at 05/20/21 0615    cetirizine (ZYRTEC) tablet 10 mg  10 mg Oral Daily Rosanna Ok Learn, APRN - CNP   10 mg at 05/20/21 1115    melatonin tablet 4.5 mg  4.5 mg Oral Nightly Rosanna Ok Learn, APRN - CNP   4.5 mg at 05/20/21 2134    metoprolol succinate (TOPROL XL) extended release tablet 50 mg  50 mg Oral Daily Faviola Hess MD   50 mg at 05/20/21 1115    miconazole (MICOTIN) 2 % powder   Topical Daily PRN Rosanna Ok Learn, APRN - CNP        troium [facility]) tablet 20 mg  20 mg Oral Nightly Rosanna Ok Learn, APRN - CNP   20 mg at 05/19/21 2251    therapeutic multivitamin-minerals 1 tablet  1 tablet Oral Daily Rosanna Ok Learn, APRN - CNP   1 tablet at 05/20/21 1115    pantoprazole (PROTONIX) tablet 40 mg  40 mg Oral BID Rosanna Ok Learn, APRN - CNP   40 mg at 05/20/21 2134    polyethylene glycol (GLYCOLAX) packet 17 g  17 g Oral Daily Rosanna Ok Learn, APRN - CNP   17 g at 05/20/21 1114    atorvastatin (LIPITOR) tablet 20 mg  20 mg Oral Daily Rosanna Ok Learn, APRN - CNP   20 mg at 05/20/21 1115    glucose (GLUTOSE) 40 % oral gel 15 g  15 g Oral PRN Rosanna Ok Learn, APRN - CNP        dextrose 50 % IV solution  12.5 g Intravenous PRN Margueritte Foil Learn, APRN - CNP        glucagon (rDNA) injection 1 mg  1 mg Intramuscular PRN Margueritte Foil Learn, APRN - CNP        dextrose 5 % solution  100 mL/hr Intravenous PRN Margjessicaitte Foil Learn, APRN - CNP        sodium chloride flush 0.9 % injection 5-40 mL  5-40 mL Intravenous 2 times per day Margueritte Foil Learn, APRN - CNP   10 mL at 05/20/21 2135    sodium chloride flush 0.9 % injection 5-40 mL  5-40 mL Intravenous PRN Margueritte Foil Learn, APRN - CNP   10 mL at 05/16/21 1441    0.9 % sodium chloride infusion  25 mL Intravenous PRN Armonditte Foil Learn, APRN - CNP        magnesium hydroxide (MILK OF MAGNESIA) 400 MG/5ML suspension 30 mL  30 mL Oral Daily PRN Armonditte Foil Learn, APRN - CNP        acetaminophen (TYLENOL) tablet 650 mg  650 mg Oral Q6H PRN Sisiueritte Foil Learn, APRN - CNP   650 mg at 05/18/21 2233    Or    acetaminophen (TYLENOL) suppository 650 mg  650 mg Rectal Q6H PRN Sisiueritte Foil Learn, APRN - CNP        trimethobenzamide Theotis Spry) injection 200 mg  200 mg Intramuscular Q6H PRN Armonditte Foil Learn, APRN - CNP        white petrolatum ointment   Topical Daily PRN Armonditte Foil Learn, APRN - CNP   Given at 05/20/21 2135    guaiFENesin tablet 400 mg  400 mg Oral TID PRN Sisiueritte Foil Learn, APRN - CNP        albuterol (PROVENTIL) nebulizer solution 2.5 mg  2.5 mg Nebulization 4x daily Margueritte Foil Learn, APRN - CNP   2.5 mg at 05/20/21 2026    lidocaine 4 % external patch 1 patch  1 patch Transdermal Daily Margueritte Foil Learn, APRN - CNP   1 patch at 05/20/21 1127       Allergies:     Allergies   Allergen Reactions    Pcn [Penicillins] Hives       Problem List:    Patient Active Problem List   Diagnosis Code    Chronic atrial fibrillation I48.20    CKD (chronic kidney disease) stage 3, GFR 30-59 ml/min ([facility]) N18.30    History of breast cancer Z85.3    Chronic anemia D64.9    Diabetes mellitus type 2, uncontrolled ([facility] E11.65    Essential hypertension I10    History of CVA (cerebrovascular accident) Z80.78    Hyperlipidemia LDL goal <100 E78.5    Acquired hypothyroidism E03.9    Morbid obesity with BMI of 40.0-44.9, adult ([facility]) E66.01, Z68.41    Acute on chronic respiratory failure with hypoxia ([facility]) J96.21    Acute on chronic diastolic congestive heart failure ([facility]) I50.33    Bilateral pleural effusion J90    Chronic respiratory failure with hypoxia and hypercapnia ([facility]) J96.11, J96.12    GERD (gastroesophageal reflux disease) K21.9    Insomnia G47.00    Epidemic vertigo A88.1    AMS (altered mental status) R41.82    Hypokalemia E87.6    Acute on chronic respiratory failure with hypoxia and hypercapnia ([facility]) J96.21, J96.22    Noncompliance with treatment Z91.19    Respiratory failure ([facility] J96.90    Noncompliance Z91.19    Hypercalcemia E83.52    Pyuria R82.81    Bacteremia due to vancomycin resistant Enterococcus R78.81, B95.2, Z16.21       Past Medical History:        Diagnosis Date    Anemia due to acute blood loss 12/29/2017    Arthritis     Blood transfusion reaction     Chronic atrial fibrillation ([facility]) 12/29/2017    CKD (chronic kidney disease) stage 3, GFR 30-59 ml/min 12/29/2017    Diabetes mellitus ([facility]     History of blood transfusion     History of breast cancer     breast cancer Right    History of stroke     Hyperlipidemia     Hypertension     Hypothyroidism     Volume overload 12/30/2017    As cause of dyspnea       Past Surgical History:        Procedure Laterality Date    APPENDECTOMY      BREAST SURGERY      right    BRONCHOSCOPY  4/28/2018    BRONCHOSCOPY DIAGNOSTIC performed by Colt Mccabe MD at [address] [place] [address] GASTRIC BYPASS SURGERY      NASAL SINUS SURGERY      august 27 2017. cauterized her nasal passage.     OTHER SURGICAL HISTORY  07/27/2017    endoscopic conrtol of epistaxis dr Jennifer Gunn N/A 4/28/2018    ENDOSCOPIC GUIDED CONTROL EPISTAXIS  WITH SEPTAL Mild mitral annular calcification. Mild aortic stenosis     Neuro/Psych:   (+) CVA:, neuromuscular disease:,             GI/Hepatic/Renal:   (+) GERD:, renal disease: CRI, morbid obesity          Endo/Other:    (+) DiabetesType II DM, poorly controlled, using insulin, hypothyroidism, blood dyscrasia: anemia and anticoagulation therapy:., electrolyte abnormalities, malignancy/cancer (breast). Abdominal:   (+) obese,         Vascular:   + PVD, aortic or cerebral, . Anesthesia Plan      MAC     ASA 4       Induction: intravenous. MIPS: Prophylactic antiemetics administered. Anesthetic plan and risks discussed with patient. Plan discussed with CRNA. **CHART REVIEW ONLY**      The anesthesia pre-op assessment was generated by review of the EMR. The patient was neither interviewed nor examined. The patient will need to be assessed, and the anesthesia pre-op assessment will need to be updated by the DOS anesthesiologist      Bernadine More MD   5/21/2021    DOS STAFF ADDENDUM:    Pt seen and examined, chart reviewed (including anesthesia, drug and allergy history). Anesthetic plan, risks, benefits, alternatives, and personnel involved discussed with patient. Patient verbalized an understanding and agrees to proceed. Plan discussed with care team members and agreed upon.     Lebron Still MD  Staff Anesthesiologist  8:12 AM

## 2021-05-21 NOTE — PROGRESS NOTES
Nursing Transfer Note    Data:  Summary of patients progress: GRACE  Reason for transfer: PACU care complete    Action:  Explained reason for transfer to patient. Report given to: Nahid Bernal, using RN Handoff Navigator. Mode of transportation: Cart    Response:  RN Recommendations: Resume previous orders.

## 2021-05-21 NOTE — CARE COORDINATION
COVID negative 5/13- no further testing needed. On iv Daptomycin and iv Teflaro x 6 weeks per ID- PICC line ordered. Per Jan Shaikh @ [place] [address] , they are able to accommodate administering these iv abxs. Plan remains to return to [facility] on discharge- bedhold- no precert required. On O2 4lNC vs Bipap- has Bipap at nursing facility. Call [address] [place] [address] [phone] to arrange Ambulance transport. N 17 in epic. Transport form on chart.  Gail Franklin, RN case manager

## 2021-05-21 NOTE — PLAN OF CARE
Problem: Falls - Risk of:  Goal: Will remain free from falls  Description: Will remain free from falls  5/21/2021 0116 by Geno Bullock RN  Outcome: Met This Shift  5/20/2021 1427 by Yuki Ellis RN  Outcome: Met This Shift  Goal: Absence of physical injury  Description: Absence of physical injury  5/21/2021 0116 by Geno Bullock RN  Outcome: Met This Shift  5/20/2021 1427 by Yuki Ellis RN  Outcome: Met This Shift     Problem: Skin Integrity:  Goal: Will show no infection signs and symptoms  Description: Will show no infection signs and symptoms  5/21/2021 0116 by Geno Bullock RN  Outcome: Met This Shift  5/20/2021 1427 by Yuki Ellis RN  Outcome: Ongoing  Goal: Absence of new skin breakdown  Description: Absence of new skin breakdown  5/21/2021 0116 by Geno Bullock RN  Outcome: Met This Shift  5/20/2021 1427 by Yuki Ellis RN  Outcome: Ongoing     Problem: Pain:  Goal: Pain level will decrease  Description: Pain level will decrease  5/21/2021 0116 by Geno Bullock RN  Outcome: Met This Shift  5/20/2021 1427 by Yuki Ellis RN  Outcome: Met This Shift  Goal: Control of acute pain  Description: Control of acute pain  5/21/2021 0116 by Geno Bullock RN  Outcome: Met This Shift  5/20/2021 1427 by Yuki Ellis RN  Outcome: Met This Shift  Goal: Control of chronic pain  Description: Control of chronic pain  5/21/2021 0116 by Geno Bullock RN  Outcome: Met This Shift  5/20/2021 1427 by Yuki Ellis RN  Outcome: Met This Shift     Problem: Gas Exchange - Impaired  Goal: Able to breathe comfortably  Description: Able to breathe comfortably  Outcome: Met This Shift     Problem: Fluid and Electrolyte Imbalance  Goal: Fluid and electrolyte balance are achieved/maintained  Outcome: Met This Shift     Problem: Confusion - Acute:  Goal: Absence of continued neurological deterioration signs and symptoms  Description: Absence of continued neurological deterioration signs and symptoms  5/21/2021 0116 by Geno Bullock Psychomotor Activity - Altered:  Goal: Absence of psychomotor disturbance signs and symptoms  Description: Absence of psychomotor disturbance signs and symptoms  5/21/2021 0116 by Jono Suh RN  Outcome: Met This Shift  5/20/2021 1427 by Mckenzie Vera RN  Outcome: Ongoing     Problem: Sensory Perception - Impaired:  Goal: Demonstrations of improved sensory functioning will increase  Description: Demonstrations of improved sensory functioning will increase  Outcome: Met This Shift  Goal: Decrease in sensory misperception frequency  Description: Decrease in sensory misperception frequency  Outcome: Met This Shift  Goal: Able to refrain from responding to false sensory perceptions  Description: Able to refrain from responding to false sensory perceptions  Outcome: Met This Shift  Goal: Demonstrates accurate environmental perceptions  Description: Demonstrates accurate environmental perceptions  Outcome: Met This Shift  Goal: Able to distinguish between reality-based and nonreality-based thinking  Description: Able to distinguish between reality-based and nonreality-based thinking  Outcome: Met This Shift  Goal: Able to interrupt nonreality-based thinking  Description: Able to interrupt nonreality-based thinking  Outcome: Met This Shift     Problem: Sleep Pattern Disturbance:  Goal: Appears well-rested  Description: Appears well-rested  Outcome: Met This Shift     Problem: Increased nutrient needs (NI-5.1)  Goal: Food and/or Nutrient Delivery  Description: Individualized approach for food/nutrient provision.   5/20/2021 1147 by Sanya Martin RD, LD  Outcome: Met This Shift  5/20/2021 1146 by Sanya Martin RD, LD  Outcome: Met This Shift  5/20/2021 1145 by Sanya Martin RD, LD  Outcome: Met This Shift  5/20/2021 1145 by Sanya Martin RD, LD  Outcome: Met This Shift  5/20/2021 1145 by Sanya Martin RD, LD  Outcome: Met This Shift  5/20/2021 1144 by Sanya Martin RD, LD  Outcome: Met This Shift  5/20/2021 1144

## 2021-05-21 NOTE — PROGRESS NOTES
Patient brought to stage I for GRACE with Dr. Tamara Hess. Signed permit on chart. She was placed on bedside monitor and VS obtained. Echo and anesthesia staff present. Dr. Tamara Hess on his way.

## 2021-05-21 NOTE — ANESTHESIA POSTPROCEDURE EVALUATION
Department of Anesthesiology  Postprocedure Note    Patient: Valentine Villegas  MRN: [id number]  YOB: 1935  Date of evaluation: 5/21/2021  Time:  9:30 AM     Procedure Summary     Date: 05/21/21 Room / Location: [facility] Echocardiography    Anesthesia Start: 0806 Anesthesia Stop: 0845    Procedure: TRANSESOPHAGEAL ECHO Diagnosis:     Scheduled Providers:  Responsible Provider: Jael Gary MD    Anesthesia Type: MAC ASA Status: 4          Anesthesia Type: MAC    Johnny Phase I:      Johnny Phase II: Johnny Score: 9    Last vitals: Reviewed and per EMR flowsheets.        Anesthesia Post Evaluation    Patient location during evaluation: PACU  Patient participation: complete - patient participated  Level of consciousness: awake and alert  Airway patency: patent  Nausea & Vomiting: no vomiting and no nausea  Complications: no  Cardiovascular status: hemodynamically stable  Respiratory status: acceptable  Hydration status: stable

## 2021-05-21 NOTE — PROGRESS NOTES
GRACE not able to be completed by Dr. Jackeline Bailon. Patient did not tolerate anesthesia to proceed with procedure. No family present to update. Will attempt to call and update.

## 2021-05-21 NOTE — PROGRESS NOTES
subjective:  Feeling tired denies complaints  No CP or SOB  No fever or chills   No uncontrolled pain  No vomiting or diarrhea     Objective:    /64   Pulse 87   Temp 98.6 °F (37 °C) (Oral)   Resp 22   Ht 5' 2\" (1.575 m)   Wt (!) 357 lb 14.4 oz (162.3 kg)   LMP  (LMP Unknown)   SpO2 94%   BMI 65.46 kg/m²     24HR INTAKE/OUTPUT:      Intake/Output Summary (Last 24 hours) at 5/21/2021 1128  Last data filed at 5/21/2021 0910  Gross per 24 hour   Intake 783 ml   Output 600 ml   Net 183 ml       General appearance: NAD, somnolent not conversant off BiPAP  Neck: FROM, supple   Lungs: Clear bilaterally no wheezes, no rhonchi, no crackles  CV: RRR, no MRGs; normal carotid upstroke and amplitude without Bruits  Abdomen: Soft, non-tender; no masses or HSM  Extremities: No edema, no cyanosis, no clubbing  Skin: Intact no rash, no lesions, no ulcers    Psych: Alert and oriented normal affect  Neuro: Nonfocal  Most Recent Labs  Lab Results   Component Value Date    WBC 4.6 05/21/2021    HGB 8.5 (L) 05/21/2021    HCT 28.3 (L) 05/21/2021     05/21/2021     05/21/2021    K 3.1 (L) 05/21/2021    CL 88 (L) 05/21/2021    CREATININE 2.0 (H) 05/21/2021    BUN 32 (H) 05/21/2021    CO2 41 (HH) 05/21/2021    GLUCOSE 171 (H) 05/21/2021    ALT 15 05/13/2021    AST 27 05/13/2021    INR 1.2 02/22/2021    TSH 2.570 05/15/2021    LABA1C 6.0 (H) 04/15/2021     No results for input(s): MG in the last 72 hours. Lab Results   Component Value Date    CALCIUM 9.5 05/21/2021    PHOS 2.7 08/28/2020        XR CHEST PORTABLE   Final Result   No significant change since the prior study. Bilateral hazy diffuse   opacities may be related to combination of pleural fluid and/or pulmonary   infiltrate/edema         XR CHEST PORTABLE   Final Result   1. Cardiomegaly with bilateral hazy airspace disease favored to represent CHF.    2. Trace left pleural effusion             Assessment    Principal Problem:    Acute on chronic respiratory failure with hypoxia and hypercapnia (HCC)  Active Problems:    Chronic atrial fibrillation    Diabetes mellitus type 2, uncontrolled (HCC)    Essential hypertension    History of CVA (cerebrovascular accident)    Hyperlipidemia LDL goal <100    Acquired hypothyroidism    Morbid obesity with BMI of 40.0-44.9, adult (HCC)    Acute on chronic diastolic congestive heart failure ([facility] [address]    Noncompliance with treatment    Respiratory failure (HCC)    Noncompliance    Hypercalcemia    Pyuria    Bacteremia due to vancomycin resistant Enterococcus  Resolved Problems:    * No resolved hospital problems. *      Plan:    80-year-old female history of chronic respiratory failure, noncompliance admitted with respiratory failure after refusing to wear her BiPAP at night--admitted with acute on chronic diastolic CHF and acute on chronic respiratory failure.  + VRE bacteremia - GRACE    Acute on diastolic chronic CHF  Bilateral pulmonary edema chest x-ray 5/13  IV Bumex- transition to PO  Monitor I's and O's   Repeat chest x-ray 5/17 personally reviewed continued bilateral infiltrates most consistent with pulmonary edema    Bacteremia-VRE, -Suspected endocarditis  Unable to do GRACE.  + blood cultures +5/18, 5/19  NGTD 5/20  Daptomycin/ceftarolone 6wks  ID Consult appreciated -discussed case    Acute on chronic respiratory failure currently 4 L NC satting 100%  Nocturnal and as needed BiPAP  Supplemental O2 wean as tolerated baseline 3 L per nursing home  Nebulizers    Pyuria  Elevated procalcitonin, further increased to 0.68  Repeat UA/UCx    Stop calcitriol as patient is hypercalcemic    Noncompliance-Long discussion regarding importance of compliance with therapy and medications.   Patient acknowledges understanding    Medications for other co morbidities cont as appropriate w dosage adjustments as necessary  PT/OT  DVT PPx  DC planning SNF with 6 wks IV teflaro and Cubacin--Patient can discharge to SNF once PICC line is placed no pre-CERT necessary    DC to SNF today  Electronically signed by Wallene Hodgkins, MD on 5/21/2021 at 11:28 AM

## 2021-05-21 NOTE — PROGRESS NOTES
[address] [place] [address] [facility]  [facility]  Progress Note    SUBJECTIVE:  Chief Complaint   Patient presents with    Shortness of Breath     came in from nursing home with SOB, brought in on non rebreather,      The patient is tolerating antibiotic without ID complaints. No fever. No pain. No nausea or vomiting. Review of systems:  As stated above in the chief complaint, otherwise negative.     Medications:  Scheduled Meds:   cefTRIAXone (ROCEPHIN) IV  2,000 mg Intravenous Q24H    bumetanide  2 mg Oral BID    daptomycin (CUBICIN) IVPB  1,000 mg Intravenous Q48H    Arformoterol Tartrate  15 mcg Nebulization BID    budesonide  1 mg Nebulization BID    insulin lispro  0-12 Units Subcutaneous TID WC    insulin lispro  0-6 Units Subcutaneous Nightly    apixaban  2.5 mg Oral BID    docusate sodium  200 mg Oral Nightly    ferrous sulfate  325 mg Oral BID WC    fluticasone  1 spray Each Nostril Daily    gabapentin  300 mg Oral Nightly    insulin glargine  40 Units Subcutaneous Nightly    levothyroxine  25 mcg Oral Daily    cetirizine  10 mg Oral Daily    melatonin  4.5 mg Oral Nightly    metoprolol succinate  50 mg Oral Daily    trospium  20 mg Oral Nightly    therapeutic multivitamin-minerals  1 tablet Oral Daily    pantoprazole  40 mg Oral BID    polyethylene glycol  17 g Oral Daily    atorvastatin  20 mg Oral Daily    sodium chloride flush  5-40 mL Intravenous 2 times per day    albuterol  2.5 mg Nebulization 4x daily    lidocaine  1 patch Transdermal Daily     Continuous Infusions:   dextrose      sodium chloride       PRN Meds:bisacodyl, miconazole, glucose, dextrose, glucagon (rDNA), dextrose, sodium chloride flush, sodium chloride, magnesium hydroxide, acetaminophen **OR** acetaminophen, trimethobenzamide, white petrolatum, guaiFENesin    OBJECTIVE:  /64   Pulse 87   Temp 98.6 °F (37 °C) (Oral)   Resp 22   Ht 5' 2\" (1.575 m)   Wt (!) 357 lb 14.4 oz (162.3 kg) LMP  (LMP Unknown)   SpO2 94%   BMI 65.46 kg/m²   Temp  Av °F (36.7 °C)  Min: 97.2 °F (36.2 °C)  Max: 98.6 °F (37 °C)  Constitutional: The patient is awake, alert, and oriented. Skin: Warm and dry. No rashes were noted. HEENT: Round and reactive pupils. Moist mucous membranes. No ulcerations or thrush. Neck: Supple to movements. Chest: No use of accessory muscles to breathe. Symmetrical expansion. No wheezing, crackles or rhonchi. Cardiovascular: S1 and S2 are rhythmic and regular. 2/6 systolic murmur. Abdomen: Positive bowel sounds to auscultation. Benign to palpation. N  Extremities: Minimal edema.   Lines: peripheral    Laboratory and Tests Review:  Lab Results   Component Value Date    WBC 4.6 2021    WBC 5.2 2021    WBC 5.3 2021    HGB 8.5 (L) 2021    HCT 28.3 (L) 2021    .9 (H) 2021     2021     Lab Results   Component Value Date    NEUTROABS 2.89 2021    NEUTROABS 3.41 2021    NEUTROABS 2.04 04/15/2021     No results found for: [facility]  Lab Results   Component Value Date    ALT 15 2021    AST 27 2021    ALKPHOS 76 2021    BILITOT 0.5 2021     Lab Results   Component Value Date     2021    K 3.1 2021    K 4.4 2021    CL 88 2021    CO2 41 2021    BUN 32 2021    CREATININE 2.0 2021    CREATININE 1.8 2021    CREATININE 1.8 2021    GFRAA 29 2021    LABGLOM 24 2021    GLUCOSE 171 2021    PROT 7.0 2021    LABALBU 3.2 2021    CALCIUM 9.5 2021    BILITOT 0.5 2021    ALKPHOS 76 2021    AST 27 2021    ALT 15 2021     Lab Results   Component Value Date    CRP 4.2 (H) 2021    CRP 0.1 2020     Lab Results   Component Value Date    SEDRATE 122 (H) 2021    SEDRATE 62 (H) 2021    SEDRATE 80 (H) 2020     Radiology:      Microbiology:   Blood cultures 2021:

## 2021-05-22 NOTE — PROGRESS NOTES
[address] [place] [address] [facility]  [facility]  Progress Note    SUBJECTIVE:  Chief Complaint   Patient presents with    Shortness of Breath     came in from nursing home with SOB, brought in on non rebreather,      The patient is tolerating antibiotics. No fevers. No N/V/D. Awaiting PICC placement     Review of systems:  As stated above in the chief complaint, otherwise negative.     Medications:  Scheduled Meds:   lidocaine PF  5 mL Intradermal Once    sodium chloride flush  5-40 mL Intravenous 2 times per day    heparin flush  3 mL Intravenous 2 times per day    ceftaroline fosamil (TEFLARO) 600 MG IVPB  400 mg Intravenous Q12H    bumetanide  2 mg Oral Daily    daptomycin (CUBICIN) IVPB  1,000 mg Intravenous Q48H    Arformoterol Tartrate  15 mcg Nebulization BID    budesonide  1 mg Nebulization BID    insulin lispro  0-12 Units Subcutaneous TID WC    insulin lispro  0-6 Units Subcutaneous Nightly    apixaban  2.5 mg Oral BID    docusate sodium  200 mg Oral Nightly    ferrous sulfate  325 mg Oral BID WC    fluticasone  1 spray Each Nostril Daily    gabapentin  300 mg Oral Nightly    insulin glargine  40 Units Subcutaneous Nightly    levothyroxine  25 mcg Oral Daily    cetirizine  10 mg Oral Daily    melatonin  4.5 mg Oral Nightly    metoprolol succinate  50 mg Oral Daily    trospium  20 mg Oral Nightly    therapeutic multivitamin-minerals  1 tablet Oral Daily    pantoprazole  40 mg Oral BID    polyethylene glycol  17 g Oral Daily    sodium chloride flush  5-40 mL Intravenous 2 times per day    albuterol  2.5 mg Nebulization 4x daily    lidocaine  1 patch Transdermal Daily     Continuous Infusions:   sodium chloride      dextrose      sodium chloride       PRN Meds:sodium chloride flush, sodium chloride, heparin flush, bisacodyl, miconazole, glucose, dextrose, glucagon (rDNA), dextrose, sodium chloride flush, sodium chloride, magnesium hydroxide, acetaminophen **OR** acetaminophen, trimethobenzamide, white petrolatum, guaiFENesin    OBJECTIVE:  /76   Pulse 93   Temp 98.2 °F (36.8 °C) (Oral)   Resp 18   Ht 5' 2\" (1.575 m)   Wt (!) 346 lb 1.6 oz (157 kg)   LMP  (LMP Unknown)   SpO2 100%   BMI 63.30 kg/m²   Temp  Av.5 °F (36.9 °C)  Min: 98.2 °F (36.8 °C)  Max: 98.8 °F (37.1 °C)  Constitutional: The patient is awake, alert, and oriented. Sitting up in bed. Eating lunch. Skin: Warm and dry. No rashes were noted. HEENT: Round and reactive pupils. Moist mucous membranes. No ulcerations or thrush. Neck: Supple to movements. Chest: No use of accessory muscles to breathe. Symmetrical expansion. No wheezing, crackles or rhonchi. Cardiovascular: S1 and S2 are rhythmic and regular. 2/6 systolic murmur. Abdomen: Positive bowel sounds to auscultation. Benign to palpation. Extremities: Minimal edema.   Lines: peripheral    Laboratory and Tests Review:  Lab Results   Component Value Date    WBC 4.6 2021    WBC 5.2 2021    WBC 5.3 2021    HGB 8.5 (L) 2021    HCT 28.3 (L) 2021    .9 (H) 2021     2021     Lab Results   Component Value Date    NEUTROABS 2.89 2021    NEUTROABS 3.41 2021    NEUTROABS 2.04 04/15/2021     No results found for: [facility]  Lab Results   Component Value Date    ALT 15 2021    AST 27 2021    ALKPHOS 76 2021    BILITOT 0.5 2021     Lab Results   Component Value Date     2021    K 3.1 2021    K 4.4 2021    CL 88 2021    CO2 41 2021    BUN 32 2021    CREATININE 2.0 2021    CREATININE 1.8 2021    CREATININE 1.8 2021    GFRAA 29 2021    LABGLOM 24 2021    GLUCOSE 171 2021    PROT 7.0 2021    LABALBU 3.2 2021    CALCIUM 9.5 2021    BILITOT 0.5 2021    ALKPHOS 76 2021    AST 27 2021    ALT 15 2021     Lab Results   Component Value Date    CRP 4.2 (H) 05/19/2021    CRP 0.1 08/13/2020     Lab Results   Component Value Date    SEDRATE 122 (H) 05/19/2021    SEDRATE 62 (H) 02/18/2021    SEDRATE 80 (H) 08/13/2020     Radiology:  No new studies     Microbiology:   Blood cultures 5/18/2021: VREnterococcus faecium in 4 of 4 bottles  Blood cultures 5/19/2021: VREnterococcus faecium   Blood cultures 5/20/2021: Negative so far    ASSESSMENT:  · Infective endocarditis with VRE. GRACE could not be done by cardiology  · CKD  · Asymptomatic bacteriuria    PLAN:  · Continue Daptomycin & Ceftaroline  · Check final cultures. · Patient will need 6 weeks of IV antibiotic  · Hold atorvastatin while on Daptomycin to reduce risk of rhabdomyolysis  · [facility] is able to accommodate antibiotics. · Await PICC for IV antibiotics    SAVANNA Atkinson - CNP  11:54 AM  5/22/2021    Patient seen and examined. I had a face to face encounter with the patient. Agree with exam.  Assessment and plan as outlined above and directed by me. Addition and corrections were done as deemed appropriate. My exam and plan include: The patient is tolerating current antibiotics. Reportedly the nursing home has accepted her, however I doubt that they would be able to afford these 2 antibiotics for 6 weeks. Once again, she would be better off at an LTAC. Awaiting for PICC.     Roque Ruiz MD  5/22/2021  12:20 PM

## 2021-05-22 NOTE — PROGRESS NOTES
Date: 5/21/2021    Time: 11:40 PM    Patient Placed On BIPAP/CPAP/ Non-Invasive Ventilation? Yes    If no must comment. Facial area red/color change? No           If YES are Blister/Lesion present? No   If yes must notify nursing staff  BIPAP/CPAP skin barrier?   Yes    Skin barrier type:mepilexlite       Comments:        Armando Cross RCP

## 2021-05-22 NOTE — PROCEDURES
PICC   Catheter insertion date 5/22/2021     Product Number:  [id number]   Lot No: [id number]   Gauge: 4 fr   Lumen: single   L Cephalic    Vein Diameter : 0.45 cm   Mid upper arm circumference: 46 cm   Catheter Length : 36 cm(14 cm cut from a 50 cm line)   Internal Length: 36 cm   External Catheter Length: 0   Ultrasound Used:yes   PICC line seen at cavoatrial junction per CXR report. Floor nurse notified PICC is okay to use.    : Malcolm Pandey RN

## 2021-05-22 NOTE — PROGRESS NOTES
Chief Complaint:  Chief Complaint   Patient presents with    Shortness of Breath     came in from nursing home with SOB, brought in on non rebreather,      Acute on chronic respiratory failure with hypoxia and hypercapnia ([address]     Subjective:    She has no complaints. Denies dyspnea. Denies pain. Objective:    /61   Pulse 84   Temp 98.2 °F (36.8 °C) (Oral)   Resp 20   Ht 5' 2\" (1.575 m)   Wt (!) 346 lb 1.6 oz (157 kg)   LMP  (LMP Unknown)   SpO2 97%   BMI 63.30 kg/m²     Current medications that patient is taking have been reviewed. General appearance: NAD, conversant, morbidly obese, and terrifically chronically ill appearing. Laying in dark room reclined in bed. HEENT: AT/NC, MMM  Neck: FROM, supple  Lungs: Clear to auscultation anteriorly, WOB normal  CV: RRR, no MRGs  Abdomen: Soft, non-tender; no masses or HSM, +BS  Extremities: No peripheral edema or digital cyanosis  Skin: no rash, lesions or ulcers  Psych: Calm and cooperative  Neuro: Awake, interactive, speaks only minimally.     Labs:  CBC with Differential:    Lab Results   Component Value Date    WBC 4.6 05/21/2021    RBC 2.75 05/21/2021    HGB 8.5 05/21/2021    HCT 28.3 05/21/2021     05/21/2021    .9 05/21/2021    MCH 30.9 05/21/2021    MCHC 30.0 05/21/2021    RDW 15.1 05/21/2021    NRBC 0.9 08/15/2020    LYMPHOPCT 17.1 05/21/2021    MONOPCT 12.8 05/21/2021    BASOPCT 0.2 05/21/2021    MONOSABS 0.59 05/21/2021    LYMPHSABS 0.79 05/21/2021    EOSABS 0.27 05/21/2021    BASOSABS 0.01 05/21/2021     CMP:    Lab Results   Component Value Date     05/22/2021    K 4.1 05/22/2021    K 4.4 05/14/2021    CL 91 05/22/2021    CO2 38 05/22/2021    BUN 30 05/22/2021    CREATININE 1.8 05/22/2021    GFRAA 32 05/22/2021    LABGLOM 27 05/22/2021    GLUCOSE 174 05/22/2021    PROT 7.0 05/13/2021    LABALBU 3.2 05/13/2021    CALCIUM 9.5 05/22/2021    BILITOT 0.5 05/13/2021    ALKPHOS 76 05/13/2021    AST 27 05/13/2021    ALT 15 05/13/2021        Assessment/Plan:  Principal Problem:    Acute on chronic respiratory failure with hypoxia and hypercapnia ([facility])  Active Problems:    Chronic atrial fibrillation    Diabetes mellitus type 2, uncontrolled (HCC)    Essential hypertension    History of CVA (cerebrovascular accident)    Hyperlipidemia LDL goal <100    Acquired hypothyroidism    Morbid obesity with BMI of 40.0-44.9, adult (HCC)    Acute on chronic diastolic congestive heart failure ([address]    Noncompliance with treatment    Respiratory failure ([address]    Noncompliance    Hypercalcemia    Pyuria    Bacteremia due to vancomycin resistant Enterococcus  Resolved Problems:    * No resolved hospital problems. *       Continue ceftaroline for bacteremia and possibly endocarditis, but couldn't tolerate GRACE    TTE not attempted - presumably due to habitus? Glucose poorly controlled. Increase glargine and SSI    Continue Eliquis and metoprolol    Continue PO bumex.   Laying nearly flat in bed, breathing comfortably    Requires continued inpatient level of care     Sebastien Garcia MD    6:12 PM  5/22/2021

## 2021-05-23 NOTE — PROGRESS NOTES
Date: 5/22/2021    Time: 2210    Patient Placed On BIPAP/CPAP/ Non-Invasive Ventilation? Yes    If no must comment. Facial area red/color change? No           If YES are Blister/Lesion present? No   If yes must notify nursing staff  BIPAP/CPAP skin barrier?   Yes    Skin barrier type:mepilexlite       Comments:    Red cassandra Alba RCP

## 2021-05-23 NOTE — PROGRESS NOTES
Chief Complaint:  Chief Complaint   Patient presents with    Shortness of Breath     came in from nursing home with SOB, brought in on non rebreather,      Acute on chronic respiratory failure with hypoxia and hypercapnia ([address]     Subjective:    She has no complaints. Denies dyspnea. Denies pain. Objective:    BP (!) 97/54   Pulse 97   Temp 98.5 °F (36.9 °C) (Axillary)   Resp 18   Ht 5' 2\" (1.575 m)   Wt (!) 353 lb 2.8 oz (160.2 kg)   LMP  (LMP Unknown)   SpO2 95%   BMI 64.60 kg/m²     Current medications that patient is taking have been reviewed. General appearance: NAD, conversant, morbidly obese, and terrifically chronically ill appearing. Laying in dark room reclined in bed. HEENT: AT/NC, MMM  Neck: FROM, supple  Lungs: Clear to auscultation anteriorly, WOB normal  CV: RRR, no MRGs  Abdomen: Soft, non-tender; no masses or HSM, +BS  Extremities: Asymmetric left arm edema, however she seems to be slumped over to the left  Skin: no rash, lesions or ulcers  Psych: Calm and cooperative  Neuro: Awake, interactive, speaks only minimally.     Labs:  CBC with Differential:    Lab Results   Component Value Date    WBC 4.6 05/21/2021    RBC 2.75 05/21/2021    HGB 8.5 05/21/2021    HCT 28.3 05/21/2021     05/21/2021    .9 05/21/2021    MCH 30.9 05/21/2021    MCHC 30.0 05/21/2021    RDW 15.1 05/21/2021    NRBC 0.9 08/15/2020    LYMPHOPCT 17.1 05/21/2021    MONOPCT 12.8 05/21/2021    BASOPCT 0.2 05/21/2021    MONOSABS 0.59 05/21/2021    LYMPHSABS 0.79 05/21/2021    EOSABS 0.27 05/21/2021    BASOSABS 0.01 05/21/2021     CMP:    Lab Results   Component Value Date     05/23/2021    K 4.1 05/23/2021    K 4.4 05/14/2021    CL 92 05/23/2021    CO2 40 05/23/2021    BUN 36 05/23/2021    CREATININE 1.9 05/23/2021    GFRAA 30 05/23/2021    LABGLOM 25 05/23/2021    GLUCOSE 164 05/23/2021    PROT 7.0 05/13/2021    LABALBU 3.2 05/13/2021    CALCIUM 9.8 05/23/2021    BILITOT 0.5 05/13/2021    ALKPHOS

## 2021-05-24 NOTE — PROGRESS NOTES
Date: 5/23/2021    Time: [id number]      Patient Placed On BIPAP/CPAP/ Non-Invasive Ventilation? Yes    If no must comment. Facial area red/color change? No           If YES are Blister/Lesion present? No   If yes must notify nursing staff  BIPAP/CPAP skin barrier?   Yes    Skin barrier type:mepilexlite       Comments:    Red cassandra Alba RCP

## 2021-05-24 NOTE — CARE COORDINATION
Social Work/Discharge Planning:  Chart reviewed. Called Alvenia Apley from [facility] patient can return to facility when ready. Facility can accommodate IV daptomycin. No pre-cert needed to return. Will continue to follow. Electronically signed by THEODORE Castillo on 5/24/2021 at 8:42 AM    Addendum:  Alvenia Apley with [facility] facility can also accept patient on ceftaroline (Teflaro). Will continue to follow.   Electronically signed by THEODORE Castillo on 5/24/2021 at 12:39 PM

## 2021-05-24 NOTE — PROGRESS NOTES
[address] [place] [address] [facility]  MARIA ESTHER  Progress Note    SUBJECTIVE:  Chief Complaint   Patient presents with    Shortness of Breath     came in from nursing home with SOB, brought in on non rebreather,      No new complaints. Tolerating antibiotics. Complaining of soreness on her buttocks. No fever. Review of systems:  As stated above in the chief complaint, otherwise negative.     Medications:  Scheduled Meds:   insulin glargine  50 Units Subcutaneous Nightly    insulin lispro  0-18 Units Subcutaneous TID WC    insulin lispro  0-9 Units Subcutaneous Nightly    sodium chloride flush  5-40 mL Intravenous 2 times per day    heparin flush  3 mL Intravenous 2 times per day    ceftaroline fosamil (TEFLARO) 600 MG IVPB  400 mg Intravenous Q12H    bumetanide  2 mg Oral Daily    daptomycin (CUBICIN) IVPB  1,000 mg Intravenous Q48H    Arformoterol Tartrate  15 mcg Nebulization BID    budesonide  1 mg Nebulization BID    apixaban  2.5 mg Oral BID    docusate sodium  200 mg Oral Nightly    ferrous sulfate  325 mg Oral BID WC    fluticasone  1 spray Each Nostril Daily    gabapentin  300 mg Oral Nightly    levothyroxine  25 mcg Oral Daily    cetirizine  10 mg Oral Daily    melatonin  4.5 mg Oral Nightly    metoprolol succinate  50 mg Oral Daily    trospium  20 mg Oral Nightly    therapeutic multivitamin-minerals  1 tablet Oral Daily    pantoprazole  40 mg Oral BID    polyethylene glycol  17 g Oral Daily    albuterol  2.5 mg Nebulization 4x daily    lidocaine  1 patch Transdermal Daily     Continuous Infusions:   sodium chloride      dextrose       PRN Meds:sodium chloride flush, sodium chloride, heparin flush, bisacodyl, miconazole, glucose, dextrose, glucagon (rDNA), dextrose, magnesium hydroxide, acetaminophen **OR** acetaminophen, trimethobenzamide, white petrolatum, guaiFENesin    OBJECTIVE:  /66   Pulse 90   Temp 98.1 °F (36.7 °C) (Oral)   Resp 17   Ht 5' 2\" (1.575 m)   Wt (!) 353 lb 2.8 oz (160.2 kg)   LMP  (LMP Unknown)   SpO2 100%   BMI 64.60 kg/m²   Temp  Av.3 °F (36.8 °C)  Min: 98.1 °F (36.7 °C)  Max: 98.5 °F (36.9 °C)  Constitutional: The patient is awake, alert, and oriented. Sitting up in bed. Eating lunch. Skin: Warm and dry. No rashes were noted. HEENT: Round and reactive pupils. Moist mucous membranes. No ulcerations or thrush. Neck: Supple to movements. Chest: No use of accessory muscles to breathe. Symmetrical expansion. No wheezing, crackles or rhonchi. Cardiovascular: S1 and S2 are rhythmic and regular. 2/6 systolic murmur. Abdomen: Positive bowel sounds to auscultation. Benign to palpation. Extremities: Minimal edema. Lines: Left PICC 21.     Laboratory and Tests Review:  Lab Results   Component Value Date    WBC 4.6 2021    WBC 5.2 2021    WBC 5.3 2021    HGB 8.5 (L) 2021    HCT 28.3 (L) 2021    .9 (H) 2021     2021     Lab Results   Component Value Date    NEUTROABS 2.89 2021    NEUTROABS 3.41 2021    NEUTROABS 2.04 04/15/2021     No results found for: [facility]  Lab Results   Component Value Date    ALT 15 2021    AST 27 2021    ALKPHOS 76 2021    BILITOT 0.5 2021     Lab Results   Component Value Date     2021    K 4.2 2021    K 4.4 2021    CL 94 2021    CO2 39 2021    BUN 37 2021    CREATININE 1.9 2021    CREATININE 1.9 2021    CREATININE 1.8 2021    GFRAA 30 2021    LABGLOM 25 2021    GLUCOSE 148 2021    PROT 7.0 2021    LABALBU 3.2 2021    CALCIUM 9.7 2021    BILITOT 0.5 2021    ALKPHOS 76 2021    AST 27 2021    ALT 15 2021     Lab Results   Component Value Date    CRP 4.6 (H) 2021    CRP 4.2 (H) 2021    CRP 0.1 2020     Lab Results   Component Value Date    SEDRATE 123 (H) 2021    SEDRATE 122 (H) 05/19/2021    SEDRATE 62 (H) 02/18/2021     Radiology:  No new studies     Microbiology:   Blood cultures 5/18/2021: VREnterococcus faecium in 4 of 4 bottles  Blood cultures 5/19/2021: VREnterococcus faecium   Blood cultures 5/20/2021: Negative so far    ASSESSMENT:  · Infective endocarditis with VRE. GRACE could not be done by cardiology  · CKD  · Asymptomatic bacteriuria    PLAN:  · Continue Daptomycin & Ceftaroline x6 weeks. Reconciled  · Check final cultures. · Hold atorvastatin while on Daptomycin to reduce risk of rhabdomyolysis  · She can be discharged to [facility] once they agree to provide IV antibiotics for 6 weeks.   Spoke with     Spoke with nursing    Arely Denney MD  11:32 AM  5/24/2021

## 2021-05-24 NOTE — PROGRESS NOTES
subjective:  Feeling tired denies complaints  No CP or SOB  No fever or chills   No uncontrolled pain  No vomiting or diarrhea     Objective:    /66   Pulse 90   Temp 98.1 °F (36.7 °C) (Oral)   Resp 17   Ht 5' 2\" (1.575 m)   Wt (!) 353 lb 2.8 oz (160.2 kg)   LMP  (LMP Unknown)   SpO2 100%   BMI 64.60 kg/m²     24HR INTAKE/OUTPUT:      Intake/Output Summary (Last 24 hours) at 5/24/2021 1349  Last data filed at 5/24/2021 1257  Gross per 24 hour   Intake 290 ml   Output 700 ml   Net -410 ml       General appearance: NAD, somnolent not conversant off BiPAP  Neck: FROM, supple   Lungs: Clear bilaterally no wheezes, no rhonchi, no crackles  CV: RRR, no MRGs; normal carotid upstroke and amplitude without Bruits  Abdomen: Soft, non-tender; no masses or HSM  Extremities: No edema, no cyanosis, no clubbing  Skin: Intact no rash, no lesions, no ulcers    Psych: Alert and oriented normal affect  Neuro: Nonfocal  Most Recent Labs  Lab Results   Component Value Date    WBC 4.6 05/21/2021    HGB 8.5 (L) 05/21/2021    HCT 28.3 (L) 05/21/2021     05/21/2021     05/24/2021    K 4.2 05/24/2021    CL 94 (L) 05/24/2021    CREATININE 1.9 (H) 05/24/2021    BUN 37 (H) 05/24/2021    CO2 39 (H) 05/24/2021    GLUCOSE 148 (H) 05/24/2021    ALT 15 05/13/2021    AST 27 05/13/2021    INR 1.2 02/22/2021    TSH 2.570 05/15/2021    LABA1C 6.0 (H) 04/15/2021     Recent Labs     05/21/21  2320   MG 1.8     Lab Results   Component Value Date    CALCIUM 9.7 05/24/2021    PHOS 2.7 08/28/2020        XR CHEST 1 VIEW   Final Result   No pneumothorax post left-sided PICC line placement. CHF. US DUP UPPER EXTREMITY LEFT VENOUS   Final Result   No evidence of DVT. XR CHEST PORTABLE   Final Result   No significant change since the prior study. Bilateral hazy diffuse   opacities may be related to combination of pleural fluid and/or pulmonary   infiltrate/edema         XR CHEST PORTABLE   Final Result   1.

## 2021-05-24 NOTE — DISCHARGE SUMMARY
Physician Discharge Summary     Patient ID:  Smita Mora  [id number]  80 y.o.  1935    Admit date: 5/13/2021    Discharge date and time:  5/24/2021    Discharge Diagnoses: Principal Problem:    Acute on chronic respiratory failure with hypoxia and hypercapnia (HCC)  Active Problems:    Chronic atrial fibrillation    Diabetes mellitus type 2, uncontrolled ([facility]    Essential hypertension    History of CVA (cerebrovascular accident)    Hyperlipidemia LDL goal <100    Acquired hypothyroidism    Morbid obesity with BMI of 40.0-44.9, adult (HCC)    Acute on chronic diastolic congestive heart failure ([address]    Noncompliance with treatment    Respiratory failure ([facility] [address]    Noncompliance    Hypercalcemia    Pyuria    Bacteremia due to vancomycin resistant Enterococcus  Resolved Problems:    * No resolved hospital problems.  *      Consults: IP CONSULT TO INTERNAL MEDICINE  IP CONSULT TO SOCIAL WORK  IP CONSULT TO SOCIAL WORK  IP CONSULT TO IV TEAM  IP CONSULT TO IV TEAM  IP CONSULT TO IV TEAM  IP CONSULT TO INFECTIOUS DISEASES  IP CONSULT TO IV TEAM  IP CONSULT TO IV TEAM  IP CONSULT TO IV TEAM  IP CONSULT TO IV TEAM    Procedures: See below    Hospital Course:   80-year-old female history of chronic respiratory failure, noncompliance admitted with respiratory failure after refusing to wear her BiPAP at night--admitted with acute on chronic diastolic CHF and acute on chronic respiratory failure.  + VRE bacteremia - GRACE    Acute on diastolic chronic CHF  Bilateral pulmonary edema chest x-ray 5/13  IV Bumex- transition to PO  Monitor I's and O's   Repeat chest x-ray 5/17 personally reviewed continued bilateral infiltrates most consistent with pulmonary edema    Bacteremia-VRE, -Suspected endocarditis  Unable to do GRACE.  + blood cultures +5/18, 5/19  NGTD 5/20  Daptomycin/ceftarolone 6wks  ID Consult appreciated -discussed case    Acute on chronic respiratory failure currently 4 L NC satting 100%  Nocturnal and as needed BiPAP  Supplemental O2 wean as tolerated baseline 3 L per nursing home  Nebulizers    Pyuria  Elevated procalcitonin, further increased to 0.68  Repeat UA/UCx    Stop calcitriol as patient is hypercalcemic    Noncompliance-Long discussion regarding importance of compliance with therapy and medications.   Patient acknowledges understanding    Medications for other co morbidities cont as appropriate w dosage adjustments as necessary  PT/OT  DVT PPx  DC planning SNF with 6 wks IV teflaro and Cubacin--Patient can discharge to SNF once PICC line is placed no pre-CERT necessary    DC to SNF today      Discharge Exam:  See progress note from today    Condition:  Stable    Disposition: SNF    Patient Instructions:   Current Discharge Medication List      START taking these medications    Details   ceftaroline fosamil (TEFLARO) 600 MG SOLR Infuse 400 mg intravenously every 12 hours  Qty: 34673 mg, Refills: 0      DAPTOmycin (CUBICIN) 500 MG injection Infuse 20 mLs intravenously daily         CONTINUE these medications which have NOT CHANGED    Details   mineral oil-hydrophilic petrolatum (AQUAPHOR) ointment Apply topically as needed (DRY FLAKY SKIN SKIN CARE AND PREVENTION) Indications: BLE      miconazole (MICOTIN) 2 % powder Apply topically as needed (SKIN CARE AND PREVENTION) Indications: CLEANSE UNDER LEFT BREAST WITH SOAP & H20, PAT DRY, THEN APPLY      !! insulin lispro (HUMALOG) 100 UNIT/ML injection vial Inject 0-10 Units into the skin 3 times daily (before meals) PER SLIDING SCALE: 150-200=2U, 201-250=4U, 251-300=6U, 301-350=8U, 351-400=10U, CALL MD IF <80 OR >400  *SEE OTHER ORDER*      !! albuterol-ipratropium (COMBIVENT RESPIMAT)  MCG/ACT AERS inhaler Inhale 1 puff into the lungs 3 times daily *SEE OTHER ORDER*      magnesium hydroxide (MILK OF MAGNESIA) 400 MG/5ML suspension Take 30 mLs by mouth daily as needed for Constipation Indications: GIVE IF NO BM IN 48HRS & NO RESULTS FROM HIGH FIBER JUICE Insulin Glargine, 1 Unit Dial, (TOUJEO SOLOSTAR) 300 UNIT/ML SOPN Inject 40 Units into the skin nightly Indications: HOLD IF FSBS <80      bumetanide (BUMEX) 1 MG tablet Take 1 tablet by mouth daily  Qty: 30 tablet, Refills: 3      fluticasone (FLONASE) 50 MCG/ACT nasal spray 1 spray by Each Nostril route daily       polyethylene glycol (GLYCOLAX) 17 g packet Take 17 g by mouth daily      !! insulin lispro (HUMALOG) 100 UNIT/ML injection vial Inject 12 Units into the skin 3 times daily (before meals) *SEE OTHER ORDER*      pantoprazole (PROTONIX) 40 MG tablet Take 40 mg by mouth 2 times daily      metoprolol succinate (TOPROL XL) 50 MG extended release tablet Take 50 mg by mouth daily Indications: HOLD IF SBP <100 AND/OR HR <60       ondansetron (ZOFRAN) 4 MG tablet Take 4 mg by mouth every 6 hours as needed for Nausea or Vomiting       apixaban (ELIQUIS) 2.5 MG TABS tablet Take 2.5 mg by mouth 2 times daily       !! albuterol-ipratropium (COMBIVENT RESPIMAT)  MCG/ACT AERS inhaler Inhale 2 puffs into the lungs every 6 hours as needed for Shortness of Breath *SEE OTHER ORDER*      bisacodyl (DULCOLAX) 10 MG suppository Place 10 mg rectally daily as needed for Constipation Indications: GIVE IF NO RESULTS FROM MOM       Multiple Vitamins-Minerals (THERAPEUTIC MULTIVITAMIN-MINERALS) tablet Take 1 tablet by mouth daily      lidocaine 4 % external patch Place 1 patch onto the skin 2 times daily Indications: LEFT ARM/SHOULDER (12HRS ON/12HRS OFF) APPLY IN AM REMOVE AT HS       potassium chloride (KLOR-CON M) 10 MEQ extended release tablet Take 10 mEq by mouth 2 times daily      acetaminophen (TYLENOL) 325 MG tablet Take 650 mg by mouth every 4 hours as needed for Pain or Fever       calcitRIOL (ROCALTROL) 0.25 MCG capsule Take 0.5 mcg by mouth daily       loratadine (CLARITIN) 10 MG tablet Take 10 mg by mouth daily      melatonin 5 MG TABS tablet Take 5 mg by mouth nightly       guaiFENesin (MUCINEX) 600 MG extended release tablet Take 600 mg by mouth every 12 hours as needed for Congestion (COUGH)       Cholecalciferol (VITAMIN D3) 5000 units TABS Take 5,000 Units by mouth daily       ferrous sulfate 325 (65 Fe) MG tablet Take 1 tablet by mouth 2 times daily (with meals)  Qty: 30 tablet, Refills: 0      docusate sodium (COLACE, DULCOLAX) 100 MG CAPS Take 200 mg by mouth nightly      Mirabegron ER 50 MG TB24 Take 50 mg by mouth every evening       simvastatin (ZOCOR) 40 MG tablet Take 40 mg by mouth nightly.      gabapentin (NEURONTIN) 300 MG capsule Take 300 mg by mouth nightly. .      levothyroxine (SYNTHROID) 25 MCG tablet Take 25 mcg by mouth Daily       ! ! - Potential duplicate medications found. Please discuss with provider.       STOP taking these medications       insulin glargine (TOUJEO SOLOSTAR) 300 UNIT/ML injection pen Comments:   Reason for Stopping:             Activity: activity as tolerated  Diet: cardiac diet diabetic    Follow-up with PCP 1wk, ID 1mth, cardio 1mth    Note that over 30 minutes was spent in preparing discharge papers, discussing discharge with patient, staff, consultants, medication review, arranging follow up, etc.    Signed:  Ag Rios MD  5/24/2021  1:50 PM

## 2023-04-20 NOTE — CARE COORDINATION
CM NOTE: Per QFR---- covid negative. Continue IV diuresis bid. INR 2.3 on coumadin. Renal, cardiology & pulmonology following pt. Repeat CXR for possible tap. [facility] 8/24. Plan is return to [facility] when stable for discharge. Will needs therapy evals (completed)--- pt does not have to wait for insurance auth. CM & SW continue to follow pt. Intermediate Repair And Graft Additional Text (Will Appearing After The Standard Complex Repair Text): The intermediate repair was not sufficient to completely close the primary defect. The remaining additional defect was repaired with the graft mentioned below.

## (undated) DEVICE — COAGULATOR SUCT 10FR LAIN FTSWCH ACTIVATION DISP VALLEYLAB

## (undated) DEVICE — DUAL LUMEN STOMACH TUBE: Brand: SALEM SUMP

## (undated) DEVICE — DOUBLE BASIN SET: Brand: MEDLINE INDUSTRIES, INC.

## (undated) DEVICE — ELECTRODE PT RET AD L9FT HI MOIST COND ADH HYDRGEL CORDED

## (undated) DEVICE — BUTTON SEPT SILIC1 1 PC STYL

## (undated) DEVICE — YANKAUER,OPEN TIP,W/O VENT,STERILE: Brand: MEDLINE INDUSTRIES, INC.

## (undated) DEVICE — ANTI-FOG SOLUTION WITH FOAM PAD: Brand: DEVON

## (undated) DEVICE — SHEATH 1912000 5PK 4MM/0DEG STORZ XOMED: Brand: ENDO-SCRUB®

## (undated) DEVICE — GOWN,SIRUS,FABRNF,L,20/CS: Brand: MEDLINE

## (undated) DEVICE — PACKING 470404 MEROCEL 2000 10PK 8CM: Brand: MEROCEL®

## (undated) DEVICE — SYRINGE, LUER LOCK, 10ML: Brand: MEDLINE

## (undated) DEVICE — SPONGE GZ W4XL4IN RAYON POLY FILL CVR W/ NONWOVEN FAB

## (undated) DEVICE — CODMAN® SURGICAL PATTIES 1/2" X 3" (1.27CM X 7.62CM): Brand: CODMAN®

## (undated) DEVICE — TOWEL,OR,DSP,ST,BLUE,STD,6/PK,12PK/CS: Brand: MEDLINE

## (undated) DEVICE — 4-PORT MANIFOLD: Brand: NEPTUNE 2

## (undated) DEVICE — SURGICAL PROCEDURE PACK EENT CUST

## (undated) DEVICE — TOTAL TRAY, DB, 100% SILI FOLEY, 16FR 10: Brand: MEDLINE

## (undated) DEVICE — MARKER,SKIN,WI/RULER AND LABELS: Brand: MEDLINE

## (undated) DEVICE — BANDAGE,GAUZE,BULKEE II,4.5"X4.1YD,STRL: Brand: MEDLINE

## (undated) DEVICE — SINU FOAM: Brand: SINU-FOAM

## (undated) DEVICE — Device